# Patient Record
Sex: MALE | Race: WHITE | NOT HISPANIC OR LATINO | Employment: OTHER | URBAN - METROPOLITAN AREA
[De-identification: names, ages, dates, MRNs, and addresses within clinical notes are randomized per-mention and may not be internally consistent; named-entity substitution may affect disease eponyms.]

---

## 2017-01-11 ENCOUNTER — GENERIC CONVERSION - ENCOUNTER (OUTPATIENT)
Dept: OTHER | Facility: OTHER | Age: 62
End: 2017-01-11

## 2017-01-12 ENCOUNTER — ALLSCRIPTS OFFICE VISIT (OUTPATIENT)
Dept: OTHER | Facility: OTHER | Age: 62
End: 2017-01-12

## 2017-01-12 ENCOUNTER — TRANSCRIBE ORDERS (OUTPATIENT)
Dept: ADMINISTRATIVE | Facility: HOSPITAL | Age: 62
End: 2017-01-12

## 2017-01-12 ENCOUNTER — APPOINTMENT (OUTPATIENT)
Dept: LAB | Facility: HOSPITAL | Age: 62
End: 2017-01-12
Attending: SURGERY
Payer: COMMERCIAL

## 2017-01-12 DIAGNOSIS — R10.32 PAIN IN THE GROIN, LEFT: Primary | ICD-10-CM

## 2017-01-12 DIAGNOSIS — R10.30 LOWER ABDOMINAL PAIN: ICD-10-CM

## 2017-01-12 DIAGNOSIS — O01.9 TROPHOBLASTIC DISEASE: Primary | ICD-10-CM

## 2017-01-12 LAB
ANION GAP SERPL CALCULATED.3IONS-SCNC: 6 MMOL/L (ref 4–13)
BUN SERPL-MCNC: 14 MG/DL (ref 5–25)
CALCIUM SERPL-MCNC: 9.4 MG/DL (ref 8.3–10.1)
CHLORIDE SERPL-SCNC: 102 MMOL/L (ref 100–108)
CO2 SERPL-SCNC: 30 MMOL/L (ref 21–32)
CREAT SERPL-MCNC: 1.06 MG/DL (ref 0.6–1.3)
GFR SERPL CREATININE-BSD FRML MDRD: >60 ML/MIN/1.73SQ M
GLUCOSE SERPL-MCNC: 90 MG/DL (ref 65–140)
POTASSIUM SERPL-SCNC: 4.1 MMOL/L (ref 3.5–5.3)
SODIUM SERPL-SCNC: 138 MMOL/L (ref 136–145)

## 2017-01-12 PROCEDURE — 80048 BASIC METABOLIC PNL TOTAL CA: CPT

## 2017-01-12 PROCEDURE — 36415 COLL VENOUS BLD VENIPUNCTURE: CPT

## 2017-01-16 RX ORDER — DULOXETIN HYDROCHLORIDE 60 MG/1
90 CAPSULE, DELAYED RELEASE ORAL DAILY
COMMUNITY
End: 2018-02-28 | Stop reason: ALTCHOICE

## 2017-01-16 RX ORDER — POLYETHYLENE GLYCOL 3350 17 G/17G
17 POWDER, FOR SOLUTION ORAL DAILY
COMMUNITY
End: 2018-02-28 | Stop reason: ALTCHOICE

## 2017-01-18 ENCOUNTER — ALLSCRIPTS OFFICE VISIT (OUTPATIENT)
Dept: OTHER | Facility: OTHER | Age: 62
End: 2017-01-18

## 2017-01-19 ENCOUNTER — GENERIC CONVERSION - ENCOUNTER (OUTPATIENT)
Dept: OTHER | Facility: OTHER | Age: 62
End: 2017-01-19

## 2017-01-19 ENCOUNTER — HOSPITAL ENCOUNTER (OUTPATIENT)
Dept: CT IMAGING | Facility: HOSPITAL | Age: 62
Discharge: HOME | End: 2017-01-19
Attending: SURGERY
Payer: COMMERCIAL

## 2017-01-19 DIAGNOSIS — R10.30 LOWER ABDOMINAL PAIN: ICD-10-CM

## 2017-01-19 PROCEDURE — 72193 CT PELVIS W/DYE: CPT

## 2017-01-19 RX ADMIN — IOHEXOL 100 ML: 350 INJECTION, SOLUTION INTRAVENOUS at 11:58

## 2017-01-24 ENCOUNTER — HOSPITAL ENCOUNTER (OUTPATIENT)
Facility: HOSPITAL | Age: 62
Setting detail: OUTPATIENT SURGERY
Discharge: HOME | End: 2017-01-24
Attending: SURGERY | Admitting: SURGERY
Payer: COMMERCIAL

## 2017-01-24 ENCOUNTER — ANESTHESIA EVENT (OUTPATIENT)
Dept: PERIOP | Facility: HOSPITAL | Age: 62
End: 2017-01-24
Payer: COMMERCIAL

## 2017-01-24 ENCOUNTER — ANESTHESIA (OUTPATIENT)
Dept: PERIOP | Facility: HOSPITAL | Age: 62
End: 2017-01-24
Payer: COMMERCIAL

## 2017-01-24 VITALS
RESPIRATION RATE: 18 BRPM | WEIGHT: 280 LBS | DIASTOLIC BLOOD PRESSURE: 68 MMHG | HEIGHT: 72 IN | SYSTOLIC BLOOD PRESSURE: 127 MMHG | OXYGEN SATURATION: 94 % | HEART RATE: 88 BPM | TEMPERATURE: 99.6 F | BODY MASS INDEX: 37.93 KG/M2

## 2017-01-24 RX ORDER — SODIUM CHLORIDE 9 MG/ML
20 INJECTION, SOLUTION INTRAVENOUS CONTINUOUS
Status: DISCONTINUED | OUTPATIENT
Start: 2017-01-24 | End: 2017-01-24 | Stop reason: HOSPADM

## 2017-01-24 RX ORDER — PROPOFOL 10 MG/ML
INJECTION, EMULSION INTRAVENOUS AS NEEDED
Status: DISCONTINUED | OUTPATIENT
Start: 2017-01-24 | End: 2017-01-24 | Stop reason: SURG

## 2017-01-24 RX ADMIN — PROPOFOL 50 MG: 10 INJECTION, EMULSION INTRAVENOUS at 08:31

## 2017-01-24 RX ADMIN — SODIUM CHLORIDE 20 ML/HR: 0.9 INJECTION, SOLUTION INTRAVENOUS at 07:22

## 2017-01-24 RX ADMIN — PROPOFOL 50 MG: 10 INJECTION, EMULSION INTRAVENOUS at 08:36

## 2017-01-24 RX ADMIN — PROPOFOL 50 MG: 10 INJECTION, EMULSION INTRAVENOUS at 08:24

## 2017-01-24 RX ADMIN — PROPOFOL 50 MG: 10 INJECTION, EMULSION INTRAVENOUS at 08:29

## 2017-01-24 RX ADMIN — PROPOFOL 50 MG: 10 INJECTION, EMULSION INTRAVENOUS at 08:39

## 2017-01-24 RX ADMIN — PROPOFOL 50 MG: 10 INJECTION, EMULSION INTRAVENOUS at 08:27

## 2017-01-24 RX ADMIN — PROPOFOL 50 MG: 10 INJECTION, EMULSION INTRAVENOUS at 08:33

## 2017-01-24 RX ADMIN — PROPOFOL 50 MG: 10 INJECTION, EMULSION INTRAVENOUS at 08:22

## 2017-01-30 ENCOUNTER — GENERIC CONVERSION - ENCOUNTER (OUTPATIENT)
Dept: OTHER | Facility: OTHER | Age: 62
End: 2017-01-30

## 2017-03-22 ENCOUNTER — ALLSCRIPTS OFFICE VISIT (OUTPATIENT)
Dept: OTHER | Facility: OTHER | Age: 62
End: 2017-03-22

## 2017-03-28 ENCOUNTER — ALLSCRIPTS OFFICE VISIT (OUTPATIENT)
Dept: OTHER | Facility: OTHER | Age: 62
End: 2017-03-28

## 2017-04-10 ENCOUNTER — ALLSCRIPTS OFFICE VISIT (OUTPATIENT)
Dept: RADIOLOGY | Facility: CLINIC | Age: 62
End: 2017-04-10
Payer: COMMERCIAL

## 2017-04-24 ENCOUNTER — ALLSCRIPTS OFFICE VISIT (OUTPATIENT)
Dept: RADIOLOGY | Facility: CLINIC | Age: 62
End: 2017-04-24
Payer: COMMERCIAL

## 2017-05-01 ENCOUNTER — ALLSCRIPTS OFFICE VISIT (OUTPATIENT)
Dept: OTHER | Facility: OTHER | Age: 62
End: 2017-05-01

## 2017-05-09 ENCOUNTER — GENERIC CONVERSION - ENCOUNTER (OUTPATIENT)
Dept: OTHER | Facility: OTHER | Age: 62
End: 2017-05-09

## 2017-06-02 DIAGNOSIS — R73.9 HYPERGLYCEMIA: ICD-10-CM

## 2017-06-02 DIAGNOSIS — E78.5 HYPERLIPIDEMIA: ICD-10-CM

## 2017-06-02 DIAGNOSIS — F11.20 UNCOMPLICATED OPIOID DEPENDENCE (HCC): ICD-10-CM

## 2017-06-02 DIAGNOSIS — G89.4 CHRONIC PAIN SYNDROME: ICD-10-CM

## 2017-06-09 ENCOUNTER — APPOINTMENT (OUTPATIENT)
Dept: LAB | Facility: HOSPITAL | Age: 62
End: 2017-06-09
Payer: COMMERCIAL

## 2017-06-09 ENCOUNTER — TRANSCRIBE ORDERS (OUTPATIENT)
Dept: ADMINISTRATIVE | Facility: HOSPITAL | Age: 62
End: 2017-06-09

## 2017-06-09 DIAGNOSIS — R73.9 HYPERGLYCEMIA: ICD-10-CM

## 2017-06-09 DIAGNOSIS — E78.5 HYPERLIPIDEMIA: ICD-10-CM

## 2017-06-09 LAB
ALBUMIN SERPL BCP-MCNC: 3.7 G/DL (ref 3.5–5)
ALP SERPL-CCNC: 58 U/L (ref 46–116)
ALT SERPL W P-5'-P-CCNC: 36 U/L (ref 12–78)
ANION GAP SERPL CALCULATED.3IONS-SCNC: 6 MMOL/L (ref 4–13)
AST SERPL W P-5'-P-CCNC: 21 U/L (ref 5–45)
BILIRUB SERPL-MCNC: 0.5 MG/DL (ref 0.2–1)
BUN SERPL-MCNC: 14 MG/DL (ref 5–25)
CALCIUM SERPL-MCNC: 8.9 MG/DL (ref 8.3–10.1)
CHLORIDE SERPL-SCNC: 104 MMOL/L (ref 100–108)
CHOLEST SERPL-MCNC: 151 MG/DL (ref 50–200)
CO2 SERPL-SCNC: 31 MMOL/L (ref 21–32)
CREAT SERPL-MCNC: 1.06 MG/DL (ref 0.6–1.3)
ERYTHROCYTE [DISTWIDTH] IN BLOOD BY AUTOMATED COUNT: 16.1 % (ref 11.6–15.1)
EST. AVERAGE GLUCOSE BLD GHB EST-MCNC: 117 MG/DL
GFR SERPL CREATININE-BSD FRML MDRD: >60 ML/MIN/1.73SQ M
GLUCOSE P FAST SERPL-MCNC: 106 MG/DL (ref 65–99)
HBA1C MFR BLD: 5.7 % (ref 4.2–6.3)
HCT VFR BLD AUTO: 48.8 % (ref 36.5–49.3)
HDLC SERPL-MCNC: 46 MG/DL (ref 40–60)
HGB BLD-MCNC: 16.2 G/DL (ref 12–17)
LDLC SERPL CALC-MCNC: 82 MG/DL (ref 0–100)
MCH RBC QN AUTO: 29.5 PG (ref 26.8–34.3)
MCHC RBC AUTO-ENTMCNC: 33.2 G/DL (ref 31.4–37.4)
MCV RBC AUTO: 89 FL (ref 82–98)
PLATELET # BLD AUTO: 245 THOUSANDS/UL (ref 149–390)
PMV BLD AUTO: 8.5 FL (ref 8.9–12.7)
POTASSIUM SERPL-SCNC: 3.8 MMOL/L (ref 3.5–5.3)
PROT SERPL-MCNC: 8 G/DL (ref 6.4–8.2)
RBC # BLD AUTO: 5.49 MILLION/UL (ref 3.88–5.62)
SODIUM SERPL-SCNC: 141 MMOL/L (ref 136–145)
TRIGL SERPL-MCNC: 115 MG/DL
TSH SERPL DL<=0.05 MIU/L-ACNC: 1.34 UIU/ML (ref 0.36–3.74)
WBC # BLD AUTO: 6.21 THOUSAND/UL (ref 4.31–10.16)

## 2017-06-09 PROCEDURE — 80053 COMPREHEN METABOLIC PANEL: CPT

## 2017-06-09 PROCEDURE — 80061 LIPID PANEL: CPT

## 2017-06-09 PROCEDURE — 85027 COMPLETE CBC AUTOMATED: CPT

## 2017-06-09 PROCEDURE — 36415 COLL VENOUS BLD VENIPUNCTURE: CPT

## 2017-06-09 PROCEDURE — 84443 ASSAY THYROID STIM HORMONE: CPT

## 2017-06-09 PROCEDURE — 83036 HEMOGLOBIN GLYCOSYLATED A1C: CPT

## 2017-06-19 ENCOUNTER — ALLSCRIPTS OFFICE VISIT (OUTPATIENT)
Dept: OTHER | Facility: OTHER | Age: 62
End: 2017-06-19

## 2017-06-20 ENCOUNTER — ALLSCRIPTS OFFICE VISIT (OUTPATIENT)
Dept: OTHER | Facility: OTHER | Age: 62
End: 2017-06-20

## 2017-06-22 ENCOUNTER — APPOINTMENT (OUTPATIENT)
Dept: LAB | Facility: HOSPITAL | Age: 62
End: 2017-06-22
Payer: COMMERCIAL

## 2017-06-22 DIAGNOSIS — F11.20 UNCOMPLICATED OPIOID DEPENDENCE (HCC): ICD-10-CM

## 2017-06-22 DIAGNOSIS — G89.4 CHRONIC PAIN SYNDROME: ICD-10-CM

## 2017-06-22 PROCEDURE — 36415 COLL VENOUS BLD VENIPUNCTURE: CPT

## 2017-06-22 PROCEDURE — 84403 ASSAY OF TOTAL TESTOSTERONE: CPT

## 2017-06-22 PROCEDURE — 84402 ASSAY OF FREE TESTOSTERONE: CPT

## 2017-06-23 LAB
TESTOST FREE SERPL-MCNC: 4.6 PG/ML (ref 6.6–18.1)
TESTOST SERPL-MCNC: 405 NG/DL (ref 348–1197)
TESTOSTERONE COMMENT: ABNORMAL

## 2017-06-26 ENCOUNTER — GENERIC CONVERSION - ENCOUNTER (OUTPATIENT)
Dept: OTHER | Facility: OTHER | Age: 62
End: 2017-06-26

## 2017-08-28 ENCOUNTER — ALLSCRIPTS OFFICE VISIT (OUTPATIENT)
Dept: OTHER | Facility: OTHER | Age: 62
End: 2017-08-28

## 2017-09-12 ENCOUNTER — GENERIC CONVERSION - ENCOUNTER (OUTPATIENT)
Dept: OTHER | Facility: OTHER | Age: 62
End: 2017-09-12

## 2017-09-12 ENCOUNTER — ALLSCRIPTS OFFICE VISIT (OUTPATIENT)
Dept: OTHER | Facility: OTHER | Age: 62
End: 2017-09-12

## 2017-09-15 ENCOUNTER — GENERIC CONVERSION - ENCOUNTER (OUTPATIENT)
Dept: OTHER | Facility: OTHER | Age: 62
End: 2017-09-15

## 2017-10-09 ENCOUNTER — GENERIC CONVERSION - ENCOUNTER (OUTPATIENT)
Dept: OTHER | Facility: OTHER | Age: 62
End: 2017-10-09

## 2017-10-11 ENCOUNTER — GENERIC CONVERSION - ENCOUNTER (OUTPATIENT)
Dept: OTHER | Facility: OTHER | Age: 62
End: 2017-10-11

## 2017-10-13 ENCOUNTER — ALLSCRIPTS OFFICE VISIT (OUTPATIENT)
Dept: RADIOLOGY | Facility: CLINIC | Age: 62
End: 2017-10-13
Payer: COMMERCIAL

## 2017-10-26 NOTE — PROGRESS NOTES
Assessment  1  Chronic pain syndrome (338 4) (G89 4)   2  Lumbar post-laminectomy syndrome (722 83) (M96 1)   3  Lumbar degenerative disc disease (722 52) (M51 36)   4  Chronic lumbar radiculopathy (724 4) (M54 16)   5  Continuous opioid dependence (304 01) (F11 20)    Plan  Chronic lumbar radiculopathy    · Hydrocodone-Acetaminophen  MG Oral Tablet; Take 1/2-1 tablet QID PRN  for break through pain; Do Not Fill Before: [id number]   Rx By: Sudhir Wolf; Dispense: 30 Days ; #:70 Tablet; Refill: 0;For: Chronic lumbar radiculopathy; TING = Y; Print Rx  Chronic lumbar radiculopathy, Chronic pain syndrome, Lumbar degenerative disc  disease, Peripheral neuropathy    · DULoxetine HCl - 60 MG Oral Capsule Delayed Release Particles; TAKE 1 PILL  DAILY   Rx By: Sudhir Wolf; Dispense: 90 Days ; #:90 Capsule Delayed Release Particles; Refill: 0;For: Chronic lumbar radiculopathy, Chronic pain syndrome, Lumbar degenerative disc disease, Peripheral neuropathy; TING = N; Verified Transmission to [address]; Last Updated By: System, [employer]; 9/12/2017 8:28:47 AM  Lumbar post-laminectomy syndrome    · DULoxetine HCl - 30 MG Oral Capsule Delayed Release Particles; Take 1 PO HS  with 60 mg to equal 90 mg daily   Rx By: Sudhir Wolf; Dispense: 90 Days ; #:90 Capsule Delayed Release Particles; Refill: 0;For: Lumbar post-laminectomy syndrome; TING = N; Verified Transmission to [address]; Last Updated By: System, [employer]; 9/12/2017 8:35:59 AM  Unlinked    · TraZODone HCl - 150 MG Oral Tablet   Dispense: 30 Days ; #:30 Tablet; Refill: 2; TING = N; Record; Last Updated By: Chico Humphreys; 9/12/2017 8:04:16 AM    Discussion/Summary    The patient is a 53-year-old male with history of chronic pain syndrome secondary to lumbar postlaminectomy syndrome, lumbar canal stenosis, chronic lumbar radiculopathy, lumbar degenerative disc disease, and peripheral neuropathy   Patient has permanent implanted [employer] spinal cord stimulator  At this time patient continues with ongoing low back pain with radicular symptoms consistent with lumbar postlaminectomy syndrome  Patient reports that he is well managed with the use of his spinal cord stimulator and medications for his pain  the patient reports that duloxetine 60 mg is not as efficient as it was when starting the medication therefore will increase his duloxetine to 90 mg daily  He was given a prescription for 60 mg 1 tablet at bedtime, and a prescription for 30 mg 1 tablet at bedtime to equal 90 mg total  Patient was instructed to call the office if he develops medication side effects or with worsening of symptoms  Patient will continue on hydrocodone/acetaminophen 10/325 mg half a tablet to one tablet 4 times a day as needed for pain  I had a thorough conversation with patient about his medication regimen  We discussed once he increased to 90 mg of duloxetine for 1 month he might not need as much hydrocodone/acetaminophen therefore I will continue to give him 75 tablets for next month, and then decrease down to 70 tablets a month after  Patient verbalized understanding  He was given a prescription for this month with a do not fill date of 9/29/2017 for 75 tablets  He was given a prescription for the following month with a do not fill date of 10/27/2017 for 70 tablets  recently had his spinal cord stimulator reprogramed 3 weeks ago is continuing to provide him good relief  patient was office today he was educated on taking benzodiazepines and opiate medication  He was cautioned that taking these 2 medications concurrently can cause increased respiratory depression and possible death  Patient verbalized understanding  He stated he would not take his opioid medications concurrently with his benzodiazepine medication  Patient also reported that he takes his benzodiazepine medication very sparingly, and on a as-needed basis only    prescription monitoring drug program report was reviewed and was appropriate  risk of opioid medications, including dependence, addiction and tolerance were explained to the patient  The patient understands and agrees to use these medications only as prescribed  I have fully discussed the potential side effects of the medication with the patient, which include, but are not limited to, constipation, drowsiness, addiction, impaired judgment and risk of fatal overdose as not taken as prescribed  I have warned the patient that sharing medications is a felony  I warned against driving while taking sedating medications  At this point in time, the patient is showing no signs of addiction, abuse, diversion or suicidal ideation  extra prescription for this patient was printed in error, prescription was voided and scanned into patients chart  The patient has the current Goals: Manage pain to tolerable level so patient can continue with activities of daily living  The patent has the current Barriers: None  Patient is able to Self-Care  Possible side effects of new medications were reviewed with the patient/guardian today  The treatment plan was reviewed with the patient/guardian  The patient/guardian understands and agrees with the treatment plan   The patient was counseled regarding instructions for management,-- risk factor reductions,-- prognosis,-- patient and family education,-- impressions,-- risks and benefits of treatment options-- and-- importance of compliance with treatment  total time of encounter was 25 minutes  There are risks associated with opiod medications, including dependence, addiction and tolerance  The patient understands and agrees to use these medications only as prescribed  Potential side effects of the medications include, but are not limited to, constipation, drowsiness, addiction, impaired judgment and risk of fatal overdose if not taken as prescribed  Sharing medications is a felony   At this point and time, the patient is showing no signs of addiction, abuse, diversion or suicidal ideation  Chief Complaint  1  Pain  Back Pain      History of Present Illness  The patient is a 46-year-old male with history of Chronic pain syndrome secondary to lumbar postlaminectomy syndrome, lumbar canal stenosis, chronic lumbar radiculopathy, lumbar degenerative disc disease, and peripheral neuropathy  Patient has permanent implanted [employer] spinal cord stimulator  Patient was last seen in the office on June 20, 2017, where he was continued on Hydrocodone/acetaminophen 10/325 mg half a tablet to 1 tablet 4 times a day as needed for pain  At this time patient presents with ongoing low back pain that radiates into his right calf and bilateral anterior aspect of his lower legs  He reports that his symptoms and pain are unchanged since last visit  His pain is constant occurring throughout the day  He describes his pain as dull aching, sharp, shooting, numbness, pins and needles, stabbing  is currently taking Hydrocodone/acetaminophen 10/325 mg half a tablet to one tablet 4 times a day as needed for pain, duloxetine 60 mg 1 tablet at bedtime  Patient reports that these medications provide him overall 60% pain relief  However patient reports recently that the duloxetine is not working as efficiently as it had been in the past  He denies bowel or bladder issues or medication side effects  reports that he had spinal cord stimulator reprogramming 3 weeks ago with positive results  Watson Amezquita presents with complaints of constant episodes of moderate bilateral lower back, left buttock, bilateral lower leg, bilateral ankle and bilateral foot pain, described as sharp, dull and aching, radiating to the lower back and bilateral lower extremity  On a scale of 1 to 10, the patient rates the pain as 7  Symptoms are unchanged        Review of Systems    Constitutional: no fever,-- no recent weight gain-- and-- no recent weight loss  Eyes: no double vision-- and-- no blurry vision  Cardiovascular: no chest pain,-- no palpitations-- and-- no lower extremity edema  Respiratory: no complaints of shortness of breath-- and-- no wheezing  Musculoskeletal: difficulty walking,-- muscle weakness,-- joint stiffness,-- pain in extremity -- and-- decreased range of motion, but-- no joint swelling-- and-- no limb swelling  Neurological: no dizziness,-- no difficulty swallowing,-- no memory loss,-- no loss of consciousness-- and-- no seizures  Gastrointestinal: no nausea,-- no vomiting,-- no constipation-- and-- no diarrhea  Genitourinary: no difficulty initiating urine stream,-- no genital pain-- and-- no frequent urination  Integumentary: no complaints of skin rash  Psychiatric: no depression  Endocrine: no excessive thirst,-- no adrenal disease,-- no hypothyroidism-- and-- no hyperthyroidism  Hematologic/Lymphatic: no tendency for easy bruising-- and-- no tendency for easy bleeding  ROS reviewed  Active Problems  1  Bursitis of hip (726 5) (M70 70)   2  Carpal tunnel syndrome, unspecified laterality (354 0) (G56 00)   3  Chronic bilateral low back pain with bilateral sciatica (724 2,724 3,338 29)   (M54 42,M54 41,G89 29)   4  Chronic lumbar radiculopathy (724 4) (M54 16)   5  Chronic pain syndrome (338 4) (G89 4)   6  Constipation due to opioid therapy (564 09,E935 2) (K59 03,T40 2X5A)   7  Continuous opioid dependence (304 01) (F11 20)   8  Depression (311) (F32 9)   9  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)   10  Edema (782 3) (R60 9)   11  Encounter for long-term opiate analgesic use (V58 69) (Z79 891)   12  Hyperglycemia (790 29) (R73 9)   13  Hyperlipidemia (272 4) (E78 5)   14  Hypertension (401 9) (I10)   15  Lateral epicondylitis (726 32) (M77 10)   16  Left groin pain (789 09) (R10 32)   17  Left inguinal hernia (550 90) (K40 90)   18  Lumbar canal stenosis (724 02) (M48 06)   19   Lumbar degenerative disc disease (722 52) (M51 36)   20  Lumbar post-laminectomy syndrome (722 83) (M96 1)   21  Lumbar sprain, sequela (905 7,847 2) (S33 5XXS)   22  Morbid obesity due to excess calories (278 01) (E66 01)   23  Neuropathy, ilioinguinal nerve, 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)   24  Obesity (278 00) (E66 9)   25  Pain in left hip (719 45) (M25 552)   26  Partial thickness burn of right foot, initial encounter (945 22) (T25 221A)   27  Peripheral neuropathy (356 9) (G62 9)   28  Persistent insomnia (307 42) (G47 00)   29  Rectus diastasis (728 84) (M62 08)   30  Restless legs syndrome (333 94) (G25 81)   31  Retrolisthesis of vertebrae (738 4) (M43 10)   32  Staphylococcus infection (041 10) (B95 8)   33  Thoracic radiculopathy (724 4) (M54 14)   34  Umbilical hernia without obstruction and without gangrene (553 1) (K42 9)    Past Medical History  1  History of Acute low back pain (724 2) (M54 5)   2  History of Deep vein thrombosis of left upper extremity (453 82) (I82 622)   3  History of Diverticula of colon (562 10) (K57 30)   4  History of angina pectoris (V12 59) (Z86 79)   5  History of backache (V13 59) (Z87 39)   6  History of sleep disturbance (V13 89) (Z87 898)   7  Denied: History of substance abuse   8  History of Leg numbness (782 0) (R20 0)   9  History of Leg pain, bilateral (729 5) (M79 604,M79 605)   10  History of Leg weakness (729 89) (R29 898)   11  History of Methicillin Resistant Staphylococcus Aureus Septicemia (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 04)    The active problems and past medical history were reviewed and updated today  Surgical History  1  History of Arthroscopy Knee Right   2  History of Back Surgery   3  History of Complete Colonoscopy   4  History of Elbow Surgery   5  History of Incision And Drainage Of Skin Abscess   6  History of Neck Surgery   7  History of Shoulder Surgery    The surgical history was reviewed and updated today  Family History  Mother    1  Family history of Carcinoma   2  Denied: Family history of substance abuse   3  Family history of Heart Disease (V17 49)   4  Denied: Family history of Mental health problem  Father    5  Family history of Carcinoma   6  Denied: Family history of substance abuse   7  Family history of Heart Disease (V17 49)   8  Denied: Family history of Mental health problem    The family history was reviewed and updated today  Social History   · Being A Social Drinker   · Currently sexually active   · Disabled   · Marital History - Currently    · Never a smoker   · No drug use   · No known risk factors   · No known STD risk factors   · Non-smoker (V49 89) (Z78 9)   · Three children  The social history was reviewed and updated today  The social history was reviewed and is unchanged  Current Meds   1  Aleve 220 MG Oral Tablet; Take 1 tablet twice daily; Therapy: (Maribel King) to Recorded   2  Aspirin 81 MG CAPS; take 1 capsule daily; Therapy: (Maribel King) to Recorded   3  Bisoprolol-Hydrochlorothiazide 2 5-6 25 MG Oral Tablet; take one tablet by mouth every   day; Therapy: 64MVO8997 to (Last Rx:92Bxc9074)  Requested for: 22OJB2198 Ordered   4  ClonazePAM 0 5 MG Oral Tablet; TAKE ONE TABLET BY MOUTH DAILY IN THE EVENING   FOR RESTLESS LEGS; Therapy: 18RRA7704 to (Last Rx:31May2017)  Requested for: 53HCK4176 Ordered   5  Daily Multivitamin TABS; TAKE 1 TABLET DAILY; Therapy: (Maribel King) to Recorded   6  DiazePAM 10 MG Oral Tablet; TAKE TABLET  1/2-1 tablet as needed daily; Last   Rx:19Jun2017 Ordered   7  DULoxetine HCl - 60 MG Oral Capsule Delayed Release Particles; TAKE 1 PILL DAILY; Therapy: 16Nbn2099 to (Evaluate:13Fif0647)  Requested for: 20Jun2017; Last   Rx:20Jun2017 Ordered   8  Hydrocodone-Acetaminophen  MG Oral Tablet; Take 1/2-1 tablet QID PRIN for   break through pain; Therapy: 69GQN4434 to (Evaluate:66Uho6800)  Requested for: 20Jun2017; Last   Rx:20Jun2017 Ordered   9   Lactulose 10 GM/15ML Oral Solution; TAKE 1 TABLESPOONFUL DAILY prn for   constipation; Therapy: 37OHI7066 to (Evaluate:30Smc1998)  Requested for: 20Jun2017; Last   Rx:20Jun2017 Ordered   10  OxyMORphone HCl ER 10 MG Oral Tablet Extended Release 12 Hour; Take 1 pill daily    PRN for severe break through pain days; Last Rx:18Jan2017 Ordered   11  Rosuvastatin Calcium 10 MG Oral Tablet; TAKE 1 TABLET DAILY; Therapy: 01Gok5702 to (Evaluate:37Sap9843)  Requested for: 63Gwj4004; Last    Rx:75Dza1407 Ordered   12  Silver Sulfadiazine 1 % External Cream; APPLY TO AFFECTED AREA 3 TIMES DAILY; Therapy: 97Zkl7373 to (Evaluate:23Oct2017)  Requested for: 59Jcp4372; Last    Rx:96Zht5872 Ordered   13  TraZODone HCl - 150 MG Oral Tablet; TAKE 1 TABLET AT BEDTIME Recorded    The medication list was reviewed and updated today  Allergies  1  fentanyl   2  Penicillins   3  Pravachol TABS   4  Sulfa Drugs   5  Vytorin TABS    Vitals  Vital Signs    Recorded: 12Sep2017 08:02AM   Temperature 98 5 F   Heart Rate 80   Systolic 332   Diastolic 80   Height 5 ft 11 in   Weight 276 lb 12 96 oz   BMI Calculated 38 61   BSA Calculated 2 42   Pain Scale 7     Physical Exam    Constitutional   General appearance: Abnormal   overweight  Eyes   Sclera: anicteric   HEENT   Hearing grossly intact  Pulmonary   Respiratory effort: Even and unlabored  Skin   Skin and subcutaneous tissue: Normal without rashes or lesions, well hydrated  Psychiatric   Mood and affect: Mood and affect appropriate  Musculoskeletal   Gait and station: Normal     Lumbar/Sacral Spine examination demonstrates Lumbosacral Spine:   Appearance: Normal    Tenderness: None  ROM Lumbosacral Spine: Full  ROM Hips: Full   Foot and ankle strength was normal bilaterally  Knee strength was normal bilaterally  Hip strength was normal bilaterally     Evaluation of Muscle Stretch Reflexes on the right side demonstrates muscle stretch reflexes equal and symmetric right lower limbs    Evaluation of Muscle Stretch Reflexes on the left side demonstrates muscle stretch reflexes equal and symmetric right lower limbs        Future Appointments    Date/Time Provider Specialty Site   11/07/2017 08:00 AM MARIA GUADALUPE [facility]   12/18/2017 06:20 PM Mary Valenzuela  [facility] MD     Signatures   Electronically signed by : MARIA GUADALUPE Agosto; Sep 12 2017 10:43AM EST                       (Author)    Electronically signed by : Maren Marcos DO; Sep 12 2017 10:52AM EST

## 2017-11-07 ENCOUNTER — ALLSCRIPTS OFFICE VISIT (OUTPATIENT)
Dept: OTHER | Facility: OTHER | Age: 62
End: 2017-11-07

## 2017-11-08 NOTE — PROGRESS NOTES
Assessment  1  Chronic bilateral low back pain with bilateral sciatica (724 2,724 3,338 29)   (M54 42,M54 41,G89 29)   2  Chronic lumbar radiculopathy (724 4) (M54 16)   3  Continuous opioid dependence (304 01) (F11 20)   4  Chronic pain syndrome (338 4) (G89 4)   5  Encounter for long-term opiate analgesic use (V58 69) (Z79 891)   6  Lumbar canal stenosis (724 02) (M48 061)   7  Lumbar degenerative disc disease (722 52) (M51 36)   8  Lumbar post-laminectomy syndrome (722 83) (M96 1)    Plan   Chronic lumbar radiculopathy    · Hydrocodone-Acetaminophen  MG Oral Tablet; Take 1 PO QID PRN for  pain   Rx By: Dedrick Lloyd; Dispense: 30 Days ; #:85 Tablet; Refill: 0;For: Chronic lumbar radiculopathy; TING = Y; Print Rx  Chronic lumbar radiculopathy, Chronic pain syndrome, Lumbar degenerative disc  disease, Peripheral neuropathy    · From  DULoxetine HCl - 60 MG Oral Capsule Delayed Release Particles TAKE  1 PILL DAILY To DULoxetine HCl - 60 MG Oral Capsule Delayed Release Particles Take  1 PO bid   Rx By: Dedrick Lloyd; Dispense: 90 Days ; #:90 Capsule Delayed Release Particles; Refill: 0;For: Chronic lumbar radiculopathy, Chronic pain syndrome, Lumbar degenerative disc disease, Peripheral neuropathy; TING = N; Record  Chronic pain syndrome    · OxyMORphone HCl ER 10 MG Oral Tablet Extended Release 12 Hour   Rx By: Dedrick Lloyd; Dispense: 15 Days ; #:15 Tablet Extended Release 12 Hour; Refill: 0;For: Chronic pain syndrome; TING = N; Print Rx  Chronic pain syndrome, Continuous opioid dependence, Encounter for long-term opiate  analgesic use    · Procedure Flowsheet; Status:Complete;   Done: 65ZFP0510 08:17AM   Performed: In Office; RYN:[id number]; Ordered; For:Chronic pain syndrome, Continuous opioid dependence, Encounter for long-term opiate analgesic use; Ordered By:Skip Fleming;  Constipation due to opioid therapy    · Lactulose 10 GM/15ML Oral Solution; TAKE 1 TABLESPOONFUL DAILY prn for  constipation   Rx By: Beto Thompson; Dispense: 30 Days ; #:1 X 237 ML Bottle; Refill: 2;For: Constipation due to opioid therapy; TING = N; Verified Transmission to [facility]  Lumbar post-laminectomy syndrome    · DULoxetine HCl - 30 MG Oral Capsule Delayed Release Particles   Rx By: Sudhir Wolf; Dispense: 90 Days ; #:90 Capsule Delayed Release Particles; Refill: 0;For: Lumbar post-laminectomy syndrome; TING = N; Sent To: [facility]; Last Updated By: Beto Thompson; 11/7/2017 8:17:02 AM    Follow-up visit in 2 months Evaluation and Treatment  Follow-up  Status: Hold For - Scheduling  Requested for: 27QIY8686  Ordered; For: Chronic pain syndrome;  Ordered By: Beto Thompson  Performed:   Due: 99EBR8013     Discussion/Summary    While the patient was in the office today, I discussed with the patient that at this point time I do feel that his current pain symptoms could be worsening with the changes in the weather as a result of the osteoarthritis  I explained to the patient that at this point I feel would be in his best interest to further titrate the Cymbalta to 60 mg twice a day again and see how he does as he has been down on the 90 mg dosage for the past several months, without side effects and may be going up on the Cymbalta will provide relief, without side effects  However, the patient is to let me know how he is doing on the increased dosage as he will need a refill in the next week or so  He can continue with the rest of his medication regimen as prescribed, however, I did temporarily increase the p r n  Norco up to 90 pills for 30 days for the 1st month and then down to 85 pills for 30 days for the 2nd month  I advised the patient that if they experience any side effects or issues with the changes in their medication regiment, they should give our office a call to discuss  I also advised the patient not to drive or operate machinery until they see how the changes in the medication regimen affects them   The patient was agreeable and verbalized an understanding  encouraged him to continue to use his spinal cord stimulator and if he feels that he needs any reprogramming, he should contact the [address] [place] [address] team  The patient was agreeable and verbalized an understanding  the patient was in the office today, I did review the patient's report on the [facility] web site and found it to be appropriate for what is being prescribed and I reviewed it for any inconsistencies or evidence of multiple prescribers/drug diversion  A copy of the patient's report can be found in their chart  oral drug screen swab was collected at today's office visit  The swab will be sent for confirmatory testing  The drug screen is medically necessary because the patient is either dependent on opioid medication or is being considered for opioid medication therapy and the results could impact ongoing or future treatment  The drug screen is to evaluate for the presence or absence of prescribed, non-prescribed, and/or illicit drugs/substances  patient was not picked for a pill count today, but he did bring his medications as required  patient was given a 2 month supply of prescriptions with a Do Not Fill date(s) of December 5, 2017  risk of opioid medications, including dependence, addiction and tolerance were explained to the patient  The patient understands and agrees to use these medications only as prescribed  I have fully discussed the potential side effects of the medication with the patient, which include, but are not limited to, constipation, drowsiness, addiction, impaired judgment and risk of fatal overdose as not taken as prescribed  I have warned the patient that sharing medications is a felony  I warned against driving while taking sedating medications  At this point in time, the patient is showing no signs of addiction, abuse, diversion or suicidal ideation           The patient has the current Goals: To hopefully see some improvement in his overall pain symptoms as a result of the increase in the Cymbalta and hopefully see better overall relief of his pain symptoms with the changes in his medication regimen  The patent has the current Barriers: Chronic pain syndrome and opioid dependence  Patient is able to Self-Care  Educational resources provided: While the patient was in the office today, I explained to them that although I am not prescribing their anti-anxiety/benzodiazapine medications, it has been deemed a black box warning for any patients who are on these types of medications to also be on opioid medications because of the risk for respiratory depression and death  I encouraged the patient to discuss this medication(s) with their prescriber to see if there are any alternative medications  I also explained that if they need to continue with this type of medication, we will have to consider decreasing any opioid medications by at least 25-50% and/or consider titrating off of opioid medications all together as a pre-caution to prevent any harm to the patient or any one else  The patient verbalized an understanding  A signed copy of the patient education sheet reviewing the risks and reasons for prescribing this medication is available in the patient's chart and a copy was also sent home with the patient  The treatment plan was reviewed with the patient/guardian  The patient/guardian understands and agrees with the treatment plan   The patient was counseled regarding instructions for management,-- prognosis,-- patient and family education,-- risks and benefits of treatment options-- and-- importance of compliance with treatment  total time of encounter was 25 minutes  Chief Complaint  1  Pain  Chronic low back and leg pain, worsened/stable  Left groin and abdominal pain, stable  History of Present Illness  The patient presents today for a follow-up office visit   The patient is currently being treated for his chronic low back and lower extremity radicular symptoms and is status post a permanent spinal cord stim implantation  He reports that with the weather changes of the past 3-4 weeks his pain has been worse, but is still manageable as long as he can take at least 2 or 3 of the p r n  [place] a day as he is no longer taking the Opana ER  The patient is status post a repeat bilateral S1 transforaminal epidural steroid injection on October 13, 2017 with Dr Ravi Santos, which she reports provided moderate relief for about 3-4 days  He reports that at this point he does not know the wants to proceed with any repeat injections and that his stimulator is still helping and overall his medication regimen treatment plans providing 30-40% relief of his pain symptoms, without any significant side effects or issues  He feels that his current increase in pain is just related to the weather changes and hopefully as thing stabilize it will get better  Sailaja Brady presents with complaints of constant episodes of moderate bilateral lower back, right hip, bilateral lower leg, bilateral ankle and bilateral foot pain, described as sharp and burning, radiating to the lower back and bilateral lower extremity  On a scale of 1 to 10, the patient rates the pain as 8  Symptoms are worsening  Review of Systems    Constitutional: no fever,-- no recent weight gain-- and-- no recent weight loss  Eyes: no double vision-- and-- no blurry vision  Cardiovascular: no chest pain,-- no palpitations-- and-- no lower extremity edema  Respiratory: no complaints of shortness of breath-- and-- no wheezing  Musculoskeletal: difficulty walking,-- muscle weakness,-- joint stiffness,-- pain in extremity -- and-- decreased range of motion, but-- no joint swelling-- and-- no limb swelling  Neurological: no dizziness,-- no difficulty swallowing,-- no memory loss,-- no loss of consciousness-- and-- no seizures  Gastrointestinal: constipation, but-- no nausea,-- no vomiting-- and-- no diarrhea  Genitourinary: no difficulty initiating urine stream,-- no genital pain-- and-- no frequent urination  Integumentary: no complaints of skin rash  Psychiatric: no depression  Endocrine: no excessive thirst,-- no adrenal disease,-- no hypothyroidism-- and-- no hyperthyroidism  Hematologic/Lymphatic: no tendency for easy bruising-- and-- no tendency for easy bleeding  Active Problems  1  Bursitis of hip (726 5) (M70 70)   2  Carpal tunnel syndrome, unspecified laterality (354 0) (G56 00)   3  Chronic bilateral low back pain with bilateral sciatica (724 2,724 3,338 29)   (M54 42,M54 41,G89 29)   4  Chronic lumbar radiculopathy (724 4) (M54 16)   5  Chronic pain syndrome (338 4) (G89 4)   6  Constipation due to opioid therapy (564 09,E935 2) (K59 03,T40 2X5A)   7  Continuous opioid dependence (304 01) (F11 20)   8  Depression (311) (F32 9)   9  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)   10  Edema (782 3) (R60 9)   11  Encounter for long-term opiate analgesic use (V58 69) (Z79 891)   12  Hyperglycemia (790 29) (R73 9)   13  Hyperlipidemia (272 4) (E78 5)   14  Hypertension (401 9) (I10)   15  Lateral epicondylitis (726 32) (M77 10)   16  Left groin pain (789 09) (R10 32)   17  Left inguinal hernia (550 90) (K40 90)   18  Lumbar canal stenosis (724 02) (M48 061)   19  Lumbar degenerative disc disease (722 52) (M51 36)   20  Lumbar post-laminectomy syndrome (722 83) (M96 1)   21  Lumbar sprain, sequela (905 7,847 2) (S33 5XXS)   22  Morbid obesity due to excess calories (278 01) (E66 01)   23  Neuropathy, ilioinguinal nerve, 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)   24  Obesity (278 00) (E66 9)   25  Pain in left hip (719 45) (M25 552)   26  Partial thickness burn of right foot, initial encounter (905 22) (T25 221A)   27  Peripheral neuropathy (356 9) (G62 9)   28  Persistent insomnia (307 42) (G47 00)   29   Rectus diastasis (819 84) (M62 08)   30  Restless legs syndrome (333 94) (G25 81)   31  Retrolisthesis of vertebrae (738 4) (M43 10)   32  Staphylococcus infection (041 10) (B95 8)   33  Thoracic radiculopathy (724 4) (M54 14)   34  Umbilical hernia without obstruction and without gangrene (553 1) (K42 9)    Past Medical History  1  History of Acute low back pain (724 2) (M54 5)   2  History of Deep vein thrombosis of left upper extremity (453 82) (I82 622)   3  History of Diverticula of colon (562 10) (K57 30)   4  History of angina pectoris (V12 59) (Z86 79)   5  History of backache (V13 59) (Z87 39)   6  History of sleep disturbance (V13 89) (Z87 898)   7  Denied: History of substance abuse   8  History of Leg numbness (782 0) (R20 0)   9  History of Leg pain, bilateral (729 5) (M79 604,M79 605)   10  History of Leg weakness (729 89) (R29 898)   11  History of Methicillin Resistant Staphylococcus Aureus Septicemia (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)    The active problems and past medical history were reviewed and updated today  Surgical History  1  History of Arthroscopy Knee Right   2  History of Back Surgery   3  History of Complete Colonoscopy   4  History of Elbow Surgery   5  History of Incision And Drainage Of Skin Abscess   6  History of Neck Surgery   7  History of Shoulder Surgery    The surgical history was reviewed and updated today  Family History  Mother    1  Family history of Carcinoma   2  Denied: Family history of substance abuse   3  Family history of Heart Disease (V17 49)   4  Denied: Family history of Mental health problem  Father    5  Family history of Carcinoma   6  Denied: Family history of substance abuse   7  Family history of Heart Disease (V17 49)   8  Denied: Family history of Mental health problem    The family history was reviewed and updated today         Social History   · Being A Social Drinker   · Currently sexually active   · Disabled   · Marital History - Currently    · Never a smoker   · No drug use   · No known risk factors   · No known STD risk factors   · Non-smoker (V49 89) (Z78 9)   · Three children  The social history was reviewed and updated today  The social history was reviewed and is unchanged  Current Meds   1  Aleve 220 MG Oral Tablet; Take 1 tablet twice daily; Therapy: (Larnell Clear) to Recorded   2  Aspirin 81 MG CAPS; take 1 capsule daily; Therapy: (Larnell Clear) to Recorded   3  Bisoprolol-Hydrochlorothiazide 2 5-6 25 MG Oral Tablet; take one tablet by mouth every   day; Therapy: 85KHL8941 to (Last Rx:31May2017)  Requested for: 27IXX1501 Ordered   4  ClonazePAM 0 5 MG Oral Tablet; TAKE ONE TABLET BY MOUTH DAILY IN THE EVENING   FOR RESTLESS LEGS; Therapy: 83XHJ3008 to (Last Rx:31May2017)  Requested for: 11CPU8905 Ordered   5  Daily Multivitamin TABS; TAKE 1 TABLET DAILY; Therapy: (Larnell Clear) to Recorded   6  DiazePAM 10 MG Oral Tablet; TAKE TABLET  1/2-1 tablet as needed daily; Last   Rx:19Jun2017 Ordered   7  DULoxetine HCl - 30 MG Oral Capsule Delayed Release Particles; Take 1 PO HS with 60   mg to equal 90 mg daily; Therapy: 14Lpv7205 to (Evaluate:99Gzh5634)  Requested for: 31Qfe2927; Last   Rx:06Hlx8276 Ordered   8  DULoxetine HCl - 60 MG Oral Capsule Delayed Release Particles; TAKE 1 PILL DAILY; Therapy: 47DPE5381 to (Evaluate:36Pjh8999)  Requested for: 87Mwh0923; Last   Rx:44Yhv5663 Ordered   9  Hydrocodone-Acetaminophen  MG Oral Tablet; Take 1/2-1 tablet QID PRN for   break through pain; Therapy: 30QMO7557 to (Evaluate:58Rzl9380)  Requested for: 11Wvb7970; Last   Rx:20Gbt3673 Ordered   10  Lactulose 10 GM/15ML Oral Solution; TAKE 1 TABLESPOONFUL DAILY prn for    constipation; Therapy: 02JOK1181 to (Evaluate:71Zfa3533)  Requested for: 20Jun2017; Last    Rx:20Jun2017 Ordered   11  OxyMORphone HCl ER 10 MG Oral Tablet Extended Release 12 Hour; Take 1 pill daily    PRN for severe break through pain days; Last Rx:18Jan2017 Ordered   12   Rosuvastatin Calcium 10 MG Oral Tablet; TAKE 1 TABLET DAILY; Therapy: 22Gsc7747 to (Evaluate:10Aug2017)  Requested for: 70Kxw6546; Last    Rx:85Yyw9923 Ordered   13  Silver Sulfadiazine 1 % External Cream; APPLY TO AFFECTED AREA 3 TIMES DAILY; Therapy: 94Efw3974 to [phone])  Requested for: 91Bmz8307; Last    Rx:14Bxh6807 Ordered    The medication list was reviewed and updated today  Allergies  1  fentanyl   2  Penicillins   3  Pravachol TABS   4  Sulfa Drugs   5  Vytorin TABS    Vitals  Vital Signs    Recorded: 69WTI7066 07:58AM   Temperature 98 4 F   Heart Rate 88   Systolic 043   Diastolic 82   Height 5 ft 11 in   Weight 277 lb    BMI Calculated 38 63   BSA Calculated 2 42   Pain Scale 8     Physical Exam    Constitutional   General appearance: Well developed, well nourished, alert, in no distress, non-toxic and no overt pain behavior  Eyes   Sclera: anicteric   HEENT   Hearing grossly intact  Pulmonary   Respiratory effort: Even and unlabored  Cardiovascular   Examination of extremities: No edema or pitting edema present  Abdomen   Abdomen: Abnormal   The abdomen was rounded-- and-- obese  Skin   Skin and subcutaneous tissue: Normal without rashes or lesions, well hydrated  Psychiatric   Mood and affect: Mood and affect appropriate  Neurologic Motor Tone:    Cranial nerves: Cranial nerves II-XII grossly intact  -- Slightly antalgic, painful, but steady gait without the use of any assistive devices     Musculoskeletal       Results/Data  Procedure Flowsheet [id number] 08:17AM Dedrick Lloyd     Test Name Result Flag Reference   Urine Drug Screen Performed Date 58IHQ1782         Future Appointments    Date/Time Provider Specialty Site   01/04/2018 02:45 PM MARIA GUADALUPE [facility]   12/18/2017 06:20 PM Alexandrea Carrasco MD Family Medicine Anne Cruz MD     Signatures   Electronically signed by : MARIA GUADALUPE Menard; Nov 7 2017  1:12PM EST (Author)    Electronically signed by : Andrews Guajardo DO; Nov 7 2017  1:25PM EST

## 2017-11-30 DIAGNOSIS — E78.5 HYPERLIPIDEMIA: ICD-10-CM

## 2017-11-30 DIAGNOSIS — Z11.59 ENCOUNTER FOR SCREENING FOR OTHER VIRAL DISEASES (CODE): ICD-10-CM

## 2017-11-30 DIAGNOSIS — R73.9 HYPERGLYCEMIA: ICD-10-CM

## 2017-12-04 ENCOUNTER — APPOINTMENT (OUTPATIENT)
Dept: LAB | Facility: HOSPITAL | Age: 62
End: 2017-12-04
Payer: COMMERCIAL

## 2017-12-04 ENCOUNTER — TRANSCRIBE ORDERS (OUTPATIENT)
Dept: ADMINISTRATIVE | Facility: HOSPITAL | Age: 62
End: 2017-12-04

## 2017-12-04 DIAGNOSIS — Z11.59 ENCOUNTER FOR SCREENING FOR OTHER VIRAL DISEASES (CODE): ICD-10-CM

## 2017-12-04 DIAGNOSIS — R73.9 HYPERGLYCEMIA: ICD-10-CM

## 2017-12-04 DIAGNOSIS — E78.5 HYPERLIPIDEMIA: ICD-10-CM

## 2017-12-04 LAB
ALBUMIN SERPL BCP-MCNC: 3.5 G/DL (ref 3.5–5)
ALP SERPL-CCNC: 63 U/L (ref 46–116)
ALT SERPL W P-5'-P-CCNC: 43 U/L (ref 12–78)
ANION GAP SERPL CALCULATED.3IONS-SCNC: 9 MMOL/L (ref 4–13)
AST SERPL W P-5'-P-CCNC: 22 U/L (ref 5–45)
BILIRUB SERPL-MCNC: 0.4 MG/DL (ref 0.2–1)
BUN SERPL-MCNC: 16 MG/DL (ref 5–25)
CALCIUM SERPL-MCNC: 8.9 MG/DL (ref 8.3–10.1)
CHLORIDE SERPL-SCNC: 105 MMOL/L (ref 100–108)
CHOLEST SERPL-MCNC: 113 MG/DL (ref 50–200)
CO2 SERPL-SCNC: 30 MMOL/L (ref 21–32)
CREAT SERPL-MCNC: 1.12 MG/DL (ref 0.6–1.3)
EST. AVERAGE GLUCOSE BLD GHB EST-MCNC: 120 MG/DL
GFR SERPL CREATININE-BSD FRML MDRD: 71 ML/MIN/1.73SQ M
GLUCOSE P FAST SERPL-MCNC: 111 MG/DL (ref 65–99)
HBA1C MFR BLD: 5.8 % (ref 4.2–6.3)
HDLC SERPL-MCNC: 40 MG/DL (ref 40–60)
LDLC SERPL CALC-MCNC: 62 MG/DL (ref 0–100)
POTASSIUM SERPL-SCNC: 4.7 MMOL/L (ref 3.5–5.3)
PROT SERPL-MCNC: 7.8 G/DL (ref 6.4–8.2)
SODIUM SERPL-SCNC: 144 MMOL/L (ref 136–145)
TRIGL SERPL-MCNC: 56 MG/DL

## 2017-12-04 PROCEDURE — 86803 HEPATITIS C AB TEST: CPT

## 2017-12-04 PROCEDURE — 83036 HEMOGLOBIN GLYCOSYLATED A1C: CPT

## 2017-12-04 PROCEDURE — 80053 COMPREHEN METABOLIC PANEL: CPT

## 2017-12-04 PROCEDURE — 36415 COLL VENOUS BLD VENIPUNCTURE: CPT

## 2017-12-04 PROCEDURE — 80061 LIPID PANEL: CPT

## 2017-12-05 LAB — HCV AB SER QL: NORMAL

## 2017-12-11 ENCOUNTER — GENERIC CONVERSION - ENCOUNTER (OUTPATIENT)
Dept: OTHER | Facility: OTHER | Age: 62
End: 2017-12-11

## 2017-12-18 ENCOUNTER — ALLSCRIPTS OFFICE VISIT (OUTPATIENT)
Dept: OTHER | Facility: OTHER | Age: 62
End: 2017-12-18

## 2017-12-20 NOTE — PROGRESS NOTES
Assessment  1  Hypertension (401 9) (I10)   2  Hyperlipidemia (272 4) (E78 5)   3  Hyperglycemia (790 29) (R73 9)   4  Chronic pain syndrome (338 4) (G89 4)   5  Morbid obesity due to excess calories (278 01) (E66 01)   6  Restless legs syndrome (333 94) (G25 81)   7  Statin myopathy (359 4,E942 2) (T46 6X1A,G72 0)    Discussion/Summary    BP excellent controlLabs reviewed in detail and copy givenLipids excellent on VezvyzeF0c stable on diet controlMyalgia possibly due to Duloxetine exacerbating a statin myopathy  Advised to stay on Duloxetine same strength and hold the Crestor for 2 weeks  L perineal pain likely due to sacral neuropathy  Possible side effects of new medications were reviewed with the patient/guardian today  The treatment plan was reviewed with the patient/guardian  The patient/guardian understands and agrees with the treatment plan     Self Referrals: No      Chief Complaint  Patient is here today for follow up of chronic conditions described in HPI  History of Present Illness  4 month followup  Taking Duloxetine now up to 120 mgSince he changed to another generic Duloxetine tablet he has been experiencing persistent and severe muscle pains in upper arms and thighs  He has been sweating in all temperatures, always warmHe has been experiencing brief but severe episodic pain from tip of penis proximately to L testicle and deep into perineum and hip  Review of Systems   Constitutional: not feeling poorly,-- no recent weight gain,-- not feeling tired-- and-- no recent weight loss  ENT: no nasal discharge  Cardiovascular: no chest pain,-- no intermittent leg claudication,-- no palpitations-- and-- no extremity edema  Respiratory: no cough-- and-- no shortness of breath during exertion  Gastrointestinal: no abdominal pain  Genitourinary: no dysuria  Musculoskeletal: arthralgias-- and-- myalgias  Integumentary: no rashes    Neurological: difficulty walking, but-- no headache-- and-- no dizziness  Psychiatric: no anxiety  Active Problems  1  Bursitis of hip (726 5) (M70 70)   2  Carpal tunnel syndrome, unspecified laterality (354 0) (G56 00)   3  Chronic bilateral low back pain with bilateral sciatica (724 2,724 3,338 29) (M54 42,M54 41,G89 29)   4  Chronic lumbar radiculopathy (724 4) (M54 16)   5  Chronic pain syndrome (338 4) (G89 4)   6  Constipation due to opioid therapy (564 09,E935 2) (K59 03,T40 2X5A)   7  Continuous opioid dependence (304 01) (F11 20)   8  Depression (311) (F32 9)   9  Edema (782 3) (R60 9)   10  Encounter for long-term opiate analgesic use (V58 69) (Z79 891)   11  Hyperglycemia (790 29) (R73 9)   12  Hyperlipidemia (272 4) (E78 5)   13  Hypertension (401 9) (I10)   14  Lateral epicondylitis (726 32) (M77 10)   15  Left groin pain (789 09) (R10 32)   16  Left inguinal hernia (550 90) (K40 90)   17  Lumbar canal stenosis (724 02) (M48 061)   18  Lumbar degenerative disc disease (722 52) (M51 36)   19  Lumbar post-laminectomy syndrome (722 83) (M96 1)   20  Morbid obesity due to excess calories (278 01) (E66 01)   21  Need for hepatitis C screening test (V73 89) (Z11 59)   22  Neuropathy, ilioinguinal nerve, 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)   23  Obesity (278 00) (E66 9)   24  Partial thickness burn of right foot, initial encounter (945 22) (T25 221A)   25  Peripheral neuropathy (356 9) (G62 9)   26  Persistent insomnia (307 42) (G47 00)   27  Restless legs syndrome (333 94) (G25 81)   28  Retrolisthesis of vertebrae (738 4) (M43 10)   29  Thoracic radiculopathy (724 4) (M54 14)   30  Umbilical hernia without obstruction and without gangrene (553 1) (K42 9)    Past Medical History  1  History of Acute low back pain (724 2) (M54 5)   2  History of Deep vein thrombosis of left upper extremity (453 82) (I82 622)   3  History of Diverticula of colon (562 10) (K57 30)   4  History of angina pectoris (V12 59) (Z86 79)   5  History of backache (V13 59) (Z87 39)   6   History of sleep disturbance (V13 89) (Z87 898)   7  Denied: History of substance abuse   8  History of Leg numbness (782 0) (R20 0)   9  History of Leg pain, bilateral (729 5) (M79 604,M79 605)   10  History of Leg weakness (729 89) (R29 898)   11  History of Methicillin Resistant Staphylococcus Aureus Septicemia (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 04)    The active problems and past medical history were reviewed and updated today  Surgical History  1  History of Arthroscopy Knee Right   2  History of Back Surgery   3  History of Complete Colonoscopy   4  History of Elbow Surgery   5  History of Incision And Drainage Of Skin Abscess   6  History of Neck Surgery   7  History of Shoulder Surgery    The surgical history was reviewed and updated today  Family History  Mother    1  Family history of Carcinoma   2  Denied: Family history of substance abuse   3  Family history of Heart Disease (V17 49)   4  Denied: Family history of Mental health problem  Father    5  Family history of Carcinoma   6  Denied: Family history of substance abuse   7  Family history of Heart Disease (V17 49)   8  Denied: Family history of Mental health problem    The family history was reviewed and updated today  Social History   · Being A Social Drinker   · Currently sexually active   · Disabled   · Marital History - Currently    · Never a smoker   · No drug use   · No known risk factors   · No known STD risk factors   · Non-smoker (V49 89) (Z78 9)   · Three children  The social history was reviewed and updated today  The social history was reviewed and is unchanged  Current Meds   1  Aleve 220 MG Oral Tablet; Take 1 tablet twice daily; Therapy: (Bonnee Render) to Recorded   2  Aspirin 81 MG CAPS; take 1 capsule daily; Therapy: (Bonnee Render) to Recorded   3  Bisoprolol-Hydrochlorothiazide 2 5-6 25 MG Oral Tablet; take one tablet by mouth every day; Therapy: 32JUG1713 to (Last Rx:10Dvm0032)  Requested for: 66LKM4807 Ordered   4   ClonazePAM 0 5 MG Oral Tablet; TAKE ONE TABLET BY MOUTH DAILY IN THE EVENING FOR RESTLESS LEGS; Therapy: 62GXD4863 to (Last Rx:41Gaz3401)  Requested for: 31RYH4530 Ordered   5  Daily Multivitamin TABS; TAKE 1 TABLET DAILY; Therapy: (Donny Sanchez) to Recorded   6  DiazePAM 10 MG Oral Tablet; TAKE TABLET  1/2-1 tablet as needed daily; Last Rx:21Uht6612 Ordered   7  DULoxetine HCl - 60 MG Oral Capsule Delayed Release Particles; Take 1 PO bid; Therapy: 90Sxu9829 to (Evaluate:11Mar2018)  Requested for: 86Ulu8543; Last Rx:81Rft9622 Ordered   8  Hydrocodone-Acetaminophen  MG Oral Tablet; Take 1 PO QID PRN for pain; Therapy: 52GRF2110 to (Evaluate:59Qbd9008)  Requested for: 27HSJ9583; Last Rx:80Eaz5444 Ordered   9  Lactulose 10 GM/15ML Oral Solution; TAKE 1 TABLESPOONFUL DAILY prn for constipation; Therapy: 23ZFO6736 to (Evaluate:72Ngx7050)  Requested for: 34RNR7866; Last Rx:12Vum6365 Ordered   10  Rosuvastatin Calcium 10 MG Oral Tablet; Take 1 tablet by mouth daily; Therapy: 62Flc7647 to (Last Rx:67Uei8645)  Requested for: 10Qxr0174 Ordered   11  Silver Sulfadiazine 1 % External Cream; APPLY TO AFFECTED AREA 3 TIMES DAILY; Therapy: 04Mnm7208 to [phone])  Requested for: 61Lnr8598; Last  Rx:60Uwq9986 Ordered    The medication list was reviewed and updated today  Allergies  1  fentanyl   2  Penicillins   3  Pravachol TABS   4  Sulfa Drugs   5  Vytorin TABS    Vitals  Vital Signs    Recorded: 54UFL4066 05:52PM   Temperature 99 2 F, Tympanic   Heart Rate 88   Systolic 579, Sitting   Diastolic 86, Sitting   BP CUFF SIZE Large   Height 5 ft 11 in   Weight 276 lb 12 8 oz   BMI Calculated 38 61   BSA Calculated 2 42     Physical Exam   Constitutional  General appearance: Abnormal  -- obese  Ears, Nose, Mouth, and Throat  Oropharynx: Normal with no erythema, edema, exudate or lesions  Pulmonary  Respiratory effort: No increased work of breathing or signs of respiratory distress     Auscultation of lungs: Clear to auscultation, equal breath sounds bilaterally, no wheezes, no rales, no rhonci  Cardiovascular  Auscultation of heart: Normal rate and rhythm, normal S1 and S2, without murmurs  Examination of extremities for edema and/or varicosities: Normal    Carotid pulses: Normal    Lymphatic  Palpation of lymph nodes in neck: No lymphadenopathy  Musculoskeletal  Gait and station: Normal    Skin  Skin and subcutaneous tissue: Normal without rashes or lesions  Neurologic  Reflexes: 2+ and symmetric  Psychiatric  Orientation to person, place and time: Normal    Mood and affect: Normal          Health Management  History of Encounter for screening colonoscopy   COLONOSCOPY; every 10 years; Last 24TGE1292; Next Due: 42FXQ1543;  Active    Future Appointments    Date/Time Provider Specialty Site   01/04/2018 02:45 PM [facility]   06/18/2018 07:00 PM Herbie Mariano MD Family Medicine Radhika Staton MD     Signatures   Electronically signed by : Raford Kawasaki, MD; Dec 19 2017  9:45PM EST                       (Author)

## 2018-01-04 ENCOUNTER — GENERIC CONVERSION - ENCOUNTER (OUTPATIENT)
Dept: OTHER | Facility: OTHER | Age: 63
End: 2018-01-04

## 2018-01-04 ENCOUNTER — APPOINTMENT (OUTPATIENT)
Dept: RADIOLOGY | Facility: CLINIC | Age: 63
End: 2018-01-04
Payer: COMMERCIAL

## 2018-01-04 DIAGNOSIS — M25.552 PAIN IN LEFT HIP: ICD-10-CM

## 2018-01-04 DIAGNOSIS — R10.32 LEFT LOWER QUADRANT PAIN: ICD-10-CM

## 2018-01-04 PROCEDURE — 73502 X-RAY EXAM HIP UNI 2-3 VIEWS: CPT

## 2018-01-10 NOTE — MISCELLANEOUS
Message   Recorded as Task   Date: 07/13/2016 10:41 AM, Created By: Oumar Diaz   Task Name: Follow Up   Assigned To: Laura Romero   Regarding Patient: Nallely Gaytan, Status: Active   CommentMicdixie Snyder - 13 Jul 2016 10:41 AM     TASK CREATED  Cover my meds sent approval letter for patient's Flector 1 3% patches  Patient aware of PA approval    Audie Mcelroy - 13 Jul 2016 10:54 AM     TASK REPLIED TO: Previously Assigned To WILLIAM Bradley, thanks! 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)   8  Edema (782 3) (R60 9)   9  Encounter for long-term use of opiate analgesic (V58 69) (Z79 891)   10  Encounter for screening colonoscopy (V76 51) (Z12 11)   11  Flu vaccine need (V04 81) (Z23)   12  Hyperglycemia (790 29) (R73 9)   13  Hyperlipidemia (272 4) (E78 5)   14  Hypertension (401 9) (I10)   15  Lateral epicondylitis (726 32) (M77 10)   16  Lumbar radicular pain (724 4) (M54 16)   17  Lumbar sprain, sequela (905 7,847 2) (S33 5XXS)   18  Neurogenic claudication due to lumbar spinal stenosis (724 03) (M48 06)   19  Pain in left hip (719 45) (M25 552)   20  Peripheral neuropathy (356 9) (G62 9)   21  Postlaminectomy syndrome of lumbosacral region (722 83) (M96 1)   22  Restless legs syndrome (333 94) (G25 81)   23  Special screening examination for neoplasm of prostate (V76 44) (Z12 5)   24  Staphylococcus infection (041 10) (B95 8)   25  Thoracic injury, initial encounter (959 11) (S29 9XXA)   26  Thoracic radiculopathy (724 4) (M54 14)   27  Thoracic spinal stenosis (724 01) (M48 04)   28  Umbilical hernia (855 7) (K42 9)    Current Meds   1   Aleve 220 MG Oral Tablet; Take 1 tablet twice daily; Therapy: (Markeldy Punter) to Recorded   2  Aspirin 81 MG CAPS; take 1 capsule daily; Therapy: (Heddy Punter) to Recorded   3  Belsomra 15 MG Oral Tablet; take 1 tablet at  bedtime as needed for insomnia; Therapy: 58CEO3569 to (Evaluate:70Bhj7052) Recorded   4  Bisoprolol-Hydrochlorothiazide 2 5-6 25 MG Oral Tablet; take one tablet by mouth every   day; Therapy: 15LYB9034 to (Last Rx:09Xjr6165)  Requested for: 97ROQ7064 Ordered   5  ClonazePAM 0 5 MG Oral Tablet; TAKE ONE TABLET BY MOUTH EVERY EVENING FOR   RESTLESS LEG; Therapy: 20KEG3420 to (Last Rx:26Jan2016)  Requested for: 15OSY5970 Ordered   6  Daily Multivitamin TABS; TAKE 1 TABLET DAILY; Therapy: (Markeldy Punter) to Recorded   7  Diazepam 10 MG Oral Tablet; TAKE TABLET  1/2-1 tablet as needed daily; Last   Rx:59Ggi3328 Ordered   8  Flector 1 3 % Transdermal Patch; APPLY PATCH TO AFFECTED AREA TWICE DAILY    Requested for: 20Ykd1264; Last Rx:86Djf7996 Ordered   9  Hydrocodone-Acetaminophen  MG Oral Tablet; Take 1/2 -1 tab every 6 hrs prn; Therapy: 07JPS8360 to (Evaluate:11Aug2016)  Requested for: 35Bng5366; Last   Rx:74Asx2319 Ordered   10  Lyrica 75 MG Oral Capsule; TAKE 1 CAPSULE TWICE DAILY; Therapy: 38ZDZ7997 to (Evaluate:11Aug2016); Last Rx:86Dos3735 Ordered   11  Oxymorphone HCl ER 10 MG Oral Tablet Extended Release 12 Hour; TAKE 1 TABLET    EVERY 12 HOURS AS NEEDED; Last Rx:27Yyw3909 Ordered   12  Rosuvastatin Calcium 10 MG Oral Tablet; TAKE 1 TABLET DAILY; Therapy: 58Xzl4490 to (Evaluate:12Rok8754); Last Rx:22Jun2016 Ordered    Allergies    1  fentanyl   2  Penicillins   3  Pravachol TABS   4  Sulfa Drugs   5   Vytorin TABS    Signatures   Electronically signed by : Joss Rivera, ; Jul 14 2016  9:31AM EST                       (Author)

## 2018-01-11 ENCOUNTER — GENERIC CONVERSION - ENCOUNTER (OUTPATIENT)
Dept: OTHER | Facility: OTHER | Age: 63
End: 2018-01-11

## 2018-01-11 NOTE — RESULT NOTES
Message  rcvd call from pt's wife to schedule pt for another proc prior to them leaving for vacation  Advised our next appt would be 8/24/2017 but they will be away already  Schedule is booked  She also asked if they could speak to Isatu from [address] [place] [address] to check about options for different programs for pt's stim  Sent email to Kandice Velásquez and Isatu to reach out to pt  and spoke to Kandice Velásquez who was in office at time of call doing a procedure

## 2018-01-11 NOTE — RESULT NOTES
Message   Recorded as Task   Date: 09/15/2017 09:31 AM, Created By: Clarence Copeland   Task Name: Miscellaneous   Assigned To: [facility] [place] [facility],Team   Regarding Patient: Inga Lomeli, Status: Active   Comment:    Pippa Montoya - 15 Sep 2017 9:31 AM     TASK CREATED  Pt's wife Husam Zimmerman called stating Skip [facility] pt [facility] (she did not know name of the medication)  Pls call Husam Erasmo at [phone]  Fabi Palacios - 15 Sep 2017 10:52 AM     TASK EDITED  Looks like bonita ordered duloxetine   Maybe that is it? Skip Fleming - 15 Sep 2017 10:54 AM     TASK REPLIED TO: Previously Assigned To Skip Fleming  It looks like it was sent to [facility]  So they will need to call the mail order to get it sent to home  Thank you  Fabi Palacios - 15 Sep 2017 11:18 AM     TASK EDITED  S/w Husam Erasmo and she s/w the pharmacy and the issue was resolved          Signatures   Electronically signed by : Nando Munoz, ; Sep 15 2017 11:18AM EST                       (Author)

## 2018-01-12 VITALS
HEART RATE: 88 BPM | WEIGHT: 284.4 LBS | SYSTOLIC BLOOD PRESSURE: 122 MMHG | BODY MASS INDEX: 39.81 KG/M2 | HEIGHT: 71 IN | DIASTOLIC BLOOD PRESSURE: 80 MMHG | TEMPERATURE: 99.3 F

## 2018-01-12 NOTE — MISCELLANEOUS
Message   Recorded as Task   Date: 08/10/2016 02:29 PM, Created By: Gracie Mccarthy   Task Name: Financial Authorization   Assigned To: Laura Romero   Regarding Patient: Saint Sayers, Status: In Progress   Luann Campbell - 10 Aug 2016 2:29 PM     TASK CREATED  PA completed, waiting on insurace approval   Laura Romero - 10 Aug 2016 2:29 PM     TASK IN PROGRESS   Gracie Mccarthy - 16 Aug 2016 8:12 AM     TASK EDITED  Refaxed PA to insurance company  Laura Romero - 16 Aug 2016 9:10 AM     TASK EDITED  Sarah Cisneros called from [facility] stating that patient's Oxymorphone 7 5mg is approved as long as the medication is the same qty that is allowed with his plan  Skip Fleming - 16 Aug 2016 10:03 AM     TASK REPLIED TO: Previously Assigned To Skip Fleming  Provider aware  Thank you  Active Problems    1  Bursitis of hip (726 5) (M70 70)   2  Carpal tunnel syndrome, unspecified laterality (354 0) (G56 00)   3  Chronic bilateral low back pain without sciatica (724 2,338 29) (M54 5,G89 29)   4  Chronic lumbar radiculopathy (724 4) (M54 16)   5  Chronic pain syndrome (338 4) (G89 4)   6  Continuous opioid dependence (304 01) (F11 20)   7  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)   8  Edema (782 3) (R60 9)   9  Encounter for long-term use of opiate analgesic (V58 69) (Z79 891)   10  Encounter for screening colonoscopy (V76 51) (Z12 11)   11  Flu vaccine need (V04 81) (Z23)   12  Hyperglycemia (790 29) (R73 9)   13  Hyperlipidemia (272 4) (E78 5)   14  Hypertension (401 9) (I10)   15  Lateral epicondylitis (726 32) (M77 10)   16  Lumbar degenerative disc disease (722 52) (M51 36)   17  Lumbar sprain, sequela (905 7,847 2) (S33 5XXS)   18  Neurogenic claudication due to lumbar spinal stenosis (724 03) (M48 06)   19  Pain in left hip (719 45) (M25 552)   20  Peripheral neuropathy (356 9) (G62 9)   21  Postlaminectomy syndrome of lumbosacral region (722 83) (M96 1)   22   Restless legs syndrome (207 94) (G25 81)   23  Special screening examination for neoplasm of prostate (V76 44) (Z12 5)   24  Staphylococcus infection (041 10) (B95 8)   25  Thoracic injury, initial encounter (959 11) (S29 9XXA)   26  Thoracic radiculopathy (724 4) (M54 14)   27  Thoracic spinal stenosis (724 01) (M48 04)   28  Umbilical hernia (018 1) (K42 9)    Current Meds   1  Aleve 220 MG Oral Tablet; Take 1 tablet twice daily; Therapy: (Maribel King) to Recorded   2  Aspirin 81 MG CAPS; take 1 capsule daily; Therapy: (Maribel King) to Recorded   3  Belsomra 15 MG Oral Tablet; take 1 tablet at  bedtime as needed for insomnia; Therapy: 91FLQ2319 to (Evaluate:76Fgs6910) Recorded   4  Bisoprolol-Hydrochlorothiazide 2 5-6 25 MG Oral Tablet; take one tablet by mouth every   day; Therapy: 97GFR5178 to (Last Rx:23Eky4486)  Requested for: 64RCQ3801 Ordered   5  ClonazePAM 0 5 MG Oral Tablet; TAKE ONE TABLET BY MOUTH EVERY EVENING FOR   RESTLESS LEG; Therapy: 65RTH6033 to (Last Rx:26Jan2016)  Requested for: 23KNQ4384 Ordered   6  Daily Multivitamin TABS; TAKE 1 TABLET DAILY; Therapy: (Maribel King) to Recorded   7  Diazepam 10 MG Oral Tablet; TAKE TABLET  1/2-1 tablet as needed daily; Last   Rx:61Dsy2350 Ordered   8  Flector 1 3 % Transdermal Patch; APPLY PATCH TO AFFECTED AREA TWICE DAILY    Requested for: 10Aug2016; Last Rx:77Anz5131 Ordered   9  Hydrocodone-Acetaminophen  MG Oral Tablet; Take 1/2-1 tablet BID PRIN for   break through pain; Therapy: 09YHI3120 to (Evaluate:93Yjb1931)  Requested for: 10Aug2016; Last   Rx:10Aug2016 Ordered   10  Oxymorphone HCl ER 7 5 MG Oral Tablet Extended Release 12 Hour; Take 1 po bid;    Last Rx:10Aug2016 Ordered   11  Rosuvastatin Calcium 10 MG Oral Tablet; TAKE 1 TABLET DAILY; Therapy: 16Uzq0847 to (Evaluate:10Aug2017)  Requested for: 03Mmt8410; Last    Rx:59Rso6074 Ordered    Allergies    1  fentanyl   2  Penicillins   3  Pravachol TABS   4  Sulfa Drugs   5   Vytorin TABS    Signatures   Electronically signed by : Chepe Goetz, ; Aug 16 2016 11:16AM EST                       (Author)

## 2018-01-12 NOTE — MISCELLANEOUS
Message   Recorded as Task   Date: 09/12/2017 08:42 AM, Created By: Ivory Swartz   Task Name: Miscellaneous   Assigned To: Art agudelo,Team   Regarding Patient: Tramaine Angeles, Status: In Progress   Comment:    Mariama Arellano - 12 Sep 2017 8:42 AM     TASK CREATED  I accidentally printed a prescription for Hydrocodone/acetaminophen 10/325 mg for this patient on the [facility] Can you please VOID the prescription and scan it in the patient's chart? Thank you   Monika Kennedy - 12 Sep 2017 9:21 AM     TASK EDITED  S/w Gracie Kelley   Per PC, script was noted to be printed, it was voided and scanned per request    Monika Kennedy - 12 Sep 2017 9:22 AM     TASK REASSIGNED: Previously Assigned To [facility],Team  ***FYI***   Mariama Arellano - 12 Sep 2017 9:37 AM     TASK REPLIED TO: Previously Assigned To Mariama Arellano  Thank you        Active Problems    1  Bursitis of hip (726 5) (M70 70)   2  Carpal tunnel syndrome, unspecified laterality (354 0) (G56 00)   3  Chronic bilateral low back pain with bilateral sciatica (724 2,724 3,338 29)   (M54 42,M54 41,G89 29)   4  Chronic lumbar radiculopathy (724 4) (M54 16)   5  Chronic pain syndrome (338 4) (G89 4)   6  Constipation due to opioid therapy (564 09,E935 2) (K59 03,T40 2X5A)   7  Continuous opioid dependence (304 01) (F11 20)   8  Depression (311) (F32 9)   9  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)   10  Edema (782 3) (R60 9)   11  Encounter for long-term opiate analgesic use (V58 69) (Z79 891)   12  Hyperglycemia (790 29) (R73 9)   13  Hyperlipidemia (272 4) (E78 5)   14  Hypertension (401 9) (I10)   15  Lateral epicondylitis (726 32) (M77 10)   16  Left groin pain (789 09) (R10 32)   17  Left inguinal hernia (550 90) (K40 90)   18  Lumbar canal stenosis (724 02) (M48 06)   19  Lumbar degenerative disc disease (722 52) (M51 36)   20  Lumbar post-laminectomy syndrome (712 83) (M96 1)   21   Lumbar sprain, sequela (905 7,847 2) (S33 5XXS)   22  Morbid obesity due to excess calories (278 01) (E66 01)   23  Neuropathy, ilioinguinal nerve, 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)   24  Obesity (278 00) (E66 9)   25  Pain in left hip (719 45) (M25 552)   26  Partial thickness burn of right foot, initial encounter (945 22) (T25 221A)   27  Peripheral neuropathy (356 9) (G62 9)   28  Persistent insomnia (307 42) (G47 00)   29  Rectus diastasis (728 84) (M62 08)   30  Restless legs syndrome (333 94) (G25 81)   31  Retrolisthesis of vertebrae (738 4) (M43 10)   32  Staphylococcus infection (041 10) (B95 8)   33  Thoracic radiculopathy (724 4) (M54 14)   34  Umbilical hernia without obstruction and without gangrene (553 1) (K42 9)    Current Meds   1  Aleve 220 MG Oral Tablet; Take 1 tablet twice daily; Therapy: (Inetta Pearl) to Recorded   2  Aspirin 81 MG CAPS; take 1 capsule daily; Therapy: (Inetta Pearl) to Recorded   3  Bisoprolol-Hydrochlorothiazide 2 5-6 25 MG Oral Tablet; take one tablet by mouth every   day; Therapy: 03UQZ3005 to (Last Rx:39Pqz2001)  Requested for: 90VYA7484 Ordered   4  ClonazePAM 0 5 MG Oral Tablet; TAKE ONE TABLET BY MOUTH DAILY IN THE   EVENING FOR RESTLESS LEGS; Therapy: 24ZKL4786 to (Last Rx:30Lto6768)  Requested for: 76FFW8469 Ordered   5  Daily Multivitamin TABS; TAKE 1 TABLET DAILY; Therapy: (Inetta Pearl) to Recorded   6  DiazePAM 10 MG Oral Tablet; TAKE TABLET  1/2-1 tablet as needed daily; Last   Rx:19Jun2017 Ordered   7  DULoxetine HCl - 30 MG Oral Capsule Delayed Release Particles; Take 1 PO HS with 60   mg to equal 90 mg daily; Therapy: 19Hrk6376 to (Evaluate:34Wxh6708)  Requested for: 93Mzm7093; Last   Rx:10Lmh4499 Ordered   8  DULoxetine HCl - 60 MG Oral Capsule Delayed Release Particles; TAKE 1 PILL DAILY; Therapy: 06RFH0410 to (Evaluate:86Jrp2964)  Requested for: 02Uzm9528; Last   Rx:35Yte7680 Ordered   9  Hydrocodone-Acetaminophen  MG Oral Tablet;  Take 1/2-1 tablet QID PRN for   break through pain; Therapy: 11VYI0689 to (Evaluate:38Azf3318)  Requested for: 50Zyq8153; Last   Rx:03Emy6661 Ordered   10  Lactulose 10 GM/15ML Oral Solution; TAKE 1 TABLESPOONFUL DAILY prn for    constipation; Therapy: 05IGK5637 to (Evaluate:44Vaj3813)  Requested for: 17Dky2764; Last    Rx:49Xqc9915 Ordered   11  OxyMORphone HCl ER 10 MG Oral Tablet Extended Release 12 Hour; Take 1 pill daily    PRN for severe break through pain days; Last Rx:67Jyq5762 Ordered   12  Rosuvastatin Calcium 10 MG Oral Tablet; TAKE 1 TABLET DAILY; Therapy: 96Rrl6767 to (Evaluate:10Aug2017)  Requested for: 40Lqr6896; Last    Rx:97Ani1702 Ordered   13  Silver Sulfadiazine 1 % External Cream; APPLY TO AFFECTED AREA 3 TIMES DAILY; Therapy: 70Wjq8443 to (77 873 135)  Requested for: 70Gka3414; Last    Rx:90Lzc7707 Ordered    Allergies    1  fentanyl   2  Penicillins   3  Pravachol TABS   4  Sulfa Drugs   5   Vytorin TABS    Signatures   Electronically signed by : Joaquin Goldman RN; Sep 12 2017 12:11PM EST                       (Author)

## 2018-01-12 NOTE — MISCELLANEOUS
Message   Recorded as Task   Date: 09/30/2016 11:17 AM, Created By: Taqueria Bello   Task Name: Miscellaneous   Assigned To: [facility]   Regarding Patient: Margarito Crain, Status: Active   CommentLarry Oddi - 30 Sep 2016 11:17 AM     TASK CREATED  Pt's wife Vasquez Robles) called  She needs to know spicifically which dr you are referring Kristin Johnson to at [facility]  Is it a specific dept or [place]? She can be reached at [phone] (cell)  or work # [phone] ([address]) at the [facility]   Skip Fleming - 03 Oct 2016 6:51 AM     TASK REPLIED TO: Previously Assigned To [facility] [place] [facility]  We are referring him to [facility] and they will review his case and paperwork and decide which surgeon is best for him to see, if they feel that they have anything to offer  Chito Burrell - 03 Oct 2016 9:10 AM     TASK REASSIGNED: Previously Assigned To YULIYA Mazariegos - 03 Oct 2016 9:22 AM     TASK REPLIED TO: Previously Assigned To [address] [place] [address] clinical,Team  Pt's wife, Pavel Yap, not listed on release  S/w pt  and advised of above  Pt  states that it is okay to s/w Pavel Yap  Pt  was advised that I will put a temporary note in his chart that it is okay to s/w Pavel Yap, but at his next visit w/ our office he should revise his release of health form  Pt  verbalized understanding and was appreciative  Cammie Warren - 26 Oct 2016 9:33 AM     TASK REPLIED TO: Previously Assigned To Skip Fleming  ****FYI****    S/w pt's wife, Pavel Yap, and advised of DG's above recommendations  Pavel Fracisco was appreciative and states that she had received a call from [facility] and they were asking if pt  was doing [facility] or if he was to be seeing a specific provider/dept  Pavel Yap states that she will notify [facility] and [employer] that pt  is to be seeing Neurogsurgery at [facility]       Pavel Yap also states that [employer] was inquiring about why the pt  needed to see [facility] and was requesting CPT codes  Deirdre Archibald states that she is going to try and just tell the insurance company that this is for a second opinion/consultation for other treatment options  Deirdre Archibadl was advised that if she does need any further info, or any types of codes c/b and we will discuss w/ DG  Deirdre Archibald jameledil Green - 03 Oct 2016 10:41 AM     TASK REPLIED TO: Previously Assigned To [facility] [facility]  Provider aware  Thank you  Active Problems    1  Bursitis of hip (726 5) (M70 70)   2  Carpal tunnel syndrome, unspecified laterality (354 0) (G56 00)   3  Chronic bilateral low back pain without sciatica (724 2,338 29) (M54 5,G89 29)   4  Chronic lumbar radiculopathy (724 4) (M54 16)   5  Chronic pain syndrome (338 4) (G89 4)   6  Continuous opioid dependence (304 01) (F11 20)   7  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)   8  Edema (782 3) (R60 9)   9  Encounter for long-term use of opiate analgesic (V58 69) (Z79 891)   10  Encounter for screening colonoscopy (V76 51) (Z12 11)   11  Flu vaccine need (V04 81) (Z23)   12  Hyperglycemia (790 29) (R73 9)   13  Hyperlipidemia (272 4) (E78 5)   14  Hypertension (401 9) (I10)   15  Lateral epicondylitis (726 32) (M77 10)   16  Lumbar degenerative disc disease (722 52) (M51 36)   17  Lumbar sprain, sequela (905 7,847 2) (S33 5XXS)   18  Neurogenic claudication due to lumbar spinal stenosis (724 03) (M48 06)   19  Neuropathy, ilioinguinal nerve, 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)   20  Pain in left hip (719 45) (M25 552)   21  Peripheral neuropathy (356 9) (G62 9)   22  Postlaminectomy syndrome of lumbosacral region (722 83) (M96 1)   23  Restless legs syndrome (333 94) (G25 81)   24  Special screening examination for neoplasm of prostate (V76 44) (Z12 5)   25  Staphylococcus infection (041 10) (B95 8)   26  Thoracic injury, initial encounter (972 11) (S29 9XXA)   27   Thoracic radiculopathy (724 4) (M54 14)   28  Thoracic spinal stenosis (724 01) (M48 04)   29  Umbilical hernia (576 1) (K42 9)    Current Meds   1  Aleve 220 MG Oral Tablet; Take 1 tablet twice daily; Therapy: (Syed Farooq) to Recorded   2  Aspirin 81 MG CAPS; take 1 capsule daily; Therapy: (Syed Farooq) to Recorded   3  Belsomra 15 MG Oral Tablet; take 1 tablet at  bedtime as needed for insomnia; Therapy: 06HXR9420 to (Evaluate:37Qal6057) Recorded   4  Bisoprolol-Hydrochlorothiazide 2 5-6 25 MG Oral Tablet; take one tablet by mouth every   day; Therapy: 81ZQC4813 to (Last Rx:48Wkn8476)  Requested for: 58QAQ4501 Ordered   5  ClonazePAM 0 5 MG Oral Tablet; TAKE ONE TABLET BY MOUTH EVERY EVENING FOR   RESTLESS LEG; Therapy: 60ACC0598 to (Last Rx:26Jan2016)  Requested for: 90CDD2885 Ordered   6  Daily Multivitamin TABS; TAKE 1 TABLET DAILY; Therapy: (Syed Farooq) to Recorded   7  Diazepam 10 MG Oral Tablet; TAKE TABLET  1/2-1 tablet as needed daily; Last   Rx:00Nbx6068 Ordered   8  DULoxetine HCl - 30 MG Oral Capsule Delayed Release Particles; Take 1 pill every other   night x 10 days, then 1 pill every night for 3-4 weeks  Call with side effects and   call 3-4 days before out of med; Therapy: 50MDG6530 to (Evie Bermudez)  Requested for: 62YUL3178; Last   Rx:52Yzm7117 Ordered   9  Flector 1 3 % Transdermal Patch; APPLY PATCH TO AFFECTED AREA TWICE DAILY    Requested for: 10Aug2016; Last Rx:54Qpq5195 Ordered   10  Hydrocodone-Acetaminophen  MG Oral Tablet; Take 1/2-1 tablet QID PRIN for    break through pain; Therapy: 06YOT5011 to (Evie Bermudez)  Requested for: 85Gry2732; Last    Rx:19Flx4941 Ordered   11  Rosuvastatin Calcium 10 MG Oral Tablet; TAKE 1 TABLET DAILY; Therapy: 76Gcf2905 to (Evaluate:10Aug2017)  Requested for: 12Vtu3171; Last    Rx:84Xpi3343 Ordered    Allergies    1  fentanyl   2  Penicillins   3  Pravachol TABS   4  Sulfa Drugs   5   Vytorin TABS    Signatures Electronically signed by : Brock Oh, ; Oct  3 2016 11:26AM EST                       (Author)

## 2018-01-12 NOTE — PROCEDURES
Procedures by José Miguel Clemente PA-C at  7/5/2016  2:45 PM      Author:  José Miguel Clemente PA-C Service:  Radiology Author Type:  Physician Assistant    Filed:  7/5/2016  2:47 PM Date of Service:  7/5/2016  2:45 PM Status:  Signed    :  José Miguel Clemente PA-C (Physician Assistant)  Cosigner:  Renee Pool DO at 7/5/2016  4:12 PM         LUMBAR MYELOGRAM    INDICATION: Lumbar back pain and bilateral lower extremity radiculopathy  COMPARISON:    Lumbar xray from June 22nd, 2016  FLUOROSCOPY TIME:   2 1 MFT      TECHNIQUE:      After fully explaining the risks, benefits and alternatives of the procedure to the patient, consent was obtained  The skin overlying the lumbar spine was prepped and draped in the usual sterile fashion  1% lidocaine was infiltrated at the puncture site  Under fluoroscopic guidance a 20 gauge 6 inch spinal needle was inserted into the thecal sac at the L3-L4 interlaminar  space  15 Cc of Omnipaque 180 contrast was injected into the thecal sac under fluoroscopy  The needle was removed  Post myelogram plain films and CT were obtained  No post-procedure complications  The patient was given appropriate instructions  Procedure performed under the direct supervision of Dr Sayda Clemente PA-C           Received for:Mel Simonkaykay [facility]  Jul 5 2016  4:11PM [facility] Standard Time

## 2018-01-12 NOTE — RESULT NOTES
Message   Recorded as Task   Date: 10/13/2016 10:13 AM, Created By: Viktoria Libman   Task Name: Med Renewal Request   Assigned To: [facility] [place] [facility]   Regarding Patient: Ruth Gerardo, Status: Active   Comment:    Chito Burrell - 13 Oct 2016 10:13 AM     TASK CREATED  Caller: Self; Renew Medication; [phone] (Home)  m 10/12/2016 @ 0806  Female calling for pt  Requesting refill of duloxetine 30 mg  Send to [facility] - 13 Oct 2016 10:27 AM     TASK EDITED  Attempted to call the pt  to clarify if any SE  Last prescribed by DG on 9/7, Next SOVS scheduled c/ DG on 10/26, LMOM for pt  CB to clarify effects  Fabi Palacios - 13 Oct 2016 10:27 AM     TASK IN PROGRESS   Fabi Palacios - 13 Oct 2016 1:47 PM     TASK EDITED  S/w the pt  and he states he feels like the duloxetine was working but now he feels like his pain is back, intense at times in his legs and feet  He did see his PCP for ABD pain, and he was supposed to call you in regards  He is wondering if his PCP was going to F/U c/ you  He is thinking maybe he needs to be increased  He states his wife can  at [facility] in   DG to advise  thanks   Skip Fleming - 13 Oct 2016 1:50 PM     TASK REPLIED TO: Previously Assigned To [facility] [place] [facility]  Is he still having the abdominal pain or is he having any other side effects  The 30 mg is a sub therapeutic dose, I just need to have those questions answered before I increase it  Chito Burrell - 13 Oct 2016 4:39 PM     TASK EDITED   Monika Kennedy - 14 Oct 2016 4:25 PM     TASK REPLIED TO: Previously Assigned To [facility] [place] [facility]       S/w pt and advised of above  Pt denies abd pain or any side effects  Spoke with DG who states he will slowly increase pt's cymbalta since pt is sensative to medication    Per DG for two weeks pt will take 30mg of cymbalta QOD and on the opposite days pt will take 60mg of cymbalta; after two weeks pt will stay on cymbalta 60mg  Pt aware that another rx is being sent to his pharmacy and verbalized understanding of instructions  Confirmed ov on 10/26 arrival at [facility] [address]   Pt will call in meantime if any issues or side effects with cymbalta increase  Skip Fleming - 14 Oct 2016 4:46 PM     TASK REPLIED TO: Previously Assigned To Skip Fleming  I escribed the Cymbalta to his pharmacy as discussed  Fabi Palacios - 17 Oct 2016 8:51 AM     TASK EDITED  S/w the pt  and he is aware          Signatures   Electronically signed by : Latisha Johansen, ; Oct 17 2016  8:51AM EST                       (Author)

## 2018-01-13 VITALS
HEIGHT: 71 IN | SYSTOLIC BLOOD PRESSURE: 136 MMHG | BODY MASS INDEX: 38.89 KG/M2 | HEART RATE: 96 BPM | TEMPERATURE: 98.9 F | DIASTOLIC BLOOD PRESSURE: 80 MMHG | WEIGHT: 277.8 LBS

## 2018-01-13 VITALS
TEMPERATURE: 98.4 F | WEIGHT: 277 LBS | SYSTOLIC BLOOD PRESSURE: 134 MMHG | BODY MASS INDEX: 38.78 KG/M2 | HEIGHT: 71 IN | DIASTOLIC BLOOD PRESSURE: 82 MMHG | HEART RATE: 88 BPM

## 2018-01-13 VITALS
WEIGHT: 283 LBS | TEMPERATURE: 100.2 F | BODY MASS INDEX: 39.62 KG/M2 | DIASTOLIC BLOOD PRESSURE: 82 MMHG | HEART RATE: 88 BPM | SYSTOLIC BLOOD PRESSURE: 120 MMHG | HEIGHT: 71 IN | RESPIRATION RATE: 16 BRPM

## 2018-01-13 VITALS
SYSTOLIC BLOOD PRESSURE: 128 MMHG | WEIGHT: 273.63 LBS | HEIGHT: 71 IN | DIASTOLIC BLOOD PRESSURE: 78 MMHG | BODY MASS INDEX: 38.31 KG/M2 | TEMPERATURE: 98.1 F | HEART RATE: 80 BPM

## 2018-01-13 VITALS
DIASTOLIC BLOOD PRESSURE: 82 MMHG | HEIGHT: 71 IN | BODY MASS INDEX: 38.55 KG/M2 | TEMPERATURE: 98 F | SYSTOLIC BLOOD PRESSURE: 128 MMHG | WEIGHT: 275.38 LBS | HEART RATE: 76 BPM

## 2018-01-13 VITALS
RESPIRATION RATE: 16 BRPM | TEMPERATURE: 98.3 F | WEIGHT: 273 LBS | HEART RATE: 92 BPM | SYSTOLIC BLOOD PRESSURE: 145 MMHG | HEIGHT: 71 IN | BODY MASS INDEX: 38.22 KG/M2 | DIASTOLIC BLOOD PRESSURE: 93 MMHG

## 2018-01-13 NOTE — PROGRESS NOTES
Assessment    1  Chronic lumbar radiculopathy (724 4) (M54 16)   2  Chronic pain syndrome (338 4) (G89 4)   3  Lumbar degenerative disc disease (722 52) (M51 36)   4  Lumbar post-laminectomy syndrome (722 83) (M96 1)    Plan    · Follow-up PRN Evaluation and Treatment  Follow-up  Status: Complete  Done:  43PLI5633 09:39AM   Ordered; For: Chronic lumbar radiculopathy, Chronic pain syndrome, Lumbar degenerative disc disease, Lumbar post-laminectomy syndrome; Ordered By: Derick Simon Performed:  Due: 78FPV9892    Discussion/Summary    This is a 65 yo male with LBP and bilateral leg pain  History of 6 previous lumbar surgeries  MRI with moderate DDD and stenosis, but also has arachnoiditis and epidural scarring  His pain pattern appears to be more consistent with post laminectomy syndrome, as well as arachnoiditis  Underwent successful SCS trial with Dr Jacklyn Carrillo  He is 1 5 years from SCS placement with additional level laminectomy  It initially worked well for his pain, but he no longer has efficacy and has not used the device in 6 months  It maintains charge  Previous CT lumbar myelogram is unchanged from his prior MRI and c/w stenosis at L5-S1  I do not believe that surgical correction of this stenosis will help with his chronic pain symptoms and carries signficant risk given his previous surgeries  Discussion held with patient and wife about Burst stimulation with his St  Kristofer device which can deliver paraesthesia free stimulation  We also discussed option of removal, leaving in and OFF, MRI conditionality, and removal of IPG alone for his mild discomfort  Also discussed risk of removal with revision surgery in face of two level laminectomy for placement  At this time he would like to try BurstDR  He underwent programming at this visit with midline mapping of the lead and activation of Burst waveform  He would like time to try this new platform  He is on Aspirin for UE DVT after a PICC placement    F/U as needed  The patient, patient's family was counseled regarding diagnostic results, patient and family education, risks and benefits of treatment options  total time of encounter was minutes  39      Chief Complaint  Patient presents to office for review of CT [place]  History of Present Illness  He is 1 5 years from SCS placement  It was initially working for him, but lost efficacy  He has not used it in over 6 months  His pain pattern is unchanged  He presents to discuss removal  He has not tried the "[employer] in his Protege IPG  He reports mild pain at the IPG site  He obtained some relief of his back and groin pain with a recent GUILLERMO  He presented 8 months from SCS placement  He has stopped using the SCS secondary to feeling it increases or intensifies his pain  He remains with pain in his lower back, bilateral hips, and right calf  Presents to discuss recent myelogram     He presented 6 weeks from SCS placement with additional level laminectomy  Doing well  Denies incisional pain  No fevers or chills  Had a PICC placed prior to procedure for IV Vanco secondary to history of MRSA  The PICC was subsequently removed three days after surgery secondary to an UE DVT for which he was placed on Aspirin  He reports obtaining significant pain relief around his hips and thighs for which he is very happy with  He would like an adjustment to increase coverage into his lower back and right calf  He presented in follow up after undergoing a successful SCS trial with Dr Gloria Plata obtaining 60-70% pain relief with improved ambulation and standing upright  He wishes to proceed with permanent implant  Given his MRSA history he had a PICC line placed today and has started Vancomycin twice daily per Dr Elian Arciniega of ID which will be continued for two weeks after surgery  In review, this is a very pleasant 65yo male with chronic pain involving his lower back and bilateral LE's   He describes constant aching LBP, as well as bilateral buttock pain with right greater than left leg pain that includes his anterior/lateral thigh and calf to the top of his foot  He feels the leg pain is worse than the back pain  History of 6 lumbar surgeries including a L2-3 Fusion  History of three staph infections treated with Vancomycin  Presently seeing Dr Fabiola Menjivar to undergo a SCS trial  Presents for surgery versus SCS evaluation  Review of Systems    Constitutional: No fever or chills, feels well, no tiredness, no recent weight gain or weight loss  Eyes: No complaints of eye pain, no red eyes, no discharge from eyes, no itchy eyes  ENT: no complaints of earache, no hearing loss, no nosebleeds, no nasal discharge, no sore throat, no hoarseness  Cardiovascular: No complaints of slow heart rate, no fast heart rate, no chest pain, no palpitations, no leg claudication, no lower extremity  Respiratory: No complaints of shortness of breath, no wheezing, no cough, no SOB on exertion, no orthopnea or PND  Gastrointestinal: No complaints of abdominal pain, no constipation, no nausea or vomiting, no diarrhea or bloody stools  Genitourinary: urinary hesitancy, but no dysuria, no genital lesions, no incontinence, no nocturia and no testicular pain  Musculoskeletal: limb pain and bi-lateral leg pain  , but No complaints of arthralgia, no myalgias, no joint swelling or stiffness, no limb pain or swelling  Integumentary: No complaints of skin rash or skin lesions, no itching, no skin wound, no dry skin  Neurological: numbness, tingling, limb weakness and difficulty walking, but no headache, no confusion, no dizziness, no convulsions and no fainting  Psychiatric: Is not suicidal, no sleep disturbances, no anxiety or depression, no change in personality, no emotional problems     Endocrine: No complaints of proptosis, no hot flashes, no muscle weakness, no erectile dysfunction, no deepening of the voice, no feelings of weakness  Hematologic/Lymphatic: ASA 81 mg, but No complaints of swollen glands, no swollen glands in the neck, does not bleed easily, no easy bruising  ROS reviewed  Active Problems    1  Bursitis of hip (726 5) (M70 70)   2  Carpal tunnel syndrome, unspecified laterality (354 0) (G56 00)   3  Chronic bilateral low back pain with bilateral sciatica (724 2,724 3,338 29)   (M54 42,M54 41,G89 29)   4  Chronic lumbar radiculopathy (724 4) (M54 16)   5  Chronic pain syndrome (338 4) (G89 4)   6  Constipation due to opioid therapy (564 09,E935 2) (K59 03,T40 2X5A)   7  Continuous opioid dependence (304 01) (F11 20)   8  Depression (311) (F32 9)   9  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)   10  Edema (782 3) (R60 9)   11  Encounter for long-term opiate analgesic use (V58 69) (Z79 891)   12  Encounter for screening colonoscopy (V76 51) (Z12 11)   13  Hyperglycemia (790 29) (R73 9)   14  Hyperlipidemia (272 4) (E78 5)   15  Hypertension (401 9) (I10)   16  Lateral epicondylitis (726 32) (M77 10)   17  Left groin pain (789 09) (R10 30)   18  Left inguinal hernia (550 90) (K40 90)   19  Lumbar canal stenosis (724 02) (M48 06)   20  Lumbar degenerative disc disease (722 52) (M51 36)   21  Lumbar sprain, sequela (905 7,847 2) (S33 5XXS)   22  Morbid obesity due to excess calories (278 01) (E66 01)   23  Neuropathy, ilioinguinal nerve, 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)   24  Obesity (278 00) (E66 9)   25  Pain in left hip (719 45) (M25 552)   26  Peripheral neuropathy (356 9) (G62 9)   27  Persistent insomnia (307 42) (G47 00)   28  Rectus diastasis (728 84) (M62 08)   29  Restless legs syndrome (333 94) (G25 81)   30  Retrolisthesis of vertebrae (738 4) (M43 10)   31  Staphylococcus infection (041 10) (B95 8)   32  Thoracic radiculopathy (724 4) (M54 14)   33  Umbilical hernia (387 5) (K42 9)   34  Umbilical hernia without obstruction and without gangrene (553 1) (K42 9)    Past Medical History    1   History of Achrochordon (701 9) (L91 8)   2  History of Acute low back pain (724 2) (M54 5)   3  History of Back stiffness (724 8) (M25 60)   4  History of Deep vein thrombosis of left upper extremity (453 82) (I82 622)   5  History of Diverticula of colon (562 10) (K57 30)   6  History of Eustachian tube dysfunction (381 81) (H69 80)   7  History of Flu vaccine need (V04 81) (Z23)   8  History of Follow-up examination following surgery (V67 00) (Z09)   9  History of Hip pain (719 45) (M25 559)   10  History of acute pharyngitis (V12 69) (Z87 09)   11  History of angina pectoris (V12 59) (Z86 79)   12  History of backache (V13 59) (Z87 39)   13  History of chest pain (V13 89) (Z87 898)   14  History of sebaceous cyst (V13 3) (Z87 2)   15  History of serous otitis media (V12 49) (Z86 69)   16  History of sleep disturbance (V13 89) (Z87 898)   17  History of Joint pain (719 40) (M25 50)   18  History of Leg numbness (782 0) (R20 0)   19  History of Leg pain, bilateral (729 5) (M79 604,M79 605)   20  History of Leg weakness (729 89) (R29 898)   21  History of Methicillin Resistant Staphylococcus Aureus Septicemia (V12 04)   22  History of Postprocedural state (V45 89) (Z98 890)   23  History of Pre-procedural examination (V72 84) (Z01 818)   24  History of Surgery, elective (V50 9) (Z41 9)    The active problems and past medical history were reviewed and updated today  Surgical History    1  History of Arthroscopy Knee Right   2  History of Back Surgery   3  History of Complete Colonoscopy   4  History of Elbow Surgery   5  History of Incision And Drainage Of Skin Abscess   6  History of Neck Surgery   7  History of Shoulder Surgery    The surgical history was reviewed and updated today  Family History  Mother    1  Family history of Carcinoma   2  Family history of Heart Disease (V17 49)  Father    3  Family history of Carcinoma   4   Family history of Heart Disease (V17 49)    The family history was reviewed and updated today  Social History    · Being A Social Drinker   · Currently sexually active   · Disabled   · Former smoker (Y68 17) (I07 188)   · Marital History - Currently    · No drug use   · No known risk factors   · No known STD risk factors   · Non-smoker (V49 89) (Z78 9)   · Three children  The social history was reviewed and updated today  Current Meds   1  Aleve 220 MG Oral Tablet; Take 1 tablet twice daily; Therapy: (Jack Phan) to Recorded   2  Aspirin 81 MG CAPS; take 1 capsule daily; Therapy: (Jack Phan) to Recorded   3  Bisoprolol-Hydrochlorothiazide 2 5-6 25 MG Oral Tablet; take one tablet by mouth every   day; Therapy: 18VVA5702 to (Last Rx:83Gqz8049)  Requested for: 79NPT6592 Ordered   4  ClonazePAM 0 5 MG Oral Tablet; TAKE ONE TABLET BY MOUTH EVERY EVENING FOR   RESTLESS LEGS; Therapy: 11ETF8405 to (Last Rx:14Oct2016)  Requested for: 99Ohz2301 Ordered   5  Daily Multivitamin TABS; TAKE 1 TABLET DAILY; Therapy: (Jack Phan) to Recorded   6  DiazePAM 10 MG Oral Tablet; TAKE TABLET  1/2-1 tablet as needed daily; Last   Rx:65Vhm2473 Ordered   7  DULoxetine HCl - 60 MG Oral Capsule Delayed Release Particles; TAKE 1 PILL DAILY; Therapy: 82Gmx2954 to (Evaluate:26Jun2017)  Requested for: 28Mar2017; Last   Rx:28Mar2017 Ordered   8  Hydrocodone-Acetaminophen  MG Oral Tablet; Take 1/2-1 tablet QID PRIN for   break through pain; Therapy: 86MTM4293 to (Evaluate:27Apr2017)  Requested for: 04KKV7875; Last   Rx:28Mar2017 Ordered   9  Lactulose 10 GM/15ML Oral Solution; TAKE 1 TABLESPOONFUL DAILY prn for   constipation; Therapy: 81DIW6758 to (Evaluate:18Apr2017)  Requested for: 28Mar2017; Last   Rx:18Jan2017 Ordered   10  OxyMORphone HCl ER 10 MG Oral Tablet Extended Release 12 Hour; Take 1 pill daily    PRN for severe break through pain days; Last Rx:18Jan2017 Ordered   11  Rosuvastatin Calcium 10 MG Oral Tablet; TAKE 1 TABLET DAILY;     Therapy: 40Ujq1794 to (Evaluate:67Swy9543)  Requested for: 01Bdj4674; Last    Rx:84Mpa7409 Ordered   12  TraZODone HCl - 150 MG Oral Tablet; TAKE 1 TABLET AT BEDTIME Recorded    The medication list was reviewed and updated today  Allergies    1  fentanyl   2  Penicillins   3  Pravachol TABS   4  Sulfa Drugs   5  Vytorin TABS    Vitals  Vital Signs    Recorded: 56ATC0161 08:18AM   Temperature 98 3 F, Tympanic   Heart Rate 92, R Brachial Artery   Pulse Quality Normal, R Brachial Artery   Respiration Quality Normal   Respiration 16   Systolic 084, RUE, Sitting   Diastolic 93, RUE, Sitting   Height 5 ft 11 in   Weight 273 lb    BMI Calculated 38 08   BSA Calculated 2 41     Physical Exam     Constitutional Patient appears healthy and well developed  Head and Face Normal on inspection  Eyes Fundoscopic exam is benign  Neck No JVD  Carotid pulses: 2+ and equal bilaterally, without bruits  Respiratory Auscultation of lungs: Clear to auscultation  No rales, rhonchi, wheezes appreciated over the lungs bilaterally  Cardiovascular Auscultation of heart: Rhythm is regular, no gallop or rubs  No heart murmur appreciated  Peripheral vascular exam: Pedal Pulses: 2+ and equal bilaterally  Radial pulses: 2+ and equal bilaterally  Extremities: Peripheral circulation is normal    Abdomen Soft, nontender, and nondistended without guarding, rigidity or rebound tenderness  Musculo: Spine Contour is normal  No tenderness of the spine billaterally  Skin warm and dry  Incisions well healed  Neurologic - Mental Status: Alert and Oriented x3  Mood and affect: Affect is normal   Memory is grossly intact  Attention is WNL  Santo Heckler Speech: Language is fluent  Cranial Nerve Exam:  2nd cranial nerve: Intact  3rd, 4th, and 6th cranial nerves: Intact  5th cranial nerve: Intact  7th cranial nerve: Intact  8th cranial nerve: Intact  9th and 10th cranial nerves: Intact  11th cranial nerve: Intact  12th cranial nerve: Intact    Motor System General Motor Strength: No pronator drift and no parietal drift  Motor System - Upper Extremities: Muscle strength: 5/5 bilaterally  Muscle tone: Normal bilaterally  Motor System - Lower Extremities: Muscle strength: 5/5 bilaterally  Muscle tone: Normal bilaterally  Reflexes: DTR's are brisk, symmetric and 2+ bilaterally  Babinski's reflex is down going bilaterally symmetrical bilaterally  Coordination: Normal    Sensory: Sensation grossly intact to light touch  Sensation grossly intact to pinprick  Gait and Station: [place] with a normal gait  Results/Data  Diagnostic Studies Reviewed Neurosurger [facility]:   I personally reviewed the CT myelogram in detail with the patient  X-ray Review Reviewed images and report of SCS trial with Dr Jose M Rao with single St  Kristofer midline 8 contact lead at mid T8 using T8-9 contacts  CT Scan Review Reviewed images and report of CT lumbar myelogram 7/2016  Stable compared to MRI in 8/2015  There is multilevel DDD with post surgical changes and scarring  Worse stenosis and DDD at L5-S1  MRI Review Reviewed images and report of MRI lumbar spine 9/2015  Moderate multilevel DDD with mild to moderate foraminal and central stenosis throughout  Previous L2-3 fusion  Arachnoiditis and epidural scarring at multiple levels  Reviewed images and report of MRI thoracic spine 9/2015  Moderate DDD throughout  There is a T8-9 and T9-10 disc protrusion with moderate stenosis  Health Management  Encounter for screening colonoscopy   COLONOSCOPY; every 10 years; Last 99GCL0467; Next Due: 25OIH7005;  Active    Future Appointments    Date/Time Provider Specialty Site   06/20/2017 08:00 AM MARIA GUADALUPE [facility]   06/19/2017 06:20 PM MD Fina Smith MD     Signatures   Electronically signed by : WILLIAM Swann ; May  1 2017  9:45AM EST                       (Author)

## 2018-01-13 NOTE — RESULT NOTES
Message   Recorded as Task   Date: 10/19/2017 10:01 AM, Created By: Chris Narayanan   Task Name: Follow Up   Assigned To: [facility]   Regarding Patient: Salvador Mckinley, Status: Active   Comment:    Geneva Loyola - 19 Oct 2017 10:01 AM     TASK CREATED  B/L S1 TFESI on 10/13/17 w/SL in [place]  F/u scheduled for 11/7/17    Please call 53/33/03   Swati Ocasio - 20 Oct 0194 55:44 AM     TASK EDITED  Patient reports he got good relief for about 3days and not he only has about 30% - Patient was advised to give more time and will discuss @ F/U     Sudhir Lyn - 20 Oct 2017 10:50 AM     TASK REPLIED TO: Previously Assigned To Vanita Flores

## 2018-01-13 NOTE — MISCELLANEOUS
Message   Recorded as Task   Date: 01/30/2017 03:56 PM, Created By: Jena Horan   Task Name: Medical Complaint Callback   Assigned To: [facility] [place] [facility]   Regarding Patient: Jose Alberto Hunter, Status: Active   Comment:    Fabi Palacios - 30 Jan 2017 3:56 PM     TASK CREATED  Caller: Self; Medical Complaint; [phone] (Home); [phone] (Work)  S/w the pt  after receiving a vmlom from 3901 from his wife  The pt  stated that since the increase in the duloxetine from 60mg to 90 mg he has noticed that he is having a lot more muscle pain, especially in his UE's, L>R  He also has BLE muscle pain  He stated he started the increase on Sunday 1/29 and then he had to stop it over night for a colonoscopy on Monday  He restarted it back up on Tuesday and since then he has been uncomfortable  DG to advise  Thanks   Skip Fleming - 30 Jan 2017 4:13 PM     TASK REPLIED TO: Previously Assigned To [facility] [place] [facility]  At this point for the next week go back to just 60 mg a day and then in 1 week, retry the 90 mg a day and see how he does  He should call our office if he continues to have issues  Fabi Palacios - 30 Jan 2017 4:15 PM     TASK EDITED  S/w the pt  and he is aware and will CB c/ an update  Skip Fleming - 30 Jan 2017 4:16 PM     TASK REPLIED TO: Previously Assigned To Skip Fleming  Provider aware  Thank you  Active Problems    1  Bursitis of hip (726 5) (M70 70)   2  Carpal tunnel syndrome, unspecified laterality (354 0) (G56 00)   3  Chronic bilateral low back pain with bilateral sciatica (724 2,724 3,338 29)   (M54 42,M54 41,G89 29)   4  Chronic lumbar radiculopathy (724 4) (M54 16)   5  Chronic pain syndrome (338 4) (G89 4)   6  Constipation due to opioid therapy (564 09,E935 2) (K59 03,T40 2X5A)   7  Continuous opioid dependence (304 01) (F11 20)   8  Depression (311) (F32 9)   9  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)   10  Edema (782 3) (R60 9)   11   Encounter for screening colonoscopy (V76 51) (Z12 11)   12  Hyperglycemia (790 29) (R73 9)   13  Hyperlipidemia (272 4) (E78 5)   14  Hypertension (401 9) (I10)   15  Lateral epicondylitis (726 32) (M77 10)   16  Left groin pain (789 09) (R10 30)   17  Left inguinal hernia (550 90) (K40 90)   18  Lumbar degenerative disc disease (722 52) (M51 36)   19  Lumbar sprain, sequela (905 7,847 2) (S33 5XXS)   20  Morbid obesity due to excess calories (278 01) (E66 01)   21  Neuropathy, ilioinguinal nerve, 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)   22  Obesity (278 00) (E66 9)   23  Pain in left hip (719 45) (M25 552)   24  Peripheral neuropathy (356 9) (G62 9)   25  Persistent insomnia (307 42) (G47 00)   26  Rectus diastasis (728 84) (M62 08)   27  Restless legs syndrome (333 94) (G25 81)   28  Staphylococcus infection (041 10) (B95 8)   29  Thoracic radiculopathy (724 4) (M54 14)   30  Umbilical hernia (009 9) (K42 9)   31  Umbilical hernia without obstruction and without gangrene (553 1) (K42 9)    Current Meds   1  Aleve 220 MG Oral Tablet; Take 1 tablet twice daily; Therapy: (Kailey Ulloa) to Recorded   2  Aspirin 81 MG CAPS; take 1 capsule daily; Therapy: (Kailey Ulloa) to Recorded   3  Bisoprolol-Hydrochlorothiazide 2 5-6 25 MG Oral Tablet; take one tablet by mouth every   day; Therapy: 67LQK6286 to (Last Rx:26Bkk4836)  Requested for: 35OFM2522 Ordered   4  ClonazePAM 0 5 MG Oral Tablet; TAKE ONE TABLET BY MOUTH EVERY EVENING FOR   RESTLESS LEGS; Therapy: 99OJC3774 to (Last Rx:14Oct2016)  Requested for: 14Oct2016 Ordered   5  Daily Multivitamin TABS; TAKE 1 TABLET DAILY; Therapy: (Kailey Ulloa) to Recorded   6  DiazePAM 10 MG Oral Tablet; TAKE TABLET  1/2-1 tablet as needed daily; Last   Rx:04Fyp7007 Ordered   7  DULoxetine HCl - 30 MG Oral Capsule Delayed Release Particles; Take 1 PO HS with 60   mg to equal 90 mg per day;    Therapy: 91EDO7647 to (Evaluate:23Apr2017)  Requested for: 64JGP4795; Last   JL:70SID4465 Ordered   8  DULoxetine HCl - 60 MG Oral Capsule Delayed Release Particles; TAKE 1 PILL DAILY; Therapy: 59HVX4876 to (Evaluate:23Apr2017)  Requested for: 11YAF7712; Last   Rx:23Jan2017 Ordered   9  Hydrocodone-Acetaminophen  MG Oral Tablet; Take 1/2-1 tablet QID PRIN for   break through pain; Therapy: 43XEB1532 to (Evaluate:62Sue8635)  Requested for: 73YUQ5618; Last   Rx:18Jan2017 Ordered   10  Lactulose 10 GM/15ML Oral Solution; TAKE 1 TABLESPOONFUL DAILY prn for    constipation; Therapy: 20BKV4085 to (Evaluate:18Apr2017)  Requested for: 45ZTB8471; Last    Rx:18Jan2017 Ordered   11  OxyMORphone HCl ER 10 MG Oral Tablet Extended Release 12 Hour; Take 1 pill daily    PRN for severe break through pain days; Last Rx:18Jan2017 Ordered   12  Rosuvastatin Calcium 10 MG Oral Tablet; TAKE 1 TABLET DAILY; Therapy: 22Apr2016 to (Evaluate:10Aug2017)  Requested for: 05Mjd9514; Last    Rx:71Zhv7281 Ordered    Allergies    1  fentanyl   2  Penicillins   3  Pravachol TABS   4  Sulfa Drugs   5   Vytorin TABS    Signatures   Electronically signed by : Cindy García, ; Jan 30 2017  4:21PM EST                       (Author)

## 2018-01-14 VITALS
HEIGHT: 71 IN | DIASTOLIC BLOOD PRESSURE: 74 MMHG | HEART RATE: 80 BPM | WEIGHT: 271.6 LBS | BODY MASS INDEX: 38.02 KG/M2 | TEMPERATURE: 98.3 F | SYSTOLIC BLOOD PRESSURE: 122 MMHG

## 2018-01-14 VITALS
SYSTOLIC BLOOD PRESSURE: 130 MMHG | WEIGHT: 276.81 LBS | HEIGHT: 71 IN | BODY MASS INDEX: 38.75 KG/M2 | DIASTOLIC BLOOD PRESSURE: 80 MMHG | HEART RATE: 80 BPM | TEMPERATURE: 98.5 F

## 2018-01-14 NOTE — MISCELLANEOUS
Message   Recorded as Task   Date: 07/14/2016 11:49 AM, Created By: Kami Braxton   Task Name: Financial Authorization   Assigned To: Laura Romero   Regarding Patient: Josephine Blanco, Status: In Progress   Comment:    Laura Romero - 14 Jul 2016 11:49 AM     TASK CREATED  PA completed for Oxymorphone  Waiting on insurance approval   Laura Romero - 14 Jul 2016 11:49 AM     TASK IN PROGRESS   Laura Romero - 15 Jul 2016 3:15 PM     TASK EDITED  Cover my meds email approval letter to me for Oxymorphone  Letter in scan bin  Left message on patient's answering machine that PA was approved and can  script at pharmacy  JoseF Lambert - 15 Jul 2016 3:16 PM     TASK REPLIED TO: Previously Assigned To Brent Gonzalez  aware thanks        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)   8  Edema (782 3) (R60 9)   9  Encounter for long-term use of opiate analgesic (V58 69) (Z79 891)   10  Encounter for screening colonoscopy (V76 51) (Z12 11)   11  Flu vaccine need (V04 81) (Z23)   12  Hyperglycemia (790 29) (R73 9)   13  Hyperlipidemia (272 4) (E78 5)   14  Hypertension (401 9) (I10)   15  Lateral epicondylitis (726 32) (M77 10)   16  Lumbar radicular pain (724 4) (M54 16)   17  Lumbar sprain, sequela (905 7,847 2) (S33 5XXS)   18  Neurogenic claudication due to lumbar spinal stenosis (724 03) (M48 06)   19  Pain in left hip (719 45) (M25 552)   20  Peripheral neuropathy (356 9) (G62 9)   21  Postlaminectomy syndrome of lumbosacral region (722 83) (M96 1)   22  Restless legs syndrome (333 94) (G25 81)   23  Special screening examination for neoplasm of prostate (V76 44) (Z12 5)   24  Staphylococcus infection (041 10) (B95 8)   25  Thoracic injury, initial encounter (959 11) (S29 9XXA)   26  Thoracic radiculopathy (724 4) (M54 14)   27  Thoracic spinal stenosis (724 01) (M48 04)   28  Umbilical hernia (448 8) (K42 9)    Current Meds   1  Aleve 220 MG Oral Tablet; Take 1 tablet twice daily; Therapy: ([employer]) to Recorded   2  Aspirin 81 MG CAPS; take 1 capsule daily; Therapy: ([employer]) to Recorded   3  Belsomra 15 MG Oral Tablet; take 1 tablet at  bedtime as needed for insomnia; Therapy: 61YSR4612 to (Evaluate:80Kpq1169) Recorded   4  Bisoprolol-Hydrochlorothiazide 2 5-6 25 MG Oral Tablet; take one tablet by mouth every   day; Therapy: 89NXK4282 to (Last Rx:20Uay5024)  Requested for: 34ZTQ7909 Ordered   5  ClonazePAM 0 5 MG Oral Tablet; TAKE ONE TABLET BY MOUTH EVERY EVENING FOR   RESTLESS LEG; Therapy: 93DYK5398 to (Last Rx:26Jan2016)  Requested for: 58UCE0386 Ordered   6  Daily Multivitamin TABS; TAKE 1 TABLET DAILY; Therapy: ([employer]) to Recorded   7  Diazepam 10 MG Oral Tablet; TAKE TABLET  1/2-1 tablet as needed daily; Last   Rx:11Eyf9764 Ordered   8  Flector 1 3 % Transdermal Patch; APPLY PATCH TO AFFECTED AREA TWICE DAILY    Requested for: 50Vyy3515; Last Rx:67Mij3768 Ordered   9  Hydrocodone-Acetaminophen  MG Oral Tablet; Take 1/2 -1 tab every 6 hrs prn; Therapy: 82CYI2414 to (Evaluate:11Aug2016)  Requested for: 01Qcr5649; Last   Rx:20Wzu1324 Ordered   10  Lyrica 75 MG Oral Capsule; TAKE 1 CAPSULE TWICE DAILY; Therapy: 59ZEL5831 to (Evaluate:11Aug2016); Last Rx:80Ulj0785 Ordered   11  Oxymorphone HCl ER 10 MG Oral Tablet Extended Release 12 Hour; TAKE 1 TABLET    EVERY 12 HOURS AS NEEDED; Last Rx:49Haj8994 Ordered   12  Rosuvastatin Calcium 10 MG Oral Tablet; TAKE 1 TABLET DAILY; Therapy: 99Owy6886 to (Evaluate:97Shl5989); Last Rx:22Jun2016 Ordered    Allergies    1  fentanyl   2  Penicillins   3  Pravachol TABS   4   Sulfa Drugs 5  Vytorin TABS    Signatures   Electronically signed by : Too Palmer, ; Jul 15 2016  3:57PM EST                       (Author)

## 2018-01-15 VITALS
BODY MASS INDEX: 39.48 KG/M2 | SYSTOLIC BLOOD PRESSURE: 132 MMHG | DIASTOLIC BLOOD PRESSURE: 80 MMHG | WEIGHT: 282 LBS | TEMPERATURE: 98.5 F | HEIGHT: 71 IN | HEART RATE: 92 BPM

## 2018-01-15 NOTE — RESULT NOTES
Message   Recorded as Task   Date: 10/09/2017 09:51 AM, Created By: Chepe Her   Task Name: Miscellaneous   Assigned To: Jamia Navarro   Regarding Patient: Mecca Grey, Status: Active   Comment:    Jamia Navarro - 09 Oct 2017 9:51 AM     TASK CREATED  l/m for pt to rtc to [place]  to schedule B/l S1 TFESI   Jamia Navarro - 11 Oct 2017 11:06 AM     TASK EDITED  proc scheduled for 10/13/2017, pt aware need for , npo 1 hr prior, wear comfortable pants, if becomes ill call to r/s  pt verbalized understanding

## 2018-01-15 NOTE — RESULT NOTES
Message   Recorded as Task   Date: 10/05/2017 02:45 PM, Created By: Josemanuel Beckham   Task Name: Miscellaneous   Assigned To: Jamia Navarro   Regarding Patient: Milly Denney, Status: In Progress   Comment:    Jamia Navarro - 05 Oct 2017 2:45 PM     TASK CREATED  pt's wife l/m to schedule pt for 3rd injection  rtc to pt to get more info, pt states back pain and lt hip pain  pt was in to see you in f/u on 2017  pt had auth for 3 inj's used 2 but [place] is not  will need to re-auth  pt is aware need to discuss w/provider to see about another inj and if yes will need to re auth  Skip Fleming - 05 Oct 2017 3:31 PM     TASK REPLIED TO: Previously Assigned To Skip Fleming  If he wants to we can schedule him for a repeat B/L S1 TFESI with DR Christiansen Argue using the same codes  THank you     Jamia Navarro - 06 Oct 2017 11:58 AM     TASK EDITED  faxed auth request w/clinicals to [place] - 09 Oct 2017 9:47 AM     TASK EDITED  approved [place] #[id number]  valid 10/9/2017-10/09/2018 X6 visits

## 2018-01-15 NOTE — MISCELLANEOUS
Message   Recorded as Task   Date: 01/19/2017 11:58 AM, Created By: Cristopher Garcia   Task Name: Follow Up   Assigned To: [facility] [place] [facility]   Regarding Patient: Daksha Lauren, Status: Active   Comment:    Skip Fleming - 19 Jan 2017 11:58 AM     TASK CREATED  Please call the patient and advise him that I did follow up with [facility]  It appears that his stim is not upgradeable  He would need a new IPG/Stim battery implanted in order to upgrade him to [place]  AT this point he can think about it and he should schedule on OV with Dr Penny Correa to discuss re-implantion or removal as he previously discussed  Thank you  Chito Burrell - 19 Jan 2017 2:51 PM     TASK EDITED  s/w pt, advised of above  pt verbalized understanding, states he is going to have the whole thing removed as he is very unhappy with the stim  Active Problems    1  Bursitis of hip (726 5) (M70 70)   2  Carpal tunnel syndrome, unspecified laterality (354 0) (G56 00)   3  Chronic bilateral low back pain with bilateral sciatica (724 2,724 3,338 29)   (M54 42,M54 41,G89 29)   4  Chronic lumbar radiculopathy (724 4) (M54 16)   5  Chronic pain syndrome (338 4) (G89 4)   6  Constipation due to opioid therapy (564 09,E935 2) (K59 03,T40 2X5A)   7  Continuous opioid dependence (304 01) (F11 20)   8  Depression (311) (F32 9)   9  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)   10  Edema (782 3) (R60 9)   11  Encounter for screening colonoscopy (V76 51) (Z12 11)   12  Hyperglycemia (790 29) (R73 9)   13  Hyperlipidemia (272 4) (E78 5)   14  Hypertension (401 9) (I10)   15  Lateral epicondylitis (726 32) (M77 10)   16  Left groin pain (789 09) (R10 30)   17  Left inguinal hernia (550 90) (K40 90)   18  Lumbar degenerative disc disease (722 52) (M51 36)   19  Lumbar sprain, sequela (905 7,847 2) (S33 5XXS)   20  Morbid obesity due to excess calories (278 01) (E66 01)   21  Neuropathy, ilioinguinal nerve, 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)   22   Obesity (278 00) (E66 9)   23  Pain in left hip (719 45) (M25 552)   24  Peripheral neuropathy (356 9) (G62 9)   25  Persistent insomnia (307 42) (G47 00)   26  Rectus diastasis (728 84) (M62 08)   27  Restless legs syndrome (333 94) (G25 81)   28  Staphylococcus infection (041 10) (B95 8)   29  Thoracic radiculopathy (724 4) (M54 14)   30  Umbilical hernia (570 2) (K42 9)   31  Umbilical hernia without obstruction and without gangrene (553 1) (K42 9)    Current Meds   1  Aleve 220 MG Oral Tablet; Take 1 tablet twice daily; Therapy: (Melvi Odom) to Recorded   2  Aspirin 81 MG CAPS; take 1 capsule daily; Therapy: (Melvi Odom) to Recorded   3  Bisoprolol-Hydrochlorothiazide 2 5-6 25 MG Oral Tablet; take one tablet by mouth every   day; Therapy: 79CRH3406 to (Last Rx:45Yfu2518)  Requested for: 75JLR2027 Ordered   4  ClonazePAM 0 5 MG Oral Tablet; TAKE ONE TABLET BY MOUTH EVERY EVENING FOR   RESTLESS LEGS; Therapy: 96TFI2783 to (Last Rx:14Oct2016)  Requested for: 14Oct2016 Ordered   5  Daily Multivitamin TABS; TAKE 1 TABLET DAILY; Therapy: (Melvi Odom) to Recorded   6  DiazePAM 10 MG Oral Tablet; TAKE TABLET  1/2-1 tablet as needed daily; Last   Rx:09Feb2016 Ordered   7  DULoxetine HCl - 30 MG Oral Capsule Delayed Release Particles; Take 1 PO HS with 60   mg to equal 90 mg per day; Therapy: 70EFZ3623 to (Sandi Marker)  Requested for: 31JRL0986; Last   Rx:18Jan2017 Ordered   8  DULoxetine HCl - 60 MG Oral Capsule Delayed Release Particles; TAKE 1 PILL DAILY; Therapy: 48KKM8608 to (Evaluate:22Zud6907)  Requested for: 51TLL9329; Last   Rx:18Jan2017 Ordered   9  Hydrocodone-Acetaminophen  MG Oral Tablet; Take 1/2-1 tablet QID PRIN for   break through pain; Therapy: 11GAL9936 to (Evaluate:83Tcp4186)  Requested for: 13AIB5537; Last   Rx:18Jan2017 Ordered   10  Lactulose 10 GM/15ML Oral Solution; TAKE 1 TABLESPOONFUL DAILY prn for    constipation;     Therapy: 07BUF3818 to (Evaluate:18Apr2017)  Requested for: 55DOV9454; Last    Rx:18Jan2017 Ordered   11  OxyMORphone HCl ER 10 MG Oral Tablet Extended Release 12 Hour; Take 1 pill daily    PRN for severe break through pain days; Last Rx:18Jan2017 Ordered   12  Rosuvastatin Calcium 10 MG Oral Tablet; TAKE 1 TABLET DAILY; Therapy: 22Apr2016 to (Evaluate:10Aug2017)  Requested for: 92Ywe8796; Last    Rx:53Aso2979 Ordered    Allergies    1  fentanyl   2  Penicillins   3  Pravachol TABS   4  Sulfa Drugs   5   Vytorin TABS    Signatures   Electronically signed by : Sam Villareal, ; Jan 19 2017  2:51PM EST                       (Author)

## 2018-01-16 NOTE — MISCELLANEOUS
Message   Recorded as Task   Date: 11/11/2016 03:45 PM, Created By: Yulia Self   Task Name: Med Renewal Request   Assigned To: [facility] [facility]   Regarding Patient: Charles Hannah, Status: In Progress   Comment:    Monika Kennedy - 11 Nov 2016 3:45 PM     TASK CREATED  Caller: Kiya Webb , Spouse; Renew Medication    Recieved call on [facility] triage line;  Pt's wife stating that DG asked them to call for a refill of her 's duloxetine when they were getting "low "  Kiya Webb states that the pt is now taking two pills at HS, States the duloxetine can be called into the [address] and it then gets sent to [address]  [facility] for her to ; Harriet Mcneal that I would send this information to Blane Pagan stated that she will check with [facility] on Monday,  Kiya Flowood then asked if I could fax the PT referral to her at [phone], because they misplaced the one that DG gave to them, PT referral faxed at present    Please adviseDago - 11 Nov 2016 3:49 PM     TASK REPLIED TO: Previously Assigned To [facility] [facility]  I escribed a script for Cymbalta 60 mg and he only needs to take one because I increased it from 30 mg, 2 PO HS  He should take the 60 mg for the next week, then I would like him to increase it to 2 PO HS and call our office before he runs out  He should also call us if he has any side effects or issues with the changes  Monika Kennedy - 11 Nov 2016 4:41 PM     TASK EDITED    ***FYI       S/w pt regarding above  Pt verbalized understanding of new dosing schedule  Stated that he will receive this new rx on Monday and will start the new dosing then  Appreciative of call  Skip Fleming - 11 Nov 2016 4:43 PM     TASK REPLIED TO: Previously Assigned To Skip Fleming  Provider aware  Thank you  Cammie Warren - 14 Nov 2016 8:36 AM     TASK IN PROGRESS        Active Problems    1  Bursitis of hip (726 5) (M70 70)   2   Carpal tunnel syndrome, unspecified laterality (354 0) (G56 00)   3  Chronic bilateral low back pain without sciatica (724 2,338 29) (M54 5,G89 29)   4  Chronic lumbar radiculopathy (724 4) (M54 16)   5  Chronic pain syndrome (338 4) (G89 4)   6  Continuous opioid dependence (304 01) (F11 20)   7  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)   8  Edema (782 3) (R60 9)   9  Encounter for long-term use of opiate analgesic (V58 69) (Z79 891)   10  Encounter for screening colonoscopy (V76 51) (Z12 11)   11  Flu vaccine need (V04 81) (Z23)   12  Hyperglycemia (790 29) (R73 9)   13  Hyperlipidemia (272 4) (E78 5)   14  Hypertension (401 9) (I10)   15  Lateral epicondylitis (726 32) (M77 10)   16  Lumbar degenerative disc disease (722 52) (M51 36)   17  Lumbar sprain, sequela (905 7,847 2) (S33 5XXS)   18  Neurogenic claudication due to lumbar spinal stenosis (724 03) (M48 06)   19  Neuropathy, ilioinguinal nerve, 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)   20  Pain in left hip (719 45) (M25 552)   21  Peripheral neuropathy (356 9) (G62 9)   22  Postlaminectomy syndrome of lumbosacral region (722 83) (M96 1)   23  Restless legs syndrome (333 94) (G25 81)   24  Special screening examination for neoplasm of prostate (V76 44) (Z12 5)   25  Staphylococcus infection (041 10) (B95 8)   26  Thoracic injury, initial encounter (959 11) (S29 9XXA)   27  Thoracic radiculopathy (724 4) (M54 14)   28  Thoracic spinal stenosis (724 01) (M48 04)   29  Umbilical hernia (224 7) (K42 9)    Current Meds   1  Aleve 220 MG Oral Tablet; Take 1 tablet twice daily; Therapy: (Aamir Peña) to Recorded   2  Aspirin 81 MG CAPS; take 1 capsule daily; Therapy: (Aamir Peña) to Recorded   3  Belsomra 15 MG Oral Tablet; take 1 tablet at  bedtime as needed for insomnia; Therapy: 20BXX1526 to (Evaluate:99Kjm1213) Recorded   4  Bisoprolol-Hydrochlorothiazide 2 5-6 25 MG Oral Tablet; take one tablet by mouth every   day;    Therapy: 03ZSY0143 to (Last Rx:06Avy6851)  Requested for: 23LXM5659 Ordered   5  ClonazePAM 0 5 MG Oral Tablet; TAKE ONE TABLET BY MOUTH EVERY EVENING FOR   RESTLESS LEGS; Therapy: 79OQH2511 to (Last Rx:93Sxz0973)  Requested for: 69Azw0344 Ordered   6  Daily Multivitamin TABS; TAKE 1 TABLET DAILY; Therapy: (Krysta Castañeda) to Recorded   7  DiazePAM 10 MG Oral Tablet; TAKE TABLET  1/2-1 tablet as needed daily; Last   Rx:89Ons1269 Ordered   8  DULoxetine HCl - 60 MG Oral Capsule Delayed Release Particles; TAKE 1 PILL DAILY; Therapy: 60Yse7138 to (Evaluate:10Jan2017)  Requested for: 15CXG9790; Last   Rx:11Nov2016 Ordered   9  Flector 1 3 % Transdermal Patch; APPLY PATCH TO AFFECTED AREA TWICE DAILY    Requested for: 10Aug2016; Last Rx:61Rta5965 Ordered   10  Hydrocodone-Acetaminophen  MG Oral Tablet; Take 1/2-1 tablet QID PRIN for    break through pain; Therapy: 75RWR9769 to (Evaluate:25Nov2016)  Requested for: 26Oct2016; Last    Rx:26Oct2016 Ordered   11  Rosuvastatin Calcium 10 MG Oral Tablet; TAKE 1 TABLET DAILY; Therapy: 46Tuu4525 to (Evaluate:10Aug2017)  Requested for: 90Xgj1350; Last    Rx:10Imu5275 Ordered    Allergies    1  fentanyl   2  Penicillins   3  Pravachol TABS   4  Sulfa Drugs   5   Vytorin TABS    Signatures   Electronically signed by : Rodríguez Mathur RN; Nov 14 2016  8:36AM EST                       (Author)

## 2018-01-17 NOTE — MISCELLANEOUS
Message   Recorded as Task   Date: 06/24/2017 08:33 AM, Created By: Tom Adam   Task Name: Follow Up   Assigned To: [facility]   Regarding Patient: Easton Salazar, Status: Active   CommentLus Starch - 24 Jun 2017 8:33 AM     TASK CREATED  Please call the patient and advise him that his testosterone was slightly low, but most likely normal for and because of his age  We will re-check next year  Ivory Ozuna - 26 Jun 2017 10:58 AM     TASK EDITED  S/W pt  Advised pt of the same  Pt appreciative  Active Problems    1  Bursitis of hip (726 5) (M70 70)   2  Carpal tunnel syndrome, unspecified laterality (354 0) (G56 00)   3  Chronic bilateral low back pain with bilateral sciatica (724 2,724 3,338 29)   (M54 42,M54 41,G89 29)   4  Chronic lumbar radiculopathy (724 4) (M54 16)   5  Chronic pain syndrome (338 4) (G89 4)   6  Constipation due to opioid therapy (564 09,E935 2) (K59 03,T40 2X5A)   7  Continuous opioid dependence (304 01) (F11 20)   8  Depression (311) (F32 9)   9  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)   10  Edema (782 3) (R60 9)   11  Encounter for long-term opiate analgesic use (V58 69) (Z79 891)   12  Hyperglycemia (790 29) (R73 9)   13  Hyperlipidemia (272 4) (E78 5)   14  Hypertension (401 9) (I10)   15  Lateral epicondylitis (726 32) (M77 10)   16  Left groin pain (789 09) (R10 32)   17  Left inguinal hernia (550 90) (K40 90)   18  Lumbar canal stenosis (724 02) (M48 06)   19  Lumbar degenerative disc disease (722 52) (M51 36)   20  Lumbar post-laminectomy syndrome (722 83) (M96 1)   21  Lumbar sprain, sequela (905 7,847 2) (S33 5XXS)   22  Morbid obesity due to excess calories (278 01) (E66 01)   23  Neuropathy, ilioinguinal nerve, 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)   24  Obesity (278 00) (E66 9)   25  Pain in left hip (719 45) (M25 552)   26  Peripheral neuropathy (356 9) (G62 9)   27  Persistent insomnia (307 42) (G47 00)   28   Rectus diastasis (218 84) (M62 08)   29  Restless legs syndrome (333 94) (G25 81)   30  Retrolisthesis of vertebrae (738 4) (M43 10)   31  Staphylococcus infection (041 10) (B95 8)   32  Thoracic radiculopathy (724 4) (M54 14)   33  Umbilical hernia without obstruction and without gangrene (553 1) (K42 9)    Current Meds   1  Aleve 220 MG Oral Tablet; Take 1 tablet twice daily; Therapy: (Jamas Channel) to Recorded   2  Aspirin 81 MG CAPS; take 1 capsule daily; Therapy: ([employer]) to Recorded   3  Bisoprolol-Hydrochlorothiazide 2 5-6 25 MG Oral Tablet; take one tablet by mouth every   day; Therapy: 10CFL2931 to (Last Rx:31May2017)  Requested for: 99EBC7340 Ordered   4  ClonazePAM 0 5 MG Oral Tablet; TAKE ONE TABLET BY MOUTH DAILY IN THE   EVENING FOR RESTLESS LEGS; Therapy: 86SGX4279 to (Last Rx:31May2017)  Requested for: 72GDY2499 Ordered   5  Daily Multivitamin TABS; TAKE 1 TABLET DAILY; Therapy: ([employer]) to Recorded   6  DiazePAM 10 MG Oral Tablet; TAKE TABLET  1/2-1 tablet as needed daily; Last   Rx:19Jun2017 Ordered   7  DULoxetine HCl - 60 MG Oral Capsule Delayed Release Particles; TAKE 1 PILL DAILY; Therapy: 48Zje7572 to (Evaluate:33Rei7811)  Requested for: 20Jun2017; Last   Rx:20Jun2017 Ordered   8  Hydrocodone-Acetaminophen  MG Oral Tablet; Take 1/2-1 tablet QID PRIN for   break through pain; Therapy: 04MDX9740 to (Evaluate:84Efi0962)  Requested for: 20Jun2017; Last   Rx:20Jun2017 Ordered   9  Lactulose 10 GM/15ML Oral Solution; TAKE 1 TABLESPOONFUL DAILY prn for   constipation; Therapy: 86WSJ7532 to (Evaluate:60Dlu8917)  Requested for: 20Jun2017; Last   Rx:20Jun2017 Ordered   10  OxyMORphone HCl ER 10 MG Oral Tablet Extended Release 12 Hour; Take 1 pill daily    PRN for severe break through pain days; Last Rx:18Jan2017 Ordered   11  Rosuvastatin Calcium 10 MG Oral Tablet; TAKE 1 TABLET DAILY;     Therapy: 78Ixe2872 to (Evaluate:80Gfe7016)  Requested for: 01Wgn3853; Last    Rx:19Zgd0306 Ordered   12  TraZODone HCl - 150 MG Oral Tablet; TAKE 1 TABLET AT BEDTIME Recorded    Allergies    1  fentanyl   2  Penicillins   3  Pravachol TABS   4  Sulfa Drugs   5   Vytorin TABS    Signatures   Electronically signed by : Kami Lawrence, ; Jun 26 2017 10:58AM EST                       (Author)

## 2018-01-23 VITALS
HEART RATE: 88 BPM | HEIGHT: 71 IN | TEMPERATURE: 99.2 F | WEIGHT: 276.8 LBS | BODY MASS INDEX: 38.75 KG/M2 | DIASTOLIC BLOOD PRESSURE: 86 MMHG | SYSTOLIC BLOOD PRESSURE: 128 MMHG

## 2018-01-23 NOTE — MISCELLANEOUS
Message   Recorded as Task   Date: 12/11/2017 01:28 PM, Created By: Leland Barragan   Task Name: Miscellaneous   Assigned To: [facility]   Regarding Patient: Jason Dee, Status: Active   Comment:    Leland Barragan - 11 Dec 2017 1:28 PM     TASK CREATED  phone call from patient's wife, Price Savage, requesting a medication refill for duloextine  patient has about 7 days worth left of medication  patient uses [address] but would like to  from [facility]  if any questions can reach patient at [phone] (cell), or [phone] (home)  Chito Burrell - 11 Dec 2017 2:41 PM     TASK EDITED  s/w pt, confirmed duloxetine 60 mg bid as discussed at 11/7 ov  per pt, + relief, no se's  Confirmed 7+ days of medication on hand  Requested rx be sent to mail order pharmacy  Advised pt, will d/w Dr Villeda Apt anticipate rx will be sent today  this office will cb if there is any issue or concern  pt verbalized understandign and confirmed 1/4 ov  Hortencia Le - 11 Dec 2017 2:51 PM     TASK EDITED  Pt's wife returned call, not realizing that you already spoke with her   Please call with any further questions at [phone] ir [phone]  Sudhir Lyn - 11 Dec 2017 3:07 PM     TASK REASSIGNED: Previously Assigned To Starr Mcintosh - 11 Dec 2017 3:26 PM     TASK REPLIED TO: Previously Assigned To [facility]  I escribed a 90 day supply of Cymbalta to the patient's [employer]  Thank you  Active Problems    1  Bursitis of hip (726 5) (M70 70)   2  Carpal tunnel syndrome, unspecified laterality (354 0) (G56 00)   3  Chronic bilateral low back pain with bilateral sciatica (724 2,724 3,338 29)   (M54 42,M54 41,G89 29)   4  Chronic lumbar radiculopathy (724 4) (M54 16)   5  Chronic pain syndrome (338 4) (G89 4)   6  Constipation due to opioid therapy (564 09,E935 2) (K59 03,T40 2X5A)   7  Continuous opioid dependence (304 01) (F11 20)   8   Depression (311) (F32 9)   9  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)   10  Edema (782 3) (R60 9)   11  Encounter for long-term opiate analgesic use (V58 69) (Z79 891)   12  Hyperglycemia (790 29) (R73 9)   13  Hyperlipidemia (272 4) (E78 5)   14  Hypertension (401 9) (I10)   15  Lateral epicondylitis (726 32) (M77 10)   16  Left groin pain (789 09) (R10 32)   17  Left inguinal hernia (550 90) (K40 90)   18  Lumbar canal stenosis (724 02) (M48 061)   19  Lumbar degenerative disc disease (722 52) (M51 36)   20  Lumbar post-laminectomy syndrome (722 83) (M96 1)   21  Lumbar sprain, sequela (905 7,847 2) (S33 5XXS)   22  Morbid obesity due to excess calories (278 01) (E66 01)   23  Need for hepatitis C screening test (V73 89) (Z11 59)   24  Neuropathy, ilioinguinal nerve, 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)   25  Obesity (278 00) (E66 9)   26  Pain in left hip (719 45) (M25 552)   27  Partial thickness burn of right foot, initial encounter (945 22) (T25 221A)   28  Peripheral neuropathy (356 9) (G62 9)   29  Persistent insomnia (307 42) (G47 00)   30  Rectus diastasis (728 84) (M62 08)   31  Restless legs syndrome (333 94) (G25 81)   32  Retrolisthesis of vertebrae (738 4) (M43 10)   33  Staphylococcus infection (041 10) (B95 8)   34  Thoracic radiculopathy (724 4) (M54 14)   35  Umbilical hernia without obstruction and without gangrene (553 1) (K42 9)    Current Meds   1  Aleve 220 MG Oral Tablet; Take 1 tablet twice daily; Therapy: (Lorri Mcginnis) to Recorded   2  Aspirin 81 MG CAPS; take 1 capsule daily; Therapy: (Lorri Mcginnis) to Recorded   3  Bisoprolol-Hydrochlorothiazide 2 5-6 25 MG Oral Tablet; take one tablet by mouth every   day; Therapy: 67PVT3597 to (Last Rx:30Cyl5434)  Requested for: 71RKB3824 Ordered   4  ClonazePAM 0 5 MG Oral Tablet; TAKE ONE TABLET BY MOUTH DAILY IN THE   EVENING FOR RESTLESS LEGS; Therapy: 65WKM3539 to (Last Rx:31May2017)  Requested for: 36PCH2427 Ordered   5   Daily Multivitamin TABS; TAKE 1 TABLET DAILY; Therapy: (Luli Stevenson) to Recorded   6  DiazePAM 10 MG Oral Tablet; TAKE TABLET  1/2-1 tablet as needed daily; Last   Rx:19Jun2017 Ordered   7  DULoxetine HCl - 60 MG Oral Capsule Delayed Release Particles; Take 1 PO bid; Therapy: 15Cgl2636 to (Evaluate:11Mar2018)  Requested for: 09Hrq4473; Last   Rx:92Mug7919 Ordered   8  Hydrocodone-Acetaminophen  MG Oral Tablet; Take 1 PO QID PRN for pain; Therapy: 83TVU3972 to (Evaluate:62Qun7811)  Requested for: 12IWT3987; Last   Rx:07Nov2017 Ordered   9  Lactulose 10 GM/15ML Oral Solution; TAKE 1 TABLESPOONFUL DAILY prn for   constipation; Therapy: 01KRE1680 to (Evaluate:45Sho2500)  Requested for: 72BAW1922; Last   Rx:20Jun2017 Ordered   10  Rosuvastatin Calcium 10 MG Oral Tablet; Take 1 tablet by mouth daily; Therapy: 25Qom0679 to (Last Rx:77Ngy9993)  Requested for: 17Jez1919 Ordered   11  Silver Sulfadiazine 1 % External Cream; APPLY TO AFFECTED AREA 3 TIMES DAILY; Therapy: 61Xgf8024 to (03 17 74 30 53)  Requested for: 83Ilu5599; Last    Rx:19Ohx4112 Ordered    Allergies    1  fentanyl   2  Penicillins   3  Pravachol TABS   4  Sulfa Drugs   5   Vytorin TABS    Signatures   Electronically signed by : Srinivasan Morris, ; Dec 11 2017  3:37PM EST                       (Author)

## 2018-01-24 VITALS
SYSTOLIC BLOOD PRESSURE: 130 MMHG | BODY MASS INDEX: 38.64 KG/M2 | TEMPERATURE: 98.6 F | HEART RATE: 86 BPM | WEIGHT: 276 LBS | HEIGHT: 71 IN | DIASTOLIC BLOOD PRESSURE: 82 MMHG

## 2018-01-26 ENCOUNTER — OFFICE VISIT (OUTPATIENT)
Dept: FAMILY MEDICINE CLINIC | Facility: HOSPITAL | Age: 63
End: 2018-01-26
Payer: COMMERCIAL

## 2018-01-26 VITALS
RESPIRATION RATE: 14 BRPM | HEIGHT: 71 IN | WEIGHT: 280.4 LBS | HEART RATE: 88 BPM | SYSTOLIC BLOOD PRESSURE: 146 MMHG | DIASTOLIC BLOOD PRESSURE: 80 MMHG | BODY MASS INDEX: 39.26 KG/M2 | TEMPERATURE: 98.7 F

## 2018-01-26 DIAGNOSIS — I10 ESSENTIAL HYPERTENSION: ICD-10-CM

## 2018-01-26 DIAGNOSIS — F19.939 WITHDRAWAL FROM OTHER PSYCHOACTIVE SUBSTANCE (HCC): ICD-10-CM

## 2018-01-26 DIAGNOSIS — R42 VERTIGO: Primary | ICD-10-CM

## 2018-01-26 PROBLEM — K42.9 UMBILICAL HERNIA WITHOUT OBSTRUCTION AND WITHOUT GANGRENE: Status: ACTIVE | Noted: 2017-01-16

## 2018-01-26 PROBLEM — T46.6X5A STATIN MYOPATHY: Status: ACTIVE | Noted: 2017-12-18

## 2018-01-26 PROBLEM — K40.90 LEFT INGUINAL HERNIA: Status: ACTIVE | Noted: 2017-01-16

## 2018-01-26 PROBLEM — M48.061 LUMBAR CANAL STENOSIS: Status: ACTIVE | Noted: 2017-03-28

## 2018-01-26 PROBLEM — E66.01 MORBID OBESITY DUE TO EXCESS CALORIES (HCC): Status: ACTIVE | Noted: 2017-01-16

## 2018-01-26 PROBLEM — M54.42 CHRONIC BILATERAL LOW BACK PAIN WITH BILATERAL SCIATICA: Status: ACTIVE | Noted: 2017-01-18

## 2018-01-26 PROBLEM — G72.0 STATIN MYOPATHY: Status: ACTIVE | Noted: 2017-12-18

## 2018-01-26 PROBLEM — T40.2X5A CONSTIPATION DUE TO OPIOID THERAPY: Status: ACTIVE | Noted: 2017-01-18

## 2018-01-26 PROBLEM — G89.29 CHRONIC BILATERAL LOW BACK PAIN WITH BILATERAL SCIATICA: Status: ACTIVE | Noted: 2017-01-18

## 2018-01-26 PROBLEM — M62.08 RECTUS DIASTASIS: Status: ACTIVE | Noted: 2017-01-16

## 2018-01-26 PROBLEM — K59.03 CONSTIPATION DUE TO OPIOID THERAPY: Status: ACTIVE | Noted: 2017-01-18

## 2018-01-26 PROBLEM — F32.A DEPRESSION: Status: ACTIVE | Noted: 2017-01-12

## 2018-01-26 PROBLEM — M96.1 LUMBAR POST-LAMINECTOMY SYNDROME: Status: ACTIVE | Noted: 2017-05-01

## 2018-01-26 PROBLEM — M43.10 RETROLISTHESIS OF VERTEBRAE: Status: ACTIVE | Noted: 2017-03-22

## 2018-01-26 PROBLEM — M54.41 CHRONIC BILATERAL LOW BACK PAIN WITH BILATERAL SCIATICA: Status: ACTIVE | Noted: 2017-01-18

## 2018-01-26 PROCEDURE — 99214 OFFICE O/P EST MOD 30 MIN: CPT | Performed by: FAMILY MEDICINE

## 2018-01-26 RX ORDER — MECLIZINE HCL 12.5 MG/1
12.5 TABLET ORAL 3 TIMES DAILY PRN
Qty: 30 TABLET | Refills: 0 | Status: SHIPPED | OUTPATIENT
Start: 2018-01-26 | End: 2018-03-29

## 2018-01-26 RX ORDER — ERGOCALCIFEROL (VITAMIN D2) 10 MCG
1 TABLET ORAL DAILY
COMMUNITY
End: 2018-11-21

## 2018-01-26 RX ORDER — LACTULOSE 10 G/15ML
SOLUTION ORAL
COMMUNITY
Start: 2018-01-04 | End: 2019-01-16

## 2018-01-26 RX ORDER — HYDROCODONE BITARTRATE AND ACETAMINOPHEN 10; 325 MG/1; MG/1
TABLET ORAL
COMMUNITY
Start: 2018-01-08 | End: 2018-03-29 | Stop reason: SDUPTHER

## 2018-01-26 NOTE — PROGRESS NOTES
Patient is here for an acute visit    HPI:  Patient is here for   Dizziness  This is not associated with it  Mostly associated with change of position  No feelings of lightheadedness  No change in blood pressure medications    He is drinking fluids adequately  Good urine output  No upper respiratory infection  No ear infection recently  No new medications  However he has been on Cymbalta in the past   Due to forgetting it in last trip he went from Cymbalta 120 mg to not taking it at all  He stopped the Cymbalta (by mistake ) last week  However this was also associated with improvement the right leg pain and mild abdominal pain that he was having  Not associated with any palpitations or chest pain  No shortness of breath or wheezing  No headaches  No lateralizing weakness, no speech changes  Dizziness   This is a new problem  The current episode started in the past 7 days  The problem occurs intermittently  The problem has been unchanged  Pertinent negatives include no abdominal pain, anorexia, arthralgias, chest pain, chills, congestion, diaphoresis, fatigue, fever, headaches, joint swelling, myalgias, nausea, numbness, rash, sore throat, swollen glands, urinary symptoms or weakness  The symptoms are aggravated by standing  He has tried nothing for the symptoms  The treatment provided no relief            -----------------------------  Review of Systems   Constitutional: Negative  Negative for activity change, appetite change, chills, diaphoresis, fatigue and fever  HENT: Negative for congestion, dental problem and sore throat  Eyes: Negative  Respiratory: Negative  Negative for apnea, chest tightness, shortness of breath and wheezing  Cardiovascular: Negative  Negative for chest pain, palpitations and leg swelling  Gastrointestinal: Negative  Negative for abdominal distention, abdominal pain, anorexia, constipation, diarrhea and nausea     Genitourinary: Negative  Negative for difficulty urinating, dysuria and frequency  Musculoskeletal: Negative for arthralgias, joint swelling and myalgias  Skin: Negative for rash  Neurological: Positive for dizziness and light-headedness  Negative for tremors, seizures, syncope, speech difficulty, weakness, numbness and headaches  Hematological: Negative for adenopathy  Does not bruise/bleed easily  Psychiatric/Behavioral: Negative for agitation and behavioral problems  The patient is not nervous/anxious  Social Hx reviewed:    Social History     Social History    Marital status: /Civil Union     Spouse name: N/A    Number of children: N/A    Years of education: N/A     Occupational History    Not on file  Social History Main Topics    Smoking status: Never Smoker    Smokeless tobacco: Never Used    Alcohol use Yes      Comment: social    Drug use: No    Sexual activity: Not on file     Other Topics Concern    Not on file     Social History Narrative    No narrative on file       Past Medical History:   Diagnosis Date    Chronic narcotic dependence ([facility] [address] )     Chronic pain syndrome     DDD (degenerative disc disease), lumbar     Depression     [facility]     LUE    History of staph infection     Hyperglycemia     Hypertension     Insomnia     Obesity     Peripheral neuropathy     Post laminectomy syndrome     RLS (restless legs syndrome)     Septicemia ([facility])     MRSA    Umbilical hernia            Allergies   Allergen Reactions    Fentanyl Other (See Comments)     "Makes me Suicidal"  Happened when dosage increased      Penicillins Other (See Comments)     As a child unknown    Pravastatin Other (See Comments)     Reaction Date: [id number];   Muscle weakness    Sulfa Antibiotics Other (See Comments)     As a child unknown    Vytorin [Ezetimibe-Simvastatin]      Reaction Date: 77NNX8404;        Vitals:    01/26/18 0842 01/26/18 1115   BP: 142/80 146/82 Patient Position:  Sitting   Pulse: 88    Resp: 14    Temp: 98 7 °F (37 1 °C)    Weight: 127 kg (280 lb 6 4 oz)    Height: 5' 11" (1 803 m)      Vitals:    01/26/18 1115   BP: 146/82   Pulse:    Resp:    Temp:      Physical Exam   Constitutional: He is oriented to person, place, and time  He appears well-developed and well-nourished  HENT:   Head: Normocephalic and atraumatic  Right Ear: External ear normal    Left Ear: External ear normal    Mouth/Throat: Oropharynx is clear and moist    Eyes: EOM are normal  Pupils are equal, round, and reactive to light  Neck: Normal range of motion  Neck supple  Cardiovascular: Normal rate, regular rhythm and normal heart sounds  Pulmonary/Chest: Effort normal and breath sounds normal    Abdominal: Soft  Bowel sounds are normal    Neurological: He is alert and oriented to person, place, and time  Skin: Skin is warm and dry  Psychiatric: He has a normal mood and affect  His behavior is normal    Nursing note and vitals reviewed  Problem List Items Addressed This Visit     Essential hypertension       Stable  No changes to medications made today  Patient not orthostatic today  Other Visit Diagnoses     Vertigo    -  Primary    Relevant Medications    meclizine (ANTIVERT) 12 5 MG tablet    Withdrawal from other psychoactive substance [facility])              Patient with vertigo  Will treat empirically with meclizine for vertigo as needed  Not consistent with orthostatics changes  Hypertension is stable  Will not make any changes with his blood pressure medication  Discussed the time concerned that this is due to withdrawal from the use of Cymbalta  Think the best course of action is to restart his Cymbalta at 60 mg once a day  This is mostly managed through pain management  Discussed if he were to get off the medication I would recommend going down to 30 mg at least for several days before weaning this off further        He has regular follow ups for his regular visits  He will continue follow-up with pain management  If the vertigo persists he will to call our office and consider a follow-up appointment  To call our office if any concerns/questions at [phone]

## 2018-02-13 ENCOUNTER — APPOINTMENT (EMERGENCY)
Dept: CT IMAGING | Facility: HOSPITAL | Age: 63
End: 2018-02-13
Payer: COMMERCIAL

## 2018-02-13 ENCOUNTER — HOSPITAL ENCOUNTER (EMERGENCY)
Facility: HOSPITAL | Age: 63
Discharge: HOME | End: 2018-02-13
Attending: EMERGENCY MEDICINE | Admitting: EMERGENCY MEDICINE
Payer: COMMERCIAL

## 2018-02-13 ENCOUNTER — APPOINTMENT (EMERGENCY)
Dept: RADIOLOGY | Facility: HOSPITAL | Age: 63
End: 2018-02-13
Payer: COMMERCIAL

## 2018-02-13 VITALS
WEIGHT: 290 LBS | HEIGHT: 71 IN | SYSTOLIC BLOOD PRESSURE: 141 MMHG | DIASTOLIC BLOOD PRESSURE: 84 MMHG | HEART RATE: 86 BPM | RESPIRATION RATE: 19 BRPM | TEMPERATURE: 98.6 F | OXYGEN SATURATION: 98 % | BODY MASS INDEX: 40.6 KG/M2

## 2018-02-13 DIAGNOSIS — K20.90 ESOPHAGITIS: ICD-10-CM

## 2018-02-13 DIAGNOSIS — R91.8 PULMONARY NODULES: ICD-10-CM

## 2018-02-13 DIAGNOSIS — R06.00 DYSPNEA: Primary | ICD-10-CM

## 2018-02-13 LAB
ANION GAP SERPL CALCULATED.3IONS-SCNC: 7 MMOL/L (ref 4–13)
ATRIAL RATE: 91 BPM
BASOPHILS # BLD AUTO: 0.04 THOUSANDS/ΜL (ref 0–0.1)
BASOPHILS NFR BLD AUTO: 1 % (ref 0–1)
BUN SERPL-MCNC: 17 MG/DL (ref 5–25)
CALCIUM SERPL-MCNC: 9.1 MG/DL (ref 8.3–10.1)
CHLORIDE SERPL-SCNC: 101 MMOL/L (ref 100–108)
CO2 SERPL-SCNC: 31 MMOL/L (ref 21–32)
CREAT SERPL-MCNC: 1.05 MG/DL (ref 0.6–1.3)
EOSINOPHIL # BLD AUTO: 0.16 THOUSAND/ΜL (ref 0–0.61)
EOSINOPHIL NFR BLD AUTO: 2 % (ref 0–6)
ERYTHROCYTE [DISTWIDTH] IN BLOOD BY AUTOMATED COUNT: 15.2 % (ref 11.6–15.1)
GFR SERPL CREATININE-BSD FRML MDRD: 77 ML/MIN/1.73SQ M
GLUCOSE SERPL-MCNC: 133 MG/DL (ref 65–140)
HCT VFR BLD AUTO: 49 % (ref 36.5–49.3)
HGB BLD-MCNC: 15.5 G/DL (ref 12–17)
LYMPHOCYTES # BLD AUTO: 2.36 THOUSANDS/ΜL (ref 0.6–4.47)
LYMPHOCYTES NFR BLD AUTO: 32 % (ref 14–44)
MCH RBC QN AUTO: 28.4 PG (ref 26.8–34.3)
MCHC RBC AUTO-ENTMCNC: 31.6 G/DL (ref 31.4–37.4)
MCV RBC AUTO: 90 FL (ref 82–98)
MONOCYTES # BLD AUTO: 0.74 THOUSAND/ΜL (ref 0.17–1.22)
MONOCYTES NFR BLD AUTO: 10 % (ref 4–12)
NEUTROPHILS # BLD AUTO: 4.13 THOUSANDS/ΜL (ref 1.85–7.62)
NEUTS SEG NFR BLD AUTO: 55 % (ref 43–75)
P AXIS: 49 DEGREES
PLATELET # BLD AUTO: 283 THOUSANDS/UL (ref 149–390)
PMV BLD AUTO: 8.8 FL (ref 8.9–12.7)
POTASSIUM SERPL-SCNC: 3.9 MMOL/L (ref 3.5–5.3)
PR INTERVAL: 178 MS
QRS AXIS: 39 DEGREES
QRSD INTERVAL: 84 MS
QT INTERVAL: 356 MS
QTC INTERVAL: 437 MS
RBC # BLD AUTO: 5.46 MILLION/UL (ref 3.88–5.62)
SODIUM SERPL-SCNC: 139 MMOL/L (ref 136–145)
T WAVE AXIS: 67 DEGREES
TROPONIN I SERPL-MCNC: 0.1 NG/ML
VENTRICULAR RATE: 91 BPM
WBC # BLD AUTO: 7.43 THOUSAND/UL (ref 4.31–10.16)

## 2018-02-13 PROCEDURE — 80048 BASIC METABOLIC PNL TOTAL CA: CPT | Performed by: EMERGENCY MEDICINE

## 2018-02-13 PROCEDURE — 84484 ASSAY OF TROPONIN QUANT: CPT | Performed by: EMERGENCY MEDICINE

## 2018-02-13 PROCEDURE — 36415 COLL VENOUS BLD VENIPUNCTURE: CPT | Performed by: EMERGENCY MEDICINE

## 2018-02-13 PROCEDURE — 93005 ELECTROCARDIOGRAM TRACING: CPT

## 2018-02-13 PROCEDURE — 71275 CT ANGIOGRAPHY CHEST: CPT

## 2018-02-13 PROCEDURE — 99285 EMERGENCY DEPT VISIT HI MDM: CPT

## 2018-02-13 PROCEDURE — 85025 COMPLETE CBC W/AUTO DIFF WBC: CPT | Performed by: EMERGENCY MEDICINE

## 2018-02-13 PROCEDURE — 71046 X-RAY EXAM CHEST 2 VIEWS: CPT

## 2018-02-13 RX ORDER — OMEPRAZOLE 20 MG/1
40 CAPSULE, DELAYED RELEASE ORAL DAILY
Qty: 30 CAPSULE | Refills: 0 | Status: SHIPPED | OUTPATIENT
Start: 2018-02-13 | End: 2018-02-28 | Stop reason: ALTCHOICE

## 2018-02-13 RX ADMIN — IOHEXOL 100 ML: 350 INJECTION, SOLUTION INTRAVENOUS at 13:14

## 2018-02-13 NOTE — ED PROVIDER NOTES
History  Chief Complaint   Patient presents with    Shortness of Breath     patient presents to ED c/o of SOB since yesterday  Was dropping a lot of things in his hands yesterday and then today just cant seem to catch his breath      Here w/ c/o dyspnea  Started yesterday, notes some sob at rest w/ worsening thomas  Denies cp/pressure, no diaphoresis, some lightheadedness no n/v/d  Has felt 'clumsy' since yesterday - dropping things  Denies actual weakness  No numbness or tingling  No hx of dvt/pe, no recent travel, surg/immob  History provided by:  Patient and spouse   used: No    Shortness of Breath   Severity:  Moderate  Onset quality:  Sudden  Duration:  2 days  Timing:  Constant  Progression:  Waxing and waning  Chronicity:  New  Context: not URI    Relieved by:  Nothing  Worsened by: Movement and exertion  Ineffective treatments:  Rest  Associated symptoms: no abdominal pain, no chest pain, no claudication, no cough, no diaphoresis, no fever, no hemoptysis, no rash, no sputum production, no syncope, no vomiting and no wheezing    Risk factors: obesity    Risk factors: no hx of PE/DVT, no prolonged immobilization and no tobacco use        Prior to Admission Medications   Prescriptions Last Dose Informant Patient Reported? Taking? ASPIRIN 81 PO   Yes No   Sig: Take 1 capsule by mouth daily   DULoxetine (CYMBALTA) 60 mg delayed release capsule   Yes No   Sig: Take 90 mg by mouth daily     Diclofenac Epolamine (FLECTOR) 1 3 % PTCH   Yes No   Sig: Place 1 application on the skin Medrol Dose Pack scheduling ONLY  Docusate Calcium (STOOL SOFTENER PO)   Yes No   Sig: Take by mouth   HYDROcodone-acetaminophen (NORCO)  mg per tablet   Yes No   HYDROcodone-acetaminophen (XODOL )  MG per tablet   Yes No   Sig: Take 1 tablet by mouth every 6 (six) hours as needed for moderate pain     Multiple Vitamin (DAILY VALUE MULTIVITAMIN) TABS   Yes No   Sig: Take 1 tablet by mouth daily   aspirin 81 MG tablet   Yes No   Sig: Take 81 mg by mouth daily   bisoprolol-hydrochlorothiazide (ZIAC) 2 5-6 25 MG per tablet   Yes No   Sig: Take 1 tablet by mouth daily  clonazePAM (KlonoPIN) 0 5 mg tablet   Yes No   Sig: Take 0 5 mg by mouth Medrol Dose Pack scheduling ONLY    diazepam (VALIUM) 10 mg tablet   Yes No   Sig: Take 10 mg by mouth daily at bedtime as needed for anxiety  lactulose (CHRONULAC) 10 g/15 mL solution   Yes No   meclizine (ANTIVERT) 12 5 MG tablet   No No   Sig: Take 1 tablet by mouth 3 (three) times a day as needed for dizziness   polyethylene glycol (MIRALAX) 17 g packet   Yes No   Sig: Take 17 g by mouth daily   rosuvastatin (CRESTOR) 10 MG tablet   Yes No   Sig: Take 10 mg by mouth Medrol Dose Pack scheduling ONLY  Facility-Administered Medications: None       Past Medical History:   Diagnosis Date    Chronic narcotic dependence ([facility] [address] )     Chronic pain syndrome     DDD (degenerative disc disease), lumbar     Depression     D[facility] thrombosis) ([facility])     LUE    History of staph infection     Hyperglycemia     Hypertension     Insomnia     Obesity     Peripheral neuropathy     Post laminectomy syndrome     RLS (restless legs syndrome)     Septicemia ([facility])     MRSA    Umbilical hernia        Past Surgical History:   Procedure Laterality Date    BACK SURGERY      laminectomy, hardware    CARPAL TUNNEL RELEASE Left     CERVICAL SPINE SURGERY      COLONOSCOPY      ELBOW SURGERY      KNEE ARTHROSCOPY Right     knee    GA COLONOSCOPY FLX DX W/COLLJ SPEC WHEN PFRMD N/A 1/24/2017    Procedure: COLONOSCOPY;  Surgeon: Lobito Bell MD;  Location: [facility] OR;  Service: General    SHOULDER SURGERY      SPINAL CORD STIMULATOR IMPLANT         Family History   Problem Relation Age of Onset    Heart disease Mother     Cancer Mother     Heart disease Father     Cancer Father      I have reviewed and agree with the history as documented      Social History   Substance Use Topics    Smoking status: Never Smoker    Smokeless tobacco: Never Used    Alcohol use Yes      Comment: social        Review of Systems   Constitutional: Negative for diaphoresis and fever  Respiratory: Positive for shortness of breath  Negative for cough, hemoptysis, sputum production and wheezing  Cardiovascular: Negative for chest pain, claudication and syncope  Gastrointestinal: Negative for abdominal pain and vomiting  Skin: Negative for rash  All other systems reviewed and are negative  Physical Exam  ED Triage Vitals   Temperature Pulse Respirations Blood Pressure SpO2   02/13/18 1112 02/13/18 1040 02/13/18 1040 02/13/18 1040 02/13/18 1040   98 6 °F (37 °C) 93 20 151/95 98 %      Temp Source Heart Rate Source Patient Position - Orthostatic VS BP Location FiO2 (%)   02/13/18 1112 02/13/18 1040 02/13/18 1040 02/13/18 1040 --   Temporal Monitor Lying Left arm       Pain Score       --                  Orthostatic Vital Signs  Vitals:    02/13/18 1300 02/13/18 1330 02/13/18 1400 02/13/18 1500   BP: 141/86 139/81 140/82 141/84   Pulse: 84 86 84 86   Patient Position - Orthostatic VS:           Physical Exam   Constitutional: He appears well-developed and well-nourished  Morbidly obese w/ BMI 40 58   HENT:   Nose: Nose normal    Mouth/Throat: Oropharynx is clear and moist    Eyes: Conjunctivae are normal  Pupils are equal, round, and reactive to light  Neck: Neck supple  No JVD present  Cardiovascular: Normal rate and regular rhythm  Pulmonary/Chest: Effort normal and breath sounds normal  No respiratory distress  Abdominal: Soft  There is no tenderness  Musculoskeletal: He exhibits edema (trace)  He exhibits no deformity  Neurological: He is alert  Skin: Skin is warm  Psychiatric: He has a normal mood and affect  Nursing note and vitals reviewed        ED Medications  Medications   iohexol (OMNIPAQUE) 350 MG/ML injection (MULTI-DOSE) 100 mL (100 mL Intravenous Given 2/13/18 1314)       Diagnostic Studies  Results Reviewed     Procedure Component Value Units Date/Time    Troponin I [[id number]]  (Abnormal) Collected:  02/13/18 1106    Lab Status:  Final result Specimen:  Blood from Arm, Left Updated:  02/13/18 1142     Troponin I 0 10 (H) ng/mL     Narrative:         [employer] Chemistry analyzer 99% cutoff is > 0 04 ng/mL in network labs    o cTnI 99% cutoff is useful only when applied to patients in the clinical setting of myocardial ischemia  o cTnI 99% cutoff should be interpreted in the context of clinical history, ECG findings and possibly cardiac imaging to establish correct diagnosis  o cTnI 99% cutoff may be suggestive but clearly not indicative of a coronary event without the clinical setting of myocardial ischemia  Basic metabolic panel [[id number]] Collected:  02/13/18 1106    Lab Status:  Final result Specimen:  Blood from Arm, Left Updated:  02/13/18 1137     Sodium 139 mmol/L      Potassium 3 9 mmol/L      Chloride 101 mmol/L      CO2 31 mmol/L      Anion Gap 7 mmol/L      BUN 17 mg/dL      Creatinine 1 05 mg/dL      Glucose 133 mg/dL      Calcium 9 1 mg/dL      eGFR 77 ml/min/1 73sq m     Narrative:         National Kidney Disease Education Program recommendations are as follows:  GFR calculation is accurate only with a steady state creatinine  Chronic Kidney disease less than 60 ml/min/1 73 sq  meters  Kidney failure less than 15 ml/min/1 73 sq  meters      CBC and differential [[id number]]  (Abnormal) Collected:  02/13/18 1106    Lab Status:  Final result Specimen:  Blood from Arm, Left Updated:  02/13/18 1130     WBC 7 43 Thousand/uL      RBC 5 46 Million/uL      Hemoglobin 15 5 g/dL      Hematocrit 49 0 %      MCV 90 fL      MCH 28 4 pg      MCHC 31 6 g/dL      RDW 15 2 (H) %      MPV 8 8 (L) fL      Platelets 956 Thousands/uL      Neutrophils Relative 55 %      Lymphocytes Relative 32 %      Monocytes Relative 10 %      Eosinophils Relative 2 % Basophils Relative 1 %      Neutrophils Absolute 4 13 Thousands/µL      Lymphocytes Absolute 2 36 Thousands/µL      Monocytes Absolute 0 74 Thousand/µL      Eosinophils Absolute 0 16 Thousand/µL      Basophils Absolute 0 04 Thousands/µL                  XR chest 2 views   ED Interpretation by Tristan Buitrago DO (02/13 1458)   neg      Final Result by Dennise Graham MD (02/13 1142)      No acute cardiopulmonary disease, stable               Workstation performed: [id number]         CTA ED chest PE study   ED Interpretation by Tristan Buitrago DO (02/13 1458)   See below      Final Result by Merle Javier DO (02/13 1442)   No central pulmonary emboli  No significant peripheral emboli to the limits of visualization  Numerous pulmonary nodules as detailed above  The largest nodule which was not imaged previously measures 5 mm on image 2/90 in the right upper lobe  Based on current Fleischner Society 2017 Guidelines on incidental pulmonary nodule, no routine    follow-up is needed if the patient is considered low risk for lung cancer  If the patient is considered high risk for lung cancer, 12 month follow-up non-contrast chest CT is recommended  Mild cardiomegaly  Fatty infiltration of liver  Esophagitis  Colonic diverticulosis without evidence of diverticulitis           Workstation performed: [id number]                    Procedures  ECG 12 Lead Documentation  Date/Time: 2/13/2018 10:45 AM  Performed by: Chelle Villalobos  Authorized by: Chelle Villalobos     ECG reviewed by me, the ED Provider: yes    Patient location:  ED  Previous ECG:     Previous ECG:  Compared to current    Similarity:  No change  Interpretation:     Interpretation: normal    Rate:     ECG rate:  91    ECG rate assessment: normal    Rhythm:     Rhythm: sinus rhythm    Ectopy:     Ectopy: none    QRS:     QRS axis:  Normal  ST segments:     ST segments:  Normal  T waves:     T waves: normal               Phone Contacts  ED Phone Contact    ED Course  ED Course as of Feb 13 1927   Tue Feb 13, 2018   1411 Troponin at baseline (runs 0 08-0 10) Troponin I: (!) 0 10     d/w pt and spouse lab/rad results  Symptoms have completely resolved since coming to ED  ? If some anxiety component  Will have him f/u w/ pcm as outpt  Started on PPI for esophagitis  MDM  Number of Diagnoses or Management Options  Dyspnea: new and requires workup  Esophagitis: new and requires workup  Pulmonary nodules: new and requires workup     Amount and/or Complexity of Data Reviewed  Clinical lab tests: reviewed and ordered  Tests in the radiology section of CPT®: reviewed and ordered  Tests in the medicine section of CPT®: ordered and reviewed  Decide to obtain previous medical records or to obtain history from someone other than the patient: yes  Obtain history from someone other than the patient: yes  Independent visualization of images, tracings, or specimens: yes      CritCare Time    Disposition  Final diagnoses:   Dyspnea   Esophagitis   Pulmonary nodules     Time reflects when diagnosis was documented in both MDM as applicable and the Disposition within this note     Time User Action Codes Description Comment    2/13/2018  3:02 PM Osiris Tee Add [R06 00] Dyspnea     2/13/2018  3:02 PM Osiris Tee Add [K20 9] Esophagitis     2/13/2018  3:02 PM Kurtis Tee0 Wilfrido Valladares Add [R91 8] Pulmonary nodules       ED Disposition     ED Disposition Condition Comment    Discharge  Samina Reillyean  discharge to home/self care      Condition at discharge: Good        Follow-up Information     Follow up With Specialties Details Why [address]  Gastroenterology  Schedule an appointment as soon as possible for a visit If symptoms worsen Sebastian  [address] [place] [address]   [id number] [place] [address] 96        Discharge Medication List as of 2/13/2018  3:05 PM      START taking these medications    Details   omeprazole (PriLOSEC) 20 mg delayed release capsule Take 2 capsules (40 mg total) by mouth daily, Starting Tue 2/13/2018, Normal         CONTINUE these medications which have NOT CHANGED    Details   aspirin 81 MG tablet Take 81 mg by mouth daily, Until Discontinued, Historical Med      ASPIRIN 81 PO Take 1 capsule by mouth daily, Historical Med      bisoprolol-hydrochlorothiazide (ZIAC) 2 5-6 25 MG per tablet Take 1 tablet by mouth daily  , Until Discontinued, Historical Med      clonazePAM (KlonoPIN) 0 5 mg tablet Take 0 5 mg by mouth Medrol Dose Pack scheduling ONLY , Until Discontinued, Historical Med      diazepam (VALIUM) 10 mg tablet Take 10 mg by mouth daily at bedtime as needed for anxiety  , Until Discontinued, Historical Med      Diclofenac Epolamine (FLECTOR) 1 3 % PTCH Place 1 application on the skin Medrol Dose Pack scheduling ONLY , Until Discontinued, Historical Med      Docusate Calcium (STOOL SOFTENER PO) Take by mouth, Until Discontinued, Historical Med      DULoxetine (CYMBALTA) 60 mg delayed release capsule Take 90 mg by mouth daily  , Until Discontinued, Historical Med      HYDROcodone-acetaminophen (NORCO)  mg per tablet Starting Mon 1/8/2018, Historical Med      HYDROcodone-acetaminophen (XODOL )  MG per tablet Take 1 tablet by mouth every 6 (six) hours as needed for moderate pain , Until Discontinued, Historical Med      lactulose (CHRONULAC) 10 g/15 mL solution Starting Thu 1/4/2018, Historical Med      meclizine (ANTIVERT) 12 5 MG tablet Take 1 tablet by mouth 3 (three) times a day as needed for dizziness, Starting Fri 1/26/2018, Normal      Multiple Vitamin (DAILY VALUE MULTIVITAMIN) TABS Take 1 tablet by mouth daily, Historical Med      polyethylene glycol (MIRALAX) 17 g packet Take 17 g by mouth daily, Until Discontinued, Historical Med      rosuvastatin (CRESTOR) 10 MG tablet Take 10 mg by mouth Medrol Dose Pack scheduling ONLY , Until Discontinued, Historical Med           No discharge procedures on file      ED Provider  Electronically Signed by           Angeles Gerber DO  02/13/18 7252

## 2018-02-13 NOTE — DISCHARGE INSTRUCTIONS
Dyspnea   WHAT YOU NEED TO KNOW:   Dyspnea is breathing difficulty or discomfort  You may have labored, painful, or shallow breathing  You may feel breathless or short of breath  Dyspnea can occur during rest or with activity  You may have dyspnea for a short time, or it might become chronic  Dyspnea is often a symptom of a disease or condition  DISCHARGE INSTRUCTIONS:   Return to the emergency department if:   · Your signs and symptoms are the same or worse within 24 hours of treatment  · You have shaking chills or a fever over 102°F      · You have new pain, pressure, or tightness in your chest      · You have a new or worse cough or wheezing, or you cough up blood  · You feel like you cannot get enough air  · The skin over your ribs or on your neck sinks in when you breathe  · You have a severe headache with vomiting and abdominal pain  · You feel confused or dizzy  Contact your healthcare provider or specialist if:   · You have questions or concerns about your condition or care  Medicines:   · Medicines  may be used to treat the cause of your dyspnea  Medicines may reduce swelling in your airway or decrease extra fluid from around your heart or lungs  Other medicines may be used to decrease anxiety and help you feel calm and relaxed  · Take your medicine as directed  Contact your healthcare provider if you think your medicine is not helping or if you have side effects  Tell him or her if you are allergic to any medicine  Keep a list of the medicines, vitamins, and herbs you take  Include the amounts, and when and why you take them  Bring the list or the pill bottles to follow-up visits  Carry your medicine list with you in case of an emergency  Manage long-term dyspnea:   · Create an action plan  You and your healthcare provider can work together to create a plan for how to handle episodes of dyspnea   The plan can include daily activities, treatment changes, and what to do if you have severe breathing problems  · Lean forward on your elbows when you sit  This helps your lungs expand and may make it easier to breathe  · Use pursed-lip breathing any time you feel short of breath  Breathe in through your nose and then slowly breathe out through your mouth with your lips slightly puckered  It should take you twice as long to breathe out as it did to breathe in  · Do not smoke  Nicotine and other chemicals in cigarettes and cigars can cause lung damage and make it harder to breathe  Ask your healthcare provider for information if you currently smoke and need help to quit  E-cigarettes or smokeless tobacco still contain nicotine  Talk to your healthcare provider before you use these products  · Reach or maintain a healthy weight  Your healthcare provider can help you create a safe weight loss plan if you are overweight  · Exercise as directed  Exercise can help your lungs work more easily  Exercise can also help you lose weight if needed  Try to get at least 30 minutes of exercise most days of the week  Your healthcare provider can help you create an exercise plan that is safe for you  Follow up with your healthcare provider or specialist as directed:  Write down your questions so you remember to ask them during your visits  © 2017 [facility] Information is for End User's use only and may not be sold, redistributed or otherwise used for commercial purposes  All illustrations and images included in CareNotes® are the copyrighted property of A D A M , Inc  or Dejon Carty  The above information is an  only  It is not intended as medical advice for individual conditions or treatments  Talk to your doctor, nurse or pharmacist before following any medical regimen to see if it is safe and effective for you  Esophagitis   WHAT YOU NEED TO KNOW:   Esophagitis is inflammation or irritation of the lining of the esophagus  DISCHARGE INSTRUCTIONS:   Call 911 for any of the following:   · You have chest pain that does not go away within a few minutes or gets worse  Return to the emergency department if:   · You feel like you have food stuck in your throat and you cannot cough it out  Contact your healthcare provider if:   · You have new or worsening symptoms, even after treatment  · You have questions or concerns about your condition or care  Medicines:   · Medicines  may be given to fight an infection or to control stomach acid  · Take your medicine as directed  Contact your healthcare provider if you think your medicine is not helping or if you have side effects  Tell him of her if you are allergic to any medicine  Keep a list of the medicines, vitamins, and herbs you take  Include the amounts, and when and why you take them  Bring the list or the pill bottles to follow-up visits  Carry your medicine list with you in case of an emergency  Follow up with your healthcare provider as directed: You may need ongoing tests or treatment  Write down your questions so you remember to ask them during your visits  Do not smoke:  Nicotine and other chemicals in cigarettes and cigars can cause blood vessel and lung damage  Ask your healthcare provider for information if you currently smoke and need help to quit  E-cigarettes or smokeless tobacco still contain nicotine  Talk to your healthcare provider before you use these products  Do not drink alcohol:  Alcohol can irritate your esophagus  Talk to your healthcare provider if you need help to stop drinking  Keep batteries and similar objects out of the reach of children:  Babies often put items in their mouths to explore them  Button batteries are easy to swallow and can cause serious damage  Keep the battery covers of electronic devices such as remote controls taped closed  Store all batteries and toxic materials where children cannot get to them   Use childproof locks to keep children away from dangerous materials  Manage or prevent esophagitis:   · Limit or do not eat foods that can lead to esophagitis  Foods such as oranges and salsa can irritate your esophagus  Caffeine and chocolate can cause acid reflux  High-fat and fried foods make your stomach digest food more slowly  This increases the amount of stomach acid your esophagus is exposed to  Eat small meals, and drink water with your meals  Soft foods such as yogurt and applesauce may help soothe your throat  Do not eat for at least 3 hours before you go to bed  · Prevent acid reflux  Do not bend over unless it is necessary  Acid may back up into your esophagus when you bend over  If possible, keep the head of your bed elevated while you sleep  This will help keep acid from backing up  Manage stress  Stress can make your symptoms worse or cause stomach acid to back up  · Drink more liquid when you take pills  Drink a full glass of water when you take your pills  Ask your healthcare provider if you can take your pills at least an hour before you go to bed  © 2017 [facility] Information is for End User's use only and may not be sold, redistributed or otherwise used for commercial purposes  All illustrations and images included in CareNotes® are the copyrighted property of A D A M , Inc  or Dejon Carty  The above information is an  only  It is not intended as medical advice for individual conditions or treatments  Talk to your doctor, nurse or pharmacist before following any medical regimen to see if it is safe and effective for you

## 2018-02-20 ENCOUNTER — VBI (OUTPATIENT)
Dept: ADMINISTRATIVE | Facility: OTHER | Age: 63
End: 2018-02-20

## 2018-02-20 NOTE — TELEPHONE ENCOUNTER
Lamont Damian  ED Visit Information     Ed visit date: 02/13/2018  Diagnosis Description: Dyspnea; Esophagitis; Pulmonary nodules  In Network? Yes [facility]  Discharge status: Home  Number of ED visits to date: 1  ED Severity:2    Outreach Information    Outreach successful: Yes 1  Date letter mailed:N/a  Date Finalized:02/20/2018    Care Coordination    [facility] ED Outreach    [facility] PCP:  Yes  Transportation:  Friend/Family Transport  Ambulance - Was Pt given choice of of ED:  NA  If able to choose an ED  Would you have chosen [facility]:  NA  Called PCP first?:  No  Told to go to ED by PCP?:  NA  Same-Day or Next Day Appointment Offered?:  NA  Would have used same-day or next-day if offered?:  NA  Feels able to call PCP for urgent problems ?:  Yes  Understands what emergencies can be handled by PCP ?:  Yes  Ever any problems getting appointment with PCP for minor emergency/urgency problems?:  No  Practice Contacted Patient ?:  No    02/20/2018 01:19 PM Phone (Puma Tate) Dhruv De  (Self)   Call Complete - Personal communication with patient:    Patient went to the ed due to shortness of breath and perfuse sweating  He believes the sweating was related to anxiety due to not being able to breath well  Patient stated that his symptoms have improved but he still has some shortness of breath  Per patient, he has had a follow up appointment with an out of network GI specialist for reflux  Patient is also scheduled for an appointment with an in network pulmonologist on 02/28/2018 to follow up on pulmonary nodules find in Ct scan performed in the ed  He does not feel that a f/u appt with his PCP is necessary at this time  Shyanne Flores is scheduled for an appt with Dr Deanne Villanueva on 03/07/2018

## 2018-02-26 NOTE — RESULT NOTES
Message   Recorded as Task   Date: 01/05/2018 12:53 PM, Created By: Rosario Ching   Task Name: Follow Up   Assigned To: Jamia Navarro   Regarding Patient: America Andrade, Status: Active   Comment:    LonnieSkip - 05 Jan 2018 12:53 PM     TASK CREATED  Can you please call the patient and advise him that his left hip x-rays did show mild OA, but unchanged since his last x-ray  WE can try a left intraarticular hip joint injection with Dr Lance Franklin to see if that helps  Thank you  Fabi Palacios - 05 Jan 2018 1:30 PM     TASK EDITED  S/w the pt  and reviewed the previous task  Pt  aware that we are switching computer systems  He is agreeable in scheduling the Left intraarticular hip injection c/ SL  Can you please schedule?  Thanks   Jamia Navarro - 09 Jan 2018 1:40 PM     TASK REASSIGNED: Previously Assigned To [facility] surgery sched,Team   Jamia Navarro - 11 Jan 2018 5:16 PM     TASK EDITED  proc scheduled in [place] [id number] 1/26/18

## 2018-02-27 ENCOUNTER — OFFICE VISIT (OUTPATIENT)
Dept: FAMILY MEDICINE CLINIC | Facility: HOSPITAL | Age: 63
End: 2018-02-27
Payer: COMMERCIAL

## 2018-02-27 ENCOUNTER — APPOINTMENT (OUTPATIENT)
Dept: LAB | Facility: HOSPITAL | Age: 63
End: 2018-02-27
Payer: COMMERCIAL

## 2018-02-27 VITALS
DIASTOLIC BLOOD PRESSURE: 88 MMHG | WEIGHT: 285.8 LBS | OXYGEN SATURATION: 92 % | HEART RATE: 76 BPM | HEIGHT: 71 IN | SYSTOLIC BLOOD PRESSURE: 140 MMHG | TEMPERATURE: 98.6 F | BODY MASS INDEX: 40.01 KG/M2

## 2018-02-27 DIAGNOSIS — I10 ESSENTIAL HYPERTENSION: ICD-10-CM

## 2018-02-27 DIAGNOSIS — R06.02 SHORTNESS OF BREATH: ICD-10-CM

## 2018-02-27 DIAGNOSIS — F41.9 ANXIETY: ICD-10-CM

## 2018-02-27 DIAGNOSIS — R06.02 SHORTNESS OF BREATH: Primary | ICD-10-CM

## 2018-02-27 LAB — NT-PROBNP SERPL-MCNC: 42 PG/ML

## 2018-02-27 PROCEDURE — 36415 COLL VENOUS BLD VENIPUNCTURE: CPT

## 2018-02-27 PROCEDURE — 93000 ELECTROCARDIOGRAM COMPLETE: CPT | Performed by: NURSE PRACTITIONER

## 2018-02-27 PROCEDURE — 83880 ASSAY OF NATRIURETIC PEPTIDE: CPT

## 2018-02-27 PROCEDURE — 99214 OFFICE O/P EST MOD 30 MIN: CPT | Performed by: NURSE PRACTITIONER

## 2018-02-27 NOTE — PROGRESS NOTES
Assessment/Plan:    No problem-specific Assessment & Plan notes found for this encounter  Diagnoses and all orders for this visit:    Shortness of breath  -     POCT ECG  -     NT-BNP PRO; Future    Essential hypertension  Comments:  borderline today, continue same antihypertensive and f/u with Dr Owen Dewey as scheduled next week  Anxiety  Comments:  exacerbated by SOB, he has diazepam to use as needed for increase in anxiety       2/2018 ER records reviewed  S/P normal CXR and CTA  EKG in office showing NSR  Consider CHF with STAT BNP drawn now  Will determine further plan of care pending result  F/U with pulmonary as scheduled tomorrow  In the meantime, trial sample dulera 100mcg BID  Needs to schedule f/u with GI for further eval of esophagitis noted on CTA  Subjective:      Patient ID: Allison Light  is a 63 y o  male here for an acute visit      "I can't get enough air" for a few weeks  Had been in the ER two weeks ago and discharged with recommendation to see GI and pulmonary  Has an appt with pulmonologist tomorrow  Has SOB episodes for 3-4 hours  Feels like he can't take a deep breath  Causes him anxiety and gets irritable  Took a diazepam with some benefit after 45 minutes  Occ cough in morning only  Occ wheezes at night time  Can't lie flat at night, has to sleep in recliner  Non smoker  Hasn't made GI follow up yet  The following portions of the patient's history were reviewed and updated as appropriate: allergies, current medications, past medical history, past social history and problem list     Review of Systems   Constitutional: Negative for chills and fever  Respiratory: Positive for cough, shortness of breath and wheezing  Cardiovascular: Negative for chest pain and palpitations  Psychiatric/Behavioral: The patient is nervous/anxious            Objective:      /88 (Patient Position: Sitting, Cuff Size: Large)   Pulse 76   Temp 98 6 °F (37 °C) (Tympanic)   Ht 5' 11" (1 803 m)   Wt 130 kg (285 lb 12 8 oz)   SpO2 92%   BMI 39 86 kg/m²          Physical Exam   Constitutional: He is oriented to person, place, and time  He appears well-developed and well-nourished  No distress  HENT:   Head: Normocephalic and atraumatic  Eyes: Conjunctivae are normal    Cardiovascular: Normal rate, regular rhythm and normal heart sounds  No murmur heard  Pulmonary/Chest: Effort normal and breath sounds normal  No respiratory distress  No cough    Lymphadenopathy:     He has no cervical adenopathy  Neurological: He is alert and oriented to person, place, and time  Skin: Skin is warm and dry  Psychiatric: He has a normal mood and affect

## 2018-02-28 ENCOUNTER — OFFICE VISIT (OUTPATIENT)
Dept: PULMONOLOGY | Facility: CLINIC | Age: 63
End: 2018-02-28
Payer: COMMERCIAL

## 2018-02-28 VITALS
SYSTOLIC BLOOD PRESSURE: 126 MMHG | WEIGHT: 285.4 LBS | OXYGEN SATURATION: 95 % | BODY MASS INDEX: 39.96 KG/M2 | HEART RATE: 88 BPM | TEMPERATURE: 98.1 F | RESPIRATION RATE: 18 BRPM | DIASTOLIC BLOOD PRESSURE: 70 MMHG | HEIGHT: 71 IN

## 2018-02-28 DIAGNOSIS — F41.9 ANXIETY: ICD-10-CM

## 2018-02-28 DIAGNOSIS — E66.01 MORBID OBESITY DUE TO EXCESS CALORIES (HCC): ICD-10-CM

## 2018-02-28 DIAGNOSIS — R06.02 SHORTNESS OF BREATH: ICD-10-CM

## 2018-02-28 DIAGNOSIS — R91.8 MULTIPLE PULMONARY NODULES: Primary | ICD-10-CM

## 2018-02-28 DIAGNOSIS — G47.33 OBSTRUCTIVE SLEEP APNEA: ICD-10-CM

## 2018-02-28 PROCEDURE — 94010 BREATHING CAPACITY TEST: CPT | Performed by: INTERNAL MEDICINE

## 2018-02-28 PROCEDURE — 99205 OFFICE O/P NEW HI 60 MIN: CPT | Performed by: INTERNAL MEDICINE

## 2018-02-28 NOTE — LETTER
February 28, 2018     Ray BrownDO criss  [address]    Patient: Shefali Jean  YOB: 1955   Date of Visit: 2/28/2018       Dear Dr Diane Hilliard:    Thank you for referring Camilla Daughters to me for evaluation  Below are my notes for this consultation  If you have questions, please do not hesitate to call me  I look forward to following your patient along with you  Sincerely,        Donya Kuo MD        CC: MD Donya Manzanares MD  2/28/2018  2:09 PM  Sign at close encounter    Consultation - Pulmonary Medicine   Shefali Jean  63 y o  male MRN: [id number]        Physician Requesting Consult: Dr Rona Fisher  Reason for Consult:   Shortness of breath and pulmonary nodules     Multiple pulmonary nodules  · These nodules are essentially stable since 2008  There is a new nodule that is 5 mm that was never imaged previously  This is likely a stable nodule and is likely associated with his occupational exposures as well  · No further follow-up has been recommended as he is a nonsmoker with low risk factors for primary pulmonary malignancy  Shortness of breath  · This is multifactorial and has components related to obesity, cardiovascular deconditioning, anxiety, and possibly a mild component of bronchospasm given his possible improvement with a couple of days of Dulera  · I have recommended a trial of Anoro Ellipta as I do not think he needs the inhaled corticosteroid component  Dual bronchodilator therapy will determine whether he is truly prone to bronchospasm  Morbid obesity due to excess calories (HCC)  · Diet and weight loss is essential for his breathing and probable component of obstructive sleep apnea  · I recommended a home sleep test to evaluate for sleep apnea  He also has a component of sleep onset insomnia, restless legs syndrome, and frequent nocturnal awakenings for urination    All of these contribute to poor sleep quality  Treatment of obstructive sleep apnea should help with some of the symptoms however  I anticipate seeing Jeremias James back in 2 months  He will contact me if the Anoro Ellipta is helpful  Will then order this for him  We will also review his home sleep study and likely initiate him on CPAP if appropriate  He will certainly call me with any new or worsening symptoms  ______________________________________________________________________    HPI:    Claudia Moreno  is a 63 y o  male who presents for evaluation of shortness of breath and pulmonary nodules  He has been referred by Dr David Kim  He had several weeks worth of increasing shortness of breath which was also associated with anxiety  When he became anxious his symptoms got considerably worse  He did not have any chest pain but he did want going to the emergency room for evaluation  At that time is EKG was normal cardiac enzymes and BNP level have been normal  He had a CT scan of his chest which showed multiple pulmonary nodules  There was no evidence of pulmonary embolism  He has multiple joint problems with chronic back pain and neuropathy  He is on chronic narcotic analgesics  He also has restless legs syndrome, insomnia, and other medical problems  He is morbidly obese and has never been evaluated for obstructive sleep apnea  He sleeps poorly and does snore  He has difficulty falling asleep and typically will only sleep for about 5 hours  He will wake up at nighttime to urinate 2-3 times during that interval     He does not have cough  He does not have wheezing  He was given a Dulera inhaler yesterday and has taken 2 doses  He does think that it has helped him open up and take a little bit deeper breath  He does not have chest pain  He has noticed a newer neck pain on the left side of his neck which feel somewhat deep and has been bothering him for the last few weeks as well    He thought he slept wrong on that side of his neck  He has not had any headaches or visual change  He denies any other new symptoms    Review of Systems:  Review of Systems   Constitutional: Negative for activity change (He is very sedentary chronically) and unexpected weight change  As per HPI   HENT: Negative  As per HPI   Eyes: Negative  Respiratory:        As per HPI   Cardiovascular: Negative for chest pain, palpitations and leg swelling  AS PER HPI   Gastrointestinal: Negative for abdominal pain  No GERD symptoms   Endocrine: Negative  Genitourinary: Positive for frequency (he does get up few times a night to urinate)  Musculoskeletal: Positive for arthralgias, back pain, gait problem and myalgias  Skin: Negative  Allergic/Immunologic: Negative for environmental allergies  Neurological:        Has issues with neuropathy and numbness related to multiple musculoskeletal issues with his back   Hematological: Negative  Psychiatric/Behavioral: The patient is nervous/anxious  All other systems reviewed and are negative          Historical Information   Past Medical History:   Diagnosis Date    Chronic narcotic dependence ([address] )     Chronic pain syndrome     DDD (degenerative disc disease), lumbar     Depression     D[facility]deep venous thrombosis) ([facility])     LUE    History of staph infection     Hyperglycemia     Hypertension     Insomnia     Obesity     Peripheral neuropathy     Post laminectomy syndrome     RLS (restless legs syndrome)     Septicemia ([facility])     MRSA    Umbilical hernia      Past Surgical History:   Procedure Laterality Date    BACK SURGERY      laminectomy, hardware    CARPAL TUNNEL RELEASE Left     CERVICAL SPINE SURGERY      COLONOSCOPY      ELBOW SURGERY      KNEE ARTHROSCOPY Right     knee    DC COLONOSCOPY FLX DX W/COLLJ SPEC WHEN PFRMD N/A 1/24/2017    Procedure: COLONOSCOPY;  Surgeon: Jesus Dobson MD;  Location:  MAIN OR;  Service: Goran Chau SHOULDER SURGERY      SPINAL CORD STIMULATOR IMPLANT       Social History   History   Smoking Status    Never Smoker   Smokeless Tobacco    Never Used       Occupational history:  Currently disabled  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   Family History:   Family History   Problem Relation Age of Onset    Heart disease Mother     Cancer Mother      breast, stomach    Aneurysm Mother     Heart disease Father     Cancer Father      skin, liver, colon         PhysicalExamination:  Vitals:   /70   Pulse 88   Temp 98 1 °F (36 7 °C)   Resp 18   Ht 5' 11" (1 803 m)   Wt 129 kg (285 lb 6 4 oz)   SpO2 95%   BMI 39 81 kg/m²      Gen:  Obese, comfortable on room air  HEENT: PERRL  Nares have no erythema or swelling  Oropharynx is crowded with a class 2 Mallampati airway  There is posterior erythema without exudate  Neck: Stout  I do not appreciate any JVD or lymphadenopathy  Trachea is midline  No thyromegaly  Chest: Breath sounds are distant but clear to auscultation  There are no wheezes, rales or rhonchi  Cardiac: Regular rate and rhythm  There are no murmurs, rubs or gallops  Abdomen:  Significant central obesity of the abdomen  Soft and nontender  Extremities: No clubbing, cyanosis or edema  Neurologic: No focal deficits  Skin: No appreciable rashes  Diagnostic Data:  Labs:   I personally reviewed the most recent laboratory data pertinent to today's visit      Lab Results   Component Value Date    WBC 7 43 02/13/2018    HGB 15 5 02/13/2018    HCT 49 0 02/13/2018    MCV 90 02/13/2018     02/13/2018     Lab Results   Component Value Date    GLUCOSE 133 02/13/2018    CALCIUM 9 1 02/13/2018     02/13/2018    K 3 9 02/13/2018    CO2 31 02/13/2018     02/13/2018    BUN 17 02/13/2018    CREATININE 1 05 02/13/2018     No results found for: IGE  Lab Results   Component Value Date    ALT 43 12/04/2017    AST 22 12/04/2017    ALKPHOS 63 12/04/2017    BILITOT 0 40 12/04/2017     He also had BNP and cardiac troponin levels done as well as EKGs which did not show any evidence for congestive heart failure or ischemia    PFT results: The most recent pulmonary function tests were reviewed  Spirometry was performed in the office today  This shows moderate restriction  Forced vital capacity was 59% predicted  FEV1 was 66% predicted  Imaging:  I personally reviewed the images on the [facility] system pertinent to today's visit  CT scan of the abdomen shows multiple pulmonary nodules  Most are stable from an abdominal CT in 2008  The remainder were not previously visualized but the largest of those is only 5 mm in size        Gabriela Morales MD

## 2018-02-28 NOTE — ASSESSMENT & PLAN NOTE
· These nodules are essentially stable since 2008  There is a new nodule that is 5 mm that was never imaged previously  This is likely a stable nodule and is likely associated with his occupational exposures as well  · No further follow-up has been recommended as he is a nonsmoker with low risk factors for primary pulmonary malignancy

## 2018-02-28 NOTE — ASSESSMENT & PLAN NOTE
· This is multifactorial and has components related to obesity, cardiovascular deconditioning, anxiety, and possibly a mild component of bronchospasm given his possible improvement with a couple of days of Dulera  · I have recommended a trial of Anoro Ellipta as I do not think he needs the inhaled corticosteroid component  Dual bronchodilator therapy will determine whether he is truly prone to bronchospasm

## 2018-02-28 NOTE — ASSESSMENT & PLAN NOTE
· Diet and weight loss is essential for his breathing and probable component of obstructive sleep apnea  · I recommended a home sleep test to evaluate for sleep apnea  He also has a component of sleep onset insomnia, restless legs syndrome, and frequent nocturnal awakenings for urination  All of these contribute to poor sleep quality  Treatment of obstructive sleep apnea should help with some of the symptoms however

## 2018-02-28 NOTE — PROGRESS NOTES
Consultation - Pulmonary Medicine   Karen Jovel  63 y o  male MRN: [id number]        Physician Requesting Consult: Dr Saud Sarmiento  Reason for Consult:   Shortness of breath and pulmonary nodules     Multiple pulmonary nodules  · These nodules are essentially stable since 2008  There is a new nodule that is 5 mm that was never imaged previously  This is likely a stable nodule and is likely associated with his occupational exposures as well  · No further follow-up has been recommended as he is a nonsmoker with low risk factors for primary pulmonary malignancy  Shortness of breath  · This is multifactorial and has components related to obesity, cardiovascular deconditioning, anxiety, and possibly a mild component of bronchospasm given his possible improvement with a couple of days of Dulera  · I have recommended a trial of Anoro Ellipta as I do not think he needs the inhaled corticosteroid component  Dual bronchodilator therapy will determine whether he is truly prone to bronchospasm  Morbid obesity due to excess calories (HCC)  · Diet and weight loss is essential for his breathing and probable component of obstructive sleep apnea  · I recommended a home sleep test to evaluate for sleep apnea  He also has a component of sleep onset insomnia, restless legs syndrome, and frequent nocturnal awakenings for urination  All of these contribute to poor sleep quality  Treatment of obstructive sleep apnea should help with some of the symptoms however  I anticipate seeing Nicole Llanos back in 2 months  He will contact me if the Anoro Ellipta is helpful  Will then order this for him  We will also review his home sleep study and likely initiate him on CPAP if appropriate  He will certainly call me with any new or worsening symptoms    ______________________________________________________________________    HPI:    Karen Jovel  is a 63 y o  male who presents for evaluation of shortness of breath and pulmonary nodules  He has been referred by Dr Veronica Banegas  He had several weeks worth of increasing shortness of breath which was also associated with anxiety  When he became anxious his symptoms got considerably worse  He did not have any chest pain but he did want going to the emergency room for evaluation  At that time is EKG was normal cardiac enzymes and BNP level have been normal  He had a CT scan of his chest which showed multiple pulmonary nodules  There was no evidence of pulmonary embolism  He has multiple joint problems with chronic back pain and neuropathy  He is on chronic narcotic analgesics  He also has restless legs syndrome, insomnia, and other medical problems  He is morbidly obese and has never been evaluated for obstructive sleep apnea  He sleeps poorly and does snore  He has difficulty falling asleep and typically will only sleep for about 5 hours  He will wake up at nighttime to urinate 2-3 times during that interval     He does not have cough  He does not have wheezing  He was given a Dulera inhaler yesterday and has taken 2 doses  He does think that it has helped him open up and take a little bit deeper breath  He does not have chest pain  He has noticed a newer neck pain on the left side of his neck which feel somewhat deep and has been bothering him for the last few weeks as well  He thought he slept wrong on that side of his neck  He has not had any headaches or visual change  He denies any other new symptoms    Review of Systems:  Review of Systems   Constitutional: Negative for activity change (He is very sedentary chronically) and unexpected weight change  As per HPI   HENT: Negative  As per HPI   Eyes: Negative  Respiratory:        As per HPI   Cardiovascular: Negative for chest pain, palpitations and leg swelling  AS PER HPI   Gastrointestinal: Negative for abdominal pain  No GERD symptoms   Endocrine: Negative  Genitourinary: Positive for frequency (he does get up few times a night to urinate)  Musculoskeletal: Positive for arthralgias, back pain, gait problem and myalgias  Skin: Negative  Allergic/Immunologic: Negative for environmental allergies  Neurological:        Has issues with neuropathy and numbness related to multiple musculoskeletal issues with his back   Hematological: Negative  Psychiatric/Behavioral: The patient is nervous/anxious  All other systems reviewed and are negative  Historical Information   Past Medical History:   Diagnosis Date    [facility] [address] [facility]     Chronic pain syndrome     DDD (degenerative disc disease), lumbar     Depression     [facility])     LUE    History of staph infection     Hyperglycemia     Hypertension     Insomnia     Obesity     Peripheral neuropathy     Post laminectomy syndrome     RLS (restless legs syndrome)     Septicemia ([facility])     MRSA    Umbilical hernia      Past Surgical History:   Procedure Laterality Date    BACK SURGERY      laminectomy, hardware    CARPAL TUNNEL RELEASE Left     CERVICAL SPINE SURGERY      COLONOSCOPY      ELBOW SURGERY      KNEE ARTHROSCOPY Right     knee    AR COLONOSCOPY FLX DX W/COLLJ SPEC WHEN PFRMD N/A 1/24/2017    Procedure: COLONOSCOPY;  Surgeon: Andi Lundborg, MD;  Location: [facility];  Service: General    SHOULDER SURGERY      SPINAL CORD STIMULATOR IMPLANT       Social History   History   Smoking Status    Never Smoker   Smokeless Tobacco    Never Used       Occupational history:  Currently disabled  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       Family History:   Family History   Problem Relation Age of Onset    Heart disease Mother     Cancer Mother      breast, stomach    Aneurysm Mother     Heart disease Father     Cancer Father      skin, liver, colon         PhysicalExamination:  Vitals:   /70   Pulse 88   Temp 98 1 °F (36 7 °C)   Resp 18   Ht 5' 11" (1 803 m)   Wt 129 kg (285 lb 6 4 oz)   SpO2 95%   BMI 39 81 kg/m²     Gen:  Obese, comfortable on room air  HEENT: PERRL  Nares have no erythema or swelling  Oropharynx is crowded with a class 2 Mallampati airway  There is posterior erythema without exudate  Neck: Stout  I do not appreciate any JVD or lymphadenopathy  Trachea is midline  No thyromegaly  Chest: Breath sounds are distant but clear to auscultation  There are no wheezes, rales or rhonchi  Cardiac: Regular rate and rhythm  There are no murmurs, rubs or gallops  Abdomen:  Significant central obesity of the abdomen  Soft and nontender  Extremities: No clubbing, cyanosis or edema  Neurologic: No focal deficits  Skin: No appreciable rashes  Diagnostic Data:  Labs: I personally reviewed the most recent laboratory data pertinent to today's visit      Lab Results   Component Value Date    WBC 7 43 02/13/2018    HGB 15 5 02/13/2018    HCT 49 0 02/13/2018    MCV 90 02/13/2018     02/13/2018     Lab Results   Component Value Date    GLUCOSE 133 02/13/2018    CALCIUM 9 1 02/13/2018     02/13/2018    K 3 9 02/13/2018    CO2 31 02/13/2018     02/13/2018    BUN 17 02/13/2018    CREATININE 1 05 02/13/2018     No results found for: IGE  Lab Results   Component Value Date    ALT 43 12/04/2017    AST 22 12/04/2017    ALKPHOS 63 12/04/2017    BILITOT 0 40 12/04/2017     He also had BNP and cardiac troponin levels done as well as EKGs which did not show any evidence for congestive heart failure or ischemia    PFT results: The most recent pulmonary function tests were reviewed  Spirometry was performed in the office today  This shows moderate restriction  Forced vital capacity was 59% predicted  FEV1 was 66% predicted      Imaging:  I personally reviewed the images on the [facility] system pertinent to today's visit  CT scan of the abdomen shows multiple pulmonary nodules  Most are stable from an abdominal CT in 2008  The remainder were not previously visualized but the largest of those is only 5 mm in size        Matt Lennon MD

## 2018-02-28 NOTE — PATIENT INSTRUCTIONS
· Start Anoro Ellipta, 1 inhalation daily in the morning  Please call me to let me know if this is working well and we will order you the medication  · Stop taking Dulera  · Make an effort at diet and weight loss with increasing your exercise as you can tolerate  · We will arrange for you to have a home sleep study and treat obstructive sleep apnea if this is identified  · I will see her back in the office in 2 months

## 2018-03-07 NOTE — PROCEDURES
Procedure    Place of Service: Procedure Room  Diagnosis: Lumbar spinal stenosis, low back pain, lumbar radiculitis     Procedure: Bilateral S1 Transforaminal Epidural Injection under Fluoroscopy  Indication for Fluoroscopy: This procedure requires the precise placement of the spinal needle into the specific paravertebral foramen  The only way to accurately and safely perform the injection  Medical Necessity: Failure of conservative medical management  Procedure Note: After fully informed consent was obtained, the patient was then positioned on the fluoroscopy table in the prone position with a pillow beneath the pelvis to reduce lumbar lordosis  The lumbar area was prepped with betadine prep x 3 and 2% chlorhexidine gluconate with 70% isopropyl alcohol and draped in the usual manner  The fluoroscope was used to identify the L3///L4///L5 vertebral body on the AP projection  The S1 foramen was clearly identified  Using a 25g needle 1% lidocaine with bicarbonate was injected for local anesthetic  Then using a 25 gauge 3 5inch needle was inserted in the skin at a point overlying the foramen  Using AP and lateral projections the needle was advanced into the foramen  Paresthesias were not noted  After gentle negative aspiration, Omnipaque 300 was injected under live fluoroscopy  No vascular uptake was noted  Digital subtraction was utilized  Following demonstration of the neurogram, 1% lidocaine was injected followed by methylprednisolone was injected  There was no CSF, paresthesiae nor heme identified  This was then performed for the contralateral side  Disposition: The patient was brought to the recovery room in stable condition  Strength and sensation were tested in both lower extremities and were found to be intact  Vital signs were stable  The patient tolerated the procedure well  Discharge instructions were reviewed and given to the patient  The patient was discharged home with a   Signatures   Electronically signed by : Ashley Llanes DO; Oct 13 2017  8:25AM EST                       (Author)

## 2018-03-29 ENCOUNTER — OFFICE VISIT (OUTPATIENT)
Dept: PAIN MEDICINE | Facility: CLINIC | Age: 63
End: 2018-03-29
Payer: COMMERCIAL

## 2018-03-29 VITALS
SYSTOLIC BLOOD PRESSURE: 132 MMHG | BODY MASS INDEX: 40.6 KG/M2 | WEIGHT: 290 LBS | HEART RATE: 72 BPM | TEMPERATURE: 97.9 F | HEIGHT: 71 IN | DIASTOLIC BLOOD PRESSURE: 72 MMHG

## 2018-03-29 DIAGNOSIS — F11.20 CONTINUOUS OPIOID DEPENDENCE (HCC): ICD-10-CM

## 2018-03-29 DIAGNOSIS — M96.1 LUMBAR POST-LAMINECTOMY SYNDROME: ICD-10-CM

## 2018-03-29 DIAGNOSIS — M54.16 CHRONIC LUMBAR RADICULOPATHY: ICD-10-CM

## 2018-03-29 DIAGNOSIS — M54.42 CHRONIC BILATERAL LOW BACK PAIN WITH BILATERAL SCIATICA: Primary | ICD-10-CM

## 2018-03-29 DIAGNOSIS — G89.29 CHRONIC BILATERAL LOW BACK PAIN WITH BILATERAL SCIATICA: Primary | ICD-10-CM

## 2018-03-29 DIAGNOSIS — M51.36 LUMBAR DEGENERATIVE DISC DISEASE: ICD-10-CM

## 2018-03-29 DIAGNOSIS — M54.41 CHRONIC BILATERAL LOW BACK PAIN WITH BILATERAL SCIATICA: Primary | ICD-10-CM

## 2018-03-29 DIAGNOSIS — M48.062 SPINAL STENOSIS OF LUMBAR REGION WITH NEUROGENIC CLAUDICATION: ICD-10-CM

## 2018-03-29 DIAGNOSIS — G60.9 IDIOPATHIC PERIPHERAL NEUROPATHY: ICD-10-CM

## 2018-03-29 DIAGNOSIS — G89.4 CHRONIC PAIN DISORDER: ICD-10-CM

## 2018-03-29 PROCEDURE — 99215 OFFICE O/P EST HI 40 MIN: CPT | Performed by: NURSE PRACTITIONER

## 2018-03-29 RX ORDER — HYDROCODONE BITARTRATE AND ACETAMINOPHEN 10; 325 MG/1; MG/1
TABLET ORAL
Qty: 75 TABLET | Refills: 0 | Status: SHIPPED | OUTPATIENT
Start: 2018-03-29 | End: 2018-03-29 | Stop reason: SDUPTHER

## 2018-03-29 RX ORDER — HYDROCODONE BITARTRATE AND ACETAMINOPHEN 10; 325 MG/1; MG/1
TABLET ORAL
Qty: 70 TABLET | Refills: 0 | Status: SHIPPED | OUTPATIENT
Start: 2018-03-29 | End: 2018-03-29 | Stop reason: SDUPTHER

## 2018-03-29 RX ORDER — HYDROCODONE BITARTRATE AND ACETAMINOPHEN 10; 325 MG/1; MG/1
TABLET ORAL
Qty: 70 TABLET | Refills: 0 | Status: SHIPPED | OUTPATIENT
Start: 2018-03-29 | End: 2018-07-09 | Stop reason: SDUPTHER

## 2018-03-29 NOTE — PROGRESS NOTES
Assessment:  1  Chronic bilateral low back pain with bilateral sciatica    2  Chronic pain disorder    3  Continuous opioid dependence ([address] )    4  Spinal stenosis of lumbar region with neurogenic claudication    5  Lumbar post-laminectomy syndrome    6  Lumbar degenerative disc disease    7  Chronic lumbar radiculopathy    8  Idiopathic peripheral neuropathy        Plan:  While the patient was in the office today, I did have a thorough conversation with the patient regarding his medication regimen and treatment plan  I explained to the patient that with his recent joint pain flare-up it is most likely because he did overdo it all at 1 time and that the Cymbalta may have been helping to keep his joint pain at a minimal and manageable level prior, however, at this point decided to just continue him without the Cymbalta and see how he does over the next few months  I did explain to the patient that I feel that a better diet, exercise, and weight loss plan would be in his best interest and that to slowly and steadily increase his activity, as tolerated, allowing pain be his guide  The patient was agreeable and verbalized an understanding  With regards to his medication regimen, I explained to the patient at this point time since I feel that his medication regimen is reasonable appropriate and he is always using the medication on an as-needed basis as evidence by his pill count, we will continue with the p r n  Norco as prescribed for now  However, for the 1st month I did decrease the total amount down to 75 pills and for the 2nd and 3rd month I decreased it down to 70 pills for 30 days  We will continue to evaluate his need for the pain medication and how much medicine he actually needs to have on hand  The patient was agreeable and verbalized an understanding           [place] Prescription Drug Monitoring Program report was reviewed and was appropriate      The patient was selected for a random pill count at todays office visit  The medication was available for the count and the amount present was found to be appropriate according to the date(s) filled and the medication prescription instructions noted on the prescription container  The medication was returned to the patient and the documentation form can be found in the patient's chart  The patient was given a 3 month supply of prescriptions with a Do Not Fill date(s) of April 26, 2018 and May 24, 2018  There are risks associated with opioid medications, including dependence, addiction and tolerance  The patient understands and agrees to use these medications only as prescribed  Potential side effects of the medications include, but are not limited to, constipation, drowsiness, addiction, impaired judgment and risk of fatal overdose if not taken as prescribed  The patient was warned against driving while taking sedation medications  Sharing medications is a felony  At this point in time, the patient is showing no signs of addiction, abuse, diversion or suicidal ideation  An oral drug screen swab was collected at today's office visit  The swab will be sent for confirmatory testing  The drug screen is medically necessary because the patient is either dependent on opioid medication or is being considered for opioid medication therapy and the results could impact ongoing or future treatment  The drug screen is to evaluate for the presences or absence of prescribed, non-prescribed, and/or illicit drugs/substances  The patient will follow-up in 12 weeks for medication prescription refill and reevaluation  The patient was advised to contact the office should their symptoms worsen in the interim  The patient was agreeable and verbalized an understanding        I attest that I have spent at least 30 minutes face to face with the patient and that at least 50% of the time was educating and/or discussing the patient's symptoms and treatment plan options until the patient was satisfied with the plan  History of Present Illness: The patient is a 63 y o  male last seen on 1/4/18 who presents for a follow up office visit in regards to chronic pain secondary to lumbar spinal stenosis with post-laminectomy syndrome  The patient currently reports that since his last office visit he actually feels that some of his radicular pain symptoms have improved since he has titrated off of the Cymbalta  He reports that he inadvertently titrated off of the Cymbalta as he went away for a trip at forgot to take it with him and since he did not see a difference office Cymbalta and his pain actually seems slightly improved, he decided to stay off of the medication  However, he reports that he recently went to a gun show where he walked around from most 8 hours and ever since then his joint pain, especially his bilateral ankles and knees has been worse  He reports that he continues to gain weight, even though he has tried to make some adjustments to his diet and most recently has not been as diligent with his home exercise program because of the joint pain  The patient does feel that the spinal cord stimulator is helpful for his lower extremity radicular symptoms  The patient presents today to discuss his medication regimen and treatment plan  Current pain medications includes:  Norco 10/3251 p o  t i d  p r n  for pain, #80 for 30 days  The patient reports that this regimen is providing 40% pain relief  The patient is reporting no side effects from this pain medication regimen  Pain Contract Signed: 1/4/18  Last Urine Drug Screen: 3/29/18    I have personally reviewed and/or updated the patient's past medical history, past surgical history, family history, social history, current medications, allergies, and vital signs today  Review of Systems:    Review of Systems   Respiratory: Positive for shortness of breath      Cardiovascular: Negative for chest pain    Gastrointestinal: Negative for constipation, diarrhea, nausea and vomiting  Musculoskeletal: Positive for gait problem  Negative for arthralgias, joint swelling (joint stiffness) and myalgias  Skin: Negative for rash  Neurological: Positive for weakness  Negative for dizziness and seizures  All other systems reviewed and are negative          Past Medical History:   Diagnosis Date    Acute low back pain     10 AUG 2016 RESOLVED    [facility]is [facility]     Bursitis of hip     Carpal tunnel syndrome     Chronic bilateral low back pain with bilateral sciatica     Chronic lumbar radiculopathy     Chronic narcotic dependence ([facility]     Chronic pain syndrome     Constipation due to opioid therapy     DDD (degenerative disc disease), lumbar     Deep vein thrombosis of left upper extremity ([facility] )     [id number]  RESOLVED    Depression     Diverticula of colon     [id number] RESOLVED    DVT (deep venous thrombosis) ([facility])     LUE    Edema     History of staph infection     Hyperglycemia     Hyperlipidemia     Hypertension     Insomnia     Lateral epicondylitis     Left inguinal hernia     Methicillin resistant Staphylococcus aureus septicemia ([facility]     Morbid obesity due to excess calories ([facility])     Obesity     Peripheral neuropathy     Post laminectomy syndrome     RLS (restless legs syndrome)     [email or web address]     MRSA    Umbilical hernia        Past Surgical History:   Procedure Laterality Date    BACK SURGERY      laminectomy, hardware    CARPAL TUNNEL RELEASE Left     CERVICAL SPINE SURGERY      COLONOSCOPY      ELBOW SURGERY      ELBOW SURGERY      KNEE ARTHROSCOPY Right     knee    KNEE SURGERY Right     ARTHOSCOPY KNEE    NECK SURGERY      1997    MT COLONOSCOPY FLX DX W/COLLJ SPEC WHEN PFRMD N/A 1/24/2017    Procedure: COLONOSCOPY;  Surgeon: Lucretia Ch MD;  Location:  MAIN OR;  Service: General    SHOULDER SURGERY     [address] [place] [address] Family History   Problem Relation Age of Onset    Heart disease Mother     Cancer Mother      breast, stomach    Aneurysm Mother     Heart disease Father     Cancer Father      skin, liver, colon DECESED AGE 58       Social History     Occupational History    Not on file  Social History Main Topics    Smoking status: Never Smoker    Smokeless tobacco: Never Used    Alcohol use Yes      Comment: social    Drug use: No    Sexual activity: Yes         Current Outpatient Prescriptions:     aspirin 81 MG tablet, Take 81 mg by mouth daily, Disp: , Rfl:     bisoprolol-hydrochlorothiazide (ZIAC) 2 5-6 25 MG per tablet, Take 1 tablet by mouth daily  , Disp: , Rfl:     diazepam (VALIUM) 10 mg tablet, Take 10 mg by mouth daily at bedtime as needed for anxiety  , Disp: , Rfl:     Docusate Calcium (STOOL SOFTENER PO), Take by mouth, Disp: , Rfl:     HYDROcodone-acetaminophen (NORCO)  mg per tablet, , Disp: , Rfl:     lactulose (CHRONULAC) 10 g/15 mL solution, , Disp: , Rfl:     meclizine (ANTIVERT) 12 5 MG tablet, Take 1 tablet by mouth 3 (three) times a day as needed for dizziness, Disp: 30 tablet, Rfl: 0    Multiple Vitamin (DAILY VALUE MULTIVITAMIN) TABS, Take 1 tablet by mouth daily, Disp: , Rfl:     rosuvastatin (CRESTOR) 10 MG tablet, Take 10 mg by mouth Medrol Dose Pack scheduling ONLY , Disp: , Rfl:     Allergies   Allergen Reactions    Fentanyl Other (See Comments)     "Makes me Suicidal"  Happened when dosage increased   Penicillins Other (See Comments)     As a child unknown    Pravastatin Other (See Comments)     Reaction Date: [id number];   Muscle weakness    Sulfa Antibiotics Other (See Comments)     As a child unknown    Vytorin [Ezetimibe-Simvastatin]      Reaction Date: [id number];        Physical Exam:    There were no vitals taken for this visit  Constitutional:normal, well developed, well nourished, alert, in no distress and non-toxic and no overt pain behavior   and overweight  Eyes:anicteric  HEENT:grossly intact  Neck:supple, symmetric, trachea midline and no masses   Pulmonary:even and unlabored  Cardiovascular:No edema or pitting edema present  Skin:Normal without rashes or lesions and well hydrated  Psychiatric:Mood and affect appropriate  Neurologic:Cranial Nerves II-XII grossly intact  Musculoskeletal:normal      Imaging  No orders to display         No orders of the defined types were placed in this encounter

## 2018-06-07 ENCOUNTER — TELEPHONE (OUTPATIENT)
Dept: FAMILY MEDICINE CLINIC | Facility: HOSPITAL | Age: 63
End: 2018-06-07

## 2018-06-08 DIAGNOSIS — Z12.5 SCREENING FOR MALIGNANT NEOPLASM OF PROSTATE: Primary | ICD-10-CM

## 2018-06-08 DIAGNOSIS — R73.9 HYPERGLYCEMIA: ICD-10-CM

## 2018-06-08 DIAGNOSIS — I10 ESSENTIAL HYPERTENSION: ICD-10-CM

## 2018-06-08 DIAGNOSIS — I10 ESSENTIAL HYPERTENSION: Primary | ICD-10-CM

## 2018-06-08 DIAGNOSIS — E78.2 MIXED HYPERLIPIDEMIA: ICD-10-CM

## 2018-06-08 RX ORDER — BISOPROLOL FUMARATE AND HYDROCHLOROTHIAZIDE 2.5; 6.25 MG/1; MG/1
TABLET ORAL
Qty: 90 TABLET | Refills: 3 | Status: SHIPPED | OUTPATIENT
Start: 2018-06-08 | End: 2019-06-19 | Stop reason: SDUPTHER

## 2018-06-11 ENCOUNTER — APPOINTMENT (OUTPATIENT)
Dept: LAB | Facility: HOSPITAL | Age: 63
End: 2018-06-11
Payer: COMMERCIAL

## 2018-06-11 DIAGNOSIS — R73.9 HYPERGLYCEMIA: ICD-10-CM

## 2018-06-11 DIAGNOSIS — Z12.5 SCREENING FOR MALIGNANT NEOPLASM OF PROSTATE: ICD-10-CM

## 2018-06-11 DIAGNOSIS — I10 ESSENTIAL HYPERTENSION: ICD-10-CM

## 2018-06-11 DIAGNOSIS — E78.2 MIXED HYPERLIPIDEMIA: ICD-10-CM

## 2018-06-11 LAB
ALBUMIN SERPL BCP-MCNC: 3.9 G/DL (ref 3.5–5)
ALP SERPL-CCNC: 49 U/L (ref 46–116)
ALT SERPL W P-5'-P-CCNC: 37 U/L (ref 12–78)
ANION GAP SERPL CALCULATED.3IONS-SCNC: 11 MMOL/L (ref 4–13)
AST SERPL W P-5'-P-CCNC: 26 U/L (ref 5–45)
BASOPHILS # BLD AUTO: 0.04 THOUSANDS/ΜL (ref 0–0.1)
BASOPHILS NFR BLD AUTO: 1 % (ref 0–1)
BILIRUB SERPL-MCNC: 0.6 MG/DL (ref 0.2–1)
BUN SERPL-MCNC: 16 MG/DL (ref 5–25)
CALCIUM SERPL-MCNC: 9.4 MG/DL (ref 8.3–10.1)
CHLORIDE SERPL-SCNC: 102 MMOL/L (ref 100–108)
CHOLEST SERPL-MCNC: 115 MG/DL (ref 50–200)
CO2 SERPL-SCNC: 28 MMOL/L (ref 21–32)
CREAT SERPL-MCNC: 1.16 MG/DL (ref 0.6–1.3)
EOSINOPHIL # BLD AUTO: 0.08 THOUSAND/ΜL (ref 0–0.61)
EOSINOPHIL NFR BLD AUTO: 2 % (ref 0–6)
ERYTHROCYTE [DISTWIDTH] IN BLOOD BY AUTOMATED COUNT: 14.7 % (ref 11.6–15.1)
EST. AVERAGE GLUCOSE BLD GHB EST-MCNC: 128 MG/DL
GFR SERPL CREATININE-BSD FRML MDRD: 68 ML/MIN/1.73SQ M
GLUCOSE P FAST SERPL-MCNC: 92 MG/DL (ref 65–99)
HBA1C MFR BLD: 6.1 % (ref 4.2–6.3)
HCT VFR BLD AUTO: 48.1 % (ref 36.5–49.3)
HDLC SERPL-MCNC: 33 MG/DL (ref 40–60)
HGB BLD-MCNC: 15.3 G/DL (ref 12–17)
IMM GRANULOCYTES # BLD AUTO: 0.01 THOUSAND/UL (ref 0–0.2)
IMM GRANULOCYTES NFR BLD AUTO: 0 % (ref 0–2)
LDLC SERPL CALC-MCNC: 62 MG/DL (ref 0–100)
LYMPHOCYTES # BLD AUTO: 1.69 THOUSANDS/ΜL (ref 0.6–4.47)
LYMPHOCYTES NFR BLD AUTO: 35 % (ref 14–44)
MCH RBC QN AUTO: 28 PG (ref 26.8–34.3)
MCHC RBC AUTO-ENTMCNC: 31.8 G/DL (ref 31.4–37.4)
MCV RBC AUTO: 88 FL (ref 82–98)
MONOCYTES # BLD AUTO: 0.49 THOUSAND/ΜL (ref 0.17–1.22)
MONOCYTES NFR BLD AUTO: 10 % (ref 4–12)
NEUTROPHILS # BLD AUTO: 2.46 THOUSANDS/ΜL (ref 1.85–7.62)
NEUTS SEG NFR BLD AUTO: 52 % (ref 43–75)
NRBC BLD AUTO-RTO: 0 /100 WBCS
PLATELET # BLD AUTO: 276 THOUSANDS/UL (ref 149–390)
PMV BLD AUTO: 8.6 FL (ref 8.9–12.7)
POTASSIUM SERPL-SCNC: 3.9 MMOL/L (ref 3.5–5.3)
PROT SERPL-MCNC: 8.5 G/DL (ref 6.4–8.2)
PSA SERPL-MCNC: 0.5 NG/ML (ref 0–4)
RBC # BLD AUTO: 5.47 MILLION/UL (ref 3.88–5.62)
SODIUM SERPL-SCNC: 141 MMOL/L (ref 136–145)
TRIGL SERPL-MCNC: 99 MG/DL
TSH SERPL DL<=0.05 MIU/L-ACNC: 1.3 UIU/ML (ref 0.36–3.74)
WBC # BLD AUTO: 4.77 THOUSAND/UL (ref 4.31–10.16)

## 2018-06-11 PROCEDURE — 36415 COLL VENOUS BLD VENIPUNCTURE: CPT

## 2018-06-11 PROCEDURE — 80053 COMPREHEN METABOLIC PANEL: CPT

## 2018-06-11 PROCEDURE — 85025 COMPLETE CBC W/AUTO DIFF WBC: CPT

## 2018-06-11 PROCEDURE — 83036 HEMOGLOBIN GLYCOSYLATED A1C: CPT

## 2018-06-11 PROCEDURE — 84443 ASSAY THYROID STIM HORMONE: CPT

## 2018-06-11 PROCEDURE — 84153 ASSAY OF PSA TOTAL: CPT

## 2018-06-11 PROCEDURE — 80061 LIPID PANEL: CPT

## 2018-06-18 ENCOUNTER — OFFICE VISIT (OUTPATIENT)
Dept: FAMILY MEDICINE CLINIC | Facility: HOSPITAL | Age: 63
End: 2018-06-18
Payer: COMMERCIAL

## 2018-06-18 VITALS
BODY MASS INDEX: 37.99 KG/M2 | RESPIRATION RATE: 12 BRPM | SYSTOLIC BLOOD PRESSURE: 122 MMHG | HEART RATE: 68 BPM | TEMPERATURE: 97.9 F | WEIGHT: 271.4 LBS | DIASTOLIC BLOOD PRESSURE: 72 MMHG | HEIGHT: 71 IN

## 2018-06-18 DIAGNOSIS — G72.0 STATIN MYOPATHY: ICD-10-CM

## 2018-06-18 DIAGNOSIS — E78.2 MIXED HYPERLIPIDEMIA: ICD-10-CM

## 2018-06-18 DIAGNOSIS — M54.16 CHRONIC LUMBAR RADICULOPATHY: ICD-10-CM

## 2018-06-18 DIAGNOSIS — M51.35 DDD (DEGENERATIVE DISC DISEASE), THORACOLUMBAR: ICD-10-CM

## 2018-06-18 DIAGNOSIS — G89.29 CHRONIC BILATERAL LOW BACK PAIN WITH BILATERAL SCIATICA: ICD-10-CM

## 2018-06-18 DIAGNOSIS — R73.9 HYPERGLYCEMIA: ICD-10-CM

## 2018-06-18 DIAGNOSIS — M54.42 CHRONIC BILATERAL LOW BACK PAIN WITH BILATERAL SCIATICA: ICD-10-CM

## 2018-06-18 DIAGNOSIS — E66.01 CLASS 2 SEVERE OBESITY DUE TO EXCESS CALORIES WITH SERIOUS COMORBIDITY AND BODY MASS INDEX (BMI) OF 37.0 TO 37.9 IN ADULT (HCC): ICD-10-CM

## 2018-06-18 DIAGNOSIS — M54.41 CHRONIC BILATERAL LOW BACK PAIN WITH BILATERAL SCIATICA: ICD-10-CM

## 2018-06-18 DIAGNOSIS — I10 ESSENTIAL HYPERTENSION: Primary | ICD-10-CM

## 2018-06-18 DIAGNOSIS — T46.6X5A STATIN MYOPATHY: ICD-10-CM

## 2018-06-18 PROBLEM — E66.9 OBESITY: Status: ACTIVE | Noted: 2017-01-16

## 2018-06-18 PROCEDURE — 3074F SYST BP LT 130 MM HG: CPT | Performed by: FAMILY MEDICINE

## 2018-06-18 PROCEDURE — 3078F DIAST BP <80 MM HG: CPT | Performed by: FAMILY MEDICINE

## 2018-06-18 PROCEDURE — 99214 OFFICE O/P EST MOD 30 MIN: CPT | Performed by: FAMILY MEDICINE

## 2018-06-18 RX ORDER — ROSUVASTATIN CALCIUM 10 MG/1
10 TABLET, COATED ORAL
Qty: 30 TABLET | Refills: 5 | Status: SHIPPED | OUTPATIENT
Start: 2018-06-18 | End: 2018-12-17 | Stop reason: SDUPTHER

## 2018-06-18 RX ORDER — DIAZEPAM 10 MG/1
10 TABLET ORAL
Qty: 30 TABLET | Refills: 1 | Status: SHIPPED | OUTPATIENT
Start: 2018-06-18 | End: 2019-06-19 | Stop reason: SDUPTHER

## 2018-06-18 NOTE — PROGRESS NOTES
Assessment/Plan:         Diagnoses and all orders for this visit:    Essential hypertension  Comments:  Excellent BP control    Chronic lumbar radiculopathy  Comments:  Stimulator managed by Pain Management  Orders:  -     diazepam (VALIUM) 10 mg tablet; Take 1 tablet (10 mg total) by mouth daily at bedtime as needed for anxiety    Statin myopathy  Comments:  Stabtolerance on Rosuvastatinle with     DDD (degenerative disc disease), thoracolumbar  Comments:  Significant flar of likely chronic DDD thoracic  Return to pain management  Orders:  -     Ambulatory referral to Physical Therapy; Future    Mixed hyperlipidemia  Comments:  Good profile on Crestor  Orders:  -     rosuvastatin (CRESTOR) 10 MG tablet; Take 1 tablet (10 mg total) by mouth Medrol Dose Pack scheduling ONLY    Hyperglycemia  Comments:  A1c in non diabetic range    Class 2 severe obesity due to excess calories with serious comorbidity and body mass index (BMI) of 37 0 to 37 9 in [facility])  Comments:  Urged further attention to weight loss    Chronic bilateral low back pain with bilateral sciatica          Subjective:      Patient ID: Gema Contreras  is a 66 y o  male  Pain for 2 months in mid thoracic area  Both sides  Unable to stand up straight    Will return to [facility] to request small amount of narcotic medication for this increased pain    Pain in L hip to medial knee to 1st toe     No recent illness  Labs reviewed in detail and copy given to patient  The following portions of the patient's history were reviewed and updated as appropriate: allergies, current medications, past family history, past medical history, past social history, past surgical history and problem list     Review of Systems   HENT: Negative  Eyes: Negative  Respiratory: Negative  Cardiovascular: Negative  Genitourinary: Negative  Musculoskeletal: Positive for arthralgias, back pain, gait problem, joint swelling, myalgias and neck pain  Allergic/Immunologic: Negative  Hematological: Negative  Psychiatric/Behavioral: Positive for dysphoric mood  Objective:      /72   Pulse 68   Temp 97 9 °F (36 6 °C)   Resp 12   Ht 5' 11" (1 803 m)   Wt 123 kg (271 lb 6 4 oz)   BMI 37 85 kg/m²          Physical Exam   Constitutional: He is oriented to person, place, and time  He appears well-developed and well-nourished  Eyes: Conjunctivae are normal    Cardiovascular: Normal rate and regular rhythm  Pulmonary/Chest: Effort normal and breath sounds normal    Musculoskeletal: Normal range of motion  Thoracic back: He exhibits tenderness, pain and spasm  Unable to stand straight from mid to upper thorax   Neurological: He is oriented to person, place, and time  Psychiatric: He has a normal mood and affect  His behavior is normal  Judgment and thought content normal    Nursing note and vitals reviewed

## 2018-06-26 ENCOUNTER — EVALUATION (OUTPATIENT)
Dept: PHYSICAL THERAPY | Facility: CLINIC | Age: 63
End: 2018-06-26
Payer: COMMERCIAL

## 2018-06-26 DIAGNOSIS — M51.35 DDD (DEGENERATIVE DISC DISEASE), THORACOLUMBAR: Primary | ICD-10-CM

## 2018-06-26 PROCEDURE — G8979 MOBILITY GOAL STATUS: HCPCS

## 2018-06-26 PROCEDURE — 97162 PT EVAL MOD COMPLEX 30 MIN: CPT

## 2018-06-26 PROCEDURE — G8978 MOBILITY CURRENT STATUS: HCPCS

## 2018-06-26 NOTE — PROGRESS NOTES
PT Evaluation     Today's date: 2018  Patient name: Trina Hilliard  : 1955  MRN: [id number]  Referring provider: Collin Haque MD  Dx:   Encounter Diagnosis     ICD-10-CM    1  DDD (degenerative disc disease), thoracolumbar M51 35 Ambulatory referral to Physical Therapy    Significant flar of likely chronic DDD thoracic  Return to pain management  Assessment  Impairments: abnormal gait, abnormal or restricted ROM, activity intolerance, pain with function, weight-bearing intolerance and poor posture     Assessment details: Pt is a 66year old male with lumbar stenosis and thoracic spine osteoarthritis  Pt has decreased lumbar and thoracic extension/rotation ROM, decreased ROM/increased neural tension of hamstrings, and decreased tolerance with standing and walking  Pt has overall decreased functional mobility and would benefit from PT  Thank you kindly for your referral!  Understanding of Dx/Px/POC: good   Prognosis: fair    Goals  ST  Pt will have a 1-3 point decrease on the pain scale within 4 weeks  2  Pt will have 75% limited thoracolumbar extension in standing within 4 weeks  3  Pt will be able to stand for 5 minutes without increased pain within 4 weeks  LT  Pt will have a decrease of 4-6 points on the pain scale within 6 weeks  2  Pt will have a FOTO score of 48/100 within 6 weeks  3  Pt will be able to walk for 5 minutes without increase in pain within 6 weeks       Plan  Planned modality interventions: cryotherapy  Planned therapy interventions: abdominal trunk stabilization, functional ROM exercises, home exercise program, therapeutic exercise, therapeutic activities, stretching, strengthening, postural training, neuromuscular re-education, manual therapy and joint mobilization  Frequency: 2x week  Duration in weeks: 6  Plan of Care beginning date: 2018  Plan of Care expiration date: 2018        Subjective Evaluation    History of Present Illness  Date of onset: 2017  Mechanism of injury: Pt has had low back pain for 20-30 years, but onset of thoracic pain 6 months ago  The thoracic pain wraps around anterior at the ribs  Pt got a spine stimulator 2 years ago, but says it has not helped  Pt most comfortable in lounge chair with feet up and has slept there the past 3 years  Pain  At best pain rating: 3  At worst pain ratin  Location: thoracolumbar spine, wrapping anterior b/l, sometimes into hips  Quality: radiating ("tooth ache pain, slow pulsing")  Relieving factors: change in position and relaxation  Aggravating factors: walking and standing  Progression: worsening      Diagnostic Tests  No diagnostic tests performed  Treatments  No previous or current treatments  Patient Goals  Patient goals for therapy: decreased pain  Patient goal: walk with less pain         Objective     Tenderness     Lumbar Spine  Tenderness in the spinous process (middle thoracic spine)       Neurological Testing     Reflexes   Left   Patellar (L4): absent (0)    Right   Patellar (L4): trace (1+)    Active Range of Motion     Additional Active Range of Motion Details  Thoracic (sitting):   Flexion: normal   L Rotation: 25% limited  R Rotation: 25% limited    Lumbar (standing):   Flexion: normal ROM, Derek's sign  Extension: unable to achieve neutral  L Rotation: 25% limited  R Rotation: 25% limited      Strength/Myotome Testing     Left Hip   Planes of Motion   Flexion: 4+    Right Hip   Planes of Motion   Flexion: 4+    Left Knee   Flexion: 4+ (painful)  Extension: 5    Right Knee   Flexion: 4+  Extension: 5    General Comments     Lumbar Comments  Gait: excessive thoracolumbar flexion, R shoulder elevation, decreased b/l knee flexion during swing, antalgic gait with general stiffness throughout   Standing posture: thoracolumbar flexion, pain with palpation at mid thoracic spine, apparent hinge around T8   Hip ROM  In supine: b/l hip flexion and hip flexion with IR/ER had no pain   SLR: hamstring pain b/l   PA glides in prone: thoracic spine hypomobile               Precautions: spine stimulator on L side, significant medical history  Dx: thoracolumbar DDD  Personal factors: anxiety  Comorbidities: Hypertension  EPOC: 8/7/18    Daily Treatment Diary         Manual 6/26            L Piriformis Trp Release - brief                                                    Exercise Diary              Recumbent bike             Supine thoracic rot HEP            Seated hamstring stretch             clamshells             TB rows             TB lat pull downs             DLS: Abd/add             DLS: pball press downs                                                                                                                                                                                                   Modalities

## 2018-06-28 ENCOUNTER — OFFICE VISIT (OUTPATIENT)
Dept: PHYSICAL THERAPY | Facility: CLINIC | Age: 63
End: 2018-06-28
Payer: COMMERCIAL

## 2018-06-28 DIAGNOSIS — M51.35 DDD (DEGENERATIVE DISC DISEASE), THORACOLUMBAR: Primary | ICD-10-CM

## 2018-06-28 PROCEDURE — 97112 NEUROMUSCULAR REEDUCATION: CPT

## 2018-06-28 PROCEDURE — 97110 THERAPEUTIC EXERCISES: CPT

## 2018-06-28 PROCEDURE — 97140 MANUAL THERAPY 1/> REGIONS: CPT

## 2018-06-28 NOTE — PROGRESS NOTES
Daily Note     Today's date: 2018  Patient name: Anya Lyle  : 1955  MRN: [id number]  Referring provider: Iván Pike MD  Dx:   Encounter Diagnosis     ICD-10-CM    1  DDD (degenerative disc disease), thoracolumbar M51 35                   Subjective: Pt  Stated he is very sore today due to the weather  Objective: See treatment diary below      Assessment: Tolerated treatment fair  Patient demonstrated fatigue post treatment and would benefit from continued PT  Pt  Presented with significant forward flexed posture and reports sharp pain when trying to stand upright  Initiated PT POC today  Pt  Able to complete TE's given but with some discomfort t/o  Post Rx pt  Left therapy with more upright posture but reported feeling sore  Plan: Continue per plan of care  Progress treatment as tolerated        Precautions: spine stimulator on L side, significant medical history  Dx: thoracolumbar DDD  Personal factors: anxiety  Comorbidities: Hypertension  EPOC: 18    Daily Treatment Diary         Manual            L Piriformis Trp Release - brief  7  b/l           Thoracic STM  7   b/l                                     Exercise Diary              Recumbent bike  5'           Supine thoracic rot HEP --           Seated hamstring stretch  15"x3 ea           clamshells             TB rows  gtb  10           TB lat pull downs  gtb  10           DLS: Abd/add  9"H30 ea           DLS: pball press downs  5"x10                                                                                                                                                                                                 Modalities              CP (seated)  12'

## 2018-07-03 ENCOUNTER — OFFICE VISIT (OUTPATIENT)
Dept: PHYSICAL THERAPY | Facility: CLINIC | Age: 63
End: 2018-07-03
Payer: COMMERCIAL

## 2018-07-03 DIAGNOSIS — M51.35 DDD (DEGENERATIVE DISC DISEASE), THORACOLUMBAR: Primary | ICD-10-CM

## 2018-07-03 PROCEDURE — 97140 MANUAL THERAPY 1/> REGIONS: CPT

## 2018-07-03 PROCEDURE — 97110 THERAPEUTIC EXERCISES: CPT

## 2018-07-03 NOTE — PROGRESS NOTES
Daily Note     Today's date: 7/3/2018  Patient name: Candida Weston  : 1955  MRN: [id number]  Referring provider: Trena Prader, MD  Dx:   Encounter Diagnosis     ICD-10-CM    1  DDD (degenerative disc disease), thoracolumbar M51 35                   Subjective: Patient reports he overdid it yesterday pressure washing his deck and is now really sore today  Objective: See treatment diary below      Assessment:  Continued pain throughout treatment this visit  No significant progressions due to soreness today  Patient reports feeling no better no worse after treatment  Plan: Continue per plan of care       Precautions: spine stimulator on L side, significant medical history  Dx: thoracolumbar DDD  Personal factors: anxiety  Comorbidities: Hypertension  EPOC: 18     Daily Treatment Diary            Manual 6/26 6/28  7/3                 L Piriformis Trp Release - brief   7  b/l  5'                 Thoracic STM   7   b/l  8'                                                                 Exercise Diary                        Recumbent bike   5'  5'                 Supine thoracic rot HEP --                   Seated hamstring stretch   15"x3 ea  20"x3 ea                 clamshells                       TB rows   gtb  10  gtb 20                 TB lat pull downs   gtb  10  gtb 20                 DLS: Abd/add   1"P28 ea  5"x10 ea                 DLS: pball press downs   5"x10  5" x10                                                                                                                                                                                                                                                                                                                                                                 Modalities    6/28  7/3                 CP (seated)   12'  10'

## 2018-07-05 ENCOUNTER — APPOINTMENT (OUTPATIENT)
Dept: PHYSICAL THERAPY | Facility: CLINIC | Age: 63
End: 2018-07-05
Payer: COMMERCIAL

## 2018-07-09 ENCOUNTER — OFFICE VISIT (OUTPATIENT)
Dept: PAIN MEDICINE | Facility: CLINIC | Age: 63
End: 2018-07-09
Payer: COMMERCIAL

## 2018-07-09 ENCOUNTER — OFFICE VISIT (OUTPATIENT)
Dept: PHYSICAL THERAPY | Facility: CLINIC | Age: 63
End: 2018-07-09
Payer: COMMERCIAL

## 2018-07-09 VITALS
SYSTOLIC BLOOD PRESSURE: 120 MMHG | DIASTOLIC BLOOD PRESSURE: 78 MMHG | WEIGHT: 266 LBS | HEART RATE: 76 BPM | TEMPERATURE: 97.9 F | BODY MASS INDEX: 37.24 KG/M2 | HEIGHT: 71 IN

## 2018-07-09 DIAGNOSIS — G60.9 IDIOPATHIC PERIPHERAL NEUROPATHY: ICD-10-CM

## 2018-07-09 DIAGNOSIS — M51.35 DDD (DEGENERATIVE DISC DISEASE), THORACOLUMBAR: Primary | ICD-10-CM

## 2018-07-09 DIAGNOSIS — M54.41 CHRONIC BILATERAL LOW BACK PAIN WITH BILATERAL SCIATICA: Primary | ICD-10-CM

## 2018-07-09 DIAGNOSIS — M96.1 LUMBAR POST-LAMINECTOMY SYNDROME: ICD-10-CM

## 2018-07-09 DIAGNOSIS — G89.29 CHRONIC BILATERAL LOW BACK PAIN WITH BILATERAL SCIATICA: Primary | ICD-10-CM

## 2018-07-09 DIAGNOSIS — M48.062 SPINAL STENOSIS OF LUMBAR REGION WITH NEUROGENIC CLAUDICATION: ICD-10-CM

## 2018-07-09 DIAGNOSIS — M54.16 CHRONIC LUMBAR RADICULOPATHY: ICD-10-CM

## 2018-07-09 DIAGNOSIS — M51.36 LUMBAR DEGENERATIVE DISC DISEASE: ICD-10-CM

## 2018-07-09 DIAGNOSIS — M54.42 CHRONIC BILATERAL LOW BACK PAIN WITH BILATERAL SCIATICA: Primary | ICD-10-CM

## 2018-07-09 DIAGNOSIS — G89.4 CHRONIC PAIN DISORDER: ICD-10-CM

## 2018-07-09 PROCEDURE — 99214 OFFICE O/P EST MOD 30 MIN: CPT | Performed by: NURSE PRACTITIONER

## 2018-07-09 PROCEDURE — 97110 THERAPEUTIC EXERCISES: CPT

## 2018-07-09 PROCEDURE — 97112 NEUROMUSCULAR REEDUCATION: CPT

## 2018-07-09 PROCEDURE — 97140 MANUAL THERAPY 1/> REGIONS: CPT

## 2018-07-09 RX ORDER — COVID-19 ANTIGEN TEST
KIT MISCELLANEOUS
COMMUNITY
End: 2018-11-21

## 2018-07-09 RX ORDER — HYDROCODONE BITARTRATE 20 MG/1
TABLET, EXTENDED RELEASE ORAL
Qty: 30 EACH | Refills: 0 | Status: SHIPPED | OUTPATIENT
Start: 2018-07-09 | End: 2018-12-17 | Stop reason: CLARIF

## 2018-07-09 RX ORDER — HYDROCODONE BITARTRATE AND ACETAMINOPHEN 10; 325 MG/1; MG/1
TABLET ORAL
Qty: 80 TABLET | Refills: 0 | Status: SHIPPED | OUTPATIENT
Start: 2018-07-09 | End: 2018-07-09 | Stop reason: SDUPTHER

## 2018-07-09 RX ORDER — HYDROCODONE BITARTRATE AND ACETAMINOPHEN 10; 325 MG/1; MG/1
TABLET ORAL
Qty: 80 TABLET | Refills: 0 | Status: SHIPPED | OUTPATIENT
Start: 2018-07-09 | End: 2018-09-26

## 2018-07-09 NOTE — PROGRESS NOTES
Assessment:  1  Chronic bilateral low back pain with bilateral sciatica    2  Lumbar post-laminectomy syndrome    3  Chronic lumbar radiculopathy    4  Chronic pain disorder    5  Spinal stenosis of lumbar region with neurogenic claudication    6  Lumbar degenerative disc disease    7  Idiopathic peripheral neuropathy        Plan:  While the patient was in the office today, I did have a thorough conversation with the patient regarding his medication regimen and treatment plan  I explained to the patient that I feel would be in his best interest to follow up with the [address] [place] [address] team to schedule a reprogramming session see if there is anything else they can do with the stimulator  The patient was agreeable and verbalized an understanding  With regards to his medication regimen, I explained to the patient that at this point time since we do not want to increase his use of the p r n  hydrocodone, I will give him a prescription for Hysingla ER, 20 mg, 1 p o  q day and he can use it as needed for severe pain days only and we are confident that he will make 1 prescription last 3 months or longer  For now, we will continue with the p r n  Norco as prescribed as he was previously on the fentanyl patch and other long-acting opioids and feels that his pain is more manageable off of the long-acting opioids on a consistent basis between the stimulator and the as needed medications  The patient was agreeable and verbalized an understanding  [place] Prescription Drug Monitoring Program report was reviewed and was appropriate      The patient was not picked for a pill count today, but he did bring his medications as required  The patient's opioid scripts were sent to their pharmacy electronically and was given a 3 month supply of prescriptions with a Do Not Fill date(s) of August 6, 2018 and September 3, 2018          There are risks associated with opioid medications, including dependence, addiction and tolerance  The patient understands and agrees to use these medications only as prescribed  Potential side effects of the medications include, but are not limited to, constipation, drowsiness, addiction, impaired judgment and risk of fatal overdose if not taken as prescribed  The patient was warned against driving while taking sedation medications  Sharing medications is a felony  At this point in time, the patient is showing no signs of addiction, abuse, diversion or suicidal ideation  The patient will follow-up in 12 weeks for medication prescription refill and reevaluation  The patient was advised to contact the office should their symptoms worsen in the interim  The patient was agreeable and verbalized an understanding  History of Present Illness: The patient is a 66 y o  male last seen on 3/29/18 who presents for a follow up office visit in regards to chronic pain secondary to lumbar post-laminectomy syndrome  The patient currently reports that since his last office visit his pain symptoms have remained relatively stable and manageable as he has recently just restarted physical therapy at the recommendation of his primary care provider to try to help with his chronic hip and low back pain  He does feel that the therapy is somewhat help  He reports that he continues use the spinal cord stimulator, but is not sure how helpful it is any more  With regards to his medication regimen, we have tried and failed all alternative neuropathic medications, either with side effects, and/or without any relief and continue to try to manage him on minimal amounts of opioids  Previously the patient was using Opana ER on an as-needed basis and only when he was having severe pain as he did not want have to take more than 2-3 of the hydrocodone daily, but did not want to take a long-acting drug every day  The patient's Opana ER prescription, which was a 30 day prescription, lasted him for 2 years  However, he is now out of the medication and is aware that this medicine is no longer available and this formulation  The patient presents today to discuss his medication regimen treatment plan as he would like some kind of long-acting medication, however, in the past has tried and failed morphine sulfate ER, Butrans patch, and the fentanyl patch  Current pain medications includes:  Norco 10/325, 1 p o  t i d  p r n  for pain dispense number 70 pills for 30 days  The patient reports that this regimen is providing 50% pain relief  The patient is reporting no side effects from this pain medication regimen  Pain Contract Signed: 1/4/18  Last Urine Drug Screen: 3/29/18    I have personally reviewed and/or updated the patient's past medical history, past surgical history, family history, social history, current medications, allergies, and vital signs today  Review of Systems:    Review of Systems   Respiratory: Positive for shortness of breath  Cardiovascular: Negative for chest pain  Gastrointestinal: Negative for constipation, diarrhea, nausea and vomiting  Musculoskeletal: Positive for gait problem  Negative for arthralgias, joint swelling (joint stiffness) and myalgias  Skin: Negative for rash  Neurological: Positive for weakness  Negative for dizziness and seizures  All other systems reviewed and are negative          Past Medical History:   Diagnosis Date    Acute low back pain     10 AUG 2016 RESOLVED    Angina pectoris (HCC)     Bursitis of hip     Carpal tunnel syndrome     Chronic bilateral low back pain with bilateral sciatica     Chronic lumbar radiculopathy     Chronic narcotic dependence (HCC)     Chronic pain syndrome     Constipation due to opioid therapy     DDD (degenerative disc disease), lumbar     Deep vein thrombosis of left upper extremity ([facility] [address] )     [id number]  RESOLVED    Depression     Diverticula of colon     [id number] RESOLVED    DVT (deep venous thrombosis) ([facility] [address] )     LUE    Edema     History of staph infection     Hyperglycemia     Hyperlipidemia     Hypertension     Insomnia     Lateral epicondylitis     Left inguinal hernia     Methicillin resistant Staphylococcus aureus septicemia (HCC)     Morbid obesity due to excess calories (HCC)     Obesity     Peripheral neuropathy     Post laminectomy syndrome     RLS (restless legs syndrome)     Septicemia ([facility] [address] )     MRSA    Umbilical hernia        Past Surgical History:   Procedure Laterality Date    BACK SURGERY      laminectomy, hardware    CARPAL TUNNEL RELEASE Left     CERVICAL SPINE SURGERY      COLONOSCOPY      ELBOW SURGERY      ELBOW SURGERY      KNEE ARTHROSCOPY Right     knee    KNEE SURGERY Right     ARTHOSCOPY KNEE    NECK SURGERY      1997    NM COLONOSCOPY FLX DX W/COLLJ SPEC WHEN PFRMD N/A 1/24/2017    Procedure: COLONOSCOPY;  Surgeon: Chelsy Calix MD;  Location:  MAIN OR;  Service: General    SHOULDER SURGERY      SPINAL CORD STIMULATOR IMPLANT         Family History   Problem Relation Age of Onset    Heart disease Mother     Cancer Mother         breast, stomach    Aneurysm Mother     Heart disease Father     Cancer Father         skin, liver, colon DECESED AGE 58       Social History     Occupational History    Not on file  Social History Main Topics    Smoking status: Never Smoker    Smokeless tobacco: Never Used    Alcohol use Yes      Comment: social    Drug use: No    Sexual activity: Yes         Current Outpatient Prescriptions:     aspirin 81 MG tablet, Take 81 mg by mouth daily, Disp: , Rfl:     bisoprolol-hydrochlorothiazide (ZIAC) 2 5-6 25 MG per tablet, TAKE ONE TABLET BY MOUTH EVERY DAY, Disp: 90 tablet, Rfl: 3    diazepam (VALIUM) 10 mg tablet, Take 1 tablet (10 mg total) by mouth daily at bedtime as needed for anxiety, Disp: 30 tablet, Rfl: 1    HYDROcodone-acetaminophen (NORCO)  mg per tablet, Take 1 PO TID PRN for pain  , Disp: 70 tablet, Rfl: 0    lactulose (CHRONULAC) 10 g/15 mL solution, , Disp: , Rfl:     Multiple Vitamin (DAILY VALUE MULTIVITAMIN) TABS, Take 1 tablet by mouth daily, Disp: , Rfl:     rosuvastatin (CRESTOR) 10 MG tablet, Take 1 tablet (10 mg total) by mouth Medrol Dose Pack scheduling ONLY, Disp: 30 tablet, Rfl: 5    Allergies   Allergen Reactions    Fentanyl Other (See Comments)     "Makes me Suicidal"  Happened when dosage increased   Penicillins Other (See Comments)     As a child unknown    Pravastatin Other (See Comments)     Reaction Date: [id number];   Muscle weakness    Sulfa Antibiotics Other (See Comments)     As a child unknown    Vytorin [Ezetimibe-Simvastatin]      Reaction Date: [id number];        Physical Exam:    There were no vitals taken for this visit  Constitutional:normal, well developed, well nourished, alert, in no distress and non-toxic and no overt pain behavior  and overweight  Eyes:anicteric  HEENT:grossly intact  Neck:supple, symmetric, trachea midline and no masses   Pulmonary:even and unlabored  Cardiovascular:No edema or pitting edema present  Skin:Normal without rashes or lesions and well hydrated  Psychiatric:Mood and affect appropriate  Neurologic:Cranial Nerves II-XII grossly intact  Musculoskeletal:Slightly antalgic, but steady gait without the use of any assistive devices  Imaging  No orders to display         No orders of the defined types were placed in this encounter

## 2018-07-09 NOTE — PROGRESS NOTES
Daily Note     Today's date: 2018  Patient name: Nia Lawrence  : 1955  MRN: [id number]  Referring provider: Devaughn Ferro MD  Dx:   Encounter Diagnosis     ICD-10-CM    1  DDD (degenerative disc disease), thoracolumbar M51 35                   Subjective: Patient reports he doesn't feel too bad today so far  Objective: See treatment diary below      Assessment: Pt  Continues to have fair tolerance to TE's with discomfort t/o  Pt  Ambulates with forward flexed posture due to his low back pain  Continued with manual therapy which provides minimal relief  Attempted to instruct pt  In proper rolling technique- will continue to enforce in future appointments  Plan: Continue per plan of care       Precautions: spine stimulator on L side, significant medical history  Dx: thoracolumbar DDD  Personal factors: anxiety  Comorbidities: Hypertension  EPOC: 18     Daily Treatment Diary            Manual 6/26 6/28  7/3  7/9               L Piriformis Trp Release - brief   7  b/l  5'  3               Thoracic STM   7   b/l  8'  7                                                               Exercise Diary                        Recumbent bike   5'  5'  5'               Supine thoracic rot HEP --                   Seated hamstring stretch   15"x3 ea  20"x3 ea  10"x5               clamshells        15 ea               TB rows   gtb  10  gtb 20  mtb  20               TB lat pull downs   gtb  10  gtb 20  mtb  20               DLS: Abd/add   6"X09 ea  5"x10 ea  5"x15               DLS: pball press downs   5"x10  5" x10  5"x15                                                                                                                                                                                                                                                                                                                                                               Modalities    6/28  7/3                 CP (seated)   12'  10'

## 2018-07-12 ENCOUNTER — OFFICE VISIT (OUTPATIENT)
Dept: PHYSICAL THERAPY | Facility: CLINIC | Age: 63
End: 2018-07-12
Payer: COMMERCIAL

## 2018-07-12 DIAGNOSIS — M51.35 DDD (DEGENERATIVE DISC DISEASE), THORACOLUMBAR: Primary | ICD-10-CM

## 2018-07-12 PROCEDURE — 97010 HOT OR COLD PACKS THERAPY: CPT

## 2018-07-12 PROCEDURE — 97112 NEUROMUSCULAR REEDUCATION: CPT | Performed by: PHYSICAL THERAPIST

## 2018-07-12 PROCEDURE — 97110 THERAPEUTIC EXERCISES: CPT | Performed by: PHYSICAL THERAPIST

## 2018-07-12 PROCEDURE — 97140 MANUAL THERAPY 1/> REGIONS: CPT | Performed by: PHYSICAL THERAPIST

## 2018-07-12 NOTE — PROGRESS NOTES
Daily Note     Today's date: 2018  Patient name: Debbi Perdomo  : 1955  MRN: [id number]  Referring provider: Josefina Ramirez MD  Dx:   Encounter Diagnosis     ICD-10-CM    1  DDD (degenerative disc disease), thoracolumbar M51 35                   Subjective: Patient reports pain feeling about the same as previously and not feeling like much has changed in the past couple weeks  Objective: See treatment diary below      Assessment: Pt tolerated tx fair  Pt tolerated TE well, and had increased pain with transitions between sit/stand/mat  Pt reported pain feeling deep like between the bones, and did not have relief with soft tissue massage  Pt tolerated PA mobilization well, and did not have any discomfort, but also did not report a change in sx's afterwards  PT tried IASTM today, but due to pocket swelling L of thoracic spine, would not continue  Pt had relief with ice massage through L of thoracic spine  Pt able to continue to progress with TE with increased difficulty of theraband and repetitions  Pt reported feeling like he is standing up straighter since starting therapy  Plan: Continue per plan of care        **Co-treated with BC, SPT on 18    Precautions: spine stimulator on L side, significant medical history  Dx: thoracolumbar DDD  Personal factors: anxiety  Comorbidities: Hypertension  EPOC: 18     Daily Treatment Diary            Manual 6/26 6/28  7/3  7/9  7/12             L Piriformis Trp Release - brief   7  b/l  5'  3               Thoracic STM   7   b/l  8'  7  3' R side              thoracic Grade 1-2 PA mobs in sitting          4'              L thoracic IASTM          3'             Exercise Diary                        Recumbent bike   5'  5'  5'  5'             Supine thoracic rot HEP --                   Seated hamstring stretch   15"x3 ea  20"x3 ea  10"x5  15" x 4 ea             clamshells        15 ea  10x ea btb             TB rows   gtb  10  gtb 20  mtb  20  mtb 20             TB lat pull downs   gtb  10  gtb 20  mtb  20  mtb 20             DLS: Abd/add   4"Y44 ea  5"x10 ea  5"x15  5"x15             DLS: pball press downs   5"x10  5" x10  5"x15  5"x15              Modified rows          3# x20 b/l              modified ext            3# x20 b/l              hip abd/add (1/4)          10x btb                                                                                                                                                                                                                                                                                     Modalities    6/28  7/3  7/12               CP (seated)   12'  10'  10'

## 2018-07-17 ENCOUNTER — OFFICE VISIT (OUTPATIENT)
Dept: PHYSICAL THERAPY | Facility: CLINIC | Age: 63
End: 2018-07-17
Payer: COMMERCIAL

## 2018-07-17 DIAGNOSIS — M54.41 CHRONIC BILATERAL LOW BACK PAIN WITH BILATERAL SCIATICA: ICD-10-CM

## 2018-07-17 DIAGNOSIS — G89.29 CHRONIC BILATERAL LOW BACK PAIN WITH BILATERAL SCIATICA: ICD-10-CM

## 2018-07-17 DIAGNOSIS — M54.42 CHRONIC BILATERAL LOW BACK PAIN WITH BILATERAL SCIATICA: ICD-10-CM

## 2018-07-17 DIAGNOSIS — M51.35 DDD (DEGENERATIVE DISC DISEASE), THORACOLUMBAR: Primary | ICD-10-CM

## 2018-07-17 PROCEDURE — 97140 MANUAL THERAPY 1/> REGIONS: CPT

## 2018-07-17 PROCEDURE — 97010 HOT OR COLD PACKS THERAPY: CPT

## 2018-07-17 NOTE — PROGRESS NOTES
Daily Note     Today's date: 2018  Patient name: Angela Dowling  : 1955  MRN: [id number]  Referring provider: Tc Hughes MD  Dx:   Encounter Diagnosis     ICD-10-CM    1  DDD (degenerative disc disease), thoracolumbar M51 35                   Subjective: Patient reports he hurts all over today with the weather coming in      Objective: See treatment diary below      Assessment: Pt  Presented with increased pain today and significant forward flexed posture  Focused on manual therapy and pain management due to high level of discomfort  minute DTM in sitting, flexed over pillow (focused on thoracic and lumbar areas-deep paraspinals and entire thoracic area)  Pt  Left much better post and left with much less flexed posture-able to stand upright in static standing but upon ambulating he slightly flexed again- not as significant as pre treatment  Plan: Continue per plan of care          Precautions: spine stimulator on L side, significant medical history  Dx: thoracolumbar DDD  Personal factors: anxiety  Comorbidities: Hypertension  EPOC: 18     Daily Treatment Diary            Manual 6/26 6/28  7/3  7/9  7/12 7/17            L Piriformis Trp Release - brief   7  b/l  5'  3               Thoracic STM   7   b/l  8'  7  3' R side  DTM  25'            thoracic Grade 1-2 PA mobs in sitting          4'              L thoracic IASTM          3'             Exercise Diary                        Recumbent bike   5'  5'  5'  5'  5'           Supine thoracic rot HEP --                   Seated hamstring stretch   15"x3 ea  20"x3 ea  10"x5  15" x 4 ea             clamshells        15 ea  10x ea btb             TB rows   gtb  10  gtb 20  mtb  20  mtb 20             TB lat pull downs   gtb  10  gtb 20  mtb  20  mtb 20             DLS: Abd/add   6"A28 ea  5"x10 ea  5"x15  5"x15             DLS: pball press downs   5"x10  5" x10  5"x15  5"x15              Modified rows          3# x20 b/l            modified ext            3# x20 b/l              hip abd/add (1/4)          10x btb                                                                                                                                                                                                                                                                                     Modalities    6/28  7/3  7/12  7/17             CP (seated)   12'  10'  10'  10

## 2018-07-19 ENCOUNTER — OFFICE VISIT (OUTPATIENT)
Dept: PHYSICAL THERAPY | Facility: CLINIC | Age: 63
End: 2018-07-19
Payer: COMMERCIAL

## 2018-07-19 DIAGNOSIS — M54.41 CHRONIC BILATERAL LOW BACK PAIN WITH BILATERAL SCIATICA: ICD-10-CM

## 2018-07-19 DIAGNOSIS — M51.35 DDD (DEGENERATIVE DISC DISEASE), THORACOLUMBAR: Primary | ICD-10-CM

## 2018-07-19 DIAGNOSIS — M54.42 CHRONIC BILATERAL LOW BACK PAIN WITH BILATERAL SCIATICA: ICD-10-CM

## 2018-07-19 DIAGNOSIS — G89.29 CHRONIC BILATERAL LOW BACK PAIN WITH BILATERAL SCIATICA: ICD-10-CM

## 2018-07-19 PROCEDURE — 97140 MANUAL THERAPY 1/> REGIONS: CPT

## 2018-07-19 NOTE — PROGRESS NOTES
Daily Note     Today's date: 2018  Patient name: Patrick San  : 1955  MRN: [id number]  Referring provider: Orion Wiseman MD  Dx:   Encounter Diagnosis     ICD-10-CM    1  DDD (degenerative disc disease), thoracolumbar M51 35    2  Chronic bilateral low back pain with bilateral sciatica M54 42     M54 41     G89 29                   Subjective: Patient reports that he felt very good after last visit and last about a day or little longer, but pain is starting to come back today  Objective: See treatment diary below      Assessment: Pt  Achieved significant relief after last visit, which was focused entirely on manual therapy  Continued today with same approach-focusing mainly on manual therapy with DTM for pain relief  Deferred CP due to pt  Reporting he was loose post DTM but ice stiffened him back up  Pt  Felt more mobile and loose after today's session  Plan: Continue per plan of care          Precautions: spine stimulator on L side, significant medical history  Dx: thoracolumbar DDD  Personal factors: anxiety  Comorbidities: Hypertension  EPOC: 18     Daily Treatment Diary            Manual 6/26 6/28  7/3  7/9  7/12 7/17   7/19         L Piriformis Trp Release - brief   7  b/l  5'  3               Thoracic STM   7   b/l  8'  7  3' R side  DTM  25'  DTM  25'          thoracic Grade 1-2 PA mobs in sitting          4'              L thoracic IASTM          3'             Exercise Diary                        Recumbent bike   11'  5'  5'  5'  5'  5'         Supine thoracic rot HEP --                   Seated hamstring stretch   15"x3 ea  20"x3 ea  10"x5  15" x 4 ea             clamshells        15 ea  10x ea btb             TB rows   gtb  10  gtb 20  mtb  20  mtb 20             TB lat pull downs   gtb  10  gtb 20  mtb  20  mtb 20             DLS: Abd/add   9"P34 ea  5"x10 ea  5"x15  5"x15             DLS: pball press downs   5"x10  5" x10  5"x15  5"x15              Modified rows          3# x20 b/l              modified ext            3# x20 b/l              hip abd/add (1/4)          10x btb                                                                                                                                                                                                                                                                                     Modalities    6/28  7/3  7/12  7/17  7/19           CP (seated)   12'  10'  10'  10

## 2018-07-23 ENCOUNTER — OFFICE VISIT (OUTPATIENT)
Dept: PHYSICAL THERAPY | Facility: CLINIC | Age: 63
End: 2018-07-23
Payer: COMMERCIAL

## 2018-07-23 DIAGNOSIS — M51.35 DDD (DEGENERATIVE DISC DISEASE), THORACOLUMBAR: Primary | ICD-10-CM

## 2018-07-23 PROCEDURE — 97140 MANUAL THERAPY 1/> REGIONS: CPT

## 2018-07-23 NOTE — PROGRESS NOTES
Daily Note     Today's date: 2018  Patient name: Tina Mandel  : 1955  MRN: [id number]  Referring provider: Hortencia Ag MD  Dx:   Encounter Diagnosis     ICD-10-CM    1  DDD (degenerative disc disease), thoracolumbar M51 35                   Subjective: Patient reports that he felt very good after last visit and last about a day or little longer, but pain began so set back in as the weather changed  He stated he feels therapy is finally starting to help and he is feeling more mobile  Objective: See treatment diary below      Assessment: Pt  Achieved significant relief after last visit, which was focused entirely on manual therapy  Continued today with same approach-focusing mainly on manual therapy with DTM for pain relief  Deferred CP due to pt  Reporting he was loose post DTM but ice stiffened him back up  Pt  Felt more mobile and loose after today's session  Plan: Continue per plan of care          Precautions: spine stimulator on L side, significant medical history  Dx: thoracolumbar DDD  Personal factors: anxiety  Comorbidities: Hypertension  EPOC: 18     Daily Treatment Diary            Manual 6/26 6/28  7/3  7/9  7/12 7/17   7/19  7/23       L Piriformis Trp Release - brief   7  b/l  5'  3               Thoracic STM   7   b/l  8'  7  3' R side  DTM  25'  DTM  25'  DTM  25'        thoracic Grade 1-2 PA mobs in sitting          4'              L thoracic IASTM          3'             Exercise Diary                        Recumbent bike   5'  5'  5'  5'  5'  5'  5'       Supine thoracic rot HEP --                   Seated hamstring stretch   15"x3 ea  20"x3 ea  10"x5  15" x 4 ea             clamshells        15 ea  10x ea btb             TB rows   gtb  10  gtb 20  mtb  20  mtb 20             TB lat pull downs   gtb  10  gtb 20  mtb  20  mtb 20             DLS: Abd/add   2"W70 ea  5"x10 ea  5"x15  5"x15             DLS: pball press downs   5"x10  5" x10  5"x15  5"x15              Modified rows          3# x20 b/l              modified ext            3# x20 b/l              hip abd/add (1/4)          10x btb                                                                                                                                                                                                                                                                                     Modalities    6/28  7/3  7/12  7/17  7/19           CP (seated)   12'  10'  10'  10

## 2018-07-26 ENCOUNTER — OFFICE VISIT (OUTPATIENT)
Dept: PHYSICAL THERAPY | Facility: CLINIC | Age: 63
End: 2018-07-26
Payer: COMMERCIAL

## 2018-07-26 DIAGNOSIS — M54.41 CHRONIC BILATERAL LOW BACK PAIN WITH BILATERAL SCIATICA: ICD-10-CM

## 2018-07-26 DIAGNOSIS — M54.42 CHRONIC BILATERAL LOW BACK PAIN WITH BILATERAL SCIATICA: ICD-10-CM

## 2018-07-26 DIAGNOSIS — G89.29 CHRONIC BILATERAL LOW BACK PAIN WITH BILATERAL SCIATICA: ICD-10-CM

## 2018-07-26 DIAGNOSIS — M51.35 DDD (DEGENERATIVE DISC DISEASE), THORACOLUMBAR: Primary | ICD-10-CM

## 2018-07-26 PROCEDURE — G8991 OTHER PT/OT GOAL STATUS: HCPCS

## 2018-07-26 PROCEDURE — 97110 THERAPEUTIC EXERCISES: CPT | Performed by: PHYSICAL THERAPIST

## 2018-07-26 PROCEDURE — 97140 MANUAL THERAPY 1/> REGIONS: CPT | Performed by: PHYSICAL THERAPIST

## 2018-07-26 PROCEDURE — G8990 OTHER PT/OT CURRENT STATUS: HCPCS

## 2018-07-26 NOTE — PROGRESS NOTES
Daily Note     Today's date: 2018  Patient name: Roxana Cruz  : 1955  MRN: [id number]  Referring provider: Bean Green MD  Dx:   Encounter Diagnosis     ICD-10-CM    1  DDD (degenerative disc disease), thoracolumbar M51 35    2  Chronic bilateral low back pain with bilateral sciatica M54 42     M54 41     G89 29                   Subjective: Pt reported feeling better recently, but feeling more sore today due to walking a lot yesterday with his grandson and sleeping in bed last night instead of in a recliner  Objective: See treatment diary below      Assessment: Pt tolerated tx fair  Pt thoracic musculature pain has decreased, and pt stated now has increase pain in low back that goes down through hips and sometimes down R LE  PT trialed IASTM on bilateral low back musculature, and patient tolerated well  Pt reported figure 4 stretching helping to decrease some of his discomfort through R buttocks, but still occasionally has the "nerve pain" down to his calf  Pt reported feeling a bit less tight after tx  Plan: Continue per plan of care        **Co-treated with BC,SPT on 18      Precautions: spine stimulator on L side, significant medical history  Dx: thoracolumbar DDD  Personal factors: anxiety  Comorbidities: Hypertension  EPOC: 18     Daily Treatment Diary            Manual 6/26 6/28  7/3  7/9  7/12 7/17   7/19  7/23  7/26     L Piriformis Trp Release - brief   7  b/l  5'  3               Thoracic STM   7   b/l  8'  7  3' R side  DTM  25'  DTM  25'  DTM  25'  20'      thoracic Grade 1-2 PA mobs in sitting          4'        5'     B/l lumbar IASTM              L thoracic IASTM          3'             Exercise Diary                        Recumbent bike   11'  5'  5'  5'  5'  5'  5'  5'     Supine thoracic rot HEP --                   Seated hamstring stretch   15"x3 ea  20"x3 ea  10"x5  15" x 4 ea        15"x4 b/l     clamshells        15 ea  10x ea btb             TB rows   gtb  10  gtb 20  mtb  20  mtb 20             TB lat pull downs   gtb  10  gtb 20  mtb  20  mtb 20             DLS: Abd/add   2"K31 ea  5"x10 ea  5"x15  5"x15             DLS: pball press downs   5"x10  5" x10  5"x15  5"x15              Modified rows          3# x20 b/l              modified ext            3# x20 b/l              hip abd/add (1/4)          10x btb              seated figure 4 stretch                  15"x3 b/l                                                                                                                                                                                                                                                     Modalities    6/28  7/3  7/12  7/17  7/19           CP (seated)   12'  10'  10'  10

## 2018-07-30 ENCOUNTER — OFFICE VISIT (OUTPATIENT)
Dept: PHYSICAL THERAPY | Facility: CLINIC | Age: 63
End: 2018-07-30
Payer: COMMERCIAL

## 2018-07-30 DIAGNOSIS — M51.35 DDD (DEGENERATIVE DISC DISEASE), THORACOLUMBAR: Primary | ICD-10-CM

## 2018-07-30 DIAGNOSIS — M54.42 CHRONIC BILATERAL LOW BACK PAIN WITH BILATERAL SCIATICA: ICD-10-CM

## 2018-07-30 DIAGNOSIS — M54.41 CHRONIC BILATERAL LOW BACK PAIN WITH BILATERAL SCIATICA: ICD-10-CM

## 2018-07-30 DIAGNOSIS — G89.29 CHRONIC BILATERAL LOW BACK PAIN WITH BILATERAL SCIATICA: ICD-10-CM

## 2018-07-30 PROCEDURE — 97140 MANUAL THERAPY 1/> REGIONS: CPT

## 2018-07-30 NOTE — PROGRESS NOTES
Daily Note     Today's date: 2018  Patient name: Edy Bain  : 1955  MRN: [id number]  Referring provider: Dennis Marino MD  Dx:   Encounter Diagnosis     ICD-10-CM    1  DDD (degenerative disc disease), thoracolumbar M51 35    2  Chronic bilateral low back pain with bilateral sciatica M54 42     M54 41     G89 29                   Subjective: Pt reported he was feeling pretty good Thursday and then went to baseball game and sat in hard bleachers for 3 hours and has felt terrible ever since  Objective: See treatment diary below      Assessment: Pt presents with increased back  Pain today after sitting on hard surface for several hours Thursday  Focused on manual therapy due to pt  Reports of making progressions with manual therapy focus prior to setback  Pt  Was in significant forward flexed posture at start of session today  Felt much better post manual therapy and walked out with more upright posture  Plan: Continue per plan of care            Precautions: spine stimulator on L side, significant medical history  Dx: thoracolumbar DDD  Personal factors: anxiety  Comorbidities: Hypertension  EPOC: 18     Daily Treatment Diary            Manual 6/26 6/28  7/3  7/9  7/12 7/17   7/19  7/23  7/26  7/30   L Piriformis Trp Release - brief   7  b/l  5'  3               Thoracic STM   7   b/l  8'  7  3' R side  DTM  25'  DTM  25'  DTM  25'  20'  30'    thoracic Grade 1-2 PA mobs in sitting          4'        5'     B/l lumbar IASTM              L thoracic IASTM          3'             Exercise Diary                        Recumbent bike   11'  5'  5'  5'  5'  5'  5'  5'  5'   Supine thoracic rot HEP --                   Seated hamstring stretch   15"x3 ea  20"x3 ea  10"x5  15" x 4 ea        15"x4 b/l     clamshells        15 ea  10x ea btb             TB rows   gtb  10  gtb 20  mtb  20  mtb 20             TB lat pull downs   gtb  10  gtb 20  mtb  20  mtb 20             DLS: Abd/add   5"x10 ea  5"x10 ea  5"x15  5"x15             DLS: pball press downs   5"x10  5" x10  5"x15  5"x15              Modified rows          3# x20 b/l              modified ext            3# x20 b/l              hip abd/add (1/4)          10x btb              seated figure 4 stretch                  15"x3 b/l                                                                                                                                                                                                                                                     Modalities    6/28  7/3  7/12  7/17  7/19           CP (seated)   12'  10'  10'  10

## 2018-08-02 ENCOUNTER — EVALUATION (OUTPATIENT)
Dept: PHYSICAL THERAPY | Facility: CLINIC | Age: 63
End: 2018-08-02
Payer: COMMERCIAL

## 2018-08-02 DIAGNOSIS — M54.42 CHRONIC BILATERAL LOW BACK PAIN WITH BILATERAL SCIATICA: ICD-10-CM

## 2018-08-02 DIAGNOSIS — M51.35 DDD (DEGENERATIVE DISC DISEASE), THORACOLUMBAR: Primary | ICD-10-CM

## 2018-08-02 DIAGNOSIS — G89.29 CHRONIC BILATERAL LOW BACK PAIN WITH BILATERAL SCIATICA: ICD-10-CM

## 2018-08-02 DIAGNOSIS — M54.41 CHRONIC BILATERAL LOW BACK PAIN WITH BILATERAL SCIATICA: ICD-10-CM

## 2018-08-02 PROCEDURE — 97140 MANUAL THERAPY 1/> REGIONS: CPT | Performed by: PHYSICAL THERAPIST

## 2018-08-02 NOTE — PROGRESS NOTES
PT Re-Evaluation     Today's date: 2018  Patient name: Jeni Andrade  : 1955  MRN: [id number]  Referring provider: Sally Adams MD  Dx:   Encounter Diagnosis     ICD-10-CM    1  DDD (degenerative disc disease), thoracolumbar M51 35    2  Chronic bilateral low back pain with bilateral sciatica M54 42     M54 41     G89 29                   Assessment  Impairments: abnormal gait, abnormal or restricted ROM, activity intolerance, pain with function and poor posture     Assessment details: Pt is a 66year old male with lumbar stenosis and thoracic spine osteoarthritis  Pt has decreased lumbar and thoracic extension ROM and decreased tolerance with standing and walking  Pt continues to have pain in thoracolumbar spine, but the higher pain ratings are happening less frequently  Pt has improved with thoracolumbar rotation ROM  Pt would benefit from continued physical therapy to continue progressing to decreased pain with functional mobility  Understanding of Dx/Px/POC: good   Prognosis: fair    Goals  ST  Pt will have a 1-3 point decrease on the pain scale within 4 weeks  Not met  2  Pt will have 75% limited thoracolumbar extension in standing within 4 weeks  met  3  Pt will be able to stand for 5 minutes without increased pain within 4 weeks  Partially met  LT  Pt will have a decrease of 4-6 points on the pain scale within 6 weeks  Not met  2  Pt will have a FOTO score of 48/100 within 6 weeks  Not met  3  Pt will be able to walk for 5 minutes without increase in pain within 6 weeks   Not met    Plan  Planned modality interventions: cryotherapy  Planned therapy interventions: abdominal trunk stabilization, functional ROM exercises, home exercise program, therapeutic exercise, therapeutic activities, stretching, strengthening, postural training, neuromuscular re-education, manual therapy and joint mobilization  Frequency: 2x week  Duration in weeks: 2  Plan of Care beginning date: 2018  Plan of Care expiration date: 2018        Subjective Evaluation    History of Present Illness  Date of onset: 2017  Mechanism of injury: Pt reports feeling a decrease in pain in bilateral sides  Pt reports being able to move better than when he first began physical therapy, and is able to sit up a little straighter than when he started  Pt reports being at the [employer] game last week and sitting in those plastic chairs took him a week to recover  Pt reports still having pain deep down inside  PT reports thoracic spine feeling better, but not feeling like it is all the way better yet  Pt reports pain fluctuating, and still having intense pain but it not happening as often  Pain  At best pain ratin  At worst pain ratin  Location: thoracolumbar spine, wrapping anterior b/l, sometimes into hips, and down into R calf  Quality: radiating and dull ache ("tooth ache pain, slow pulsing")  Relieving factors: change in position and relaxation  Aggravating factors: walking and standing  Progression: improved      Diagnostic Tests  No diagnostic tests performed  Treatments  No previous or current treatments  Patient Goals  Patient goals for therapy: decreased pain  Patient goal: walk with less pain         Objective     Tenderness     Lumbar Spine  Tenderness in the spinous process (middle thoracic spine)       Neurological Testing     Reflexes   Left   Patellar (L4): absent (0)    Right   Patellar (L4): trace (1+)    Active Range of Motion     Additional Active Range of Motion Details  Thoracic (sitting):   Flexion: normal   L Rotation: 75% of full  R Rotation: 75% of full     Lumbar (standing):   Flexion: normal ROM, [place]'s sign, pain in thoracic  Extension: neutral lumbar spine, pain down legs  L Rotation: normal   R Rotation: 25% limited, slight pain down legs      Strength/Myotome Testing     Left Hip   Planes of Motion   Flexion: 4+    Right Hip   Planes of Motion   Flexion: 4+    Left Knee Flexion: 5 (painful)  Extension: 5    Right Knee   Flexion: 5  Extension: 5    General Comments     Lumbar Comments  Gait: thoracolumbar flexion, antalgic gait with general stiffness   Standing posture: thoracolumbar flexion  Hip ROM  In supine: b/l hip flexion and hip flexion with IR/ER had no pain   SLR: tightness in b/l hamstrings                 Precautions: spine stimulator on L side, significant medical history  Dx: thoracolumbar DDD  Personal factors: anxiety  Comorbidities: Hypertension  EPOC: 8/7/18    Daily Treatment Diary            Manual 6/26 6/28  7/3  7/9  7/12 7/17   7/19  7/23  7/26  7/30   L Piriformis Trp Release - brief   7  b/l  5'  3               Thoracic STM   7   b/l  8'  7  3' R side  DTM  25'  DTM  25'  DTM  25'  20'  20'    thoracic Grade 1-2 PA mobs in sitting          4'        5'     B/l lumbar IASTM              L thoracic IASTM          3'             Exercise Diary                        Recumbent bike   11'  5'  5'  5'  5'  5'  5'  5'  5'   Supine thoracic rot HEP --                   Seated hamstring stretch   15"x3 ea  20"x3 ea  10"x5  15" x 4 ea        15"x4 b/l     clamshells        15 ea  10x ea btb             TB rows   gtb  10  gtb 20  mtb  20  mtb 20             TB lat pull downs   gtb  10  gtb 20  mtb  20  mtb 20             DLS: Abd/add   7"U03 ea  5"x10 ea  5"x15  5"x15             DLS: pball press downs   5"x10  5" x10  5"x15  5"x15              Modified rows          3# x20 b/l              modified ext            3# x20 b/l              hip abd/add (1/4)          10x btb              seated figure 4 stretch                  15"x3 b/l                                                                                                                                                                                                                                                     Modalities    6/28  7/3  7/12  7/17  7/19           CP (seated)   12'  10'  10'  10

## 2018-08-06 ENCOUNTER — OFFICE VISIT (OUTPATIENT)
Dept: PHYSICAL THERAPY | Facility: CLINIC | Age: 63
End: 2018-08-06
Payer: COMMERCIAL

## 2018-08-06 DIAGNOSIS — G89.29 CHRONIC BILATERAL LOW BACK PAIN WITH BILATERAL SCIATICA: ICD-10-CM

## 2018-08-06 DIAGNOSIS — M54.41 CHRONIC BILATERAL LOW BACK PAIN WITH BILATERAL SCIATICA: ICD-10-CM

## 2018-08-06 DIAGNOSIS — M54.42 CHRONIC BILATERAL LOW BACK PAIN WITH BILATERAL SCIATICA: ICD-10-CM

## 2018-08-06 DIAGNOSIS — M51.35 DDD (DEGENERATIVE DISC DISEASE), THORACOLUMBAR: Primary | ICD-10-CM

## 2018-08-06 PROCEDURE — 97140 MANUAL THERAPY 1/> REGIONS: CPT

## 2018-08-06 NOTE — PROGRESS NOTES
Daily Note     Today's date: 2018  Patient name: Roxana Cruz  : 1955  MRN: [id number]  Referring provider: Bean Green MD  Dx:   Encounter Diagnosis     ICD-10-CM    1  DDD (degenerative disc disease), thoracolumbar M51 35    2  Chronic bilateral low back pain with bilateral sciatica M54 42     M54 41     G89 29                   Subjective: Pt reported he was feeling pretty good Thursday and then went to baseball game and sat in hard bleachers for 3 hours and has felt terrible ever since  Objective: See treatment diary below      Assessment: Pt presents with increased back pain which extended up thoracic area into shoulders today  Focused on manual therapy due to pt  Pt  Was forward flexed posture at start of session today  Felt much better post manual therapy and walked out with more upright posture  Plan: Continue per plan of care            Precautions: spine stimulator on L side, significant medical history  Dx: thoracolumbar DDD  Personal factors: anxiety  Comorbidities: Hypertension  EPOC: 18     Daily Treatment Diary            Manual             L Piriformis Trp Release - brief             Thoracic STM 30'             thoracic Grade 1-2 PA mobs in sitting             B/l lumbar IASTM              L thoracic IASTM             Exercise Diary              Recumbent bike 5'            Supine thoracic rot                     Seated hamstring stretch              clamshells              TB rows              TB lat pull downs              DLS: Abd/add              DLS: pball press downs               Modified rows               modified ext                 hip abd/add ()               seated figure 4 stretch                                                                                                                                                                               Modalities                CP (seated)

## 2018-08-09 ENCOUNTER — OFFICE VISIT (OUTPATIENT)
Dept: PHYSICAL THERAPY | Facility: CLINIC | Age: 63
End: 2018-08-09
Payer: COMMERCIAL

## 2018-08-09 DIAGNOSIS — M54.42 CHRONIC BILATERAL LOW BACK PAIN WITH BILATERAL SCIATICA: ICD-10-CM

## 2018-08-09 DIAGNOSIS — M54.41 CHRONIC BILATERAL LOW BACK PAIN WITH BILATERAL SCIATICA: ICD-10-CM

## 2018-08-09 DIAGNOSIS — M51.35 DDD (DEGENERATIVE DISC DISEASE), THORACOLUMBAR: Primary | ICD-10-CM

## 2018-08-09 DIAGNOSIS — G89.29 CHRONIC BILATERAL LOW BACK PAIN WITH BILATERAL SCIATICA: ICD-10-CM

## 2018-08-09 PROCEDURE — 97140 MANUAL THERAPY 1/> REGIONS: CPT

## 2018-08-09 NOTE — PROGRESS NOTES
Daily Note     Today's date: 2018  Patient name: Suman Solis  : 1955  MRN: [id number]  Referring provider: Gayla Yun MD  Dx:   Encounter Diagnosis     ICD-10-CM    1  DDD (degenerative disc disease), thoracolumbar M51 35    2  Chronic bilateral low back pain with bilateral sciatica M54 42     M54 41     G89 29                   Subjective: Pt stated he has noticed pain relief after therapy has started to last a little less time-pain sets in sooner  Objective: See treatment diary below      Assessment: Pt presents with increased back pain which extended up thoracic area extending around rib areas  Focused on manual therapy due to pt  Pain level and low tolerance to TE's  Pt  Presented with forward flexed posture at start of session today  Felt much better post manual therapy and walked out with more upright posture  Plan: Continue per plan of care            Precautions: spine stimulator on L side, significant medical history  Dx: thoracolumbar DDD  Personal factors: anxiety  Comorbidities: Hypertension  EPOC: 18     Daily Treatment Diary            Manual            L Piriformis Trp Release - brief             Thoracic STM 30' 30'            thoracic Grade 1-2 PA mobs in sitting             B/l lumbar IASTM              L thoracic IASTM             Exercise Diary              Recumbent bike 5' 5'           Supine thoracic rot                     Seated hamstring stretch              clamshells              TB rows              TB lat pull downs              DLS: Abd/add              DLS: pball press downs               Modified rows               modified ext                 hip abd/add ()               seated figure 4 stretch                                                                                                                                                                               Modalities                CP (seated)

## 2018-08-13 ENCOUNTER — OFFICE VISIT (OUTPATIENT)
Dept: PHYSICAL THERAPY | Facility: CLINIC | Age: 63
End: 2018-08-13
Payer: COMMERCIAL

## 2018-08-13 DIAGNOSIS — M51.35 DDD (DEGENERATIVE DISC DISEASE), THORACOLUMBAR: Primary | ICD-10-CM

## 2018-08-13 PROCEDURE — 97140 MANUAL THERAPY 1/> REGIONS: CPT

## 2018-08-13 NOTE — PROGRESS NOTES
Daily Note     Today's date: 2018  Patient name: Suman Solis  : 1955  MRN: [id number]  Referring provider: Gayla Yun MD  Dx:   Encounter Diagnosis     ICD-10-CM    1  DDD (degenerative disc disease), thoracolumbar M51 35                   Subjective: Pt stated today is a good day and he isn't feeling too bad  Objective: See treatment diary below      Assessment: Pt presents with same back pain which extended up thoracic area extending around rib areas, just a bit less than last few visits  Focused on manual therapy due to pt  Pain level and low tolerance to TE's  Still Presented with forward flexed posture at start of session today, but decreased since last visit  Felt much better post manual therapy and walked out with more upright posture  Plan: Continue per plan of care            Precautions: spine stimulator on L side, significant medical history  Dx: thoracolumbar DDD  Personal factors: anxiety  Comorbidities: Hypertension  EPOC: 18     Daily Treatment Diary            Manual           L Piriformis Trp Release - brief             Thoracic STM 30' 30' 30'           thoracic Grade 1-2 PA mobs in sitting             B/l lumbar IASTM              L thoracic IASTM             Exercise Diary              Recumbent bike 5' 5' 5'          Supine thoracic rot                     Seated hamstring stretch              clamshells              TB rows              TB lat pull downs              DLS: Abd/add              DLS: pball press downs               Modified rows               modified ext                 hip abd/add ()               seated figure 4 stretch                                                                                                                                                                               Modalities                CP (seated)

## 2018-08-16 ENCOUNTER — OFFICE VISIT (OUTPATIENT)
Dept: PHYSICAL THERAPY | Facility: CLINIC | Age: 63
End: 2018-08-16
Payer: COMMERCIAL

## 2018-08-16 DIAGNOSIS — M51.35 DDD (DEGENERATIVE DISC DISEASE), THORACOLUMBAR: Primary | ICD-10-CM

## 2018-08-16 DIAGNOSIS — M54.41 CHRONIC BILATERAL LOW BACK PAIN WITH BILATERAL SCIATICA: ICD-10-CM

## 2018-08-16 DIAGNOSIS — G89.29 CHRONIC BILATERAL LOW BACK PAIN WITH BILATERAL SCIATICA: ICD-10-CM

## 2018-08-16 DIAGNOSIS — M54.42 CHRONIC BILATERAL LOW BACK PAIN WITH BILATERAL SCIATICA: ICD-10-CM

## 2018-08-16 PROCEDURE — G8991 OTHER PT/OT GOAL STATUS: HCPCS

## 2018-08-16 PROCEDURE — 97140 MANUAL THERAPY 1/> REGIONS: CPT

## 2018-08-16 PROCEDURE — G8992 OTHER PT/OT  D/C STATUS: HCPCS

## 2018-08-16 NOTE — PROGRESS NOTES
Daily Note     Today's date: 2018  Patient name: Lorraine Shi  : 1955  MRN: [id number]  Referring provider: Rabia Pardo MD  Dx:   Encounter Diagnosis     ICD-10-CM    1  DDD (degenerative disc disease), thoracolumbar M51 35                   Subjective: Pt stated he is in a lot of discomfort today and did not sleep well last night due to his pain  Objective: See treatment diary below      Assessment: Pt  Had minimal progress while at PT and will be d/c to Home Exercise Program today  Pt  Always reported he felt better at end of PT sessions but relief didn't last more than 2-3 hours  Pt  now reports  pain at still rest and increasing with activity  Pt  Still has difficulty with ADL's due to condition being seen at PT for     Pt  Did not meet impairment or functional goals    Plan: D/C        Precautions: spine stimulator on L side, significant medical history  Dx: thoracolumbar DDD  Personal factors: anxiety  Comorbidities: Hypertension  EPOC: 18     Daily Treatment Diary            Manual          L Piriformis Trp Release - brief             Thoracic STM 30' 30' 30' 30          thoracic Grade 1-2 PA mobs in sitting             B/l lumbar IASTM              L thoracic IASTM             Exercise Diary              Recumbent bike 5' 5' 5' 5'         Supine thoracic rot                     Seated hamstring stretch              clamshells              TB rows              TB lat pull downs              DLS: Abd/add              DLS: pball press downs               Modified rows               modified ext                 hip abd/add ()               seated figure 4 stretch                                                                                                                                                                               Modalities                CP (seated)

## 2018-08-22 ENCOUNTER — OFFICE VISIT (OUTPATIENT)
Dept: FAMILY MEDICINE CLINIC | Facility: HOSPITAL | Age: 63
End: 2018-08-22
Payer: COMMERCIAL

## 2018-08-22 VITALS
SYSTOLIC BLOOD PRESSURE: 130 MMHG | TEMPERATURE: 98.1 F | BODY MASS INDEX: 37.08 KG/M2 | HEIGHT: 70 IN | HEART RATE: 78 BPM | DIASTOLIC BLOOD PRESSURE: 78 MMHG | WEIGHT: 259 LBS

## 2018-08-22 DIAGNOSIS — F32.9 REACTIVE DEPRESSION: ICD-10-CM

## 2018-08-22 DIAGNOSIS — M54.14 THORACIC RADICULOPATHY: Primary | ICD-10-CM

## 2018-08-22 DIAGNOSIS — G89.4 CHRONIC PAIN DISORDER: ICD-10-CM

## 2018-08-22 DIAGNOSIS — N40.1 BENIGN PROSTATIC HYPERPLASIA WITH INCOMPLETE BLADDER EMPTYING: ICD-10-CM

## 2018-08-22 DIAGNOSIS — R39.14 BENIGN PROSTATIC HYPERPLASIA WITH INCOMPLETE BLADDER EMPTYING: ICD-10-CM

## 2018-08-22 PROCEDURE — 3008F BODY MASS INDEX DOCD: CPT | Performed by: FAMILY MEDICINE

## 2018-08-22 PROCEDURE — 99214 OFFICE O/P EST MOD 30 MIN: CPT | Performed by: FAMILY MEDICINE

## 2018-08-22 PROCEDURE — 1036F TOBACCO NON-USER: CPT | Performed by: FAMILY MEDICINE

## 2018-08-22 RX ORDER — TAMSULOSIN HYDROCHLORIDE 0.4 MG/1
0.4 CAPSULE ORAL
Qty: 30 CAPSULE | Refills: 2 | Status: SHIPPED | OUTPATIENT
Start: 2018-08-22 | End: 2018-09-26

## 2018-08-22 NOTE — PROGRESS NOTES
Assessment/Plan:         Diagnoses and all orders for this visit:    Thoracic radiculopathy      -  Extended discussion with Ysabel Parra about ongoing thoracic pain which to him is different and new since last visit with [facility]  He wanted me to image the area to see what has changed but I strongly urged him to relay his concerns back to [facility] about need to rescan if appropriate and to review medication and stimulator efficacy  He is extremely frustrated with stimulator management and lack of accessibility for reprogramming  I stated I would pass on his concerns to Skip with whom he should be communicating ASAP    Benign prostatic hyperplasia with incomplete bladder emptying  -     tamsulosin (FLOMAX) 0 4 mg; Take 1 capsule (0 4 mg total) by mouth daily with dinner  If no benefit he will need Urologic evaluation    Chronic pain disorder    Reactive depression          Subjective:      Patient ID: Anya Lyle  is a 66 y o  male  No benefit with PT over the long term despite benefit while there for 2-3 hours  Pain is in the low thoracic bilateral area from spine to the midaxillary line both sides  This pain is different from what he has been treating with Skip from last visit in July    Some SOB at times and splinting with deep breath  Recent cough/sore throat from grandkids  Tried contacting the stimulator programming group twice but didn't have call returned  He got disgusted and didn't try again  Frustrated with Dr Kadi Ayala because he feels he isnt addressing his concerns  He hasnt discussed this pain with Skip but does have an appointment with him in later September  Also discussed difficulty with urination- feels like he cant easily empty his bladder and sometimes has to move his bowels to be able to empty better  We had tried a medication  In the distant past according to him but there is no direct record of that which I can see            The following portions of the patient's history were reviewed and updated as appropriate: allergies, current medications, past family history, past medical history, past social history, past surgical history and problem list     Review of Systems   Genitourinary: Positive for decreased urine volume and difficulty urinating  Musculoskeletal: Positive for arthralgias, back pain, gait problem, joint swelling and myalgias  Hematological: Does not bruise/bleed easily  Psychiatric/Behavioral: Positive for dysphoric mood and sleep disturbance  All other systems reviewed and are negative  Objective:      /78   Pulse 78   Temp 98 1 °F (36 7 °C)   Ht 5' 9 75" (1 772 m)   Wt 117 kg (259 lb)   BMI 37 43 kg/m²          Physical Exam   Constitutional: He is oriented to person, place, and time  He appears well-developed and well-nourished  Cardiovascular: Normal rate, regular rhythm and normal heart sounds  Pulmonary/Chest: Effort normal and breath sounds normal    Musculoskeletal:        Thoracic back: He exhibits decreased range of motion and tenderness  Arms:  Mid and low thoracic localization of pain and spasm   Neurological: He is oriented to person, place, and time  Psychiatric: His behavior is normal  Judgment and thought content normal  Cognition and memory are normal  He exhibits a depressed mood  Nursing note and vitals reviewed

## 2018-08-23 ENCOUNTER — TELEPHONE (OUTPATIENT)
Dept: PAIN MEDICINE | Facility: CLINIC | Age: 63
End: 2018-08-23

## 2018-08-23 DIAGNOSIS — M54.6 CHRONIC BILATERAL THORACIC BACK PAIN: ICD-10-CM

## 2018-08-23 DIAGNOSIS — G89.29 CHRONIC BILATERAL LOW BACK PAIN WITH BILATERAL SCIATICA: ICD-10-CM

## 2018-08-23 DIAGNOSIS — G89.29 CHRONIC BILATERAL THORACIC BACK PAIN: ICD-10-CM

## 2018-08-23 DIAGNOSIS — M54.41 CHRONIC BILATERAL LOW BACK PAIN WITH BILATERAL SCIATICA: ICD-10-CM

## 2018-08-23 DIAGNOSIS — G89.4 CHRONIC PAIN DISORDER: ICD-10-CM

## 2018-08-23 DIAGNOSIS — M96.1 LUMBAR POST-LAMINECTOMY SYNDROME: ICD-10-CM

## 2018-08-23 DIAGNOSIS — M54.42 CHRONIC BILATERAL LOW BACK PAIN WITH BILATERAL SCIATICA: ICD-10-CM

## 2018-08-23 DIAGNOSIS — M54.16 CHRONIC LUMBAR RADICULOPATHY: ICD-10-CM

## 2018-08-23 DIAGNOSIS — M54.14 THORACIC RADICULOPATHY: Primary | ICD-10-CM

## 2018-08-23 DIAGNOSIS — M51.36 LUMBAR DEGENERATIVE DISC DISEASE: ICD-10-CM

## 2018-08-23 DIAGNOSIS — M48.062 SPINAL STENOSIS OF LUMBAR REGION WITH NEUROGENIC CLAUDICATION: ICD-10-CM

## 2018-08-23 RX ORDER — PREDNISONE 10 MG/1
TABLET ORAL
Qty: 21 TABLET | Refills: 0 | Status: SHIPPED | OUTPATIENT
Start: 2018-08-23 | End: 2018-09-04 | Stop reason: SDUPTHER

## 2018-08-23 NOTE — TELEPHONE ENCOUNTER
S/w pt, advised of above and provided central scheduling #  Pt verbalized agreement with oral steroids  Advised pt, no NSAID, ok to continue daily baby asa, tylenol is ok unless contraindicated  Anticipate rx of 2 pills po tid, decrease 1 pill each day until gone  C/b when med is complete with an update/ sooner if questions / concerns arise  This office will cb when ct results are available, sooner if there is a change/ question with the plan as discussed  Pt verbalized understanding and appreciation

## 2018-08-23 NOTE — TELEPHONE ENCOUNTER
S/w pt, states he had increased pain in mid back, wrapping around to his ribs b/l  Pt states the pain is also in his legs  Pt stated that his pain increased shortly after his last ov w/ DG  Pt stated that he has gone to therapy with no relief  Also made 2 attempts to contact [facility]  Stated that he was told someone would get back to him, pt has not received a call back  Pt expressed frustration, stating, "I'm done with them "    Advised pt, will d/w DG and cb to advise  Pt verbalized understanding and appreciation

## 2018-08-23 NOTE — TELEPHONE ENCOUNTER
Can you please call the patient and advise him that Dr Liss Coleman contacted us to follow up with him regarding his new onset thoracic pain  Has he had any trauma or injury? How long has his pain been worse? Also, Dr Liss Coleman mentioned concerns about lack of accessibility for stim re-programming? Has he been trying to contact [facility] and they aren't getting back to him?

## 2018-08-23 NOTE — TELEPHONE ENCOUNTER
Well we can proceed with a CT scan of the thoracic and lumbar spine to see if anything has changed or worsened  I put in an order for that and we will call once we have those results  We can do an oral titrating dose of Prednisone if he wants to see if that at least calms things down for now  Let me know if he wants to try that

## 2018-08-26 ENCOUNTER — HOSPITAL ENCOUNTER (OUTPATIENT)
Dept: CT IMAGING | Facility: HOSPITAL | Age: 63
Discharge: HOME | End: 2018-08-26
Payer: COMMERCIAL

## 2018-08-26 DIAGNOSIS — M54.16 CHRONIC LUMBAR RADICULOPATHY: ICD-10-CM

## 2018-08-26 DIAGNOSIS — M96.1 LUMBAR POST-LAMINECTOMY SYNDROME: ICD-10-CM

## 2018-08-26 DIAGNOSIS — M54.42 CHRONIC BILATERAL LOW BACK PAIN WITH BILATERAL SCIATICA: ICD-10-CM

## 2018-08-26 DIAGNOSIS — M51.36 LUMBAR DEGENERATIVE DISC DISEASE: ICD-10-CM

## 2018-08-26 DIAGNOSIS — M54.41 CHRONIC BILATERAL LOW BACK PAIN WITH BILATERAL SCIATICA: ICD-10-CM

## 2018-08-26 DIAGNOSIS — M54.6 CHRONIC BILATERAL THORACIC BACK PAIN: ICD-10-CM

## 2018-08-26 DIAGNOSIS — G89.29 CHRONIC BILATERAL LOW BACK PAIN WITH BILATERAL SCIATICA: ICD-10-CM

## 2018-08-26 DIAGNOSIS — G89.4 CHRONIC PAIN DISORDER: ICD-10-CM

## 2018-08-26 DIAGNOSIS — M48.062 SPINAL STENOSIS OF LUMBAR REGION WITH NEUROGENIC CLAUDICATION: ICD-10-CM

## 2018-08-26 DIAGNOSIS — M54.14 THORACIC RADICULOPATHY: ICD-10-CM

## 2018-08-26 DIAGNOSIS — G89.29 CHRONIC BILATERAL THORACIC BACK PAIN: ICD-10-CM

## 2018-08-26 PROCEDURE — 72128 CT CHEST SPINE W/O DYE: CPT

## 2018-08-26 PROCEDURE — 72131 CT LUMBAR SPINE W/O DYE: CPT

## 2018-08-28 ENCOUNTER — TELEPHONE (OUTPATIENT)
Dept: PAIN MEDICINE | Facility: CLINIC | Age: 63
End: 2018-08-28

## 2018-08-28 NOTE — TELEPHONE ENCOUNTER
S/w pt, advised of above  Pt verbalized understanding  Stated that his pain was worst in his mid back, wrapping around his sides  Pt stated that he has relief w/ the oral steroids, stated that his pain is tolerable at this point  Pt stated taht he will fu w/ DG at his ov later this month, 9/26 at 0700  Will cb if his pain worsens or questions arise  Advised pt, will make DG aware

## 2018-08-28 NOTE — TELEPHONE ENCOUNTER
Can you please call the patient and advise him that his CT scan of the thoracic spine does show mild deg changes/OA with a small Right herniation at T12-L1  It appears that the SCStim lead has not moved and is intact  The CT of the Lumbar spine does shoe mod to severe deg changes/OA with worsening B/L foramnial stenosis at L5-S1 and various levels of mild to moderate central and foraminal stenosis at other levels, which has progressed since his last imaging  There is also a new disc herniation at L1-2 to the right causing compression on the Right L1 nerve  Is his pain currently worse in the middle of his back or the low back? Does it go down his leg? Which leg and how far down? Have the steroids helped at all?

## 2018-09-04 ENCOUNTER — TELEPHONE (OUTPATIENT)
Dept: PAIN MEDICINE | Facility: CLINIC | Age: 63
End: 2018-09-04

## 2018-09-04 DIAGNOSIS — M54.16 CHRONIC LUMBAR RADICULOPATHY: ICD-10-CM

## 2018-09-04 DIAGNOSIS — M51.36 LUMBAR DEGENERATIVE DISC DISEASE: ICD-10-CM

## 2018-09-04 DIAGNOSIS — G89.29 CHRONIC BILATERAL THORACIC BACK PAIN: ICD-10-CM

## 2018-09-04 DIAGNOSIS — M48.062 SPINAL STENOSIS OF LUMBAR REGION WITH NEUROGENIC CLAUDICATION: ICD-10-CM

## 2018-09-04 DIAGNOSIS — M54.41 CHRONIC BILATERAL LOW BACK PAIN WITH BILATERAL SCIATICA: ICD-10-CM

## 2018-09-04 DIAGNOSIS — M96.1 LUMBAR POST-LAMINECTOMY SYNDROME: ICD-10-CM

## 2018-09-04 DIAGNOSIS — G89.4 CHRONIC PAIN DISORDER: ICD-10-CM

## 2018-09-04 DIAGNOSIS — M54.42 CHRONIC BILATERAL LOW BACK PAIN WITH BILATERAL SCIATICA: ICD-10-CM

## 2018-09-04 DIAGNOSIS — M54.14 THORACIC RADICULOPATHY: ICD-10-CM

## 2018-09-04 DIAGNOSIS — M54.6 CHRONIC BILATERAL THORACIC BACK PAIN: ICD-10-CM

## 2018-09-04 DIAGNOSIS — G89.29 CHRONIC BILATERAL LOW BACK PAIN WITH BILATERAL SCIATICA: ICD-10-CM

## 2018-09-04 RX ORDER — PREDNISONE 10 MG/1
TABLET ORAL
Qty: 21 TABLET | Refills: 0 | Status: SHIPPED | OUTPATIENT
Start: 2018-09-04 | End: 2018-09-26

## 2018-09-04 RX ORDER — PREDNISONE 10 MG/1
TABLET ORAL
Qty: 21 TABLET | Refills: 0 | Status: CANCELLED | OUTPATIENT
Start: 2018-09-04

## 2018-09-04 NOTE — TELEPHONE ENCOUNTER
S/w pt, he was wondering if he can get another rx for the Prednisone  Pt said he was on a titratable dose and has been off it for about a week now  Pt said it really helped, even more than his pain pills  Pt was not sure if he could go on it again cause the pain is starting to come back  Pt has f/u on 9/26

## 2018-09-04 NOTE — TELEPHONE ENCOUNTER
Please advise the patient that we can try another round of oral prednisone, but then after this round we would have to hold off on any oral prednisone for at least 3-4 months, if not longer  I did e-scribe the script to his pharmacy listed in the chart  He is to call in a week once he has completed the prednisone to give us an update on his pain  Thank you

## 2018-09-26 ENCOUNTER — TELEPHONE (OUTPATIENT)
Dept: PAIN MEDICINE | Facility: MEDICAL CENTER | Age: 63
End: 2018-09-26

## 2018-09-26 ENCOUNTER — OFFICE VISIT (OUTPATIENT)
Dept: PAIN MEDICINE | Facility: CLINIC | Age: 63
End: 2018-09-26
Payer: COMMERCIAL

## 2018-09-26 VITALS
WEIGHT: 256 LBS | BODY MASS INDEX: 36.65 KG/M2 | HEART RATE: 68 BPM | SYSTOLIC BLOOD PRESSURE: 120 MMHG | DIASTOLIC BLOOD PRESSURE: 80 MMHG | HEIGHT: 70 IN

## 2018-09-26 DIAGNOSIS — M54.14 THORACIC RADICULOPATHY: ICD-10-CM

## 2018-09-26 DIAGNOSIS — M51.36 LUMBAR DEGENERATIVE DISC DISEASE: ICD-10-CM

## 2018-09-26 DIAGNOSIS — G89.29 CHRONIC BILATERAL THORACIC BACK PAIN: ICD-10-CM

## 2018-09-26 DIAGNOSIS — M54.42 CHRONIC BILATERAL LOW BACK PAIN WITH BILATERAL SCIATICA: Primary | ICD-10-CM

## 2018-09-26 DIAGNOSIS — M54.16 CHRONIC LUMBAR RADICULOPATHY: ICD-10-CM

## 2018-09-26 DIAGNOSIS — M48.062 SPINAL STENOSIS OF LUMBAR REGION WITH NEUROGENIC CLAUDICATION: ICD-10-CM

## 2018-09-26 DIAGNOSIS — G89.29 CHRONIC BILATERAL LOW BACK PAIN WITH BILATERAL SCIATICA: Primary | ICD-10-CM

## 2018-09-26 DIAGNOSIS — M96.1 LUMBAR POST-LAMINECTOMY SYNDROME: ICD-10-CM

## 2018-09-26 DIAGNOSIS — G89.4 CHRONIC PAIN DISORDER: ICD-10-CM

## 2018-09-26 DIAGNOSIS — G60.9 IDIOPATHIC PERIPHERAL NEUROPATHY: ICD-10-CM

## 2018-09-26 DIAGNOSIS — M54.41 CHRONIC BILATERAL LOW BACK PAIN WITH BILATERAL SCIATICA: Primary | ICD-10-CM

## 2018-09-26 DIAGNOSIS — M54.6 CHRONIC BILATERAL THORACIC BACK PAIN: ICD-10-CM

## 2018-09-26 PROCEDURE — 99214 OFFICE O/P EST MOD 30 MIN: CPT | Performed by: NURSE PRACTITIONER

## 2018-09-26 RX ORDER — MORPHINE SULFATE 15 MG/1
TABLET ORAL
Qty: 60 TABLET | Refills: 0 | Status: SHIPPED | OUTPATIENT
Start: 2018-09-26 | End: 2018-11-21

## 2018-09-26 RX ORDER — MORPHINE SULFATE 15 MG/1
TABLET ORAL
Qty: 60 TABLET | Refills: 0 | Status: SHIPPED | OUTPATIENT
Start: 2018-09-26 | End: 2018-09-26 | Stop reason: SDUPTHER

## 2018-09-26 NOTE — TELEPHONE ENCOUNTER
Pt called and left a voicemail today @ 1:14p requesting his medical records be faxed to dr Anthony Stewart office @ [phone]

## 2018-09-26 NOTE — PROGRESS NOTES
Assessment:  1  Chronic bilateral low back pain with bilateral sciatica    2  Chronic bilateral thoracic back pain    3  Chronic pain disorder    4  Spinal stenosis of lumbar region with neurogenic claudication    5  Lumbar post-laminectomy syndrome    6  Chronic lumbar radiculopathy    7  Idiopathic peripheral neuropathy    8  Thoracic radiculopathy    9  Lumbar degenerative disc disease        Plan:  While the patient and his wife were in the office today, I did have a very long, thorough, and vickie conversation with the patient regarding realistic expectations and his pain relief in the future  I explained to the patient that he has had several surgeries on his spine with complications and infections, and that unfortunately he may not find anyone who is willing to do surgery because they have to look at the risk versus benefit ratio  I also try to set realistic expectations and that his pain will never be fixed," and that we are trying to find is a way to manage his pain and unfortunately that may require the use of opioid medications  I explained to the patient that I really feel it is important that he just comes to terms with the fact that he most likely have some degree of pain the rest of his life  I explained and and but thighs with him that I could see how that would be very disheartening, but I feel it is important that he have a realistic expectation of his pain management goals  The patient was very tearful, but verbalized understanding, however, stated that he just feels the pain is finally getting to me," and he just finds it hard to go on day by day with pain that isn't manageable  At this point, as per the patient's request, I did put in a referral to Dr Mandy Watkins of [facility] for a 2nd surgical opinion as he did recently just have CT scans of the thoracic and lumbar spine so that way they do have fresh imaging for them to review   The patient just wants to make sure that there are not any other options at this point  With regards to the spinal cord stimulator, we did not even address this at his office visit today as I felt that it was a sore subject for the patient and he is just convinced that it does not help and will not help no matter how much reprogramming is completed so rather than to distress him more we just did not address it at this office visit  With regards to his medication regimen, I did explain to the patient and his wife that he has been on the p r n  Norco for quite some time and unfortunately we do build up tolerance to pain medications and that maybe we need to try to switch to a different medication at this point  However, I did explain to the patient that I do feel he may benefit from a long-acting medication with a short-acting medication for breakthrough, however, he did not want to go on a long-acting medication as he likes to be able to take less medicine if he does not need to and does not like the idea of having something in his system all the time, especially if he does not feel he needs it all the time  At this point we are going to have him discontinue the p r n  Norco and try morphine sulfate IR 15 mg, half a tablet every 6 hr as needed for pain  I advised the patient that if they experience any side effects or issues with the changes in their medication regiment, they should give our office a call to discuss  I also advised the patient not to drive or operate machinery until they see how the changes in the medication regimen affects them  I explained to the patient at this point since he has tried and failed every other neuropathic medication, physical therapy, spinal cord stimulation, and even has had surgeries, without relief, unfortunately our only other option is probably to try to manage his pain with opioid medications at safe and reasonable doses  The patient was agreeable and verbalized an understanding           [place] Prescription Drug Monitoring Program report was reviewed and was appropriate     The patient was not picked for a pill count today, but he did bring his medications as required  The patient's opioid scripts were sent to their pharmacy electronically and was given a 2 month supply of prescriptions with a Do Not Fill date(s) of October 24, 2018  There are risks associated with opioid medications, including dependence, addiction and tolerance  The patient understands and agrees to use these medications only as prescribed  Potential side effects of the medications include, but are not limited to, constipation, drowsiness, addiction, impaired judgment and risk of fatal overdose if not taken as prescribed  The patient was warned against driving while taking sedation medications  Sharing medications is a felony  At this point in time, the patient is showing no signs of addiction, abuse, diversion or suicidal ideation  While the patient was in the office today, I explained to them that although I am not prescribing their anti-anxiety/benzodiazapine medications, it has been deemed a black box warning for any patients who are on these types of medications to also be on opioid medications because of the risk for respiratory depression and death  I encouraged the patient to discuss this medication(s) with their prescriber to see if there are any alternative medications  I also explained that if they need to continue with this type of medication, we will have to consider decreasing any opioid medications by at least 25-50% and/or consider titrating off of opioid medications all together as a pre-caution to prevent any harm to the patient or any one else  The patient verbalized an understanding  A signed copy of the patient education sheet reviewing the risks and reasons for prescribing this medication is available in the patient's chart and a copy was also sent home with the patient           The patient will follow-up in 8 weeks for medication prescription refill and reevaluation  The patient was advised to contact the office should their symptoms worsen in the interim  The patient was agreeable and verbalized an understanding  History of Present Illness: The patient is a 66 y o  male last seen on 7/9/18 who presents for a follow up office visit in regards to chronic pain secondary to lumbar post-laminectomy syndrome  The patient currently reports that his low back and lower extremity radicular symptoms has continued to worsen and although the previous to doses of oral prednisone tapers have at least helped, he feels that the pain is just getting worse and worse and getting to the point that he just cannot tolerate it any more  At this point he has completely given up on his spinal cord stimulator and reprogramming as he feels it is "useless" and provides little to no relief  At this point time the patient would like to proceed with a 2nd surgical opinion with Dr Orlin Griffith, just to make sure that there are not any other treatment options  He also presents today to discuss his medication regimen and treatment plan and to see if maybe read trying or switching to a different medication at this point as he has been on hydrocodone for quite some time and just feels it is not as helpful as it used to be and does not want to just take more higher dosages and would prefer if maybe we can switch him back to an older medication or something different for now  The patient reports that he is looking for some kind of relief and away to fix," the problem so he can function with manageable pain without pain medicines of possible  The patient and his wife present today to discuss his medication regimen treatment plan  Current pain medications includes:   Norco 10/3251 p o  t i d  p r n  for pain dispense number 80 pills for 30 days  The patient reports that this regimen is providing 40% pain relief    The patient is reporting no side effects from this pain medication regimen  Pain Contract Signed: 1/4/18  Last Urine Drug Screen: 3/29/18    I have personally reviewed and/or updated the patient's past medical history, past surgical history, family history, social history, current medications, allergies, and vital signs today  Review of Systems:    Review of Systems   Respiratory: Negative for shortness of breath  Cardiovascular: Negative for chest pain  Gastrointestinal: Negative for constipation, diarrhea, nausea and vomiting  Musculoskeletal: Positive for gait problem  Negative for arthralgias, joint swelling (joint stiffness) and myalgias  Skin: Negative for rash  Neurological: Positive for weakness  Negative for dizziness and seizures  All other systems reviewed and are negative          Past Medical History:   Diagnosis Date    Acute low back pain     10 AUG 2016 RESOLVED    [facility]is [facility])     Bursitis of hip     Carpal tunnel syndrome     Chronic bilateral low back pain with bilateral sciatica     Chronic lumbar radiculopathy     Chronic narcotic dependence ([facility])     Chronic pain syndrome     Constipation due to opioid therapy     DDD (degenerative disc disease), lumbar     Deep vein thrombosis of left upper extremity ([address] )     [id number]  RESOLVED    Depression     Diverticula of colon     [id number] RESOLVED    DVT (deep venous thrombosis) ([facility])     LUE    Edema     History of staph infection     Hyperglycemia     Hyperlipidemia     Hypertension     Insomnia     Lateral epicondylitis     Left inguinal hernia     Methicillin resistant Staphylococcus aureus septicemia ([address] )     Morbid obesity due to excess calories ([facility])     Obesity     Peripheral neuropathy     Post laminectomy syndrome     RLS (restless legs syndrome)     Septicemia ([address] )     MRSA    Umbilical hernia        Past Surgical History:   Procedure Laterality Date    BACK SURGERY      laminectomy, hardware    CARPAL TUNNEL RELEASE Left     CERVICAL SPINE SURGERY      COLONOSCOPY      ELBOW SURGERY      ELBOW SURGERY      KNEE ARTHROSCOPY Right     knee    KNEE SURGERY Right     ARTHOSCOPY KNEE    NECK SURGERY      1997    OH COLONOSCOPY FLX DX W/COLLJ SPEC WHEN PFRMD N/A 1/24/2017    Procedure: COLONOSCOPY;  Surgeon: Sarah Martino MD;  Location: [facility];  Service: General    SHOULDER SURGERY      SPINAL CORD STIMULATOR IMPLANT         Family History   Problem Relation Age of Onset    Heart disease Mother     Cancer Mother         breast, stomach    Aneurysm Mother     Heart disease Father     Cancer Father         skin, liver, colon DECESED AGE 58       Social History     Occupational History    Not on file  Social History Main Topics    Smoking status: Never Smoker    Smokeless tobacco: Never Used    Alcohol use Yes      Comment: social    Drug use: No    Sexual activity: Yes         Current Outpatient Prescriptions:     aspirin 81 MG tablet, Take 81 mg by mouth daily, Disp: , Rfl:     bisoprolol-hydrochlorothiazide (ZIAC) 2 5-6 25 MG per tablet, TAKE ONE TABLET BY MOUTH EVERY DAY, Disp: 90 tablet, Rfl: 3    diazepam (VALIUM) 10 mg tablet, Take 1 tablet (10 mg total) by mouth daily at bedtime as needed for anxiety, Disp: 30 tablet, Rfl: 1    HYDROcodone Bitartrate ER (HYSINGLA ER) 20 MG T24A, Take 1 PO QD for SEVERE PAIN, Disp: 30 each, Rfl: 0    HYDROcodone-acetaminophen (NORCO)  mg per tablet, Take 1 PO TID PRN for pain   DO NOT FILL UNTL: 9/3/18, Disp: 80 tablet, Rfl: 0    lactulose (CHRONULAC) 10 g/15 mL solution, , Disp: , Rfl:     Multiple Vitamin (DAILY VALUE MULTIVITAMIN) TABS, Take 1 tablet by mouth daily, Disp: , Rfl:     Naproxen Sodium (ALEVE) 220 MG CAPS, Take by mouth, Disp: , Rfl:     predniSONE 10 mg tablet, Take 2 PO TID, then decrease by one pill daily untill gone , Disp: 21 tablet, Rfl: 0    rosuvastatin (CRESTOR) 10 MG tablet, Take 1 tablet (10 mg total) by mouth Medrol Dose Pack scheduling ONLY, Disp: 30 tablet, Rfl: 5    tamsulosin (FLOMAX) 0 4 mg, Take 1 capsule (0 4 mg total) by mouth daily with dinner, Disp: 30 capsule, Rfl: 2    Allergies   Allergen Reactions    Fentanyl Other (See Comments)     "Makes me Suicidal"  Happened when dosage increased   Penicillins Other (See Comments)     As a child unknown    Pravastatin Other (See Comments)     Reaction Date: [id number];   Muscle weakness    Sulfa Antibiotics Other (See Comments)     As a child unknown    Vytorin [Ezetimibe-Simvastatin]      Reaction Date: [id number];        Physical Exam:    There were no vitals taken for this visit  Constitutional:normal, well developed, well nourished, alert, in no distress and non-toxic and no overt pain behavior  and overweight  Eyes:anicteric  HEENT:grossly intact  Neck:supple, symmetric, trachea midline and no masses   Pulmonary:even and unlabored  Cardiovascular:No edema or pitting edema present  Skin:Normal without rashes or lesions and well hydrated  Psychiatric:Mood and affect appropriate  Neurologic:Cranial Nerves II-XII grossly intact  Musculoskeletal:Slightly antalgic, but steady gait without the use of any assistive devices  Imaging  No orders to display         No orders of the defined types were placed in this encounter

## 2018-09-27 ENCOUNTER — TELEPHONE (OUTPATIENT)
Dept: PAIN MEDICINE | Facility: CLINIC | Age: 63
End: 2018-09-27

## 2018-09-28 ENCOUNTER — TELEPHONE (OUTPATIENT)
Dept: PAIN MEDICINE | Facility: CLINIC | Age: 63
End: 2018-09-28

## 2018-09-28 NOTE — TELEPHONE ENCOUNTER
S/w pt to advise him we are working on Rafaela Galarza for Morphine & will contact him when determination is made   Also, he confirmed that he is just using the Hydrocodone until we contact him

## 2018-09-28 NOTE — TELEPHONE ENCOUNTER
Can you please call the patient and advise him that I have faxed the pre-auth for his Morphine and we are awaiting the results  He can continue the Hydrocodone in the mean time  We will call him once we have the results  Thank you

## 2018-10-01 ENCOUNTER — TELEPHONE (OUTPATIENT)
Dept: PAIN MEDICINE | Facility: CLINIC | Age: 63
End: 2018-10-01

## 2018-10-01 NOTE — TELEPHONE ENCOUNTER
Can you please call the patient and advise him that his Morphine Sulfate IR was APPROVED until 9/29/19  He is to discontinue the Hydrocodone and start the MS IR as prescribed  Thank you

## 2018-11-06 ENCOUNTER — TELEPHONE (OUTPATIENT)
Dept: PAIN MEDICINE | Facility: MEDICAL CENTER | Age: 63
End: 2018-11-06

## 2018-11-13 DIAGNOSIS — K59.03 CONSTIPATION DUE TO OPIOID THERAPY: Primary | ICD-10-CM

## 2018-11-13 DIAGNOSIS — T40.2X5A CONSTIPATION DUE TO OPIOID THERAPY: Primary | ICD-10-CM

## 2018-11-13 RX ORDER — LACTULOSE 20 G/30ML
SOLUTION ORAL
Qty: 473 ML | Refills: 2 | Status: SHIPPED | OUTPATIENT
Start: 2018-11-13 | End: 2020-03-10 | Stop reason: SDUPTHER

## 2018-11-21 ENCOUNTER — OFFICE VISIT (OUTPATIENT)
Dept: PAIN MEDICINE | Facility: CLINIC | Age: 63
End: 2018-11-21
Payer: COMMERCIAL

## 2018-11-21 VITALS
SYSTOLIC BLOOD PRESSURE: 122 MMHG | WEIGHT: 258 LBS | BODY MASS INDEX: 36.94 KG/M2 | DIASTOLIC BLOOD PRESSURE: 80 MMHG | HEIGHT: 70 IN | HEART RATE: 76 BPM

## 2018-11-21 DIAGNOSIS — M54.41 CHRONIC BILATERAL LOW BACK PAIN WITH BILATERAL SCIATICA: Primary | ICD-10-CM

## 2018-11-21 DIAGNOSIS — G89.4 CHRONIC PAIN DISORDER: ICD-10-CM

## 2018-11-21 DIAGNOSIS — G60.9 IDIOPATHIC PERIPHERAL NEUROPATHY: ICD-10-CM

## 2018-11-21 DIAGNOSIS — M51.36 LUMBAR DEGENERATIVE DISC DISEASE: ICD-10-CM

## 2018-11-21 DIAGNOSIS — F11.20 CONTINUOUS OPIOID DEPENDENCE (HCC): ICD-10-CM

## 2018-11-21 DIAGNOSIS — G89.29 CHRONIC BILATERAL THORACIC BACK PAIN: ICD-10-CM

## 2018-11-21 DIAGNOSIS — M48.062 SPINAL STENOSIS OF LUMBAR REGION WITH NEUROGENIC CLAUDICATION: ICD-10-CM

## 2018-11-21 DIAGNOSIS — M96.1 LUMBAR POST-LAMINECTOMY SYNDROME: ICD-10-CM

## 2018-11-21 DIAGNOSIS — G89.29 CHRONIC BILATERAL LOW BACK PAIN WITH BILATERAL SCIATICA: Primary | ICD-10-CM

## 2018-11-21 DIAGNOSIS — M54.42 CHRONIC BILATERAL LOW BACK PAIN WITH BILATERAL SCIATICA: Primary | ICD-10-CM

## 2018-11-21 DIAGNOSIS — E78.2 MIXED HYPERLIPIDEMIA: Primary | ICD-10-CM

## 2018-11-21 DIAGNOSIS — M54.6 CHRONIC BILATERAL THORACIC BACK PAIN: ICD-10-CM

## 2018-11-21 DIAGNOSIS — M54.16 CHRONIC LUMBAR RADICULOPATHY: ICD-10-CM

## 2018-11-21 DIAGNOSIS — M54.14 THORACIC RADICULOPATHY: ICD-10-CM

## 2018-11-21 DIAGNOSIS — R73.9 HYPERGLYCEMIA: ICD-10-CM

## 2018-11-21 PROCEDURE — 99214 OFFICE O/P EST MOD 30 MIN: CPT | Performed by: NURSE PRACTITIONER

## 2018-11-21 RX ORDER — HYDROCODONE BITARTRATE AND ACETAMINOPHEN 10; 325 MG/1; MG/1
TABLET ORAL
Qty: 90 TABLET | Refills: 0 | Status: SHIPPED | OUTPATIENT
Start: 2018-11-21 | End: 2018-11-21 | Stop reason: SDUPTHER

## 2018-11-21 RX ORDER — HYDROCODONE BITARTRATE AND ACETAMINOPHEN 10; 325 MG/1; MG/1
TABLET ORAL
Qty: 85 TABLET | Refills: 0 | Status: SHIPPED | OUTPATIENT
Start: 2018-11-21 | End: 2019-01-16

## 2018-11-21 NOTE — PROGRESS NOTES
Assessment:  1  Chronic bilateral low back pain with bilateral sciatica    2  Chronic lumbar radiculopathy    3  Thoracic radiculopathy    4  Idiopathic peripheral neuropathy    5  Lumbar degenerative disc disease    6  Chronic bilateral thoracic back pain    7  Continuous opioid dependence ([facility] [address] )    8  Lumbar post-laminectomy syndrome    9  Spinal stenosis of lumbar region with neurogenic claudication    10  Chronic pain disorder        Plan:  While the patient was in the office today, I did have a thorough conversation with the patient regarding his medication regimen and treatment plan  I did try to remind the patient of the goal of pain medication and explained to him that unfortunately there still going to be good days and bad days no matter what medication he is on  I also explained to him that he still needs to come to the real is a shin that most likely there is in a medication that is going to take all of his pain away all the time and since he has already had more than 1 surgery with out complete relief, I do not think it is in his best interest to put so much emphasis on having another surgery with the hopes of significantly relieving his pain  The patient was agreeable and verbalized an understanding  I explained to the patient at this point since he feels the hydrocodone was more helpful, he could finish out the morphine sulfate IR as prescribed and his next prescription which is due to be filled in the beginning of December we will switch him back to the [facility] 10/325 dosage and again of 1 pill every 8 hours as needed  However, for the 2nd prescription I am going to decrease the total amount down to 85 pills for 30 days  The patient was agreeable and verbalized an understanding  [place] Prescription Drug Monitoring Program report was reviewed and was appropriate      The patient was not picked for a pill count today, but he did bring his medications as required          The patient's opioid scripts were sent to their pharmacy electronically and was given a 2 month supply of prescriptions with a Do Not Fill date(s) of December 4, 2018 and January 2, 2019  There are risks associated with opioid medications, including dependence, addiction and tolerance  The patient understands and agrees to use these medications only as prescribed  Potential side effects of the medications include, but are not limited to, constipation, drowsiness, addiction, impaired judgment and risk of fatal overdose if not taken as prescribed  The patient was warned against driving while taking sedation medications  Sharing medications is a felony  At this point in time, the patient is showing no signs of addiction, abuse, diversion or suicidal ideation  An oral drug screen swab was collected at today's office visit  The swab will be sent for confirmatory testing  The drug screen is medically necessary because the patient is either dependent on opioid medication or is being considered for opioid medication therapy and the results could impact ongoing or future treatment  The drug screen is to evaluate for the presences or absence of prescribed, non-prescribed, and/or illicit drugs/substances  The patient will follow-up in 8 weeks for medication prescription refill and reevaluation  The patient was advised to contact the office should their symptoms worsen in the interim  The patient was agreeable and verbalized an understanding  History of Present Illness: The patient is a 66 y o  male last seen on 9/26/18 who presents for a follow up office visit in regards to chronic pain secondary to lumbar post-laminectomy syndrome  The patient currently reports that he continues with the low back and leg pain which has somewhat stabilized since his last office visit    However, he reports that he actually feels the previously prescribed Norco was more effective in managing his pain, especially on the bad days than the morphine sulfate IR  The patient would like to finish out his morphine sulfate IR and go back on to the [facility] 10/325 dosage  He also reports that since his last office visit he did try to proceed for the consultation with Dr Charlton Dancer for the 2nd surgical opinion, however, he reports that he went to the appointment and checked in and sat for an hour and half and was never seen because he decided to leave after waiting so long  Does report he has an upcoming office visit next Friday with a different orthopedic surgeon for 2nd opinion, however, he did not know his name and thought that our office set him up for it  The patient presents today to discuss his medication regimen and treatment plan  Current pain medications includes:  Morphine sulfate IR 15 mg, half a tablet q i d  p r n  for pain  The patient reports that this regimen is providing minimal pain relief  The patient is reporting no side effects from this pain medication regimen  Pain Contract Signed: 1/4/18  Last Urine Drug Screen: 11/21/18    I have personally reviewed and/or updated the patient's past medical history, past surgical history, family history, social history, current medications, allergies, and vital signs today  Review of Systems:    Review of Systems   Respiratory: Negative for shortness of breath  Cardiovascular: Negative for chest pain  Gastrointestinal: Positive for constipation  Negative for diarrhea, nausea and vomiting  Musculoskeletal: Positive for gait problem  Negative for arthralgias, joint swelling (joint stiffness) and myalgias  Skin: Negative for rash  Neurological: Positive for weakness  Negative for dizziness and seizures  All other systems reviewed and are negative          Past Medical History:   Diagnosis Date    Acute low back pain     10 AUG 2016 RESOLVED    Angina pectoris (HCC)     Bursitis of hip     Carpal tunnel syndrome     Chronic bilateral low back pain with bilateral sciatica  Chronic lumbar radiculopathy     Chronic narcotic dependence ([facility])     Chronic pain syndrome     Constipation due to opioid therapy     DDD (degenerative disc disease), lumbar     Deep vein thrombosis of left upper extremity ([facility] [address] )     [id number]  RESOLVED    Depression     Diverticula of colon     [id number] RESOLVED    DVT (deep venous thrombosis) ([facility])     LUE    Edema     History of staph infection     Hyperglycemia     Hyperlipidemia     Hypertension     Insomnia     Lateral epicondylitis     Left inguinal hernia     Methicillin resistant Staphylococcus aureus septicemia ([facility] [address] )     Morbid obesity due to excess calories ([facility])     Obesity     Peripheral neuropathy     Post laminectomy syndrome     RLS (restless legs syndrome)     Septicemia ([facility] [address] )     MRSA    Umbilical hernia        Past Surgical History:   Procedure Laterality Date    BACK SURGERY      laminectomy, hardware    CARPAL TUNNEL RELEASE Left     CERVICAL SPINE SURGERY      COLONOSCOPY      ELBOW SURGERY      ELBOW SURGERY      KNEE ARTHROSCOPY Right     knee    KNEE SURGERY Right     ARTHOSCOPY KNEE    NECK SURGERY      1997    VT COLONOSCOPY FLX DX W/COLLJ SPEC WHEN PFRMD N/A 1/24/2017    Procedure: COLONOSCOPY;  Surgeon: Raad Smith MD;  Location:  MAIN OR;  Service: General    SHOULDER SURGERY      SPINAL CORD STIMULATOR IMPLANT         Family History   Problem Relation Age of Onset    Heart disease Mother     Cancer Mother         breast, stomach    Aneurysm Mother     Heart disease Father     Cancer Father         skin, liver, colon DECESED AGE 58       Social History     Occupational History    Not on file       Social History Main Topics    Smoking status: Never Smoker    Smokeless tobacco: Never Used    Alcohol use Yes      Comment: social    Drug use: No    Sexual activity: Yes         Current Outpatient Prescriptions:     aspirin 81 MG tablet, Take 81 mg by mouth daily, Disp: , Rfl:    bisoprolol-hydrochlorothiazide (ZIAC) 2 5-6 25 MG per tablet, TAKE ONE TABLET BY MOUTH EVERY DAY, Disp: 90 tablet, Rfl: 3    diazepam (VALIUM) 10 mg tablet, Take 1 tablet (10 mg total) by mouth daily at bedtime as needed for anxiety, Disp: 30 tablet, Rfl: 1    HYDROcodone Bitartrate ER (HYSINGLA ER) 20 MG T24A, Take 1 PO QD for SEVERE PAIN, Disp: 30 each, Rfl: 0    lactulose (CHRONULAC) 10 g/15 mL solution, , Disp: , Rfl:     lactulose 20 g/30 mL, Take 1-2 tablespoons daily PRN as needed for chronic constipation  , Disp: 473 mL, Rfl: 2    morphine (MSIR) 15 mg tablet, Take 1/2 tablet QID PRN for pain  DO NOT FILL UNTIL: 10/24/18, Disp: 60 tablet, Rfl: 0    Multiple Vitamin (DAILY VALUE MULTIVITAMIN) TABS, Take 1 tablet by mouth daily, Disp: , Rfl:     Naproxen Sodium (ALEVE) 220 MG CAPS, Take by mouth, Disp: , Rfl:     rosuvastatin (CRESTOR) 10 MG tablet, Take 1 tablet (10 mg total) by mouth Medrol Dose Pack scheduling ONLY, Disp: 30 tablet, Rfl: 5    Allergies   Allergen Reactions    Fentanyl Other (See Comments)     "Makes me Suicidal"  Happened when dosage increased   Penicillins Other (See Comments)     As a child unknown    Pravastatin Other (See Comments)     Reaction Date: [id number];   Muscle weakness    Sulfa Antibiotics Other (See Comments)     As a child unknown    Vytorin [Ezetimibe-Simvastatin]      Reaction Date: [id number];        Physical Exam:    There were no vitals taken for this visit  Constitutional:normal, well developed, well nourished, alert, in no distress and non-toxic and no overt pain behavior   and overweight  Eyes:anicteric  HEENT:grossly intact  Neck:supple, symmetric, trachea midline and no masses   Pulmonary:even and unlabored  Cardiovascular:No edema or pitting edema present  Skin:Normal without rashes or lesions and well hydrated  Psychiatric:Mood and affect appropriate  Neurologic:Cranial Nerves II-XII grossly intact  Musculoskeletal:Slightly antalgic, but steady gait without the use of any assistive devices  Imaging  No orders to display         No orders of the defined types were placed in this encounter

## 2018-12-07 ENCOUNTER — APPOINTMENT (OUTPATIENT)
Dept: LAB | Facility: HOSPITAL | Age: 63
End: 2018-12-07
Payer: COMMERCIAL

## 2018-12-07 DIAGNOSIS — E78.2 MIXED HYPERLIPIDEMIA: ICD-10-CM

## 2018-12-07 DIAGNOSIS — R73.9 HYPERGLYCEMIA: ICD-10-CM

## 2018-12-07 LAB
ALBUMIN SERPL BCP-MCNC: 3.7 G/DL (ref 3.5–5)
ALP SERPL-CCNC: 53 U/L (ref 46–116)
ALT SERPL W P-5'-P-CCNC: 31 U/L (ref 12–78)
ANION GAP SERPL CALCULATED.3IONS-SCNC: 10 MMOL/L (ref 4–13)
AST SERPL W P-5'-P-CCNC: 19 U/L (ref 5–45)
BILIRUB SERPL-MCNC: 0.4 MG/DL (ref 0.2–1)
BUN SERPL-MCNC: 15 MG/DL (ref 5–25)
CALCIUM SERPL-MCNC: 8.8 MG/DL (ref 8.3–10.1)
CHLORIDE SERPL-SCNC: 103 MMOL/L (ref 100–108)
CHOLEST SERPL-MCNC: 135 MG/DL (ref 50–200)
CO2 SERPL-SCNC: 30 MMOL/L (ref 21–32)
CREAT SERPL-MCNC: 1.02 MG/DL (ref 0.6–1.3)
EST. AVERAGE GLUCOSE BLD GHB EST-MCNC: 117 MG/DL
GFR SERPL CREATININE-BSD FRML MDRD: 79 ML/MIN/1.73SQ M
GLUCOSE P FAST SERPL-MCNC: 98 MG/DL (ref 65–99)
HBA1C MFR BLD: 5.7 % (ref 4.2–6.3)
HDLC SERPL-MCNC: 35 MG/DL (ref 40–60)
LDLC SERPL CALC-MCNC: 81 MG/DL (ref 0–100)
POTASSIUM SERPL-SCNC: 4.1 MMOL/L (ref 3.5–5.3)
PROT SERPL-MCNC: 7.9 G/DL (ref 6.4–8.2)
SODIUM SERPL-SCNC: 143 MMOL/L (ref 136–145)
TRIGL SERPL-MCNC: 93 MG/DL

## 2018-12-07 PROCEDURE — 36415 COLL VENOUS BLD VENIPUNCTURE: CPT

## 2018-12-07 PROCEDURE — 83036 HEMOGLOBIN GLYCOSYLATED A1C: CPT

## 2018-12-07 PROCEDURE — 80053 COMPREHEN METABOLIC PANEL: CPT

## 2018-12-07 PROCEDURE — 80061 LIPID PANEL: CPT

## 2018-12-16 RX ORDER — DULOXETIN HYDROCHLORIDE 60 MG/1
CAPSULE, DELAYED RELEASE ORAL
COMMUNITY
End: 2019-01-16

## 2018-12-16 RX ORDER — OMEPRAZOLE 20 MG/1
CAPSULE, DELAYED RELEASE ORAL
COMMUNITY
End: 2019-04-10

## 2018-12-16 RX ORDER — PREDNISONE 10 MG/1
TABLET ORAL
COMMUNITY
End: 2019-04-10

## 2018-12-16 RX ORDER — MORPHINE SULFATE 15 MG/1
TABLET ORAL
COMMUNITY
End: 2018-12-17 | Stop reason: CLARIF

## 2018-12-16 RX ORDER — TAMSULOSIN HYDROCHLORIDE 0.4 MG/1
CAPSULE ORAL
COMMUNITY
End: 2019-01-16

## 2018-12-16 RX ORDER — MECLIZINE HCL 12.5 MG/1
TABLET ORAL
COMMUNITY
End: 2019-01-16

## 2018-12-17 ENCOUNTER — OFFICE VISIT (OUTPATIENT)
Dept: OBGYN CLINIC | Facility: HOSPITAL | Age: 63
End: 2018-12-17
Payer: COMMERCIAL

## 2018-12-17 ENCOUNTER — HOSPITAL ENCOUNTER (OUTPATIENT)
Dept: RADIOLOGY | Facility: HOSPITAL | Age: 63
Discharge: HOME | End: 2018-12-17
Attending: ORTHOPAEDIC SURGERY
Payer: COMMERCIAL

## 2018-12-17 ENCOUNTER — OFFICE VISIT (OUTPATIENT)
Dept: FAMILY MEDICINE CLINIC | Facility: HOSPITAL | Age: 63
End: 2018-12-17
Payer: COMMERCIAL

## 2018-12-17 VITALS
DIASTOLIC BLOOD PRESSURE: 90 MMHG | HEIGHT: 70 IN | HEART RATE: 87 BPM | SYSTOLIC BLOOD PRESSURE: 147 MMHG | WEIGHT: 258 LBS | BODY MASS INDEX: 36.94 KG/M2

## 2018-12-17 VITALS
TEMPERATURE: 98.5 F | WEIGHT: 260.6 LBS | HEART RATE: 90 BPM | DIASTOLIC BLOOD PRESSURE: 80 MMHG | SYSTOLIC BLOOD PRESSURE: 132 MMHG | HEIGHT: 70 IN | BODY MASS INDEX: 37.31 KG/M2

## 2018-12-17 DIAGNOSIS — M51.36 LUMBAR DEGENERATIVE DISC DISEASE: ICD-10-CM

## 2018-12-17 DIAGNOSIS — M48.10 DISH (DIFFUSE IDIOPATHIC SKELETAL HYPEROSTOSIS): Primary | ICD-10-CM

## 2018-12-17 DIAGNOSIS — M48.062 SPINAL STENOSIS OF LUMBAR REGION WITH NEUROGENIC CLAUDICATION: ICD-10-CM

## 2018-12-17 DIAGNOSIS — M54.42 CHRONIC BILATERAL LOW BACK PAIN WITH BILATERAL SCIATICA: ICD-10-CM

## 2018-12-17 DIAGNOSIS — M54.6 CHRONIC BILATERAL THORACIC BACK PAIN: ICD-10-CM

## 2018-12-17 DIAGNOSIS — K59.03 CONSTIPATION DUE TO OPIOID THERAPY: ICD-10-CM

## 2018-12-17 DIAGNOSIS — M54.41 CHRONIC BILATERAL LOW BACK PAIN WITH BILATERAL SCIATICA: ICD-10-CM

## 2018-12-17 DIAGNOSIS — M54.14 THORACIC RADICULOPATHY: ICD-10-CM

## 2018-12-17 DIAGNOSIS — T40.2X5A CONSTIPATION DUE TO OPIOID THERAPY: ICD-10-CM

## 2018-12-17 DIAGNOSIS — G89.29 CHRONIC BILATERAL LOW BACK PAIN WITH BILATERAL SCIATICA: ICD-10-CM

## 2018-12-17 DIAGNOSIS — M54.16 CHRONIC LUMBAR RADICULOPATHY: ICD-10-CM

## 2018-12-17 DIAGNOSIS — F11.20 CONTINUOUS OPIOID DEPENDENCE (HCC): ICD-10-CM

## 2018-12-17 DIAGNOSIS — G89.4 CHRONIC PAIN DISORDER: ICD-10-CM

## 2018-12-17 DIAGNOSIS — M96.1 LUMBAR POST-LAMINECTOMY SYNDROME: ICD-10-CM

## 2018-12-17 DIAGNOSIS — G89.29 CHRONIC BILATERAL THORACIC BACK PAIN: ICD-10-CM

## 2018-12-17 DIAGNOSIS — E66.01 MORBID OBESITY DUE TO EXCESS CALORIES (HCC): ICD-10-CM

## 2018-12-17 DIAGNOSIS — E78.2 MIXED HYPERLIPIDEMIA: ICD-10-CM

## 2018-12-17 DIAGNOSIS — I10 ESSENTIAL HYPERTENSION: Primary | ICD-10-CM

## 2018-12-17 DIAGNOSIS — M50.30 DDD (DEGENERATIVE DISC DISEASE), CERVICAL: ICD-10-CM

## 2018-12-17 PROCEDURE — 72110 X-RAY EXAM L-2 SPINE 4/>VWS: CPT

## 2018-12-17 PROCEDURE — 99214 OFFICE O/P EST MOD 30 MIN: CPT | Performed by: FAMILY MEDICINE

## 2018-12-17 PROCEDURE — 99204 OFFICE O/P NEW MOD 45 MIN: CPT | Performed by: ORTHOPAEDIC SURGERY

## 2018-12-17 PROCEDURE — 3075F SYST BP GE 130 - 139MM HG: CPT | Performed by: FAMILY MEDICINE

## 2018-12-17 PROCEDURE — 1036F TOBACCO NON-USER: CPT | Performed by: FAMILY MEDICINE

## 2018-12-17 PROCEDURE — 3079F DIAST BP 80-89 MM HG: CPT | Performed by: FAMILY MEDICINE

## 2018-12-17 PROCEDURE — 72170 X-RAY EXAM OF PELVIS: CPT

## 2018-12-17 RX ORDER — ROSUVASTATIN CALCIUM 10 MG/1
10 TABLET, COATED ORAL
Qty: 90 TABLET | Refills: 3 | Status: SHIPPED | OUTPATIENT
Start: 2018-12-17 | End: 2019-12-11 | Stop reason: SDUPTHER

## 2018-12-17 NOTE — PROGRESS NOTES
Assessment/Plan:    Lumbar post laminectomy syndrome  DISH  · Return to pain management for left SI joint injection and left intra-articular hip injection  · Consider updating CT myelogram  · Follow up PRN       Problem List Items Addressed This Visit     Chronic bilateral low back pain with bilateral sciatica    Relevant Orders    XR spine lumbar minimum 4 views non injury    XR pelvis ap only 1 or 2 vw    Chronic lumbar radiculopathy     Patient presents for evaluation of constant left-sided lower back pain and chronic lower back pain that radiates to bilateral legs  He has had several lumbar spine surgeries including fusions complicated with postop infection including osteomyelitis at L4-5  His most recent surgery was a spinal cord stimulator placed over 4 years ago for symptoms  He reports ongoing left-sided lower back and bilateral leg symptoms  He is on heavy duty pain medications including morphine as prescribed to him by his pain management specialist    On physical exam  He appears stated age  He is awake alert and oriented x3  Affect is appropriate  He is overweight  He does ambulate slowly  Lumbar mobility is limited in all planes  No significant reproducible tenderness to palpation over the lumbosacral spine  Well-healed midline lumbar incision  In the seated position he has pain with hip internal rotation and external rotation left side worse than right  Negative modified straight leg raise bilaterally  5/5 strength L2-S1 with the exception of left foot dorsiflexion which is 4/5  Sensation is diminished light touch left L4-5 distribution  Reflexes are absent on the left at L4 and present at 1+ on the right at L4  S1 reflexes are absent bilaterally  CT imaging  Demonstrates previous lumbar fusion at L2-3 with pedicle screws and interbody cages  The left L2 pedicle screw appears lateral in the vertebral body    There is loss of global lordosis and severe disc space narrowing retrolisthesis at L5-S1  He has evidence of diffuse idiopathic skeletal hyperostosis involving the thoracolumbar spine as well as bilateral hip joints    Assessment and plan  Chronic pain syndrome  Post-laminectomy syndrome  History of lumbar osteomyelitis L4-5  Diffuse idiopathic skeletal hyperostosis  I believe his left-sided lower back and hip pain is multifactorial in origin including post-laminectomy syndrome as well as intra-articular pathology of the hip  At this time I will avoid injecting lumbar spine due to history of osteomyelitis and diskitis  Injections should be targeted to the left SI joint and intra-articular hip injection to be performed by pain management specialist     He will follow up on a p r n  basis         Chronic pain disorder    Relevant Orders    Ambulatory referral to Pain Management    Lumbar canal stenosis    Relevant Orders    Ambulatory referral to Pain Management    Lumbar degenerative disc disease    Lumbar post-laminectomy syndrome    Relevant Orders    Ambulatory referral to Pain Management    Thoracic radiculopathy    Chronic bilateral thoracic back pain      Other Visit Diagnoses     DISH (diffuse idiopathic skeletal hyperostosis)    -  Primary    Relevant Orders    Ambulatory referral to Pain Management            Subjective:      Patient ID: Leona Keys  is a 66 y o  male  HPI  Patient presents to the office for a second opinion of low back pain ongoing for multiple years  He has a history of 8 back surgeries including lumbar fusion in 1994 and 2007  He developed a "bone eating staph infection" following the surgery in 2007  He also has a spinal cord stimulator placed in 2012 by Dr Nimesh Saini  He reports no benefit in symptoms from his surgical interventions  Today he is experiencing bilateral leg pain that goes into his bilateral feet  His pain is worst at his left hip  He has associated numbness and tingling in his bilateral legs   His bilateral feet are constantly numb and his bilateral calves are starting to become more constant  He experiences bilateral leg weakness  He has difficulty with balance and walking  He has tried epidural steroid injections without relief  He is accompanied by his wife today in the office  The following portions of the patient's history were reviewed and updated as appropriate: current medications, past family history, past medical history, past social history, past surgical history and problem list     Review of Systems   Constitutional: Negative for fever and unexpected weight change  HENT: Negative for hearing loss, nosebleeds and sore throat  Eyes: Negative for pain, redness and visual disturbance  Respiratory: Negative for cough, shortness of breath and wheezing  Cardiovascular: Negative for chest pain, palpitations and leg swelling  Gastrointestinal: Negative for abdominal pain, nausea and vomiting  Endocrine: Negative for polydipsia and polyuria  Genitourinary: Negative for dysuria and hematuria  Musculoskeletal: Positive for back pain  Skin: Negative for rash and wound  Neurological: Negative for dizziness and headaches  Psychiatric/Behavioral: Negative for agitation and suicidal ideas  Objective:      /90   Pulse 87   Ht 5' 10" (1 778 m)   Wt 117 kg (258 lb)   BMI 37 02 kg/m²          Physical Exam   Constitutional: He is oriented to person, place, and time  He appears well-developed and well-nourished  HENT:   Head: Normocephalic and atraumatic  Eyes: Pupils are equal, round, and reactive to light  Neck: Neck supple  Cardiovascular: Intact distal pulses  Pulmonary/Chest: Breath sounds normal    Neurological: He is alert and oriented to person, place, and time  Skin: Skin is warm and dry  Psychiatric: He has a normal mood and affect   His behavior is normal        Patient ambulates without assistance  Well healed incisions  Modified straight leg raise negative bilaterally  Strength 4/5 with left foot dorsiflexion  Sensation diminished left L4-5  Reflexes 1+ at L4 and hypoactive at S1 bilaterally  Mayco's test positive on the left  Pain with internal and external rotation of bilateral hips

## 2018-12-17 NOTE — ASSESSMENT & PLAN NOTE
Patient presents for evaluation of constant left-sided lower back pain and chronic lower back pain that radiates to bilateral legs  He has had several lumbar spine surgeries including fusions complicated with postop infection including osteomyelitis at L4-5  His most recent surgery was a spinal cord stimulator placed over 4 years ago for symptoms  He reports ongoing left-sided lower back and bilateral leg symptoms  He is on heavy duty pain medications including morphine as prescribed to him by his pain management specialist    On physical exam  He appears stated age  He is awake alert and oriented x3  Affect is appropriate  He is overweight  He does ambulate slowly  Lumbar mobility is limited in all planes  No significant reproducible tenderness to palpation over the lumbosacral spine  Well-healed midline lumbar incision  In the seated position he has pain with hip internal rotation and external rotation left side worse than right  Negative modified straight leg raise bilaterally  5/5 strength L2-S1 with the exception of left foot dorsiflexion which is 4/5  Sensation is diminished light touch left L4-5 distribution  Reflexes are absent on the left at L4 and present at 1+ on the right at L4  S1 reflexes are absent bilaterally  CT imaging  Demonstrates previous lumbar fusion at L2-3 with pedicle screws and interbody cages  The left L2 pedicle screw appears lateral in the vertebral body  There is loss of global lordosis and severe disc space narrowing retrolisthesis at L5-S1    He has evidence of diffuse idiopathic skeletal hyperostosis involving the thoracolumbar spine as well as bilateral hip joints    Assessment and plan  Chronic pain syndrome  Post-laminectomy syndrome  History of lumbar osteomyelitis L4-5  Diffuse idiopathic skeletal hyperostosis  I believe his left-sided lower back and hip pain is multifactorial in origin including post-laminectomy syndrome as well as intra-articular pathology of the hip  At this time I will avoid injecting lumbar spine due to history of osteomyelitis and diskitis  Injections should be targeted to the left SI joint and intra-articular hip injection to be performed by pain management specialist     He will follow up on a p r n  basis

## 2018-12-18 ENCOUNTER — TELEPHONE (OUTPATIENT)
Dept: RADIOLOGY | Facility: CLINIC | Age: 63
End: 2018-12-18

## 2018-12-18 PROBLEM — M50.30 DDD (DEGENERATIVE DISC DISEASE), CERVICAL: Status: ACTIVE | Noted: 2018-12-18

## 2018-12-18 NOTE — PROGRESS NOTES
Assessment/Plan:         Diagnoses and all orders for this visit:    Essential hypertension  Comments:  Excellent BP control    Mixed hyperlipidemia  Comments:  Good profile on Crestor  Orders:  -     rosuvastatin (CRESTOR) 10 MG tablet; Take 1 tablet (10 mg total) by mouth once at bedtime    Chronic pain disorder  Comments:  Discussed CBD oil and advised review with Skip at next visit  Continuous opioid dependence ([facility] [address] )  Comments:  F/U with pain management    Morbid obesity due to excess calories (HCC)    Constipation due to opioid therapy    DDD (degenerative disc disease), cervical          Subjective:      Patient ID: Lamont Damian  is a 66 y o  male  6 month follow up  Saw Dr Maris Moy and is recommending SI injection but not to compromise or risk infection of LS space  We had an extended discussion about considering CBD oil as he has failed on significant opioid management  Having stiffness and difficulty in neck mobility into L shoulder        The following portions of the patient's history were reviewed and updated as appropriate: allergies, current medications, past family history, past medical history, past social history, past surgical history and problem list     Review of Systems   Constitutional: Negative for unexpected weight change  HENT: Negative  Musculoskeletal: Positive for arthralgias, back pain, gait problem, joint swelling, myalgias, neck pain and neck stiffness  Skin: Negative for rash  Neurological: Negative for tremors  Hematological: Negative  Psychiatric/Behavioral: Positive for decreased concentration, dysphoric mood and sleep disturbance  All other systems reviewed and are negative  Objective:      /80   Pulse 90   Temp 98 5 °F (36 9 °C)   Ht 5' 10" (1 778 m)   Wt 118 kg (260 lb 9 6 oz)   BMI 37 39 kg/m²          Physical Exam   Constitutional: He is oriented to person, place, and time  He appears well-developed and well-nourished  Neck: No thyromegaly present  Cardiovascular: Normal rate and regular rhythm  Murmur heard  Pulmonary/Chest: Effort normal and breath sounds normal    Musculoskeletal: He exhibits no edema  Neurological: He is oriented to person, place, and time  Psychiatric: He has a normal mood and affect  His behavior is normal  Judgment and thought content normal    Nursing note and vitals reviewed  BMI Counseling: Body mass index is 37 39 kg/m²  Discussed the patient's BMI with him  The BMI is above average  BMI counseling and education was provided to the patient  Nutrition recommendations include reducing portion sizes and moderation in carbohydrate intake  Exercise recommendations include exercising 3-5 times per week

## 2018-12-18 NOTE — TELEPHONE ENCOUNTER
Created encounter from staff message for documentation purpose  Message     Please schedule the patient for a left intra-articular hip joint injection with diary, then 2 weeks later left SI joint injection with dye or      ----- Message -----   From: Mary Leyva   Sent: 12/18/2018   1:50 PM   To: Renu Wang, DO      Patient is established, referred for injection, please advise   Follow up scheduled in January with Skip

## 2018-12-19 NOTE — TELEPHONE ENCOUNTER
L/m for pt to rtc to [facility]  to schedule hip inj (order in [place])    Ask pt if he has done or completed PT for low back pain/SIJ for next procedure, if no therapy completed send info to provider prior to scheduling #2 procedure as the requirement is PT within the past year for a SIJ

## 2019-01-07 ENCOUNTER — HOSPITAL ENCOUNTER (OUTPATIENT)
Dept: RADIOLOGY | Facility: CLINIC | Age: 64
Discharge: HOME | End: 2019-01-07
Attending: ANESTHESIOLOGY | Admitting: ANESTHESIOLOGY
Payer: COMMERCIAL

## 2019-01-07 VITALS
RESPIRATION RATE: 20 BRPM | HEART RATE: 74 BPM | DIASTOLIC BLOOD PRESSURE: 80 MMHG | SYSTOLIC BLOOD PRESSURE: 136 MMHG | OXYGEN SATURATION: 96 % | TEMPERATURE: 98.6 F

## 2019-01-07 DIAGNOSIS — M25.552 LEFT HIP PAIN: ICD-10-CM

## 2019-01-07 PROCEDURE — 77002 NEEDLE LOCALIZATION BY XRAY: CPT | Performed by: ANESTHESIOLOGY

## 2019-01-07 PROCEDURE — 77002 NEEDLE LOCALIZATION BY XRAY: CPT

## 2019-01-07 PROCEDURE — 20610 DRAIN/INJ JOINT/BURSA W/O US: CPT | Performed by: ANESTHESIOLOGY

## 2019-01-07 RX ORDER — LIDOCAINE HYDROCHLORIDE 10 MG/ML
5 INJECTION, SOLUTION EPIDURAL; INFILTRATION; INTRACAUDAL; PERINEURAL ONCE
Status: DISCONTINUED | OUTPATIENT
Start: 2019-01-07 | End: 2019-01-11 | Stop reason: HOSPADM

## 2019-01-07 RX ORDER — METHYLPREDNISOLONE ACETATE 80 MG/ML
80 INJECTION, SUSPENSION INTRA-ARTICULAR; INTRALESIONAL; INTRAMUSCULAR; PARENTERAL; SOFT TISSUE ONCE
Status: DISCONTINUED | OUTPATIENT
Start: 2019-01-07 | End: 2019-01-11 | Stop reason: HOSPADM

## 2019-01-07 NOTE — H&P
History of Present Illness: The patient is a 66 y o  male who presents with complaints of hip pain      Patient Active Problem List   Diagnosis    Carpal tunnel syndrome    Chronic bilateral low back pain with bilateral sciatica    Chronic lumbar radiculopathy    Chronic pain disorder    Constipation due to opioid therapy    Continuous opioid dependence ([facility] )    Depression    Hyperglycemia    Hyperlipidemia    Essential hypertension    Left inguinal hernia    Lumbar canal stenosis    Lumbar degenerative disc disease    Lumbar post-laminectomy syndrome    Morbid obesity due to excess calories (HCC)    Neuropathy, ilioinguinal nerve, left    Pain in left hip    Peripheral neuropathy    Persistent insomnia    Rectus diastasis    Restless legs syndrome    Retrolisthesis of vertebrae    Statin myopathy    Thoracic radiculopathy    Umbilical hernia without obstruction and without gangrene    Multiple pulmonary nodules    Shortness of breath    Anxiety    Obstructive sleep apnea    Benign prostatic hyperplasia with incomplete bladder emptying    Chronic bilateral thoracic back pain    DDD (degenerative disc disease), cervical       Past Medical History:   Diagnosis Date    Acute low back pain     10 AUG 2016 RESOLVED    Angina pectoris (HCC)     Bursitis of hip     Carpal tunnel syndrome     Chronic bilateral low back pain with bilateral sciatica     Chronic lumbar radiculopathy     Chronic narcotic dependence (HCC)     Chronic pain syndrome     Constipation due to opioid therapy     DDD (degenerative disc disease), lumbar     Deep vein thrombosis of left upper extremity ([address] )     [id number]  RESOLVED    Depression     Diverticula of colon     [id number] RESOLVED    D[facility] thrombosis) [facility])     LUE    Edema     History of staph infection     Hyperglycemia     Hyperlipidemia     Hypertension     Insomnia     Lateral epicondylitis     Left inguinal hernia     Methicillin resistant Staphylococcus aureus septicemia ([facility] [address] )     Morbid obesity due to excess calories (HCC)     Obesity     Peripheral neuropathy     Post laminectomy syndrome     RLS (restless legs syndrome)     Septicemia ([facility] [address] )     MRSA    Umbilical hernia        Past Surgical History:   Procedure Laterality Date    BACK SURGERY      laminectomy, hardware    CARPAL TUNNEL RELEASE Left     CERVICAL SPINE SURGERY      COLONOSCOPY      ELBOW SURGERY      ELBOW SURGERY      KNEE ARTHROSCOPY Right     knee    KNEE SURGERY Right     ARTHOSCOPY KNEE    NECK SURGERY      1997    DC COLONOSCOPY FLX DX W/COLLJ SPEC WHEN PFRMD N/A 1/24/2017    Procedure: COLONOSCOPY;  Surgeon: Maris Marrero MD;  Location:  MAIN OR;  Service: General    SHOULDER SURGERY      SPINAL CORD STIMULATOR IMPLANT           Current Outpatient Prescriptions:     aspirin 81 MG tablet, Take 81 mg by mouth daily, Disp: , Rfl:     bisoprolol-hydrochlorothiazide (ZIAC) 2 5-6 25 MG per tablet, TAKE ONE TABLET BY MOUTH EVERY DAY, Disp: 90 tablet, Rfl: 3    diazepam (VALIUM) 10 mg tablet, Take 1 tablet (10 mg total) by mouth daily at bedtime as needed for anxiety, Disp: 30 tablet, Rfl: 1    DULoxetine (CYMBALTA) 60 mg delayed release capsule, duloxetine 60 mg capsule,delayed release, Disp: , Rfl:     HYDROcodone-acetaminophen (NORCO)  mg per tablet, Take 1 PO TID PRN for pain  DO NOT FILL UNTIL 1/2/19, Disp: 85 tablet, Rfl: 0    influenza vaccine, quadrivalent (AFLURIA QUADRIVALENT) 0 5 ML GAIL, inject 0 5 milliliter intramuscularly, Disp: , Rfl:     lactulose (CHRONULAC) 10 g/15 mL solution, , Disp: , Rfl:     lactulose 20 g/30 mL, Take 1-2 tablespoons daily PRN as needed for chronic constipation  , Disp: 473 mL, Rfl: 2    meclizine (ANTIVERT) 12 5 MG tablet, meclizine 12 5 mg tablet, Disp: , Rfl:     omeprazole (PriLOSEC) 20 mg delayed release capsule, omeprazole 20 mg capsule,delayed release, Disp: , Rfl:    predniSONE 10 mg tablet, prednisone 10 mg tablet, Disp: , Rfl:     rosuvastatin (CRESTOR) 10 MG tablet, Take 1 tablet (10 mg total) by mouth once at bedtime, Disp: 90 tablet, Rfl: 3    tamsulosin (FLOMAX) 0 4 mg, tamsulosin 0 4 mg capsule, Disp: , Rfl:     Current Facility-Administered Medications:     iohexol (OMNIPAQUE) 300 mg/mL injection 50 mL, 50 mL, Intra-articular, Once, Sudhir Lyn DO    lidocaine (PF) (XYLOCAINE-MPF) 1 % injection 5 mL, 5 mL, Other, Once, Sudhir Lyn DO    lidocaine (PF) (XYLOCAINE-MPF) 2 % injection 5 mL, 5 mL, Intra-articular, Once, Sudhir Lyn DO    methylPREDNISolone acetate (DEPO-MEDROL) injection 80 mg, 80 mg, Intra-articular, Once, Sudhir Lyn DO    Allergies   Allergen Reactions    Fentanyl Other (See Comments)     "Makes me Suicidal"  Happened when dosage increased   Penicillins Other (See Comments)     As a child unknown    Pravastatin Other (See Comments)     Reaction Date: [id number];   Muscle weakness    Sulfa Antibiotics Other (See Comments)     As a child unknown    Vytorin [Ezetimibe-Simvastatin]      Reaction Date: [id number];        Physical Exam:   Vitals:    01/07/19 1457   BP: 130/73   Pulse: 71   Resp: 20   Temp: 98 6 °F (37 °C)   SpO2: 95%     General: Awake, Alert, Oriented x 3, Mood and affect appropriate  Respiratory: Respirations even and unlabored  Cardiovascular: Peripheral pulses intact; no edema  Musculoskeletal Exam:  Decreased range of motion left hip    ASA Score: II    Patient/Chart Verification  Patient ID Verified: Verbal  ID Band Applied: No  Consents Confirmed: Procedural  H&P( within 30 days) Verified: To be obtained in the Pre-Procedure area  Interval H&P(within 24 hr) Complete (required for Outpatients and Surgery Admit only): To be obtained in the Pre-Procedure area  Allergies Reviewed: Yes  Anticoag/NSAID held?: No  Currently on antibiotics?: No  Pre-op Lab/Test Results Available: N/A    Assessment:   1   Left hip pain        Plan: LT INTRA ART HIP JNT INJ W/PAIN DIARY

## 2019-01-07 NOTE — DISCHARGE INSTRUCTIONS
Steroid Joint Injection   WHAT YOU NEED TO KNOW:   A steroid joint injection is a procedure to inject steroid medicine into a joint  Steroid medicine decreases pain and inflammation  The injection may also contain an anesthetic (numbing medicine) to decrease pain  It may be done to treat conditions such as arthritis, gout, or carpal tunnel syndrome  The injections may be given in your knee, ankle, shoulder, elbow, wrist, ankle or sacroiliac joint  1  Do not apply heat to any area that is numb  If you have discomfort or soreness at the injection site, you may apply ice today, 20 minutes on and 20 minutes off  Tomorrow you may use ice or warm, moist heat  Do not apply ice or heat directly to the skin  2  You may have an increase or change in the discomfort for 36-48 hours after your treatment  Apply ice and continue with any pain medicine you have been prescribed  3  Do not do anything strenuous today  You may shower, but no tub baths or hot tubs today  You may resume your normal activities tomorrow, but do not overdo it  Resume normal activities slowly when you are feeling better  4  If you experience redness, drainage or swelling at the injection site, or if you develop a fever above 100 degrees, please call [facility] at [phone] or go to the Emergency Room  5  Continue to take all routine medicines prescribed by your primary care physician unless otherwise instructed by our staff  Most blood thinners should be started again according to your regularly scheduled dosing  If you have any questions, please give our office a call  If you have a problem specifically related to your procedure, please call our office at [phone]  Problems not related to your procedure should be directed to your primary care physician

## 2019-01-08 NOTE — TELEPHONE ENCOUNTER
Pt was discharged from procedure on 1/7/2019 and not sent to  to schedule SIJ per note, will reach out to pt today

## 2019-01-08 NOTE — TELEPHONE ENCOUNTER
Discussed with provider if we are continuing with plan of SIJ 2 wks post hip injection and yes  Pt called and schedule for Lt SIJ on 1/31/2019, pt aware of pre-procedure instructions

## 2019-01-14 ENCOUNTER — TELEPHONE (OUTPATIENT)
Dept: PAIN MEDICINE | Facility: CLINIC | Age: 64
End: 2019-01-14

## 2019-01-14 DIAGNOSIS — G89.29 CHRONIC BILATERAL THORACIC BACK PAIN: ICD-10-CM

## 2019-01-14 DIAGNOSIS — M54.6 CHRONIC BILATERAL THORACIC BACK PAIN: ICD-10-CM

## 2019-01-14 DIAGNOSIS — M96.1 LUMBAR POST-LAMINECTOMY SYNDROME: ICD-10-CM

## 2019-01-14 DIAGNOSIS — G89.29 CHRONIC BILATERAL LOW BACK PAIN WITH BILATERAL SCIATICA: ICD-10-CM

## 2019-01-14 DIAGNOSIS — G89.4 CHRONIC PAIN DISORDER: ICD-10-CM

## 2019-01-14 DIAGNOSIS — M54.42 CHRONIC BILATERAL LOW BACK PAIN WITH BILATERAL SCIATICA: ICD-10-CM

## 2019-01-14 DIAGNOSIS — M54.41 CHRONIC BILATERAL LOW BACK PAIN WITH BILATERAL SCIATICA: ICD-10-CM

## 2019-01-14 NOTE — TELEPHONE ENCOUNTER
I was first made aware of it this morning and the pre-auth was faxed with a confirmation that they received it at [phone] this morning with a confirmation fax that the insurance company received it  So as soon as we receive any information regarding the pre-auth we will calll  Hopefully we will have an answer today

## 2019-01-14 NOTE — TELEPHONE ENCOUNTER
Pt reports 50% improvement post inj   Pain level 4/10       Lt Intra-articular hip inj 1/7   F/u 1/16

## 2019-01-14 NOTE — TELEPHONE ENCOUNTER
Approved per [facility] pharmacy   Addressed in email refill request response of 1/14/2019

## 2019-01-15 RX ORDER — HYDROCODONE BITARTRATE AND ACETAMINOPHEN 10; 325 MG/1; MG/1
TABLET ORAL
Qty: 85 TABLET | Refills: 0 | Status: CANCELLED | OUTPATIENT
Start: 2019-01-15

## 2019-01-15 NOTE — TELEPHONE ENCOUNTER
Monica Ríos, [facility],[address]   [phone]    Monica Ríos is calling to let us know that the patient has been approved for Demian Davidson will be faxing the information to us

## 2019-01-16 ENCOUNTER — OFFICE VISIT (OUTPATIENT)
Dept: PAIN MEDICINE | Facility: CLINIC | Age: 64
End: 2019-01-16
Payer: COMMERCIAL

## 2019-01-16 VITALS
BODY MASS INDEX: 37.39 KG/M2 | HEART RATE: 78 BPM | SYSTOLIC BLOOD PRESSURE: 120 MMHG | DIASTOLIC BLOOD PRESSURE: 78 MMHG | HEIGHT: 70 IN

## 2019-01-16 DIAGNOSIS — M54.42 CHRONIC BILATERAL LOW BACK PAIN WITH BILATERAL SCIATICA: Primary | ICD-10-CM

## 2019-01-16 DIAGNOSIS — M54.14 THORACIC RADICULOPATHY: ICD-10-CM

## 2019-01-16 DIAGNOSIS — G89.29 CHRONIC BILATERAL LOW BACK PAIN WITH BILATERAL SCIATICA: Primary | ICD-10-CM

## 2019-01-16 DIAGNOSIS — G89.4 CHRONIC PAIN DISORDER: ICD-10-CM

## 2019-01-16 DIAGNOSIS — M48.062 SPINAL STENOSIS OF LUMBAR REGION WITH NEUROGENIC CLAUDICATION: ICD-10-CM

## 2019-01-16 DIAGNOSIS — M51.36 LUMBAR DEGENERATIVE DISC DISEASE: ICD-10-CM

## 2019-01-16 DIAGNOSIS — G89.29 CHRONIC BILATERAL THORACIC BACK PAIN: ICD-10-CM

## 2019-01-16 DIAGNOSIS — G60.9 IDIOPATHIC PERIPHERAL NEUROPATHY: ICD-10-CM

## 2019-01-16 DIAGNOSIS — M96.1 LUMBAR POST-LAMINECTOMY SYNDROME: ICD-10-CM

## 2019-01-16 DIAGNOSIS — M54.6 CHRONIC BILATERAL THORACIC BACK PAIN: ICD-10-CM

## 2019-01-16 DIAGNOSIS — M54.41 CHRONIC BILATERAL LOW BACK PAIN WITH BILATERAL SCIATICA: Primary | ICD-10-CM

## 2019-01-16 DIAGNOSIS — M54.16 CHRONIC LUMBAR RADICULOPATHY: ICD-10-CM

## 2019-01-16 PROCEDURE — 99214 OFFICE O/P EST MOD 30 MIN: CPT | Performed by: NURSE PRACTITIONER

## 2019-01-16 RX ORDER — HYDROCODONE BITARTRATE AND ACETAMINOPHEN 10; 325 MG/1; MG/1
TABLET ORAL
Qty: 90 TABLET | Refills: 0 | Status: SHIPPED | OUTPATIENT
Start: 2019-01-16 | End: 2019-01-16 | Stop reason: SDUPTHER

## 2019-01-16 RX ORDER — HYDROCODONE BITARTRATE AND ACETAMINOPHEN 10; 325 MG/1; MG/1
TABLET ORAL
Qty: 90 TABLET | Refills: 0 | Status: SHIPPED | OUTPATIENT
Start: 2019-01-16 | End: 2019-04-10 | Stop reason: SDUPTHER

## 2019-01-16 NOTE — PROGRESS NOTES
Assessment:  1  Chronic bilateral low back pain with bilateral sciatica    2  Chronic lumbar radiculopathy    3  Idiopathic peripheral neuropathy    4  Thoracic radiculopathy    5  Lumbar degenerative disc disease    6  Chronic bilateral thoracic back pain    7  Chronic pain disorder    8  Lumbar post-laminectomy syndrome    9  Spinal stenosis of lumbar region with neurogenic claudication        Plan:  While the patient was in the office today, I discussed with the patient at this point time with regards to the spinal cord stimulator, I feel would be in his best interest to follow-up with the [address] [place] [address] team for re-evaluation and to see what his battery life looks like  I took the patient's phone number and advised him that I would contact Mari Medina from the [address] [place] [address] team to call him so they can meet for evaluation and possible reprogramming  He may need to be referred to Dr August Smalls in the future if he does need an IPG replacement  The patient was agreeable and verbalized an understanding  With regards to his procedures, I explained to the patient that it does appear that the left hip injection has been diagnostic and therapeutic and that hopefully we can hold off any repeat hip injections for at least 3-4 months, if not longer  I encouraged the patient to proceed with the left SI joint injection as scheduled later on this month  The patient was agreeable and verbalized an understanding  With regards to his medication, explained the patient at this point he can continue with the p r n  Norco and lactulose as prescribed an as needed  I did review with him that as soon as we received the pre authorization notice it was completed and should be good for 1 whole year  The patient was agreeable and verbalized an understanding               [place] Prescription Drug Monitoring Program report was reviewed and was appropriate      While the patient was in the office today, an annual review of the opioid contract/agreement was conducted, the patient was agreeable to continuing the contract as previously agreed upon and signed for this calendar year  The patient did not have their opioid medications available for confirmation or counting while in the office today  I reviewed with the patient that as per the opioid contract, they are to bring in the last filled prescription for all of their opioid medications, with what they have left to every office visit  I advised the patient that if they would continue to not bring in their prescriptions as discussed, we may not be able to continue prescribing opioid medications in the future  The patient was agreeable and verbalized an understanding  The patient's opioid scripts were sent to their pharmacy electronically and was given a 3 month supply of prescriptions with a Do Not Fill date(s) of February 11, 2019, March 11, 2019, and April 8, 2019  There are risks associated with opioid medications, including dependence, addiction and tolerance  The patient understands and agrees to use these medications only as prescribed  Potential side effects of the medications include, but are not limited to, constipation, drowsiness, addiction, impaired judgment and risk of fatal overdose if not taken as prescribed  The patient was warned against driving while taking sedation medications  Sharing medications is a felony  At this point in time, the patient is showing no signs of addiction, abuse, diversion or suicidal ideation  The patient will follow-up in 12 weeks for medication prescription refill and reevaluation  The patient was advised to contact the office should their symptoms worsen in the interim  The patient was agreeable and verbalized an understanding  History of Present Illness:     The patient is a 66 y o  male last seen on 1/7/19 who presents for a follow up office visit in regards to chronic pain secondary to lumbar post-laminectomy syndrome  The patient currently reports that since his last office visit he did proceed with a left intra-articular hip joint injection on January 7, 2019 with Dr Yamil Carrillo and has definitely seen improvement in his left hip pain that he has not seen in years  He is also scheduled for an upcoming left sacroiliac joint injection at the end of this month and is hoping that would be somewhat helpful as well  However, he has been out of his pain medications, appropriately, for a few days because there was some issues with a pre authorization which was completed and the script was filled 2 days ago, however, it has to get sent from the [facility] to our [facility]  He is hopeful that he will get the medications tomorrow as he has definitely noticed worsening pain  He also reports that he is using his spinal cord stimulator and is concerned about the battery running out, but reports that he has had issues with the [address] [place] [address] team getting back to him  Current pain medications includes:  Norco 10/3251 p  o  t i d  p r n  for pain  The patient reports that this regimen is providing 50-60% pain relief  The patient is reporting no side effects from this pain medication regimen  Pain Contract Signed: 1/16/19  Last Urine Drug Screen: 11/21/18    I have personally reviewed and/or updated the patient's past medical history, past surgical history, family history, social history, current medications, allergies, and vital signs today  Review of Systems:    Review of Systems   Respiratory: Negative for shortness of breath  Cardiovascular: Negative for chest pain  Gastrointestinal: Negative for constipation, diarrhea, nausea and vomiting  Musculoskeletal: Positive for gait problem  Negative for arthralgias, joint swelling (joint stiffness) and myalgias  Skin: Negative for rash  Neurological: Positive for weakness  Negative for dizziness and seizures     All other systems reviewed and are negative  Past Medical History:   Diagnosis Date    Acute low back pain     10 AUG 2016 RESOLVED    [facility]     Bursitis of hip     Carpal tunnel syndrome     Chronic bilateral low back pain with bilateral sciatica     Chronic lumbar radiculopathy     Chronic narcotic dependence (HCC)     Chronic pain syndrome     Constipation due to opioid therapy     DDD (degenerative disc disease), lumbar     Deep vein thrombosis of left upper extremity ([facility] [address] )     [id number]  RESOLVED    Depression     Diverticula of colon     [id number] RESOLVED    DVT (deep venous thrombosis) ([facility])     LUE    Edema     History of staph infection     Hyperglycemia     Hyperlipidemia     Hypertension     Insomnia     Lateral epicondylitis     Left inguinal hernia     Methicillin resistant Staphylococcus aureus septicemia ([facility]     Morbid obesity due to excess calories ([facility])     Obesity     Peripheral neuropathy     Post laminectomy syndrome     RLS (restless legs syndrome)     Septicemia ([facility] [address] [facility] )     MRSA    Umbilical hernia        Past Surgical History:   Procedure Laterality Date    BACK SURGERY      laminectomy, hardware    CARPAL TUNNEL RELEASE Left     CERVICAL SPINE SURGERY      COLONOSCOPY      ELBOW SURGERY      ELBOW SURGERY      KNEE ARTHROSCOPY Right     knee    KNEE SURGERY Right     ARTHOSCOPY KNEE    NECK SURGERY      1997    NM COLONOSCOPY FLX DX W/COLLJ SPEC WHEN PFRMD N/A 1/24/2017    Procedure: COLONOSCOPY;  Surgeon: Opal Mcguire MD;  Location:  MAIN OR;  Service: General    SHOULDER SURGERY      SPINAL CORD STIMULATOR IMPLANT         Family History   Problem Relation Age of Onset    Heart disease Mother     Cancer Mother         breast, stomach    Aneurysm Mother     Heart disease Father     Cancer Father         skin, liver, colon DECESED AGE 58       Social History     Occupational History    Not on file       Social History Main Topics    Smoking status: Never Smoker    Smokeless tobacco: Never Used    Alcohol use Yes      Comment: social    Drug use: No    Sexual activity: Yes         Current Outpatient Prescriptions:     aspirin 81 MG tablet, Take 81 mg by mouth daily, Disp: , Rfl:     bisoprolol-hydrochlorothiazide (ZIAC) 2 5-6 25 MG per tablet, TAKE ONE TABLET BY MOUTH EVERY DAY, Disp: 90 tablet, Rfl: 3    diazepam (VALIUM) 10 mg tablet, Take 1 tablet (10 mg total) by mouth daily at bedtime as needed for anxiety, Disp: 30 tablet, Rfl: 1    DULoxetine (CYMBALTA) 60 mg delayed release capsule, duloxetine 60 mg capsule,delayed release, Disp: , Rfl:     HYDROcodone-acetaminophen (NORCO)  mg per tablet, Take 1 PO TID PRN for pain  DO NOT FILL UNTIL 1/2/19, Disp: 85 tablet, Rfl: 0    influenza vaccine, quadrivalent (AFLURIA QUADRIVALENT) 0 5 ML GAIL, inject 0 5 milliliter intramuscularly, Disp: , Rfl:     lactulose (CHRONULAC) 10 g/15 mL solution, , Disp: , Rfl:     lactulose 20 g/30 mL, Take 1-2 tablespoons daily PRN as needed for chronic constipation  , Disp: 473 mL, Rfl: 2    meclizine (ANTIVERT) 12 5 MG tablet, meclizine 12 5 mg tablet, Disp: , Rfl:     omeprazole (PriLOSEC) 20 mg delayed release capsule, omeprazole 20 mg capsule,delayed release, Disp: , Rfl:     predniSONE 10 mg tablet, prednisone 10 mg tablet, Disp: , Rfl:     rosuvastatin (CRESTOR) 10 MG tablet, Take 1 tablet (10 mg total) by mouth once at bedtime, Disp: 90 tablet, Rfl: 3    tamsulosin (FLOMAX) 0 4 mg, tamsulosin 0 4 mg capsule, Disp: , Rfl:     Allergies   Allergen Reactions    Fentanyl Other (See Comments)     "Makes me Suicidal"  Happened when dosage increased      Penicillins Other (See Comments)     As a child unknown    Pravastatin Other (See Comments)     Reaction Date: [id number];   Muscle weakness    Sulfa Antibiotics Other (See Comments)     As a child unknown    Vytorin [Ezetimibe-Simvastatin]      Reaction Date: [id number];        Physical Exam:    There were no vitals taken for this visit  Constitutional:normal, well developed, well nourished, alert, in no distress and non-toxic and no overt pain behavior  and overweight  Eyes:anicteric  HEENT:grossly intact  Neck:supple, symmetric, trachea midline and no masses   Pulmonary:even and unlabored  Cardiovascular:No edema or pitting edema present  Skin:Normal without rashes or lesions and well hydrated  Psychiatric:Mood and affect appropriate  Neurologic:Cranial Nerves II-XII grossly intact  Musculoskeletal:Slightly antalgic, but steady gait without the use of any assistive devices  Imaging  No orders to display         No orders of the defined types were placed in this encounter

## 2019-01-31 ENCOUNTER — HOSPITAL ENCOUNTER (OUTPATIENT)
Dept: RADIOLOGY | Facility: CLINIC | Age: 64
Discharge: HOME | End: 2019-01-31
Attending: ANESTHESIOLOGY | Admitting: ANESTHESIOLOGY
Payer: COMMERCIAL

## 2019-01-31 VITALS
OXYGEN SATURATION: 94 % | TEMPERATURE: 98.2 F | DIASTOLIC BLOOD PRESSURE: 79 MMHG | SYSTOLIC BLOOD PRESSURE: 135 MMHG | RESPIRATION RATE: 20 BRPM | HEART RATE: 75 BPM

## 2019-01-31 DIAGNOSIS — M53.3 DISORDER OF SACRUM: ICD-10-CM

## 2019-01-31 DIAGNOSIS — M46.1 SACROILIITIS, NOT ELSEWHERE CLASSIFIED (HCC): ICD-10-CM

## 2019-01-31 PROCEDURE — 27096 INJECT SACROILIAC JOINT: CPT | Performed by: ANESTHESIOLOGY

## 2019-01-31 RX ORDER — MULTIVITAMIN
1 TABLET ORAL EVERY OTHER DAY
COMMUNITY

## 2019-01-31 RX ORDER — LIDOCAINE HYDROCHLORIDE 10 MG/ML
5 INJECTION, SOLUTION EPIDURAL; INFILTRATION; INTRACAUDAL; PERINEURAL ONCE
Status: COMPLETED | OUTPATIENT
Start: 2019-01-31 | End: 2019-01-31

## 2019-01-31 RX ORDER — METHYLPREDNISOLONE ACETATE 80 MG/ML
80 INJECTION, SUSPENSION INTRA-ARTICULAR; INTRALESIONAL; INTRAMUSCULAR; PARENTERAL; SOFT TISSUE ONCE
Status: COMPLETED | OUTPATIENT
Start: 2019-01-31 | End: 2019-01-31

## 2019-01-31 RX ADMIN — METHYLPREDNISOLONE ACETATE 80 MG: 80 INJECTION, SUSPENSION INTRA-ARTICULAR; INTRALESIONAL; INTRAMUSCULAR; SOFT TISSUE at 15:11

## 2019-01-31 RX ADMIN — IOHEXOL 1 ML: 300 INJECTION, SOLUTION INTRAVENOUS at 15:10

## 2019-01-31 RX ADMIN — LIDOCAINE HYDROCHLORIDE 2 ML: 20 INJECTION, SOLUTION EPIDURAL; INFILTRATION; INTRACAUDAL at 15:11

## 2019-01-31 RX ADMIN — LIDOCAINE HYDROCHLORIDE 3 ML: 10 INJECTION, SOLUTION EPIDURAL; INFILTRATION; INTRACAUDAL; PERINEURAL at 15:10

## 2019-01-31 NOTE — DISCHARGE INSTR - LAB
Left message with Josie to return call regarding INR instructions.   Steroid Joint Injection   WHAT YOU NEED TO KNOW:   A steroid joint injection is a procedure to inject steroid medicine into a joint  Steroid medicine decreases pain and inflammation  The injection may also contain an anesthetic (numbing medicine) to decrease pain  It may be done to treat conditions such as arthritis, gout, or carpal tunnel syndrome  The injections may be given in your knee, ankle, shoulder, elbow, wrist, ankle or sacroiliac joint  1  Do not apply heat to any area that is numb  If you have discomfort or soreness at the injection site, you may apply ice today, 20 minutes on and 20 minutes off  Tomorrow you may use ice or warm, moist heat  Do not apply ice or heat directly to the skin  2  You may have an increase or change in the discomfort for 36-48 hours after your treatment  Apply ice and continue with any pain medicine you have been prescribed  3  Do not do anything strenuous today  You may shower, but no tub baths or hot tubs today  You may resume your normal activities tomorrow, but do not overdo it  Resume normal activities slowly when you are feeling better  4  If you experience redness, drainage or swelling at the injection site, or if you develop a fever above 100 degrees, please call [facility] at [phone] or go to the Emergency Room  5  Continue to take all routine medicines prescribed by your primary care physician unless otherwise instructed by our staff  Most blood thinners should be started again according to your regularly scheduled dosing  If you have any questions, please give our office a call  If you have a problem specifically related to your procedure, please call our office at [phone]  Problems not related to your procedure should be directed to your primary care physician

## 2019-01-31 NOTE — H&P
History of Present Illness:  The patient is a 66 y o  male who presents with complaints of     Patient Active Problem List   Diagnosis    Carpal tunnel syndrome    Chronic bilateral low back pain with bilateral sciatica    Chronic lumbar radiculopathy    Chronic pain disorder    Constipation due to opioid therapy    [facility]    Depression    Hyperglycemia    Hyperlipidemia    Essential hypertension    Left inguinal hernia    Lumbar canal stenosis    Lumbar degenerative disc disease    Lumbar post-laminectomy syndrome    Morbid obesity due to excess calories (HCC)    Neuropathy, ilioinguinal nerve, left    Pain in left hip    Peripheral neuropathy    Persistent insomnia    Rectus diastasis    Restless legs syndrome    Retrolisthesis of vertebrae    Statin myopathy    Thoracic radiculopathy    Umbilical hernia without obstruction and without gangrene    Multiple pulmonary nodules    Shortness of breath    Anxiety    Obstructive sleep apnea    Benign prostatic hyperplasia with incomplete bladder emptying    Chronic bilateral thoracic back pain    DDD (degenerative disc disease), cervical       Past Medical History:   Diagnosis Date    Acute low back pain     10 AUG 2016 RESOLVED    [facility] pectoris [facility])     Bursitis of hip     Carpal tunnel syndrome     Chronic bilateral low back pain with bilateral sciatica     Chronic lumbar radiculopathy     Chronic narcotic dependence (HCC)     Chronic pain syndrome     Constipation due to opioid therapy     DDD (degenerative disc disease), lumbar     Deep vein thrombosis of left upper extremity ([facility] )     [id number]  RESOLVED    Depression     Diverticula of colon     [id number] RESOLVED    DVT (deep venous thrombosis) ([facility])     LUE    Edema     History of staph infection     Hyperglycemia     Hyperlipidemia     Hypertension     Insomnia     Lateral epicondylitis     Left inguinal hernia     Methicillin resistant Staphylococcus aureus septicemia ([facility]     Morbid obesity due to excess calories ([facility] )     Obesity     Peripheral neuropathy     Post laminectomy syndrome     RLS (restless legs syndrome)     Septicemia ([facility] )     MRSA    Umbilical hernia        Past Surgical History:   Procedure Laterality Date    BACK SURGERY      laminectomy, hardware    CARPAL TUNNEL RELEASE Left     CERVICAL SPINE SURGERY      COLONOSCOPY      ELBOW SURGERY      ELBOW SURGERY      KNEE ARTHROSCOPY Right     knee    KNEE SURGERY Right     ARTHOSCOPY KNEE    NECK SURGERY      1997    KY COLONOSCOPY FLX DX W/COLLJ SPEC WHEN PFRMD N/A 1/24/2017    Procedure: COLONOSCOPY;  Surgeon: Luciano Sung MD;  Location:  MAIN OR;  Service: General    SHOULDER SURGERY      SPINAL CORD STIMULATOR IMPLANT           Current Outpatient Prescriptions:     Multiple Vitamin (MULTIVITAMIN) tablet, Take 1 tablet by mouth daily, Disp: , Rfl:     aspirin 81 MG tablet, Take 81 mg by mouth daily, Disp: , Rfl:     bisoprolol-hydrochlorothiazide (ZIAC) 2 5-6 25 MG per tablet, TAKE ONE TABLET BY MOUTH EVERY DAY, Disp: 90 tablet, Rfl: 3    diazepam (VALIUM) 10 mg tablet, Take 1 tablet (10 mg total) by mouth daily at bedtime as needed for anxiety, Disp: 30 tablet, Rfl: 1    HYDROcodone-acetaminophen (NORCO)  mg per tablet, Take 1 PO TID PRN for pain  DO NOT FILL UNTIL 4/8/19  [facility] send to [facility], Disp: 90 tablet, Rfl: 0    lactulose 20 g/30 mL, Take 1-2 tablespoons daily PRN as needed for chronic constipation  , Disp: 473 mL, Rfl: 2    omeprazole (PriLOSEC) 20 mg delayed release capsule, omeprazole 20 mg capsule,delayed release, Disp: , Rfl:     predniSONE 10 mg tablet, prednisone 10 mg tablet, Disp: , Rfl:     rosuvastatin (CRESTOR) 10 MG tablet, Take 1 tablet (10 mg total) by mouth once at bedtime, Disp: 90 tablet, Rfl: 3    Current Facility-Administered Medications:     iohexol (OMNIPAQUE) 300 mg/mL injection 50 mL, 50 mL, Intra-articular, Once, Sudhir Lyn DO    lidocaine (PF) (XYLOCAINE-MPF) 1 % injection 5 mL, 5 mL, Other, Once, Sudhir Lyn,     lidocaine (PF) (XYLOCAINE-MPF) 2 % injection 5 mL, 5 mL, Intra-articular, Once, Sudhir Lyn DO    methylPREDNISolone acetate (DEPO-MEDROL) injection 80 mg, 80 mg, Intra-articular, Once, Sudhir Lyn, DO    Allergies   Allergen Reactions    Fentanyl Other (See Comments)     "Makes me Suicidal"  Happened when dosage increased   Penicillins Other (See Comments)     As a child unknown    Pravastatin Other (See Comments)     Reaction Date: [id number];   Muscle weakness    Sulfa Antibiotics Other (See Comments)     As a child unknown    Vytorin [Ezetimibe-Simvastatin]      Reaction Date: [id number];        Physical Exam:   Vitals:    01/31/19 1453   BP: 139/86   Pulse: 79   Resp: 20   Temp: 98 2 °F (36 8 °C)   SpO2: 96%     General: Awake, Alert, Oriented x 3, Mood and affect appropriate  Respiratory: Respirations even and unlabored  Cardiovascular: Peripheral pulses intact; no edema  Musculoskeletal Exam:  Pain to palpation left PSIS    ASA Score: II    Patient/Chart Verification  Patient ID Verified: Verbal  ID Band Applied: No  Consents Confirmed: Procedural, To be obtained in the Pre-Procedure area  H&P( within 30 days) Verified: To be obtained in the Pre-Procedure area  Allergies Reviewed: Yes  Anticoag/NSAID held?: NA  Currently on antibiotics?: No    Assessment:   1  Sacroiliitis, not elsewhere classified ([facility] )    2   Disorder of sacrum        Plan: LT SIJ

## 2019-02-07 ENCOUNTER — TELEPHONE (OUTPATIENT)
Dept: PAIN MEDICINE | Facility: CLINIC | Age: 64
End: 2019-02-07

## 2019-04-10 ENCOUNTER — OFFICE VISIT (OUTPATIENT)
Dept: PAIN MEDICINE | Facility: CLINIC | Age: 64
End: 2019-04-10
Payer: COMMERCIAL

## 2019-04-10 VITALS
SYSTOLIC BLOOD PRESSURE: 138 MMHG | BODY MASS INDEX: 38.94 KG/M2 | WEIGHT: 272 LBS | HEART RATE: 72 BPM | HEIGHT: 70 IN | DIASTOLIC BLOOD PRESSURE: 80 MMHG

## 2019-04-10 DIAGNOSIS — M51.36 LUMBAR DEGENERATIVE DISC DISEASE: ICD-10-CM

## 2019-04-10 DIAGNOSIS — M54.16 CHRONIC LUMBAR RADICULOPATHY: ICD-10-CM

## 2019-04-10 DIAGNOSIS — G89.4 CHRONIC PAIN DISORDER: ICD-10-CM

## 2019-04-10 DIAGNOSIS — M54.42 CHRONIC BILATERAL LOW BACK PAIN WITH BILATERAL SCIATICA: Primary | ICD-10-CM

## 2019-04-10 DIAGNOSIS — G89.29 CHRONIC BILATERAL LOW BACK PAIN WITH BILATERAL SCIATICA: Primary | ICD-10-CM

## 2019-04-10 DIAGNOSIS — M25.551 CHRONIC PAIN OF RIGHT HIP: ICD-10-CM

## 2019-04-10 DIAGNOSIS — M48.062 SPINAL STENOSIS OF LUMBAR REGION WITH NEUROGENIC CLAUDICATION: ICD-10-CM

## 2019-04-10 DIAGNOSIS — M54.6 CHRONIC BILATERAL THORACIC BACK PAIN: ICD-10-CM

## 2019-04-10 DIAGNOSIS — M54.41 CHRONIC BILATERAL LOW BACK PAIN WITH BILATERAL SCIATICA: Primary | ICD-10-CM

## 2019-04-10 DIAGNOSIS — G60.9 IDIOPATHIC PERIPHERAL NEUROPATHY: ICD-10-CM

## 2019-04-10 DIAGNOSIS — G89.29 CHRONIC BILATERAL THORACIC BACK PAIN: ICD-10-CM

## 2019-04-10 DIAGNOSIS — M96.1 LUMBAR POST-LAMINECTOMY SYNDROME: ICD-10-CM

## 2019-04-10 DIAGNOSIS — M50.30 DDD (DEGENERATIVE DISC DISEASE), CERVICAL: ICD-10-CM

## 2019-04-10 DIAGNOSIS — G89.29 CHRONIC PAIN OF RIGHT HIP: ICD-10-CM

## 2019-04-10 DIAGNOSIS — M54.14 THORACIC RADICULOPATHY: ICD-10-CM

## 2019-04-10 PROCEDURE — 99214 OFFICE O/P EST MOD 30 MIN: CPT | Performed by: NURSE PRACTITIONER

## 2019-04-10 RX ORDER — HYDROCODONE BITARTRATE AND ACETAMINOPHEN 10; 325 MG/1; MG/1
TABLET ORAL
Qty: 90 TABLET | Refills: 0 | Status: SHIPPED | OUTPATIENT
Start: 2019-04-10 | End: 2019-04-10 | Stop reason: SDUPTHER

## 2019-04-10 RX ORDER — HYDROCODONE BITARTRATE AND ACETAMINOPHEN 10; 325 MG/1; MG/1
TABLET ORAL
Qty: 90 TABLET | Refills: 0 | Status: SHIPPED | OUTPATIENT
Start: 2019-04-10 | End: 2019-07-02 | Stop reason: SDUPTHER

## 2019-04-15 ENCOUNTER — HOSPITAL ENCOUNTER (OUTPATIENT)
Dept: RADIOLOGY | Facility: CLINIC | Age: 64
Discharge: HOME | End: 2019-04-15
Attending: ANESTHESIOLOGY
Payer: COMMERCIAL

## 2019-04-15 VITALS
RESPIRATION RATE: 18 BRPM | OXYGEN SATURATION: 98 % | TEMPERATURE: 98.7 F | SYSTOLIC BLOOD PRESSURE: 122 MMHG | DIASTOLIC BLOOD PRESSURE: 78 MMHG | HEART RATE: 70 BPM

## 2019-04-15 DIAGNOSIS — M25.551 CHRONIC PAIN OF RIGHT HIP: ICD-10-CM

## 2019-04-15 DIAGNOSIS — G89.29 CHRONIC PAIN OF RIGHT HIP: ICD-10-CM

## 2019-04-15 PROCEDURE — 77002 NEEDLE LOCALIZATION BY XRAY: CPT | Performed by: ANESTHESIOLOGY

## 2019-04-15 PROCEDURE — 77002 NEEDLE LOCALIZATION BY XRAY: CPT

## 2019-04-15 PROCEDURE — 20610 DRAIN/INJ JOINT/BURSA W/O US: CPT | Performed by: ANESTHESIOLOGY

## 2019-04-15 RX ORDER — LIDOCAINE HYDROCHLORIDE 10 MG/ML
5 INJECTION, SOLUTION EPIDURAL; INFILTRATION; INTRACAUDAL; PERINEURAL ONCE
Status: COMPLETED | OUTPATIENT
Start: 2019-04-15 | End: 2019-04-15

## 2019-04-15 RX ORDER — METHYLPREDNISOLONE ACETATE 80 MG/ML
80 INJECTION, SUSPENSION INTRA-ARTICULAR; INTRALESIONAL; INTRAMUSCULAR; PARENTERAL; SOFT TISSUE ONCE
Status: COMPLETED | OUTPATIENT
Start: 2019-04-15 | End: 2019-04-15

## 2019-04-15 RX ADMIN — IOHEXOL 1 ML: 300 INJECTION, SOLUTION INTRAVENOUS at 09:06

## 2019-04-15 RX ADMIN — LIDOCAINE HYDROCHLORIDE 2 ML: 20 INJECTION, SOLUTION EPIDURAL; INFILTRATION; INTRACAUDAL at 09:07

## 2019-04-15 RX ADMIN — METHYLPREDNISOLONE ACETATE 80 MG: 80 INJECTION, SUSPENSION INTRA-ARTICULAR; INTRALESIONAL; INTRAMUSCULAR; SOFT TISSUE at 09:08

## 2019-04-15 RX ADMIN — LIDOCAINE HYDROCHLORIDE 3 ML: 10 INJECTION, SOLUTION EPIDURAL; INFILTRATION; INTRACAUDAL; PERINEURAL at 09:06

## 2019-04-22 ENCOUNTER — TELEPHONE (OUTPATIENT)
Dept: PAIN MEDICINE | Facility: CLINIC | Age: 64
End: 2019-04-22

## 2019-06-06 ENCOUNTER — TELEPHONE (OUTPATIENT)
Dept: FAMILY MEDICINE CLINIC | Facility: HOSPITAL | Age: 64
End: 2019-06-06

## 2019-06-06 NOTE — TELEPHONE ENCOUNTER
Has appointment coming up this month  Wants to know if he's supposed to have blood work done prior to the appointment

## 2019-06-07 DIAGNOSIS — E78.2 MIXED HYPERLIPIDEMIA: ICD-10-CM

## 2019-06-07 DIAGNOSIS — Z12.5 SCREENING FOR MALIGNANT NEOPLASM OF PROSTATE: ICD-10-CM

## 2019-06-07 DIAGNOSIS — I10 ESSENTIAL HYPERTENSION: Primary | ICD-10-CM

## 2019-06-07 DIAGNOSIS — R73.9 HYPERGLYCEMIA: ICD-10-CM

## 2019-06-10 ENCOUNTER — APPOINTMENT (OUTPATIENT)
Dept: LAB | Facility: HOSPITAL | Age: 64
End: 2019-06-10
Payer: COMMERCIAL

## 2019-06-10 DIAGNOSIS — I10 ESSENTIAL HYPERTENSION: ICD-10-CM

## 2019-06-10 DIAGNOSIS — R73.9 HYPERGLYCEMIA: ICD-10-CM

## 2019-06-10 DIAGNOSIS — E78.2 MIXED HYPERLIPIDEMIA: ICD-10-CM

## 2019-06-10 DIAGNOSIS — Z12.5 SCREENING FOR MALIGNANT NEOPLASM OF PROSTATE: ICD-10-CM

## 2019-06-10 LAB
ALBUMIN SERPL BCP-MCNC: 3.7 G/DL (ref 3.5–5)
ALP SERPL-CCNC: 44 U/L (ref 46–116)
ALT SERPL W P-5'-P-CCNC: 34 U/L (ref 12–78)
ANION GAP SERPL CALCULATED.3IONS-SCNC: 10 MMOL/L (ref 4–13)
AST SERPL W P-5'-P-CCNC: 21 U/L (ref 5–45)
BASOPHILS # BLD AUTO: 0.04 THOUSANDS/ΜL (ref 0–0.1)
BASOPHILS NFR BLD AUTO: 1 % (ref 0–1)
BILIRUB SERPL-MCNC: 0.4 MG/DL (ref 0.2–1)
BUN SERPL-MCNC: 18 MG/DL (ref 5–25)
CALCIUM SERPL-MCNC: 9.2 MG/DL (ref 8.3–10.1)
CHLORIDE SERPL-SCNC: 104 MMOL/L (ref 100–108)
CHOLEST SERPL-MCNC: 121 MG/DL (ref 50–200)
CO2 SERPL-SCNC: 28 MMOL/L (ref 21–32)
CREAT SERPL-MCNC: 1.04 MG/DL (ref 0.6–1.3)
EOSINOPHIL # BLD AUTO: 0.23 THOUSAND/ΜL (ref 0–0.61)
EOSINOPHIL NFR BLD AUTO: 5 % (ref 0–6)
ERYTHROCYTE [DISTWIDTH] IN BLOOD BY AUTOMATED COUNT: 13.5 % (ref 11.6–15.1)
EST. AVERAGE GLUCOSE BLD GHB EST-MCNC: 117 MG/DL
GFR SERPL CREATININE-BSD FRML MDRD: 77 ML/MIN/1.73SQ M
GLUCOSE P FAST SERPL-MCNC: 92 MG/DL (ref 65–99)
HBA1C MFR BLD: 5.7 % (ref 4.2–6.3)
HCT VFR BLD AUTO: 49.4 % (ref 36.5–49.3)
HDLC SERPL-MCNC: 37 MG/DL (ref 40–60)
HGB BLD-MCNC: 15.7 G/DL (ref 12–17)
IMM GRANULOCYTES # BLD AUTO: 0 THOUSAND/UL (ref 0–0.2)
IMM GRANULOCYTES NFR BLD AUTO: 0 % (ref 0–2)
LDLC SERPL CALC-MCNC: 66 MG/DL (ref 0–100)
LYMPHOCYTES # BLD AUTO: 1.86 THOUSANDS/ΜL (ref 0.6–4.47)
LYMPHOCYTES NFR BLD AUTO: 38 % (ref 14–44)
MCH RBC QN AUTO: 29 PG (ref 26.8–34.3)
MCHC RBC AUTO-ENTMCNC: 31.8 G/DL (ref 31.4–37.4)
MCV RBC AUTO: 91 FL (ref 82–98)
MONOCYTES # BLD AUTO: 0.49 THOUSAND/ΜL (ref 0.17–1.22)
MONOCYTES NFR BLD AUTO: 10 % (ref 4–12)
NEUTROPHILS # BLD AUTO: 2.32 THOUSANDS/ΜL (ref 1.85–7.62)
NEUTS SEG NFR BLD AUTO: 46 % (ref 43–75)
NRBC BLD AUTO-RTO: 0 /100 WBCS
PLATELET # BLD AUTO: 274 THOUSANDS/UL (ref 149–390)
PMV BLD AUTO: 8.8 FL (ref 8.9–12.7)
POTASSIUM SERPL-SCNC: 4 MMOL/L (ref 3.5–5.3)
PROT SERPL-MCNC: 8 G/DL (ref 6.4–8.2)
PSA SERPL-MCNC: 0.6 NG/ML (ref 0–4)
RBC # BLD AUTO: 5.41 MILLION/UL (ref 3.88–5.62)
SODIUM SERPL-SCNC: 142 MMOL/L (ref 136–145)
TRIGL SERPL-MCNC: 91 MG/DL
TSH SERPL DL<=0.05 MIU/L-ACNC: 1.46 UIU/ML (ref 0.36–3.74)
WBC # BLD AUTO: 4.94 THOUSAND/UL (ref 4.31–10.16)

## 2019-06-10 PROCEDURE — 80053 COMPREHEN METABOLIC PANEL: CPT

## 2019-06-10 PROCEDURE — 83036 HEMOGLOBIN GLYCOSYLATED A1C: CPT

## 2019-06-10 PROCEDURE — 80061 LIPID PANEL: CPT

## 2019-06-10 PROCEDURE — 84443 ASSAY THYROID STIM HORMONE: CPT

## 2019-06-10 PROCEDURE — G0103 PSA SCREENING: HCPCS

## 2019-06-10 PROCEDURE — 85025 COMPLETE CBC W/AUTO DIFF WBC: CPT

## 2019-06-10 PROCEDURE — 36415 COLL VENOUS BLD VENIPUNCTURE: CPT

## 2019-06-19 ENCOUNTER — OFFICE VISIT (OUTPATIENT)
Dept: FAMILY MEDICINE CLINIC | Facility: HOSPITAL | Age: 64
End: 2019-06-19
Payer: COMMERCIAL

## 2019-06-19 VITALS
OXYGEN SATURATION: 96 % | HEIGHT: 70 IN | BODY MASS INDEX: 38.14 KG/M2 | WEIGHT: 266.4 LBS | TEMPERATURE: 99.5 F | DIASTOLIC BLOOD PRESSURE: 80 MMHG | SYSTOLIC BLOOD PRESSURE: 124 MMHG | HEART RATE: 71 BPM

## 2019-06-19 DIAGNOSIS — E78.2 MIXED HYPERLIPIDEMIA: ICD-10-CM

## 2019-06-19 DIAGNOSIS — R73.9 HYPERGLYCEMIA: ICD-10-CM

## 2019-06-19 DIAGNOSIS — F11.20 CONTINUOUS OPIOID DEPENDENCE (HCC): ICD-10-CM

## 2019-06-19 DIAGNOSIS — M54.16 CHRONIC LUMBAR RADICULOPATHY: ICD-10-CM

## 2019-06-19 DIAGNOSIS — R07.9 CHEST PAIN, EXERTIONAL: ICD-10-CM

## 2019-06-19 DIAGNOSIS — I10 ESSENTIAL HYPERTENSION: ICD-10-CM

## 2019-06-19 DIAGNOSIS — Z00.00 MEDICARE ANNUAL WELLNESS VISIT, SUBSEQUENT: Primary | ICD-10-CM

## 2019-06-19 DIAGNOSIS — E66.01 MORBID OBESITY DUE TO EXCESS CALORIES (HCC): ICD-10-CM

## 2019-06-19 PROCEDURE — G0439 PPPS, SUBSEQ VISIT: HCPCS | Performed by: FAMILY MEDICINE

## 2019-06-19 PROCEDURE — 99214 OFFICE O/P EST MOD 30 MIN: CPT | Performed by: FAMILY MEDICINE

## 2019-06-19 RX ORDER — DIAZEPAM 10 MG/1
10 TABLET ORAL
Qty: 30 TABLET | Refills: 1 | Status: SHIPPED | OUTPATIENT
Start: 2019-06-19 | End: 2020-06-29 | Stop reason: SDUPTHER

## 2019-06-19 RX ORDER — BISOPROLOL FUMARATE AND HYDROCHLOROTHIAZIDE 2.5; 6.25 MG/1; MG/1
1 TABLET ORAL DAILY
Qty: 90 TABLET | Refills: 3 | Status: SHIPPED | OUTPATIENT
Start: 2019-06-19 | End: 2020-06-23

## 2019-06-19 RX ORDER — MAGNESIUM 30 MG
30 TABLET ORAL DAILY PRN
COMMUNITY
End: 2020-06-29

## 2019-06-20 ENCOUNTER — TELEPHONE (OUTPATIENT)
Dept: FAMILY MEDICINE CLINIC | Facility: HOSPITAL | Age: 64
End: 2019-06-20

## 2019-07-02 ENCOUNTER — OFFICE VISIT (OUTPATIENT)
Dept: PAIN MEDICINE | Facility: CLINIC | Age: 64
End: 2019-07-02
Payer: COMMERCIAL

## 2019-07-02 VITALS
HEIGHT: 70 IN | HEART RATE: 72 BPM | WEIGHT: 270 LBS | DIASTOLIC BLOOD PRESSURE: 78 MMHG | SYSTOLIC BLOOD PRESSURE: 128 MMHG | BODY MASS INDEX: 38.65 KG/M2

## 2019-07-02 DIAGNOSIS — G89.4 CHRONIC PAIN SYNDROME: Primary | ICD-10-CM

## 2019-07-02 DIAGNOSIS — M54.6 CHRONIC BILATERAL THORACIC BACK PAIN: ICD-10-CM

## 2019-07-02 DIAGNOSIS — G89.29 CHRONIC PAIN OF RIGHT HIP: ICD-10-CM

## 2019-07-02 DIAGNOSIS — M54.42 CHRONIC BILATERAL LOW BACK PAIN WITH BILATERAL SCIATICA: ICD-10-CM

## 2019-07-02 DIAGNOSIS — M54.41 CHRONIC BILATERAL LOW BACK PAIN WITH BILATERAL SCIATICA: ICD-10-CM

## 2019-07-02 DIAGNOSIS — M51.36 LUMBAR DEGENERATIVE DISC DISEASE: ICD-10-CM

## 2019-07-02 DIAGNOSIS — G89.29 CHRONIC BILATERAL THORACIC BACK PAIN: ICD-10-CM

## 2019-07-02 DIAGNOSIS — M54.16 CHRONIC LUMBAR RADICULOPATHY: ICD-10-CM

## 2019-07-02 DIAGNOSIS — M25.551 CHRONIC PAIN OF RIGHT HIP: ICD-10-CM

## 2019-07-02 DIAGNOSIS — M96.1 LUMBAR POST-LAMINECTOMY SYNDROME: ICD-10-CM

## 2019-07-02 DIAGNOSIS — G60.9 IDIOPATHIC PERIPHERAL NEUROPATHY: ICD-10-CM

## 2019-07-02 DIAGNOSIS — M48.062 SPINAL STENOSIS OF LUMBAR REGION WITH NEUROGENIC CLAUDICATION: ICD-10-CM

## 2019-07-02 DIAGNOSIS — G89.4 CHRONIC PAIN DISORDER: ICD-10-CM

## 2019-07-02 DIAGNOSIS — G89.29 CHRONIC BILATERAL LOW BACK PAIN WITH BILATERAL SCIATICA: ICD-10-CM

## 2019-07-02 PROCEDURE — 99214 OFFICE O/P EST MOD 30 MIN: CPT | Performed by: NURSE PRACTITIONER

## 2019-07-02 RX ORDER — HYDROCODONE BITARTRATE AND ACETAMINOPHEN 10; 325 MG/1; MG/1
TABLET ORAL
Qty: 90 TABLET | Refills: 0 | Status: SHIPPED | OUTPATIENT
Start: 2019-07-02 | End: 2019-10-22 | Stop reason: SDUPTHER

## 2019-07-02 RX ORDER — HYDROCODONE BITARTRATE AND ACETAMINOPHEN 10; 325 MG/1; MG/1
TABLET ORAL
Qty: 90 TABLET | Refills: 0 | Status: SHIPPED | OUTPATIENT
Start: 2019-07-02 | End: 2019-07-02 | Stop reason: SDUPTHER

## 2019-07-02 NOTE — PATIENT INSTRUCTIONS
Hydrocodone Fill Dates: 7/17/19 & 8/14/19      Opioid Pain Management   AMBULATORY CARE:   An opioid  is a type of medicine used to treat pain  Examples of opioids are oxycodone, morphine, fentanyl, or codeine  Take opioid medicines as directed, for the condition it is prescribed:  Common problems that may occur when you do not take opioid medicines as directed include the following:  · Health problems  may occur  You may have trouble breathing  You may also develop liver or kidney damage, or stomach bleeding  Any of these health problems can become life-threatening  · Opioid dependence  means your body needs the opioid medicine to keep it from going through withdrawal      · Opioid tolerance  means the opioid does not control pain as well as it used to  You need higher doses of the opioid to get pain relief  · Opioid addiction  means you are not able to control the use of the opioid medicine  You use it when you do not have pain  You crave the opioid medicine  Call 911 or have someone call 911 for any of the following:   · You are breathing slower than normal, or you have trouble breathing  · You cannot be awakened  · You have a seizure  Seek care immediately if:   · Your heart is beating slower than usual     · Your heart feels like it is jumping or fluttering  · You are so sleepy that you cannot stay awake  · You have severe muscle pain or weakness  · You see or hear things that are not real   Contact your healthcare provider if:   · You are too dizzy to stand up  · Your pain gets worse or you have new pain  · You cannot do your usual activities because of side effects from the opioid  · You are constipated or have abdominal pain  · You have questions or concerns about your condition or care  Opioid safety measures:   · Take your medicine as directed  Ask if you need more information on how to take your medicine correctly   Follow up with your healthcare provider regularly  You may need to have your dose adjusted  Do not use opioid medicine if you are pregnant or breastfeeding  · Give your healthcare provider a list of all your medicines  Include any over-the-counter medicines, vitamins, and herbs  It can be dangerous to take opioids with certain other medicines, such as antihistamines  · Keep opioid medicine in a safe place  Store your opioid medicine in a locked cabinet to keep it away from children and others  · Do not drink alcohol while you use opioids  Alcohol use with an opioid medicine can make you sleepy and slow your breathing rate  You may stop breathing completely  · Do not drive or operate heavy machinery after you take opioid medicine  Opioid medicine can make you drowsy and make it hard to concentrate  You may injure yourself or others if you drive or operate heavy machinery while taking your medicine  · Drink fluids and eat high-fiber foods  Fluids and fiber will help prevent constipation  Ask your healthcare provider what fluids are right for you and how much you should drink  Also ask for a list of foods that contain fiber  Follow up with your healthcare provider as directed: You may need to have your dose adjusted  You may be referred to a pain specialist  Write down your questions so you remember to ask them during your visits  © 2017 [employer]  Information is for End User's use only and may not be sold, redistributed or otherwise used for commercial purposes  All illustrations and images included in CareNotes® are the copyrighted property of [employer]  or [employer]  The above information is an  only  It is not intended as medical advice for individual conditions or treatments  Talk to your doctor, nurse or pharmacist before following any medical regimen to see if it is safe and effective for you      Buprenorphine (Into the mouth)   Buprenorphine (bue-pre-NOR-feen)  Treats addiction to narcotic pain relievers  Also relieves severe pain  Brand Name(s): Belbuca   There may be other brand names for this medicine  When This Medicine Should Not Be Used: This medicine is not right for everyone  Do not use it if you had an allergic reaction to buprenorphine, or if you severe lung or breathing problems or stomach blockage (including paralytic ileus)  How to Use This Medicine: Thin Sheet, Tablet  · Take your medicine as directed  Your dose may need to be changed several times to find what works best for you  · An overdose can be dangerous  Follow directions carefully so you do not get too much medicine at one time  · Buccal film:   ¨ Do not place a film on an area of the mouth that has sores or lesions  ¨ Use your tongue to wet the side of your cheek, or rinse your mouth with water  ¨ Place the yellow side of the film against the inside of your cheek  ¨ Press down on the film and hold it in place with clean, dry fingers for 5 seconds  ¨ Leave the film in place until it dissolves  Do not touch or move the film  Do not chew or swallow the film  ¨ Do not eat or drink anything until the film is completely dissolved, which is usually within 30 minutes  ¨ Do not use a film that is cut, torn, or damaged  · This medicine should come with a Medication Guide  Ask your pharmacist for a copy if you do not have one  · Missed dose: Take a dose as soon as you remember  If it is almost time for your next dose, wait until then and take a regular dose  Do not take extra medicine to make up for a missed dose  · Store the medicine in a closed container at room temperature, away from heat, moisture, and direct light  Ask your pharmacist about the best way to dispose of medicine you do not use  Drugs and Foods to Avoid:   Ask your doctor or pharmacist before using any other medicine, including over-the-counter medicines, vitamins, and herbal products    · Some medicines can affect how buprenorphine works  Tell your doctor if you are using any of the following:   ¨ Carbamazepine, erythromycin, ketoconazole, phenytoin, rifampin  ¨ Diuretic (water pill)  ¨ HIV or AIDS medicine (including atazanavir, delavirdine, efavirenz, etravirine, nevirapine, ritonavir)  ¨ Medicine to treat depression, including MAO inhibitor  ¨ Medicine to treat heart rhythm (including amiodarone, disopyramide, dofetilide, procainamide, quinidine, sotalol)  ¨ Phenothiazine medicine  ¨ Tranquilizer or benzodiazepine medicine (including alprazolam, clonazepam, diazepam, lorazepam)  · Do not drink alcohol while you are using this medicine  · Tell your doctor if you use anything else that makes you sleepy  Some examples are allergy medicine, narcotic pain medicine, and alcohol  Tell your doctor if you are also using butorphanol, nalbuphine, pentazocine, a benzodiazepine, or a muscle relaxer  Warnings While Using This Medicine:   · Tell your doctor if you are pregnant or breastfeeding, or if you have kidney disease, liver disease, an adrenal gland problem, an enlarged prostate, gallbladder problems, lung or breathing problems (including COPD, apnea), mouth sores or ulcers, pancreas problems, trouble urinating, an underactive thyroid, or mental health problems (including depression)  Tell your doctor if you have heart disease, congestive heart failure, a slow heartbeat, or a history of heart rhythm problems (long QT syndrome)  Tell your doctor if you have ever had a head injury, brain tumor, seizures, stroke, or alcohol or drug abuse  · This medicine may cause the following problems:  ¨ High risk of overdose, which can lead to death  ¨ Respiratory depression (serious breathing problem that can be life-threatening)  ¨ QT prolongation (heart rhythm problem)  ¨ Liver problems  ¨ Serotonin syndrome, when used with certain medicines  · This medicine may make you dizzy or drowsy   Do not drive or do anything that could be dangerous until you know how this medicine affects you  Sit or lie down if you feel dizzy  Stand up carefully  · Tell any doctor or dentist who treats you that you are using this medicine  · This medicine can be habit-forming  Do not use more than your prescribed dose  Call your doctor if you think your medicine is not working  · Do not stop using this medicine suddenly  Your doctor will need to slowly decrease your dose before you stop it completely  · This medicine could cause infertility  Talk with your doctor before using this medicine if you plan to have children  · Keep all medicine out of the reach of children  Never share your medicine with anyone  Possible Side Effects While Using This Medicine:   Call your doctor right away if you notice any of these side effects:  · Allergic reaction: Itching or hives, swelling in your face or hands, swelling or tingling in your mouth or throat, chest tightness, trouble breathing  · Anxiety, restlessness, fast heartbeat, fever, sweating, muscle spasms, twitching, nausea, vomiting, diarrhea, seeing or hearing things that are not there  · Dark urine or pale stools, nausea, vomiting, loss of appetite, stomach pain, yellow skin or eyes  · Extreme dizziness or weakness, shallow breathing, sweating, seizures, cold or clammy skin  · Fast, pounding, or uneven heartbeat  · Severe confusion, lightheadedness, dizziness, or fainting  · Severe constipation, stomach pain, or vomiting  · Trouble breathing or slow breathing  If you notice these less serious side effects, talk with your doctor:   · Mild constipation  · Headache  If you notice other side effects that you think are caused by this medicine, tell your doctor  Call your doctor for medical advice about side effects  You may report side effects to FDA at 9-113-FDA-7683  © 2017 [employer] Information is for End User's use only and may not be sold, redistributed or otherwise used for commercial purposes    The above information is an  only  It is not intended as medical advice for individual conditions or treatments  Talk to your doctor, nurse or pharmacist before following any medical regimen to see if it is safe and effective for you

## 2019-07-02 NOTE — PROGRESS NOTES
Assessment:  1  Chronic pain syndrome    2  Chronic bilateral low back pain with bilateral sciatica    3  Chronic lumbar radiculopathy    4  Idiopathic peripheral neuropathy    5  Lumbar degenerative disc disease    6  Chronic bilateral thoracic back pain    7  Chronic pain of right hip    8  Lumbar post-laminectomy syndrome    9  Spinal stenosis of lumbar region with neurogenic claudication    10  Chronic pain disorder        Plan:  While the patient was in the office today, I did have a thorough conversation with the patient regarding his chronic pain syndrome, symptoms, treatment plan options  I reviewed with the patient that at this time since he is not a surgical candidate and he already has a spinal cord stimulator and did try multiple adjustments on his own and previous adjustments with the [employer] representative, at this point I feel it is in his best interest to try to find a long-acting medications such as Belbuca 150 mcg BID to try to better manage his baseline pain and also decrease his need for the p r n  Norco in both dosage and frequency in the future  Especially since we have also previously tried and failed every alternative neuropathic medication including gabapentin, Cymbalta, nortriptyline, Lyrica, and Savella  We will continue with the p r n  Norco as prescribed for now as well as the lactulose as needed  I advised the patient that if they experience any side effects or issues with the changes in their medication regiment, they should give our office a call to discuss  I also advised the patient not to drive or operate machinery until they see how the changes in the medication regimen affects them  The patient was agreeable and verbalized an understanding  [place] Prescription Drug Monitoring Program report was reviewed and was appropriate     The patient was not picked for a pill count today, but he did bring his medications as required          The patient's opioid scripts were sent to their pharmacy electronically and was given a 2 month supply of prescriptions with a Do Not Fill date(s) of July 17, 2019 and August 14, 2019  There are risks associated with opioid medications, including dependence, addiction and tolerance  The patient understands and agrees to use these medications only as prescribed  Potential side effects of the medications include, but are not limited to, constipation, drowsiness, addiction, impaired judgment and risk of fatal overdose if not taken as prescribed  The patient was warned against driving while taking sedation medications  Sharing medications is a felony  At this point in time, the patient is showing no signs of addiction, abuse, diversion or suicidal ideation  An oral drug screen swab was collected at today's office visit  The swab will be sent for confirmatory testing  The drug screen is medically necessary because the patient is either dependent on opioid medication or is being considered for opioid medication therapy and the results could impact ongoing or future treatment  The drug screen is to evaluate for the presences or absence of prescribed, non-prescribed, and/or illicit drugs/substances  The patient will follow-up in 8 weeks for medication prescription refill and reevaluation  The patient was advised to contact the office should their symptoms worsen in the interim  The patient was agreeable and verbalized an understanding  History of Present Illness: The patient is a 60 y o  male last seen on 4/15/19 who presents for a follow up office visit in regards to chronic pain syndrome secondary to lumbar post-laminectomy syndrome with idiopathic peripheral neuropathy  The patient currently reports that since his last office visit although his back pain is relatively stable and manageable, the leg pain and radicular symptoms that, go have been severe and significant at times    He reports that he did try to adjust his spinal cord stimulator, without any relief of pain and is not interested in meeting with the [address] [place] [address] team for any reprogramming at this time  The patient presents today to discuss his medication regimen treatment plan and to see if there is anything else that can be done to better manage his overall pain symptoms  Current pain medications includes:  Norco 10/3251 p  o  t i d  p r n  for pain and lactulose as needed for constipation  The patient reports that this regimen is providing 50% pain relief  The patient is reporting no side effects from this pain medication regimen  Pain Contract Signed: 1/16/19  Last Urine Drug Screen: 7/2/19    I have personally reviewed and/or updated the patient's past medical history, past surgical history, family history, social history, current medications, allergies, and vital signs today  Review of Systems:    Review of Systems   Respiratory: Positive for shortness of breath  Cardiovascular: Positive for chest pain  Gastrointestinal: Negative for constipation, diarrhea, nausea and vomiting  Musculoskeletal: Positive for gait problem  Negative for arthralgias, joint swelling (JOINT STIFFNESS) and myalgias  Skin: Negative for rash  Neurological: Positive for weakness  Negative for dizziness and seizures  All other systems reviewed and are negative          Past Medical History:   Diagnosis Date    Acute low back pain     10 AUG 2016 RESOLVED    [facility])     Bursitis of hip     Carpal tunnel syndrome     Chronic bilateral low back pain with bilateral sciatica     Chronic lumbar radiculopathy     Chronic narcotic dependence (HCC)     Chronic pain syndrome     Constipation due to opioid therapy     DDD (degenerative disc disease), lumbar     Deep vein thrombosis of left upper extremity ([facility] [address] )     [id number]  RESOLVED    Depression     Diverticula of colon     [id number] RESOLVED    DVT (deep venous thrombosis) ([facility])     LUE    Edema     History of staph infection     Hyperglycemia     Hyperlipidemia     Hypertension     Insomnia     Lateral epicondylitis     Left inguinal hernia     Methicillin resistant Staphylococcus aureus septicemia (HCC)     Morbid obesity due to excess calories (HCC)     Obesity     Peripheral neuropathy     Post laminectomy syndrome     RLS (restless legs syndrome)     Septicemia ([facility] [address] )     MRSA    Umbilical hernia        Past Surgical History:   Procedure Laterality Date    BACK SURGERY      laminectomy, hardware    CARPAL TUNNEL RELEASE Left     CERVICAL SPINE SURGERY      COLONOSCOPY      ELBOW SURGERY      ELBOW SURGERY      KNEE ARTHROSCOPY Right     knee    KNEE SURGERY Right     ARTHOSCOPY KNEE    NECK SURGERY      1997    [place] COLONOSCOPY FLX DX W/COLLJ SPEC WHEN PFRMD N/A 1/24/2017    Procedure: COLONOSCOPY;  Surgeon: Constantin Arrieta MD;  Location:  MAIN OR;  Service: General    SHOULDER SURGERY      SPINAL CORD STIMULATOR IMPLANT         Family History   Problem Relation Age of Onset    Heart disease Mother     Cancer Mother         breast, stomach    Aneurysm Mother     Heart disease Father     Cancer Father         skin, liver, colon DECESED AGE 58       Social History     Occupational History    Not on file   Tobacco Use    Smoking status: Never Smoker    Smokeless tobacco: Never Used   Substance and Sexual Activity    Alcohol use: Yes     Comment: social    Drug use: No    Sexual activity: Yes         Current Outpatient Medications:     aspirin 81 MG tablet, Take 81 mg by mouth daily, Disp: , Rfl:     bisoprolol-hydrochlorothiazide (ZIAC) 2 5-6 25 MG per tablet, Take 1 tablet by mouth daily, Disp: 90 tablet, Rfl: 3    diazepam (VALIUM) 10 mg tablet, Take 1 tablet (10 mg total) by mouth daily at bedtime as needed for anxiety, Disp: 30 tablet, Rfl: 1    HYDROcodone-acetaminophen (NORCO)  mg per tablet, Take 1 PO TID PRN for pain   DO NOT FILL UNTIL: 7/17/19 Please send to [facility], Disp: 90 tablet, Rfl: 0    lactulose 20 g/30 mL, Take 1-2 tablespoons daily PRN as needed for chronic constipation  , Disp: 473 mL, Rfl: 2    magnesium 30 MG tablet, Take 30 mg by mouth daily as needed, Disp: , Rfl:     Multiple Vitamin (MULTIVITAMIN) tablet, Take 1 tablet by mouth every other day , Disp: , Rfl:     rosuvastatin (CRESTOR) 10 MG tablet, Take 1 tablet (10 mg total) by mouth once at bedtime, Disp: 90 tablet, Rfl: 3    Buprenorphine HCl 150 MCG FILM, Apply 1 patch to inside of cheek BID for pain  [facility] send to [facility]  , Disp: 60 each, Rfl: 1    Allergies   Allergen Reactions    Fentanyl Other (See Comments)     "Makes me Suicidal"  Happened when dosage increased   Penicillins Other (See Comments)     As a child unknown    Pravastatin Other (See Comments)     Reaction Date: [id number];   Muscle weakness    Sulfa Antibiotics Other (See Comments)     As a child unknown    Vytorin [Ezetimibe-Simvastatin]      Reaction Date: [id number];        Physical Exam:    /78   Pulse 72   Ht 5' 10" (1 778 m)   Wt 122 kg (270 lb)   BMI 38 74 kg/m²     Constitutional:normal, well developed, well nourished, alert, in no distress and non-toxic and no overt pain behavior  and overweight  Eyes:anicteric  HEENT:grossly intact  Neck:supple, symmetric, trachea midline and no masses   Pulmonary:even and unlabored  Cardiovascular:No edema or pitting edema present  Skin:Normal without rashes or lesions and well hydrated  Psychiatric:Mood and affect appropriate  Neurologic:Cranial Nerves II-XII grossly intact  Musculoskeletal:The patient's gait is slightly antalgic, painful, but steady without the use of any assistive devices  Imaging  No orders to display         No orders of the defined types were placed in this encounter

## 2019-07-08 ENCOUNTER — TELEPHONE (OUTPATIENT)
Dept: PAIN MEDICINE | Facility: CLINIC | Age: 64
End: 2019-07-08

## 2019-07-08 DIAGNOSIS — G89.4 CHRONIC PAIN SYNDROME: Primary | ICD-10-CM

## 2019-07-08 RX ORDER — BUPRENORPHINE 7.5 UG/H
PATCH TRANSDERMAL
Qty: 4 PATCH | Refills: 1 | Status: SHIPPED | OUTPATIENT
Start: 2019-07-08 | End: 2019-08-27

## 2019-07-08 NOTE — TELEPHONE ENCOUNTER
I e-scribed a Butrans 7 5 mcg dosage script to his pharmacy  Please remind him that the patch is to be changed every 7 days and that he should not submerse into any water, but taking a shower is fine  He needs to alternate patch application sites to avoid skin irritation  He should see how the medication affects him before he drives or operates machinery  He is to call us if he has any side effects or issues  He is also to call us if the Butrans need pre-auth  Thank you

## 2019-07-08 NOTE — TELEPHONE ENCOUNTER
S/w pt, advised of above  Pt verbalized understanding and agreement  Advised pt, will make DG aware  Anticipate rx will be sent to the pharmacy today  This office will cb if there is anything additional or change in the plan as discussed

## 2019-07-08 NOTE — TELEPHONE ENCOUNTER
Can you please call the patient and advise him that his insurance will not cover the oral Belbuca until he tries and fails the Butrans patch  Does he want to try the patch and if it fails we can try again to get the Oral Belbuca covered? Thank you

## 2019-07-10 ENCOUNTER — TELEPHONE (OUTPATIENT)
Dept: PAIN MEDICINE | Facility: CLINIC | Age: 64
End: 2019-07-10

## 2019-07-10 NOTE — TELEPHONE ENCOUNTER
Can you please call the patient and let him know that the Butrans patch was APPROVED for one year  Thank you

## 2019-07-10 NOTE — TELEPHONE ENCOUNTER
S/w pt and advised the same  Pt asking if he should continue his hydrocodone  Advised pt will check with DG but will likely continue as needed and hopefully butrans patch will decrease the need for it  Pt verbalized understanding  States he takes hydrocodone up to q6hrs prn  Advised hydrocodone ordered tid prn  Are you OK with pt taking as ordered if needed along with butrans?

## 2019-07-10 NOTE — TELEPHONE ENCOUNTER
Yes he can still take the Hydrocodone PRN with the butrans, but the hope is that he finds he will not need to use it as much or as often

## 2019-07-12 ENCOUNTER — HOSPITAL ENCOUNTER (OUTPATIENT)
Dept: NON INVASIVE DIAGNOSTICS | Facility: HOSPITAL | Age: 64
Discharge: HOME | End: 2019-07-12
Payer: COMMERCIAL

## 2019-07-12 DIAGNOSIS — R07.9 CHEST PAIN, EXERTIONAL: ICD-10-CM

## 2019-07-12 PROCEDURE — 93306 TTE W/DOPPLER COMPLETE: CPT | Performed by: INTERNAL MEDICINE

## 2019-07-12 PROCEDURE — 93306 TTE W/DOPPLER COMPLETE: CPT

## 2019-07-17 ENCOUNTER — TELEPHONE (OUTPATIENT)
Dept: FAMILY MEDICINE CLINIC | Facility: HOSPITAL | Age: 64
End: 2019-07-17

## 2019-08-27 ENCOUNTER — OFFICE VISIT (OUTPATIENT)
Dept: PAIN MEDICINE | Facility: CLINIC | Age: 64
End: 2019-08-27
Payer: COMMERCIAL

## 2019-08-27 VITALS
SYSTOLIC BLOOD PRESSURE: 134 MMHG | BODY MASS INDEX: 38.65 KG/M2 | DIASTOLIC BLOOD PRESSURE: 84 MMHG | HEIGHT: 70 IN | WEIGHT: 270 LBS | HEART RATE: 72 BPM

## 2019-08-27 DIAGNOSIS — M54.41 CHRONIC BILATERAL LOW BACK PAIN WITH BILATERAL SCIATICA: ICD-10-CM

## 2019-08-27 DIAGNOSIS — M54.6 CHRONIC BILATERAL THORACIC BACK PAIN: ICD-10-CM

## 2019-08-27 DIAGNOSIS — M96.1 LUMBAR POST-LAMINECTOMY SYNDROME: ICD-10-CM

## 2019-08-27 DIAGNOSIS — M16.0 PRIMARY OSTEOARTHRITIS OF BOTH HIPS: ICD-10-CM

## 2019-08-27 DIAGNOSIS — M51.36 LUMBAR DEGENERATIVE DISC DISEASE: ICD-10-CM

## 2019-08-27 DIAGNOSIS — M54.14 THORACIC RADICULOPATHY: ICD-10-CM

## 2019-08-27 DIAGNOSIS — G89.4 CHRONIC PAIN DISORDER: ICD-10-CM

## 2019-08-27 DIAGNOSIS — M25.551 CHRONIC PAIN OF BOTH HIPS: ICD-10-CM

## 2019-08-27 DIAGNOSIS — M54.16 CHRONIC LUMBAR RADICULOPATHY: ICD-10-CM

## 2019-08-27 DIAGNOSIS — G89.29 CHRONIC PAIN OF BOTH HIPS: ICD-10-CM

## 2019-08-27 DIAGNOSIS — M48.062 SPINAL STENOSIS OF LUMBAR REGION WITH NEUROGENIC CLAUDICATION: ICD-10-CM

## 2019-08-27 DIAGNOSIS — M25.551 CHRONIC PAIN OF RIGHT HIP: ICD-10-CM

## 2019-08-27 DIAGNOSIS — G89.29 CHRONIC PAIN OF RIGHT HIP: ICD-10-CM

## 2019-08-27 DIAGNOSIS — G89.4 CHRONIC PAIN SYNDROME: Primary | ICD-10-CM

## 2019-08-27 DIAGNOSIS — M54.42 CHRONIC BILATERAL LOW BACK PAIN WITH BILATERAL SCIATICA: ICD-10-CM

## 2019-08-27 DIAGNOSIS — M25.552 CHRONIC PAIN OF BOTH HIPS: ICD-10-CM

## 2019-08-27 DIAGNOSIS — G89.29 CHRONIC BILATERAL THORACIC BACK PAIN: ICD-10-CM

## 2019-08-27 DIAGNOSIS — G89.29 CHRONIC BILATERAL LOW BACK PAIN WITH BILATERAL SCIATICA: ICD-10-CM

## 2019-08-27 DIAGNOSIS — G60.9 IDIOPATHIC PERIPHERAL NEUROPATHY: ICD-10-CM

## 2019-08-27 PROCEDURE — 99214 OFFICE O/P EST MOD 30 MIN: CPT | Performed by: NURSE PRACTITIONER

## 2019-08-27 RX ORDER — BUPRENORPHINE 10 UG/H
PATCH TRANSDERMAL
Qty: 4 PATCH | Refills: 1 | Status: SHIPPED | OUTPATIENT
Start: 2019-08-27 | End: 2019-10-22 | Stop reason: SDUPTHER

## 2019-08-27 NOTE — PROGRESS NOTES
Assessment:  1  Chronic pain syndrome    2  Chronic bilateral low back pain with bilateral sciatica    3  Chronic bilateral thoracic back pain    4  Chronic pain of right hip    5  Lumbar post-laminectomy syndrome    6  Chronic lumbar radiculopathy    7  Idiopathic peripheral neuropathy    8  Thoracic radiculopathy    9  Lumbar degenerative disc disease    10  Spinal stenosis of lumbar region with neurogenic claudication    11  Chronic pain disorder    12  Chronic pain of both hips    13  Primary osteoarthritis of both hips        Plan:  While the patient was in the office today, I did have a thorough conversation with the patient regarding his chronic pain syndrome, symptoms, medication regimen  At this point since he does feel the Butrans patch was helpful, without side effects as he has been very sensitive to other medications, but feels that it was not as helpful as he would like or as long lasting as he would like, we could try to increase the patch to 10 mcg for the next 2 months and see how he does  The patient can continue the p r n  Norco and at this point he was hopeful and very confident that with what he has left he could make it last for 2 months so I did not send a prescription for the p r n  Agatha Ruiz as he would like to try to titrate down on that as much as possible  I advised the patient that if they experience any side effects or issues with the changes in their medication regiment, they should give our office a call to discuss  I also advised the patient not to drive or operate machinery until they see how the changes in the medication regimen affects them  The patient was agreeable and verbalized an understanding       The patient had also asked about may be considering a muscle relaxer at nighttime, however, we decided to hold off on a muscle relaxer for now since he is sometime sensitive to meds and would like to try to get a better  on the Butrans patch 1st as in the future if the patches as helpful we may also consider switching him to [facility]  The patient was agreeable and verbalized an understanding  [place] Prescription Drug Monitoring Program report was reviewed and was appropriate     There are risks associated with opioid medications, including dependence, addiction and tolerance  The patient understands and agrees to use these medications only as prescribed  Potential side effects of the medications include, but are not limited to, constipation, drowsiness, addiction, impaired judgment and risk of fatal overdose if not taken as prescribed  The patient was warned against driving while taking sedation medications  Sharing medications is a felony  At this point in time, the patient is showing no signs of addiction, abuse, diversion or suicidal ideation  The patient will follow-up in 8 weeks for medication prescription refill and reevaluation  The patient was advised to contact the office should their symptoms worsen in the interim  The patient was agreeable and verbalized an understanding  History of Present Illness: The patient is a 60 y o  male last seen on 7/2/19 who presents for a follow up office visit in regards to chronic pain syndrome secondary to lumbar post-laminectomy syndrome and bilateral hip osteoarthritis  The patient currently reports that since his last office visit pain symptoms have slightly improved regards to the back and radicular pain symptoms as he does feel the Butrans patch was definitely helpful, although he has been out of it for almost 2 weeks appropriately  The patient feels that the patch did seem to help his back and leg pain definitely for the 1st 4-5 days, but after that is started to wax and wane with regards to relief  However, he does report that he was definitely using much less Norco and feels that with what he has left on hand he may even make it to his next office visit in 2 months    The patient presents today to discuss his medication regimen and to see if we would be willing to consider increasing the Butrans patch to the next dosage to see if that would be more helpful  Current pain medications includes:   Butrans patch 7 5 mcg applying 1 patch transdermally Q 7 days, lactulose p r n  for constipation, and Norco 10/325 t i d  p r n  for pain  The patient reports that this regimen is providing 10-20% pain relief  The patient is reporting no side effects from this pain medication regimen  Pain Contract Signed: 1/16/19  Last Urine Drug Screen: 7/2/19    I have personally reviewed and/or updated the patient's past medical history, past surgical history, family history, social history, current medications, allergies, and vital signs today  Review of Systems:    Review of Systems   Respiratory: Negative for shortness of breath  Cardiovascular: Negative for chest pain  Gastrointestinal: Negative for constipation, diarrhea, nausea and vomiting  Musculoskeletal: Positive for gait problem  Negative for arthralgias, joint swelling (joint stiffness) and myalgias  Skin: Negative for rash  Neurological: Positive for weakness  Negative for dizziness and seizures  All other systems reviewed and are negative          Past Medical History:   Diagnosis Date    Acute low back pain     10 AUG 2016 RESOLVED    Angina pectoris (HCC)     Bursitis of hip     Carpal tunnel syndrome     Chronic bilateral low back pain with bilateral sciatica     Chronic lumbar radiculopathy     Chronic narcotic dependence (HCC)     Chronic pain syndrome     Constipation due to opioid therapy     DDD (degenerative disc disease), lumbar     Deep vein thrombosis of left upper extremity ([facility] [address] )     [id number]  RESOLVED    Depression     Diverticula of colon     [id number] RESOLVED    DVT ([facility] thrombosis) ([facility])     LUE    Edema     History of staph infection     Hyperglycemia     Hyperlipidemia     Hypertension     Insomnia     Lateral epicondylitis     Left inguinal hernia     Methicillin resistant Staphylococcus aureus septicemia ([facility] [address] [facility]     Morbid obesity due to excess calories (HCC)     Obesity     Peripheral neuropathy     Post laminectomy syndrome     RLS (restless legs syndrome)     Septicemia ([facility] [address] )     MRSA    Umbilical hernia        Past Surgical History:   Procedure Laterality Date    BACK SURGERY      laminectomy, hardware    CARPAL TUNNEL RELEASE Left     CERVICAL SPINE SURGERY      COLONOSCOPY      ELBOW SURGERY      ELBOW SURGERY      KNEE ARTHROSCOPY Right     knee    KNEE SURGERY Right     ARTHOSCOPY KNEE    NECK SURGERY      1997    [place] COLONOSCOPY FLX DX W/COLLJ SPEC WHEN PFRMD N/A 1/24/2017    Procedure: COLONOSCOPY;  Surgeon: Maricruz Rayo MD;  Location: [facility];  Service: General    SHOULDER SURGERY      SPINAL CORD STIMULATOR IMPLANT         Family History   Problem Relation Age of Onset    Heart disease Mother     Cancer Mother         breast, stomach    Aneurysm Mother     Heart disease Father     Cancer Father         skin, liver, colon DECESED AGE 58       Social History     Occupational History    Not on file   Tobacco Use    Smoking status: Never Smoker    Smokeless tobacco: Never Used   Substance and Sexual Activity    Alcohol use: Yes     Comment: social    Drug use: No    Sexual activity: Yes         Current Outpatient Medications:     aspirin 81 MG tablet, Take 81 mg by mouth daily, Disp: , Rfl:     bisoprolol-hydrochlorothiazide (ZIAC) 2 5-6 25 MG per tablet, Take 1 tablet by mouth daily, Disp: 90 tablet, Rfl: 3    diazepam (VALIUM) 10 mg tablet, Take 1 tablet (10 mg total) by mouth daily at bedtime as needed for anxiety, Disp: 30 tablet, Rfl: 1    HYDROcodone-acetaminophen (NORCO)  mg per tablet, Take 1 PO TID PRN for pain   DO NOT FILL UNTIL: 8/14/19 [facility] send to [facility], Disp: 90 tablet, Rfl: 0    lactulose 20 g/30 mL, Take 1-2 tablespoons daily PRN as needed for chronic constipation  , Disp: 473 mL, Rfl: 2    magnesium 30 MG tablet, Take 30 mg by mouth daily as needed, Disp: , Rfl:     Multiple Vitamin (MULTIVITAMIN) tablet, Take 1 tablet by mouth every other day , Disp: , Rfl:     rosuvastatin (CRESTOR) 10 MG tablet, Take 1 tablet (10 mg total) by mouth once at bedtime, Disp: 90 tablet, Rfl: 3    Buprenorphine 10 MCG/HR PTWK, Apply 1 patch TD every 7 days  [facility] send to [facility] [address]  , Disp: 4 patch, Rfl: 1    Allergies   Allergen Reactions    Fentanyl Other (See Comments)     "Makes me Suicidal"  Happened when dosage increased   Penicillins Other (See Comments)     As a child unknown    Pravastatin Other (See Comments)     Reaction Date: [id number];   Muscle weakness    Sulfa Antibiotics Other (See Comments)     As a child unknown    Vytorin [Ezetimibe-Simvastatin]      Reaction Date: [id number];        Physical Exam:    /84 (BP Location: Left arm, Patient Position: Sitting, Cuff Size: Large)   Pulse 72   Ht 5' 10" (1 778 m)   Wt 122 kg (270 lb)   BMI 38 74 kg/m²     Constitutional:normal, well developed, well nourished, alert, in no distress and non-toxic and no overt pain behavior  and overweight  Eyes:anicteric  HEENT:grossly intact  Neck:supple, symmetric, trachea midline and no masses   Pulmonary:even and unlabored  Cardiovascular:No edema or pitting edema present  Skin:Normal without rashes or lesions and well hydrated  Psychiatric:Mood and affect appropriate  Neurologic:Cranial Nerves II-XII grossly intact  Musculoskeletal:The patient's gait is slightly antalgic, but steady without the use of any assistive devices        Imaging  FL spine and pain procedure    (Results Pending)         Orders Placed This Encounter   Procedures    [place] spine and pain procedure

## 2019-08-27 NOTE — PATIENT INSTRUCTIONS
Steroid Joint Injection   WHAT YOU NEED TO KNOW:   What do I need to know about steroid joint injection? A steroid joint injection is a procedure to inject steroid medicine into a joint  Steroid medicine decreases pain and inflammation  The injection may also contain an anesthetic (numbing medicine) to decrease pain  It may be done to treat conditions such as arthritis, gout, or carpal tunnel syndrome  The injections may be given in your knee, ankle, shoulder, elbow, wrist, or ankle  How do I prepare for steroid joint injection? Your healthcare provider will talk to you about how to prepare for this procedure  He will tell you what medicines to take or not take on the day of your procedure  You may need to stop taking blood thinners several days before your procedure  What will happen during steroid joint injection? You may be given local anesthesia to numb the area where the injection will be given  With local anesthesia, you may still feel pressure during the procedure, but you should not feel any pain  Your healthcare provider may use ultrasound or fluoroscopy (a type of x-ray) to guide the needle to the right area  He will then inject the steroid into your joint  A bandage will be placed on the injection site  What will happen after steroid joint injection? You may have redness, warmth, or sweating in your face and chest right after the steroid injection  Steroids can affect blood sugar levels  If you have diabetes, check your blood sugars closely in the first 24 hours after your procedure  What are the risks of steroid joint injection? You may get an infection in your joint  The injection may also cause more pain during the first 24 to 36 hours  You may need more than one injection to feel pain relief  The skin near the injection site may be damaged and become discolored or indented  This can happen if the steroid is placed too close to your skin   A tendon near the injection site may rupture or a nerve can be damaged  CARE AGREEMENT:   You have the right to help plan your care  Learn about your health condition and how it may be treated  Discuss treatment options with your caregivers to decide what care you want to receive  You always have the right to refuse treatment  The above information is an  only  It is not intended as medical advice for individual conditions or treatments  Talk to your doctor, nurse or pharmacist before following any medical regimen to see if it is safe and effective for you  © 2017 [employer]  Information is for End User's use only and may not be sold, redistributed or otherwise used for commercial purposes  All illustrations and images included in CareNotes® are the copyrighted property of [employer] M [employer]  or [employer]  Opioid Pain Management   AMBULATORY CARE:   An opioid  is a type of medicine used to treat pain  Examples of opioids are oxycodone, morphine, fentanyl, or codeine  Take opioid medicines as directed, for the condition it is prescribed:  Common problems that may occur when you do not take opioid medicines as directed include the following:  · Health problems  may occur  You may have trouble breathing  You may also develop liver or kidney damage, or stomach bleeding  Any of these health problems can become life-threatening  · Opioid dependence  means your body needs the opioid medicine to keep it from going through withdrawal      · Opioid tolerance  means the opioid does not control pain as well as it used to  You need higher doses of the opioid to get pain relief  · Opioid addiction  means you are not able to control the use of the opioid medicine  You use it when you do not have pain  You crave the opioid medicine  Call 911 or have someone call 911 for any of the following:   · You are breathing slower than normal, or you have trouble breathing  · You cannot be awakened  · You have a seizure    Seek care immediately if:   · Your heart is beating slower than usual     · Your heart feels like it is jumping or fluttering  · You are so sleepy that you cannot stay awake  · You have severe muscle pain or weakness  · You see or hear things that are not real   Contact your healthcare provider if:   · You are too dizzy to stand up  · Your pain gets worse or you have new pain  · You cannot do your usual activities because of side effects from the opioid  · You are constipated or have abdominal pain  · You have questions or concerns about your condition or care  Opioid safety measures:   · Take your medicine as directed  Ask if you need more information on how to take your medicine correctly  Follow up with your healthcare provider regularly  You may need to have your dose adjusted  Do not use opioid medicine if you are pregnant or breastfeeding  · Give your healthcare provider a list of all your medicines  Include any over-the-counter medicines, vitamins, and herbs  It can be dangerous to take opioids with certain other medicines, such as antihistamines  · Keep opioid medicine in a safe place  Store your opioid medicine in a locked cabinet to keep it away from children and others  · Do not drink alcohol while you use opioids  Alcohol use with an opioid medicine can make you sleepy and slow your breathing rate  You may stop breathing completely  · Do not drive or operate heavy machinery after you take opioid medicine  Opioid medicine can make you drowsy and make it hard to concentrate  You may injure yourself or others if you drive or operate heavy machinery while taking your medicine  · Drink fluids and eat high-fiber foods  Fluids and fiber will help prevent constipation  Ask your healthcare provider what fluids are right for you and how much you should drink  Also ask for a list of foods that contain fiber  Follow up with your healthcare provider as directed:   You may need to have your dose adjusted  You may be referred to a pain specialist  Write down your questions so you remember to ask them during your visits  © 2017 [employer] Information is for End User's use only and may not be sold, redistributed or otherwise used for commercial purposes  All illustrations and images included in CareNotes® are the copyrighted property of [employer] M [employer] Inc  or [employer]  The above information is an  only  It is not intended as medical advice for individual conditions or treatments  Talk to your doctor, nurse or pharmacist before following any medical regimen to see if it is safe and effective for you

## 2019-08-28 ENCOUNTER — TELEPHONE (OUTPATIENT)
Dept: PAIN MEDICINE | Facility: CLINIC | Age: 64
End: 2019-08-28

## 2019-08-28 NOTE — TELEPHONE ENCOUNTER
Can you please call the patient and advise him that I was able to get the increased dose of the Butrans patch 10 mcg patch APPROVED through his insurance for 1 year  Thank you

## 2019-08-30 ENCOUNTER — HOSPITAL ENCOUNTER (OUTPATIENT)
Dept: RADIOLOGY | Facility: CLINIC | Age: 64
Discharge: HOME | End: 2019-08-30
Attending: ANESTHESIOLOGY
Payer: COMMERCIAL

## 2019-08-30 VITALS
TEMPERATURE: 98.3 F | SYSTOLIC BLOOD PRESSURE: 130 MMHG | OXYGEN SATURATION: 94 % | HEART RATE: 72 BPM | RESPIRATION RATE: 20 BRPM | DIASTOLIC BLOOD PRESSURE: 83 MMHG

## 2019-08-30 DIAGNOSIS — M16.0 PRIMARY OSTEOARTHRITIS OF BOTH HIPS: ICD-10-CM

## 2019-08-30 DIAGNOSIS — G89.29 CHRONIC PAIN OF BOTH HIPS: ICD-10-CM

## 2019-08-30 DIAGNOSIS — M25.552 CHRONIC PAIN OF BOTH HIPS: ICD-10-CM

## 2019-08-30 DIAGNOSIS — M25.551 CHRONIC PAIN OF BOTH HIPS: ICD-10-CM

## 2019-08-30 PROCEDURE — 20610 DRAIN/INJ JOINT/BURSA W/O US: CPT | Performed by: ANESTHESIOLOGY

## 2019-08-30 PROCEDURE — 77002 NEEDLE LOCALIZATION BY XRAY: CPT | Performed by: ANESTHESIOLOGY

## 2019-08-30 PROCEDURE — 77002 NEEDLE LOCALIZATION BY XRAY: CPT

## 2019-08-30 RX ORDER — METHYLPREDNISOLONE ACETATE 80 MG/ML
80 INJECTION, SUSPENSION INTRA-ARTICULAR; INTRALESIONAL; INTRAMUSCULAR; PARENTERAL; SOFT TISSUE ONCE
Status: COMPLETED | OUTPATIENT
Start: 2019-08-30 | End: 2019-08-30

## 2019-08-30 RX ORDER — LIDOCAINE HYDROCHLORIDE 10 MG/ML
5 INJECTION, SOLUTION EPIDURAL; INFILTRATION; INTRACAUDAL; PERINEURAL ONCE
Status: COMPLETED | OUTPATIENT
Start: 2019-08-30 | End: 2019-08-30

## 2019-08-30 RX ADMIN — LIDOCAINE HYDROCHLORIDE 5 ML: 20 INJECTION, SOLUTION EPIDURAL; INFILTRATION; INTRACAUDAL at 09:15

## 2019-08-30 RX ADMIN — METHYLPREDNISOLONE ACETATE 80 MG: 80 INJECTION, SUSPENSION INTRA-ARTICULAR; INTRALESIONAL; INTRAMUSCULAR; SOFT TISSUE at 09:16

## 2019-08-30 RX ADMIN — IOHEXOL 2 ML: 300 INJECTION, SOLUTION INTRAVENOUS at 09:16

## 2019-08-30 RX ADMIN — LIDOCAINE HYDROCHLORIDE 4 ML: 10 INJECTION, SOLUTION EPIDURAL; INFILTRATION; INTRACAUDAL; PERINEURAL at 09:14

## 2019-08-30 NOTE — H&P
History of Present Illness: The patient is a 60 y o  male who presents with complaints of hip pain      Patient Active Problem List   Diagnosis    Carpal tunnel syndrome    Chronic bilateral low back pain with bilateral sciatica    Chronic lumbar radiculopathy    Chronic pain syndrome    Constipation due to opioid therapy    Continuous opioid dependence ([facility] [address] )    Depression    Hyperglycemia    Mixed hyperlipidemia    Essential hypertension    Left inguinal hernia    Lumbar canal stenosis    Lumbar degenerative disc disease    Lumbar post-laminectomy syndrome    Morbid obesity due to excess calories ([facility])    Neuropathy, ilioinguinal nerve, left    Chronic pain of both hips    Peripheral neuropathy    Persistent insomnia    Rectus diastasis    Restless legs syndrome    Retrolisthesis of vertebrae    Statin myopathy    Thoracic radiculopathy    Umbilical hernia without obstruction and without gangrene    Multiple pulmonary nodules    Shortness of breath    Anxiety    Obstructive sleep apnea    Benign prostatic hyperplasia with incomplete bladder emptying    Chronic bilateral thoracic back pain    DDD (degenerative disc disease), cervical    Primary osteoarthritis of both hips    Medicare annual wellness visit, subsequent    Chest pain, exertional       Past Medical History:   Diagnosis Date    Acute low back pain     10 AUG 2016 RESOLVED    Angina pectoris ([facility])     Bursitis of hip     Carpal tunnel syndrome     Chronic bilateral low back pain with bilateral sciatica     Chronic lumbar radiculopathy     Chronic narcotic dependence ([facility])     Chronic pain syndrome     Constipation due to opioid therapy     DDD (degenerative disc disease), lumbar     Deep vein thrombosis of left upper extremity ([address] )     [id number]  RESOLVED    Depression     Diverticula of colon     [id number] RESOLVED    DVT (deep venous thrombosis) ([facility])     LUE    Edema     History of staph infection     Hyperglycemia     Hyperlipidemia     Hypertension     Insomnia     Lateral epicondylitis     Left inguinal hernia     Methicillin resistant Staphylococcus aureus septicemia (HCC)     Morbid obesity due to excess calories (HCC)     Obesity     Peripheral neuropathy     Post laminectomy syndrome     RLS (restless legs syndrome)     Septicemia ([facility] [address] )     MRSA    Umbilical hernia        Past Surgical History:   Procedure Laterality Date    BACK SURGERY      laminectomy, hardware    CARPAL TUNNEL RELEASE Left     CERVICAL SPINE SURGERY      COLONOSCOPY      ELBOW SURGERY      ELBOW SURGERY      KNEE ARTHROSCOPY Right     knee    KNEE SURGERY Right     ARTHOSCOPY KNEE    NECK SURGERY      1997    OR COLONOSCOPY FLX DX W/COLLJ SPEC WHEN PFRMD N/A 1/24/2017    Procedure: COLONOSCOPY;  Surgeon: Baird Councilman, MD;  Location:  MAIN OR;  Service: General    SHOULDER SURGERY      SPINAL CORD STIMULATOR IMPLANT           Current Outpatient Medications:     aspirin 81 MG tablet, Take 81 mg by mouth daily, Disp: , Rfl:     bisoprolol-hydrochlorothiazide (ZIAC) 2 5-6 25 MG per tablet, Take 1 tablet by mouth daily, Disp: 90 tablet, Rfl: 3    Buprenorphine 10 MCG/HR PTWK, Apply 1 patch TD every 7 days  Please send to [facility] [address]  , Disp: 4 patch, Rfl: 1    diazepam (VALIUM) 10 mg tablet, Take 1 tablet (10 mg total) by mouth daily at bedtime as needed for anxiety, Disp: 30 tablet, Rfl: 1    HYDROcodone-acetaminophen (NORCO)  mg per tablet, Take 1 PO TID PRN for pain  DO NOT FILL UNTIL: 8/14/19 Please send to [facility], Disp: 90 tablet, Rfl: 0    lactulose 20 g/30 mL, Take 1-2 tablespoons daily PRN as needed for chronic constipation  , Disp: 473 mL, Rfl: 2    magnesium 30 MG tablet, Take 30 mg by mouth daily as needed, Disp: , Rfl:     Multiple Vitamin (MULTIVITAMIN) tablet, Take 1 tablet by mouth every other day , Disp: , Rfl:     rosuvastatin (CRESTOR) 10 MG tablet, Take 1 tablet (10 mg total) by mouth once at bedtime, Disp: 90 tablet, Rfl: 3    Current Facility-Administered Medications:     iohexol (OMNIPAQUE) 300 mg/mL injection 50 mL, 50 mL, Intra-articular, Once, Sudhir Lyn DO    lidocaine (PF) (XYLOCAINE-MPF) 1 % injection 5 mL, 5 mL, Other, Once, Sudhir Lyn DO    lidocaine (PF) (XYLOCAINE-MPF) 2 % injection 5 mL, 5 mL, Intra-articular, Once, Sudhir Lyn DO    methylPREDNISolone acetate (DEPO-MEDROL) injection 80 mg, 80 mg, Intra-articular, Once, Sudhir Lyn DO    Allergies   Allergen Reactions    Fentanyl Other (See Comments)     "Makes me Suicidal"  Happened when dosage increased   Penicillins Other (See Comments)     As a child unknown    Pravastatin Other (See Comments)     Reaction Date: [id number];   Muscle weakness    Sulfa Antibiotics Other (See Comments)     As a child unknown    Vytorin [Ezetimibe-Simvastatin]      Reaction Date: [id number];        Physical Exam:   General: Awake, Alert, Oriented x 3, Mood and affect appropriate  Respiratory: Respirations even and unlabored  Cardiovascular: Peripheral pulses intact; no edema  Musculoskeletal Exam:  Decreased range of motion bilateral hip    ASA Score: III         Assessment:   1  Chronic pain of both hips    2   Primary osteoarthritis of both hips        Plan: B/L Hip Injection

## 2019-08-30 NOTE — DISCHARGE INSTRUCTIONS
Steroid Joint Injection   WHAT YOU NEED TO KNOW:   A steroid joint injection is a procedure to inject steroid medicine into a joint  Steroid medicine decreases pain and inflammation  The injection may also contain an anesthetic (numbing medicine) to decrease pain  It may be done to treat conditions such as arthritis, gout, or carpal tunnel syndrome  The injections may be given in your knee, ankle, shoulder, elbow, wrist, ankle or sacroiliac joint  1  Do not apply heat to any area that is numb  If you have discomfort or soreness at the injection site, you may apply ice today, 20 minutes on and 20 minutes off  Tomorrow you may use ice or warm, moist heat  Do not apply ice or heat directly to the skin  2  You may have an increase or change in the discomfort for 36-48 hours after your treatment  Apply ice and continue with any pain medicine you have been prescribed  3  Do not do anything strenuous today  You may shower, but no tub baths or hot tubs today  You may resume your normal activities tomorrow, but do not overdo it  Resume normal activities slowly when you are feeling better  4  If you experience redness, drainage or swelling at the injection site, or if you develop a fever above 100 degrees, please call [facility] at [phone] or go to the Emergency Room  5  Continue to take all routine medicines prescribed by your primary care physician unless otherwise instructed by our staff  Most blood thinners should be started again according to your regularly scheduled dosing  If you have any questions, please give our office a call  If you have a problem specifically related to your procedure, please call our office at [phone]  Problems not related to your procedure should be directed to your primary care physician

## 2019-09-06 ENCOUNTER — TELEPHONE (OUTPATIENT)
Dept: PAIN MEDICINE | Facility: CLINIC | Age: 64
End: 2019-09-06

## 2019-09-18 NOTE — TELEPHONE ENCOUNTER
S/w Shelby from [facility]; pt is going away and they are requesting to have his   Buprenorphine 10 MCG/HR PTWK filled 4 days prior to refill date    Shelby contact tel [phone]    [address] #[id number], [address] [facility] [address]

## 2019-09-18 NOTE — TELEPHONE ENCOUNTER
S/w pt, confirmed he is wearing his last patch, will run out saturday  Leaving for [place] for a week tomorrow  Pt is requesting to  1 patch - would be enough to get him through his vacation  Advised pt, will d/w DG and cb to advise  Pt verbalized understanding and appreciation

## 2019-09-19 NOTE — TELEPHONE ENCOUNTER
S/w pharmacy, advised of above  S/w javi at [facility], advised of above  S/w pt, advised of above  Pt stated that he will probably not have time to get up to [facility] to  the rx before he leaves  Pt stated that he will go back to using his pills for pain  Provided [facility] phone number for hours  Pt verbalied understanding and appreciation

## 2019-09-19 NOTE — TELEPHONE ENCOUNTER
Nika Yu from [facility] left voice message on call center     Requesting override to fill script for pt:   Buprenorphine 10 MCG/HR PTWK filled 4 days prior to refill date  [facility] #[id number], [address] [place] tel [phone]

## 2019-10-14 ENCOUNTER — OFFICE VISIT (OUTPATIENT)
Dept: OBGYN CLINIC | Facility: CLINIC | Age: 64
End: 2019-10-14
Payer: COMMERCIAL

## 2019-10-14 VITALS
HEIGHT: 70 IN | DIASTOLIC BLOOD PRESSURE: 90 MMHG | SYSTOLIC BLOOD PRESSURE: 138 MMHG | WEIGHT: 266.4 LBS | BODY MASS INDEX: 38.14 KG/M2

## 2019-10-14 DIAGNOSIS — G56.01 CARPAL TUNNEL SYNDROME ON RIGHT: Primary | ICD-10-CM

## 2019-10-14 PROCEDURE — 99213 OFFICE O/P EST LOW 20 MIN: CPT | Performed by: ORTHOPAEDIC SURGERY

## 2019-10-14 RX ORDER — LIDOCAINE HYDROCHLORIDE AND EPINEPHRINE 10; 10 MG/ML; UG/ML
20 INJECTION, SOLUTION INFILTRATION; PERINEURAL ONCE
Status: CANCELLED | OUTPATIENT
Start: 2019-10-14 | End: 2019-10-14

## 2019-10-14 NOTE — H&P
ASSESSMENT/PLAN:    Assessment:   Right wrist carpal tunnel syndrome    Plan:   Diagnosis, treatment options and associated risks were discussed with the patient including no treatment, nonsurgical treatment and potential for surgical intervention  The patient was given the opportunity to ask questions regarding each  Quality of life decision pursue endoscopic carpal tunnel release of his right wrist   Risks and benefits were discussed and consents obtained  Follow Up: After Surgery    To Do Next Visit:    and Sutures out      Operative Discussions:     Endoscopic Carpal Tunnel Release: The anatomy and physiology of carpal tunnel syndrome was discussed with the patient today  Increase pressure localized under the transverse carpal ligament can cause pain, numbness, tingling, or dysesthesias within the median nerve distribution as well as feelings of fatigue, clumsiness, or awkwardness  These symptoms typically occur at night and worse in the morning upon waking  Eventually, untreated carpal tunnel syndrome can result in weakness and permanent loss of muscle within the thenar compartment of the hand  Treatment options were discussed with the patient  Conservative treatment includes nocturnal resting splints to keep the nerve in a neutral position, ergonomic changes within the work or home environment, activity modification, and tendon gliding exercises  Steroid injections within the carpal canal can help a majority of patients, however this is often self-limited in a majority of patients  Surgical intervention to divide the transverse carpal ligament typically results in a long-lasting relief of the patient's complaints, with the recurrence rate of less than 1%  The patient has elected to undergo an endoscopic carpal tunnel release  The 2 incision technique was discussed with the patient, which results in approximately a two-week less recovery time, and less wound complications    In the postoperative period, light activities are allowed immediately, driving is allowed when narcotic medication has stopped, and the bandages may be removed and incision may get wet after 2 days  Heavy activities (lifting more than approximately 10 pounds) will be allowed after follow up appointment in 1-2 weeks  While the pain and discomfort in the hands generally improves rapidly, the numbness and tingling as well as the strength will slowly improve over weeks to months depending on the severity of the carpal tunnel syndrome  Pillar pain was discussed with the patient, which is typically a common but self-limiting condition  The risks of bleeding and infection from the surgery are less than 1%  Risk of recurrence is approximately 0 5%  The risks of nerve injury or nerve damage or damage to the blood vessels is approximately 1 in 1200  The patient has an understanding of the above mentioned discussion  The risks and benefits of the procedure were explained to the patient, which include, but are not limited to: Bleeding, infection, recurrence, pain, scar, damage to tendons, damage to nerves, and damage to blood vessels, failure to give desired results and complications related to anesthesia   These risks, along with alternative conservative treatment options, and postoperative protocols were voiced back and understood by the patient   All questions were answered to the patient's satisfaction   The patient agrees to comply with a standard postoperative protocol, and is willing to proceed   Education was provided via written and auditory forms   There were no barriers to learning   Written handouts regarding wound care, incision and scar care, and general preoperative information was provided to the patient   Prior to surgery, the patient may be requested to stop all anti-inflammatory medications   Prophylactic aspirin, Plavix, and Coumadin may be allowed to be continued   Medications including vitamin E , ginkgo, and fish oil are requested to be stopped approximately one week prior to surgery   Hypertensive medications and beta blockers, if taken, should be continued  Right wrist endoscopic carpal tunnel release, local    _____________________________________________________  CHIEF COMPLAINT:  Chief Complaint   Patient presents with    Right Hand - Numbness, Pain, Tingling         SUBJECTIVE:  Cora Green  is a 60 y o  male who presents with numbness and tingling as well as pain throughout his right hand  He has numbness and tingling throughout all fingers of his right hand  He had similar symptoms at his left in 2014 in responded favorably to a endoscopic carpal tunnel release  He actually states that he had symptoms of both hands at that time however had relief of his right hand symptoms following his left wrist surgery for several years  He finds that with certain daily activities as well as nighttime his hand will be numb and has to shake out      PAST MEDICAL HISTORY:  Past Medical History:   Diagnosis Date    Acute low back pain     10 AUG 2016 RESOLVED    [facility]is [facility])     Bursitis of hip     Carpal tunnel syndrome     Chronic bilateral low back pain with bilateral sciatica     Chronic lumbar radiculopathy     Chronic narcotic dependence ([facility])     Chronic pain syndrome     Constipation due to opioid therapy     DDD (degenerative disc disease), lumbar     Deep vein thrombosis of left upper extremity ([facility] [address] )     [id number]  RESOLVED    Depression     Diverticula of colon     [id number] RESOLVED    DVT (deep venous thrombosis) ([facility])     LUE    Edema     History of staph infection     Hyperglycemia     Hyperlipidemia     Hypertension     Insomnia     Lateral epicondylitis     Left inguinal hernia     Methicillin resistant Staphylococcus aureus septicemia ([facility] [address] )     Morbid obesity due to excess calories ([facility])     Obesity     Peripheral neuropathy     Post laminectomy syndrome     RLS (restless legs syndrome)     Septicemia ([facility] [address] )     MRSA    Umbilical hernia        PAST SURGICAL HISTORY:  Past Surgical History:   Procedure Laterality Date    BACK SURGERY      laminectomy, hardware    CARPAL TUNNEL RELEASE Left     CERVICAL SPINE SURGERY      COLONOSCOPY      ELBOW SURGERY      ELBOW SURGERY      KNEE ARTHROSCOPY Right     knee    KNEE SURGERY Right     ARTHOSCOPY KNEE    NECK SURGERY      1997    NM COLONOSCOPY FLX DX W/COLLJ SPEC WHEN PFRMD N/A 1/24/2017    Procedure: COLONOSCOPY;  Surgeon: Salley Schaumann, MD;  Location:  MAIN OR;  Service: General    SHOULDER SURGERY      SPINAL CORD STIMULATOR IMPLANT         FAMILY HISTORY:  Family History   Problem Relation Age of Onset    Heart disease Mother     Cancer Mother         breast, stomach    Aneurysm Mother     Heart disease Father     Cancer Father         skin, liver, colon DECESED AGE 58       SOCIAL HISTORY:  Social History     Tobacco Use    Smoking status: Never Smoker    Smokeless tobacco: Never Used   Substance Use Topics    Alcohol use: Yes     Comment: social    Drug use: No       MEDICATIONS:    Current Outpatient Medications:     aspirin 81 MG tablet, Take 81 mg by mouth daily, Disp: , Rfl:     bisoprolol-hydrochlorothiazide (ZIAC) 2 5-6 25 MG per tablet, Take 1 tablet by mouth daily, Disp: 90 tablet, Rfl: 3    Buprenorphine 10 MCG/HR PTWK, Apply 1 patch TD every 7 days  [facility] send to [facility] [address]  , Disp: 4 patch, Rfl: 1    diazepam (VALIUM) 10 mg tablet, Take 1 tablet (10 mg total) by mouth daily at bedtime as needed for anxiety, Disp: 30 tablet, Rfl: 1    HYDROcodone-acetaminophen (NORCO)  mg per tablet, Take 1 PO TID PRN for pain  DO NOT FILL UNTIL: 8/14/19 [facility] send to [facility], Disp: 90 tablet, Rfl: 0    lactulose 20 g/30 mL, Take 1-2 tablespoons daily PRN as needed for chronic constipation  , Disp: 473 mL, Rfl: 2    magnesium 30 MG tablet, Take 30 mg by mouth daily as needed, Disp: , Rfl:     Multiple Vitamin (MULTIVITAMIN) tablet, Take 1 tablet by mouth every other day , Disp: , Rfl:     rosuvastatin (CRESTOR) 10 MG tablet, Take 1 tablet (10 mg total) by mouth once at bedtime, Disp: 90 tablet, Rfl: 3    ALLERGIES:  Allergies   Allergen Reactions    Fentanyl Other (See Comments)     "Makes me Suicidal"  Happened when dosage increased   Penicillins Other (See Comments)     As a child unknown    Pravastatin Other (See Comments)     Reaction Date: [id number];   Muscle weakness    Sulfa Antibiotics Other (See Comments)     As a child unknown    Vytorin [Ezetimibe-Simvastatin]      Reaction Date: [id number];        REVIEW OF SYSTEMS:  Pertinent items are noted in HPI  A comprehensive review of systems was negative      LABS:  HgA1c:   Lab Results   Component Value Date    HGBA1C 5 7 06/10/2019     BMP:   Lab Results   Component Value Date    GLUCOSE 100 12/14/2015    CALCIUM 9 2 06/10/2019     12/14/2015    K 4 0 06/10/2019    CO2 28 06/10/2019     06/10/2019    BUN 18 06/10/2019    CREATININE 1 04 06/10/2019         _____________________________________________________  PHYSICAL EXAMINATION:  Vital signs: /90   Ht 5' 10" (1 778 m)   Wt 121 kg (266 lb 6 4 oz)   BMI 38 22 kg/m²    General: well developed and well nourished, alert, oriented times 3 and appears comfortable  Psychiatric: Normal  HEENT: Trachea Midline, No torticollis  Cardiovascular: No discernable arrhythmia  Pulmonary: No wheezing or stridor  Skin: No masses, erythema, lacerations, fluctation, ulcerations  Neurovascular: Sensation intact to the Ulnar Nerve, Sensation Intact to the Radial Nerve, Decreased Sensation to  the Median Nerve, Motor Intact to the Ulnar Nerve, Motor Intact to the Radial Nerve, Pulses Intact and Right APB weakness    MUSCULOSKELETAL EXAMINATION:  RIGHT SIDE:  Carpal tunnel:  No atrophy thenar muscles, Weakness APB 4/5, full AIN,Postive Tinel's and Positive Phalan's Test    _____________________________________________________  STUDIES REVIEWED:  No Studies to review      PROCEDURES PERFORMED:  Procedures  No Procedures performed today   Scribe Attestation    I,:   Ardenraymond Jang am acting as a scribe while in the presence of the attending physician :        I,:   Sindhu Gaona MD personally performed the services described in this documentation    as scribed in my presence :

## 2019-10-14 NOTE — PROGRESS NOTES
ASSESSMENT/PLAN:    Assessment:   Right wrist carpal tunnel syndrome    Plan:   Diagnosis, treatment options and associated risks were discussed with the patient including no treatment, nonsurgical treatment and potential for surgical intervention  The patient was given the opportunity to ask questions regarding each  Quality of life decision pursue endoscopic carpal tunnel release of his right wrist   Risks and benefits were discussed and consents obtained  Follow Up: After Surgery    To Do Next Visit:    and Sutures out      Operative Discussions:     Endoscopic Carpal Tunnel Release: The anatomy and physiology of carpal tunnel syndrome was discussed with the patient today  Increase pressure localized under the transverse carpal ligament can cause pain, numbness, tingling, or dysesthesias within the median nerve distribution as well as feelings of fatigue, clumsiness, or awkwardness  These symptoms typically occur at night and worse in the morning upon waking  Eventually, untreated carpal tunnel syndrome can result in weakness and permanent loss of muscle within the thenar compartment of the hand  Treatment options were discussed with the patient  Conservative treatment includes nocturnal resting splints to keep the nerve in a neutral position, ergonomic changes within the work or home environment, activity modification, and tendon gliding exercises  Steroid injections within the carpal canal can help a majority of patients, however this is often self-limited in a majority of patients  Surgical intervention to divide the transverse carpal ligament typically results in a long-lasting relief of the patient's complaints, with the recurrence rate of less than 1%  The patient has elected to undergo an endoscopic carpal tunnel release  The 2 incision technique was discussed with the patient, which results in approximately a two-week less recovery time, and less wound complications    In the postoperative period, light activities are allowed immediately, driving is allowed when narcotic medication has stopped, and the bandages may be removed and incision may get wet after 2 days  Heavy activities (lifting more than approximately 10 pounds) will be allowed after follow up appointment in 1-2 weeks  While the pain and discomfort in the hands generally improves rapidly, the numbness and tingling as well as the strength will slowly improve over weeks to months depending on the severity of the carpal tunnel syndrome  Pillar pain was discussed with the patient, which is typically a common but self-limiting condition  The risks of bleeding and infection from the surgery are less than 1%  Risk of recurrence is approximately 0 5%  The risks of nerve injury or nerve damage or damage to the blood vessels is approximately 1 in 1200  The patient has an understanding of the above mentioned discussion  The risks and benefits of the procedure were explained to the patient, which include, but are not limited to: Bleeding, infection, recurrence, pain, scar, damage to tendons, damage to nerves, and damage to blood vessels, failure to give desired results and complications related to anesthesia   These risks, along with alternative conservative treatment options, and postoperative protocols were voiced back and understood by the patient   All questions were answered to the patient's satisfaction   The patient agrees to comply with a standard postoperative protocol, and is willing to proceed   Education was provided via written and auditory forms   There were no barriers to learning   Written handouts regarding wound care, incision and scar care, and general preoperative information was provided to the patient   Prior to surgery, the patient may be requested to stop all anti-inflammatory medications   Prophylactic aspirin, Plavix, and Coumadin may be allowed to be continued   Medications including vitamin E , ginkgo, and fish oil are requested to be stopped approximately one week prior to surgery   Hypertensive medications and beta blockers, if taken, should be continued  Right wrist endoscopic carpal tunnel release, local    _____________________________________________________  CHIEF COMPLAINT:  Chief Complaint   Patient presents with    Right Hand - Numbness, Pain, Tingling         SUBJECTIVE:  Archie Wilson  is a 60 y o  male who presents with numbness and tingling as well as pain throughout his right hand  He has numbness and tingling throughout all fingers of his right hand  He had similar symptoms at his left in 2014 in responded favorably to a endoscopic carpal tunnel release  He actually states that he had symptoms of both hands at that time however had relief of his right hand symptoms following his left wrist surgery for several years  He finds that with certain daily activities as well as nighttime his hand will be numb and has to shake out      PAST MEDICAL HISTORY:  Past Medical History:   Diagnosis Date    Acute low back pain     10 AUG 2016 RESOLVED    [facility]     Bursitis of hip     Carpal tunnel syndrome     Chronic bilateral low back pain with bilateral sciatica     Chronic lumbar radiculopathy     Chronic narcotic dependence ([facility]     Chronic pain syndrome     Constipation due to opioid therapy     DDD (degenerative disc disease), lumbar     Deep vein thrombosis of left upper extremity ([facility] [address] )     [id number]  RESOLVED    Depression     Diverticula of colon     [id number] RESOLVED    DVT (deep venous thrombosis) ([facility])     LUE    Edema     History of staph infection     Hyperglycemia     Hyperlipidemia     Hypertension     Insomnia     Lateral epicondylitis     Left inguinal hernia     Methicillin resistant Staphylococcus aureus septicemia ([facility] [address] )     Morbid obesity due to excess calories ([facility])     Obesity     Peripheral neuropathy     Post laminectomy syndrome     RLS (restless legs syndrome)     Septicemia ([facility] [address] )     MRSA    Umbilical hernia        PAST SURGICAL HISTORY:  Past Surgical History:   Procedure Laterality Date    BACK SURGERY      laminectomy, hardware    CARPAL TUNNEL RELEASE Left     CERVICAL SPINE SURGERY      COLONOSCOPY      ELBOW SURGERY      ELBOW SURGERY      KNEE ARTHROSCOPY Right     knee    KNEE SURGERY Right     ARTHOSCOPY KNEE    NECK SURGERY      1997    WA COLONOSCOPY FLX DX W/COLLJ SPEC WHEN PFRMD N/A 1/24/2017    Procedure: COLONOSCOPY;  Surgeon: Ernesto Mott MD;  Location:  MAIN OR;  Service: General    SHOULDER SURGERY      SPINAL CORD STIMULATOR IMPLANT         FAMILY HISTORY:  Family History   Problem Relation Age of Onset    Heart disease Mother     Cancer Mother         breast, stomach    Aneurysm Mother     Heart disease Father     Cancer Father         skin, liver, colon DECESED AGE 58       SOCIAL HISTORY:  Social History     Tobacco Use    Smoking status: Never Smoker    Smokeless tobacco: Never Used   Substance Use Topics    Alcohol use: Yes     Comment: social    Drug use: No       MEDICATIONS:    Current Outpatient Medications:     aspirin 81 MG tablet, Take 81 mg by mouth daily, Disp: , Rfl:     bisoprolol-hydrochlorothiazide (ZIAC) 2 5-6 25 MG per tablet, Take 1 tablet by mouth daily, Disp: 90 tablet, Rfl: 3    Buprenorphine 10 MCG/HR PTWK, Apply 1 patch TD every 7 days  Please send to [facility] [address]  , Disp: 4 patch, Rfl: 1    diazepam (VALIUM) 10 mg tablet, Take 1 tablet (10 mg total) by mouth daily at bedtime as needed for anxiety, Disp: 30 tablet, Rfl: 1    HYDROcodone-acetaminophen (NORCO)  mg per tablet, Take 1 PO TID PRN for pain  DO NOT FILL UNTIL: 8/14/19 Please send to [facility], Disp: 90 tablet, Rfl: 0    lactulose 20 g/30 mL, Take 1-2 tablespoons daily PRN as needed for chronic constipation  , Disp: 473 mL, Rfl: 2    magnesium 30 MG tablet, Take 30 mg by mouth daily as needed, Disp: , Rfl:     Multiple Vitamin (MULTIVITAMIN) tablet, Take 1 tablet by mouth every other day , Disp: , Rfl:     rosuvastatin (CRESTOR) 10 MG tablet, Take 1 tablet (10 mg total) by mouth once at bedtime, Disp: 90 tablet, Rfl: 3    ALLERGIES:  Allergies   Allergen Reactions    Fentanyl Other (See Comments)     "Makes me Suicidal"  Happened when dosage increased   Penicillins Other (See Comments)     As a child unknown    Pravastatin Other (See Comments)     Reaction Date: [id number];   Muscle weakness    Sulfa Antibiotics Other (See Comments)     As a child unknown    Vytorin [Ezetimibe-Simvastatin]      Reaction Date: [id number];        REVIEW OF SYSTEMS:  Pertinent items are noted in HPI  A comprehensive review of systems was negative      LABS:  HgA1c:   Lab Results   Component Value Date    HGBA1C 5 7 06/10/2019     BMP:   Lab Results   Component Value Date    GLUCOSE 100 12/14/2015    CALCIUM 9 2 06/10/2019     12/14/2015    K 4 0 06/10/2019    CO2 28 06/10/2019     06/10/2019    BUN 18 06/10/2019    CREATININE 1 04 06/10/2019         _____________________________________________________  PHYSICAL EXAMINATION:  Vital signs: /90   Ht 5' 10" (1 778 m)   Wt 121 kg (266 lb 6 4 oz)   BMI 38 22 kg/m²   General: well developed and well nourished, alert, oriented times 3 and appears comfortable  Psychiatric: Normal  HEENT: Trachea Midline, No torticollis  Cardiovascular: No discernable arrhythmia  Pulmonary: No wheezing or stridor  Skin: No masses, erythema, lacerations, fluctation, ulcerations  Neurovascular: Sensation intact to the Ulnar Nerve, Sensation Intact to the Radial Nerve, Decreased Sensation to  the Median Nerve, Motor Intact to the Ulnar Nerve, Motor Intact to the Radial Nerve, Pulses Intact and Right APB weakness    MUSCULOSKELETAL EXAMINATION:  RIGHT SIDE:  Carpal tunnel:  No atrophy thenar muscles, Weakness APB 4/5, full AIN,Postive Tinel's and Positive Phalan's Test    _____________________________________________________  STUDIES REVIEWED:  No Studies to review      PROCEDURES PERFORMED:  Procedures  No Procedures performed today   Scribe Attestation    I,:   Gary Gonzalez am acting as a scribe while in the presence of the attending physician :        I,:   Joceline Morales MD personally performed the services described in this documentation    as scribed in my presence :

## 2019-10-15 ENCOUNTER — PREP FOR PROCEDURE (OUTPATIENT)
Dept: OBGYN CLINIC | Facility: CLINIC | Age: 64
End: 2019-10-15

## 2019-10-22 ENCOUNTER — OFFICE VISIT (OUTPATIENT)
Dept: PAIN MEDICINE | Facility: CLINIC | Age: 64
End: 2019-10-22
Payer: COMMERCIAL

## 2019-10-22 VITALS
DIASTOLIC BLOOD PRESSURE: 80 MMHG | HEIGHT: 70 IN | BODY MASS INDEX: 38.08 KG/M2 | SYSTOLIC BLOOD PRESSURE: 126 MMHG | WEIGHT: 266 LBS

## 2019-10-22 DIAGNOSIS — M25.551 CHRONIC PAIN OF RIGHT HIP: ICD-10-CM

## 2019-10-22 DIAGNOSIS — G89.29 CHRONIC BILATERAL THORACIC BACK PAIN: ICD-10-CM

## 2019-10-22 DIAGNOSIS — M54.14 THORACIC RADICULOPATHY: ICD-10-CM

## 2019-10-22 DIAGNOSIS — M54.42 CHRONIC BILATERAL LOW BACK PAIN WITH BILATERAL SCIATICA: ICD-10-CM

## 2019-10-22 DIAGNOSIS — M16.0 PRIMARY OSTEOARTHRITIS OF BOTH HIPS: ICD-10-CM

## 2019-10-22 DIAGNOSIS — G89.4 CHRONIC PAIN DISORDER: ICD-10-CM

## 2019-10-22 DIAGNOSIS — M48.062 SPINAL STENOSIS OF LUMBAR REGION WITH NEUROGENIC CLAUDICATION: ICD-10-CM

## 2019-10-22 DIAGNOSIS — M25.552 CHRONIC PAIN OF BOTH HIPS: ICD-10-CM

## 2019-10-22 DIAGNOSIS — F11.20 UNCOMPLICATED OPIOID DEPENDENCE (HCC): ICD-10-CM

## 2019-10-22 DIAGNOSIS — M96.1 LUMBAR POST-LAMINECTOMY SYNDROME: ICD-10-CM

## 2019-10-22 DIAGNOSIS — G89.29 CHRONIC PAIN OF BOTH HIPS: ICD-10-CM

## 2019-10-22 DIAGNOSIS — G89.4 CHRONIC PAIN SYNDROME: Primary | ICD-10-CM

## 2019-10-22 DIAGNOSIS — M54.6 CHRONIC BILATERAL THORACIC BACK PAIN: ICD-10-CM

## 2019-10-22 DIAGNOSIS — G89.29 CHRONIC PAIN OF RIGHT HIP: ICD-10-CM

## 2019-10-22 DIAGNOSIS — M25.551 CHRONIC PAIN OF BOTH HIPS: ICD-10-CM

## 2019-10-22 DIAGNOSIS — Z79.891 ENCOUNTER FOR LONG-TERM OPIATE ANALGESIC USE: ICD-10-CM

## 2019-10-22 DIAGNOSIS — G89.29 CHRONIC BILATERAL LOW BACK PAIN WITH BILATERAL SCIATICA: ICD-10-CM

## 2019-10-22 DIAGNOSIS — M54.16 CHRONIC LUMBAR RADICULOPATHY: ICD-10-CM

## 2019-10-22 DIAGNOSIS — M51.36 LUMBAR DEGENERATIVE DISC DISEASE: ICD-10-CM

## 2019-10-22 DIAGNOSIS — M54.41 CHRONIC BILATERAL LOW BACK PAIN WITH BILATERAL SCIATICA: ICD-10-CM

## 2019-10-22 PROCEDURE — 99214 OFFICE O/P EST MOD 30 MIN: CPT | Performed by: NURSE PRACTITIONER

## 2019-10-22 RX ORDER — BUPRENORPHINE 10 UG/H
PATCH TRANSDERMAL
Qty: 4 PATCH | Refills: 2 | Status: SHIPPED | OUTPATIENT
Start: 2019-10-22 | End: 2020-01-14

## 2019-10-22 RX ORDER — HYDROCODONE BITARTRATE AND ACETAMINOPHEN 10; 325 MG/1; MG/1
TABLET ORAL
Qty: 60 TABLET | Refills: 0 | Status: SHIPPED | OUTPATIENT
Start: 2019-10-22 | End: 2019-10-22 | Stop reason: SDUPTHER

## 2019-10-22 RX ORDER — HYDROCODONE BITARTRATE AND ACETAMINOPHEN 10; 325 MG/1; MG/1
TABLET ORAL
Qty: 60 TABLET | Refills: 0 | Status: SHIPPED | OUTPATIENT
Start: 2019-10-22 | End: 2020-01-14 | Stop reason: SDUPTHER

## 2019-10-22 NOTE — PATIENT INSTRUCTIONS
Donald Valentina Dates: Today, 11/19/19, & 12/17/19    Opioid Pain Management   AMBULATORY CARE:   An opioid  is a type of medicine used to treat pain  Examples of opioids are oxycodone, morphine, fentanyl, or codeine  Take opioid medicines as directed, for the condition it is prescribed:  Common problems that may occur when you do not take opioid medicines as directed include the following:  · Health problems  may occur  You may have trouble breathing  You may also develop liver or kidney damage, or stomach bleeding  Any of these health problems can become life-threatening  · Opioid dependence  means your body needs the opioid medicine to keep it from going through withdrawal      · Opioid tolerance  means the opioid does not control pain as well as it used to  You need higher doses of the opioid to get pain relief  · Opioid addiction  means you are not able to control the use of the opioid medicine  You use it when you do not have pain  You crave the opioid medicine  Call 911 or have someone call 911 for any of the following:   · You are breathing slower than normal, or you have trouble breathing  · You cannot be awakened  · You have a seizure  Seek care immediately if:   · Your heart is beating slower than usual     · Your heart feels like it is jumping or fluttering  · You are so sleepy that you cannot stay awake  · You have severe muscle pain or weakness  · You see or hear things that are not real   Contact your healthcare provider if:   · You are too dizzy to stand up  · Your pain gets worse or you have new pain  · You cannot do your usual activities because of side effects from the opioid  · You are constipated or have abdominal pain  · You have questions or concerns about your condition or care  Opioid safety measures:   · Take your medicine as directed  Ask if you need more information on how to take your medicine correctly   Follow up with your healthcare provider regularly  You may need to have your dose adjusted  Do not use opioid medicine if you are pregnant or breastfeeding  · Give your healthcare provider a list of all your medicines  Include any over-the-counter medicines, vitamins, and herbs  It can be dangerous to take opioids with certain other medicines, such as antihistamines  · Keep opioid medicine in a safe place  Store your opioid medicine in a locked cabinet to keep it away from children and others  · Do not drink alcohol while you use opioids  Alcohol use with an opioid medicine can make you sleepy and slow your breathing rate  You may stop breathing completely  · Do not drive or operate heavy machinery after you take opioid medicine  Opioid medicine can make you drowsy and make it hard to concentrate  You may injure yourself or others if you drive or operate heavy machinery while taking your medicine  · Drink fluids and eat high-fiber foods  Fluids and fiber will help prevent constipation  Ask your healthcare provider what fluids are right for you and how much you should drink  Also ask for a list of foods that contain fiber  Follow up with your healthcare provider as directed: You may need to have your dose adjusted  You may be referred to a pain specialist  Write down your questions so you remember to ask them during your visits  © 2017 [employer] Information is for End User's use only and may not be sold, redistributed or otherwise used for commercial purposes  All illustrations and images included in CareNotes® are the copyrighted property of [employer]  or [employer]  The above information is an  only  It is not intended as medical advice for individual conditions or treatments  Talk to your doctor, nurse or pharmacist before following any medical regimen to see if it is safe and effective for you

## 2019-10-22 NOTE — PROGRESS NOTES
Assessment:  1  Chronic pain syndrome    2  Chronic bilateral low back pain with bilateral sciatica    3  Chronic lumbar radiculopathy    4  Chronic pain of both hips    5  Chronic bilateral thoracic back pain    6  Lumbar post-laminectomy syndrome    7  Spinal stenosis of lumbar region with neurogenic claudication    8  Lumbar degenerative disc disease    9  Primary osteoarthritis of both hips    10  Thoracic radiculopathy    11  Chronic pain of right hip    12  Chronic pain disorder    13  Uncomplicated opioid dependence ([address] )    14  Encounter for long-term opiate analgesic use        Plan:  While the patient was in the office today, I did have a thorough conversation with the patient regarding his chronic pain syndrome, symptoms, and medication regimen/treatment plan options  I did discuss with the patient that if he wanted to we could try to increase the Butrans patch to the 15 micro g dosage to see if that would be more helpful with regards to managing the pain for the full 7 days and or we could consider switching him to [facility] in the future  However, this point the patient want to continue with the Butrans at the current dosage and see how he does  I explained the patient that for now he can continue with the Butrans patch and the p r n  Norco as prescribed  The patient was agreeable and verbalized an understanding  [place] Prescription Drug Monitoring Program report was reviewed and was appropriate     The patient was not picked for a pill count today, but he did bring his medications as required  The patient's opioid scripts were sent to their pharmacy electronically and was given a 3 month supply of prescriptions with a Do Not Fill date(s) of November 19, 2019 and December 17, 2019  There are risks associated with opioid medications, including dependence, addiction and tolerance  The patient understands and agrees to use these medications only as prescribed   Potential side effects of the medications include, but are not limited to, constipation, drowsiness, addiction, impaired judgment and risk of fatal overdose if not taken as prescribed  The patient was warned against driving while taking sedation medications  Sharing medications is a felony  At this point in time, the patient is showing no signs of addiction, abuse, diversion or suicidal ideation  The patient will follow-up in 12 weeks for medication prescription refill and reevaluation  The patient was advised to contact the office should their symptoms worsen in the interim  The patient was agreeable and verbalized an understanding  History of Present Illness: The patient is a 60 y o  male last seen on 8/30/19 who presents for a follow up office visit in regards to chronic pain syndrome secondary to lumbar post-laminectomy syndrome  The patient currently reports that since his last office visit he does feel that his hip pain has improved as he is status post a bilateral hip injections on August 30, 2019 although the relief was more significant initially, it is still greater than 50% improved and for now would like to hold off any repeat injections for as long as possible  He does feel that the increase in the Butrans patch at his last office visit has been helpful and in general his back pain at least is much more manageable and tolerable except on the 5th and 6th day of the patch he does start to notice the pain starts to come back as prior to that he has almost no back pain but continues with the foot pain and neuropathy  The patient presents today to discuss his medication regimen treatment plan  Current pain medications includes:  Butrans patch 10 micro g applying 1 patch transdermally every 7 days and Norco 10/3251 p o  t i d  p r n  for pain  The patient reports that this regimen is providing 40-50% pain relief  The patient is reporting no side effects from this pain medication regimen      Pain Contract Signed: 1/16/19  Last Urine Drug Screen: 10/22/19    I have personally reviewed and/or updated the patient's past medical history, past surgical history, family history, social history, current medications, allergies, and vital signs today  Review of Systems:    Review of Systems   Respiratory: Negative for shortness of breath  Cardiovascular: Negative for chest pain  Gastrointestinal: Negative for constipation, diarrhea, nausea and vomiting  Musculoskeletal: Positive for gait problem  Negative for arthralgias, joint swelling (joint stiffness) and myalgias  Skin: Negative for rash  Neurological: Positive for weakness  Negative for dizziness and seizures  All other systems reviewed and are negative          Past Medical History:   Diagnosis Date    Acute low back pain     10 AUG 2016 RESOLVED    [facility]is [facility])     Bursitis of hip     Carpal tunnel syndrome     Chronic bilateral low back pain with bilateral sciatica     Chronic lumbar radiculopathy     Chronic narcotic dependence ([facility])     Chronic pain syndrome     Constipation due to opioid therapy     DDD (degenerative disc disease), lumbar     Deep vein thrombosis of left upper extremity ([address] )     [id number]  RESOLVED    Depression     Diverticula of colon     [id number] RESOLVED    DVT (deep venous thrombosis) ([facility])     LUE    Edema     History of staph infection     Hyperglycemia     Hyperlipidemia     Hypertension     Insomnia     Lateral epicondylitis     Left inguinal hernia     Methicillin resistant Staphylococcus aureus septicemia ([address] )     Morbid obesity due to excess calories ([facility])     Obesity     Peripheral neuropathy     Post laminectomy syndrome     RLS (restless legs syndrome)     Septicemia ([address] )     MRSA    Umbilical hernia        Past Surgical History:   Procedure Laterality Date    BACK SURGERY      laminectomy, hardware    CARPAL TUNNEL RELEASE Left     CERVICAL SPINE SURGERY      COLONOSCOPY      ELBOW SURGERY      ELBOW SURGERY      KNEE ARTHROSCOPY Right     knee    KNEE SURGERY Right     ARTHOSCOPY KNEE    NECK SURGERY      1997    DE COLONOSCOPY FLX DX W/COLLJ SPEC WHEN PFRMD N/A 1/24/2017    Procedure: COLONOSCOPY;  Surgeon: Chu Keen MD;  Location:  MAIN OR;  Service: General    SHOULDER SURGERY      SPINAL CORD STIMULATOR IMPLANT         Family History   Problem Relation Age of Onset    Heart disease Mother     Cancer Mother         breast, stomach    Aneurysm Mother     Heart disease Father     Cancer Father         skin, liver, colon DECESED AGE 58       Social History     Occupational History    Not on file   Tobacco Use    Smoking status: Never Smoker    Smokeless tobacco: Never Used   Substance and Sexual Activity    Alcohol use: Yes     Comment: social    Drug use: No    Sexual activity: Yes         Current Outpatient Medications:     aspirin 81 MG tablet, Take 81 mg by mouth daily, Disp: , Rfl:     bisoprolol-hydrochlorothiazide (ZIAC) 2 5-6 25 MG per tablet, Take 1 tablet by mouth daily, Disp: 90 tablet, Rfl: 3    Buprenorphine 10 MCG/HR PTWK, Apply 1 patch TD every 7 days  Please send to [facility] [address]  , Disp: 4 patch, Rfl: 2    diazepam (VALIUM) 10 mg tablet, Take 1 tablet (10 mg total) by mouth daily at bedtime as needed for anxiety, Disp: 30 tablet, Rfl: 1    HYDROcodone-acetaminophen (NORCO)  mg per tablet, Take 1 PO BID PRN for pain  Please send to [facility], Disp: 60 tablet, Rfl: 0    lactulose 20 g/30 mL, Take 1-2 tablespoons daily PRN as needed for chronic constipation  , Disp: 473 mL, Rfl: 2    magnesium 30 MG tablet, Take 30 mg by mouth daily as needed, Disp: , Rfl:     Multiple Vitamin (MULTIVITAMIN) tablet, Take 1 tablet by mouth every other day , Disp: , Rfl:     rosuvastatin (CRESTOR) 10 MG tablet, Take 1 tablet (10 mg total) by mouth once at bedtime, Disp: 90 tablet, Rfl: 3    Allergies   Allergen Reactions    Fentanyl Other (See Comments)     "Makes me Suicidal"  Happened when dosage increased   Penicillins Other (See Comments)     As a child unknown    Pravastatin Other (See Comments)     Reaction Date: [id number];   Muscle weakness    Sulfa Antibiotics Other (See Comments)     As a child unknown    Vytorin [Ezetimibe-Simvastatin]      Reaction Date: [id number];        Physical Exam:    /80 (BP Location: Left arm, Patient Position: Sitting, Cuff Size: Large)   Ht 5' 10" (1 778 m)   Wt 121 kg (266 lb)   BMI 38 17 kg/m²     Constitutional:normal, well developed, well nourished, alert, in no distress and non-toxic and no overt pain behavior  and overweight  Eyes:anicteric  HEENT:grossly intact  Neck:supple, symmetric, trachea midline and no masses   Pulmonary:even and unlabored  Cardiovascular:No edema or pitting edema present  Skin:Normal without rashes or lesions and well hydrated  Psychiatric:Mood and affect appropriate  Neurologic:Cranial Nerves II-XII grossly intact  Musculoskeletal:The patient's gait is slightly antalgic, but steady without the use of any assistive devices        Imaging  No orders to display         Orders Placed This Encounter   Procedures    MM ALL_Prescribed Meds and Special Instructions    MM DT_Alprazolam Definitive Test    MM DT_Amphetamine Definitive Test    MM DT_Aripiprazole Definitive Test    MM DT_Bath Salts Definitive Test    MM DT_Buprenorphine Definitive Test    MM DT_Butalbital Definitive Test    MM DT_Clonazepam Definitive Test    MM DT_Cocaine Definitive Test    MM DT_Clozapine Definitive Test    MM DT_Codeine Definitive Test    MM DT_Desipramine Definitive Test    MM DT_Dextromethorphan Definitive Test    MM Diazepam Definitive Test    MM DT_Ethyl Glucuronide/Ethyl Sulfate Definitive Test    MM DT_Fentanyl Screen and Confirm    MM DT_Haloperidol Definitive Test    MM DT_Heroin Definitive Test    MM DT_Hydrocodone Definitive Test    MM DT_Hydromorphone Definitive Test    MM DT_Imipramine Definitive Test    MM DT_Kratom Definitive Test    MM DT_Levorphanol Definitive Test    MM Lorazepam Definitive Test    MM DT_MDMA Definitive Test    MM DT_Meperidine Definitive Test    MM DT_Methadone Definitive Test    MM DT_Methamphetamine Definitive Test    MM DT_Methylphenidate Definitive Test    MM DT_Morphine Definitive Test    MM DT_Olanzapine Definitive Test    MM DT_Oxazepam Definitive Test    MM DT_Oxycodone Definitive Test    MM DT_Oxymorphone Definitive Test    MM DT_Phencyclidine Definitive Test    MM DT_Phenobarbital Definitive Test    MM DT_Phentermine Definitive Test    MM DT_Quetiapine Definitive Test    MM DT_Risperidone Definitive Test    MM DT_Secobarbital Definitive Test    MM DT_Spice Definitive Test    MM DT_Tapentadol Definitive Test    MM DT_Temazapam Definitive Test    MM DT_THC Definitive Test    MM DT_Tramadol Definitive Test

## 2019-12-11 DIAGNOSIS — E78.2 MIXED HYPERLIPIDEMIA: Primary | ICD-10-CM

## 2019-12-11 DIAGNOSIS — E78.2 MIXED HYPERLIPIDEMIA: ICD-10-CM

## 2019-12-11 DIAGNOSIS — R73.9 HYPERGLYCEMIA: ICD-10-CM

## 2019-12-11 DIAGNOSIS — I10 ESSENTIAL HYPERTENSION: ICD-10-CM

## 2019-12-11 DIAGNOSIS — G72.0 STATIN MYOPATHY: ICD-10-CM

## 2019-12-11 DIAGNOSIS — T46.6X5A STATIN MYOPATHY: ICD-10-CM

## 2019-12-11 RX ORDER — ROSUVASTATIN CALCIUM 10 MG/1
10 TABLET, COATED ORAL
Qty: 90 TABLET | Refills: 0 | Status: SHIPPED | OUTPATIENT
Start: 2019-12-11 | End: 2020-06-23

## 2019-12-11 RX ORDER — ROSUVASTATIN CALCIUM 10 MG/1
TABLET, COATED ORAL
Qty: 90 TABLET | Refills: 0 | Status: SHIPPED | OUTPATIENT
Start: 2019-12-11 | End: 2019-12-11 | Stop reason: SDUPTHER

## 2019-12-12 ENCOUNTER — APPOINTMENT (OUTPATIENT)
Dept: LAB | Facility: HOSPITAL | Age: 64
End: 2019-12-12
Payer: COMMERCIAL

## 2019-12-12 ENCOUNTER — TRANSCRIBE ORDERS (OUTPATIENT)
Dept: ADMINISTRATIVE | Facility: HOSPITAL | Age: 64
End: 2019-12-12

## 2019-12-12 ENCOUNTER — HOSPITAL ENCOUNTER (OUTPATIENT)
Dept: RADIOLOGY | Facility: HOSPITAL | Age: 64
Discharge: HOME | End: 2019-12-12
Attending: PODIATRIST
Payer: COMMERCIAL

## 2019-12-12 DIAGNOSIS — R73.9 HYPERGLYCEMIA: ICD-10-CM

## 2019-12-12 DIAGNOSIS — E78.2 MIXED HYPERLIPIDEMIA: ICD-10-CM

## 2019-12-12 DIAGNOSIS — I10 ESSENTIAL HYPERTENSION: ICD-10-CM

## 2019-12-12 DIAGNOSIS — M20.11 ACQUIRED HALLUX VALGUS OF RIGHT FOOT: Primary | ICD-10-CM

## 2019-12-12 DIAGNOSIS — M20.12 ACQUIRED HALLUX VALGUS OF LEFT FOOT: ICD-10-CM

## 2019-12-12 DIAGNOSIS — G72.0 STATIN MYOPATHY: ICD-10-CM

## 2019-12-12 DIAGNOSIS — T46.6X5A STATIN MYOPATHY: ICD-10-CM

## 2019-12-12 DIAGNOSIS — M20.11 ACQUIRED HALLUX VALGUS OF RIGHT FOOT: ICD-10-CM

## 2019-12-12 LAB
ALBUMIN SERPL BCP-MCNC: 3.9 G/DL (ref 3.5–5)
ALP SERPL-CCNC: 48 U/L (ref 46–116)
ALT SERPL W P-5'-P-CCNC: 38 U/L (ref 12–78)
ANION GAP SERPL CALCULATED.3IONS-SCNC: 4 MMOL/L (ref 4–13)
AST SERPL W P-5'-P-CCNC: 19 U/L (ref 5–45)
BASOPHILS # BLD AUTO: 0.06 THOUSANDS/ΜL (ref 0–0.1)
BASOPHILS NFR BLD AUTO: 1 % (ref 0–1)
BILIRUB SERPL-MCNC: 0.51 MG/DL (ref 0.2–1)
BUN SERPL-MCNC: 16 MG/DL (ref 5–25)
CALCIUM SERPL-MCNC: 9.2 MG/DL (ref 8.3–10.1)
CHLORIDE SERPL-SCNC: 107 MMOL/L (ref 100–108)
CHOLEST SERPL-MCNC: 151 MG/DL (ref 50–200)
CK MB SERPL-MCNC: 1.5 % (ref 0–2.5)
CK MB SERPL-MCNC: 2.5 NG/ML (ref 0–5)
CK SERPL-CCNC: 171 U/L (ref 39–308)
CO2 SERPL-SCNC: 29 MMOL/L (ref 21–32)
CREAT SERPL-MCNC: 1.07 MG/DL (ref 0.6–1.3)
EOSINOPHIL # BLD AUTO: 0.2 THOUSAND/ΜL (ref 0–0.61)
EOSINOPHIL NFR BLD AUTO: 3 % (ref 0–6)
ERYTHROCYTE [DISTWIDTH] IN BLOOD BY AUTOMATED COUNT: 13.6 % (ref 11.6–15.1)
EST. AVERAGE GLUCOSE BLD GHB EST-MCNC: 123 MG/DL
GFR SERPL CREATININE-BSD FRML MDRD: 74 ML/MIN/1.73SQ M
GLUCOSE P FAST SERPL-MCNC: 106 MG/DL (ref 65–99)
HBA1C MFR BLD: 5.9 % (ref 4.2–6.3)
HCT VFR BLD AUTO: 47.7 % (ref 36.5–49.3)
HDLC SERPL-MCNC: 39 MG/DL
HGB BLD-MCNC: 15.4 G/DL (ref 12–17)
IMM GRANULOCYTES # BLD AUTO: 0.01 THOUSAND/UL (ref 0–0.2)
IMM GRANULOCYTES NFR BLD AUTO: 0 % (ref 0–2)
LDLC SERPL CALC-MCNC: 87 MG/DL (ref 0–100)
LYMPHOCYTES # BLD AUTO: 1.96 THOUSANDS/ΜL (ref 0.6–4.47)
LYMPHOCYTES NFR BLD AUTO: 33 % (ref 14–44)
MCH RBC QN AUTO: 29.1 PG (ref 26.8–34.3)
MCHC RBC AUTO-ENTMCNC: 32.3 G/DL (ref 31.4–37.4)
MCV RBC AUTO: 90 FL (ref 82–98)
MONOCYTES # BLD AUTO: 0.6 THOUSAND/ΜL (ref 0.17–1.22)
MONOCYTES NFR BLD AUTO: 10 % (ref 4–12)
NEUTROPHILS # BLD AUTO: 3.19 THOUSANDS/ΜL (ref 1.85–7.62)
NEUTS SEG NFR BLD AUTO: 53 % (ref 43–75)
NRBC BLD AUTO-RTO: 0 /100 WBCS
PLATELET # BLD AUTO: 257 THOUSANDS/UL (ref 149–390)
PMV BLD AUTO: 8.6 FL (ref 8.9–12.7)
POTASSIUM SERPL-SCNC: 3.8 MMOL/L (ref 3.5–5.3)
PROT SERPL-MCNC: 7.8 G/DL (ref 6.4–8.2)
RBC # BLD AUTO: 5.29 MILLION/UL (ref 3.88–5.62)
SODIUM SERPL-SCNC: 140 MMOL/L (ref 136–145)
TRIGL SERPL-MCNC: 123 MG/DL
WBC # BLD AUTO: 6.02 THOUSAND/UL (ref 4.31–10.16)

## 2019-12-12 PROCEDURE — 36415 COLL VENOUS BLD VENIPUNCTURE: CPT

## 2019-12-12 PROCEDURE — 80053 COMPREHEN METABOLIC PANEL: CPT

## 2019-12-12 PROCEDURE — 83036 HEMOGLOBIN GLYCOSYLATED A1C: CPT

## 2019-12-12 PROCEDURE — 82550 ASSAY OF CK (CPK): CPT

## 2019-12-12 PROCEDURE — 80061 LIPID PANEL: CPT

## 2019-12-12 PROCEDURE — 85025 COMPLETE CBC W/AUTO DIFF WBC: CPT

## 2019-12-12 PROCEDURE — 82553 CREATINE MB FRACTION: CPT

## 2019-12-12 PROCEDURE — 73630 X-RAY EXAM OF FOOT: CPT

## 2019-12-18 ENCOUNTER — OFFICE VISIT (OUTPATIENT)
Dept: FAMILY MEDICINE CLINIC | Facility: HOSPITAL | Age: 64
End: 2019-12-18
Payer: COMMERCIAL

## 2019-12-18 VITALS
OXYGEN SATURATION: 96 % | WEIGHT: 270 LBS | DIASTOLIC BLOOD PRESSURE: 80 MMHG | HEIGHT: 70 IN | BODY MASS INDEX: 38.65 KG/M2 | HEART RATE: 77 BPM | TEMPERATURE: 99 F | SYSTOLIC BLOOD PRESSURE: 130 MMHG

## 2019-12-18 DIAGNOSIS — M21.372 BILATERAL FOOT-DROP: ICD-10-CM

## 2019-12-18 DIAGNOSIS — F11.20 CONTINUOUS OPIOID DEPENDENCE (HCC): ICD-10-CM

## 2019-12-18 DIAGNOSIS — R73.9 HYPERGLYCEMIA: ICD-10-CM

## 2019-12-18 DIAGNOSIS — N32.81 OAB (OVERACTIVE BLADDER): ICD-10-CM

## 2019-12-18 DIAGNOSIS — E66.01 CLASS 2 SEVERE OBESITY DUE TO EXCESS CALORIES WITH SERIOUS COMORBIDITY AND BODY MASS INDEX (BMI) OF 38.0 TO 38.9 IN ADULT (HCC): ICD-10-CM

## 2019-12-18 DIAGNOSIS — I10 ESSENTIAL HYPERTENSION: Primary | ICD-10-CM

## 2019-12-18 DIAGNOSIS — G60.9 IDIOPATHIC PERIPHERAL NEUROPATHY: ICD-10-CM

## 2019-12-18 DIAGNOSIS — M51.36 LUMBAR DEGENERATIVE DISC DISEASE: ICD-10-CM

## 2019-12-18 DIAGNOSIS — M21.371 BILATERAL FOOT-DROP: ICD-10-CM

## 2019-12-18 DIAGNOSIS — E78.2 MIXED HYPERLIPIDEMIA: ICD-10-CM

## 2019-12-18 PROBLEM — E66.812 CLASS 2 SEVERE OBESITY DUE TO EXCESS CALORIES WITH SERIOUS COMORBIDITY AND BODY MASS INDEX (BMI) OF 38.0 TO 38.9 IN ADULT (HCC): Status: ACTIVE | Noted: 2017-01-16

## 2019-12-18 PROCEDURE — 99214 OFFICE O/P EST MOD 30 MIN: CPT | Performed by: FAMILY MEDICINE

## 2019-12-18 RX ORDER — SOLIFENACIN SUCCINATE 10 MG/1
10 TABLET, FILM COATED ORAL DAILY
Qty: 30 TABLET | Refills: 3 | Status: SHIPPED | OUTPATIENT
Start: 2019-12-18 | End: 2020-06-29

## 2019-12-19 NOTE — PROGRESS NOTES
Assessment/Plan:         Diagnoses and all orders for this visit:    Essential hypertension  Comments:  Excellent BP control    Lumbar degenerative disc disease  Comments:  F/U with Vera De Souza    Continuous opioid dependence ([facility] )  Comments:  Managed by [facility]    Hyperglycemia  Comments:  Good control A1c on diet    Mixed hyperlipidemia    Class 2 severe obesity due to excess calories with serious comorbidity and body mass index (BMI) of 38 0 to 38 9 in adult [facility])    Bilateral foot-drop  -     Ambulatory referral to Neurology; Future    Idiopathic peripheral neuropathy    OAB (overactive bladder)  -     solifenacin (VESICARE) 10 MG tablet; Take 1 tablet (10 mg total) by mouth daily          Subjective:      Patient ID: Yulia Tsai  is a 60 y o  male  6 month follow up    Sprained L ankle and skinned R shin with very extensive subcutaneous bruising down to medial malleolus from gravity flow  Having R carpal tunnel surgery and R bunionectomy    Tried Butrans patch but not necessarily cutting down the total tablets he is needing for pain relief  Will follow up next month with Skip  Labs reviewed in detail and copy given to patient  Having trouble with urinary urgency if he drinks anything  Has double voiding and slowed stream  We did try Flomax in the past without benefit      The following portions of the patient's history were reviewed and updated as appropriate: allergies, current medications, past family history, past medical history, past social history, past surgical history and problem list     Review of Systems   Constitutional: Negative for unexpected weight change  Eyes: Negative for visual disturbance  Respiratory: Negative  Cardiovascular: Negative  Gastrointestinal: Negative  Musculoskeletal: Positive for arthralgias, back pain, gait problem, joint swelling and myalgias  Skin: Positive for wound  Hematological: Bruises/bleeds easily  Psychiatric/Behavioral: Negative  All other systems reviewed and are negative  Objective:      /80 (Patient Position: Sitting, Cuff Size: Large)   Pulse 77   Temp 99 °F (37 2 °C) (Tympanic)   Ht 5' 10" (1 778 m)   Wt 122 kg (270 lb)   SpO2 96%   BMI 38 74 kg/m²          Physical Exam   Constitutional: He is oriented to person, place, and time  He appears well-developed and well-nourished  Neck: No thyromegaly present  Cardiovascular: Normal rate, regular rhythm, normal heart sounds and intact distal pulses  Pulmonary/Chest: Effort normal and breath sounds normal    Neurological: He is alert and oriented to person, place, and time  Nursing note and vitals reviewed

## 2019-12-27 RX ORDER — MELOXICAM 15 MG/1
15 TABLET ORAL DAILY
COMMUNITY
End: 2020-06-29

## 2019-12-27 NOTE — PRE-PROCEDURE INSTRUCTIONS
Pre-Surgery Instructions:   Medication Instructions    aspirin 81 MG tablet Patient was instructed by Physician and understands   bisoprolol-hydrochlorothiazide (ZIAC) 2 5-6 25 MG per tablet Patient was instructed by Physician and understands   Buprenorphine 10 MCG/HR PTWK Patient was instructed by Physician and understands   diazepam (VALIUM) 10 mg tablet Patient was instructed by Physician and understands   HYDROcodone-acetaminophen (NORCO)  mg per tablet Patient was instructed by Physician and understands   lactulose 20 g/30 mL Patient was instructed by Physician and understands   magnesium 30 MG tablet Patient was instructed by Physician and understands   meloxicam (MOBIC) 15 mg tablet Patient was instructed by Physician and understands   Multiple Vitamin (MULTIVITAMIN) tablet Patient was instructed by Physician and understands   rosuvastatin (CRESTOR) 10 MG tablet Patient was instructed by Physician and understands   solifenacin (VESICARE) 10 MG tablet Patient was instructed by Physician and understands  Pre shower with hibiclens as instructed by surgeon  You may drive yourself to the hospital   You may take your medications day of surgery  You may eat on the day of your surgery  You may have a dressing, please wear something that will fit over it

## 2019-12-31 ENCOUNTER — TELEPHONE (OUTPATIENT)
Dept: OBGYN CLINIC | Facility: CLINIC | Age: 64
End: 2019-12-31

## 2020-01-02 ENCOUNTER — HOSPITAL ENCOUNTER (OUTPATIENT)
Facility: HOSPITAL | Age: 65
Setting detail: OUTPATIENT SURGERY
Discharge: HOME | End: 2020-01-02
Attending: ORTHOPAEDIC SURGERY | Admitting: ORTHOPAEDIC SURGERY
Payer: COMMERCIAL

## 2020-01-02 VITALS
OXYGEN SATURATION: 93 % | DIASTOLIC BLOOD PRESSURE: 89 MMHG | RESPIRATION RATE: 16 BRPM | HEART RATE: 72 BPM | SYSTOLIC BLOOD PRESSURE: 128 MMHG | TEMPERATURE: 98.5 F

## 2020-01-02 DIAGNOSIS — G56.01 CARPAL TUNNEL SYNDROME ON RIGHT: Primary | ICD-10-CM

## 2020-01-02 PROCEDURE — 29848 WRIST ENDOSCOPY/SURGERY: CPT | Performed by: PHYSICIAN ASSISTANT

## 2020-01-02 PROCEDURE — 29848 WRIST ENDOSCOPY/SURGERY: CPT | Performed by: ORTHOPAEDIC SURGERY

## 2020-01-02 PROCEDURE — 99024 POSTOP FOLLOW-UP VISIT: CPT | Performed by: PHYSICIAN ASSISTANT

## 2020-01-02 RX ORDER — NAPROXEN SODIUM 220 MG
220 TABLET ORAL 2 TIMES DAILY WITH MEALS
Qty: 10 TABLET | Refills: 0 | Status: SHIPPED | OUTPATIENT
Start: 2020-01-02 | End: 2021-09-20

## 2020-01-02 RX ORDER — SENNOSIDES 8.6 MG
650 CAPSULE ORAL EVERY 8 HOURS
Qty: 15 TABLET | Refills: 0 | Status: SHIPPED | OUTPATIENT
Start: 2020-01-02 | End: 2020-01-07

## 2020-01-02 RX ORDER — MAGNESIUM HYDROXIDE 1200 MG/15ML
LIQUID ORAL AS NEEDED
Status: DISCONTINUED | OUTPATIENT
Start: 2020-01-02 | End: 2020-01-02 | Stop reason: HOSPADM

## 2020-01-02 RX ORDER — LIDOCAINE HYDROCHLORIDE AND EPINEPHRINE 10; 10 MG/ML; UG/ML
20 INJECTION, SOLUTION INFILTRATION; PERINEURAL ONCE
Status: COMPLETED | OUTPATIENT
Start: 2020-01-02 | End: 2020-01-02

## 2020-01-02 RX ADMIN — LIDOCAINE HYDROCHLORIDE AND EPINEPHRINE 18 ML: 10; 10 INJECTION, SOLUTION INFILTRATION; PERINEURAL at 08:50

## 2020-01-02 NOTE — H&P
H&P Exam - Orthopedics   Wei Sullivan  60 y o  male MRN: [id number]  Unit/Bed#: [facility] 06    Assessment/Plan   Assessment:  R CTS   Plan:  R ECTR today    History of Present Illness   HPI:  Wei Sullivan  is a 60 y o  male who presents with c/o R hand n/t and pain  Describes nocturnal symptoms and flick sign  Had similar symptoms on the left hand which resolved after CTR       Historical Information  Review Of Systems:   · Skin: Normal  · Neuro: See HPI  · Musculoskeletal: See HPI  · 14 point review of systems negative except as stated above     Past Medical History:   Past Medical History:   Diagnosis Date    Acute low back pain     10 AUG 2016 RESOLVED    Angina pectoris ([facility])     Bunion     Bursitis of hip     Carpal tunnel syndrome     Chronic bilateral low back pain with bilateral sciatica     Chronic lumbar radiculopathy     Chronic narcotic dependence ([facility])     Chronic pain syndrome     Constipation due to opioid therapy     DDD (degenerative disc disease), lumbar     Deep vein thrombosis of left upper extremity ([address] )     [id number]  RESOLVED    Depression     Diverticula of colon     [id number] RESOLVED    DVT (deep venous thrombosis) ([facility])     LUE    Edema     History of staph infection     Hyperglycemia     Hyperlipidemia     Hypertension     Insomnia     Lateral epicondylitis     Left inguinal hernia     Methicillin resistant Staphylococcus aureus septicemia ([address] )     Morbid obesity due to excess calories ([facility])     Obesity     Peripheral neuropathy     Post laminectomy syndrome     RLS (restless legs syndrome)     Septicemia ([address] )     MRSA    Umbilical hernia        Past Surgical History:   Past Surgical History:   Procedure Laterality Date    BACK SURGERY      laminectomy, hardware    CARPAL TUNNEL RELEASE Left     CERVICAL SPINE SURGERY      COLONOSCOPY      ELBOW SURGERY      ELBOW SURGERY      KNEE ARTHROSCOPY Right     knee    KNEE SURGERY Right ARTHOSCOPY KNEE    NECK SURGERY      1997    TN COLONOSCOPY FLX DX W/COLLJ SPEC WHEN PFRMD N/A 1/24/2017    Procedure: COLONOSCOPY;  Surgeon: Dee Dee Grajeda MD;  Location:  MAIN OR;  Service: General    SHOULDER SURGERY      SPINAL CORD STIMULATOR IMPLANT         Family History:  Family history reviewed and non-contributory  Family History   Problem Relation Age of Onset    Heart disease Mother     Cancer Mother         breast, stomach    Aneurysm Mother     Heart disease Father     Cancer Father         skin, liver, colon DECESED AGE 58       Social History:  Social History     Socioeconomic History    Marital status: /Civil Union     Spouse name: None    Number of children: 3    Years of education: None    Highest education level: None   Occupational History    None   Social Needs    Financial resource strain: None    Food insecurity:     Worry: None     Inability: None    Transportation needs:     Medical: None     Non-medical: None   Tobacco Use    Smoking status: Never Smoker    Smokeless tobacco: Never Used   Substance and Sexual Activity    Alcohol use: Yes     Frequency: Monthly or less     Drinks per session: 1 or 2     Binge frequency: Never    Drug use: No     Comment: opiod use for chronic pain    Sexual activity: Yes   Lifestyle    Physical activity:     Days per week: None     Minutes per session: None    Stress: None   Relationships    Social connections:     Talks on phone: None     Gets together: None     Attends Druze service: None     Active member of club or organization: None     Attends meetings of clubs or organizations: None     Relationship status: None    Intimate partner violence:     Fear of current or ex partner: None     Emotionally abused: None     Physically abused: None     Forced sexual activity: None   Other Topics Concern    None   Social History Narrative    Worked for 14 years in [employer] - talc exposure/dust exposure DISABLED    NO KNOWN RISK FACTORS    NO KNOWN STD RISK FACTORS       Allergies: Allergies   Allergen Reactions    Fentanyl Other (See Comments)     "Makes me Suicidal"  Happened when dosage increased   Pravastatin Other (See Comments)     Reaction Date: [id number];   Muscle weakness    Vytorin [Ezetimibe-Simvastatin] Myalgia     Reaction Date: [id number];     Penicillins Other (See Comments)     As a child unknown    Sulfa Antibiotics Other (See Comments)     As a child unknown           Labs:  0   Lab Value Date/Time    HCT 47 7 12/12/2019 0805    HCT 49 4 (H) 06/10/2019 0717    HCT 48 1 06/11/2018 0728    HCT 45 1 11/29/2015 0642    HCT 44 6 11/28/2015 0905    HCT 42 3 11/27/2015 0523    HGB 15 4 12/12/2019 0805    HGB 15 7 06/10/2019 0717    HGB 15 3 06/11/2018 0728    HGB 15 0 11/29/2015 0642    HGB 14 7 11/28/2015 0905    HGB 14 2 11/27/2015 0523    INR 0 96 07/05/2016 1243    INR 1 05 11/26/2015 0938    WBC 6 02 12/12/2019 0805    WBC 4 94 06/10/2019 0717    WBC 4 77 06/11/2018 0728    WBC 8 88 11/29/2015 0642    WBC 7 18 11/28/2015 0905    WBC 9 94 11/27/2015 0523       Meds:    Current Facility-Administered Medications:     lidocaine-epinephrine (XYLOCAINE/EPINEPHRINE) 1 %-1:100,000 injection 20 mL, 20 mL, Infiltration, Once, Kasandra Rojas MD    Blood Culture:   No results found for: BLOODCX    Wound Culture:   No results found for: WOUNDCULT    Ins and Outs:  No intake/output data recorded  Physical Exam  There were no vitals taken for this visit  There were no vitals taken for this visit  Gen: Alert and oriented to person, place, time  HEENT: EOMI, eyes clear, moist mucus membranes, hearing intact  Respiratory: Bilateral chest rise   No audible wheezing found  Cardiovascular: Regular Rate and Rhythm  Abdomen: soft nontender/nondistended  Ortho Exam: Well-maintained finger ROM  Neuro Exam: Right carpal tunnel with + Tinel's, + Phalan's test, +APB weakness but no atrophy    Lab Results: Reviewed  Imaging: Reviewed

## 2020-01-02 NOTE — OP NOTE
OPERATIVE REPORT  PATIENT NAME: Archie Wilson  :  1955  MRN: [id number]  Pt Location: [facility] MAIN OR    SURGERY DATE: 20    Surgeon(s) and Role:     * Julieta Rodriguez MD - Primary     * Lucy Alas PA-C - Assisting    Pre-Op Diagnosis:  Carpal tunnel syndrome on right [G56 01]    Post-Op Diagnosis Codes:     * Carpal tunnel syndrome on right [G56 01]    Procedure(s):  RELEASE CARPAL TUNNEL ENDOSCOPIC, right (Right)    Specimen(s):  * No orders in the log *    Estimated Blood Loss:   Minimal      Anesthesia Type:   Local    Operative Indications: The patient has a history of Carpal Tunnel Syndrome  right that was recalcitrant to conservative management  The decision was made to bring the patient to the operating room for Endoscopic Carpal Tunnel Release  right  Risks of the procedure were explained which include, but are not limited to bleeding; infection; damage to nerves, arteries,veins, tendons; scar; pain; need for reoperation; failure to give desired result; and risks of anaesthesia  All questions were answered to satisfaction and they were willing to proceed  Operative Findings:  Right hand carpal tunnel syndrome    Complications:   None    Procedure and Technique:  After the patient, site, and procedure were identified, the patient was brought into the operating room in a supine position  Local anaesthesia was adminstered in the preoperative holding area  A tourniquet was not used  The  right upper extremity was then prepped and drapped in a normal, sterile, orthopedic fashion  After reconfirmation of the patient, site, and surgical procedure, which was agreed upon by the entire surgical team, attention was turned to the right wrist   The sites of the proximal and distal incisions were marked    The mustapha of the proximal incision was placed horizontally at the midline of the wrist   The distal incision mustapha was longitudinal extending distally from the point of intersection of the line between the long finger and ring finger and the line along the distal border of the fully abducted thumb  The proximal incision was performed  Subcutaneous tissues were dissected  Then the transverse volar antebrachial fascia was perforated with a scalpel  The edges of the skin incision where retracted and the forearm fascia was incised for approximately 1 5 cm proximally with care taken to identify and protect the median nerve  Retractors were used to inspect the transverse carpal ligament distally  A curved Kim dissector was used to glide under the transverse carpal ligament and superficial to the median nerve with confirmation via the washboard feeling  Then the curved Kim was pushed into the palm toward the distal incision site  When the location of the distal skin mustapha was adequate, the distal incision was made  Then with retraction of the skin, further dissection and perforation of the palmar fascia was performed with the use of tenotomy scissors  The curved Kim was guided from proximal to distal out the distal incisions without any twisting to allow for dilation of the tract  The curved Kim was removed, and the cannula for the camera was inserted along the same tract, making sure to keep the alignment post on the cannula perpendicular to the plane of the hand without twisting  Then while keeping the wrist in extension, and holding the cannula of the camera in place, the wrist was placed on the hyperextension board  The scope was inserted distally, and a cotton-tip applicator was used proximally to clean the tract as well as the scope  A curved cutting knife was introduced from proximal to distal while keeping visualization with the use of the camera  Without twisting of the canula, the knife was used to cut the transverse carpal ligament completely, making sure there were no remnant fibers    Then after this was accomplished, the hand was removed from the extension block   Three maneuvers were used to confirm the full release of the transverse carpal ligament  First, the ease of twisting the trocar of the camera confirmed the release of the ligament  Second, the curved Kim was introduced to make sure there were no remnant fibers that could be felt palmarly  Third, the scope was introduced again to visualize that the whole ligament was released proximally to distally  Additional confirmation of full release included retraction and inspection in the distal and proximal incisions to make sure there were no remnant fibers distally or proximally respectively  At the completion of the procedure, hemostasis was obtained with cautery and direct pressure  The wounds were copiously irrigated with sterile solution  The wounds were closed with Prolene  Sterile dressings were applied, including Xeroform, gauze, tweeners, webril, ACE  Please note, all sponge, needle, and instrument counts were correct prior to closure  Loupe magnification was utilized  The patient tolerated the procedure well       I was present for the entire procedure, A qualified resident physician was not available and A physician assistant was required during the procedure for retraction tissue handling,dissection and suturing    Patient Disposition:  APU and hemodynamically stable    SIGNATURE: Paul Angel MD  DATE: 01/02/20  TIME: 9:25 AM

## 2020-01-02 NOTE — DISCHARGE INSTRUCTIONS
Post Operative Instructions    You have had surgery on your arm today, please read and follow the information below:  · Elevate your hand above your elbow during the next 24-48 hours to help with swelling  · Place your hand and arm over your head with motion at your shoulder three times a day  · Do not apply any cream/ointment/oil to your incisions including antibiotics  · Do not soak your hands in standing water (dishwater, tubs, Jacuzzi's, pools, etc ) until given permission (typically 2-3 weeks after injury)    Call the office if you notice any:  · Increased numbness or tingling of your hand or fingers that is not relieved with elevation  · Increasing pain that is not controlled with medication  · Difficulty chewing, breathing, swallowing  · Chest pains or shortness of breath  · Fever over 101 4 degrees  Bandage: Remove bandage after 5 days  Motion: Move fingers into a fist 5 times a day, DO NOT move any splinted fingers  Weight bearing status: Avoid heavy lifting (>5 pounds) with the extremity that was operated on until follow up appointment  Normal activities of daily living are OK  Ice: Ice for 10 minutes every hour as needed for swelling x 24 hours  Sling: No sling necessary  Medications:   Naproxen 220 mg two times a day   Tylenol Extended Release 650 mg every 8 hours   Please also continue your home pain medications as instructed     Follow-up Appointment: 7-10 days  Please call the office if you have any questions or concerns regarding your post-operative care

## 2020-01-13 ENCOUNTER — OFFICE VISIT (OUTPATIENT)
Dept: OBGYN CLINIC | Facility: CLINIC | Age: 65
End: 2020-01-13

## 2020-01-13 VITALS
SYSTOLIC BLOOD PRESSURE: 122 MMHG | HEIGHT: 70 IN | DIASTOLIC BLOOD PRESSURE: 84 MMHG | WEIGHT: 272 LBS | BODY MASS INDEX: 38.94 KG/M2

## 2020-01-13 DIAGNOSIS — G56.01 CARPAL TUNNEL SYNDROME ON RIGHT: Primary | ICD-10-CM

## 2020-01-13 PROCEDURE — 99024 POSTOP FOLLOW-UP VISIT: CPT | Performed by: ORTHOPAEDIC SURGERY

## 2020-01-13 NOTE — PROGRESS NOTES
Assessment:   S/p R ECTR on 1/2/2020    Plan: It was discussed with the patient that they may now begin to wash their incision site with soap and water  They were instructed to let the steri-strips fall off on their own at this point in time and when they do they may begin to apply lotion to their incision site  Lotion enriched in vitamin E, coca butter, scarzone and maderma was discussed with the patient  Follow Up:  PRN    To Do Next Visit:         CHIEF COMPLAINT:  Chief Complaint   Patient presents with    Right Wrist - Post-op         SUBJECTIVE:  Armen Islas  is a 60 y o  male who presents for follow up after Endoscopic Carpal Tunnel Release  Right on 1/2/2020  Today patient has minimal numbness to hsi thumb, index and long finger however this is greatly improved since surgery  His pain resolved since surgery  He is very happy with his results         PHYSICAL EXAMINATION:  Vital signs: /84   Ht 5' 10" (1 778 m)   Wt 123 kg (272 lb)   BMI 39 03 kg/m²   General: well developed and well nourished, alert, oriented times 3 and appears comfortable  Psychiatric: Normal    MUSCULOSKELETAL EXAMINATION:  Incision: healed  Range of Motion: opposition intact and full composite fist possible apb 5/5  Neurovascular status: Neuro intact, good cap refill  Activity Restrictions: No restrictions       STUDIES REVIEWED:  No Studies to review      PROCEDURES PERFORMED:  Procedures  No Procedures performed today   Scribe Attestation    I,:   Nick Grullon am acting as a scribe while in the presence of the attending physician :        I,:   Zeyad Orantes MD personally performed the services described in this documentation    as scribed in my presence :

## 2020-01-14 ENCOUNTER — OFFICE VISIT (OUTPATIENT)
Dept: PAIN MEDICINE | Facility: CLINIC | Age: 65
End: 2020-01-14
Payer: COMMERCIAL

## 2020-01-14 VITALS
HEIGHT: 70 IN | DIASTOLIC BLOOD PRESSURE: 82 MMHG | WEIGHT: 271 LBS | HEART RATE: 65 BPM | SYSTOLIC BLOOD PRESSURE: 122 MMHG | BODY MASS INDEX: 38.8 KG/M2

## 2020-01-14 DIAGNOSIS — M54.42 CHRONIC BILATERAL LOW BACK PAIN WITH BILATERAL SCIATICA: ICD-10-CM

## 2020-01-14 DIAGNOSIS — G89.4 CHRONIC PAIN SYNDROME: Primary | ICD-10-CM

## 2020-01-14 DIAGNOSIS — M16.0 PRIMARY OSTEOARTHRITIS OF BOTH HIPS: ICD-10-CM

## 2020-01-14 DIAGNOSIS — M25.552 CHRONIC PAIN OF BOTH HIPS: ICD-10-CM

## 2020-01-14 DIAGNOSIS — G89.29 CHRONIC PAIN OF BOTH HIPS: ICD-10-CM

## 2020-01-14 DIAGNOSIS — M25.551 CHRONIC PAIN OF RIGHT HIP: ICD-10-CM

## 2020-01-14 DIAGNOSIS — M25.551 CHRONIC PAIN OF BOTH HIPS: ICD-10-CM

## 2020-01-14 DIAGNOSIS — G89.29 CHRONIC BILATERAL LOW BACK PAIN WITH BILATERAL SCIATICA: ICD-10-CM

## 2020-01-14 DIAGNOSIS — M54.41 CHRONIC BILATERAL LOW BACK PAIN WITH BILATERAL SCIATICA: ICD-10-CM

## 2020-01-14 DIAGNOSIS — M54.6 CHRONIC BILATERAL THORACIC BACK PAIN: ICD-10-CM

## 2020-01-14 DIAGNOSIS — M51.36 LUMBAR DEGENERATIVE DISC DISEASE: ICD-10-CM

## 2020-01-14 DIAGNOSIS — G89.29 CHRONIC BILATERAL THORACIC BACK PAIN: ICD-10-CM

## 2020-01-14 DIAGNOSIS — M54.14 THORACIC RADICULOPATHY: ICD-10-CM

## 2020-01-14 DIAGNOSIS — G89.29 CHRONIC PAIN OF RIGHT HIP: ICD-10-CM

## 2020-01-14 DIAGNOSIS — M54.16 CHRONIC LUMBAR RADICULOPATHY: ICD-10-CM

## 2020-01-14 DIAGNOSIS — M48.062 SPINAL STENOSIS OF LUMBAR REGION WITH NEUROGENIC CLAUDICATION: ICD-10-CM

## 2020-01-14 DIAGNOSIS — G89.4 CHRONIC PAIN DISORDER: ICD-10-CM

## 2020-01-14 DIAGNOSIS — M96.1 LUMBAR POST-LAMINECTOMY SYNDROME: ICD-10-CM

## 2020-01-14 PROCEDURE — 99214 OFFICE O/P EST MOD 30 MIN: CPT | Performed by: NURSE PRACTITIONER

## 2020-01-14 RX ORDER — HYDROCODONE BITARTRATE AND ACETAMINOPHEN 10; 325 MG/1; MG/1
TABLET ORAL
Qty: 60 TABLET | Refills: 0 | Status: SHIPPED | OUTPATIENT
Start: 2020-01-14 | End: 2020-03-10 | Stop reason: SDUPTHER

## 2020-01-14 RX ORDER — HYDROCODONE BITARTRATE AND ACETAMINOPHEN 10; 325 MG/1; MG/1
TABLET ORAL
Qty: 60 TABLET | Refills: 0 | Status: SHIPPED | OUTPATIENT
Start: 2020-01-14 | End: 2020-01-14 | Stop reason: SDUPTHER

## 2020-01-14 RX ORDER — BUPRENORPHINE 15 UG/H
PATCH TRANSDERMAL
Qty: 4 PATCH | Refills: 1 | Status: SHIPPED | OUTPATIENT
Start: 2020-01-14 | End: 2020-01-31

## 2020-01-14 NOTE — PATIENT INSTRUCTIONS
Sofiya Rosales Dates: Today & 2/11/20    Opioid Pain Management   AMBULATORY CARE:   An opioid  is a type of medicine used to treat pain  Examples of opioids are oxycodone, morphine, fentanyl, or codeine  Take opioid medicines as directed, for the condition it is prescribed:  Common problems that may occur when you do not take opioid medicines as directed include the following:  · Health problems  may occur  You may have trouble breathing  You may also develop liver or kidney damage, or stomach bleeding  Any of these health problems can become life-threatening  · Opioid dependence  means your body needs the opioid medicine to keep it from going through withdrawal      · Opioid tolerance  means the opioid does not control pain as well as it used to  You need higher doses of the opioid to get pain relief  · Opioid addiction  means you are not able to control the use of the opioid medicine  You use it when you do not have pain  You crave the opioid medicine  Call 911 or have someone call 911 for any of the following:   · You are breathing slower than normal, or you have trouble breathing  · You cannot be awakened  · You have a seizure  Seek care immediately if:   · Your heart is beating slower than usual     · Your heart feels like it is jumping or fluttering  · You are so sleepy that you cannot stay awake  · You have severe muscle pain or weakness  · You see or hear things that are not real   Contact your healthcare provider if:   · You are too dizzy to stand up  · Your pain gets worse or you have new pain  · You cannot do your usual activities because of side effects from the opioid  · You are constipated or have abdominal pain  · You have questions or concerns about your condition or care  Opioid safety measures:   · Take your medicine as directed  Ask if you need more information on how to take your medicine correctly  Follow up with your healthcare provider regularly   You may need to have your dose adjusted  Do not use opioid medicine if you are pregnant or breastfeeding  · Give your healthcare provider a list of all your medicines  Include any over-the-counter medicines, vitamins, and herbs  It can be dangerous to take opioids with certain other medicines, such as antihistamines  · Keep opioid medicine in a safe place  Store your opioid medicine in a locked cabinet to keep it away from children and others  · Do not drink alcohol while you use opioids  Alcohol use with an opioid medicine can make you sleepy and slow your breathing rate  You may stop breathing completely  · Do not drive or operate heavy machinery after you take opioid medicine  Opioid medicine can make you drowsy and make it hard to concentrate  You may injure yourself or others if you drive or operate heavy machinery while taking your medicine  · Drink fluids and eat high-fiber foods  Fluids and fiber will help prevent constipation  Ask your healthcare provider what fluids are right for you and how much you should drink  Also ask for a list of foods that contain fiber  Follow up with your healthcare provider as directed: You may need to have your dose adjusted  You may be referred to a pain specialist  Write down your questions so you remember to ask them during your visits  © 2017 [employer]  Information is for End User's use only and may not be sold, redistributed or otherwise used for commercial purposes  All illustrations and images included in CareNotes® are the copyrighted property of [employer]  or [employer]  The above information is an  only  It is not intended as medical advice for individual conditions or treatments  Talk to your doctor, nurse or pharmacist before following any medical regimen to see if it is safe and effective for you

## 2020-01-14 NOTE — PROGRESS NOTES
Assessment:  1  Chronic pain syndrome    2  Chronic bilateral low back pain with bilateral sciatica    3  Lumbar post-laminectomy syndrome    4  Spinal stenosis of lumbar region with neurogenic claudication    5  Chronic bilateral thoracic back pain    6  Chronic pain of both hips    7  Lumbar degenerative disc disease    8  Primary osteoarthritis of both hips    9  Chronic lumbar radiculopathy    10  Thoracic radiculopathy    11  Chronic pain of right hip    12  Chronic pain disorder        Plan:  While the patient was in the office today, I did have a thorough conversation with the patient regarding his chronic pain syndrome, symptoms, medication regimen, and treatment plan  I did explain to the patient that at this point since he is not noticing as stable relief and typically the winter time is his worst pain time of the year, I do feel that for the next 2 months it would be beneficial to further titrate the Butrans patch to the 15 micro g dosage, applying 1 patch transdermally every 7 days and continue the p r n  Norco as prescribed  I advised the patient that if they experience any side effects or issues with the changes in their medication regiment, they should give our office a call to discuss  I also advised the patient not to drive or operate machinery until they see how the changes in the medication regimen affects them  The patient was agreeable and verbalized an understanding  [place] Prescription Drug Monitoring Program report was reviewed and was appropriate      While the patient was in the office today, an annual review of the opioid contract/agreement was conducted, the patient was agreeable to continuing the contract as previously agreed upon and signed for this calendar year  The patient was selected for a random pill count at [facility] office visit   The medication was available for the count and the amount present was found to be appropriate according to the date(s) filled and the medication prescription instructions noted on the prescription container  The medication was returned to the patient and the documentation form can be found in the patient's chart  The patient's opioid scripts were sent to their pharmacy electronically and was given a 2 month supply of prescriptions with a Do Not Fill date(s) of February 11, 2020  There are risks associated with opioid medications, including dependence, addiction and tolerance  The patient understands and agrees to use these medications only as prescribed  Potential side effects of the medications include, but are not limited to, constipation, drowsiness, addiction, impaired judgment and risk of fatal overdose if not taken as prescribed  The patient was warned against driving while taking sedation medications  Sharing medications is a felony  At this point in time, the patient is showing no signs of addiction, abuse, diversion or suicidal ideation  The patient will follow-up in 8 weeks for medication prescription refill and reevaluation  The patient was advised to contact the office should their symptoms worsen in the interim  The patient was agreeable and verbalized an understanding  History of Present Illness: The patient is a 60 y o  male last seen on 10/22/19 who presents for a follow up office visit in regards to chronic pain syndrome secondary to lumbar post-laminectomy syndrome with stenosis, spondylosis as well as bilateral osteoarthritis of the hips and chronic lumbar radiculopathy  The patient currently reports that since his last office visit his pain symptoms have slightly worsened as he does not feel that the Butrans patch is as helpful as it was initially and finds that he is using the p r n  Norco every 8 hours as needed more off and then he would like   The patient presents today to discuss his medication regimen and treatment plan as he continues to find minimal relief as a result of the spinal cord stimulator, despite numerous attempts at reprogramming  Current pain medications includes:  Butrans patch 10 micro g applying 1 patch transdermally every 7 days, Norco 10/325 t i d  P r n  For breakthrough pain dispense number 60 pills for 30 days   The patient reports that this regimen is providing minimal to moderate pain relief  The patient is reporting no side effects from this pain medication regimen  Pain Contract Signed: 2/12/19  Last Urine Drug Screen: 10/22/19    I have personally reviewed and/or updated the patient's past medical history, past surgical history, family history, social history, current medications, allergies, and vital signs today         Review of Systems:    Review of Systems      Past Medical History:   Diagnosis Date    Acute low back pain     10 AUG 2016 RESOLVED    Angina pectoris (HCC)     Bunion     Bursitis of hip     Carpal tunnel syndrome     Chronic bilateral low back pain with bilateral sciatica     Chronic lumbar radiculopathy     Chronic narcotic dependence (HCC)     Chronic pain syndrome     Constipation due to opioid therapy     DDD (degenerative disc disease), lumbar     Deep vein thrombosis of left upper extremity ([address] )     [id number]  RESOLVED    Depression     Diverticula of colon     [id number] RESOLVED    DVT (deep venous thrombosis) (HCC)     LUE    Edema     History of staph infection     Hyperglycemia     Hyperlipidemia     Hypertension     Insomnia     Lateral epicondylitis     Left inguinal hernia     Methicillin resistant Staphylococcus aureus septicemia ([address] )     Morbid obesity due to excess calories ([facility])     Obesity     Peripheral neuropathy     Post laminectomy syndrome     RLS (restless legs syndrome)     Septicemia ([address] )     MRSA    Umbilical hernia        Past Surgical History:   Procedure Laterality Date    BACK SURGERY      laminectomy, hardware    CARPAL TUNNEL RELEASE Left     CERVICAL SPINE SURGERY      COLONOSCOPY      ELBOW SURGERY      ELBOW SURGERY      KNEE ARTHROSCOPY Right     knee    KNEE SURGERY Right     ARTHOSCOPY KNEE    NECK SURGERY      1997    UT COLONOSCOPY FLX DX W/COLLJ SPEC WHEN PFRMD N/A 1/24/2017    Procedure: COLONOSCOPY;  Surgeon: Delvin Primrose, MD;  Location: [facility];  Service: General    UT WRIST Clotildejaime Vivas LIG Right 1/2/2020    Procedure: RELEASE CARPAL TUNNEL ENDOSCOPIC, right;  Surgeon: Anirudh Stevenson MD;  Location: [facility];  Service: Orthopedics    SHOULDER SURGERY      SPINAL CORD STIMULATOR IMPLANT         Family History   Problem Relation Age of Onset    Heart disease Mother     Cancer Mother         breast, stomach    Aneurysm Mother     Heart disease Father     Cancer Father         skin, liver, colon DECESED AGE 58       Social History     Occupational History    Not on file   Tobacco Use    Smoking status: Never Smoker    Smokeless tobacco: Never Used   Substance and Sexual Activity    Alcohol use: Yes     Frequency: Monthly or less     Drinks per session: 1 or 2     Binge frequency: Never    Drug use: No     Comment: opiod use for chronic pain    Sexual activity: Yes         Current Outpatient Medications:     aspirin 81 MG tablet, Take 81 mg by mouth daily, Disp: , Rfl:     bisoprolol-hydrochlorothiazide (ZIAC) 2 5-6 25 MG per tablet, Take 1 tablet by mouth daily, Disp: 90 tablet, Rfl: 3    diazepam (VALIUM) 10 mg tablet, Take 1 tablet (10 mg total) by mouth daily at bedtime as needed for anxiety, Disp: 30 tablet, Rfl: 1    HYDROcodone-acetaminophen (NORCO)  mg per tablet, Take 1 PO BID PRN for pain  Please send to [facility], Disp: 60 tablet, Rfl: 0    lactulose 20 g/30 mL, Take 1-2 tablespoons daily PRN as needed for chronic constipation  , Disp: 473 mL, Rfl: 2    magnesium 30 MG tablet, Take 30 mg by mouth daily as needed, Disp: , Rfl:     Multiple Vitamin (MULTIVITAMIN) tablet, Take 1 tablet by mouth every other day , Disp: , Rfl:     naproxen sodium (ALEVE) 220 MG tablet, Take 1 tablet (220 mg total) by mouth 2 (two) times a day with meals for 5 days, Disp: 10 tablet, Rfl: 0    rosuvastatin (CRESTOR) 10 MG tablet, Take 1 tablet (10 mg total) by mouth daily at bedtime, Disp: 90 tablet, Rfl: 0    solifenacin (VESICARE) 10 MG tablet, Take 1 tablet (10 mg total) by mouth daily, Disp: 30 tablet, Rfl: 3    Buprenorphine (BUTRANS) 15 MCG/HR PTWK, Apply 1 patch TD every 7 days for pain  [facility] to [facility]  , Disp: 4 patch, Rfl: 1    meloxicam (MOBIC) 15 mg tablet, Take 15 mg by mouth daily, Disp: , Rfl:     Allergies   Allergen Reactions    Fentanyl Other (See Comments)     "Makes me Suicidal"  Happened when dosage increased   Pravastatin Other (See Comments)     Reaction Date: [id number];   Muscle weakness    Vytorin [Ezetimibe-Simvastatin] Myalgia     Reaction Date: [id number];     Penicillins Other (See Comments)     As a child unknown    Sulfa Antibiotics Other (See Comments)     As a child unknown       Physical Exam:    /82 (BP Location: Left arm, Patient Position: Sitting, Cuff Size: Large)   Pulse 65   Ht 5' 10" (1 778 m)   Wt 123 kg (271 lb)   BMI 38 88 kg/m²     Constitutional:normal, well developed, well nourished, alert, in no distress and non-toxic and no overt pain behavior  and overweight  Eyes:anicteric  HEENT:grossly intact  Neck:supple, symmetric, trachea midline and no masses   Pulmonary:even and unlabored  Cardiovascular:No edema or pitting edema present  Skin:Normal without rashes or lesions and well hydrated  Psychiatric:Mood and affect appropriate  Neurologic:Cranial Nerves II-XII grossly intact  Musculoskeletal:The patient's gait is slightly antalgic, but steady without the use of any assistive devices  Imaging  No orders to display         No orders of the defined types were placed in this encounter

## 2020-01-20 ENCOUNTER — TELEPHONE (OUTPATIENT)
Dept: PAIN MEDICINE | Facility: CLINIC | Age: 65
End: 2020-01-20

## 2020-01-20 NOTE — TELEPHONE ENCOUNTER
Can you please call [facility] plan and find out about the pre-auth for the Butrans patch 15 mcg dosage that I faxed on 1/14/2020? Thank you       Phone: [phone]  ID: [id number]

## 2020-01-20 NOTE — TELEPHONE ENCOUNTER
S/w Silvana at [facility]  Confirmed the change in strength did not need a prior auth  Stated that that rx was updated and processed by [facility] on 1/14/2020  S/w pt, confirmed above  Per pt, stated the next rx on Saturday  At this time, + improvement / no se's  Advised pt, will make DG aware, please cb if questions / concerns arise  Pt verbalized understanding and appreciation

## 2020-01-30 ENCOUNTER — TELEPHONE (OUTPATIENT)
Dept: NEUROLOGY | Facility: CLINIC | Age: 65
End: 2020-01-30

## 2020-01-30 NOTE — TELEPHONE ENCOUNTER
Called patient to offer sooner apt with Dr Doc Vieyra in [facility] tomorrow 1/30/2020 at 8 am   Patient accepted new apt verified date, time and location

## 2020-01-31 ENCOUNTER — CONSULT (OUTPATIENT)
Dept: NEUROLOGY | Facility: CLINIC | Age: 65
End: 2020-01-31
Payer: COMMERCIAL

## 2020-01-31 ENCOUNTER — TELEPHONE (OUTPATIENT)
Dept: PAIN MEDICINE | Facility: CLINIC | Age: 65
End: 2020-01-31

## 2020-01-31 VITALS
BODY MASS INDEX: 38.6 KG/M2 | DIASTOLIC BLOOD PRESSURE: 80 MMHG | HEART RATE: 70 BPM | SYSTOLIC BLOOD PRESSURE: 136 MMHG | WEIGHT: 269 LBS

## 2020-01-31 DIAGNOSIS — G89.29 CHRONIC BILATERAL LOW BACK PAIN WITH BILATERAL SCIATICA: ICD-10-CM

## 2020-01-31 DIAGNOSIS — M25.551 CHRONIC PAIN OF BOTH HIPS: ICD-10-CM

## 2020-01-31 DIAGNOSIS — M54.6 CHRONIC BILATERAL THORACIC BACK PAIN: ICD-10-CM

## 2020-01-31 DIAGNOSIS — G62.9 NEUROPATHY: Primary | ICD-10-CM

## 2020-01-31 DIAGNOSIS — M54.41 CHRONIC BILATERAL LOW BACK PAIN WITH BILATERAL SCIATICA: ICD-10-CM

## 2020-01-31 DIAGNOSIS — M54.42 CHRONIC BILATERAL LOW BACK PAIN WITH BILATERAL SCIATICA: ICD-10-CM

## 2020-01-31 DIAGNOSIS — M96.1 LUMBAR POST-LAMINECTOMY SYNDROME: ICD-10-CM

## 2020-01-31 DIAGNOSIS — G89.29 CHRONIC BILATERAL THORACIC BACK PAIN: ICD-10-CM

## 2020-01-31 DIAGNOSIS — M25.552 CHRONIC PAIN OF BOTH HIPS: ICD-10-CM

## 2020-01-31 DIAGNOSIS — M54.16 CHRONIC LUMBAR RADICULOPATHY: ICD-10-CM

## 2020-01-31 DIAGNOSIS — M21.372 BILATERAL FOOT-DROP: ICD-10-CM

## 2020-01-31 DIAGNOSIS — G89.29 CHRONIC PAIN OF BOTH HIPS: ICD-10-CM

## 2020-01-31 DIAGNOSIS — M51.36 LUMBAR DEGENERATIVE DISC DISEASE: ICD-10-CM

## 2020-01-31 DIAGNOSIS — M21.371 BILATERAL FOOT-DROP: ICD-10-CM

## 2020-01-31 DIAGNOSIS — G89.4 CHRONIC PAIN SYNDROME: ICD-10-CM

## 2020-01-31 PROCEDURE — 99244 OFF/OP CNSLTJ NEW/EST MOD 40: CPT | Performed by: PSYCHIATRY & NEUROLOGY

## 2020-01-31 RX ORDER — BUPRENORPHINE 10 UG/H
PATCH TRANSDERMAL
Qty: 4 PATCH | Refills: 1 | Status: SHIPPED | OUTPATIENT
Start: 2020-01-31 | End: 2020-03-10 | Stop reason: SDUPTHER

## 2020-01-31 NOTE — TELEPHONE ENCOUNTER
S/w [facility]  Confirmed butrans 15 mcg has been cancelled  S/w Pt, advised of above  Pt verbalized understanding and appreciation  Will fu as discussed and cb if questions or concerns arise

## 2020-01-31 NOTE — PATIENT INSTRUCTIONS
Lumbar post-laminectomy syndrome  Pt here for initial neuro follow up  Pt with long standing low back issues ever since 1994  Pt with total of 7 lumbar surgeries and 1 cervical surgery  Pt follows with dr flanagan from [facility]  Pt epidural in lumbar spine in October for conservative mx  Pt has also gotten SIJ injections as well  Pts current sxs with int tripping  Pt had one fall about one month ago on one step  Pt with stephanie lower ext numbess up to about 2/3 calf  Pt with evidence of possible peripheral neuropathy superimposed upon multi level lumbar spondylosis  rec updated labs for neuropathy  rec emg of the lower ext , only had emg of uppers at [facility] several yrs ago    Chronic pain of both hips  Pt to follow up with [facility] for eval  History of sij injections    Neuropathy  Please see above  Pt with dec vib stephanie lowers sym  Pt also with dec temp lower 2/3 calves sym  rec updated labs   rec emg as well

## 2020-01-31 NOTE — ASSESSMENT & PLAN NOTE
Please see above  Pt with dec vib stephanie lowers sym  Pt also with dec temp lower 2/3 calves sym  rec updated labs   rec emg as well

## 2020-01-31 NOTE — PROGRESS NOTES
Patient ID: Henry Jimenes  is a 60 y o  male  Assessment/Plan:    Lumbar post-laminectomy syndrome  Pt here for initial neuro follow up  Pt with long standing low back issues ever since 1994  Pt with total of 7 lumbar surgeries and 1 cervical surgery  Pt follows with dr flanagan from [facility]  Pt epidural in lumbar spine in October for conservative mx  Pt has also gotten SIJ injections as well  Pts current sxs with int tripping  Pt had one fall about one month ago on one step  Pt with stephanie lower ext numbess up to about 2/3 calf  Pt with evidence of possible peripheral neuropathy superimposed upon multi level lumbar spondylosis  rec updated labs for neuropathy  rec emg of the lower ext , only had emg of uppers at [place] several yrs ago    Chronic pain of both hips  Pt to follow up with pain [facility]x for eval  History of sij injections    Neuropathy  Please see above  Pt with dec vib stephanie lowers sym  Pt also with dec temp lower 2/3 calves sym  rec updated labs   rec emg as well       Diagnoses and all orders for this visit:    Neuropathy  -     EMG 2 limb lower extremity; Future  -     Vitamin B12; Future  -     Vitamin B6; Future  -     Folate; Future  -     Lyme Antibody Profile with reflex to WB; Future  -     Protein electrophoresis, serum; Future  -     Methylmalonic acid, serum; Future    Bilateral foot-drop  -     Ambulatory referral to Neurology  -     EMG 2 limb lower extremity; Future    Lumbar degenerative disc disease    Chronic lumbar radiculopathy  -     EMG 2 limb lower extremity; Future    Lumbar post-laminectomy syndrome  -     EMG 2 limb lower extremity; Future           Subjective:    HPI    Pt is a 59 yo m with pmh of arthritis, htn and hypercholesterolemia who presents today c/o numbness and tingling of both lower ext  Pt notes prior history of 8 surgeries ie 7 on his lumbar spine and one on his cervical spine  Pt presents today accompanied by his wife    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  pts back surgeries span time of 1994- 2012  Pt has also had spinal cord stimulator placed in 2012 by dr Norah Hilton  Per pt this was not overly successful  Pt notes he also recalls that one of his surgeries on his back had significant post op complications with MRSA in 2008 and needed revision and cage placement  Pt notes he had surgery and upenn at that time  Pt notes occ tripping  No falls  No loc  No sz  No change in bowel or bladder  No change in speech or swallowing  No ha or n or v   No recent infections  No recent hospitalizations  Pt notes long standing pain and chronic pain  Pt is on norco per pain mx  Pt had myelogram in 2016  Rev study with pt and wife from [facility]  Pt with known L2-3 pedicle screw fixation with vertical support rods and disc cage in place  Pt with spondylotic ddd most sever left greater than right foraminal stenosis at L5/S1  Pt with mod left greater than right L4/5 narrowing  and mild stenosis at L3/4  Pt notes he has been following with dr flanagan for several yrs now  Pt has had epidurals in past as well as SIJ injections  Pt denies any history of DM or cancer or chemo  Only social etoh  Pt notes he had a right arm injury as well at work from pulling heavy object and damaged tendons in the arm at that time and subsequent neck surgery  Pt notes numbness of the feet up to about 2/3 of the calves stephanie  Pt notes occ pinprick sensaiton in the feet as well  Pt had one fall on a one step slab and twisted left foot and landed on right shin on the front of the step  Pt worked with fork lift many years ago as well  Pt notes prior knee surgeries as well 3 on the right and 2 on the left  Rev with pt the importance of emg of the lower ext to help sort out his peripheral complaints stemming from his back as well as possible lower ext neuropathy as well    Pt with numbness and tingling of the fee which is symmetrical   When he notes low back pain , this will go across back and occ more down the right leg  Pt denies any nocturnal sxs at this time  Pt with long standing pain in both hips, seen by ortho in the past      The following portions of the patient's history were reviewed and updated as appropriate: allergies, current medications, past family history, past medical history, past social history, past surgical history and problem list and ros rev  Objective:    Blood pressure 136/80, pulse 70, weight 122 kg (269 lb)  Physical Exam   Constitutional: He appears well-developed and well-nourished  Eyes: Pupils are equal, round, and reactive to light  Lids are normal    Cardiovascular: Intact distal pulses  Neurological:   Reflex Scores:       Tricep reflexes are 2+ on the right side and 2+ on the left side  Bicep reflexes are 2+ on the right side and 2+ on the left side  Brachioradialis reflexes are 2+ on the right side and 2+ on the left side  Patellar reflexes are Tr on the right side and Tr on the left side  Achilles reflexes are 0 on the right side and 0 on the left side  Psychiatric: His speech is normal        Neurological Exam  Mental Status  Awake, alert and oriented to person, place and time  Recent and remote memory are intact  Speech is normal  Language is fluent with no aphasia  Attention and concentration are normal  Fund of knowledge is appropriate for level of education  Cranial Nerves  CN II: Visual acuity is normal  Visual fields full to confrontation  Right funduscopic exam: disc intact  Left funduscopic exam: disc intact  CN III, IV, VI: Extraocular movements intact bilaterally  Normal lids and orbits bilaterally  Pupils equal round and reactive to light bilaterally  CN V: Facial sensation is normal   CN VII: Full and symmetric facial movement  CN VIII: Hearing is normal   CN IX, X: Palate elevates symmetrically  Normal gag reflex    CN XI: Shoulder shrug strength is normal   CN XII: Tongue midline without atrophy or fasciculations  Motor  Normal muscle bulk throughout  Normal muscle tone  No abnormal involuntary movements  Strength is 5/5 in all four extremities except as noted  Pt with right iliopsoas 4+/5, left 5-/5  Lower distal right tib ant 5-/5 and left 4+/5  Upper ext 5/5 stephanie  Sensory  Dec vib stephanie lowers  Dec temp stephanie lowers  Reflexes                                           Right                      Left  Brachioradialis                    2+                         2+  Biceps                                 2+                         2+  Triceps                                2+                         2+  Finger flex                           2+                         2+  Hamstring                            2+                         2+  Patellar                                Tr                         Tr  Achilles                                0                         0  Plantar                           Downgoing                Downgoing    Coordination  Right: Finger-to-nose normal  Rapid alternating movement normal   Left: Finger-to-nose normal  Rapid alternating movement normal     Gait  Casual gait: Wide stance  ROS:    Review of Systems   Constitutional: Negative  Negative for appetite change and fever  HENT: Negative  Negative for hearing loss, tinnitus, trouble swallowing and voice change  Eyes: Negative  Negative for photophobia and pain  Respiratory: Positive for shortness of breath  Cardiovascular: Negative  Negative for palpitations  Gastrointestinal: Negative  Negative for nausea and vomiting  Endocrine: Negative  Negative for cold intolerance and heat intolerance  Genitourinary: Positive for frequency  Negative for dysuria and urgency  Musculoskeletal: Positive for arthralgias, back pain and gait problem  Negative for myalgias and neck pain  Pain while walking   Skin: Negative  Negative for rash  Neurological: Positive for numbness  Negative for dizziness, tremors, seizures, syncope, facial asymmetry, speech difficulty, weakness, light-headedness and headaches  Tingling   Hematological: Negative  Does not bruise/bleed easily  Psychiatric/Behavioral: Negative  Negative for confusion, hallucinations and sleep disturbance

## 2020-01-31 NOTE — ASSESSMENT & PLAN NOTE
Pt here for initial neuro follow up  Pt with long standing low back issues ever since 1994  Pt with total of 7 lumbar surgeries and 1 cervical surgery  Pt follows with dr flanagan from [facility]  Pt epidural in lumbar spine in October for conservative mx  Pt has also gotten SIJ injections as well  Pts current sxs with int tripping  Pt had one fall about one month ago on one step  Pt with stephanie lower ext numbess up to about 2/3 calf  Pt with evidence of possible peripheral neuropathy superimposed upon multi level lumbar spondylosis  rec updated labs for neuropathy  rec emg of the lower ext , only had emg of uppers at [facility] several yrs ago

## 2020-01-31 NOTE — TELEPHONE ENCOUNTER
Patient stopped by stating he stopped his Buprenorphine (BUTRANS) 15 MCG/HR PTWK     In the mean time he is taking   HYDROcodone-acetaminophen (NORCO)  mg per tablet         And wants to go back to the lower dose due to urination problems with the higher dose  He has one patch of the 10mcg and would like you to call in more to  [facility]      Patient can be contacted   [phone]

## 2020-01-31 NOTE — TELEPHONE ENCOUNTER
Can you please call [facility] and cancel the Butrans 15 mcg script as I sent a script for 10 mcg with a refill  He will have to wait at least a week to fill the 10 mcg patch because the 15 was filled on 1/14/20  He should use his NORCO as prescribed and the 1 patch of 10 mcg he has and then go from there  Thank you

## 2020-02-04 ENCOUNTER — TELEPHONE (OUTPATIENT)
Dept: PAIN MEDICINE | Facility: CLINIC | Age: 65
End: 2020-02-04

## 2020-02-04 NOTE — TELEPHONE ENCOUNTER
Per outgoing message, "the party you are trying to reach is not accepting calls at this time "     Will fu

## 2020-02-04 NOTE — TELEPHONE ENCOUNTER
Can you please call the patient and advise him that I was able to get his Butrans 10 mcg patch approved until: 7/10/2020  Thank you

## 2020-02-04 NOTE — TELEPHONE ENCOUNTER
Per outgoing message, "the party you are trying to reach is not accepting calls at this time "    LMOM to cb on "other" phone   Provided cb number and office hours

## 2020-02-06 ENCOUNTER — TELEPHONE (OUTPATIENT)
Dept: PAIN MEDICINE | Facility: CLINIC | Age: 65
End: 2020-02-06

## 2020-02-06 NOTE — TELEPHONE ENCOUNTER
Patient called returning nurses call  Patient will call back in a hour  He has some type of block on the phone where he only receives calls from his wife and family

## 2020-02-06 NOTE — TELEPHONE ENCOUNTER
Pt called back, he says he has his phone set up to avoid robo calls so that's why no one could get through to him  Best to call his wife   Advised pt patch was approved, will p/u later

## 2020-03-10 ENCOUNTER — OFFICE VISIT (OUTPATIENT)
Dept: PAIN MEDICINE | Facility: CLINIC | Age: 65
End: 2020-03-10
Payer: COMMERCIAL

## 2020-03-10 VITALS
HEART RATE: 76 BPM | BODY MASS INDEX: 39.8 KG/M2 | DIASTOLIC BLOOD PRESSURE: 78 MMHG | WEIGHT: 278 LBS | HEIGHT: 70 IN | SYSTOLIC BLOOD PRESSURE: 130 MMHG

## 2020-03-10 DIAGNOSIS — Z79.891 ENCOUNTER FOR LONG-TERM OPIATE ANALGESIC USE: ICD-10-CM

## 2020-03-10 DIAGNOSIS — M50.30 DDD (DEGENERATIVE DISC DISEASE), CERVICAL: ICD-10-CM

## 2020-03-10 DIAGNOSIS — M54.42 CHRONIC BILATERAL LOW BACK PAIN WITH BILATERAL SCIATICA: ICD-10-CM

## 2020-03-10 DIAGNOSIS — M54.6 CHRONIC BILATERAL THORACIC BACK PAIN: ICD-10-CM

## 2020-03-10 DIAGNOSIS — T40.2X5A CONSTIPATION DUE TO OPIOID THERAPY: ICD-10-CM

## 2020-03-10 DIAGNOSIS — G89.4 CHRONIC PAIN SYNDROME: Primary | ICD-10-CM

## 2020-03-10 DIAGNOSIS — M25.551 CHRONIC PAIN OF BOTH HIPS: ICD-10-CM

## 2020-03-10 DIAGNOSIS — G60.9 IDIOPATHIC PERIPHERAL NEUROPATHY: ICD-10-CM

## 2020-03-10 DIAGNOSIS — M54.41 CHRONIC BILATERAL LOW BACK PAIN WITH BILATERAL SCIATICA: ICD-10-CM

## 2020-03-10 DIAGNOSIS — G89.4 CHRONIC PAIN DISORDER: ICD-10-CM

## 2020-03-10 DIAGNOSIS — M48.062 SPINAL STENOSIS OF LUMBAR REGION WITH NEUROGENIC CLAUDICATION: ICD-10-CM

## 2020-03-10 DIAGNOSIS — G89.29 CHRONIC BILATERAL THORACIC BACK PAIN: ICD-10-CM

## 2020-03-10 DIAGNOSIS — M54.16 CHRONIC LUMBAR RADICULOPATHY: ICD-10-CM

## 2020-03-10 DIAGNOSIS — K59.03 CONSTIPATION DUE TO OPIOID THERAPY: ICD-10-CM

## 2020-03-10 DIAGNOSIS — M96.1 LUMBAR POST-LAMINECTOMY SYNDROME: ICD-10-CM

## 2020-03-10 DIAGNOSIS — G89.29 CHRONIC BILATERAL LOW BACK PAIN WITH BILATERAL SCIATICA: ICD-10-CM

## 2020-03-10 DIAGNOSIS — M54.14 THORACIC RADICULOPATHY: ICD-10-CM

## 2020-03-10 DIAGNOSIS — F11.20 UNCOMPLICATED OPIOID DEPENDENCE (HCC): ICD-10-CM

## 2020-03-10 DIAGNOSIS — M16.0 PRIMARY OSTEOARTHRITIS OF BOTH HIPS: ICD-10-CM

## 2020-03-10 DIAGNOSIS — M25.552 CHRONIC PAIN OF BOTH HIPS: ICD-10-CM

## 2020-03-10 DIAGNOSIS — G89.29 CHRONIC PAIN OF BOTH HIPS: ICD-10-CM

## 2020-03-10 DIAGNOSIS — M51.36 LUMBAR DEGENERATIVE DISC DISEASE: ICD-10-CM

## 2020-03-10 PROCEDURE — 3078F DIAST BP <80 MM HG: CPT | Performed by: NURSE PRACTITIONER

## 2020-03-10 PROCEDURE — 99214 OFFICE O/P EST MOD 30 MIN: CPT | Performed by: NURSE PRACTITIONER

## 2020-03-10 PROCEDURE — 1036F TOBACCO NON-USER: CPT | Performed by: NURSE PRACTITIONER

## 2020-03-10 PROCEDURE — 3008F BODY MASS INDEX DOCD: CPT | Performed by: NURSE PRACTITIONER

## 2020-03-10 PROCEDURE — 3075F SYST BP GE 130 - 139MM HG: CPT | Performed by: NURSE PRACTITIONER

## 2020-03-10 RX ORDER — BUPRENORPHINE 10 UG/H
PATCH TRANSDERMAL
Qty: 4 PATCH | Refills: 2 | Status: SHIPPED | OUTPATIENT
Start: 2020-03-10 | End: 2020-06-02 | Stop reason: SDUPTHER

## 2020-03-10 RX ORDER — HYDROCODONE BITARTRATE AND ACETAMINOPHEN 10; 325 MG/1; MG/1
TABLET ORAL
Qty: 60 TABLET | Refills: 0 | Status: SHIPPED | OUTPATIENT
Start: 2020-03-10 | End: 2020-03-10 | Stop reason: SDUPTHER

## 2020-03-10 RX ORDER — LACTULOSE 20 G/30ML
SOLUTION ORAL
Qty: 473 ML | Refills: 2 | Status: SHIPPED | OUTPATIENT
Start: 2020-03-10 | End: 2020-08-25 | Stop reason: SDUPTHER

## 2020-03-10 RX ORDER — HYDROCODONE BITARTRATE AND ACETAMINOPHEN 10; 325 MG/1; MG/1
TABLET ORAL
Qty: 60 TABLET | Refills: 0 | Status: SHIPPED | OUTPATIENT
Start: 2020-03-10 | End: 2020-07-15 | Stop reason: SDUPTHER

## 2020-03-10 NOTE — PATIENT INSTRUCTIONS
Lizz Up Dates: Today, 4/7/20, 5/5/20      Opioid Pain Management   AMBULATORY CARE:   An opioid  is a type of medicine used to treat pain  Examples of opioids are oxycodone, morphine, fentanyl, or codeine  Take opioid medicines as directed, for the condition it is prescribed:  Common problems that may occur when you do not take opioid medicines as directed include the following:  · Health problems  may occur  You may have trouble breathing  You may also develop liver or kidney damage, or stomach bleeding  Any of these health problems can become life-threatening  · Opioid dependence  means your body needs the opioid medicine to keep it from going through withdrawal      · Opioid tolerance  means the opioid does not control pain as well as it used to  You need higher doses of the opioid to get pain relief  · Opioid addiction  means you are not able to control the use of the opioid medicine  You use it when you do not have pain  You crave the opioid medicine  Call 911 or have someone call 911 for any of the following:   · You are breathing slower than normal, or you have trouble breathing  · You cannot be awakened  · You have a seizure  Seek care immediately if:   · Your heart is beating slower than usual     · Your heart feels like it is jumping or fluttering  · You are so sleepy that you cannot stay awake  · You have severe muscle pain or weakness  · You see or hear things that are not real   Contact your healthcare provider if:   · You are too dizzy to stand up  · Your pain gets worse or you have new pain  · You cannot do your usual activities because of side effects from the opioid  · You are constipated or have abdominal pain  · You have questions or concerns about your condition or care  Opioid safety measures:   · Take your medicine as directed  Ask if you need more information on how to take your medicine correctly   Follow up with your healthcare provider regularly  You may need to have your dose adjusted  Do not use opioid medicine if you are pregnant or breastfeeding  · Give your healthcare provider a list of all your medicines  Include any over-the-counter medicines, vitamins, and herbs  It can be dangerous to take opioids with certain other medicines, such as antihistamines  · Keep opioid medicine in a safe place  Store your opioid medicine in a locked cabinet to keep it away from children and others  · Do not drink alcohol while you use opioids  Alcohol use with an opioid medicine can make you sleepy and slow your breathing rate  You may stop breathing completely  · Do not drive or operate heavy machinery after you take opioid medicine  Opioid medicine can make you drowsy and make it hard to concentrate  You may injure yourself or others if you drive or operate heavy machinery while taking your medicine  · Drink fluids and eat high-fiber foods  Fluids and fiber will help prevent constipation  Ask your healthcare provider what fluids are right for you and how much you should drink  Also ask for a list of foods that contain fiber  Follow up with your healthcare provider as directed: You may need to have your dose adjusted  You may be referred to a pain specialist  Write down your questions so you remember to ask them during your visits  © 2017 [employer] Information is for End User's use only and may not be sold, redistributed or otherwise used for commercial purposes  All illustrations and images included in CareNotes® are the copyrighted property of [employer]  or Dejon Cart[employer]  The above information is an  only  It is not intended as medical advice for individual conditions or treatments  Talk to your doctor, nurse or pharmacist before following any medical regimen to see if it is safe and effective for you

## 2020-03-10 NOTE — PROGRESS NOTES
Assessment:  1  Chronic pain syndrome    2  Chronic bilateral thoracic back pain    3  Chronic pain of both hips    4  Chronic bilateral low back pain with bilateral sciatica    5  Chronic lumbar radiculopathy    6  Idiopathic peripheral neuropathy    7  Thoracic radiculopathy    8  DDD (degenerative disc disease), cervical    9  Lumbar degenerative disc disease    10  Primary osteoarthritis of both hips    11  Spinal stenosis of lumbar region with neurogenic claudication    12  Lumbar post-laminectomy syndrome    13  Chronic pain disorder        Plan:  While the patient was in the office today, I did have a thorough conversation with the patient regarding their chronic pain syndrome, symptoms, medication regimen, and treatment plan  At this point time, I discussed the patient that since it has been quite sometime since he has had any kind of injections in his hips and he did note moderate to significant relief for a significant amount of time, since his pain is becoming worse and worse, I feel would be beneficial proceed with repeat bilateral hip injections with Dr Quintin Henry to address inflammatory component provide relief again  The patient was agreeable and verbalized an understanding  Complete risks and benefits including bleeding, infection, tissue reaction, nerve injury and allergic reaction were discussed  The approach was demonstrated using models and literature was provided  Verbal and written consent was obtained  With regards to his medication regimen, at this point time since he is noting at least moderate stable relief, we decided to continue the Butrans patch at the 10 micro g dosage and the p r n  Norco as prescribed  The patient was agreeable and verbalized an understanding  [place] Prescription Drug Monitoring Program report was reviewed and was appropriate     The patient was not picked for a pill count today, but he did bring his medications as required          The patient's opioid scripts were sent to their pharmacy electronically and was given a 3 month supply of prescriptions with a Do Not Fill date(s) of April 7, 2020 and May 5, 2020  There are risks associated with opioid medications, including dependence, addiction and tolerance  The patient understands and agrees to use these medications only as prescribed  Potential side effects of the medications include, but are not limited to, constipation, drowsiness, addiction, impaired judgment and risk of fatal overdose if not taken as prescribed  The patient was warned against driving while taking sedation medications  Sharing medications is a felony  At this point in time, the patient is showing no signs of addiction, abuse, diversion or suicidal ideation  An oral drug screen swab was collected at today's office visit  The swab will be sent for confirmatory testing  The drug screen is medically necessary because the patient is either dependent on opioid medication or is being considered for opioid medication therapy and the results could impact ongoing or future treatment  The drug screen is to evaluate for the presences or absence of prescribed, non-prescribed, and/or illicit drugs/substances  The patient will follow-up in 12 weeks for medication prescription refill and reevaluation  The patient was advised to contact the office should their symptoms worsen in the interim  The patient was agreeable and verbalized an understanding  History of Present Illness: The patient is a 60 y o  male last seen on 1/14/2020 who presents for a follow up office visit in regards to chronic pain syndrome secondary to lumbar post-laminectomy syndrome with degenerative disc disease, primary osteoarthritis of the bilateral hips, and diffuse myofascial pain with lumbar radiculopathy    The patient currently reports that since his last office visit overall his pain symptoms have remained relatively stable, except for his bilateral hip and groin pain continues to worsen and presents today as he would like to discuss the possibility of a repeat hip injection with Dr Rohit Fall  Since his last office visit the patient did try the Butrans patch at the 15 micro g dosage and although he definitely felt that for the 1st week it lasted the full 6-7 days, however, for the 2nd week he started to notice significant urinary retention and then went back to the 10 micro g patch and the urinary retention resolved and he still has the same issue where it the 10 micro g patch does provide relief for the 1st 4-5 days and then the last 2 days he he does use the p r n  Hydrocodone as he tries not to use the p r n  Hydrocodone during the 1st 4-5 days when he is getting relief  The patient presents today to discuss his medication regimen and treatment plan  Current pain medications includes:  Butrans patch 10 micro g applying 1 patch transdermally Q 7 days for pain, Norco 10/3251 p o  B i d  P r n  For pain  The patient reports that this regimen is providing 50% pain relief  The patient is reporting no side effects from this pain medication regimen  Pain Contract Signed: 1/14/2020  Last Urine Drug Screen: 3/10/2020    I have personally reviewed and/or updated the patient's past medical history, past surgical history, family history, social history, current medications, allergies, and vital signs today  Review of Systems:    Review of Systems   Respiratory: Positive for shortness of breath  Cardiovascular: Negative for chest pain  Gastrointestinal: Positive for constipation  Negative for diarrhea, nausea and vomiting  Musculoskeletal: Positive for gait problem  Negative for arthralgias, joint swelling (joint stiffness) and myalgias  Skin: Negative for rash  Neurological: Positive for weakness  Negative for dizziness and seizures  All other systems reviewed and are negative          Past Medical History:   Diagnosis Date    Acute low back pain     10 AUG 2016 RESOLVED    Angina pectoris (HCC)     Bunion     Bursitis of hip     Carpal tunnel syndrome     Chronic bilateral low back pain with bilateral sciatica     Chronic lumbar radiculopathy     Chronic narcotic dependence (HCC)     Chronic pain syndrome     Constipation due to opioid therapy     DDD (degenerative disc disease), lumbar     Deep vein thrombosis of left upper extremity ([facility] [address] )     [id number]  RESOLVED    Depression     Diverticula of colon     [id number] RESOLVED    DVT (deep venous thrombosis) (formerly [facility])     LUE    Edema     History of staph infection     Hyperglycemia     Hyperlipidemia     Hypertension     Insomnia     Lateral epicondylitis     Left inguinal hernia     Methicillin resistant Staphylococcus aureus septicemia (HCC)     Morbid obesity due to excess calories (HCC)     Obesity     Peripheral neuropathy     Post laminectomy syndrome     RLS (restless legs syndrome)     Septicemia ([facility] [address] )     MRSA    Umbilical hernia        Past Surgical History:   Procedure Laterality Date    BACK SURGERY      laminectomy, hardware    CARPAL TUNNEL RELEASE Left     CERVICAL SPINE SURGERY      COLONOSCOPY      ELBOW SURGERY      ELBOW SURGERY      KNEE ARTHROSCOPY Right     knee    KNEE SURGERY Right     ARTHOSCOPY KNEE    NECK SURGERY      1997    UT COLONOSCOPY FLX DX W/COLLJ SPEC WHEN PFRMD N/A 1/24/2017    Procedure: COLONOSCOPY;  Surgeon: Ching Bellamy MD;  [facility];  Service: General    [place] WRIST [place] LIG Right 1/2/2020    Procedure: RELEASE CARPAL TUNNEL ENDOSCOPIC, right;  Surgeon: Lali Garzon MD;  [facility];  Service: Orthopedics    SHOULDER SURGERY      SPINAL CORD STIMULATOR IMPLANT         Family History   Problem Relation Age of Onset    Heart disease Mother     Cancer Mother         breast, stomach    Aneurysm Mother     Heart disease Father     Cancer Father         skin, liver, colon DECESED AGE 58       Social History     Occupational History    Not on file   Tobacco Use    Smoking status: Never Smoker    Smokeless tobacco: Never Used   Substance and Sexual Activity    Alcohol use: Yes     Frequency: Monthly or less     Drinks per session: 1 or 2     Binge frequency: Never    Drug use: No     Comment: opiod use for chronic pain    Sexual activity: Yes         Current Outpatient Medications:     aspirin 81 MG tablet, Take 81 mg by mouth daily, Disp: , Rfl:     bisoprolol-hydrochlorothiazide (ZIAC) 2 5-6 25 MG per tablet, Take 1 tablet by mouth daily, Disp: 90 tablet, Rfl: 3    diazepam (VALIUM) 10 mg tablet, Take 1 tablet (10 mg total) by mouth daily at bedtime as needed for anxiety, Disp: 30 tablet, Rfl: 1    HYDROcodone-acetaminophen (NORCO)  mg per tablet, Take 1 PO BID PRN for pain  Please send to [facility], Disp: 60 tablet, Rfl: 0    lactulose 20 g/30 mL, Take 1-2 tablespoons daily PRN as needed for chronic constipation  , Disp: 473 mL, Rfl: 2    Multiple Vitamin (MULTIVITAMIN) tablet, Take 1 tablet by mouth every other day , Disp: , Rfl:     naproxen sodium (ALEVE) 220 MG tablet, Take 1 tablet (220 mg total) by mouth 2 (two) times a day with meals for 5 days, Disp: 10 tablet, Rfl: 0    rosuvastatin (CRESTOR) 10 MG tablet, Take 1 tablet (10 mg total) by mouth daily at bedtime, Disp: 90 tablet, Rfl: 0    transdermal buprenorphine (Butrans) 10 MCG/HR PTWK, Apply 1 patch TD every 7 days for pain  Please send to [facility]  , Disp: 4 patch, Rfl: 2    magnesium 30 MG tablet, Take 30 mg by mouth daily as needed, Disp: , Rfl:     meloxicam (MOBIC) 15 mg tablet, Take 15 mg by mouth daily, Disp: , Rfl:     solifenacin (VESICARE) 10 MG tablet, Take 1 tablet (10 mg total) by mouth daily (Patient not taking: Reported on 1/31/2020), Disp: 30 tablet, Rfl: 3    Allergies   Allergen Reactions    Fentanyl Other (See Comments)     "Makes me Suicidal"  Happened when dosage increased   Pravastatin Other (See Comments)     Reaction Date: [id number];   Muscle weakness    Vytorin [Ezetimibe-Simvastatin] Myalgia     Reaction Date: [id number];     Penicillins Other (See Comments)     As a child unknown    Sulfa Antibiotics Other (See Comments)     As a child unknown       Physical Exam:    /78 (BP Location: Left arm, Patient Position: Sitting, Cuff Size: Standard)   Pulse 76   Ht 5' 10" (1 778 m)   Wt 126 kg (278 lb)   BMI 39 89 kg/m²     Constitutional:normal, well developed, well nourished, alert, in no distress and non-toxic and no overt pain behavior  and overweight  Eyes:anicteric  HEENT:grossly intact  Neck:supple, symmetric, trachea midline and no masses   Pulmonary:even and unlabored  Cardiovascular:No edema or pitting edema present  Skin:Normal without rashes or lesions and well hydrated  Psychiatric:Mood and affect appropriate  Neurologic:Cranial Nerves II-XII grossly intact  Musculoskeletal:The patient's gait is slightly antalgic, but steady without the use of any assistive devices        Imaging  FL spine and pain procedure    (Results Pending)         Orders Placed This Encounter   Procedures    FL spine and pain procedure

## 2020-03-12 ENCOUNTER — HOSPITAL ENCOUNTER (OUTPATIENT)
Dept: RADIOLOGY | Facility: CLINIC | Age: 65
Discharge: HOME | End: 2020-03-12
Attending: ANESTHESIOLOGY | Admitting: ANESTHESIOLOGY
Payer: COMMERCIAL

## 2020-03-12 VITALS
SYSTOLIC BLOOD PRESSURE: 150 MMHG | TEMPERATURE: 97.9 F | OXYGEN SATURATION: 95 % | RESPIRATION RATE: 20 BRPM | HEART RATE: 77 BPM | DIASTOLIC BLOOD PRESSURE: 82 MMHG

## 2020-03-12 DIAGNOSIS — M25.551 CHRONIC PAIN OF BOTH HIPS: ICD-10-CM

## 2020-03-12 DIAGNOSIS — G89.29 CHRONIC PAIN OF BOTH HIPS: ICD-10-CM

## 2020-03-12 DIAGNOSIS — M25.552 CHRONIC PAIN OF BOTH HIPS: ICD-10-CM

## 2020-03-12 PROCEDURE — 77002 NEEDLE LOCALIZATION BY XRAY: CPT

## 2020-03-12 PROCEDURE — 20610 DRAIN/INJ JOINT/BURSA W/O US: CPT | Performed by: ANESTHESIOLOGY

## 2020-03-12 PROCEDURE — 77002 NEEDLE LOCALIZATION BY XRAY: CPT | Performed by: ANESTHESIOLOGY

## 2020-03-12 RX ORDER — METHYLPREDNISOLONE ACETATE 80 MG/ML
120 INJECTION, SUSPENSION INTRA-ARTICULAR; INTRALESIONAL; INTRAMUSCULAR; PARENTERAL; SOFT TISSUE ONCE
Status: COMPLETED | OUTPATIENT
Start: 2020-03-12 | End: 2020-03-12

## 2020-03-12 RX ORDER — LIDOCAINE HYDROCHLORIDE 10 MG/ML
5 INJECTION, SOLUTION EPIDURAL; INFILTRATION; INTRACAUDAL; PERINEURAL ONCE
Status: COMPLETED | OUTPATIENT
Start: 2020-03-12 | End: 2020-03-12

## 2020-03-12 RX ADMIN — METHYLPREDNISOLONE ACETATE 120 MG: 80 INJECTION, SUSPENSION INTRA-ARTICULAR; INTRALESIONAL; INTRAMUSCULAR; SOFT TISSUE at 13:24

## 2020-03-12 RX ADMIN — LIDOCAINE HYDROCHLORIDE 2 ML: 20 INJECTION, SOLUTION EPIDURAL; INFILTRATION; INTRACAUDAL at 13:24

## 2020-03-12 RX ADMIN — LIDOCAINE HYDROCHLORIDE 3 ML: 10 INJECTION, SOLUTION EPIDURAL; INFILTRATION; INTRACAUDAL; PERINEURAL at 13:22

## 2020-03-12 RX ADMIN — IOHEXOL 1 ML: 300 INJECTION, SOLUTION INTRAVENOUS at 13:23

## 2020-03-12 NOTE — DISCHARGE INSTRUCTIONS
Steroid Joint Injection   WHAT YOU NEED TO KNOW:   A steroid joint injection is a procedure to inject steroid medicine into a joint  Steroid medicine decreases pain and inflammation  The injection may also contain an anesthetic (numbing medicine) to decrease pain  It may be done to treat conditions such as arthritis, gout, or carpal tunnel syndrome  The injections may be given in your knee, ankle, shoulder, elbow, wrist, ankle or sacroiliac joint  1  Do not apply heat to any area that is numb  If you have discomfort or soreness at the injection site, you may apply ice today, 20 minutes on and 20 minutes off  Tomorrow you may use ice or warm, moist heat  Do not apply ice or heat directly to the skin  2  You may have an increase or change in the discomfort for 36-48 hours after your treatment  Apply ice and continue with any pain medicine you have been prescribed  3  Do not do anything strenuous today  You may shower, but no tub baths or hot tubs today  You may resume your normal activities tomorrow, but do not overdo it  Resume normal activities slowly when you are feeling better  4  If you experience redness, drainage or swelling at the injection site, or if you develop a fever above 100 degrees, please call [facility] at [phone] or go to the Emergency Room  5  Continue to take all routine medicines prescribed by your primary care physician unless otherwise instructed by our staff  Most blood thinners should be started again according to your regularly scheduled dosing  If you have any questions, please give our office a call  If you have a problem specifically related to your procedure, please call our office at [phone]  Problems not related to your procedure should be directed to your primary care physician

## 2020-03-12 NOTE — H&P
History of Present Illness:  The patient is a 60 y o  male who presents with complaints of bilateral hip pain    Patient Active Problem List   Diagnosis    Carpal tunnel syndrome    Chronic bilateral low back pain with bilateral sciatica    Chronic lumbar radiculopathy    Chronic pain syndrome    Constipation due to opioid therapy    Continuous opioid dependence ([address] )    Depression    Hyperglycemia    Mixed hyperlipidemia    Essential hypertension    Left inguinal hernia    Lumbar canal stenosis    Lumbar degenerative disc disease    Lumbar post-laminectomy syndrome    Class 2 severe obesity due to excess calories with serious comorbidity and body mass index (BMI) of 38 0 to 38 9 in adult [facility])    Neuropathy, ilioinguinal nerve, left    Chronic pain of both hips    Peripheral neuropathy    Persistent insomnia    Rectus diastasis    Restless legs syndrome    Retrolisthesis of vertebrae    Statin myopathy    Thoracic radiculopathy    Umbilical hernia without obstruction and without gangrene    Multiple pulmonary nodules    Shortness of breath    Anxiety    Obstructive sleep apnea    Benign prostatic hyperplasia with incomplete bladder emptying    Chronic bilateral thoracic back pain    DDD (degenerative disc disease), cervical    Primary osteoarthritis of both hips    Medicare annual wellness visit, subsequent    Chest pain, exertional    Carpal tunnel syndrome on right    Bilateral foot-drop    OAB (overactive bladder)    Neuropathy       Past Medical History:   Diagnosis Date    Acute low back pain     10 AUG 2016 RESOLVED    Angina pectoris (HCC)     Bunion     Bursitis of hip     Carpal tunnel syndrome     Chronic bilateral low back pain with bilateral sciatica     Chronic lumbar radiculopathy     Chronic narcotic dependence (HCC)     Chronic pain syndrome     Constipation due to opioid therapy     DDD (degenerative disc disease), lumbar     Deep vein thrombosis of left upper extremity ([facility] [address] )     [id number]  RESOLVED    Depression     Diverticula of colon     [id number] RESOLVED    DVT (deep venous thrombosis) (HCC)     LUE    Edema     History of staph infection     Hyperglycemia     Hyperlipidemia     Hypertension     Insomnia     Lateral epicondylitis     Left inguinal hernia     Methicillin resistant Staphylococcus aureus septicemia ([facility] [address] )     Morbid obesity due to excess calories ([facility] [address] )     Obesity     Peripheral neuropathy     Post laminectomy syndrome     RLS (restless legs syndrome)     Septicemia ([facility] [address] )     MRSA    Umbilical hernia        Past Surgical History:   Procedure Laterality Date    BACK SURGERY      laminectomy, hardware    CARPAL TUNNEL RELEASE Left     CERVICAL SPINE SURGERY      COLONOSCOPY      ELBOW SURGERY      ELBOW SURGERY      KNEE ARTHROSCOPY Right     knee    KNEE SURGERY Right     ARTHOSCOPY KNEE    NECK SURGERY      1997    MS COLONOSCOPY FLX DX W/COLLJ SPEC WHEN PFRMD N/A 1/24/2017    Procedure: COLONOSCOPY;  Surgeon: Chaparrita Padgett MD;  [facility];  Service: General    [place] WRIST [place] LIG Right 1/2/2020    Procedure: RELEASE CARPAL TUNNEL ENDOSCOPIC, right;  Surgeon: Sourav Blunt MD;  Location:  MAIN OR;  Service: Orthopedics    SHOULDER SURGERY      SPINAL CORD STIMULATOR IMPLANT           Current Outpatient Medications:     aspirin 81 MG tablet, Take 81 mg by mouth daily, Disp: , Rfl:     bisoprolol-hydrochlorothiazide (ZIAC) 2 5-6 25 MG per tablet, Take 1 tablet by mouth daily, Disp: 90 tablet, Rfl: 3    diazepam (VALIUM) 10 mg tablet, Take 1 tablet (10 mg total) by mouth daily at bedtime as needed for anxiety, Disp: 30 tablet, Rfl: 1    HYDROcodone-acetaminophen (NORCO)  mg per tablet, Take 1 PO BID PRN for pain  DO NOT FILL UNTIL:5/5/20   Please send to [facility], Disp: 60 tablet, Rfl: 0    lactulose 20 g/30 mL, Take 1-2 tablespoons daily PRN as needed for chronic constipation  , Disp: 473 mL, Rfl: 2    magnesium 30 MG tablet, Take 30 mg by mouth daily as needed, Disp: , Rfl:     meloxicam (MOBIC) 15 mg tablet, Take 15 mg by mouth daily, Disp: , Rfl:     Multiple Vitamin (MULTIVITAMIN) tablet, Take 1 tablet by mouth every other day , Disp: , Rfl:     naproxen sodium (ALEVE) 220 MG tablet, Take 1 tablet (220 mg total) by mouth 2 (two) times a day with meals for 5 days, Disp: 10 tablet, Rfl: 0    rosuvastatin (CRESTOR) 10 MG tablet, Take 1 tablet (10 mg total) by mouth daily at bedtime, Disp: 90 tablet, Rfl: 0    solifenacin (VESICARE) 10 MG tablet, Take 1 tablet (10 mg total) by mouth daily (Patient not taking: Reported on 1/31/2020), Disp: 30 tablet, Rfl: 3    transdermal buprenorphine (Butrans) 10 MCG/HR PTWK, Apply 1 patch TD every 7 days for pain  [facility]  , Disp: 4 patch, Rfl: 2    Current Facility-Administered Medications:     iohexol (OMNIPAQUE) 300 mg/mL injection 50 mL, 50 mL, Intra-articular, Once, Sudhir Lyn DO    lidocaine (PF) (XYLOCAINE-MPF) 1 % injection 5 mL, 5 mL, Other, Once, Sudhir Lyn DO    lidocaine (PF) (XYLOCAINE-MPF) 2 % injection 5 mL, 5 mL, Intra-articular, Once, Sudhir Lyn DO    methylPREDNISolone acetate (DEPO-MEDROL) injection 120 mg, 120 mg, Intra-articular, Once, Sudhir Lyn DO    Allergies   Allergen Reactions    Fentanyl Other (See Comments)     "Makes me Suicidal"  Happened when dosage increased      Pravastatin Other (See Comments)     Reaction Date: [id number];   Muscle weakness    Vytorin [Ezetimibe-Simvastatin] Myalgia     Reaction Date: [id number];     Penicillins Other (See Comments)     As a child unknown    Sulfa Antibiotics Other (See Comments)     As a child unknown       Physical Exam:   Vitals:    03/12/20 1304   BP: 148/88   Pulse: 74   Resp: 20   Temp: 97 9 °F (36 6 °C)   SpO2: 97%     General: Awake, Alert, Oriented x 3, Mood and affect appropriate  Respiratory: Respirations even and unlabored  Cardiovascular: Peripheral pulses intact; no edema  Musculoskeletal Exam:  Decreased range of motion bilateral hips    ASA Score: II    Patient/Chart Verification  Patient ID Verified: Verbal  ID Band Applied: No  Consents Confirmed: Procedural  H&P( within 30 days) Verified: To be obtained in the Pre-Procedure area  Interval H&P(within 24 hr) Complete (required for Outpatients and Surgery Admit only): To be obtained in the Pre-Procedure area  Allergies Reviewed: Yes  Anticoag/NSAID held?: No  Currently on antibiotics?: No  Pre-op Lab/Test Results Available: N/A    Assessment:   1   Chronic pain of both hips        Plan: B/L Hip Inejction

## 2020-03-19 ENCOUNTER — TELEPHONE (OUTPATIENT)
Dept: PAIN MEDICINE | Facility: CLINIC | Age: 65
End: 2020-03-19

## 2020-04-14 ENCOUNTER — HOSPITAL ENCOUNTER (OUTPATIENT)
Dept: NEUROLOGY | Facility: CLINIC | Age: 65
Discharge: HOME | End: 2020-04-14
Payer: COMMERCIAL

## 2020-04-14 DIAGNOSIS — M21.371 BILATERAL FOOT-DROP: ICD-10-CM

## 2020-04-14 DIAGNOSIS — M21.372 BILATERAL FOOT-DROP: ICD-10-CM

## 2020-04-14 DIAGNOSIS — M54.16 CHRONIC LUMBAR RADICULOPATHY: ICD-10-CM

## 2020-04-14 DIAGNOSIS — G62.9 NEUROPATHY: ICD-10-CM

## 2020-04-14 DIAGNOSIS — M96.1 LUMBAR POST-LAMINECTOMY SYNDROME: ICD-10-CM

## 2020-04-14 PROCEDURE — 95886 MUSC TEST DONE W/N TEST COMP: CPT | Performed by: PHYSICAL MEDICINE & REHABILITATION

## 2020-04-14 PROCEDURE — 95911 NRV CNDJ TEST 9-10 STUDIES: CPT | Performed by: PHYSICAL MEDICINE & REHABILITATION

## 2020-04-23 ENCOUNTER — APPOINTMENT (OUTPATIENT)
Dept: LAB | Facility: HOSPITAL | Age: 65
End: 2020-04-23
Attending: PSYCHIATRY & NEUROLOGY
Payer: COMMERCIAL

## 2020-04-23 DIAGNOSIS — G62.9 NEUROPATHY: ICD-10-CM

## 2020-04-23 LAB
FOLATE SERPL-MCNC: >20 NG/ML (ref 3.1–17.5)
VIT B12 SERPL-MCNC: 545 PG/ML (ref 100–900)

## 2020-04-23 PROCEDURE — 84165 PROTEIN E-PHORESIS SERUM: CPT | Performed by: PATHOLOGY

## 2020-04-23 PROCEDURE — 83918 ORGANIC ACIDS TOTAL QUANT: CPT

## 2020-04-23 PROCEDURE — 84165 PROTEIN E-PHORESIS SERUM: CPT

## 2020-04-23 PROCEDURE — 86618 LYME DISEASE ANTIBODY: CPT

## 2020-04-23 PROCEDURE — 84207 ASSAY OF VITAMIN B-6: CPT

## 2020-04-23 PROCEDURE — 36415 COLL VENOUS BLD VENIPUNCTURE: CPT

## 2020-04-23 PROCEDURE — 82607 VITAMIN B-12: CPT

## 2020-04-23 PROCEDURE — 82746 ASSAY OF FOLIC ACID SERUM: CPT

## 2020-04-24 LAB
ALBUMIN SERPL ELPH-MCNC: 4.54 G/DL (ref 3.5–5)
ALBUMIN SERPL ELPH-MCNC: 56.1 % (ref 52–65)
ALPHA1 GLOB SERPL ELPH-MCNC: 0.31 G/DL (ref 0.1–0.4)
ALPHA1 GLOB SERPL ELPH-MCNC: 3.8 % (ref 2.5–5)
ALPHA2 GLOB SERPL ELPH-MCNC: 0.8 G/DL (ref 0.4–1.2)
ALPHA2 GLOB SERPL ELPH-MCNC: 9.9 % (ref 7–13)
B BURGDOR IGG+IGM SER-ACNC: <0.91 ISR (ref 0–0.9)
BETA GLOB ABNORMAL SERPL ELPH-MCNC: 0.53 G/DL (ref 0.4–0.8)
BETA1 GLOB SERPL ELPH-MCNC: 6.5 % (ref 5–13)
BETA2 GLOB SERPL ELPH-MCNC: 6.8 % (ref 2–8)
BETA2+GAMMA GLOB SERPL ELPH-MCNC: 0.55 G/DL (ref 0.2–0.5)
GAMMA GLOB ABNORMAL SERPL ELPH-MCNC: 1.37 G/DL (ref 0.5–1.6)
GAMMA GLOB SERPL ELPH-MCNC: 16.9 % (ref 12–22)
IGG/ALB SER: 1.28 {RATIO} (ref 1.1–1.8)
PROT PATTERN SERPL ELPH-IMP: ABNORMAL
PROT SERPL-MCNC: 8.1 G/DL (ref 6.4–8.2)

## 2020-04-25 LAB — VIT B6 SERPL-MCNC: 26.1 UG/L (ref 5.3–46.7)

## 2020-04-30 LAB
METHYLMALONATE SERPL-SCNC: 222 NMOL/L (ref 0–378)
SL AMB DISCLAIMER: NORMAL

## 2020-05-26 ENCOUNTER — TELEPHONE (OUTPATIENT)
Dept: NEUROLOGY | Facility: CLINIC | Age: 65
End: 2020-05-26

## 2020-05-29 ENCOUNTER — TELEPHONE (OUTPATIENT)
Dept: NEUROLOGY | Facility: CLINIC | Age: 65
End: 2020-05-29

## 2020-05-29 ENCOUNTER — OFFICE VISIT (OUTPATIENT)
Dept: NEUROLOGY | Facility: CLINIC | Age: 65
End: 2020-05-29
Payer: COMMERCIAL

## 2020-05-29 VITALS
DIASTOLIC BLOOD PRESSURE: 82 MMHG | BODY MASS INDEX: 39.6 KG/M2 | SYSTOLIC BLOOD PRESSURE: 148 MMHG | WEIGHT: 276 LBS | HEART RATE: 79 BPM | TEMPERATURE: 98.6 F

## 2020-05-29 DIAGNOSIS — G62.9 NEUROPATHY: Primary | ICD-10-CM

## 2020-05-29 DIAGNOSIS — M54.16 CHRONIC LUMBAR RADICULOPATHY: ICD-10-CM

## 2020-05-29 DIAGNOSIS — G60.9 IDIOPATHIC PERIPHERAL NEUROPATHY: ICD-10-CM

## 2020-05-29 PROCEDURE — 1036F TOBACCO NON-USER: CPT | Performed by: PSYCHIATRY & NEUROLOGY

## 2020-05-29 PROCEDURE — 3079F DIAST BP 80-89 MM HG: CPT | Performed by: PSYCHIATRY & NEUROLOGY

## 2020-05-29 PROCEDURE — 99214 OFFICE O/P EST MOD 30 MIN: CPT | Performed by: PSYCHIATRY & NEUROLOGY

## 2020-05-29 PROCEDURE — 3077F SYST BP >= 140 MM HG: CPT | Performed by: PSYCHIATRY & NEUROLOGY

## 2020-06-01 ENCOUNTER — TELEPHONE (OUTPATIENT)
Dept: FAMILY MEDICINE CLINIC | Facility: HOSPITAL | Age: 65
End: 2020-06-01

## 2020-06-02 ENCOUNTER — OFFICE VISIT (OUTPATIENT)
Dept: PAIN MEDICINE | Facility: CLINIC | Age: 65
End: 2020-06-02
Payer: COMMERCIAL

## 2020-06-02 VITALS
SYSTOLIC BLOOD PRESSURE: 142 MMHG | TEMPERATURE: 98.7 F | WEIGHT: 268 LBS | BODY MASS INDEX: 38.37 KG/M2 | HEART RATE: 84 BPM | DIASTOLIC BLOOD PRESSURE: 84 MMHG | HEIGHT: 70 IN

## 2020-06-02 DIAGNOSIS — M54.6 CHRONIC BILATERAL THORACIC BACK PAIN: ICD-10-CM

## 2020-06-02 DIAGNOSIS — M25.552 CHRONIC PAIN OF BOTH HIPS: ICD-10-CM

## 2020-06-02 DIAGNOSIS — M51.36 LUMBAR DEGENERATIVE DISC DISEASE: ICD-10-CM

## 2020-06-02 DIAGNOSIS — M16.0 PRIMARY OSTEOARTHRITIS OF BOTH HIPS: ICD-10-CM

## 2020-06-02 DIAGNOSIS — G89.29 CHRONIC BILATERAL THORACIC BACK PAIN: ICD-10-CM

## 2020-06-02 DIAGNOSIS — M50.30 DDD (DEGENERATIVE DISC DISEASE), CERVICAL: ICD-10-CM

## 2020-06-02 DIAGNOSIS — M43.10 RETROLISTHESIS OF VERTEBRAE: ICD-10-CM

## 2020-06-02 DIAGNOSIS — M48.062 SPINAL STENOSIS OF LUMBAR REGION WITH NEUROGENIC CLAUDICATION: ICD-10-CM

## 2020-06-02 DIAGNOSIS — M54.16 CHRONIC LUMBAR RADICULOPATHY: ICD-10-CM

## 2020-06-02 DIAGNOSIS — G89.29 CHRONIC PAIN OF BOTH HIPS: ICD-10-CM

## 2020-06-02 DIAGNOSIS — M54.41 CHRONIC BILATERAL LOW BACK PAIN WITH BILATERAL SCIATICA: ICD-10-CM

## 2020-06-02 DIAGNOSIS — M54.14 THORACIC RADICULOPATHY: ICD-10-CM

## 2020-06-02 DIAGNOSIS — Z11.4 SCREENING FOR HIV (HUMAN IMMUNODEFICIENCY VIRUS): Primary | ICD-10-CM

## 2020-06-02 DIAGNOSIS — G89.29 CHRONIC BILATERAL LOW BACK PAIN WITH BILATERAL SCIATICA: ICD-10-CM

## 2020-06-02 DIAGNOSIS — R73.9 HYPERGLYCEMIA: ICD-10-CM

## 2020-06-02 DIAGNOSIS — M54.42 CHRONIC BILATERAL LOW BACK PAIN WITH BILATERAL SCIATICA: ICD-10-CM

## 2020-06-02 DIAGNOSIS — G57.82 NEUROPATHY, ILIOINGUINAL NERVE, LEFT: ICD-10-CM

## 2020-06-02 DIAGNOSIS — G89.4 CHRONIC PAIN SYNDROME: Primary | ICD-10-CM

## 2020-06-02 DIAGNOSIS — M25.551 CHRONIC PAIN OF BOTH HIPS: ICD-10-CM

## 2020-06-02 DIAGNOSIS — G60.9 IDIOPATHIC PERIPHERAL NEUROPATHY: ICD-10-CM

## 2020-06-02 DIAGNOSIS — E78.2 MIXED HYPERLIPIDEMIA: ICD-10-CM

## 2020-06-02 DIAGNOSIS — F11.20 CONTINUOUS OPIOID DEPENDENCE (HCC): ICD-10-CM

## 2020-06-02 DIAGNOSIS — M96.1 LUMBAR POST-LAMINECTOMY SYNDROME: ICD-10-CM

## 2020-06-02 PROCEDURE — 1036F TOBACCO NON-USER: CPT | Performed by: NURSE PRACTITIONER

## 2020-06-02 PROCEDURE — 3008F BODY MASS INDEX DOCD: CPT | Performed by: NURSE PRACTITIONER

## 2020-06-02 PROCEDURE — 3079F DIAST BP 80-89 MM HG: CPT | Performed by: NURSE PRACTITIONER

## 2020-06-02 PROCEDURE — 99214 OFFICE O/P EST MOD 30 MIN: CPT | Performed by: NURSE PRACTITIONER

## 2020-06-02 PROCEDURE — 3077F SYST BP >= 140 MM HG: CPT | Performed by: NURSE PRACTITIONER

## 2020-06-02 RX ORDER — BUPRENORPHINE 10 UG/H
PATCH TRANSDERMAL
Qty: 4 PATCH | Refills: 2 | Status: SHIPPED | OUTPATIENT
Start: 2020-06-02 | End: 2020-08-25

## 2020-06-02 RX ORDER — NALOXONE HYDROCHLORIDE 4 MG/.1ML
SPRAY NASAL
Qty: 2 EACH | Refills: 0 | Status: SHIPPED | OUTPATIENT
Start: 2020-06-02 | End: 2022-04-29 | Stop reason: SDUPTHER

## 2020-06-10 ENCOUNTER — APPOINTMENT (OUTPATIENT)
Dept: LAB | Facility: HOSPITAL | Age: 65
End: 2020-06-10
Attending: PSYCHIATRY & NEUROLOGY
Payer: COMMERCIAL

## 2020-06-10 DIAGNOSIS — G62.9 NEUROPATHY: ICD-10-CM

## 2020-06-10 DIAGNOSIS — E78.2 MIXED HYPERLIPIDEMIA: ICD-10-CM

## 2020-06-10 DIAGNOSIS — Z11.4 SCREENING FOR HIV (HUMAN IMMUNODEFICIENCY VIRUS): ICD-10-CM

## 2020-06-10 DIAGNOSIS — R73.9 HYPERGLYCEMIA: ICD-10-CM

## 2020-06-10 LAB
ALBUMIN SERPL BCP-MCNC: 3.9 G/DL (ref 3.5–5)
ALP SERPL-CCNC: 58 U/L (ref 46–116)
ALT SERPL W P-5'-P-CCNC: 36 U/L (ref 12–78)
ANION GAP SERPL CALCULATED.3IONS-SCNC: 6 MMOL/L (ref 4–13)
AST SERPL W P-5'-P-CCNC: 26 U/L (ref 5–45)
BILIRUB SERPL-MCNC: 0.57 MG/DL (ref 0.2–1)
BUN SERPL-MCNC: 16 MG/DL (ref 5–25)
CALCIUM SERPL-MCNC: 9.1 MG/DL (ref 8.3–10.1)
CHLORIDE SERPL-SCNC: 104 MMOL/L (ref 100–108)
CHOLEST SERPL-MCNC: 131 MG/DL (ref 50–200)
CO2 SERPL-SCNC: 28 MMOL/L (ref 21–32)
CREAT SERPL-MCNC: 1.02 MG/DL (ref 0.6–1.3)
ERYTHROCYTE [SEDIMENTATION RATE] IN BLOOD: 33 MM/HOUR (ref 0–10)
EST. AVERAGE GLUCOSE BLD GHB EST-MCNC: 114 MG/DL
GFR SERPL CREATININE-BSD FRML MDRD: 78 ML/MIN/1.73SQ M
GLUCOSE P FAST SERPL-MCNC: 112 MG/DL (ref 65–99)
HBA1C MFR BLD: 5.6 %
HDLC SERPL-MCNC: 40 MG/DL
LDLC SERPL CALC-MCNC: 76 MG/DL (ref 0–100)
POTASSIUM SERPL-SCNC: 3.9 MMOL/L (ref 3.5–5.3)
PROT SERPL-MCNC: 8.3 G/DL (ref 6.4–8.2)
SODIUM SERPL-SCNC: 138 MMOL/L (ref 136–145)
TRIGL SERPL-MCNC: 75 MG/DL

## 2020-06-10 PROCEDURE — 86039 ANTINUCLEAR ANTIBODIES (ANA): CPT

## 2020-06-10 PROCEDURE — 86235 NUCLEAR ANTIGEN ANTIBODY: CPT

## 2020-06-10 PROCEDURE — 80061 LIPID PANEL: CPT

## 2020-06-10 PROCEDURE — 86430 RHEUMATOID FACTOR TEST QUAL: CPT

## 2020-06-10 PROCEDURE — 85652 RBC SED RATE AUTOMATED: CPT

## 2020-06-10 PROCEDURE — 83520 IMMUNOASSAY QUANT NOS NONAB: CPT

## 2020-06-10 PROCEDURE — 36415 COLL VENOUS BLD VENIPUNCTURE: CPT

## 2020-06-10 PROCEDURE — 80053 COMPREHEN METABOLIC PANEL: CPT

## 2020-06-10 PROCEDURE — 83036 HEMOGLOBIN GLYCOSYLATED A1C: CPT

## 2020-06-10 PROCEDURE — 86038 ANTINUCLEAR ANTIBODIES: CPT

## 2020-06-10 PROCEDURE — 87389 HIV-1 AG W/HIV-1&-2 AB AG IA: CPT

## 2020-06-10 PROCEDURE — 86255 FLUORESCENT ANTIBODY SCREEN: CPT

## 2020-06-10 PROCEDURE — 83519 RIA NONANTIBODY: CPT

## 2020-06-11 LAB
ENA SS-A AB SER-ACNC: <0.2 AI (ref 0–0.9)
ENA SS-B AB SER-ACNC: <0.2 AI (ref 0–0.9)
HIV 1+2 AB+HIV1 P24 AG SERPL QL IA: NORMAL
RHEUMATOID FACT SER QL LA: NEGATIVE

## 2020-06-12 LAB
ANA HOMOGEN SER QL IF: NORMAL
ANA HOMOGEN TITR SER: NORMAL {TITER}
RYE IGE QN: POSITIVE

## 2020-06-15 ENCOUNTER — TELEPHONE (OUTPATIENT)
Dept: NEUROLOGY | Facility: CLINIC | Age: 65
End: 2020-06-15

## 2020-06-22 DIAGNOSIS — E78.2 MIXED HYPERLIPIDEMIA: ICD-10-CM

## 2020-06-22 DIAGNOSIS — I10 ESSENTIAL HYPERTENSION: ICD-10-CM

## 2020-06-23 RX ORDER — ROSUVASTATIN CALCIUM 10 MG/1
10 TABLET, COATED ORAL
Qty: 90 TABLET | Refills: 1 | Status: SHIPPED | OUTPATIENT
Start: 2020-06-23 | End: 2020-08-25

## 2020-06-23 RX ORDER — BISOPROLOL FUMARATE AND HYDROCHLOROTHIAZIDE 2.5; 6.25 MG/1; MG/1
TABLET ORAL
Qty: 90 TABLET | Refills: 3 | Status: SHIPPED | OUTPATIENT
Start: 2020-06-23 | End: 2021-07-01 | Stop reason: SDUPTHER

## 2020-06-24 LAB — MISCELLANEOUS LAB TEST RESULT: NORMAL

## 2020-06-29 ENCOUNTER — OFFICE VISIT (OUTPATIENT)
Dept: FAMILY MEDICINE CLINIC | Facility: HOSPITAL | Age: 65
End: 2020-06-29
Payer: COMMERCIAL

## 2020-06-29 VITALS
HEIGHT: 70 IN | SYSTOLIC BLOOD PRESSURE: 122 MMHG | BODY MASS INDEX: 37.81 KG/M2 | DIASTOLIC BLOOD PRESSURE: 68 MMHG | WEIGHT: 264.1 LBS | TEMPERATURE: 99.3 F | HEART RATE: 83 BPM

## 2020-06-29 DIAGNOSIS — E66.01 CLASS 2 SEVERE OBESITY DUE TO EXCESS CALORIES WITH SERIOUS COMORBIDITY AND BODY MASS INDEX (BMI) OF 37.0 TO 37.9 IN ADULT (HCC): ICD-10-CM

## 2020-06-29 DIAGNOSIS — R73.9 HYPERGLYCEMIA: ICD-10-CM

## 2020-06-29 DIAGNOSIS — K59.00 OBSTIPATION: ICD-10-CM

## 2020-06-29 DIAGNOSIS — I10 ESSENTIAL HYPERTENSION: Primary | ICD-10-CM

## 2020-06-29 DIAGNOSIS — E78.2 MIXED HYPERLIPIDEMIA: ICD-10-CM

## 2020-06-29 DIAGNOSIS — M54.16 CHRONIC LUMBAR RADICULOPATHY: ICD-10-CM

## 2020-06-29 DIAGNOSIS — G89.4 CHRONIC PAIN SYNDROME: ICD-10-CM

## 2020-06-29 PROCEDURE — 1036F TOBACCO NON-USER: CPT | Performed by: FAMILY MEDICINE

## 2020-06-29 PROCEDURE — 3074F SYST BP LT 130 MM HG: CPT | Performed by: FAMILY MEDICINE

## 2020-06-29 PROCEDURE — 3008F BODY MASS INDEX DOCD: CPT | Performed by: FAMILY MEDICINE

## 2020-06-29 PROCEDURE — 99214 OFFICE O/P EST MOD 30 MIN: CPT | Performed by: FAMILY MEDICINE

## 2020-06-29 PROCEDURE — 3078F DIAST BP <80 MM HG: CPT | Performed by: FAMILY MEDICINE

## 2020-06-29 RX ORDER — DIAZEPAM 10 MG/1
10 TABLET ORAL
Qty: 30 TABLET | Refills: 1 | Status: SHIPPED | OUTPATIENT
Start: 2020-06-29 | End: 2020-12-30 | Stop reason: SDUPTHER

## 2020-07-13 ENCOUNTER — TELEPHONE (OUTPATIENT)
Dept: PAIN MEDICINE | Facility: CLINIC | Age: 65
End: 2020-07-13

## 2020-07-13 DIAGNOSIS — M96.1 LUMBAR POST-LAMINECTOMY SYNDROME: ICD-10-CM

## 2020-07-13 DIAGNOSIS — M54.41 CHRONIC BILATERAL LOW BACK PAIN WITH BILATERAL SCIATICA: ICD-10-CM

## 2020-07-13 DIAGNOSIS — G89.4 CHRONIC PAIN DISORDER: ICD-10-CM

## 2020-07-13 DIAGNOSIS — M54.42 CHRONIC BILATERAL LOW BACK PAIN WITH BILATERAL SCIATICA: ICD-10-CM

## 2020-07-13 DIAGNOSIS — M54.6 CHRONIC BILATERAL THORACIC BACK PAIN: ICD-10-CM

## 2020-07-13 DIAGNOSIS — G89.29 CHRONIC BILATERAL LOW BACK PAIN WITH BILATERAL SCIATICA: ICD-10-CM

## 2020-07-13 DIAGNOSIS — G89.29 CHRONIC BILATERAL THORACIC BACK PAIN: ICD-10-CM

## 2020-07-13 NOTE — TELEPHONE ENCOUNTER
Gale-wife contacted Call Center requested refill of their medication  Medication Name: Norco      Dosage of Med: 10 - 325 mg      Frequency of Med: Take 1 PO BID PRN for pain  Remaining Medication: Humble Garay was unsure how many but states he mention low      Pharmacy and Location: [facility] pharmacy on file        Pt  Preferred Callback Phone Number: [phone]        Thank you

## 2020-07-14 DIAGNOSIS — K92.1 BLOOD IN STOOL: Primary | ICD-10-CM

## 2020-07-14 NOTE — TELEPHONE ENCOUNTER
LMOM to cb  On home and cb number provided  Provided cb number and office hours  Note:    HYDROcodone-acetaminophen (NORCO)  mg per tablet [[id number]]     Order Details   Dose, Route, Frequency: As Directed    Dispense Quantity: 60 tablet Refills: 0 Fills remaining: --           Sig: Take 1 PO BID PRN for pain   DO NOT FILL UNTIL:5/5/20   Please send to [facility]          Written Date: 03/10/20 Expiration Date: 05/09/20     Start Date: 03/10/20 End Date: --     Earliest Fill Date: 03/10/20             Ordering Provider: -- Phone:  -- Fax:  --    Address:  -- KELLIE #:  -- NPI:  --           Authorizing Provider: Lucinda Piña in august    No rx's / refills on file per teresita laws [address] [place] [address]

## 2020-07-15 RX ORDER — HYDROCODONE BITARTRATE AND ACETAMINOPHEN 10; 325 MG/1; MG/1
TABLET ORAL
Qty: 60 TABLET | Refills: 0 | Status: SHIPPED | OUTPATIENT
Start: 2020-07-15 | End: 2020-08-25 | Stop reason: SDUPTHER

## 2020-07-15 NOTE — TELEPHONE ENCOUNTER
S/w pt, confirmed Norco as prescribed  Per pt, + relief, no se's  No changes or questions re: pain or medication  7 pills on hand  Advised pt, anticipate rx will be sent to the pharmacy today  Fu on 8/25 as scheduled  This office will cb only if there is a question or change in the plan as discussed  Pt verbalized understanding and appreciation  Will cb sooner if questions/ concerns arise

## 2020-07-15 NOTE — TELEPHONE ENCOUNTER
I sent a script for the [place] to the [facility] on file  He should f/u as scheduled next month  Thank you

## 2020-08-25 ENCOUNTER — OFFICE VISIT (OUTPATIENT)
Dept: PAIN MEDICINE | Facility: CLINIC | Age: 65
End: 2020-08-25
Payer: COMMERCIAL

## 2020-08-25 VITALS
SYSTOLIC BLOOD PRESSURE: 142 MMHG | WEIGHT: 262 LBS | DIASTOLIC BLOOD PRESSURE: 82 MMHG | BODY MASS INDEX: 37.51 KG/M2 | TEMPERATURE: 99.8 F | HEIGHT: 70 IN | HEART RATE: 92 BPM

## 2020-08-25 DIAGNOSIS — M48.062 SPINAL STENOSIS OF LUMBAR REGION WITH NEUROGENIC CLAUDICATION: ICD-10-CM

## 2020-08-25 DIAGNOSIS — F11.20 UNCOMPLICATED OPIOID DEPENDENCE (HCC): ICD-10-CM

## 2020-08-25 DIAGNOSIS — M51.36 LUMBAR DEGENERATIVE DISC DISEASE: ICD-10-CM

## 2020-08-25 DIAGNOSIS — M25.552 CHRONIC PAIN OF BOTH HIPS: ICD-10-CM

## 2020-08-25 DIAGNOSIS — M50.30 DDD (DEGENERATIVE DISC DISEASE), CERVICAL: ICD-10-CM

## 2020-08-25 DIAGNOSIS — M96.1 LUMBAR POST-LAMINECTOMY SYNDROME: ICD-10-CM

## 2020-08-25 DIAGNOSIS — G62.9 NEUROPATHY: ICD-10-CM

## 2020-08-25 DIAGNOSIS — M54.42 CHRONIC BILATERAL LOW BACK PAIN WITH BILATERAL SCIATICA: ICD-10-CM

## 2020-08-25 DIAGNOSIS — K59.03 CONSTIPATION DUE TO OPIOID THERAPY: ICD-10-CM

## 2020-08-25 DIAGNOSIS — M16.0 PRIMARY OSTEOARTHRITIS OF BOTH HIPS: ICD-10-CM

## 2020-08-25 DIAGNOSIS — M25.551 CHRONIC PAIN OF BOTH HIPS: ICD-10-CM

## 2020-08-25 DIAGNOSIS — G89.29 CHRONIC BILATERAL LOW BACK PAIN WITH BILATERAL SCIATICA: ICD-10-CM

## 2020-08-25 DIAGNOSIS — M54.16 CHRONIC LUMBAR RADICULOPATHY: ICD-10-CM

## 2020-08-25 DIAGNOSIS — G89.4 CHRONIC PAIN SYNDROME: Primary | ICD-10-CM

## 2020-08-25 DIAGNOSIS — G60.9 IDIOPATHIC PERIPHERAL NEUROPATHY: ICD-10-CM

## 2020-08-25 DIAGNOSIS — Z79.891 LONG-TERM CURRENT USE OF OPIATE ANALGESIC: ICD-10-CM

## 2020-08-25 DIAGNOSIS — M54.41 CHRONIC BILATERAL LOW BACK PAIN WITH BILATERAL SCIATICA: ICD-10-CM

## 2020-08-25 DIAGNOSIS — T40.2X5A CONSTIPATION DUE TO OPIOID THERAPY: ICD-10-CM

## 2020-08-25 DIAGNOSIS — G89.29 CHRONIC PAIN OF BOTH HIPS: ICD-10-CM

## 2020-08-25 DIAGNOSIS — G89.29 CHRONIC BILATERAL THORACIC BACK PAIN: ICD-10-CM

## 2020-08-25 DIAGNOSIS — M54.14 THORACIC RADICULOPATHY: ICD-10-CM

## 2020-08-25 DIAGNOSIS — M54.6 CHRONIC BILATERAL THORACIC BACK PAIN: ICD-10-CM

## 2020-08-25 PROCEDURE — 3079F DIAST BP 80-89 MM HG: CPT | Performed by: NURSE PRACTITIONER

## 2020-08-25 PROCEDURE — 99214 OFFICE O/P EST MOD 30 MIN: CPT | Performed by: NURSE PRACTITIONER

## 2020-08-25 PROCEDURE — 3008F BODY MASS INDEX DOCD: CPT | Performed by: NURSE PRACTITIONER

## 2020-08-25 PROCEDURE — 1036F TOBACCO NON-USER: CPT | Performed by: NURSE PRACTITIONER

## 2020-08-25 PROCEDURE — 3077F SYST BP >= 140 MM HG: CPT | Performed by: NURSE PRACTITIONER

## 2020-08-25 RX ORDER — BUPRENORPHINE 10 UG/H
PATCH TRANSDERMAL
Qty: 4 PATCH | Refills: 2 | Status: CANCELLED | OUTPATIENT
Start: 2020-08-25

## 2020-08-25 RX ORDER — HYDROCODONE BITARTRATE AND ACETAMINOPHEN 10; 325 MG/1; MG/1
TABLET ORAL
Qty: 75 TABLET | Refills: 0 | Status: SHIPPED | OUTPATIENT
Start: 2020-08-25 | End: 2020-11-17 | Stop reason: SDUPTHER

## 2020-08-25 RX ORDER — LACTULOSE 20 G/30ML
SOLUTION ORAL
Qty: 473 ML | Refills: 2 | Status: SHIPPED | OUTPATIENT
Start: 2020-08-25

## 2020-08-25 RX ORDER — HYDROCODONE BITARTRATE AND ACETAMINOPHEN 10; 325 MG/1; MG/1
TABLET ORAL
Qty: 75 TABLET | Refills: 0 | Status: SHIPPED | OUTPATIENT
Start: 2020-08-25 | End: 2020-08-25 | Stop reason: SDUPTHER

## 2020-08-25 NOTE — PATIENT INSTRUCTIONS
Mari Flannery Dates: Today, 9/22/20, 10/20/20      Opioid Pain Management   AMBULATORY CARE:   An opioid  is a type of medicine used to treat pain  Examples of opioids are oxycodone, morphine, fentanyl, or codeine  Take opioid medicines as directed, for the condition it is prescribed:  Common problems that may occur when you do not take opioid medicines as directed include the following:  · Health problems  may occur  You may have trouble breathing  You may also develop liver or kidney damage, or stomach bleeding  Any of these health problems can become life-threatening  · Opioid dependence  means your body needs the opioid medicine to keep it from going through withdrawal      · Opioid tolerance  means the opioid does not control pain as well as it used to  You need higher doses of the opioid to get pain relief  · Opioid addiction  means you are not able to control the use of the opioid medicine  You use it when you do not have pain  You crave the opioid medicine  Call 911 or have someone call 911 for any of the following:   · You are breathing slower than normal, or you have trouble breathing  · You cannot be awakened  · You have a seizure  Seek care immediately if:   · Your heart is beating slower than usual     · Your heart feels like it is jumping or fluttering  · You are so sleepy that you cannot stay awake  · You have severe muscle pain or weakness  · You see or hear things that are not real   Contact your healthcare provider if:   · You are too dizzy to stand up  · Your pain gets worse or you have new pain  · You cannot do your usual activities because of side effects from the opioid  · You are constipated or have abdominal pain  · You have questions or concerns about your condition or care  Opioid safety measures:   · Take your medicine as directed  Ask if you need more information on how to take your medicine correctly   Follow up with your healthcare provider regularly  You may need to have your dose adjusted  Do not use opioid medicine if you are pregnant or breastfeeding  · Give your healthcare provider a list of all your medicines  Include any over-the-counter medicines, vitamins, and herbs  It can be dangerous to take opioids with certain other medicines, such as antihistamines  · Keep opioid medicine in a safe place  Store your opioid medicine in a locked cabinet to keep it away from children and others  · Do not drink alcohol while you use opioids  Alcohol use with an opioid medicine can make you sleepy and slow your breathing rate  You may stop breathing completely  · Do not drive or operate heavy machinery after you take opioid medicine  Opioid medicine can make you drowsy and make it hard to concentrate  You may injure yourself or others if you drive or operate heavy machinery while taking your medicine  · Drink fluids and eat high-fiber foods  Fluids and fiber will help prevent constipation  Ask your healthcare provider what fluids are right for you and how much you should drink  Also ask for a list of foods that contain fiber  Follow up with your healthcare provider as directed: You may need to have your dose adjusted  You may be referred to a pain specialist  Write down your questions so you remember to ask them during your visits  © 2017 [employer] Information is for End User's use only and may not be sold, redistributed or otherwise used for commercial purposes  All illustrations and images included in CareNotes® are the copyrighted property of [employer]  or Dejon Cart[employer]  The above information is an  only  It is not intended as medical advice for individual conditions or treatments  Talk to your doctor, nurse or pharmacist before following any medical regimen to see if it is safe and effective for you

## 2020-08-25 NOTE — PROGRESS NOTES
Assessment:  1  Chronic pain syndrome    2  Chronic bilateral low back pain with bilateral sciatica    3  Chronic bilateral thoracic back pain    4  Chronic pain of both hips    5  Chronic lumbar radiculopathy    6  Thoracic radiculopathy    7  Neuropathy    8  Idiopathic peripheral neuropathy    9  Lumbar post-laminectomy syndrome    10  Primary osteoarthritis of both hips    11  DDD (degenerative disc disease), cervical    12  Lumbar degenerative disc disease    13  Spinal stenosis of lumbar region with neurogenic claudication    14  Constipation due to opioid therapy        Plan:  While the patient was in the office today, I did have a thorough conversation with the patient regarding their chronic pain syndrome, symptoms, medication regimen, and treatment plan  I discussed with the patient at this point time with regards to the neuropathy and continued foot pain and numbness, he should continue to follow-up with neurology as scheduled  The patient was agreeable and verbalized an understanding  With regards to his medication regimen, explained the patient at this point since he does not feel the Butrans patch has noted or provided any significant or stable relief, I agree that it is in his best interest to stay off of the Butrans patch and that it is reasonable to increase the Norco to t i d  Dosing dispense number 75 pills per 30 days  The patient will continue the lactulose as needed for constipation  The patient was agreeable and verbalized an understanding  [place] Prescription Drug Monitoring Program report was reviewed and was appropriate     The patient was not picked for a pill count today, but he did bring his medications as required  The patient's opioid scripts were sent to their pharmacy electronically and was given a 3 month supply of prescriptions with a Do Not Fill date(s) of September 22, 2020 and October 20, 2020          There are risks associated with opioid medications, including dependence, addiction and tolerance  The patient understands and agrees to use these medications only as prescribed  Potential side effects of the medications include, but are not limited to, constipation, drowsiness, addiction, impaired judgment and risk of fatal overdose if not taken as prescribed  The patient was warned against driving while taking sedation medications  Sharing medications is a felony  At this point in time, the patient is showing no signs of addiction, abuse, diversion or suicidal ideation  An oral drug screen swab was collected at today's office visit  The swab will be sent for confirmatory testing  The drug screen is medically necessary because the patient is either dependent on opioid medication or is being considered for opioid medication therapy and the results could impact ongoing or future treatment  The drug screen is to evaluate for the presences or absence of prescribed, non-prescribed, and/or illicit drugs/substances  The patient will follow-up in 12 weeks for medication prescription refill and reevaluation  The patient was advised to contact the office should their symptoms worsen in the interim  The patient was agreeable and verbalized an understanding  History of Present Illness: The patient is a 63 y o  male last seen on 6/2/2020 who presents for a follow up office visit in regards to chronic pain syndrome secondary to lumbar post-laminectomy syndrome with degenerative disc disease, radiculopathy, idiopathic peripheral neuropathy as well as primary osteoarthritis of the bilateral hips and thoracic radiculopathy    The patient currently reports that since his last office visit overall his pain symptoms have remained relatively stable and manageable despite the fact that he did discontinue the Butrans patch as he was continuing to notice worsening constipation and hemorrhoids and once he discontinued the patch he did not notice significant difference in his pain  The patient reports that this point he would like to just manage on the Norco and want to know if we could increase it to t i d  Dosing again and increase the total amount 75 pills per 30 days as he feels he could manage his overall pain symptoms with that  He does report he continues with the progressing neuropathy in a sock like distribution and is following up with Neurology for evaluation to try to figure out what is causing the neuropathy  Current pain medications includes:  Norco 10/325, 1 p o  B i d  P r n  for pain and lactulose p r n  For constipation  The patient reports that this regimen is providing 50% pain relief  The patient is reporting no side effects from this pain medication regimen  Pain Contract Signed: 1/14/2020  Last Urine Drug Screen: 8/25/2020    I have personally reviewed and/or updated the patient's past medical history, past surgical history, family history, social history, current medications, allergies, and vital signs today  Review of Systems:    Review of Systems   Respiratory: Negative for shortness of breath  Cardiovascular: Negative for chest pain  Gastrointestinal: Positive for constipation  Negative for diarrhea, nausea and vomiting  Musculoskeletal: Positive for gait problem  Negative for arthralgias, joint swelling (joint stiffness) and myalgias  Skin: Negative for rash  Neurological: Positive for weakness  Negative for dizziness and seizures  All other systems reviewed and are negative          Past Medical History:   Diagnosis Date    Acute low back pain     10 AUG 2016 RESOLVED    Angina pectoris (HCC)     Bunion     Bursitis of hip     Carpal tunnel syndrome     Chronic bilateral low back pain with bilateral sciatica     Chronic lumbar radiculopathy     Chronic narcotic dependence (HCC)     Chronic pain syndrome     Constipation due to opioid therapy     DDD (degenerative disc disease), lumbar     Deep vein thrombosis of left upper extremity ([facility] [address] )     [id number]  RESOLVED    Depression     Diverticula of colon     [id number] RESOLVED    DVT (deep venous thrombosis) (HCC)     LUE    Edema     History of staph infection     Hyperglycemia     Hyperlipidemia     Hypertension     Insomnia     Lateral epicondylitis     Left inguinal hernia     Methicillin resistant Staphylococcus aureus septicemia (HCC)     Morbid obesity due to excess calories (HCC)     Obesity     Peripheral neuropathy     Post laminectomy syndrome     RLS (restless legs syndrome)     Septicemia ([facility] [address] )     MRSA    Umbilical hernia        Past Surgical History:   Procedure Laterality Date    BACK SURGERY      laminectomy, hardware    CARPAL TUNNEL RELEASE Left     CERVICAL SPINE SURGERY      COLONOSCOPY      ELBOW SURGERY      ELBOW SURGERY      KNEE ARTHROSCOPY Right     knee    KNEE SURGERY Right     ARTHOSCOPY KNEE    NECK SURGERY      1997    VT COLONOSCOPY FLX DX W/COLLJ SPEC WHEN PFRMD N/A 1/24/2017    Procedure: COLONOSCOPY;  Surgeon: Adrián Dunn MD;  Location: [facility];  Service: General    VT WRIST Hyacinth Wadsworth LIG Right 1/2/2020    Procedure: RELEASE CARPAL TUNNEL ENDOSCOPIC, right;  Surgeon: Kaylah Christina MD;  Location: [facility];  Service: Orthopedics    SHOULDER SURGERY      SPINAL CORD STIMULATOR IMPLANT         Family History   Problem Relation Age of Onset    Heart disease Mother     Cancer Mother         breast, stomach    Aneurysm Mother     Heart disease Father     Cancer Father         skin, liver, colon DECESED AGE 58       Social History     Occupational History    Not on file   Tobacco Use    Smoking status: Never Smoker    Smokeless tobacco: Never Used   Substance and Sexual Activity    Alcohol use: Yes     Frequency: Monthly or less     Drinks per session: 1 or 2     Binge frequency: Never    Drug use: No     Comment: opiod use for chronic pain    Sexual activity: Yes Current Outpatient Medications:     bisoprolol-hydrochlorothiazide (ZIAC) 2 5-6 25 MG per tablet, TAKE ONE TABLET BY MOUTH EVERY DAY, Disp: 90 tablet, Rfl: 3    diazepam (VALIUM) 10 mg tablet, Take 1 tablet (10 mg total) by mouth daily at bedtime as needed for anxiety, Disp: 30 tablet, Rfl: 1    lactulose 20 g/30 mL, Take 1-2 tablespoons daily PRN as needed for chronic constipation  Please sent to [facility]  , Disp: 473 mL, Rfl: 2    linaCLOtide 145 MCG CAPS, Take 1 capsule (145 mcg total) by mouth daily as needed (constipation), Disp: 30 capsule, Rfl: 3    Multiple Vitamin (MULTIVITAMIN) tablet, Take 1 tablet by mouth every other day , Disp: , Rfl:     naproxen sodium (ALEVE) 220 MG tablet, Take 1 tablet (220 mg total) by mouth 2 (two) times a day with meals for 5 days, Disp: 10 tablet, Rfl: 0    HYDROcodone-acetaminophen (NORCO)  mg per tablet, Take 1 PO TID PRN for pain  Please send to [facility]  DO NOT FILL UNTIL: 10/20/20, Disp: 75 tablet, Rfl: 0    Naloxone HCl (Narcan) 4 MG/0 1ML LIQD, Spray 2 sprays into 1 nostril for suspected opioid overdose  May repeat in other nostril  Call 911 , Disp: 2 each, Rfl: 0    Allergies   Allergen Reactions    Fentanyl Other (See Comments)     "Makes me Suicidal"  Happened when dosage increased   Pravastatin Other (See Comments)     Reaction Date: [id number];   Muscle weakness    Vytorin [Ezetimibe-Simvastatin] Myalgia     Reaction Date: [id number];     Penicillins Other (See Comments)     As a child unknown    Sulfa Antibiotics Other (See Comments)     As a child unknown       Physical Exam:    /82 (BP Location: Left arm, Patient Position: Sitting, Cuff Size: Standard)   Pulse 92   Temp 99 8 °F (37 7 °C)   Ht 5' 10" (1 778 m)   Wt 119 kg (262 lb)   BMI 37 59 kg/m²     Constitutional:normal, well developed, well nourished, alert, in no distress and non-toxic and no overt pain behavior   and overweight  Eyes:anicteric  HEENT:grossly intact  Neck:supple, symmetric, trachea midline and no masses   Pulmonary:even and unlabored  Cardiovascular:No edema or pitting edema present  Skin:Normal without rashes or lesions and well hydrated  Psychiatric:Mood and affect appropriate  Neurologic:Cranial Nerves II-XII grossly intact  Musculoskeletal:The patient's gait is slightly antalgic, but steady without the use of any assistive devices  Imaging  No orders to display         No orders of the defined types were placed in this encounter

## 2020-08-28 LAB
AMPHET SAL QL CFM: NEGATIVE NG/ML
BUPRENORPHINE SAL QL SCN: ABNORMAL NG/ML
CARBOXYTHC SAL QL CFM: NEGATIVE NG/ML
CCP IGG SERPL-ACNC: NEGATIVE
COCAINE SAL QL CFM: NEGATIVE NG/ML
CODEINE SAL QL CFM: NEGATIVE NG/ML
DIAZEPAM SAL CFM-MCNC: 2.93 NG/ML
EDDP SAL QL CFM: NEGATIVE NG/ML
HYDROCODONE SAL CFM-MCNC: 926.99 NG/ML
HYDROCODONE SAL CFM-MCNC: 926.99 NG/ML
HYDROMORPHONE SAL QL CFM: NEGATIVE NG/ML
LEUKEMIA MARKERS BLD-IMP: NEGATIVE NG/ML
M PROTEIN 3 UR ELPH-MCNC: NEGATIVE NG/ML
M TB TUBERC IGNF/MITOGEN IGNF CONTROL: NEGATIVE NG/ML
METHADONE SAL QL CFM: NEGATIVE NG/ML
MORPHINE SAL QL CFM: NEGATIVE NG/ML
MORPHINE SAL QL CFM: NEGATIVE NG/ML
NORDIAZEPAM SAL CFM-MCNC: 3.94 NG/ML
NORHYDROCODONE SAL CFM-MCNC: 54.15 NG/ML
NORHYDROCODONE SAL CFM-MCNC: 54.15 NG/ML
OXYMORPHONE SAL QL CFM: NEGATIVE NG/ML
OXYMORPHONE SAL QL CFM: NEGATIVE NG/ML
PCP SAL QL CFM: NEGATIVE NG/ML
RESULT ALL_PRESCRIBED MEDS AND SPECIAL INSTRUCTIONS: NORMAL
SL AMB 6-MAM (HEROIN METABOLITE) QUANTIFICATION: NEGATIVE NG/ML
SL AMB ALPRAZOLAM QUANTIFICATION: NEGATIVE NG/ML
SL AMB ATOMOXETINE METABOLITE QUANTIFICATION: NEGATIVE
SL AMB ATOMOXETINE QUANTIFICATION: NEGATIVE
SL AMB CLONAZEPAM QUANTIFICATION: NEGATIVE NG/ML
SL AMB FENTANYL QUANTIFICATION: NEGATIVE NG/ML
SL AMB MDMA QUANTIFICATION: NEGATIVE NG/ML
SL AMB N-DESMETHYL-TRAMADOL QUANTIFICATION SALIVA: NEGATIVE NG/ML
SL AMB NORBUPRENORPHINE QUANTIFICATION: ABNORMAL NG/ML
SL AMB NORFENTANYL QUANTIFICATION: NEGATIVE NG/ML
SL AMB NORMEPERIDINE QUANTIFICATION: NEGATIVE NG/ML
SL AMB NOROXYCODONE QUANTIFICATION: NEGATIVE NG/ML
SL AMB O-DESMETHYL-TRAMADOL QUANTIFICATION: NEGATIVE NG/ML
SL AMB OXAZEPAM QUANTIFICATION: NEGATIVE NG/ML
SL AMB RITALINIC ACID QUANTIFICATION: NEGATIVE
SL AMB TEMAZEPAM QUANTIFICATION: NEGATIVE NG/ML
SL AMB TEMAZEPAM QUANTIFICATION: NEGATIVE NG/ML
SL AMB TRAMADOL QUANTIFICATION: NEGATIVE NG/ML
SQUAMOUS #/AREA URNS HPF: NEGATIVE NG/ML

## 2020-09-08 ENCOUNTER — OFFICE VISIT (OUTPATIENT)
Dept: NEUROLOGY | Facility: CLINIC | Age: 65
End: 2020-09-08
Payer: COMMERCIAL

## 2020-09-08 VITALS
TEMPERATURE: 98 F | HEART RATE: 79 BPM | DIASTOLIC BLOOD PRESSURE: 89 MMHG | SYSTOLIC BLOOD PRESSURE: 136 MMHG | WEIGHT: 258 LBS | HEIGHT: 70 IN | BODY MASS INDEX: 36.94 KG/M2

## 2020-09-08 DIAGNOSIS — G89.29 CHRONIC BILATERAL LOW BACK PAIN WITH BILATERAL SCIATICA: ICD-10-CM

## 2020-09-08 DIAGNOSIS — G60.9 IDIOPATHIC PERIPHERAL NEUROPATHY: Primary | ICD-10-CM

## 2020-09-08 DIAGNOSIS — M51.36 LUMBAR DEGENERATIVE DISC DISEASE: ICD-10-CM

## 2020-09-08 DIAGNOSIS — M54.42 CHRONIC BILATERAL LOW BACK PAIN WITH BILATERAL SCIATICA: ICD-10-CM

## 2020-09-08 DIAGNOSIS — M54.41 CHRONIC BILATERAL LOW BACK PAIN WITH BILATERAL SCIATICA: ICD-10-CM

## 2020-09-08 PROCEDURE — 99215 OFFICE O/P EST HI 40 MIN: CPT | Performed by: PSYCHIATRY & NEUROLOGY

## 2020-09-08 RX ORDER — GABAPENTIN 300 MG/1
300 CAPSULE ORAL
Qty: 30 CAPSULE | Refills: 1 | Status: SHIPPED | OUTPATIENT
Start: 2020-09-08 | End: 2020-11-17

## 2020-09-08 NOTE — PROGRESS NOTES
Patient ID: Taj Perez  is a 63 y o  male  Assessment/Plan:    Peripheral neuropathy  This patient has had paresthesias in both his feet, that started 2-3 years ago, and have been gradually progressive  This is not associated with any muscle weakness, however he reports symptoms of neurogenic claudication with lower back pain, and pain in lower extremities with any considerable standing or walking  Exam today did not reveal any significant muscle weakness except the left ankle at the toes on the left, which have been chronically weak since his initial spine surgery years ago  Beyond that, he has mild acral sensory loss to all modalities up to the ankles bilaterally, with normal ankle reflexes  Clinically, he does have symptoms of neuropathy, EMG was minimally abnormal with absent superficial peroneal sensory responses, and normal sensory studies, which could be technical in origin versus normal for this patient's age  I believe some of his neuropathy symptoms could be stemming from the lower back, or could be axonal loss due to his age  He has had significant workup for reversible causes of neuropathy as noted below, which has all been unremarkable  At this time, there is no other testing that I can recommend  We discussed the neuropathy causes at length today, explained to the patient that diabetes is the most common cause of neuropathy world wide, beyond that obesity, metabolic syndrome, prediabetes have been reported as etiology for slowly progressive neuropathies, which could be a possibility in his case  Talked about continuing physical activity as much as he can tolerate to maintain his physical strength and endurance in the lower extremities, which he understands  Gave him options for physical therapy which he deferred today, he could consider aqua therapy as pandemic clears, which might allow him to get some more exercise while functioning with his lower back pain      For symptomatic treatment, he is currently not on any neuropathic pain medication, willing to try gabapentin, symptoms are worse at nighttime, recommended 300 mg at bedtime  This can be further increased if needed  Beyond that, he understands there is no medication available that will help with reinnervation of the nerves or slow down the progression of neuropathies  He would like to continue following with pain management as he has been doing, and will call us for any questions or concerns  Follow-up as needed  Diagnoses and all orders for this visit:    Idiopathic peripheral neuropathy  -     gabapentin (NEURONTIN) 300 mg capsule; Take 1 capsule (300 mg total) by mouth daily at bedtime    Chronic bilateral low back pain with bilateral sciatica    Lumbar degenerative disc disease       Total time spent with the patient today was 50 minutes, more than half the time was spent in discussing his symptoms, workup done so far and discussing my assessment  Subjective:    HPI     I had the pleasure of seeing your patient in [facility] for neuromuscular consultation  As you know, is a 14-year-old man who was referred for evaluation for peripheral neuropathy  He has been followed by my colleague Dr Ronda Sierra, comes in today for the neuromuscular consultation  Please allow me to summarize his history for the record  Started having symptoms 2-3 years ago, with numbness in the bottom of his feet, then gradually involved the top of his feet and the toes  Cannot distinguish hot and cold, feet feel cold or hot intermittently  Has paresthesias in both feet  The abnormal sensation is up to his ankles now  Has had intermittent shooting sensation in his calves, occasionally may feel paresthesias in the lower legs  Has long standing lower back issues since 1994, has had 7 lumbar surgeries and 1 cervical spine surgery, last surgery was 5-7 years ago   Continues to have lower back pain, has been following with pain management, has had SI joint injections  Has a dorsal spinal cord stimulator  On norco, also uses valium at nighttime, uses prn to help sleep  Has used buprenorphine patches in the past      Has HTN, dyslipidemia, Sleep apnea  Has had CTS surgery on right in fall of 2019  CT scan of the lumbar spine performed in August 2018 showed degenerative changes at multiple levels, right paramedian disc herniation at L1-2, impacting the descending right L4 nerve root, in addition severe bilateral neuroforaminal narrowing was reported at L5-S1, with mild to moderate central and neuroforaminal narrowing at other multiple levels  CT thoracic spine:  August 2018:  Mild Degenerative change, no significant canal stenosis  EMG performed in April 2020 by Dr Heather Wallis, data personally reviewed  Bilateral sural sensory studies were normal, bilateral superficial peroneal sensory responses were absent  Bilateral peroneal and tibial motor studies showed normal distal latencies and amplitudes with mild slowing of conduction velocities  Needle examination only showed chronic neurogenic motor unit action potentials in the peroneus longus muscle on the right  These findings are best interpreted as possible L5 radiculopathy in the right lower extremity  The absent superficial peroneal sensory responses can be considered  normal for this patient's age versus technical in origin as were reported, bilateral sural sensory studies were normal   These findings are minimal, can be considered normal versus very mild neuropathy  Blood work in June 2020 he A1c was 5 6, LYUDMILA, HIV, paraneoplastic antibodies, rheumatoid factor, sed rate, Sjogren's, MMA, serum protein electrophoresis, Lyme, B6 and B12 were all normal     CK was 171 in 12/2019< TSh has been normal      Family hx: parents had CAD, mother had breast ca, father had cancer as well (5 different types, not sure)  Father had DM as well  No siblings       Smokes a cigar once in a while, occasional alcohol  The following portions of the patient's history were reviewed and updated as appropriate: allergies, current medications, past family history, past medical history, past social history, past surgical history and problem list          Objective:    Blood pressure 136/89, pulse 79, temperature 98 °F (36 7 °C), height 5' 10" (1 778 m), weight 117 kg (258 lb)  Physical Exam  General exam: Pt was awake, alert and oriented  HEENT: atraumatic, normocephalic  Normal oral mucosa, neck was supple, no lymphadenopathy  Normal peripheral pulses  Extremities did not show any edema or cyanosis  Neurological Exam  Neurologically, pt was awake and alert  Speech was normal, no dysarthria or aphasia  Cranial nerve exam showed normal extraocular movements, no nystagmus or diplopia  There was no ptosis at baseline or with sustained upward gaze  Strength of eye closure muscles was normal   Facial sensations were normal bilaterally  No facial weakness, able to blow out the cheeks and push the tongue in the cheeks well  No tongue atrophy or fasciculations  Motor exam revealed normal tone and muscle bulk  There was no atrophy, scapular winging, high arches, hammertoes, shortening of achilles tendons or any other features of neuromuscular disease  Muscle strength was normal in neck flexors and extensors, and all muscle groups in both upper and lower extremities, except the ankle dorsiflexors on left was 4+, and EHL was 4, this is chronic since the first spine surgery per the pt     Reflexes were graded as 2+ in the upper extremities, right knee was 2, bilateral ankles were 2, left knee was absent  Toes were downgoing  There was no exaggerated jaw jerk or faye's sign  No ankle clonus  Sensory exam revealed length dependent, decreased sensation to pin and temp up to ankles  Vibration was  reduced at toes   Proprioception was normal    Rhomberg showed minimal swaying, gait was antalgic due to chronic lower back pain, and slightly wide-based, slow and cautious  ROS:  I reviewed the below ROS and what is mentioned in HPI, the remainder of ROS was negative  Review of Systems   Constitutional: Negative  Negative for appetite change and fever  HENT: Negative  Negative for hearing loss, tinnitus, trouble swallowing and voice change  Eyes: Negative  Negative for photophobia and pain  Respiratory: Negative  Negative for shortness of breath  Cardiovascular: Negative  Negative for palpitations  Gastrointestinal: Negative  Negative for nausea and vomiting  Endocrine: Negative  Negative for cold intolerance  Genitourinary: Negative  Negative for dysuria, frequency and urgency  Musculoskeletal: Positive for back pain and gait problem  Negative for myalgias, neck pain and neck stiffness  Skin: Negative  Negative for rash  Allergic/Immunologic: Negative  Neurological: Positive for numbness  Negative for dizziness, tremors, seizures, syncope, facial asymmetry, speech difficulty, weakness, light-headedness and headaches  Feet-hot/cold  Pins and needle like feeling   Hematological: Negative  Does not bruise/bleed easily  Psychiatric/Behavioral: Negative  Negative for confusion, hallucinations and sleep disturbance

## 2020-09-09 NOTE — ASSESSMENT & PLAN NOTE
This patient has had paresthesias in both his feet, that started 2-3 years ago, and have been gradually progressive  This is not associated with any muscle weakness, however he reports symptoms of neurogenic claudication with lower back pain, and pain in lower extremities with any considerable standing or walking  Exam today did not reveal any significant muscle weakness except the left ankle at the toes on the left, which have been chronically weak since his initial spine surgery years ago  Beyond that, he has mild acral sensory loss to all modalities up to the ankles bilaterally, with normal ankle reflexes  Clinically, he does have symptoms of neuropathy, EMG was minimally abnormal with absent superficial peroneal sensory responses, and normal sensory studies, which could be technical in origin versus normal for this patient's age  I believe some of his neuropathy symptoms could be stemming from the lower back, or could be axonal loss due to his age  He has had significant workup for reversible causes of neuropathy as noted below, which has all been unremarkable  At this time, there is no other testing that I can recommend  We discussed the neuropathy causes at length today, explained to the patient that diabetes is the most common cause of neuropathy world wide, beyond that obesity, metabolic syndrome, prediabetes have been reported as etiology for slowly progressive neuropathies, which could be a possibility in his case  Talked about continuing physical activity as much as he can tolerate to maintain his physical strength and endurance in the lower extremities, which he understands  Gave him options for physical therapy which he deferred today, he could consider aqua therapy as pandemic clears, which might allow him to get some more exercise while functioning with his lower back pain      For symptomatic treatment, he is currently not on any neuropathic pain medication, willing to try gabapentin, symptoms are worse at nighttime, recommended 300 mg at bedtime  This can be further increased if needed  Beyond that, he understands there is no medication available that will help with reinnervation of the nerves or slow down the progression of neuropathies  He would like to continue following with pain management as he has been doing, and will call us for any questions or concerns  Follow-up as needed

## 2020-09-17 NOTE — TELEPHONE ENCOUNTER
----- Message from Jimi Lopez sent at 9/17/2020 10:13 AM CDT -----  Regarding: refill status  Type: Patient Call Back    Who called:Mariano     What is the request in detail: The patient is calling to check on the status of a refill for: chlordiazepoxide (LIBRIUM) 10 MG capsule    Can the clinic reply by [facility]?no    Would the patient rather a call back or a response via [facility]? Call back    Best call back number:[phone]         Ramakrishna Crawford, Pt's Wife  [phone]    Serjio Jacobo    Pt's wife called stating the prior auth for Hydrocodone-Acetaminophen (Norco)  mg per tablet was not completed  Pharmacy will be faxing it over again  Please call to advise when completed

## 2020-11-17 ENCOUNTER — OFFICE VISIT (OUTPATIENT)
Dept: PAIN MEDICINE | Facility: CLINIC | Age: 65
End: 2020-11-17
Payer: COMMERCIAL

## 2020-11-17 VITALS
HEART RATE: 85 BPM | SYSTOLIC BLOOD PRESSURE: 138 MMHG | DIASTOLIC BLOOD PRESSURE: 84 MMHG | TEMPERATURE: 98.6 F | WEIGHT: 264 LBS | BODY MASS INDEX: 37.8 KG/M2 | HEIGHT: 70 IN

## 2020-11-17 DIAGNOSIS — G60.9 IDIOPATHIC PERIPHERAL NEUROPATHY: ICD-10-CM

## 2020-11-17 DIAGNOSIS — M54.6 CHRONIC BILATERAL THORACIC BACK PAIN: ICD-10-CM

## 2020-11-17 DIAGNOSIS — M54.42 CHRONIC BILATERAL LOW BACK PAIN WITH BILATERAL SCIATICA: ICD-10-CM

## 2020-11-17 DIAGNOSIS — G89.29 CHRONIC BILATERAL LOW BACK PAIN WITH BILATERAL SCIATICA: ICD-10-CM

## 2020-11-17 DIAGNOSIS — M96.1 LUMBAR POST-LAMINECTOMY SYNDROME: ICD-10-CM

## 2020-11-17 DIAGNOSIS — G89.29 CHRONIC PAIN OF BOTH HIPS: ICD-10-CM

## 2020-11-17 DIAGNOSIS — M25.551 CHRONIC PAIN OF BOTH HIPS: ICD-10-CM

## 2020-11-17 DIAGNOSIS — M16.0 PRIMARY OSTEOARTHRITIS OF BOTH HIPS: ICD-10-CM

## 2020-11-17 DIAGNOSIS — M54.16 CHRONIC LUMBAR RADICULOPATHY: ICD-10-CM

## 2020-11-17 DIAGNOSIS — M54.14 THORACIC RADICULOPATHY: ICD-10-CM

## 2020-11-17 DIAGNOSIS — M51.36 LUMBAR DEGENERATIVE DISC DISEASE: ICD-10-CM

## 2020-11-17 DIAGNOSIS — G89.4 CHRONIC PAIN SYNDROME: Primary | ICD-10-CM

## 2020-11-17 DIAGNOSIS — M25.552 CHRONIC PAIN OF BOTH HIPS: ICD-10-CM

## 2020-11-17 DIAGNOSIS — M54.41 CHRONIC BILATERAL LOW BACK PAIN WITH BILATERAL SCIATICA: ICD-10-CM

## 2020-11-17 DIAGNOSIS — M48.062 SPINAL STENOSIS OF LUMBAR REGION WITH NEUROGENIC CLAUDICATION: ICD-10-CM

## 2020-11-17 DIAGNOSIS — G89.29 CHRONIC BILATERAL THORACIC BACK PAIN: ICD-10-CM

## 2020-11-17 PROCEDURE — 99214 OFFICE O/P EST MOD 30 MIN: CPT | Performed by: NURSE PRACTITIONER

## 2020-11-17 RX ORDER — PREGABALIN 75 MG/1
CAPSULE ORAL
Qty: 90 CAPSULE | Refills: 1 | Status: SHIPPED | OUTPATIENT
Start: 2020-11-17 | End: 2021-01-13 | Stop reason: SDUPTHER

## 2020-11-17 RX ORDER — MORPHINE SULFATE 15 MG/1
TABLET ORAL
Qty: 30 TABLET | Refills: 0 | Status: SHIPPED | OUTPATIENT
Start: 2020-11-17 | End: 2021-11-11 | Stop reason: SDUPTHER

## 2020-11-17 RX ORDER — HYDROCODONE BITARTRATE AND ACETAMINOPHEN 10; 325 MG/1; MG/1
TABLET ORAL
Qty: 75 TABLET | Refills: 0 | Status: SHIPPED | OUTPATIENT
Start: 2020-11-17 | End: 2020-11-18 | Stop reason: SDUPTHER

## 2020-11-18 RX ORDER — HYDROCODONE BITARTRATE AND ACETAMINOPHEN 10; 325 MG/1; MG/1
TABLET ORAL
Qty: 75 TABLET | Refills: 0 | Status: SHIPPED | OUTPATIENT
Start: 2020-11-18 | End: 2021-01-13 | Stop reason: SDUPTHER

## 2020-11-23 ENCOUNTER — HOSPITAL ENCOUNTER (OUTPATIENT)
Dept: RADIOLOGY | Facility: CLINIC | Age: 65
Discharge: HOME | End: 2020-11-23
Attending: ANESTHESIOLOGY | Admitting: ANESTHESIOLOGY
Payer: COMMERCIAL

## 2020-11-23 VITALS
TEMPERATURE: 98.5 F | OXYGEN SATURATION: 95 % | RESPIRATION RATE: 20 BRPM | DIASTOLIC BLOOD PRESSURE: 82 MMHG | HEART RATE: 77 BPM | SYSTOLIC BLOOD PRESSURE: 152 MMHG

## 2020-11-23 DIAGNOSIS — M25.551 CHRONIC PAIN OF BOTH HIPS: ICD-10-CM

## 2020-11-23 DIAGNOSIS — M25.552 CHRONIC PAIN OF BOTH HIPS: ICD-10-CM

## 2020-11-23 DIAGNOSIS — G89.29 CHRONIC PAIN OF BOTH HIPS: ICD-10-CM

## 2020-11-23 PROCEDURE — 77002 NEEDLE LOCALIZATION BY XRAY: CPT | Performed by: ANESTHESIOLOGY

## 2020-11-23 PROCEDURE — 20610 DRAIN/INJ JOINT/BURSA W/O US: CPT | Performed by: ANESTHESIOLOGY

## 2020-11-23 PROCEDURE — 77002 NEEDLE LOCALIZATION BY XRAY: CPT

## 2020-11-23 RX ORDER — LIDOCAINE HYDROCHLORIDE 10 MG/ML
5 INJECTION, SOLUTION EPIDURAL; INFILTRATION; INTRACAUDAL; PERINEURAL ONCE
Status: COMPLETED | OUTPATIENT
Start: 2020-11-23 | End: 2020-11-23

## 2020-11-23 RX ORDER — METHYLPREDNISOLONE ACETATE 80 MG/ML
80 INJECTION, SUSPENSION INTRA-ARTICULAR; INTRALESIONAL; INTRAMUSCULAR; PARENTERAL; SOFT TISSUE ONCE
Status: COMPLETED | OUTPATIENT
Start: 2020-11-23 | End: 2020-11-23

## 2020-11-23 RX ADMIN — LIDOCAINE HYDROCHLORIDE 2 ML: 20 INJECTION, SOLUTION EPIDURAL; INFILTRATION; INTRACAUDAL at 09:35

## 2020-11-23 RX ADMIN — IOHEXOL 1 ML: 300 INJECTION, SOLUTION INTRAVENOUS at 09:35

## 2020-11-23 RX ADMIN — LIDOCAINE HYDROCHLORIDE 3 ML: 10 INJECTION, SOLUTION EPIDURAL; INFILTRATION; INTRACAUDAL; PERINEURAL at 09:35

## 2020-11-23 RX ADMIN — METHYLPREDNISOLONE ACETATE 80 MG: 80 INJECTION, SUSPENSION INTRA-ARTICULAR; INTRALESIONAL; INTRAMUSCULAR; SOFT TISSUE at 09:36

## 2020-11-30 ENCOUNTER — TELEPHONE (OUTPATIENT)
Dept: PAIN MEDICINE | Facility: CLINIC | Age: 65
End: 2020-11-30

## 2020-12-04 ENCOUNTER — TELEPHONE (OUTPATIENT)
Dept: FAMILY MEDICINE CLINIC | Facility: HOSPITAL | Age: 65
End: 2020-12-04

## 2020-12-07 DIAGNOSIS — E78.2 MIXED HYPERLIPIDEMIA: ICD-10-CM

## 2020-12-07 DIAGNOSIS — I10 ESSENTIAL HYPERTENSION: ICD-10-CM

## 2020-12-07 DIAGNOSIS — R73.9 HYPERGLYCEMIA: ICD-10-CM

## 2020-12-07 DIAGNOSIS — Z12.5 SCREENING FOR MALIGNANT NEOPLASM OF PROSTATE: Primary | ICD-10-CM

## 2020-12-21 ENCOUNTER — LAB (OUTPATIENT)
Dept: LAB | Facility: HOSPITAL | Age: 65
End: 2020-12-21
Payer: COMMERCIAL

## 2020-12-21 DIAGNOSIS — Z12.5 SCREENING FOR MALIGNANT NEOPLASM OF PROSTATE: ICD-10-CM

## 2020-12-21 DIAGNOSIS — I10 ESSENTIAL HYPERTENSION: ICD-10-CM

## 2020-12-21 DIAGNOSIS — R73.9 HYPERGLYCEMIA: ICD-10-CM

## 2020-12-21 DIAGNOSIS — E78.2 MIXED HYPERLIPIDEMIA: ICD-10-CM

## 2020-12-21 LAB
ALBUMIN SERPL BCP-MCNC: 3.9 G/DL (ref 3.5–5)
ALP SERPL-CCNC: 64 U/L (ref 46–116)
ALT SERPL W P-5'-P-CCNC: 33 U/L (ref 12–78)
ANION GAP SERPL CALCULATED.3IONS-SCNC: 6 MMOL/L (ref 4–13)
AST SERPL W P-5'-P-CCNC: 17 U/L (ref 5–45)
BILIRUB SERPL-MCNC: 0.38 MG/DL (ref 0.2–1)
BUN SERPL-MCNC: 14 MG/DL (ref 5–25)
CALCIUM SERPL-MCNC: 9.5 MG/DL (ref 8.3–10.1)
CHLORIDE SERPL-SCNC: 107 MMOL/L (ref 100–108)
CHOLEST SERPL-MCNC: 158 MG/DL (ref 50–200)
CO2 SERPL-SCNC: 29 MMOL/L (ref 21–32)
CREAT SERPL-MCNC: 1.07 MG/DL (ref 0.6–1.3)
ERYTHROCYTE [DISTWIDTH] IN BLOOD BY AUTOMATED COUNT: 14.3 % (ref 11.6–15.1)
EST. AVERAGE GLUCOSE BLD GHB EST-MCNC: 111 MG/DL
GFR SERPL CREATININE-BSD FRML MDRD: 73 ML/MIN/1.73SQ M
GLUCOSE P FAST SERPL-MCNC: 108 MG/DL (ref 65–99)
HBA1C MFR BLD: 5.5 %
HCT VFR BLD AUTO: 51.7 % (ref 36.5–49.3)
HDLC SERPL-MCNC: 45 MG/DL
HGB BLD-MCNC: 16.1 G/DL (ref 12–17)
LDLC SERPL CALC-MCNC: 88 MG/DL (ref 0–100)
MCH RBC QN AUTO: 28.5 PG (ref 26.8–34.3)
MCHC RBC AUTO-ENTMCNC: 31.1 G/DL (ref 31.4–37.4)
MCV RBC AUTO: 92 FL (ref 82–98)
PLATELET # BLD AUTO: 259 THOUSANDS/UL (ref 149–390)
PMV BLD AUTO: 9.2 FL (ref 8.9–12.7)
POTASSIUM SERPL-SCNC: 4.3 MMOL/L (ref 3.5–5.3)
PROT SERPL-MCNC: 8.2 G/DL (ref 6.4–8.2)
PSA SERPL-MCNC: 0.6 NG/ML (ref 0–4)
RBC # BLD AUTO: 5.64 MILLION/UL (ref 3.88–5.62)
SODIUM SERPL-SCNC: 142 MMOL/L (ref 136–145)
TRIGL SERPL-MCNC: 125 MG/DL
TSH SERPL DL<=0.05 MIU/L-ACNC: 1.87 UIU/ML (ref 0.36–3.74)
WBC # BLD AUTO: 6.02 THOUSAND/UL (ref 4.31–10.16)

## 2020-12-21 PROCEDURE — 84443 ASSAY THYROID STIM HORMONE: CPT

## 2020-12-21 PROCEDURE — 80061 LIPID PANEL: CPT

## 2020-12-21 PROCEDURE — 80053 COMPREHEN METABOLIC PANEL: CPT

## 2020-12-21 PROCEDURE — 83036 HEMOGLOBIN GLYCOSYLATED A1C: CPT

## 2020-12-21 PROCEDURE — 36415 COLL VENOUS BLD VENIPUNCTURE: CPT

## 2020-12-21 PROCEDURE — 85027 COMPLETE CBC AUTOMATED: CPT

## 2020-12-21 PROCEDURE — G0103 PSA SCREENING: HCPCS

## 2020-12-30 ENCOUNTER — OFFICE VISIT (OUTPATIENT)
Dept: FAMILY MEDICINE CLINIC | Facility: HOSPITAL | Age: 65
End: 2020-12-30
Payer: COMMERCIAL

## 2020-12-30 VITALS
HEIGHT: 70 IN | BODY MASS INDEX: 38.57 KG/M2 | TEMPERATURE: 97 F | DIASTOLIC BLOOD PRESSURE: 80 MMHG | SYSTOLIC BLOOD PRESSURE: 136 MMHG | WEIGHT: 269.4 LBS | HEART RATE: 74 BPM | OXYGEN SATURATION: 97 %

## 2020-12-30 DIAGNOSIS — N40.1 BENIGN PROSTATIC HYPERPLASIA WITH INCOMPLETE BLADDER EMPTYING: ICD-10-CM

## 2020-12-30 DIAGNOSIS — Z23 ENCOUNTER FOR IMMUNIZATION: ICD-10-CM

## 2020-12-30 DIAGNOSIS — R39.14 BENIGN PROSTATIC HYPERPLASIA WITH INCOMPLETE BLADDER EMPTYING: ICD-10-CM

## 2020-12-30 DIAGNOSIS — E78.2 MIXED HYPERLIPIDEMIA: ICD-10-CM

## 2020-12-30 DIAGNOSIS — F11.20 CONTINUOUS OPIOID DEPENDENCE (HCC): ICD-10-CM

## 2020-12-30 DIAGNOSIS — M54.16 CHRONIC LUMBAR RADICULOPATHY: ICD-10-CM

## 2020-12-30 DIAGNOSIS — I10 ESSENTIAL HYPERTENSION: Primary | ICD-10-CM

## 2020-12-30 PROCEDURE — 99214 OFFICE O/P EST MOD 30 MIN: CPT | Performed by: FAMILY MEDICINE

## 2020-12-30 PROCEDURE — 90682 RIV4 VACC RECOMBINANT DNA IM: CPT

## 2020-12-30 PROCEDURE — 90471 IMMUNIZATION ADMIN: CPT

## 2020-12-30 RX ORDER — DIAZEPAM 10 MG/1
10 TABLET ORAL
Qty: 30 TABLET | Refills: 1 | Status: SHIPPED | OUTPATIENT
Start: 2020-12-30 | End: 2021-07-27 | Stop reason: SDUPTHER

## 2021-01-13 ENCOUNTER — OFFICE VISIT (OUTPATIENT)
Dept: PAIN MEDICINE | Facility: CLINIC | Age: 66
End: 2021-01-13
Payer: COMMERCIAL

## 2021-01-13 VITALS
HEIGHT: 70 IN | HEART RATE: 78 BPM | DIASTOLIC BLOOD PRESSURE: 80 MMHG | SYSTOLIC BLOOD PRESSURE: 128 MMHG | WEIGHT: 273 LBS | TEMPERATURE: 97.8 F | BODY MASS INDEX: 39.08 KG/M2

## 2021-01-13 DIAGNOSIS — M54.6 CHRONIC BILATERAL THORACIC BACK PAIN: ICD-10-CM

## 2021-01-13 DIAGNOSIS — G89.29 CHRONIC PAIN OF BOTH HIPS: ICD-10-CM

## 2021-01-13 DIAGNOSIS — G89.29 CHRONIC BILATERAL THORACIC BACK PAIN: ICD-10-CM

## 2021-01-13 DIAGNOSIS — M54.41 CHRONIC BILATERAL LOW BACK PAIN WITH BILATERAL SCIATICA: ICD-10-CM

## 2021-01-13 DIAGNOSIS — G89.4 CHRONIC PAIN SYNDROME: Primary | ICD-10-CM

## 2021-01-13 DIAGNOSIS — Z79.891 LONG-TERM CURRENT USE OF OPIATE ANALGESIC: ICD-10-CM

## 2021-01-13 DIAGNOSIS — M25.552 CHRONIC PAIN OF BOTH HIPS: ICD-10-CM

## 2021-01-13 DIAGNOSIS — M16.0 PRIMARY OSTEOARTHRITIS OF BOTH HIPS: ICD-10-CM

## 2021-01-13 DIAGNOSIS — G60.9 IDIOPATHIC PERIPHERAL NEUROPATHY: ICD-10-CM

## 2021-01-13 DIAGNOSIS — G62.9 NEUROPATHY: ICD-10-CM

## 2021-01-13 DIAGNOSIS — M25.551 CHRONIC PAIN OF BOTH HIPS: ICD-10-CM

## 2021-01-13 DIAGNOSIS — M51.36 LUMBAR DEGENERATIVE DISC DISEASE: ICD-10-CM

## 2021-01-13 DIAGNOSIS — G89.29 CHRONIC BILATERAL LOW BACK PAIN WITH BILATERAL SCIATICA: ICD-10-CM

## 2021-01-13 DIAGNOSIS — M54.16 CHRONIC LUMBAR RADICULOPATHY: ICD-10-CM

## 2021-01-13 DIAGNOSIS — M54.14 THORACIC RADICULOPATHY: ICD-10-CM

## 2021-01-13 DIAGNOSIS — M54.42 CHRONIC BILATERAL LOW BACK PAIN WITH BILATERAL SCIATICA: ICD-10-CM

## 2021-01-13 DIAGNOSIS — M48.062 SPINAL STENOSIS OF LUMBAR REGION WITH NEUROGENIC CLAUDICATION: ICD-10-CM

## 2021-01-13 DIAGNOSIS — M96.1 LUMBAR POST-LAMINECTOMY SYNDROME: ICD-10-CM

## 2021-01-13 DIAGNOSIS — F11.20 UNCOMPLICATED OPIOID DEPENDENCE (HCC): ICD-10-CM

## 2021-01-13 PROCEDURE — 99214 OFFICE O/P EST MOD 30 MIN: CPT | Performed by: NURSE PRACTITIONER

## 2021-01-13 RX ORDER — PREGABALIN 100 MG/1
CAPSULE ORAL
Qty: 90 CAPSULE | Refills: 2 | Status: SHIPPED | OUTPATIENT
Start: 2021-01-13 | End: 2021-04-05 | Stop reason: SDUPTHER

## 2021-01-13 RX ORDER — HYDROCODONE BITARTRATE AND ACETAMINOPHEN 10; 325 MG/1; MG/1
TABLET ORAL
Qty: 75 TABLET | Refills: 0 | Status: SHIPPED | OUTPATIENT
Start: 2021-01-13 | End: 2021-04-05 | Stop reason: SDUPTHER

## 2021-01-13 RX ORDER — HYDROCODONE BITARTRATE AND ACETAMINOPHEN 10; 325 MG/1; MG/1
TABLET ORAL
Qty: 75 TABLET | Refills: 0 | Status: SHIPPED | OUTPATIENT
Start: 2021-01-13 | End: 2021-01-13 | Stop reason: SDUPTHER

## 2021-01-13 NOTE — PROGRESS NOTES
Assessment:  1  Chronic pain syndrome    2  Chronic bilateral low back pain with bilateral sciatica    3  Chronic bilateral thoracic back pain    4  Chronic pain of both hips    5  Chronic lumbar radiculopathy    6  Thoracic radiculopathy    7  Neuropathy    8  Idiopathic peripheral neuropathy    9  Lumbar post-laminectomy syndrome    10  Lumbar degenerative disc disease    11  Spinal stenosis of lumbar region with neurogenic claudication    12  Primary osteoarthritis of both hips    13  Long-term current use of opiate analgesic    14  Uncomplicated opioid dependence ([address] )        Plan:  While the patient was in the office today, I did have a thorough conversation with the patient regarding their chronic pain syndrome, symptoms, medication regimen, and treatment plan  I discussed with the patient at this point time since he is still on a low and subtherapeutic dose of the Lyrica with improvement, without side effects, and I feel would be in his best interest to further titrate the Lyrica to 300 mg a day for the next 3 months and see how he does  In the meantime, we will continue the p r n  Cecilia Osbornin with the hope that eventually he can titrate down on the amount of the p r n  Norco and use it on a more sparing as-needed basis for the breakthrough pain  I advised the patient that if they experience any side effects or issues with the changes in their medication regiment, they should give our office a call to discuss  I also advised the patient not to drive or operate machinery until they see how the changes in the medication regimen affects them  The patient was agreeable and verbalized an understanding               [place] Prescription Drug Monitoring Program report was reviewed and was appropriate      While the patient was in the office today, an annual review of the opioid contract/agreement was conducted, the patient was agreeable to continuing the contract as previously agreed upon and signed for this calendar year  The patient was selected for a random pill count at todays office visit  The medication was available for the count and the amount present was found to be appropriate according to the date(s) filled and the medication prescription instructions noted on the prescription container  The medication was returned to the patient and the documentation form can be found in the patient's chart  The patient's opioid scripts were sent to their pharmacy electronically and was given a 3 month supply of prescriptions with a Do Not Fill date(s) of January 28, 2021, February 25, 2021, and March 25, 2021  There are risks associated with opioid medications, including dependence, addiction and tolerance  The patient understands and agrees to use these medications only as prescribed  Potential side effects of the medications include, but are not limited to, constipation, drowsiness, addiction, impaired judgment and risk of fatal overdose if not taken as prescribed  The patient was warned against driving while taking sedation medications  Sharing medications is a felony  At this point in time, the patient is showing no signs of addiction, abuse, diversion or suicidal ideation  An oral drug screen swab was collected at today's office visit  The swab will be sent for confirmatory testing  The drug screen is medically necessary because the patient is either dependent on opioid medication or is being considered for opioid medication therapy and the results could impact ongoing or future treatment  The drug screen is to evaluate for the presences or absence of prescribed, non-prescribed, and/or illicit drugs/substances  The patient will follow-up in 12 weeks for medication prescription refill and reevaluation  The patient was advised to contact the office should their symptoms worsen in the interim  The patient was agreeable and verbalized an understanding  History of Present Illness:     The patient is a 63 y o  male last seen on 11/23/2020 who presents for a follow up office visit in regards to chronic pain syndrome secondary to lumbar post-laminectomy syndrome with degenerative disc disease, spondylosis, stenosis and radiculopathy as well as chronic joint pain and osteoarthritis  The patient currently reports that since his last office visit he does feel there has been definite improvement in his pain symptoms since starting on the Lyrica as he is currently taking 75 mg 1 pill in the morning and 2 pills at bedtime with the p r n  Norco   The patient is very pleased with the improvement in his overall pain and feels that the Lyrica is definitely helping the burning pain  Current pain medications includes:  Lyrica 75 mg t i d  The patient reports that this regimen is providing 60% pain relief  The patient is reporting no side effects from this pain medication regimen  Pain Contract Signed: 1/13/2021  Last Urine Drug Screen: 1/13/2021    I have personally reviewed and/or updated the patient's past medical history, past surgical history, family history, social history, current medications, allergies, and vital signs today  Review of Systems:    Review of Systems   Respiratory: Negative for shortness of breath  Cardiovascular: Negative for chest pain  Gastrointestinal: Positive for constipation  Negative for diarrhea, nausea and vomiting  Musculoskeletal: Positive for gait problem  Negative for arthralgias, joint swelling (joint stiffness) and myalgias  Skin: Negative for rash  Neurological: Positive for weakness  Negative for dizziness and seizures  All other systems reviewed and are negative          Past Medical History:   Diagnosis Date    Acute low back pain     10 AUG 2016 RESOLVED    Angina pectoris (HCC)     Bunion     Bursitis of hip     Carpal tunnel syndrome     Chronic bilateral low back pain with bilateral sciatica     Chronic lumbar radiculopathy     Chronic narcotic dependence (HCC)     Chronic pain syndrome     Constipation due to opioid therapy     DDD (degenerative disc disease), lumbar     Deep vein thrombosis of left upper extremity ([address] )     [id number]  RESOLVED    Depression     Diverticula of colon     [id number] RESOLVED    DVT [facility]deep venous thrombosis) [facility])     LUE    Edema     History of staph infection     Hyperglycemia     Hyperlipidemia     Hypertension     Insomnia     Lateral epicondylitis     Left inguinal hernia     Methicillin resistant Staphylococcus aureus septicemia ([address] )     Morbid obesity due to excess calories ([facility])     Obesity     Peripheral neuropathy     Post laminectomy syndrome     RLS (restless legs syndrome)     Septicemia ([address] )     MRSA    Umbilical hernia        Past Surgical History:   Procedure Laterality Date    BACK SURGERY      laminectomy, hardware    CARPAL TUNNEL RELEASE Left     CERVICAL SPINE SURGERY      COLONOSCOPY      ELBOW SURGERY      ELBOW SURGERY      KNEE ARTHROSCOPY Right     knee    KNEE SURGERY Right     ARTHOSCOPY KNEE    NECK SURGERY      1997    DC COLONOSCOPY FLX DX W/COLLJ SPEC WHEN PFRMD N/A 1/24/2017    Procedure: COLONOSCOPY;  Surgeon: Aniket Locke MD;  Location:  MAIN OR;  Service: General    DC WRIST Holly Teto LIG Right 1/2/2020    Procedure: RELEASE CARPAL TUNNEL ENDOSCOPIC, right;  Surgeon: Jaz Alvarado MD;  Location:  MAIN OR;  Service: Orthopedics    SHOULDER SURGERY      SPINAL CORD STIMULATOR IMPLANT         Family History   Problem Relation Age of Onset    Heart disease Mother     Cancer Mother         breast, stomach    Aneurysm Mother     Heart disease Father     Cancer Father         skin, liver, colon DECESED AGE 58       Social History     Occupational History    Not on file   Tobacco Use    Smoking status: Never Smoker    Smokeless tobacco: Never Used   Substance and Sexual Activity    Alcohol use: Yes     Frequency: Monthly or less     Drinks per session: 1 or 2     Binge frequency: Never    Drug use: No     Comment: opiod use for chronic pain    Sexual activity: Yes         Current Outpatient Medications:     bisoprolol-hydrochlorothiazide (ZIAC) 2 5-6 25 MG per tablet, TAKE ONE TABLET BY MOUTH EVERY DAY, Disp: 90 tablet, Rfl: 3    diazepam (VALIUM) 10 mg tablet, Take 1 tablet (10 mg total) by mouth daily at bedtime as needed for anxiety, Disp: 30 tablet, Rfl: 1    HYDROcodone-acetaminophen (NORCO)  mg per tablet, Take 1 PO TID PRN for pain  Please send to [facility]  DO NOT FILL UNTIL: 1/28/21, Disp: 75 tablet, Rfl: 0    lactulose 20 g/30 mL, Take 1-2 tablespoons daily PRN as needed for chronic constipation  Please sent to [facility]  , Disp: 473 mL, Rfl: 2    linaCLOtide 145 MCG CAPS, Take 1 capsule (145 mcg total) by mouth daily as needed (constipation), Disp: 30 capsule, Rfl: 3    morphine (MSIR) 15 mg tablet, Take 1/2 tablet BID PRN for severe pain in place of hydrocodone occasionally  Please send to [facility]  , Disp: 30 tablet, Rfl: 0    Multiple Vitamin (MULTIVITAMIN) tablet, Take 1 tablet by mouth every other day , Disp: , Rfl:     naproxen sodium (ALEVE) 220 MG tablet, Take 1 tablet (220 mg total) by mouth 2 (two) times a day with meals for 5 days, Disp: 10 tablet, Rfl: 0    pregabalin (LYRICA) 100 mg capsule, Take 1 in AM and 2 PO HS  Please send to [facility]  , Disp: 90 capsule, Rfl: 2    Rosuvastatin Calcium (CRESTOR PO), Take by mouth, Disp: , Rfl:     Naloxone HCl (Narcan) 4 MG/0 1ML LIQD, Spray 2 sprays into 1 nostril for suspected opioid overdose  May repeat in other nostril  Call 911 , Disp: 2 each, Rfl: 0    Allergies   Allergen Reactions    Fentanyl Other (See Comments)     "Makes me Suicidal"  Happened when dosage increased      Pravastatin Other (See Comments)     Reaction Date: [id number];   Muscle weakness    Vytorin [Ezetimibe-Simvastatin] Myalgia Reaction Date: [id number];     Penicillins Other (See Comments)     As a child unknown    Sulfa Antibiotics Other (See Comments)     As a child unknown       Physical Exam:    /80 (BP Location: Left arm, Patient Position: Sitting, Cuff Size: Standard)   Pulse 78   Temp 97 8 °F (36 6 °C)   Ht 5' 10" (1 778 m)   Wt 124 kg (273 lb)   BMI 39 17 kg/m²     Constitutional:normal, well developed, well nourished, alert, in no distress and non-toxic and no overt pain behavior    Eyes:anicteric  HEENT:grossly intact  Neck:supple, symmetric, trachea midline and no masses   Pulmonary:even and unlabored  Cardiovascular:No edema or pitting edema present  Skin:Normal without rashes or lesions and well hydrated  Psychiatric:Mood and affect appropriate  Neurologic:Cranial Nerves II-XII grossly intact  Musculoskeletal:normal      Imaging  No orders to display         Orders Placed This Encounter   Procedures    MM OF_Alprazolam Definitive    MM OF_Amphetamine Definitive Test    MM OF_Atomoxetine Definitive Test    MM OF_Buprenorphine Definitive Test    MM OF_Clonazepam Definitive    MM OF_Cocaine Definitive Test    MM OF_Codeine Definitive    MM OF_Diazepam Definitive    MM OF_Fentanyl Definitive Test    MM OF_Heroin Definitive Test    MM OF_Hydrocodone Definitive    MM OF_Hydromorphone Definitive    MM OF_Lorazepam Definitive    MM OF_MDMA Definitive Test    MM OF_Meperidine Definitive Test    MM OF_Methadone Definitive Test    MM OF_Methylphenidate Definitive Test    MM OF_Morphine Definitive    MM OF_Oxazepam Definitive    MM OF_Oxycodone Definitive Test - Oral Fluid    MM OF_Oxymorphone Definitive Test    MM OF_Phencyclidine Definitive Test    MM OF_Temazepam Definitive    MM OF_THC Definitive Test    MM OF_Tramadol Definitive Test    MM ALL_Prescribed Meds and Special Instructions

## 2021-01-13 NOTE — PATIENT INSTRUCTIONS
Donald Montgomery Dates: 1/28/21, 2/25/21, 3/25/21    Opioid Safety   AMBULATORY CARE:   Opioid safety  includes the correct use, storage, and disposal of opioids  Examples of opioid medicines to treat pain include oxycodone, morphine, fentanyl, and codeine  Call your local emergency number (911 in the [address] [facility]), or have someone else call if:   · You have a seizure  · You cannot be woken  · You have trouble staying awake and your breathing is slow or shallow  · Your speech is slurred, or you are confused  · You are dizzy or stumble when you walk  Call your doctor, or have someone close to you call if:   · You are extremely drowsy, or you have trouble staying awake or speaking  · You have pale or clammy skin  · You have blue fingernails or lips  · Your heartbeat is slower than normal     · You cannot stop vomiting  · You have questions or concerns about your condition or care  Use opioids safely:   · Take prescribed opioids exactly as directed  Opioids come with directions based on the kind of opioid and how it is given  Do not take more than the recommended amount, or for longer than needed  · Do not give opioids to others or take opioids that belong to someone else  Misuse of opioids can lead to an addiction or overdose  · Do not mix opioids with other medicines or alcohol  The combination can cause an overdose, or lead to a coma  · Do not drive or operate heavy machinery after you take the opioid  Your provider or pharmacist can tell you how long to wait after a dose before you do these activities  · Talk to your healthcare provider if you have any side effects  He or she can help you prevent or relieve side effects  Side effects include nausea, sleepiness, itching, and trouble thinking clearly  Manage constipation:  Constipation is the most common side effect of opioid medicine   Constipation is when you have hard, dry bowel movements, or you go longer than usual between bowel movements  Tell your healthcare provider about all changes in your bowel movements while you are taking opioids  He or she may recommend laxative medicine to help you have a bowel movement  He or she may also change the kind of opioid you are taking, or change when you take it  The following are more ways you can prevent or relieve constipation:  · Drink liquids as directed  You may need to drink extra liquids to help soften and move your bowels  Ask how much liquid to drink each day and which liquids are best for you  · Eat high-fiber foods  This may help decrease constipation by adding bulk to your bowel movements  High-fiber foods include fruits, vegetables, whole-grain breads and cereals, and beans  Your healthcare provider or dietitian can help you create a high-fiber meal plan  Your provider may also recommend a fiber supplement if you cannot get enough fiber from food  · Exercise regularly  Regular physical activity can help stimulate your intestines  Walking is a good exercise to prevent or relieve constipation  Ask which exercises are best for you  · Schedule a time each day to have a bowel movement  This may help train your body to have regular bowel movements  Bend forward while you are on the toilet to help move the bowel movement out  Sit on the toilet for at least 10 minutes, even if you do not have a bowel movement  Store opioids safely:   · Store opioids where others cannot easily get them  Keep them in a locked cabinet or secure area  Do not  keep them in a purse or other bag you carry with you  A person may be looking for something else and find the opioids  · Make sure opioids are stored out of the reach of children  A child can easily overdose on opioids  Opioids may look like candy to a small child  The best way to dispose of opioids: The laws vary by country and area   In the [facility], the best way is to return the opioids through a take-back program  This program is offered by the [employer])  The following are options for using the program:  · Take the opioids to a KELLIE collection site  The site is often a law enforcement center  Call your local law enforcement center for scheduled take-back days in your area  You will be given information on where to go if the collection site is in a different location  · Take the opioids to an approved pharmacy or hospital   A pharmacy or hospital may be set up as a collection site  You will need to ask if it is a KELLIE collection site if you were not directed there  A pharmacy or doctor's office may not be able to take back opioids unless it is a KELLIE site  · Use a mail-back system  This means you are given containers to put the opioids into  You will then mail them in the containers  · Use a take-back drop box  This is a place to leave the opioids at any time  People and animals will not be able to get into the box  Your local law enforcement agency can tell you where to find a drop box in your area  Other safe ways to dispose of opioids: The medicine may come with disposal instructions  The instructions may vary depending on the brand of medicine you are using  Instructions may come in a Medication Guide, but not every medicine has one  You may instead get instructions from your pharmacy or doctor  Follow instructions carefully  The following are general guidelines to follow:  · Find out if you can flush the opioid  Some opioids can be flushed down the toilet or poured into the sink  You will need to contact authorities in your area to see if this is an option for you  The FDA also offers a list of medicines that are safe to flush down the toilet  You can check the list if you cannot get the information for your local area  · Ask your waste management company about rules for putting opioids in the trash  The company will be able to give you specific directions   Scratch out personal information on the original medicine label so it cannot be read  Then put it in the trash  Do not label the trash or put any information on it about the opioids  It should look like regular household trash so no one is tempted to look for the opioids  Keep the trash out of the reach of children and animals  Always make sure trash is secure  · Talk to officials if you live in a facility  If you live in a nursing home or assisted living center, talk to an official  The person will know the rules for your area  Other ways to manage pain:   · Ask your healthcare provider about non-opioid medicines to control pain  Nonprescription medicines include NSAIDs (such as ibuprofen) and acetaminophen  Prescription medicines include muscle relaxers, antidepressants, and steroids  · Pain may be managed without any medicines  Some ways to relieve pain include massage, aromatherapy, or meditation  Physical or occupational therapy may also help  For more information:   · Drug Enforcement Administration  [facility] [address]  Phone: 3- 215 - 719-0949  Web Address: [facility]/drug_disposal/    · [address] Drug Administration  [facility]  [place] [address] [facility]  Phone: 8- 494 - [phone]  Web Address: http://G[employer]/  Follow up with your doctor or pain specialist as directed: You may need to have your dose adjusted  Your doctor or pain specialist can also help you find ways to manage pain without opioids  Write down your questions so you remember to ask them during your visits  © Copyright [address] [place] [address] Drive Information is for End User's use only and may not be sold, redistributed or otherwise used for commercial purposes  All illustrations and images included in CareNotes® are the copyrighted property of [employer]  or [facility] hSeila Phelps   The above information is an  only   It is not intended as medical advice for individual conditions or treatments  Talk to your doctor, nurse or pharmacist before following any medical regimen to see if it is safe and effective for you

## 2021-01-15 LAB
AMPHET SAL QL CFM: NEGATIVE NG/ML
BUPRENORPHINE SAL QL SCN: NEGATIVE NG/ML
CARBOXYTHC SAL QL CFM: NEGATIVE NG/ML
CCP IGG SERPL-ACNC: NEGATIVE
COCAINE SAL QL CFM: NEGATIVE NG/ML
CODEINE SAL QL CFM: NEGATIVE NG/ML
EDDP SAL QL CFM: NEGATIVE NG/ML
HYDROCODONE SAL QL CFM: ABNORMAL NG/ML
HYDROCODONE SAL QL CFM: ABNORMAL NG/ML
HYDROMORPHONE SAL QL CFM: ABNORMAL NG/ML
LEUKEMIA MARKERS BLD-IMP: NEGATIVE NG/ML
M PROTEIN 3 UR ELPH-MCNC: NEGATIVE NG/ML
M TB TUBERC IGNF/MITOGEN IGNF CONTROL: NEGATIVE NG/ML
METHADONE SAL QL CFM: NEGATIVE NG/ML
MORPHINE SAL QL CFM: ABNORMAL NG/ML
MORPHINE SAL QL CFM: ABNORMAL NG/ML
OXYMORPHONE SAL QL CFM: NEGATIVE NG/ML
OXYMORPHONE SAL QL CFM: NEGATIVE NG/ML
PCP SAL QL CFM: NEGATIVE NG/ML
RESULT ALL_PRESCRIBED MEDS AND SPECIAL INSTRUCTIONS: NORMAL
SL AMB 6-MAM (HEROIN METABOLITE) QUANTIFICATION: NEGATIVE NG/ML
SL AMB ALPRAZOLAM QUANTIFICATION: NEGATIVE NG/ML
SL AMB ATOMOXETINE METABOLITE QUANTIFICATION: NEGATIVE
SL AMB ATOMOXETINE QUANTIFICATION: NEGATIVE
SL AMB CLONAZEPAM QUANTIFICATION: NEGATIVE NG/ML
SL AMB DIAZEPAM QUANTIFICATION: NORMAL NG/ML
SL AMB FENTANYL QUANTIFICATION: NEGATIVE NG/ML
SL AMB MDMA QUANTIFICATION: NEGATIVE NG/ML
SL AMB N-DESMETHYL-TRAMADOL QUANTIFICATION SALIVA: NEGATIVE NG/ML
SL AMB NORBUPRENORPHINE QUANTIFICATION: NEGATIVE NG/ML
SL AMB NORDIAZEPAM QUANTIFICATION: NORMAL NG/ML
SL AMB NORFENTANYL QUANTIFICATION: NEGATIVE NG/ML
SL AMB NORHYDROCODONE QUANTIFICATION: ABNORMAL NG/ML
SL AMB NORHYDROCODONE QUANTIFICATION: ABNORMAL NG/ML
SL AMB NORMEPERIDINE QUANTIFICATION: NEGATIVE NG/ML
SL AMB NOROXYCODONE QUANTIFICATION: NEGATIVE NG/ML
SL AMB O-DESMETHYL-TRAMADOL QUANTIFICATION: NEGATIVE NG/ML
SL AMB OXAZEPAM QUANTIFICATION: NEGATIVE NG/ML
SL AMB RITALINIC ACID QUANTIFICATION: NEGATIVE
SL AMB TEMAZEPAM QUANTIFICATION: NEGATIVE NG/ML
SL AMB TEMAZEPAM QUANTIFICATION: NEGATIVE NG/ML
SL AMB TRAMADOL QUANTIFICATION: NEGATIVE NG/ML
SQUAMOUS #/AREA URNS HPF: NEGATIVE NG/ML

## 2021-02-15 DIAGNOSIS — E78.2 MIXED HYPERLIPIDEMIA: Primary | ICD-10-CM

## 2021-02-15 RX ORDER — ROSUVASTATIN CALCIUM 10 MG/1
TABLET, COATED ORAL
Qty: 90 TABLET | Refills: 3 | Status: SHIPPED | OUTPATIENT
Start: 2021-02-15 | End: 2021-12-10 | Stop reason: SDUPTHER

## 2021-03-01 DIAGNOSIS — M79.641 HAND PAIN, RIGHT: Primary | ICD-10-CM

## 2021-03-04 ENCOUNTER — OFFICE VISIT (OUTPATIENT)
Dept: SURGERY | Facility: HOSPITAL | Age: 66
End: 2021-03-04
Payer: COMMERCIAL

## 2021-03-04 VITALS
HEIGHT: 70 IN | DIASTOLIC BLOOD PRESSURE: 78 MMHG | HEART RATE: 71 BPM | SYSTOLIC BLOOD PRESSURE: 138 MMHG | WEIGHT: 268.2 LBS | TEMPERATURE: 97.7 F | BODY MASS INDEX: 38.39 KG/M2

## 2021-03-04 DIAGNOSIS — L72.3 SEBACEOUS CYST: Primary | ICD-10-CM

## 2021-03-04 PROCEDURE — 99242 OFF/OP CONSLTJ NEW/EST SF 20: CPT | Performed by: SURGERY

## 2021-03-10 DIAGNOSIS — Z23 ENCOUNTER FOR IMMUNIZATION: ICD-10-CM

## 2021-03-17 ENCOUNTER — OFFICE VISIT (OUTPATIENT)
Dept: OBGYN CLINIC | Facility: MEDICAL CENTER | Age: 66
End: 2021-03-17
Payer: COMMERCIAL

## 2021-03-17 VITALS
HEIGHT: 70 IN | HEART RATE: 76 BPM | BODY MASS INDEX: 38.37 KG/M2 | DIASTOLIC BLOOD PRESSURE: 83 MMHG | SYSTOLIC BLOOD PRESSURE: 155 MMHG | WEIGHT: 268 LBS

## 2021-03-17 DIAGNOSIS — M65.331 TRIGGER MIDDLE FINGER OF RIGHT HAND: ICD-10-CM

## 2021-03-17 DIAGNOSIS — M79.641 HAND PAIN, RIGHT: ICD-10-CM

## 2021-03-17 DIAGNOSIS — M65.322 TRIGGER INDEX FINGER OF LEFT HAND: Primary | ICD-10-CM

## 2021-03-17 DIAGNOSIS — M65.351 TRIGGER LITTLE FINGER OF RIGHT HAND: ICD-10-CM

## 2021-03-17 PROCEDURE — 20550 NJX 1 TENDON SHEATH/LIGAMENT: CPT | Performed by: ORTHOPAEDIC SURGERY

## 2021-03-17 PROCEDURE — 99244 OFF/OP CNSLTJ NEW/EST MOD 40: CPT | Performed by: ORTHOPAEDIC SURGERY

## 2021-03-17 RX ORDER — LIDOCAINE HYDROCHLORIDE 10 MG/ML
0.5 INJECTION, SOLUTION INFILTRATION; PERINEURAL
Status: COMPLETED | OUTPATIENT
Start: 2021-03-17 | End: 2021-03-17

## 2021-03-17 RX ORDER — BETAMETHASONE SODIUM PHOSPHATE AND BETAMETHASONE ACETATE 3; 3 MG/ML; MG/ML
3 INJECTION, SUSPENSION INTRA-ARTICULAR; INTRALESIONAL; INTRAMUSCULAR; SOFT TISSUE
Status: COMPLETED | OUTPATIENT
Start: 2021-03-17 | End: 2021-03-17

## 2021-03-17 RX ADMIN — BETAMETHASONE SODIUM PHOSPHATE AND BETAMETHASONE ACETATE 3 MG: 3; 3 INJECTION, SUSPENSION INTRA-ARTICULAR; INTRALESIONAL; INTRAMUSCULAR; SOFT TISSUE at 13:07

## 2021-03-17 RX ADMIN — LIDOCAINE HYDROCHLORIDE 0.5 ML: 10 INJECTION, SOLUTION INFILTRATION; PERINEURAL at 13:07

## 2021-03-17 NOTE — PROGRESS NOTES
Chief Complaint     Right middle and small finger pain  Left index finger pain      History of Present Illness     Sho Reagan  is a 63 y o  right hand dominant male presenting for evaluation regarding his bilateral hands  I am being asked to see him in consultation at the request of Dr Blanca Melchor MD   He notes pain in the middle and small finger on the right hand beginning about two and half months ago  He also states that left index finger began a similar symptoms just a few days ago  He localizes pain to the base of each finger  There is occasional popping however the primary symptom is pain and stiffness  He denies numbness or tingling and has had bilateral carpal tunnel releases done in the past which have been doing well  He has not had any formal treatment at this time although he has attempted ice and heat at home  Patient is not a diabetic  Patient is currently retired         Past Medical History:   Diagnosis Date    Acute low back pain     10 AUG 2016 RESOLVED    [facility]ina pectoris [facility])     Bunion     Bursitis of hip     Carpal tunnel syndrome     Chronic bilateral low back pain with bilateral sciatica     Chronic lumbar radiculopathy     Chronic narcotic dependence ([facility])     Chronic pain syndrome     Constipation due to opioid therapy     DDD (degenerative disc disease), lumbar     Deep vein thrombosis of left upper extremity ([address] )     [id number]  RESOLVED    Depression     Diverticula of colon     [id number] RESOLVED    DVT (deep venous thrombosis) ([facility])     LUE    Edema     History of staph infection     Hyperglycemia     Hyperlipidemia     Hypertension     Insomnia     Lateral epicondylitis     Left inguinal hernia     Methicillin resistant Staphylococcus aureus septicemia ([facility] [address] )     Morbid obesity due to excess calories ([facility])     Obesity     Peripheral neuropathy     Post laminectomy syndrome     RLS (restless legs syndrome)     Septicemia [facility])     MRSA    Umbilical hernia        Past Surgical History:   Procedure Laterality Date    BACK SURGERY      laminectomy, hardware    CARPAL TUNNEL RELEASE Left     CERVICAL SPINE SURGERY      COLONOSCOPY      ELBOW SURGERY      ELBOW SURGERY      KNEE ARTHROSCOPY Right     knee    KNEE SURGERY Right     ARTHOSCOPY KNEE    NECK SURGERY      1997    AR COLONOSCOPY FLX DX W/COLLJ SPEC WHEN PFRMD N/A 1/24/2017    Procedure: COLONOSCOPY;  Surgeon: Ernesto Mott MD;  Location: [facility];  Service: General    AR WRIST [place]ine [place] LIG Right 1/2/2020    Procedure: RELEASE CARPAL TUNNEL ENDOSCOPIC, right;  Surgeon: Reji Alvarenga MD;  Location: [facility];  Service: Orthopedics    SHOULDER SURGERY      SPINAL CORD STIMULATOR IMPLANT         Allergies   Allergen Reactions    Fentanyl Other (See Comments)     "Makes me Suicidal"  Happened when dosage increased   Pravastatin Other (See Comments)     Reaction Date: [id number];   Muscle weakness    Vytorin [Ezetimibe-Simvastatin] Myalgia     Reaction Date: [id number];     Penicillins Other (See Comments)     As a child unknown    Sulfa Antibiotics Other (See Comments)     As a child unknown       Current Outpatient Medications on File Prior to Visit   Medication Sig Dispense Refill    bisoprolol-hydrochlorothiazide (ZIAC) 2 5-6 25 MG per tablet TAKE ONE TABLET BY MOUTH EVERY DAY 90 tablet 3    diazepam (VALIUM) 10 mg tablet Take 1 tablet (10 mg total) by mouth daily at bedtime as needed for anxiety 30 tablet 1    HYDROcodone-acetaminophen (NORCO)  mg per tablet Take 1 PO TID PRN for pain  [facility] send to [facility]  DO NOT FILL UNTIL: 3/25/21 75 tablet 0    lactulose 20 g/30 mL Take 1-2 tablespoons daily PRN as needed for chronic constipation  Please sent to [facility]   473 mL 2    linaCLOtide 145 MCG CAPS Take 1 capsule (145 mcg total) by mouth daily as needed (constipation) 30 capsule 3    morphine (MSIR) 15 mg tablet Take 1/2 tablet BID PRN for severe pain in place of hydrocodone occasionally  Please send to [facility]  30 tablet 0    Multiple Vitamin (MULTIVITAMIN) tablet Take 1 tablet by mouth every other day       Naloxone HCl (Narcan) 4 MG/0 1ML LIQD Spray 2 sprays into 1 nostril for suspected opioid overdose  May repeat in other nostril  Call 911  2 each 0    pregabalin (LYRICA) 100 mg capsule Take 1 in AM and 2 PO HS  Please send to [facility]  90 capsule 2    rosuvastatin (CRESTOR) 10 MG tablet TAKE ONE TABLET BY MOUTH AT BEDTIME 90 tablet 3    Rosuvastatin Calcium (CRESTOR PO) Take by mouth      naproxen sodium (ALEVE) 220 MG tablet Take 1 tablet (220 mg total) by mouth 2 (two) times a day with meals for 5 days 10 tablet 0     No current facility-administered medications on file prior to visit  Social History     Tobacco Use    Smoking status: Never Smoker    Smokeless tobacco: Never Used   Substance Use Topics    Alcohol use: Yes     Frequency: Monthly or less     Drinks per session: 1 or 2     Binge frequency: Never    Drug use: No     Comment: opiod use for chronic pain       Family History   Problem Relation Age of Onset    Heart disease Mother     Cancer Mother         breast, stomach    Aneurysm Mother     Heart disease Father     Cancer Father         skin, liver, colon DECESED AGE 58       Review of Systems     As stated in the HPI  All other systems were reviewed and are negative  Physical Exam     /83   Pulse 76   Ht 5' 10" (1 778 m)   Wt 122 kg (268 lb)   BMI 38 45 kg/m²     GENERAL: This is a well-developed, well-nourished, age-appropriate patient in no acute distress  The patient is alert and oriented x3  Pleasant and cooperative  Eyes: Anicteric sclerae  Extraocular movements appear intact  HENT: Nares are patent with no drainage  Lungs: There is equal chest rise on inspection   Breathing is non-labored with no audible wheezing  Cardiovascular: No cyanosis  No upper extremity lymphadema  Skin: Skin is warm to touch  No obvious skin lesions or rashes other than described below  Neurologic: No ataxia  Psychiatric: Mood and affect are appropriate  Right hand:  Skin is warm, dry and well perfused  No swelling, ecchymosis or erythema  Mild PIP contractures  Elevated DPC  Tenderness A1 pulley middle and small finger, palpable nodule at small  5/5 Motor to the APB, FDI, FDP2, FDP5, EDC  Sensation intact to light touch in the median, radial, and ulnar nerve distribution  Brisk capillary refill noted in all digits  Palpable distal radial pulse    Left hand:  Skin is warm, dry and well perfused  No swelling, ecchymosis or erythema  Mild PIP contracture  Elevated DPC  Tenderness A1 pulley index finger  5/5 Motor to the APB, FDI, FDP2, FDP5, EDC  Sensation intact to light touch in the median, radial, and ulnar nerve distribution  Brisk capillary refill noted in all digits  Palpable distal radial pulse    Data Review     Results Reviewed     None             Imaging:  None today    Assessment and Plan      Diagnoses and all orders for this visit:    Hand pain, right  -     Ambulatory referral to Orthopedic Surgery       71-year-old male with left index as well as right middle and small trigger fingers  I discussed the natural course and treatment options of trigger finger with the patient  We discussed that the etiology is thickening of the tendon sheath surrounding the flexor tendons in the distal palm and that common symptoms are pain, catching, clicking, popping, and locking of the digit  We also discussed that there are surgical and nonsurgical means to treat this  Activity modification can be somewhat helpful, though corticosteroid injections are often the first-line treatment for this condition    The published efficacy of the 1st injection for trigger finger is about 45% at achieving full remission and that we may pursue up to 3 injections per the patient's desire if symptomatology returns  At any point, the patient may choose to have surgical intervention which would involve release of the A1 pulley  We discussed the technical aspects, risks, and benefits of the procedure, perioperative care, and expected recovery from this surgery  Risks of the injection including pain, infection, tendon rupture, progression of arthritis, fat atrophy, depigmentation, and worsening of symptoms were all discussed with the patient  There was good understanding by the patient and they wish to proceed  Follow Up: will call in 4 weeks    To Do Next Visit: repeat exam    PROCEDURES PERFORMED:  Hand/upper extremity injection: L index A1  Universal Protocol:  Procedure performed by:  Consent: Verbal consent obtained  Risks and benefits: risks, benefits and alternatives were discussed  Consent given by: patient  Time out: Immediately prior to procedure a "time out" was called to verify the correct patient, procedure, equipment, support staff and site/side marked as required  Timeout called at: 3/17/2021 1:05 PM   Site marked: the operative site was marked  Supporting Documentation  Indications: tendon swelling and pain   Procedure Details  Condition:trigger finger Location: index finger - L index A1   Preparation: Patient was prepped and draped in the usual sterile fashion  Needle size: 25 G  Ultrasound guidance: no  Approach: volar  Medications administered: 0 5 mL lidocaine 1 %; 3 mg betamethasone acetate-betamethasone sodium phosphate 6 (3-3) mg/mL    Patient tolerance: patient tolerated the procedure well with no immediate complications  Dressing:  Sterile dressing applied     Hand/upper extremity injection: R long A1  Universal Protocol:  Procedure performed by:  Consent: Verbal consent obtained    Risks and benefits: risks, benefits and alternatives were discussed  Consent given by: patient  Time out: Immediately prior to procedure a "time out" was called to verify the correct patient, procedure, equipment, support staff and site/side marked as required  Timeout called at: 3/17/2021 1:05 PM   Site marked: the operative site was marked  Supporting Documentation  Indications: tendon swelling and pain   Procedure Details  Condition:trigger finger Location: long finger - R long A1   Preparation: Patient was prepped and draped in the usual sterile fashion  Needle size: 25 G  Ultrasound guidance: no  Approach: volar  Medications administered: 0 5 mL lidocaine 1 %; 3 mg betamethasone acetate-betamethasone sodium phosphate 6 (3-3) mg/mL    Patient tolerance: patient tolerated the procedure well with no immediate complications  Dressing:  Sterile dressing applied     Hand/upper extremity injection: R small A1  Universal Protocol:  Procedure performed by:  Consent: Verbal consent obtained  Risks and benefits: risks, benefits and alternatives were discussed  Consent given by: patient  Time out: Immediately prior to procedure a "time out" was called to verify the correct patient, procedure, equipment, support staff and site/side marked as required    Timeout called at: 3/17/2021 1:06 PM   Site marked: the operative site was marked  Supporting Documentation  Indications: tendon swelling and pain   Procedure Details  Condition:trigger finger Location: small finger - R small A1   Preparation: Patient was prepped and draped in the usual sterile fashion  Needle size: 25 G  Ultrasound guidance: no  Approach: volar  Medications administered: 0 5 mL lidocaine 1 %; 3 mg betamethasone acetate-betamethasone sodium phosphate 6 (3-3) mg/mL    Patient tolerance: patient tolerated the procedure well with no immediate complications  Dressing:  Sterile dressing applied              Scribe Attestation    I,:  Blair Batista MA am acting as a scribe while in the presence of the attending physician :       I,:  Josh Ohara MD personally performed the services described in this documentation    as scribed in my presence :

## 2021-03-17 NOTE — LETTER
March 19, 2021     Carmencita Griffith, [address] [facility]  [address] [facility]  [address] [facility] [address]    Patient: Fer Gustafson  YOB: 1955   Date of Visit: 3/17/2021       Dear Dr Kenisha Kirk: Thank you for referring Luis Iverson to me for evaluation  Below are my notes for this consultation  If you have questions, please do not hesitate to call me  I look forward to following your patient along with you  Sincerely,        Caitie Proctor MD        CC: No Recipients  Caitie Proctor MD  3/17/2021  7:40 PM  Signed  Chief Complaint     Right middle and small finger pain  Left index finger pain      History of Present Illness     Fer Gustafson  is a 63 y o  right hand dominant male presenting for evaluation regarding his bilateral hands  I am being asked to see him in consultation at the request of Dr Carmencita Griffith MD   He notes pain in the middle and small finger on the right hand beginning about two and half months ago  He also states that left index finger began a similar symptoms just a few days ago  He localizes pain to the base of each finger  There is occasional popping however the primary symptom is pain and stiffness  He denies numbness or tingling and has had bilateral carpal tunnel releases done in the past which have been doing well  He has not had any formal treatment at this time although he has attempted ice and heat at home  Patient is not a diabetic  Patient is currently retired         Past Medical History:   Diagnosis Date    Acute low back pain     10 AUG 2016 RESOLVED    Angina pectoris (HCC)     Bunion     Bursitis of hip     Carpal tunnel syndrome     Chronic bilateral low back pain with bilateral sciatica     Chronic lumbar radiculopathy     Chronic narcotic dependence (HCC)     Chronic pain syndrome     Constipation due to opioid therapy     DDD (degenerative disc disease), lumbar     Deep vein thrombosis of left upper extremity ([facility] )     [id number]  RESOLVED    Depression     Diverticula of colon     [id number] RESOLVED    DVT (deep venous thrombosis) (HCC)     LUE    Edema     History of staph infection     Hyperglycemia     Hyperlipidemia     Hypertension     Insomnia     Lateral epicondylitis     Left inguinal hernia     Methicillin resistant Staphylococcus aureus septicemia (HCC)     Morbid obesity due to excess calories (HCC)     Obesity     Peripheral neuropathy     Post laminectomy syndrome     RLS (restless legs syndrome)     Septicemia ([facility] )     MRSA    Umbilical hernia        Past Surgical History:   Procedure Laterality Date    BACK SURGERY      laminectomy, hardware    CARPAL TUNNEL RELEASE Left     CERVICAL SPINE SURGERY      COLONOSCOPY      ELBOW SURGERY      ELBOW SURGERY      KNEE ARTHROSCOPY Right     knee    KNEE SURGERY Right     ARTHOSCOPY KNEE    NECK SURGERY      1997    WA COLONOSCOPY FLX DX W/COLLJ SPEC WHEN PFRMD N/A 1/24/2017    Procedure: COLONOSCOPY;  Surgeon: Hector Perez MD;  Location: [facility];  Service: General    WA WRIST Nayan Ax LIG Right 1/2/2020    Procedure: RELEASE CARPAL TUNNEL ENDOSCOPIC, right;  Surgeon: Jacobo Zimmerman MD;  Location: [facility];  Service: Orthopedics    SHOULDER SURGERY      SPINAL CORD STIMULATOR IMPLANT         Allergies   Allergen Reactions    Fentanyl Other (See Comments)     "Makes me Suicidal"  Happened when dosage increased      Pravastatin Other (See Comments)     Reaction Date: [id number];   Muscle weakness    Vytorin [Ezetimibe-Simvastatin] Myalgia     Reaction Date: [id number];     Penicillins Other (See Comments)     As a child unknown    Sulfa Antibiotics Other (See Comments)     As a child unknown       Current Outpatient Medications on File Prior to Visit   Medication Sig Dispense Refill    bisoprolol-hydrochlorothiazide (ZIAC) 2 5-6 25 MG per tablet TAKE ONE TABLET BY MOUTH EVERY DAY 90 tablet 3  diazepam (VALIUM) 10 mg tablet Take 1 tablet (10 mg total) by mouth daily at bedtime as needed for anxiety 30 tablet 1    HYDROcodone-acetaminophen (NORCO)  mg per tablet Take 1 PO TID PRN for pain  Please send to [facility]  DO NOT FILL UNTIL: 3/25/21 75 tablet 0    lactulose 20 g/30 mL Take 1-2 tablespoons daily PRN as needed for chronic constipation  Please sent to [facility]  473 mL 2    linaCLOtide 145 MCG CAPS Take 1 capsule (145 mcg total) by mouth daily as needed (constipation) 30 capsule 3    morphine (MSIR) 15 mg tablet Take 1/2 tablet BID PRN for severe pain in place of hydrocodone occasionally  Please send to [facility]  30 tablet 0    Multiple Vitamin (MULTIVITAMIN) tablet Take 1 tablet by mouth every other day       Naloxone HCl (Narcan) 4 MG/0 1ML LIQD Spray 2 sprays into 1 nostril for suspected opioid overdose  May repeat in other nostril  Call 911  2 each 0    pregabalin (LYRICA) 100 mg capsule Take 1 in AM and 2 PO HS  Please send to [facility]  90 capsule 2    rosuvastatin (CRESTOR) 10 MG tablet TAKE ONE TABLET BY MOUTH AT BEDTIME 90 tablet 3    Rosuvastatin Calcium (CRESTOR PO) Take by mouth      naproxen sodium (ALEVE) 220 MG tablet Take 1 tablet (220 mg total) by mouth 2 (two) times a day with meals for 5 days 10 tablet 0     No current facility-administered medications on file prior to visit          Social History     Tobacco Use    Smoking status: Never Smoker    Smokeless tobacco: Never Used   Substance Use Topics    Alcohol use: Yes     Frequency: Monthly or less     Drinks per session: 1 or 2     Binge frequency: Never    Drug use: No     Comment: opiod use for chronic pain       Family History   Problem Relation Age of Onset    Heart disease Mother     Cancer Mother         breast, stomach    Aneurysm Mother     Heart disease Father     Cancer Father         skin, liver, colon DECESED AGE 58       Review of Systems     As stated in the HPI  All other systems were reviewed and are negative  Physical Exam     /83   Pulse 76   Ht 5' 10" (1 778 m)   Wt 122 kg (268 lb)   BMI 38 45 kg/m²     GENERAL: This is a well-developed, well-nourished, age-appropriate patient in no acute distress  The patient is alert and oriented x3  Pleasant and cooperative  Eyes: Anicteric sclerae  Extraocular movements appear intact  HENT: Nares are patent with no drainage  Lungs: There is equal chest rise on inspection  Breathing is non-labored with no audible wheezing  Cardiovascular: No cyanosis  No upper extremity lymphadema  Skin: Skin is warm to touch  No obvious skin lesions or rashes other than described below  Neurologic: No ataxia  Psychiatric: Mood and affect are appropriate  Right hand:  Skin is warm, dry and well perfused  No swelling, ecchymosis or erythema  Mild PIP contractures  Elevated DPC  Tenderness A1 pulley middle and small finger, palpable nodule at small  5/5 Motor to the APB, FDI, FDP2, FDP5, EDC  Sensation intact to light touch in the median, radial, and ulnar nerve distribution  Brisk capillary refill noted in all digits  Palpable distal radial pulse    Left hand:  Skin is warm, dry and well perfused  No swelling, ecchymosis or erythema  Mild PIP contracture  Elevated DPC  Tenderness A1 pulley index finger  5/5 Motor to the APB, FDI, FDP2, FDP5, EDC  Sensation intact to light touch in the median, radial, and ulnar nerve distribution  Brisk capillary refill noted in all digits  Palpable distal radial pulse    Data Review     Results Reviewed     None             Imaging:  None today    Assessment and Plan      Diagnoses and all orders for this visit:    Hand pain, right  -     Ambulatory referral to Orthopedic Surgery       81-year-old male with left index as well as right middle and small trigger fingers  I discussed the natural course and treatment options of trigger finger with the patient    We discussed that the etiology is thickening of the tendon sheath surrounding the flexor tendons in the distal palm and that common symptoms are pain, catching, clicking, popping, and locking of the digit  We also discussed that there are surgical and nonsurgical means to treat this  Activity modification can be somewhat helpful, though corticosteroid injections are often the first-line treatment for this condition  The published efficacy of the 1st injection for trigger finger is about 45% at achieving full remission and that we may pursue up to 3 injections per the patient's desire if symptomatology returns  At any point, the patient may choose to have surgical intervention which would involve release of the A1 pulley  We discussed the technical aspects, risks, and benefits of the procedure, perioperative care, and expected recovery from this surgery  Risks of the injection including pain, infection, tendon rupture, progression of arthritis, fat atrophy, depigmentation, and worsening of symptoms were all discussed with the patient  There was good understanding by the patient and they wish to proceed  Follow Up: will call in 4 weeks    To Do Next Visit: repeat exam    PROCEDURES PERFORMED:  Hand/upper extremity injection: L index A1  Universal Protocol:  Procedure performed by:  Consent: Verbal consent obtained  Risks and benefits: risks, benefits and alternatives were discussed  Consent given by: patient  Time out: Immediately prior to procedure a "time out" was called to verify the correct patient, procedure, equipment, support staff and site/side marked as required    Timeout called at: 3/17/2021 1:05 PM   Site marked: the operative site was marked  Supporting Documentation  Indications: tendon swelling and pain   Procedure Details  Condition:trigger finger Location: index finger - L index A1   Preparation: Patient was prepped and draped in the usual sterile fashion  Needle size: 25 G  Ultrasound guidance: no  Approach: volar  Medications administered: 0 5 mL lidocaine 1 %; 3 mg betamethasone acetate-betamethasone sodium phosphate 6 (3-3) mg/mL    Patient tolerance: patient tolerated the procedure well with no immediate complications  Dressing:  Sterile dressing applied     Hand/upper extremity injection: R long A1  Universal Protocol:  Procedure performed by:  Consent: Verbal consent obtained  Risks and benefits: risks, benefits and alternatives were discussed  Consent given by: patient  Time out: Immediately prior to procedure a "time out" was called to verify the correct patient, procedure, equipment, support staff and site/side marked as required  Timeout called at: 3/17/2021 1:05 PM   Site marked: the operative site was marked  Supporting Documentation  Indications: tendon swelling and pain   Procedure Details  Condition:trigger finger Location: long finger - R long A1   Preparation: Patient was prepped and draped in the usual sterile fashion  Needle size: 25 G  Ultrasound guidance: no  Approach: volar  Medications administered: 0 5 mL lidocaine 1 %; 3 mg betamethasone acetate-betamethasone sodium phosphate 6 (3-3) mg/mL    Patient tolerance: patient tolerated the procedure well with no immediate complications  Dressing:  Sterile dressing applied     Hand/upper extremity injection: R small A1  Universal Protocol:  Procedure performed by:  Consent: Verbal consent obtained  Risks and benefits: risks, benefits and alternatives were discussed  Consent given by: patient  Time out: Immediately prior to procedure a "time out" was called to verify the correct patient, procedure, equipment, support staff and site/side marked as required    Timeout called at: 3/17/2021 1:06 PM   Site marked: the operative site was marked  Supporting Documentation  Indications: tendon swelling and pain   Procedure Details  Condition:trigger finger Location: small finger - R small A1   Preparation: Patient was prepped and draped in the usual sterile fashion  Needle size: 25 G  Ultrasound guidance: no  Approach: volar  Medications administered: 0 5 mL lidocaine 1 %; 3 mg betamethasone acetate-betamethasone sodium phosphate 6 (3-3) mg/mL    Patient tolerance: patient tolerated the procedure well with no immediate complications  Dressing:  Sterile dressing applied              Scribe Attestation    I,:  Melissa Higgins MA am acting as a scribe while in the presence of the attending physician :       I,:  Jannet Khan MD personally performed the services described in this documentation    as scribed in my presence :

## 2021-03-26 ENCOUNTER — IMMUNIZATIONS (OUTPATIENT)
Dept: FAMILY MEDICINE CLINIC | Facility: HOSPITAL | Age: 66
End: 2021-03-26

## 2021-03-26 DIAGNOSIS — Z23 ENCOUNTER FOR IMMUNIZATION: Primary | ICD-10-CM

## 2021-03-26 PROCEDURE — 91300 SARS-COV-2 / COVID-19 MRNA VACCINE (PFIZER-BIONTECH) 30 MCG: CPT

## 2021-03-26 PROCEDURE — 0001A SARS-COV-2 / COVID-19 MRNA VACCINE (PFIZER-BIONTECH) 30 MCG: CPT

## 2021-04-05 ENCOUNTER — OFFICE VISIT (OUTPATIENT)
Dept: PAIN MEDICINE | Facility: CLINIC | Age: 66
End: 2021-04-05
Payer: COMMERCIAL

## 2021-04-05 VITALS
TEMPERATURE: 98.3 F | HEART RATE: 68 BPM | WEIGHT: 270 LBS | SYSTOLIC BLOOD PRESSURE: 130 MMHG | DIASTOLIC BLOOD PRESSURE: 80 MMHG | BODY MASS INDEX: 38.65 KG/M2 | HEIGHT: 70 IN

## 2021-04-05 DIAGNOSIS — G89.29 CHRONIC PAIN OF BOTH HIPS: ICD-10-CM

## 2021-04-05 DIAGNOSIS — G62.9 NEUROPATHY: ICD-10-CM

## 2021-04-05 DIAGNOSIS — M54.42 CHRONIC BILATERAL LOW BACK PAIN WITH BILATERAL SCIATICA: ICD-10-CM

## 2021-04-05 DIAGNOSIS — M54.6 CHRONIC BILATERAL THORACIC BACK PAIN: ICD-10-CM

## 2021-04-05 DIAGNOSIS — M48.062 SPINAL STENOSIS OF LUMBAR REGION WITH NEUROGENIC CLAUDICATION: ICD-10-CM

## 2021-04-05 DIAGNOSIS — G89.29 CHRONIC BILATERAL LOW BACK PAIN WITH BILATERAL SCIATICA: ICD-10-CM

## 2021-04-05 DIAGNOSIS — G60.9 IDIOPATHIC PERIPHERAL NEUROPATHY: ICD-10-CM

## 2021-04-05 DIAGNOSIS — Z79.891 LONG-TERM CURRENT USE OF OPIATE ANALGESIC: ICD-10-CM

## 2021-04-05 DIAGNOSIS — F11.20 UNCOMPLICATED OPIOID DEPENDENCE (HCC): ICD-10-CM

## 2021-04-05 DIAGNOSIS — M54.16 CHRONIC LUMBAR RADICULOPATHY: ICD-10-CM

## 2021-04-05 DIAGNOSIS — M54.14 THORACIC RADICULOPATHY: ICD-10-CM

## 2021-04-05 DIAGNOSIS — M25.552 CHRONIC PAIN OF BOTH HIPS: ICD-10-CM

## 2021-04-05 DIAGNOSIS — M51.36 LUMBAR DEGENERATIVE DISC DISEASE: ICD-10-CM

## 2021-04-05 DIAGNOSIS — M96.1 LUMBAR POST-LAMINECTOMY SYNDROME: ICD-10-CM

## 2021-04-05 DIAGNOSIS — M54.41 CHRONIC BILATERAL LOW BACK PAIN WITH BILATERAL SCIATICA: ICD-10-CM

## 2021-04-05 DIAGNOSIS — M50.30 DDD (DEGENERATIVE DISC DISEASE), CERVICAL: ICD-10-CM

## 2021-04-05 DIAGNOSIS — M16.0 PRIMARY OSTEOARTHRITIS OF BOTH HIPS: ICD-10-CM

## 2021-04-05 DIAGNOSIS — G89.29 CHRONIC BILATERAL THORACIC BACK PAIN: ICD-10-CM

## 2021-04-05 DIAGNOSIS — M25.551 CHRONIC PAIN OF BOTH HIPS: ICD-10-CM

## 2021-04-05 DIAGNOSIS — G89.4 CHRONIC PAIN SYNDROME: Primary | ICD-10-CM

## 2021-04-05 PROCEDURE — 99214 OFFICE O/P EST MOD 30 MIN: CPT | Performed by: NURSE PRACTITIONER

## 2021-04-05 PROCEDURE — 80305 DRUG TEST PRSMV DIR OPT OBS: CPT | Performed by: NURSE PRACTITIONER

## 2021-04-05 RX ORDER — HYDROCODONE BITARTRATE AND ACETAMINOPHEN 10; 325 MG/1; MG/1
TABLET ORAL
Qty: 90 TABLET | Refills: 0 | Status: SHIPPED | OUTPATIENT
Start: 2021-04-05 | End: 2021-04-05 | Stop reason: SDUPTHER

## 2021-04-05 RX ORDER — PREGABALIN 100 MG/1
CAPSULE ORAL
Qty: 90 CAPSULE | Refills: 2 | Status: SHIPPED | OUTPATIENT
Start: 2021-04-05 | End: 2021-04-05

## 2021-04-05 RX ORDER — HYDROCODONE BITARTRATE AND ACETAMINOPHEN 10; 325 MG/1; MG/1
TABLET ORAL
Qty: 90 TABLET | Refills: 0 | Status: SHIPPED | OUTPATIENT
Start: 2021-04-05 | End: 2021-06-18 | Stop reason: SDUPTHER

## 2021-04-05 RX ORDER — PREDNISONE 10 MG/1
TABLET ORAL
Qty: 21 TABLET | Refills: 0 | Status: SHIPPED | OUTPATIENT
Start: 2021-04-05 | End: 2021-06-18

## 2021-04-05 NOTE — PROGRESS NOTES
Assessment:  1  Chronic pain syndrome    2  Chronic bilateral low back pain with bilateral sciatica    3  Chronic bilateral thoracic back pain    4  Chronic pain of both hips    5  Chronic lumbar radiculopathy    6  Thoracic radiculopathy    7  Lumbar post-laminectomy syndrome    8  Neuropathy    9  Idiopathic peripheral neuropathy    10  Lumbar degenerative disc disease    11  Primary osteoarthritis of both hips    12  DDD (degenerative disc disease), cervical    13  Spinal stenosis of lumbar region with neurogenic claudication    14  Long-term current use of opiate analgesic    15  Uncomplicated opioid dependence ([address] )        Plan:   While the patient was in the office today, I did have a thorough conversation with the patient regarding their chronic pain syndrome, symptoms, medication regimen, and treatment plan  I discussed with the patient that although edema can be normal from Lyrica, trigger finger and arthritis is not, however, I agree that at this point it is in his best interest to titrate him off of the Lyrica as discussed and see how he does over the next few weeks and proceed from there as appropriate  In the meantime, I will temporarily increase the total amount of the p r n  Norco to 90 pills per 30 days just so he has some extra pills to take if he needs to to manage the pain  I advised the patient that if they experience any side effects or issues with the changes in their medication regiment, they should give our office a call to discuss  I also advised the patient not to drive or operate machinery until they see how the changes in the medication regimen affects them  The patient was agreeable and verbalized an understanding  I also discussed with the patient that at this point time since I feel that there is a significant inflammatory component to their pain symptoms, that they would benefit from a titrating dose of oral steroids over the next 7 days   I advised the patient that while on the steroids, they should not take any other oral NSAIDs except for acetaminophen or Tylenol until they have completed the steroid taper  I also advised them that once they have completed the steroid taper, they are to give our office a follow up phone call to let us know how they are doing and if there is any improvement  The patient was agreeable and verbalized an understanding  [facility] Prescription Drug Monitoring Program report was reviewed and was appropriate     A urine drug screen was collected at today's office visit as part of our medication management protocol  The point of care testing results were appropriate for what was being prescribed  The specimen will be sent for confirmatory testing  The drug screen is medically necessary because the patient is either dependent on opioid medication or is being considered for opioid medication therapy and the results could impact ongoing or future treatment  The drug screen is to evaluate for the presences or absence of prescribed, non-prescribed, and/or illicit drugs/substances  The patient's opioid scripts were sent to their pharmacy electronically and was given a 3 month supply of prescriptions with a Do Not Fill date(s) of  April 15, 2021, May 12, 2021, and June 9, 2021  There are risks associated with opioid medications, including dependence, addiction and tolerance  The patient understands and agrees to use these medications only as prescribed  Potential side effects of the medications include, but are not limited to, constipation, drowsiness, addiction, impaired judgment and risk of fatal overdose if not taken as prescribed  The patient was warned against driving while taking sedation medications  Sharing medications is a felony  At this point in time, the patient is showing no signs of addiction, abuse, diversion or suicidal ideation      While the patient was in the office today, I discussed with the patient that at this point they have been identified as a patient who is at significant risk for respiratory depression and/or even death because they are being, appropriately prescribed,  a dosage of opioid pain medication that is equal to or greater than 40 milligram equivalents of Morphine Sulfates, on  a long acting medication, and/or the patient's age and other comorbidities puts them at increased risk for respiratory depression and possibly death  I, again, reviewed the dangers of being on an opioid and at this point since we feel it is appropriate that they continue the opioid medication at this level, for now, we will continue the dosage as prescribed  However, we feel that it is best and safe practice to prescribe narcan nasal spray to be used if the patient is experiencing respiratory depression as a result of suspected intentional or unintentional opioid over dose  I reviewed with the patient how to use the nasal spray and to make sure that they educated a family member on how to use it and that no matter what emergency personnel must be notified as the Narcan nasal spray is only meant to give the patient more time to get to the nearest emergency room for a more thorough evaluation  The patient was agreeable and verbalized an understanding  A signed copy of the patient education sheet reviewing the risks and reasons for prescribing this medication is available in the patient's chart and a copy was also sent home with the patient  The patient will follow-up in 12 weeks for medication prescription refill and reevaluation  The patient was advised to contact the office should their symptoms worsen in the interim  The patient was agreeable and verbalized an understanding  History of Present Illness:     The patient is a 63 y o  male last seen on 1/13/2021 who presents for a follow up office visit in regards to  Chronic pain syndrome secondary to  Lumbar post-laminectomy syndrome with degenerative disc disease of both cervical and lumbar spine as well as lumbar radiculopathy, neuropathy, and chronic hip pain with osteoarthritis  The patient currently reports that since his last office visit his low back pain with radiating pain around into his hips and down his legs has worsened  He also reports worsening hand pain and trigger finger, which the orthopedist suggested could be from his Lyrica as according to the patient the orthopedist has seen with patients on Lyrica  The patient is wondering if maybe we should titrate him down and off of the Lyrica to see if there is a difference in his pain as he is still not sure how helpful it is and is not sure the wants to stay on it if it could be causing some of the swelling in issues in his hands and fingers  Current pain medications includes:    Lyrica 100 mg t i d  and Norco 10/325, 1 p o  t i d  p r n  for pain, dispense number 75 pills per 30 days  The patient reports that this regimen is providing 50% pain relief  The patient is reporting no side effects from this pain medication regimen  Pain Contract Signed: 1/13/2021  Last Urine Drug Screen: 4/5/2021    I have personally reviewed and/or updated the patient's past medical history, past surgical history, family history, social history, current medications, allergies, and vital signs today  Review of Systems:    Review of Systems   Respiratory: Positive for shortness of breath  Cardiovascular: Negative for chest pain  Gastrointestinal: Negative for constipation, diarrhea, nausea and vomiting  Musculoskeletal: Positive for gait problem  Negative for arthralgias, joint swelling and myalgias  Skin: Negative for rash  Neurological: Positive for weakness  Negative for dizziness and seizures  All other systems reviewed and are negative          Past Medical History:   Diagnosis Date    Acute low back pain     10 AUG 2016 RESOLVED    Angina pectoris (HCC)     Bunion     Bursitis of hip     Carpal tunnel syndrome     Chronic bilateral low back pain with bilateral sciatica     Chronic lumbar radiculopathy     Chronic narcotic dependence (HCC)     Chronic pain syndrome     Constipation due to opioid therapy     DDD (degenerative disc disease), lumbar     Deep vein thrombosis of left upper extremity ([facility] [address] )     [id number]  RESOLVED    Depression     Diverticula of colon     [id number] RESOLVED    D[facility]deep venous thrombosis) [facility]     LUE    Edema     History of staph infection     Hyperglycemia     Hyperlipidemia     Hypertension     Insomnia     Lateral epicondylitis     Left inguinal hernia     Methicillin resistant Staphylococcus aureus septicemia ([facility] )     Morbid obesity due to excess calories ([facility])     Obesity     Peripheral neuropathy     Post laminectomy syndrome     RLS (restless legs syndrome)     Septicemia ([facility] )     MRSA    Umbilical hernia        Past Surgical History:   Procedure Laterality Date    BACK SURGERY      laminectomy, hardware    CARPAL TUNNEL RELEASE Left     CERVICAL SPINE SURGERY      COLONOSCOPY      ELBOW SURGERY      ELBOW SURGERY      KNEE ARTHROSCOPY Right     knee    KNEE SURGERY Right     ARTHOSCOPY KNEE    NECK SURGERY      1997    OK COLONOSCOPY FLX DX W/COLLJ SPEC WHEN PFRMD N/A 1/24/2017    Procedure: COLONOSCOPY;  Surgeon: Naomy Esteban MD;  Location: [facility];  Service: General    OK WRIST Siobhan Rave LIG Right 1/2/2020    Procedure: RELEASE CARPAL TUNNEL ENDOSCOPIC, right;  Surgeon: Braulio Herrera MD;  Location: [facility];  Service: Orthopedics    SHOULDER SURGERY      SPINAL CORD STIMULATOR IMPLANT         Family History   Problem Relation Age of Onset    Heart disease Mother     Cancer Mother         breast, stomach    Aneurysm Mother     Heart disease Father     Cancer Father         skin, liver, colon DECESED AGE 58       Social History     Occupational History    Not on file   Tobacco Use    Smoking status: Never Smoker  Smokeless tobacco: Never Used   Substance and Sexual Activity    Alcohol use: Yes     Frequency: Monthly or less     Drinks per session: 1 or 2     Binge frequency: Never    Drug use: No     Comment: opiod use for chronic pain    Sexual activity: Yes         Current Outpatient Medications:     bisoprolol-hydrochlorothiazide (ZIAC) 2 5-6 25 MG per tablet, TAKE ONE TABLET BY MOUTH EVERY DAY, Disp: 90 tablet, Rfl: 3    diazepam (VALIUM) 10 mg tablet, Take 1 tablet (10 mg total) by mouth daily at bedtime as needed for anxiety, Disp: 30 tablet, Rfl: 1    HYDROcodone-acetaminophen (NORCO)  mg per tablet, Take 1 PO TID PRN for pain  Please send to [facility]  DO NOT FILL UNTIL: 4/15/21, Disp: 90 tablet, Rfl: 0    lactulose 20 g/30 mL, Take 1-2 tablespoons daily PRN as needed for chronic constipation  Please sent to [facility]  , Disp: 473 mL, Rfl: 2    linaCLOtide 145 MCG CAPS, Take 1 capsule (145 mcg total) by mouth daily as needed (constipation), Disp: 30 capsule, Rfl: 3    morphine (MSIR) 15 mg tablet, Take 1/2 tablet BID PRN for severe pain in place of hydrocodone occasionally  Please send to [facility]  , Disp: 30 tablet, Rfl: 0    Multiple Vitamin (MULTIVITAMIN) tablet, Take 1 tablet by mouth every other day , Disp: , Rfl:     naproxen sodium (ALEVE) 220 MG tablet, Take 1 tablet (220 mg total) by mouth 2 (two) times a day with meals for 5 days, Disp: 10 tablet, Rfl: 0    pregabalin (LYRICA) 100 mg capsule, Take 1 in AM and 2 PO HS  Please send to [facility]  , Disp: 90 capsule, Rfl: 2    rosuvastatin (CRESTOR) 10 MG tablet, TAKE ONE TABLET BY MOUTH AT BEDTIME, Disp: 90 tablet, Rfl: 3    Naloxone HCl (Narcan) 4 MG/0 1ML LIQD, Spray 2 sprays into 1 nostril for suspected opioid overdose  May repeat in other nostril   Call 911 , Disp: 2 each, Rfl: 0    predniSONE 10 mg tablet, Take 2 PO TID or 6 pills spread out on the first day, then decrease by 1 pill daily until gone , Disp: 21 tablet, Rfl: 0    Rosuvastatin Calcium (CRESTOR PO), Take by mouth, Disp: , Rfl:     Allergies   Allergen Reactions    Fentanyl Other (See Comments)     "Makes me Suicidal"  Happened when dosage increased   Pravastatin Other (See Comments)     Reaction Date: [id number];   Muscle weakness    Vytorin [Ezetimibe-Simvastatin] Myalgia     Reaction Date: [id number];     Penicillins Other (See Comments)     As a child unknown    Sulfa Antibiotics Other (See Comments)     As a child unknown       Physical Exam:    /80 (BP Location: Left arm, Patient Position: Sitting, Cuff Size: Standard)   Pulse 68   Temp 98 3 °F (36 8 °C)   Ht 5' 10" (1 778 m)   Wt 122 kg (270 lb)   BMI 38 74 kg/m²     Constitutional:normal, well developed, well nourished, alert, in no distress and non-toxic and no overt pain behavior  and overweight  Eyes:anicteric  HEENT:grossly intact  Neck:supple, symmetric, trachea midline and no masses   Pulmonary:even and unlabored  Cardiovascular:No edema or pitting edema present  Skin:Normal without rashes or lesions and well hydrated  Psychiatric:Mood and affect appropriate  Neurologic:Cranial Nerves II-XII grossly intact  Musculoskeletal:The patient's gait is slightly antalgic, painful, but steady without the use of any assistive devices  Patient is wearing a lumbar support brace today        Imaging  No orders to display         Orders Placed This Encounter   Procedures    MM ALL_Prescribed Meds and Special Instructions    MM DT_Alprazolam Definitive Test    MM DT_Amphetamine Definitive Test    MM DT_Aripiprazole Definitive Test    MM DT_Bath Salts Definitive Test    MM DT_Buprenorphine Definitive Test    MM DT_Butalbital Definitive Test    MM DT_Clonazepam Definitive Test    MM DT_Clozapine Definitive Test    MM DT_Cocaine Definitive Test    MM DT_Codeine Confirm Positive    MM DT_Validity Creatinine    MM DT_Validity Oxidant    MM DT_Validity pH    MM DT_Validity Specific    MM DT_Desipramine Definitive Test    MM DT_Dextromethorphan Definitive Test    MM Diazepam Definitive Test    MM DT_Ethyl Glucuronide/Ethyl Sulfate Definitive Test    MM DT_Fentanyl Definitive Test    MM DT_Haloperidol Definitive Test    MM DT_Heroin Definitive Test    MM DT_Hydrocodone Definitive Test    MM DT_Hydromorphone Definitive Test    MM DT_Imipramine Definitive Test    MM DT_Kratom Definitive Test    MM DT_Levorphanol Definitive Test    MM Lorazepam Definitive Test    MM DT_MDMA Definitive Test    MM DT_Meperidine Definitive Test    MM DT_Methadone Definitive Test    MM DT_Methamphetamine Definitive Test    MM DT_Methylphenidate Definitive Test    MM DT_Morphine Definitive Test    MM DT_Olanzapine Definitive Test    MM DT_Oxazepam Definitive Test    MM DT_Oxycodone Definitive Test    MM DT_Oxymorphone Definitive Test    MM DT_Phencyclidine Definitive Test    MM DT_Phenobarbital Definitive Test    MM DT_Phentermine Definitive Test    MM DT_Quetiapine Definitive Test    MM DT_Risperidone Definitive Test    MM DT_Secobarbital Definitive Test    MM DT_Spice Definitive Test    MM DT_Tapentadol Definitive Test    MM DT_Temazapam Definitive Test    MM DT_THC Definitive Test    MM DT_Tramadol Definitive Test

## 2021-04-05 NOTE — PATIENT INSTRUCTIONS
Lyrica 100 mg: Decrease down to 2 pills daily x 7 days, then 1 pill daily x 7 days, then 1 pill every other day x 6 days, then STOP  Opioid Safety   AMBULATORY CARE:   Opioid safety  includes the correct use, storage, and disposal of opioids  Examples of opioid medicines to treat pain include oxycodone, morphine, fentanyl, and codeine  Call your local emergency number (911 in the [address] [facility]), or have someone else call if:   · You have a seizure  · You cannot be woken  · You have trouble staying awake and your breathing is slow or shallow  · Your speech is slurred, or you are confused  · You are dizzy or stumble when you walk  Call your doctor, or have someone close to you call if:   · You are extremely drowsy, or you have trouble staying awake or speaking  · You have pale or clammy skin  · You have blue fingernails or lips  · Your heartbeat is slower than normal     · You cannot stop vomiting  · You have questions or concerns about your condition or care  Use opioids safely:   · Take prescribed opioids exactly as directed  Opioids come with directions based on the kind of opioid and how it is given  Do not take more than the recommended amount, or for longer than needed  · Do not give opioids to others or take opioids that belong to someone else  Misuse of opioids can lead to an addiction or overdose  · Do not mix opioids with other medicines or alcohol  The combination can cause an overdose, or lead to a coma  · Do not drive or operate heavy machinery after you take the opioid  Your provider or pharmacist can tell you how long to wait after a dose before you do these activities  · Talk to your healthcare provider if you have any side effects  He or she can help you prevent or relieve side effects  Side effects include nausea, sleepiness, itching, and trouble thinking clearly  Manage constipation:  Constipation is the most common side effect of opioid medicine  Constipation is when you have hard, dry bowel movements, or you go longer than usual between bowel movements  Tell your healthcare provider about all changes in your bowel movements while you are taking opioids  He or she may recommend laxative medicine to help you have a bowel movement  He or she may also change the kind of opioid you are taking, or change when you take it  The following are more ways you can prevent or relieve constipation:  · Drink liquids as directed  You may need to drink extra liquids to help soften and move your bowels  Ask how much liquid to drink each day and which liquids are best for you  · Eat high-fiber foods  This may help decrease constipation by adding bulk to your bowel movements  High-fiber foods include fruits, vegetables, whole-grain breads and cereals, and beans  Your healthcare provider or dietitian can help you create a high-fiber meal plan  Your provider may also recommend a fiber supplement if you cannot get enough fiber from food  · Exercise regularly  Regular physical activity can help stimulate your intestines  Walking is a good exercise to prevent or relieve constipation  Ask which exercises are best for you  · Schedule a time each day to have a bowel movement  This may help train your body to have regular bowel movements  Bend forward while you are on the toilet to help move the bowel movement out  Sit on the toilet for at least 10 minutes, even if you do not have a bowel movement  Store opioids safely:   · Store opioids where others cannot easily get them  Keep them in a locked cabinet or secure area  Do not  keep them in a purse or other bag you carry with you  A person may be looking for something else and find the opioids  · Make sure opioids are stored out of the reach of children  A child can easily overdose on opioids  Opioids may look like candy to a small child  The best way to dispose of opioids:   The laws vary by country and area  In the [facility], the best way is to return the opioids through a take-back program  This program is offered by the [employer]  The following are options for using the program:  · Take the opioids to a KELLIE collection site  The site is often a law enforcement center  Call your local law enforcement center for scheduled take-back days in your area  You will be given information on where to go if the collection site is in a different location  · Take the opioids to an approved pharmacy or hospital   A pharmacy or hospital may be set up as a collection site  You will need to ask if it is a KELLIE collection site if you were not directed there  A pharmacy or doctor's office may not be able to take back opioids unless it is a KELLIE site  · Use a mail-back system  This means you are given containers to put the opioids into  You will then mail them in the containers  · Use a take-back drop box  This is a place to leave the opioids at any time  People and animals will not be able to get into the box  Your local law enforcement agency can tell you where to find a drop box in your area  Other safe ways to dispose of opioids: The medicine may come with disposal instructions  The instructions may vary depending on the brand of medicine you are using  Instructions may come in a Medication Guide, but not every medicine has one  You may instead get instructions from your pharmacy or doctor  Follow instructions carefully  The following are general guidelines to follow:  · Find out if you can flush the opioid  Some opioids can be flushed down the toilet or poured into the sink  You will need to contact authorities in your area to see if this is an option for you  The FDA also offers a list of medicines that are safe to flush down the toilet  You can check the list if you cannot get the information for your local area      · Ask your waste management company about rules for putting opioids in the trash  The company will be able to give you specific directions  Scratch out personal information on the original medicine label so it cannot be read  Then put it in the trash  Do not label the trash or put any information on it about the opioids  It should look like regular household trash so no one is tempted to look for the opioids  Keep the trash out of the reach of children and animals  Always make sure trash is secure  · Talk to officials if you live in a facility  If you live in a nursing home or assisted living center, talk to an official  The person will know the rules for your area  Other ways to manage pain:   · Ask your healthcare provider about non-opioid medicines to control pain  Nonprescription medicines include NSAIDs (such as ibuprofen) and acetaminophen  Prescription medicines include muscle relaxers, antidepressants, and steroids  · Pain may be managed without any medicines  Some ways to relieve pain include massage, aromatherapy, or meditation  Physical or occupational therapy may also help  For more information:   · Drug Enforcement Administration  [address] [facility] [address]  Phone: 5- 042 - [phone]  Web Address: [facility]/drug_disposal/    · [employer] and Drug Administration  [place] [address] , [address] [place]  Phone: 7- 646 - [phone]  Web Address: http://[employer]/  Follow up with your doctor or pain specialist as directed: You may need to have your dose adjusted  Your doctor or pain specialist can also help you find ways to manage pain without opioids  Write down your questions so you remember to ask them during your visits  © Copyright [address] [place] [address] Drive Information is for End User's use only and may not be sold, redistributed or otherwise used for commercial purposes   All illustrations and images included in CareNotes® are the copyrighted property of [employer]  or Stephanie Peoples  The above information is an  only  It is not intended as medical advice for individual conditions or treatments  Talk to your doctor, nurse or pharmacist before following any medical regimen to see if it is safe and effective for you  Prednisone (By mouth)   Prednisone (PRED-ni-sone)  Treats many diseases and conditions, especially problems related to inflammation  This medicine is a corticosteroid  Brand Name(s): Good, predniSONE Intensol   There may be other brand names for this medicine  When This Medicine Should Not Be Used: This medicine is not right for everyone  Do not use if you had an allergic reaction to prednisone or if you are pregnant  How to Use This Medicine:   Liquid, Tablet, Delayed Release Tablet  · Take your medicine as directed  Your dose may need to be changed several times to find what works best for you  · It is best to take this medicine with food or milk  · Swallow the delayed-release tablet whole  Do not crush, break, or chew it  · Measure the oral liquid medicine with a marked measuring spoon, oral syringe, or medicine cup  · Missed dose: Take a dose as soon as you remember  If it is almost time for your next dose, wait until then and take a regular dose  Do not take extra medicine to make up for a missed dose  · Store the medicine in a closed container at room temperature, away from heat, moisture, and direct light  Do not freeze the oral liquid  Drugs and Foods to Avoid:   Ask your doctor or pharmacist before using any other medicine, including over-the-counter medicines, vitamins, and herbal products  · Tell your doctor if you use any of the following:  ? Aminoglutethimide, amphotericin B, carbamazepine, cholestyramine, cyclosporine, digoxin, isoniazid, ketoconazole, phenobarbital, phenytoin, or rifampin  ? Blood thinner, such as warfarin  ? NSAID pain or arthritis medicine, such as aspirin, diclofenac, ibuprofen, naproxen, celecoxib  ?  Diuretic (water pill)  ? Diabetes medicine  ? Macrolide antibiotic, such as azithromycin, clarithromycin, erythromycin  ? Estrogen, including birth control pills or hormone replacement therapy  · This medicine may interfere with vaccines  Ask your doctor before you get a flu shot or any other vaccines  Warnings While Using This Medicine:   · It is not safe to take this medicine during pregnancy  It could harm an unborn baby  Tell your doctor right away if you become pregnant  · Tell your doctor if you are breastfeeding or if you have kidney problems, heart failure, high blood pressure, a recent heart attack, diabetes, glaucoma, osteoporosis, or thyroid problems  Tell your doctor about any infection you have  Also tell your doctor if you have had mental or emotional problems (such as depression) or stomach or bowel problems (such as an ulcer or diverticulitis)  · This medicine may cause the following problems:  ? Mood or behavior changes  ? Higher blood pressure, retaining water, changes in salt or potassium levels in your body  ? Cataracts or glaucoma (with long-term use)  ? Weak bones or osteoporosis (with long-term use)  ? Slow growth in children (with long-term use)  ? Muscle problems (with high doses, especially if you have myasthenia gravis or similar nerve and muscle problems)  · Do not stop using this medicine suddenly  Your doctor will need to slowly decrease your dose before you stop it completely  · This medicine could cause you to get infections more easily  Tell your doctor right away if you are exposed to chicken pox, measles, or other serious infection  Tell your doctor if you had a serious infection in the past, such as tuberculosis or herpes  · Tell your doctor about any extra stress or anxiety in your life  Your dose might need to be changed for a short time  · Tell any doctor or dentist who treats you that you are using this medicine  This medicine may affect certain medical test results    · Keep all medicine out of the reach of children  Never share your medicine with anyone  Possible Side Effects While Using This Medicine:   Call your doctor right away if you notice any of these side effects:  · Allergic reaction: Itching or hives, swelling in your face or hands, swelling or tingling in your mouth or throat, chest tightness, trouble breathing  · Dark freckles, skin color changes, coldness, weakness, tiredness, nausea, vomiting, weight loss  · Depression, unusual thoughts, feelings, or behaviors, trouble sleeping  · Fever, chills, cough, sore throat, and body aches  · Muscle pain or weakness  · Rapid weight gain, swelling in your hands, ankles, or feet  · Severe stomach pain, nausea, vomiting, or red or black stools  · Skin changes or growths  · Trouble seeing, eye pain, headache  If you notice these less serious side effects, talk with your doctor:   · Increased appetite  · Round, puffy face  · Weight gain around your neck, upper back, breast, face, or waist  If you notice other side effects that you think are caused by this medicine, tell your doctor  Call your doctor for medical advice about side effects  You may report side effects to FDA at 2-934-FDA-4982  © Copyright [address] Information is for End User's use only and may not be sold, redistributed or otherwise used for commercial purposes  The above information is an  only  It is not intended as medical advice for individual conditions or treatments  Talk to your doctor, nurse or pharmacist before following any medical regimen to see if it is safe and effective for you

## 2021-04-07 LAB
6MAM UR QL CFM: NEGATIVE NG/ML
7AMINOCLONAZEPAM UR QL CFM: NEGATIVE NG/ML
A-OH ALPRAZ UR QL CFM: NEGATIVE NG/ML
ACCEPTABLE CREAT UR QL: NORMAL MG/DL
ACCEPTIBLE SP GR UR QL: NORMAL
AMPHET UR QL CFM: NEGATIVE NG/ML
AMPHET UR QL CFM: NEGATIVE NG/ML
BUPRENORPHINE UR QL CFM: NEGATIVE NG/ML
BUTALBITAL UR QL CFM: NEGATIVE NG/ML
BZE UR QL CFM: NEGATIVE NG/ML
CODEINE UR QL CFM: NORMAL
DESIPRAMINE UR QL CFM: NEGATIVE NG/ML
DESIPRAMINE UR QL CFM: NEGATIVE NG/ML
EDDP UR QL CFM: NEGATIVE NG/ML
ETHYL GLUCURONIDE UR QL CFM: NEGATIVE NG/ML
ETHYL SULFATE UR QL SCN: NEGATIVE NG/ML
FENTANYL UR QL CFM: NEGATIVE NG/ML
GLIADIN IGG SER IA-ACNC: NEGATIVE NG/ML
GLUCOSE 30M P 50 G LAC PO SERPL-MCNC: NEGATIVE NG/ML
HYDROCODONE UR QL CFM: NORMAL NG/ML
HYDROMORPHONE UR QL CFM: NORMAL NG/ML
IMIPRAMINE UR QL CFM: NEGATIVE NG/ML
LORAZEPAM UR QL CFM: NEGATIVE NG/ML
MDMA UR QL CFM: NEGATIVE NG/ML
ME-PHENIDATE UR QL CFM: NEGATIVE NG/ML
MEPERIDINE UR QL CFM: NEGATIVE NG/ML
MEPHEDRONE UR QL CFM: NEGATIVE NG/ML
METHADONE UR QL CFM: NEGATIVE NG/ML
METHAMPHET UR QL CFM: NEGATIVE NG/ML
MORPHINE UR QL CFM: NEGATIVE NG/ML
NITRITE UR QL: NORMAL UG/ML
NORBUPRENORPHINE UR QL CFM: NEGATIVE NG/ML
NORDIAZEPAM UR QL CFM: NORMAL NG/ML
NORFENTANYL UR QL CFM: NEGATIVE NG/ML
NORHYDROCODONE UR QL CFM: NORMAL NG/ML
NORMEPERIDINE UR QL CFM: NEGATIVE NG/ML
NOROXYCODONE UR QL CFM: NEGATIVE NG/ML
OLANZAPINE QUANTIFICATION: NEGATIVE NG/ML
OPC-3373 QUANTIFICATION: NEGATIVE
OXAZEPAM UR QL CFM: NORMAL NG/ML
OXYCODONE UR QL CFM: NEGATIVE NG/ML
OXYMORPHONE UR QL CFM: NEGATIVE NG/ML
OXYMORPHONE UR QL CFM: NEGATIVE NG/ML
PCP UR QL CFM: NEGATIVE NG/ML
PHENOBARB UR QL CFM: NEGATIVE NG/ML
RESULT ALL_PRESCRIBED MEDS AND SPECIAL INSTRUCTIONS: NORMAL
SECOBARBITAL UR QL CFM: NEGATIVE NG/ML
SL AMB 3-METHYL-FENTANYL QUANTIFICATION: NORMAL NG/ML
SL AMB 4-ANPP QUANTIFICATION: NORMAL NG/ML
SL AMB 4-FIBF QUANTIFICATION: NORMAL NG/ML
SL AMB 5F-ADB-M7 METABOLITE QUANTIFICATION: NEGATIVE NG/ML
SL AMB 7-OH-MITRAGYNINE (KRATOM ALKALOID) QUANTIFICATION: NEGATIVE NG/ML
SL AMB AB-FUBINACA-M3 METABOLITE QUANTIFICATION: NEGATIVE NG/ML
SL AMB ACETYL FENTANYL QUANTIFICATION: NORMAL NG/ML
SL AMB ACETYL NORFENTANYL QUANTIFICATION: NORMAL NG/ML
SL AMB ACRYL FENTANYL QUANTIFICATION: NORMAL NG/ML
SL AMB BATH SALTS: NEGATIVE NG/ML
SL AMB BUTRYL FENTANYL QUANTIFICATION: NORMAL NG/ML
SL AMB CARFENTANIL QUANTIFICATION: NORMAL NG/ML
SL AMB CLOZAPINE QUANTIFICATION: NEGATIVE NG/ML
SL AMB CTHC (MARIJUANA METABOLITE) QUANTIFICATION: NEGATIVE NG/ML
SL AMB CYCLOPROPYL FENTANYL QUANTIFICATION: NORMAL NG/ML
SL AMB DEXTROMETHORPHAN QUANTIFICATION: NEGATIVE NG/ML
SL AMB DEXTRORPHAN (DEXTROMETHORPHAN METABOLITE) QUANT: NEGATIVE NG/ML
SL AMB DEXTRORPHAN (DEXTROMETHORPHAN METABOLITE) QUANT: NEGATIVE NG/ML
SL AMB FURANYL FENTANYL QUANTIFICATION: NORMAL NG/ML
SL AMB HALOPERIDOL  QUANTIFICATION: NEGATIVE NG/ML
SL AMB HALOPERIDOL METABOLITE QUANTIFICATION: NEGATIVE NG/ML
SL AMB HYDROXYRISPERIDONE QUANTIFICATION: NEGATIVE NG/ML
SL AMB JWH018 METABOLITE QUANTIFICATION: NEGATIVE NG/ML
SL AMB JWH073 METABOLITE QUANTIFICATION: NEGATIVE NG/ML
SL AMB MDMB-FUBINACA-M1 METABOLITE QUANTIFICATION: NEGATIVE NG/ML
SL AMB METHOXYACETYL FENTANYL QUANTIFICATION: NORMAL NG/ML
SL AMB METHYLONE QUANTIFICATION: NEGATIVE NG/ML
SL AMB N-DESMETHYL U-47700 QUANTIFICATION: NORMAL NG/ML
SL AMB N-DESMETHYL-TRAMADOL QUANTIFICATION: NEGATIVE NG/ML
SL AMB N-DESMETHYLCLOZAPINE QUANTIFICATION: NEGATIVE NG/ML
SL AMB NORQUETIAPINE QUANTIFICATION: NEGATIVE NG/ML
SL AMB PHENTERMINE QUANTIFICATION: NEGATIVE NG/ML
SL AMB QUETIAPINE QUANTIFICATION: NEGATIVE NG/ML
SL AMB RCS4 METABOLITE QUANTIFICATION: NEGATIVE NG/ML
SL AMB RISPERIDONE QUANTIFICATION: NEGATIVE NG/ML
SL AMB RITALINIC ACID QUANTIFICATION: NEGATIVE NG/ML
SL AMB U-47700 QUANTIFICATION: NORMAL NG/ML
SPECIMEN DRAWN SERPL: NEGATIVE NG/ML
SPECIMEN PH ACCEPTABLE UR: NORMAL
TAPENTADOL UR QL CFM: NEGATIVE NG/ML
TEMAZEPAM UR QL CFM: NORMAL NG/ML
TEMAZEPAM UR QL CFM: NORMAL NG/ML
TRAMADOL UR QL CFM: NEGATIVE NG/ML
URATE/CREAT 24H UR: NEGATIVE NG/ML

## 2021-04-16 NOTE — PROGRESS NOTES
Assessment/Plan:   Taj Perez  is a 63 y o male who is here for Cyst (New patient consult for 'draining cyst" to right shoulder area  Had opened and drained  Area is now closed over with scab formation  Mild redness  No drainage  No fevr or chills )    Plan: Sebaceous cyst - recent infection has seemed to resolve no need for I+D, would cont local wound care, f/u in 8 weeks to discuss excision and prevent recurrent infection    Preoperative Clearance: None    HPI:  Taj Perez  is a 63 y o male who was referred for evaluation of Cyst (New patient consult for 'draining cyst" to right shoulder area  Had opened and drained  Area is now closed over with scab formation  Mild redness  No drainage  No fevr or chills )    Currently having no pain  ROS:  General ROS: negative  negative for - chills, fatigue, fever or night sweats, weight loss  Respiratory ROS: no cough, shortness of breath, or wheezing  Cardiovascular ROS: no chest pain or dyspnea on exertion  Genito-Urinary ROS: no dysuria, trouble voiding, or hematuria  Musculoskeletal ROS: negative for - gait disturbance, joint pain or muscle pain  Neurological ROS: no TIA or stroke symptoms  Skin lesion    [unfilled]  Fentanyl, Pravastatin, Vytorin [ezetimibe-simvastatin], Penicillins, and Sulfa antibiotics    Current Outpatient Medications:     bisoprolol-hydrochlorothiazide (ZIAC) 2 5-6 25 MG per tablet, TAKE ONE TABLET BY MOUTH EVERY DAY, Disp: 90 tablet, Rfl: 3    diazepam (VALIUM) 10 mg tablet, Take 1 tablet (10 mg total) by mouth daily at bedtime as needed for anxiety, Disp: 30 tablet, Rfl: 1    lactulose 20 g/30 mL, Take 1-2 tablespoons daily PRN as needed for chronic constipation  Please sent to [facility]  , Disp: 473 mL, Rfl: 2    morphine (MSIR) 15 mg tablet, Take 1/2 tablet BID PRN for severe pain in place of hydrocodone occasionally  Please send to [facility]  , Disp: 30 tablet, Rfl: 0    Multiple Vitamin (MULTIVITAMIN) tablet, Take 1 tablet by mouth every other day , Disp: , Rfl:     Naloxone HCl (Narcan) 4 MG/0 1ML LIQD, Spray 2 sprays into 1 nostril for suspected opioid overdose  May repeat in other nostril  Call 911 , Disp: 2 each, Rfl: 0    rosuvastatin (CRESTOR) 10 MG tablet, TAKE ONE TABLET BY MOUTH AT BEDTIME, Disp: 90 tablet, Rfl: 3    Rosuvastatin Calcium (CRESTOR PO), Take by mouth, Disp: , Rfl:     HYDROcodone-acetaminophen (NORCO)  mg per tablet, Take 1 PO TID PRN for pain  Please send to [facility]   DO NOT FILL UNTIL: 6/9/21, Disp: 90 tablet, Rfl: 0    linaCLOtide 145 MCG CAPS, Take 1 capsule (145 mcg total) by mouth daily as needed (constipation), Disp: 30 capsule, Rfl: 3    naproxen sodium (ALEVE) 220 MG tablet, Take 1 tablet (220 mg total) by mouth 2 (two) times a day with meals for 5 days, Disp: 10 tablet, Rfl: 0    predniSONE 10 mg tablet, Take 2 PO TID or 6 pills spread out on the first day, then decrease by 1 pill daily until gone , Disp: 21 tablet, Rfl: 0  Past Medical History:   Diagnosis Date    Acute low back pain     10 AUG 2016 RESOLVED    Angina pectoris (HCC)     Bunion     Bursitis of hip     Carpal tunnel syndrome     Chronic bilateral low back pain with bilateral sciatica     Chronic lumbar radiculopathy     Chronic narcotic dependence (HCC)     Chronic pain syndrome     Constipation due to opioid therapy     DDD (degenerative disc disease), lumbar     Deep vein thrombosis of left upper extremity ([facility] [address] )     [id number]  RESOLVED    Depression     Diverticula of colon     [id number] RESOLVED    DVT (deep venous thrombosis) (HCC)     LUE    Edema     History of staph infection     Hyperglycemia     Hyperlipidemia     Hypertension     Insomnia     Lateral epicondylitis     Left inguinal hernia     Methicillin resistant Staphylococcus aureus septicemia ([address] )     Morbid obesity due to excess calories (HCC)     Obesity     Peripheral neuropathy  Post laminectomy syndrome     RLS (restless legs syndrome)     Septicemia (HCC)     MRSA    Umbilical hernia      Past Surgical History:   Procedure Laterality Date    BACK SURGERY      laminectomy, hardware    CARPAL TUNNEL RELEASE Left     CERVICAL SPINE SURGERY      COLONOSCOPY      ELBOW SURGERY      ELBOW SURGERY      KNEE ARTHROSCOPY Right     knee    KNEE SURGERY Right     ARTHOSCOPY KNEE    NECK SURGERY      1997    MA COLONOSCOPY FLX DX W/COLLJ SPEC WHEN PFRMD N/A 1/24/2017    Procedure: COLONOSCOPY;  Surgeon: Jesus Dobson MD;  Location: [facility];  Service: General    MA WRIST Mace Hesselbach LIG Right 1/2/2020    Procedure: RELEASE CARPAL TUNNEL ENDOSCOPIC, right;  Surgeon: Juan Albarran MD;  Location: [facility];  Service: Orthopedics    SHOULDER SURGERY      SPINAL CORD STIMULATOR IMPLANT       Family History   Problem Relation Age of Onset    Heart disease Mother     Cancer Mother         breast, stomach    Aneurysm Mother     Heart disease Father     Cancer Father         skin, liver, colon DECESED AGE 58      reports that he has never smoked  He has never used smokeless tobacco  He reports current alcohol use  He reports that he does not use drugs      Labs:   Lab Results   Component Value Date    WBC 6 02 12/21/2020    WBC 6 02 12/12/2019    WBC 4 94 06/10/2019    WBC 8 88 11/29/2015    WBC 7 18 11/28/2015    WBC 9 94 11/27/2015    HGB 16 1 12/21/2020    HGB 15 4 12/12/2019    HGB 15 7 06/10/2019    HGB 15 0 11/29/2015    HGB 14 7 11/28/2015    HGB 14 2 11/27/2015     12/21/2020     12/12/2019     06/10/2019     11/29/2015     11/28/2015     11/27/2015     Lab Results   Component Value Date    ALT 33 12/21/2020    ALT 36 06/10/2020    ALT 38 12/12/2019    ALT 47 12/14/2015    ALT 43 11/26/2015    ALT 37 10/28/2015    AST 17 12/21/2020    AST 26 06/10/2020    AST 19 12/12/2019    AST 25 12/14/2015    AST 29 11/26/2015    AST 27 10/28/2015     This SmartLink has not been configured with any valid records  PHYSICAL EXAM  General Appearance:    Alert, cooperative, no distress,    Head:    Normocephalic without obvious abnormality   Eyes:    PERRL, conjunctiva/corneas clear, EOM's intact        Neck:   Supple, no adenopathy, no JVD   Back:     Symmetric, no spinal or CVA tenderness   Lungs:     Clear to auscultation bilaterally, no wheezing or rhonchi   Heart:    Regular rate and rhythm, S1 and S2 normal, no murmur   Abdomen:        Extremities:   Extremities normal  No clubbing, cyanosis or edema   Psych:   Normal Affect, AOx3  Neurologic:  Skin:   CNII-XII intact  Strength symmetric, speech intact    Warm, dry, sebaceous cyst appears to have spontaneously drained and no signs of infection         Physical Exam              Some portions of this record may have been generated with voice recognition software  There may be translation, syntax,  or grammatical errors  Occasional wrong word or "sound-a-like" substitutions may have occurred due to the inherent limitations of the voice recognition software  Read the chart carefully and recognize, using context, where substitutions may have occurred  If you have any questions, please contact the dictating provider for clarification or correction, as needed  This encounter has been coded by a non-certified coder

## 2021-04-19 ENCOUNTER — IMMUNIZATIONS (OUTPATIENT)
Dept: FAMILY MEDICINE CLINIC | Facility: HOSPITAL | Age: 66
End: 2021-04-19

## 2021-04-19 DIAGNOSIS — Z23 ENCOUNTER FOR IMMUNIZATION: Primary | ICD-10-CM

## 2021-04-19 PROCEDURE — 91300 SARS-COV-2 / COVID-19 MRNA VACCINE (PFIZER-BIONTECH) 30 MCG: CPT

## 2021-04-19 PROCEDURE — 0002A SARS-COV-2 / COVID-19 MRNA VACCINE (PFIZER-BIONTECH) 30 MCG: CPT

## 2021-04-26 ENCOUNTER — PROCEDURE VISIT (OUTPATIENT)
Dept: SURGERY | Facility: HOSPITAL | Age: 66
End: 2021-04-26
Payer: COMMERCIAL

## 2021-04-26 VITALS
BODY MASS INDEX: 38.65 KG/M2 | TEMPERATURE: 97.8 F | DIASTOLIC BLOOD PRESSURE: 78 MMHG | WEIGHT: 270 LBS | RESPIRATION RATE: 16 BRPM | HEIGHT: 70 IN | HEART RATE: 76 BPM | SYSTOLIC BLOOD PRESSURE: 141 MMHG

## 2021-04-26 DIAGNOSIS — L72.9 SKIN CYST: Primary | ICD-10-CM

## 2021-04-26 PROCEDURE — 88304 TISSUE EXAM BY PATHOLOGIST: CPT | Performed by: PATHOLOGY

## 2021-04-26 PROCEDURE — 99212 OFFICE O/P EST SF 10 MIN: CPT | Performed by: SURGERY

## 2021-05-21 NOTE — PROGRESS NOTES
Assessment/Plan:   Luis Daniel Charlton  is a 61 y o male who is here for Procedure (Office procedure  Patient had excision of skin cysts x2 from right upper arm and mid chest area  Tolerated procedure well  Pathology sent to lab)    Plan: Right shoulder and chest skin cyst - excised in the office with no issues today, wound care instructions given sent for pathology    Preoperative Clearance: None    HPI:  Luis Daniel Charlton  is a 61 y o male who was referred for evaluation of Procedure (Office procedure  Patient had excision of skin cysts x2 from right upper arm and mid chest area  Tolerated procedure well  Pathology sent to lab)    Currently has two cysts one of which was recently infected  ROS:  General ROS: negative  negative for - chills, fatigue, fever or night sweats, weight loss  Respiratory ROS: no cough, shortness of breath, or wheezing  Cardiovascular ROS: no chest pain or dyspnea on exertion  Genito-Urinary ROS: no dysuria, trouble voiding, or hematuria  Musculoskeletal ROS: negative for - gait disturbance, joint pain or muscle pain  Neurological ROS: no TIA or stroke symptoms  Skin cysts    [unfilled]  Fentanyl, Pravastatin, Vytorin [ezetimibe-simvastatin], Penicillins, and Sulfa antibiotics    Current Outpatient Medications:     bisoprolol-hydrochlorothiazide (ZIAC) 2 5-6 25 MG per tablet, TAKE ONE TABLET BY MOUTH EVERY DAY, Disp: 90 tablet, Rfl: 3    diazepam (VALIUM) 10 mg tablet, Take 1 tablet (10 mg total) by mouth daily at bedtime as needed for anxiety, Disp: 30 tablet, Rfl: 1    HYDROcodone-acetaminophen (NORCO)  mg per tablet, Take 1 PO TID PRN for pain  Please send to [facility]  DO NOT FILL UNTIL: 6/9/21, Disp: 90 tablet, Rfl: 0    lactulose 20 g/30 mL, Take 1-2 tablespoons daily PRN as needed for chronic constipation  Please sent to [facility]  , Disp: 473 mL, Rfl: 2    linaCLOtide 145 MCG CAPS, Take 1 capsule (145 mcg total) by mouth daily as needed (constipation), Disp: 30 capsule, Rfl: 3    morphine (MSIR) 15 mg tablet, Take 1/2 tablet BID PRN for severe pain in place of hydrocodone occasionally  [facility] send to [facility]  , Disp: 30 tablet, Rfl: 0    Multiple Vitamin (MULTIVITAMIN) tablet, Take 1 tablet by mouth every other day , Disp: , Rfl:     Naloxone HCl (Narcan) 4 MG/0 1ML LIQD, Spray 2 sprays into 1 nostril for suspected opioid overdose  May repeat in other nostril   Call 911 , Disp: 2 each, Rfl: 0    naproxen sodium (ALEVE) 220 MG tablet, Take 1 tablet (220 mg total) by mouth 2 (two) times a day with meals for 5 days, Disp: 10 tablet, Rfl: 0    predniSONE 10 mg tablet, Take 2 PO TID or 6 pills spread out on the first day, then decrease by 1 pill daily until gone , Disp: 21 tablet, Rfl: 0    rosuvastatin (CRESTOR) 10 MG tablet, TAKE ONE TABLET BY MOUTH AT BEDTIME, Disp: 90 tablet, Rfl: 3    Rosuvastatin Calcium (CRESTOR PO), Take by mouth, Disp: , Rfl:   Past Medical History:   Diagnosis Date    Acute low back pain     10 AUG 2016 RESOLVED    Angina pectoris (HCC)     Bunion     Bursitis of hip     Carpal tunnel syndrome     Chronic bilateral low back pain with bilateral sciatica     Chronic lumbar radiculopathy     Chronic narcotic dependence (HCC)     Chronic pain syndrome     Constipation due to opioid therapy     DDD (degenerative disc disease), lumbar     Deep vein thrombosis of left upper extremity ([address] )     [id number]  RESOLVED    Depression     Diverticula of colon     [id number] RESOLVED    DVT (deep venous thrombosis) ([facility])     LUE    Edema     History of staph infection     Hyperglycemia     Hyperlipidemia     Hypertension     Insomnia     Lateral epicondylitis     Left inguinal hernia     Methicillin resistant Staphylococcus aureus septicemia ([address] )     Morbid obesity due to excess calories (HCC)     Obesity     Peripheral neuropathy     Post laminectomy syndrome     RLS (restless legs syndrome)     Septicemia ([facility] [address] )     MRSA    Umbilical hernia      Past Surgical History:   Procedure Laterality Date    BACK SURGERY      laminectomy, hardware    CARPAL TUNNEL RELEASE Left     CERVICAL SPINE SURGERY      COLONOSCOPY      ELBOW SURGERY      ELBOW SURGERY      KNEE ARTHROSCOPY Right     knee    KNEE SURGERY Right     ARTHOSCOPY KNEE    NECK SURGERY      1997    CA COLONOSCOPY FLX DX W/COLLJ SPEC WHEN PFRMD N/A 1/24/2017    Procedure: COLONOSCOPY;  Surgeon: Marietta Puentes MD;  Location: [facility];  Service: General    CA WRIST Jamshid Ha LIG Right 1/2/2020    Procedure: RELEASE CARPAL TUNNEL ENDOSCOPIC, right;  Surgeon: Kevin Garner MD;  Location: [facility];  Service: Orthopedics    SHOULDER SURGERY      SPINAL CORD STIMULATOR IMPLANT       Family History   Problem Relation Age of Onset    Heart disease Mother     Cancer Mother         breast, stomach    Aneurysm Mother     Heart disease Father     Cancer Father         skin, liver, colon DECESED AGE 58      reports that he has never smoked  He has never used smokeless tobacco  He reports current alcohol use  He reports that he does not use drugs      Labs:   Lab Results   Component Value Date    WBC 6 02 12/21/2020    WBC 6 02 12/12/2019    WBC 4 94 06/10/2019    WBC 8 88 11/29/2015    WBC 7 18 11/28/2015    WBC 9 94 11/27/2015    HGB 16 1 12/21/2020    HGB 15 4 12/12/2019    HGB 15 7 06/10/2019    HGB 15 0 11/29/2015    HGB 14 7 11/28/2015    HGB 14 2 11/27/2015     12/21/2020     12/12/2019     06/10/2019     11/29/2015     11/28/2015     11/27/2015     Lab Results   Component Value Date    ALT 33 12/21/2020    ALT 36 06/10/2020    ALT 38 12/12/2019    ALT 47 12/14/2015    ALT 43 11/26/2015    ALT 37 10/28/2015    AST 17 12/21/2020    AST 26 06/10/2020    AST 19 12/12/2019    AST 25 12/14/2015    AST 29 11/26/2015    AST 27 10/28/2015     This SmartLink has not been configured with any valid records  PHYSICAL EXAM  General Appearance:    Alert, cooperative, no distress,    Head:    Normocephalic without obvious abnormality   Eyes:    PERRL, conjunctiva/corneas clear, EOM's intact        Neck:   Supple, no adenopathy, no JVD   Back:     Symmetric, no spinal or CVA tenderness   Lungs:     Clear to auscultation bilaterally, no wheezing or rhonchi   Heart:    Regular rate and rhythm, S1 and S2 normal, no murmur   Abdomen:        Extremities:   Extremities normal  No clubbing, cyanosis or edema   Psych:   Normal Affect, AOx3  Neurologic:  Skin:   CNII-XII intact  Strength symmetric, speech intact    Warm, dry, intact, skin cyst x 2         Physical Exam      Skin excision    Date/Time: 4/26/2021 9:01 AM  Performed by: Opal Mcguire MD  Authorized by: Opal Mcguire MD   Universal Protocol:  Consent: Verbal consent obtained  Written consent obtained  Risks and benefits: risks, benefits and alternatives were discussed  Consent given by: patient and spouse  Patient identity confirmed: verbally with patient      Procedure Details - Skin Excision:     Number of Lesions:  2  Lesion 1:     Skin lesion 1 location: right shoulder  Skin lesion 1 size (mm): [id number]  Final defect size (mm): 2x1  5x1cm  Malignancy: benign lesion      Destruction method: scissors used for extraction      Repair type:  Linear closure    Closure complexity: simple       Repair Comments: Closed with 4-0 monocryl in subcuticular fashion    1mm margin  Lesion 2:     Skin lesion 2 location: chest         Skin lesion 2 size (mm): 1 5x1  5x1 0  Final defect size (mm): [id number]      Malignancy: benign lesion      Destruction method: scissors used for extraction      Repair type:  Linear closure    Closure complexity: intermediate       Closed with 3-0 Vicryl subcutaneous sutures Closed with 4-0 monocryl in subcuticular fashion    1mm kelly            Some portions of this record may have been generated with voice recognition software  There may be translation, syntax,  or grammatical errors  Occasional wrong word or "sound-a-like" substitutions may have occurred due to the inherent limitations of the voice recognition software  Read the chart carefully and recognize, using context, where substitutions may have occurred  If you have any questions, please contact the dictating provider for clarification or correction, as needed  This encounter has been coded by a non-certified coder

## 2021-06-18 ENCOUNTER — OFFICE VISIT (OUTPATIENT)
Dept: PAIN MEDICINE | Facility: CLINIC | Age: 66
End: 2021-06-18
Payer: COMMERCIAL

## 2021-06-18 VITALS
HEART RATE: 72 BPM | SYSTOLIC BLOOD PRESSURE: 138 MMHG | TEMPERATURE: 99.4 F | WEIGHT: 275 LBS | BODY MASS INDEX: 39.37 KG/M2 | HEIGHT: 70 IN | DIASTOLIC BLOOD PRESSURE: 82 MMHG

## 2021-06-18 DIAGNOSIS — G60.9 IDIOPATHIC PERIPHERAL NEUROPATHY: ICD-10-CM

## 2021-06-18 DIAGNOSIS — M54.41 CHRONIC BILATERAL LOW BACK PAIN WITH BILATERAL SCIATICA: ICD-10-CM

## 2021-06-18 DIAGNOSIS — G62.9 NEUROPATHY: ICD-10-CM

## 2021-06-18 DIAGNOSIS — M16.0 PRIMARY OSTEOARTHRITIS OF BOTH HIPS: ICD-10-CM

## 2021-06-18 DIAGNOSIS — M48.062 SPINAL STENOSIS OF LUMBAR REGION WITH NEUROGENIC CLAUDICATION: ICD-10-CM

## 2021-06-18 DIAGNOSIS — G89.29 CHRONIC BILATERAL LOW BACK PAIN WITH BILATERAL SCIATICA: ICD-10-CM

## 2021-06-18 DIAGNOSIS — M54.16 CHRONIC LUMBAR RADICULOPATHY: ICD-10-CM

## 2021-06-18 DIAGNOSIS — G89.29 CHRONIC PAIN OF BOTH HIPS: ICD-10-CM

## 2021-06-18 DIAGNOSIS — M54.14 THORACIC RADICULOPATHY: ICD-10-CM

## 2021-06-18 DIAGNOSIS — M54.6 CHRONIC BILATERAL THORACIC BACK PAIN: ICD-10-CM

## 2021-06-18 DIAGNOSIS — M25.551 CHRONIC PAIN OF BOTH HIPS: ICD-10-CM

## 2021-06-18 DIAGNOSIS — M54.42 CHRONIC BILATERAL LOW BACK PAIN WITH BILATERAL SCIATICA: ICD-10-CM

## 2021-06-18 DIAGNOSIS — M50.30 DDD (DEGENERATIVE DISC DISEASE), CERVICAL: ICD-10-CM

## 2021-06-18 DIAGNOSIS — G89.4 CHRONIC PAIN SYNDROME: Primary | ICD-10-CM

## 2021-06-18 DIAGNOSIS — M51.36 LUMBAR DEGENERATIVE DISC DISEASE: ICD-10-CM

## 2021-06-18 DIAGNOSIS — M96.1 LUMBAR POST-LAMINECTOMY SYNDROME: ICD-10-CM

## 2021-06-18 DIAGNOSIS — G89.29 CHRONIC BILATERAL THORACIC BACK PAIN: ICD-10-CM

## 2021-06-18 DIAGNOSIS — M25.552 CHRONIC PAIN OF BOTH HIPS: ICD-10-CM

## 2021-06-18 PROCEDURE — 99214 OFFICE O/P EST MOD 30 MIN: CPT | Performed by: NURSE PRACTITIONER

## 2021-06-18 RX ORDER — HYDROCODONE BITARTRATE AND ACETAMINOPHEN 10; 325 MG/1; MG/1
TABLET ORAL
Qty: 75 TABLET | Refills: 0 | Status: SHIPPED | OUTPATIENT
Start: 2021-06-18 | End: 2021-06-18 | Stop reason: SDUPTHER

## 2021-06-18 RX ORDER — HYDROCODONE BITARTRATE AND ACETAMINOPHEN 10; 325 MG/1; MG/1
TABLET ORAL
Qty: 75 TABLET | Refills: 0 | Status: SHIPPED | OUTPATIENT
Start: 2021-06-18 | End: 2021-09-10 | Stop reason: SDUPTHER

## 2021-06-18 RX ORDER — PREGABALIN 150 MG/1
CAPSULE ORAL
Qty: 90 CAPSULE | Refills: 2 | Status: SHIPPED | OUTPATIENT
Start: 2021-06-18 | End: 2021-08-13 | Stop reason: ALTCHOICE

## 2021-06-18 RX ORDER — PREGABALIN 100 MG/1
CAPSULE ORAL
COMMUNITY
Start: 2021-05-17 | End: 2021-06-18 | Stop reason: SDUPTHER

## 2021-06-18 NOTE — PATIENT INSTRUCTIONS
Darian Ilda Dates: 7/7/21, 8/4/21, 9/1/21    Opioid Safety   AMBULATORY CARE:   Opioid safety  includes the correct use, storage, and disposal of opioids  Examples of opioid medicines to treat pain include oxycodone, morphine, fentanyl, and codeine  Call your local emergency number (911 in the [address]), or have someone else call if:   · You have a seizure  · You cannot be woken  · You have trouble staying awake and your breathing is slow or shallow  · Your speech is slurred, or you are confused  · You are dizzy or stumble when you walk  Call your doctor, or have someone close to you call if:   · You are extremely drowsy, or you have trouble staying awake or speaking  · You have pale or clammy skin  · You have blue fingernails or lips  · Your heartbeat is slower than normal     · You cannot stop vomiting  · You have questions or concerns about your condition or care  Use opioids safely:   · Take prescribed opioids exactly as directed  Opioids come with directions based on the kind of opioid and how it is given  Do not take more than the recommended amount, or for longer than needed  · Do not give opioids to others or take opioids that belong to someone else  Misuse of opioids can lead to an addiction or overdose  · Do not mix opioids with other medicines or alcohol  The combination can cause an overdose, or lead to a coma  · Do not drive or operate heavy machinery after you take the opioid  Your provider or pharmacist can tell you how long to wait after a dose before you do these activities  · Talk to your healthcare provider if you have any side effects  He or she can help you prevent or relieve side effects  Side effects include nausea, sleepiness, itching, and trouble thinking clearly  Manage constipation:  Constipation is the most common side effect of opioid medicine   Constipation is when you have hard, dry bowel movements, or you go longer than usual between bowel movements  Tell your healthcare provider about all changes in your bowel movements while you are taking opioids  He or she may recommend laxative medicine to help you have a bowel movement  He or she may also change the kind of opioid you are taking, or change when you take it  The following are more ways you can prevent or relieve constipation:  · Drink liquids as directed  You may need to drink extra liquids to help soften and move your bowels  Ask how much liquid to drink each day and which liquids are best for you  · Eat high-fiber foods  This may help decrease constipation by adding bulk to your bowel movements  High-fiber foods include fruits, vegetables, whole-grain breads and cereals, and beans  Your healthcare provider or dietitian can help you create a high-fiber meal plan  Your provider may also recommend a fiber supplement if you cannot get enough fiber from food  · Exercise regularly  Regular physical activity can help stimulate your intestines  Walking is a good exercise to prevent or relieve constipation  Ask which exercises are best for you  · Schedule a time each day to have a bowel movement  This may help train your body to have regular bowel movements  Bend forward while you are on the toilet to help move the bowel movement out  Sit on the toilet for at least 10 minutes, even if you do not have a bowel movement  Store opioids safely:   · Store opioids where others cannot easily get them  Keep them in a locked cabinet or secure area  Do not  keep them in a purse or other bag you carry with you  A person may be looking for something else and find the opioids  · Make sure opioids are stored out of the reach of children  A child can easily overdose on opioids  Opioids may look like candy to a small child  The best way to dispose of opioids: The laws vary by country and area   In the [facility], the best way is to return the opioids through a take-back program  This program is offered by the [employer] ([employer])  The following are options for using the program:  · Take the opioids to a [employer] collection site  The site is often a law enforcement center  Call your local law enforcement center for scheduled take-back days in your area  You will be given information on where to go if the collection site is in a different location  · Take the opioids to an approved pharmacy or hospital   A pharmacy or hospital may be set up as a collection site  You will need to ask if it is a [employer] collection site if you were not directed there  A pharmacy or doctor's office may not be able to take back opioids unless it is a [employer] site  · Use a mail-back system  This means you are given containers to put the opioids into  You will then mail them in the containers  · Use a take-back drop box  This is a place to leave the opioids at any time  People and animals will not be able to get into the box  Your local law enforcement agency can tell you where to find a drop box in your area  Other safe ways to dispose of opioids: The medicine may come with disposal instructions  The instructions may vary depending on the brand of medicine you are using  Instructions may come in a Medication Guide, but not every medicine has one  You may instead get instructions from your pharmacy or doctor  Follow instructions carefully  The following are general guidelines to follow:  · Find out if you can flush the opioid  Some opioids can be flushed down the toilet or poured into the sink  You will need to contact authorities in your area to see if this is an option for you  The FDA also offers a list of medicines that are safe to flush down the toilet  You can check the list if you cannot get the information for your local area  · Ask your waste management company about rules for putting opioids in the trash  The company will be able to give you specific directions   Scratch out personal information on the original medicine label so it cannot be read  Then put it in the trash  Do not label the trash or put any information on it about the opioids  It should look like regular household trash so no one is tempted to look for the opioids  Keep the trash out of the reach of children and animals  Always make sure trash is secure  · Talk to officials if you live in a facility  If you live in a nursing home or assisted living center, talk to an official  The person will know the rules for your area  Other ways to manage pain:   · Ask your healthcare provider about non-opioid medicines to control pain  Nonprescription medicines include NSAIDs (such as ibuprofen) and acetaminophen  Prescription medicines include muscle relaxers, antidepressants, and steroids  · Pain may be managed without any medicines  Some ways to relieve pain include massage, aromatherapy, or meditation  Physical or occupational therapy may also help  For more information:   · Drug Enforcement Administration  [address]  Phone: 1- 283 - 055-9529  Web Address: [facility]/drug_disposal/    · [address]  and Drug Administration  [facility] , [address] [facility]  Phone: 5- 238 - 781-8070  Web Address: http://[employer]/  Follow up with your doctor or pain specialist as directed: You may need to have your dose adjusted  Your doctor or pain specialist can also help you find ways to manage pain without opioids  Write down your questions so you remember to ask them during your visits  © Copyright [address] [place] [address] Drive Information is for End User's use only and may not be sold, redistributed or otherwise used for commercial purposes  All illustrations and images included in CareNotes® are the copyrighted property of [employer]  or [facility] Sheila Phelps   The above information is an  only   It is not intended as medical advice for individual conditions or treatments  Talk to your doctor, nurse or pharmacist before following any medical regimen to see if it is safe and effective for you

## 2021-06-18 NOTE — PROGRESS NOTES
Assessment:  1  Chronic pain syndrome    2  Chronic bilateral low back pain with bilateral sciatica    3  Chronic bilateral thoracic back pain    4  Chronic pain of both hips    5  Chronic lumbar radiculopathy    6  Thoracic radiculopathy    7  Neuropathy    8  Idiopathic peripheral neuropathy    9  Lumbar post-laminectomy syndrome    10  Lumbar degenerative disc disease    11  Primary osteoarthritis of both hips    12  DDD (degenerative disc disease), cervical    13  Spinal stenosis of lumbar region with neurogenic claudication        Plan:   While the patient was in the office today, I did have a thorough conversation with the patient regarding their chronic pain syndrome, symptoms, medication regimen, and treatment plan  I discussed with the patient at this point time I feel it is reasonable and appropriate to further titrate the Lyrica to a more therapeutic dose and we will increase it to 150 mg t i d  for the next 3 months and see how he does  I advised the patient that if they experience any side effects or issues with the changes in their medication regiment, they should give our office a call to discuss  I also advised the patient not to drive or operate machinery until they see how the changes in the medication regimen affects them  The patient was agreeable and verbalized an understanding  With regards to the p r n  Norco, I explained the patient, as per his request, I feel it is reasonable to decrease the total amount down to 75 pills per 30 days as that is closer to what he needs or is using  The patient was agreeable and verbalized an understanding  [place] Prescription Drug Monitoring Program report was reviewed and was appropriate     The patient was not picked for a pill count today, but he did bring his medications as required          The patient's opioid scripts were sent to their pharmacy electronically and was given a 3 month supply of prescriptions with a Do Not Fill date(s) of July 7, 2021, August 4, 2021, and September 1, 2021  There are risks associated with opioid medications, including dependence, addiction and tolerance  The patient understands and agrees to use these medications only as prescribed  Potential side effects of the medications include, but are not limited to, constipation, drowsiness, addiction, impaired judgment and risk of fatal overdose if not taken as prescribed  The patient was warned against driving while taking sedation medications  Sharing medications is a felony  At this point in time, the patient is showing no signs of addiction, abuse, diversion or suicidal ideation  The patient will follow-up in 12 weeks for medication prescription refill and reevaluation  The patient was advised to contact the office should their symptoms worsen in the interim  The patient was agreeable and verbalized an understanding  History of Present Illness: The patient is a 61 y o  male last seen on 4/5/2021 who presents for a follow up office visit in regards to  Chronic pain syndrome secondary to lumbar post-laminectomy syndrome with degenerative disc disease, radiculopathy as well as cervical degenerative disc disease and needle path a peripheral neuropathy as well as chronic and diffuse joint pain  The patient currently reports that since his last office visit he actually feels his pain symptoms have slightly improved as he is using less of the p r n  Norco and he did try to titrate off of the Lyrica, however, but realized how much it was helping his neuropathic pain and is back on Lyrica 100 mg t i d   The patient is wondering if maybe we could increase the Lyrica further as this may help his overall pain more consistently and allow him to continue to use less of the p r n  Norco   The patient presents today for regular medication follow-up visit  Current pain medications includes:   Lyrica 100 mg t i d  and Norco 10/325, 1 p o  t i d  p r n  for pain  The patient reports that this regimen is providing 50% pain relief  The patient is reporting no side effects from this pain medication regimen  Pain Contract Signed: 1/13/2021  Last Urine Drug Screen: 4/5/2021    I have personally reviewed and/or updated the patient's past medical history, past surgical history, family history, social history, current medications, allergies, and vital signs today  Review of Systems:    Review of Systems   Respiratory: Positive for shortness of breath  Cardiovascular: Negative for chest pain  Gastrointestinal: Negative for constipation, diarrhea, nausea and vomiting  Musculoskeletal: Positive for gait problem  Negative for arthralgias, joint swelling (joint stiffness) and myalgias  Skin: Negative for rash  Neurological: Positive for weakness  Negative for dizziness and seizures  All other systems reviewed and are negative          Past Medical History:   Diagnosis Date    Acute low back pain     10 AUG 2016 RESOLVED    [facility]     Bunion     Bursitis of hip     Carpal tunnel syndrome     Chronic bilateral low back pain with bilateral sciatica     Chronic lumbar radiculopathy     Chronic narcotic dependence ([facility]     Chronic pain syndrome     Constipation due to opioid therapy     DDD (degenerative disc disease), lumbar     Deep vein thrombosis of left upper extremity ([address] )     [id number]  RESOLVED    Depression     Diverticula of colon     [id number] RESOLVED    DVT (deep venous thrombosis) ([facility]     LUE    Edema     History of staph infection     Hyperglycemia     Hyperlipidemia     Hypertension     Insomnia     Lateral epicondylitis     Left inguinal hernia     Methicillin resistant Staphylococcus aureus septicemia ([address] )     Morbid obesity due to excess calories ([facility])     Obesity     Peripheral neuropathy     Post laminectomy syndrome     RLS (restless legs syndrome)     Septicemia ([address] )     MRSA    Umbilical hernia        Past Surgical History:   Procedure Laterality Date    BACK SURGERY      laminectomy, hardware    CARPAL TUNNEL RELEASE Left     CERVICAL SPINE SURGERY      COLONOSCOPY      ELBOW SURGERY      ELBOW SURGERY      KNEE ARTHROSCOPY Right     knee    KNEE SURGERY Right     ARTHOSCOPY KNEE    NECK SURGERY      1997    PA COLONOSCOPY FLX DX W/COLLJ SPEC WHEN PFRMD N/A 1/24/2017    Procedure: COLONOSCOPY;  Surgeon: Marietta Puentes MD;  [facility];  Service: General    PA WRIST Jamshid Ha LIG Right 1/2/2020    Procedure: RELEASE CARPAL TUNNEL ENDOSCOPIC, right;  Surgeon: Kevin Garner MD;  [facility];  Service: Orthopedics    SHOULDER SURGERY      SPINAL CORD STIMULATOR IMPLANT         Family History   Problem Relation Age of Onset    Heart disease Mother     Cancer Mother         breast, stomach    Aneurysm Mother     Heart disease Father     Cancer Father         skin, liver, colon DECESED AGE 58       Social History     Occupational History    Not on file   Tobacco Use    Smoking status: Never Smoker    Smokeless tobacco: Never Used   Vaping Use    Vaping Use: Never used   Substance and Sexual Activity    Alcohol use: Yes    Drug use: No     Comment: opiod use for chronic pain    Sexual activity: Yes         Current Outpatient Medications:     bisoprolol-hydrochlorothiazide (ZIAC) 2 5-6 25 MG per tablet, TAKE ONE TABLET BY MOUTH EVERY DAY, Disp: 90 tablet, Rfl: 3    diazepam (VALIUM) 10 mg tablet, Take 1 tablet (10 mg total) by mouth daily at bedtime as needed for anxiety, Disp: 30 tablet, Rfl: 1    HYDROcodone-acetaminophen (NORCO)  mg per tablet, Take 1 PO TID PRN for pain  [facility] send to [facility]  DO NOT FILL UNTIL: 7/7/21, Disp: 75 tablet, Rfl: 0    lactulose 20 g/30 mL, Take 1-2 tablespoons daily PRN as needed for chronic constipation  Please sent to [facility]  , Disp: 473 mL, Rfl: 2    linaCLOtide 145 MCG CAPS, Take 1 capsule (145 mcg total) by mouth daily as needed (constipation), Disp: 30 capsule, Rfl: 3    morphine (MSIR) 15 mg tablet, Take 1/2 tablet BID PRN for severe pain in place of hydrocodone occasionally  Please send to [facility]  , Disp: 30 tablet, Rfl: 0    Multiple Vitamin (MULTIVITAMIN) tablet, Take 1 tablet by mouth every other day , Disp: , Rfl:     naproxen sodium (ALEVE) 220 MG tablet, Take 1 tablet (220 mg total) by mouth 2 (two) times a day with meals for 5 days, Disp: 10 tablet, Rfl: 0    pregabalin (LYRICA) 150 mg capsule, Take 1 PO TID  Please send to [facility]  , Disp: 90 capsule, Rfl: 2    rosuvastatin (CRESTOR) 10 MG tablet, TAKE ONE TABLET BY MOUTH AT BEDTIME, Disp: 90 tablet, Rfl: 3    Naloxone HCl (Narcan) 4 MG/0 1ML LIQD, Spray 2 sprays into 1 nostril for suspected opioid overdose  May repeat in other nostril  Call 911 , Disp: 2 each, Rfl: 0    Allergies   Allergen Reactions    Fentanyl Other (See Comments)     "Makes me Suicidal"  Happened when dosage increased   Pravastatin Other (See Comments)     Reaction Date: [id number];   Muscle weakness    Vytorin [Ezetimibe-Simvastatin] Myalgia     Reaction Date: [id number];     Penicillins Other (See Comments)     As a child unknown    Sulfa Antibiotics Other (See Comments)     As a child unknown       Physical Exam:    /82 (BP Location: Left arm, Patient Position: Sitting, Cuff Size: Standard)   Pulse 72   Temp 99 4 °F (37 4 °C)   Ht 5' 10" (1 778 m)   Wt 125 kg (275 lb)   BMI 39 46 kg/m²     Constitutional:normal, well developed, well nourished, alert, in no distress and non-toxic and no overt pain behavior   and obese  Eyes:anicteric  HEENT:grossly intact  Neck:supple, symmetric, trachea midline and no masses   Pulmonary:even and unlabored  Cardiovascular:No edema or pitting edema present  Skin:Normal without rashes or lesions and well hydrated  Psychiatric:Mood and affect appropriate  Neurologic:Cranial Nerves II-XII grossly intact  Musculoskeletal:The patient's gait is slightly antalgic, but steady without the use of any assistive devices  Imaging  No orders to display         No orders of the defined types were placed in this encounter

## 2021-06-24 ENCOUNTER — TELEPHONE (OUTPATIENT)
Dept: FAMILY MEDICINE CLINIC | Facility: HOSPITAL | Age: 66
End: 2021-06-24

## 2021-07-01 DIAGNOSIS — I10 ESSENTIAL HYPERTENSION: ICD-10-CM

## 2021-07-01 RX ORDER — BISOPROLOL FUMARATE AND HYDROCHLOROTHIAZIDE 2.5; 6.25 MG/1; MG/1
1 TABLET ORAL DAILY
Qty: 90 TABLET | Refills: 3 | Status: SHIPPED | OUTPATIENT
Start: 2021-07-01 | End: 2021-12-10 | Stop reason: SDUPTHER

## 2021-07-05 ENCOUNTER — LAB (OUTPATIENT)
Dept: LAB | Facility: HOSPITAL | Age: 66
End: 2021-07-05
Payer: COMMERCIAL

## 2021-07-05 DIAGNOSIS — E78.2 MIXED HYPERLIPIDEMIA: ICD-10-CM

## 2021-07-05 DIAGNOSIS — R73.9 HYPERGLYCEMIA: ICD-10-CM

## 2021-07-05 DIAGNOSIS — I10 ESSENTIAL HYPERTENSION: ICD-10-CM

## 2021-07-05 LAB
ALBUMIN SERPL BCP-MCNC: 3.9 G/DL (ref 3.5–5)
ALP SERPL-CCNC: 55 U/L (ref 46–116)
ALT SERPL W P-5'-P-CCNC: 38 U/L (ref 12–78)
ANION GAP SERPL CALCULATED.3IONS-SCNC: 2 MMOL/L (ref 4–13)
AST SERPL W P-5'-P-CCNC: 25 U/L (ref 5–45)
BILIRUB SERPL-MCNC: 0.55 MG/DL (ref 0.2–1)
BUN SERPL-MCNC: 15 MG/DL (ref 5–25)
CALCIUM SERPL-MCNC: 9.3 MG/DL (ref 8.3–10.1)
CHLORIDE SERPL-SCNC: 107 MMOL/L (ref 100–108)
CHOLEST SERPL-MCNC: 150 MG/DL (ref 50–200)
CO2 SERPL-SCNC: 30 MMOL/L (ref 21–32)
CREAT SERPL-MCNC: 0.97 MG/DL (ref 0.6–1.3)
ERYTHROCYTE [DISTWIDTH] IN BLOOD BY AUTOMATED COUNT: 14.4 % (ref 11.6–15.1)
EST. AVERAGE GLUCOSE BLD GHB EST-MCNC: 114 MG/DL
GFR SERPL CREATININE-BSD FRML MDRD: 82 ML/MIN/1.73SQ M
GLUCOSE P FAST SERPL-MCNC: 100 MG/DL (ref 65–99)
HBA1C MFR BLD: 5.6 %
HCT VFR BLD AUTO: 50.5 % (ref 36.5–49.3)
HDLC SERPL-MCNC: 38 MG/DL
HGB BLD-MCNC: 16.1 G/DL (ref 12–17)
LDLC SERPL CALC-MCNC: 88 MG/DL (ref 0–100)
MCH RBC QN AUTO: 29.3 PG (ref 26.8–34.3)
MCHC RBC AUTO-ENTMCNC: 31.9 G/DL (ref 31.4–37.4)
MCV RBC AUTO: 92 FL (ref 82–98)
PLATELET # BLD AUTO: 260 THOUSANDS/UL (ref 149–390)
PMV BLD AUTO: 9 FL (ref 8.9–12.7)
POTASSIUM SERPL-SCNC: 4 MMOL/L (ref 3.5–5.3)
PROT SERPL-MCNC: 8.1 G/DL (ref 6.4–8.2)
RBC # BLD AUTO: 5.5 MILLION/UL (ref 3.88–5.62)
SODIUM SERPL-SCNC: 139 MMOL/L (ref 136–145)
TRIGL SERPL-MCNC: 121 MG/DL
WBC # BLD AUTO: 6.52 THOUSAND/UL (ref 4.31–10.16)

## 2021-07-05 PROCEDURE — 83036 HEMOGLOBIN GLYCOSYLATED A1C: CPT

## 2021-07-05 PROCEDURE — 85027 COMPLETE CBC AUTOMATED: CPT

## 2021-07-05 PROCEDURE — 80061 LIPID PANEL: CPT

## 2021-07-05 PROCEDURE — 80053 COMPREHEN METABOLIC PANEL: CPT

## 2021-07-05 PROCEDURE — 36415 COLL VENOUS BLD VENIPUNCTURE: CPT

## 2021-07-07 ENCOUNTER — OFFICE VISIT (OUTPATIENT)
Dept: FAMILY MEDICINE CLINIC | Facility: HOSPITAL | Age: 66
End: 2021-07-07
Payer: COMMERCIAL

## 2021-07-07 VITALS
SYSTOLIC BLOOD PRESSURE: 122 MMHG | OXYGEN SATURATION: 94 % | BODY MASS INDEX: 39.48 KG/M2 | TEMPERATURE: 98.5 F | HEIGHT: 70 IN | WEIGHT: 275.8 LBS | DIASTOLIC BLOOD PRESSURE: 70 MMHG | HEART RATE: 76 BPM

## 2021-07-07 DIAGNOSIS — I10 ESSENTIAL HYPERTENSION: Primary | ICD-10-CM

## 2021-07-07 DIAGNOSIS — F11.20 CONTINUOUS OPIOID DEPENDENCE (HCC): ICD-10-CM

## 2021-07-07 DIAGNOSIS — E66.01 CLASS 2 SEVERE OBESITY DUE TO EXCESS CALORIES WITH SERIOUS COMORBIDITY AND BODY MASS INDEX (BMI) OF 39.0 TO 39.9 IN ADULT (HCC): ICD-10-CM

## 2021-07-07 DIAGNOSIS — I86.1 VARICOCELE: ICD-10-CM

## 2021-07-07 DIAGNOSIS — E78.2 MIXED HYPERLIPIDEMIA: ICD-10-CM

## 2021-07-07 DIAGNOSIS — G89.4 CHRONIC PAIN SYNDROME: ICD-10-CM

## 2021-07-07 PROCEDURE — 99214 OFFICE O/P EST MOD 30 MIN: CPT | Performed by: FAMILY MEDICINE

## 2021-07-27 ENCOUNTER — OFFICE VISIT (OUTPATIENT)
Dept: OBGYN CLINIC | Facility: MEDICAL CENTER | Age: 66
End: 2021-07-27
Payer: COMMERCIAL

## 2021-07-27 VITALS
SYSTOLIC BLOOD PRESSURE: 132 MMHG | HEART RATE: 78 BPM | HEIGHT: 70 IN | DIASTOLIC BLOOD PRESSURE: 73 MMHG | WEIGHT: 275 LBS | BODY MASS INDEX: 39.37 KG/M2

## 2021-07-27 DIAGNOSIS — M54.16 CHRONIC LUMBAR RADICULOPATHY: ICD-10-CM

## 2021-07-27 DIAGNOSIS — M65.331 TRIGGER MIDDLE FINGER OF RIGHT HAND: Primary | ICD-10-CM

## 2021-07-27 DIAGNOSIS — M65.351 TRIGGER LITTLE FINGER OF RIGHT HAND: ICD-10-CM

## 2021-07-27 DIAGNOSIS — M65.322 TRIGGER INDEX FINGER OF LEFT HAND: ICD-10-CM

## 2021-07-27 PROCEDURE — 20550 NJX 1 TENDON SHEATH/LIGAMENT: CPT | Performed by: ORTHOPAEDIC SURGERY

## 2021-07-27 PROCEDURE — 99214 OFFICE O/P EST MOD 30 MIN: CPT | Performed by: ORTHOPAEDIC SURGERY

## 2021-07-27 RX ORDER — DIAZEPAM 10 MG/1
10 TABLET ORAL
Qty: 30 TABLET | Refills: 0 | Status: SHIPPED | OUTPATIENT
Start: 2021-07-27 | End: 2021-10-05 | Stop reason: SDUPTHER

## 2021-07-27 RX ORDER — LIDOCAINE HYDROCHLORIDE 10 MG/ML
0.5 INJECTION, SOLUTION INFILTRATION; PERINEURAL
Status: COMPLETED | OUTPATIENT
Start: 2021-07-27 | End: 2021-07-27

## 2021-07-27 RX ORDER — BETAMETHASONE SODIUM PHOSPHATE AND BETAMETHASONE ACETATE 3; 3 MG/ML; MG/ML
3 INJECTION, SUSPENSION INTRA-ARTICULAR; INTRALESIONAL; INTRAMUSCULAR; SOFT TISSUE
Status: COMPLETED | OUTPATIENT
Start: 2021-07-27 | End: 2021-07-27

## 2021-07-27 RX ADMIN — LIDOCAINE HYDROCHLORIDE 0.5 ML: 10 INJECTION, SOLUTION INFILTRATION; PERINEURAL at 10:32

## 2021-07-27 RX ADMIN — BETAMETHASONE SODIUM PHOSPHATE AND BETAMETHASONE ACETATE 3 MG: 3; 3 INJECTION, SUSPENSION INTRA-ARTICULAR; INTRALESIONAL; INTRAMUSCULAR; SOFT TISSUE at 10:32

## 2021-07-27 NOTE — PROGRESS NOTES
Chief Complaint     Right middle, right small and left index finger pain       History of Present Illness     Nomi Greene  is a 61 y o  right hand dominant male who presents to the office today for a follow up regarding left index, right middle and right small trigger fingers  He was seen last in the office on 3/17/2021, at which time he underwent trigger finger CSI's for the affected fingers  He states that the CSI was beneficial for the right small and left index finger  He feels the CSI for the right middle finger was only beneficial for a short period of time  He is having difficulty with full middle finger flexion on the right  Patient is currently retired         Past Medical History:   Diagnosis Date    Acute low back pain     10 AUG 2016 RESOLVED    [facility]is [facility])     Bunion     Bursitis of hip     Carpal tunnel syndrome     Chronic bilateral low back pain with bilateral sciatica     Chronic lumbar radiculopathy     Chronic narcotic dependence ([facility])     Chronic pain syndrome     Constipation due to opioid therapy     DDD (degenerative disc disease), lumbar     Deep vein thrombosis of left upper extremity ([address] )     [id number]  RESOLVED    Depression     Diverticula of colon     [id number] RESOLVED    DVT (deep venous thrombosis) ([facility])     LUE    Edema     History of staph infection     Hyperglycemia     Hyperlipidemia     Hypertension     Insomnia     Lateral epicondylitis     Left inguinal hernia     Methicillin resistant Staphylococcus aureus septicemia ([address] )     Morbid obesity due to excess calories ([facility])     Obesity     Peripheral neuropathy     Post laminectomy syndrome     RLS (restless legs syndrome)     Septicemia ([address] )     MRSA    Umbilical hernia        Past Surgical History:   Procedure Laterality Date    BACK SURGERY      laminectomy, hardware    CARPAL TUNNEL RELEASE Left     CERVICAL SPINE SURGERY      COLONOSCOPY      ELBOW SURGERY  ELBOW SURGERY      KNEE ARTHROSCOPY Right     knee    KNEE SURGERY Right     ARTHOSCOPY KNEE    NECK SURGERY      1997    AR COLONOSCOPY FLX DX W/COLLJ SPEC WHEN PFRMD N/A 1/24/2017    Procedure: COLONOSCOPY;  Surgeon: Evelio Gomez MD;  [facility];  Service: General    AR WRIST Nakita Mourning LIG Right 1/2/2020    Procedure: RELEASE CARPAL TUNNEL ENDOSCOPIC, right;  Surgeon: Harriet Barnes MD;  [facility];  Service: Orthopedics    SHOULDER SURGERY      SPINAL CORD STIMULATOR IMPLANT         Allergies   Allergen Reactions    Fentanyl Other (See Comments)     "Makes me Suicidal"  Happened when dosage increased   Pravastatin Other (See Comments)     Reaction Date: [id number];   Muscle weakness    Vytorin [Ezetimibe-Simvastatin] Myalgia     Reaction Date: [id number];     Penicillins Other (See Comments)     As a child unknown    Sulfa Antibiotics Other (See Comments)     As a child unknown       Current Outpatient Medications on File Prior to Visit   Medication Sig Dispense Refill    bisoprolol-hydrochlorothiazide (ZIAC) 2 5-6 25 MG per tablet Take 1 tablet by mouth daily 90 tablet 3    diazepam (VALIUM) 10 mg tablet Take 1 tablet (10 mg total) by mouth daily at bedtime as needed for anxiety 30 tablet 1    HYDROcodone-acetaminophen (NORCO)  mg per tablet Take 1 PO TID PRN for pain  Please send to [facility]  DO NOT FILL UNTIL: 9/1/21 75 tablet 0    lactulose 20 g/30 mL Take 1-2 tablespoons daily PRN as needed for chronic constipation  Please sent to [facility]  473 mL 2    linaCLOtide 145 MCG CAPS Take 1 capsule (145 mcg total) by mouth daily as needed (constipation) 30 capsule 3    morphine (MSIR) 15 mg tablet Take 1/2 tablet BID PRN for severe pain in place of hydrocodone occasionally  Please send to [facility]   30 tablet 0    Multiple Vitamin (MULTIVITAMIN) tablet Take 1 tablet by mouth every other day       Naloxone HCl (Narcan) 4 MG/0 1ML LIQD Spray 2 sprays into 1 nostril for suspected opioid overdose  May repeat in other nostril  Call 911  2 each 0    naproxen sodium (ALEVE) 220 MG tablet Take 1 tablet (220 mg total) by mouth 2 (two) times a day with meals for 5 days 10 tablet 0    pregabalin (LYRICA) 150 mg capsule Take 1 PO TID  [facility] send to [facility]  90 capsule 2    rosuvastatin (CRESTOR) 10 MG tablet TAKE ONE TABLET BY MOUTH AT BEDTIME 90 tablet 3     No current facility-administered medications on file prior to visit  Social History     Tobacco Use    Smoking status: Never Smoker    Smokeless tobacco: Never Used   Vaping Use    Vaping Use: Never used   Substance Use Topics    Alcohol use: Yes    Drug use: No     Comment: opiod use for chronic pain       Family History   Problem Relation Age of Onset    Heart disease Mother     Cancer Mother         breast, stomach    Aneurysm Mother     Heart disease Father     Cancer Father         skin, liver, colon DECESED AGE 58       Review of Systems     As stated in the HPI  All other systems were reviewed and are negative  Physical Exam     /73   Pulse 78   Ht 5' 10" (1 778 m)   Wt 125 kg (275 lb)   BMI 39 46 kg/m²     GENERAL: This is a well-developed, well-nourished, age-appropriate patient in no acute distress  The patient is alert and oriented x3  Pleasant and cooperative  Eyes: Anicteric sclerae  Extraocular movements appear intact  HENT: Nares are patent with no drainage  Lungs: There is equal chest rise on inspection  Breathing is non-labored with no audible wheezing  Cardiovascular: No cyanosis  No upper extremity lymphadema  Skin: Skin is warm to touch  No obvious skin lesions or rashes other than described below  Neurologic: No ataxia  Psychiatric: Mood and affect are appropriate      Right upper extremity:   No erythema, ecchymosis or edema   Slight Middle finger PIP joint flexion contracture   Base line PIP flexion contracture of the small finger due to old injury   A1 pulley tenderness of the middle finger   Sensation intact in the radial ulnar aspect of the digit  Brisk capillary refill all digits  DPC just over 0  5/5 Motor to the APB, FDI, FDP2, FDP5, EDC  Sensation intact to light touch in the median, radial, and ulnar nerve distribution    Data Review     Labs:  None to review     Electrodiagnostic Testing:  None to review     Imaging:  None to review     Assessment and Plan      Diagnoses and all orders for this visit:    Trigger middle finger of right hand  -     Ambulatory referral to Hand Surgery; Future  -     Hand/upper extremity injection: R long A1    Trigger index finger of left hand    Trigger little finger of right hand           61 y o  male with improved left index and right small finger trigger finger button exacerbation the of the right middle finger trigger  Had a long discussion with the patient about the exacerbates shin  Certainly repeat injection is reasonable at this point with a slightly decreased efficacy compared to initial injection  He had 2 of 3 trigger fingers affectively treated with corticosteroid and so I have recommended a repeat injection as opposed to rushing to surgery  Patient will elect for another injection after risks and benefits once again described  Patient will also follow-up with my partners for discussion of surgery if he has persistent symptomatology    Follow Up: Dr Emmanuel Almaguer     To Do Next Visit:     PROCEDURES PERFORMED:  Hand/upper extremity injection: R long A1  Universal Protocol:  Consent: Verbal consent obtained  Written consent not obtained  Risks and benefits: risks, benefits and alternatives were discussed  Consent given by: patient  Time out: Immediately prior to procedure a "time out" was called to verify the correct patient, procedure, equipment, support staff and site/side marked as required    Patient identity confirmed: verbally with patient    Supporting Documentation  Indications: pain   Procedure Details  Condition:trigger finger Location: long finger - R long A1   Preparation: Patient was prepped and draped in the usual sterile fashion  Needle size: 25 G  Ultrasound guidance: no  Medications administered: 0 5 mL lidocaine 1 %; 3 mg betamethasone acetate-betamethasone sodium phosphate 6 (3-3) mg/mL    Patient tolerance: patient tolerated the procedure well with no immediate complications  Dressing:  Sterile dressing applied              Scribe Attestation    I,:  Shellie Olsen am acting as a scribe while in the presence of the attending physician :       I,:  Paul Kunz MD personally performed the services described in this documentation    as scribed in my presence :

## 2021-08-13 ENCOUNTER — OFFICE VISIT (OUTPATIENT)
Dept: FAMILY MEDICINE CLINIC | Facility: HOSPITAL | Age: 66
End: 2021-08-13
Payer: COMMERCIAL

## 2021-08-13 VITALS
HEIGHT: 70 IN | SYSTOLIC BLOOD PRESSURE: 140 MMHG | HEART RATE: 81 BPM | TEMPERATURE: 97.9 F | DIASTOLIC BLOOD PRESSURE: 78 MMHG | WEIGHT: 275 LBS | BODY MASS INDEX: 39.37 KG/M2

## 2021-08-13 DIAGNOSIS — F43.0 STRESS REACTION CAUSING MIXED DISTURBANCE OF EMOTION AND CONDUCT: Primary | ICD-10-CM

## 2021-08-13 DIAGNOSIS — E66.01 CLASS 2 SEVERE OBESITY DUE TO EXCESS CALORIES WITH SERIOUS COMORBIDITY AND BODY MASS INDEX (BMI) OF 39.0 TO 39.9 IN ADULT (HCC): ICD-10-CM

## 2021-08-13 DIAGNOSIS — G47.00 PERSISTENT INSOMNIA: ICD-10-CM

## 2021-08-13 DIAGNOSIS — I10 ESSENTIAL HYPERTENSION: ICD-10-CM

## 2021-08-13 PROCEDURE — 99214 OFFICE O/P EST MOD 30 MIN: CPT | Performed by: FAMILY MEDICINE

## 2021-08-13 RX ORDER — CLONAZEPAM 0.5 MG/1
0.5 TABLET, ORALLY DISINTEGRATING ORAL 2 TIMES DAILY
Qty: 60 TABLET | Refills: 0 | Status: SHIPPED | OUTPATIENT
Start: 2021-08-13 | End: 2021-12-10 | Stop reason: SDUPTHER

## 2021-08-13 NOTE — PROGRESS NOTES
Assessment/Plan:         Diagnoses and all orders for this visit:    Stress reaction causing mixed disturbance of emotion and conduct  Comments:  Advised trial of Clonazepam for somewhat longer duration and less sedation than Valium  Orders:  -     clonazePAM (KlonoPIN) 0 5 MG disintegrating tablet; Take 1 tablet (0 5 mg total) by mouth 2 (two) times a day    Persistent insomnia  Comments:  OK to continue Valium hs if felt to be beneficial    Essential hypertension  Comments:  Adequate BP control    Class 2 severe obesity due to excess calories with serious comorbidity and body mass index (BMI) of 39 0 to 39 9 in [facility])          Subjective:      Patient ID: Leonel Dunn  is a 61 y o  male  Visit for acute stress reaction     Seen with wife in visit    Oldest son committed suicide several weeks ago under unclear circumstances in present of his wife  They saw him the week before and he seemed well except for being tired  The day before his death he asked them to help get a     He has good and bad days  Some heaviness in chest    Not able to sleep well, trouble falling and staying asleep  Taking Valium 10mg 1 1/2 tablet at night  He has nothing to do during the day  Appetite has been OK overall      The following portions of the patient's history were reviewed and updated as appropriate: allergies, current medications, past family history, past medical history, past social history, past surgical history and problem list     Review of Systems   Constitutional: Positive for fatigue  Negative for unexpected weight change  Musculoskeletal: Positive for arthralgias  Psychiatric/Behavioral: Positive for agitation, dysphoric mood and sleep disturbance  The patient is nervous/anxious  All other systems reviewed and are negative          Objective:      /78   Pulse 81   Temp 97 9 °F (36 6 °C)   Ht 5' 10" (1 778 m)   Wt 125 kg (275 lb)   BMI 39 46 kg/m²          Physical Exam  Vitals and nursing note reviewed  Constitutional:       Appearance: Normal appearance  Neurological:      Mental Status: He is alert  Psychiatric:         Attention and Perception: Attention normal          Mood and Affect: Mood is anxious and depressed  Affect is tearful  Speech: Speech normal          Behavior: Behavior normal  Behavior is cooperative  Thought Content:  Thought content normal          Cognition and Memory: Cognition and memory normal

## 2021-08-23 ENCOUNTER — OFFICE VISIT (OUTPATIENT)
Dept: OBGYN CLINIC | Facility: CLINIC | Age: 66
End: 2021-08-23
Payer: COMMERCIAL

## 2021-08-23 VITALS
WEIGHT: 268.8 LBS | DIASTOLIC BLOOD PRESSURE: 76 MMHG | SYSTOLIC BLOOD PRESSURE: 142 MMHG | HEIGHT: 70 IN | BODY MASS INDEX: 38.48 KG/M2

## 2021-08-23 DIAGNOSIS — M65.331 TRIGGER MIDDLE FINGER OF RIGHT HAND: Primary | ICD-10-CM

## 2021-08-23 PROCEDURE — 99214 OFFICE O/P EST MOD 30 MIN: CPT | Performed by: ORTHOPAEDIC SURGERY

## 2021-08-23 NOTE — H&P
ASSESSMENT/PLAN:    Assessment:   Right long trigger finger     Plan:     Patient has had 2 cortisone injections in long finger and continues with pain, triggering and locking  At this point would recommend surgical trigger finger release of long finger to alleviate symptoms  Patient agree's and will move forward with surgery, consent signed  Do surgery under local anesthesia  No heavy lifting for 2 weeks following surgery      Follow Up: After Surgery      General Discussions:     Trigger FInger: The anatomy and physiology of trigger finger was discussed with the patient today in the office  Edema and increased contact pressure within the flexor tendons at the A1 pulley can cause pain, crepitation, and limitation of function  Treatment options include resting MP blocking splints to decrease edema, oral anti-inflammatory medications, home or formal therapy exercises, up to 2 steroid injections within the tendon sheath, or surgical release  While majority of patients do respond to conservative treatment, up to 20% may require surgical release  Operative Discussions:     Trigger Finger Release: The anatomy and physiology of trigger finger was discussed with the patient today in the office  Edema and increased contact pressure within the flexor tendons at the A1 pulley can cause pain, crepitation, and limitation of function  Treatment options include resting MP blocking splints to decrease edema, oral anti-inflammatory medications, home or formal therapy exercises, up to 2 steroid injections or surgical release  While majority of patients do respond to conservative treatment, up to 20% may require surgical release  The patient has elected release of the trigger finger  The patient has elected to undergo a release of the A1 pulley (trigger finger)  A small incision will be made over the palmar aspect of the hand, the tendon sheath holding the flexor tendons will be released    In the postoperative period, light activities are allowed immediately, driving is allowed when narcotic medication has stopped, and the incision may get wet after 2 days  Heavy activities (lifting more than approximately 10 pounds) will be allowed after the follow up appointment in 1-2 weeks  While the pain and discomfort within the wrist typically improves rapidly, some residual discomfort may be present for up to 6 weeks  The nodule that is typically palpable in the palmar aspect of the hand will not be removed, as this would necessitate removal of a portion of the flexor tendon, however the catching, clicking, and locking should resolve  Approximate success rate is 98%  The risks and benefits of the procedure were explained to the patient, which include, but are not limited to: Bleeding, infection, recurrence, pain, scar, damage to tendons, damage to nerves, and damage to blood vessels, need for future surgery and complications related to anesthesia  If bony work is done, risks also include malunion and nonunion  These risks, along with alternative conservative treatment options, and postoperative protocols were voiced back and understood by the patient  All questions were answered to the patient's satisfaction  The patient agrees to comply with a standard postoperative protocol, and is willing to proceed  Education was provided via written and auditory forms  There were no barriers to learning  Written handouts regarding wound care, incision and scar care, and general preoperative information, as well as risks and benefits were provided to the patient     _____________________________________________________  CHIEF COMPLAINT:  Chief Complaint   Patient presents with    Right Middle Finger - Triggering         SUBJECTIVE:  Megan Vaughan  is a 61 y o  male who presents for evaluation of right long trigger fingers  He has had 2 cortisone injections by Dr Herman Dale, last 7/27/21   Previous fingers injected left index and right small in March  He continues to have intermittent locking of long finger with pain, more bad days than good  Symptoms worse with activity requiring, gripping, grasping  Will bother him in morning waking from sleep     Radiation: None  Previous Treatments: cortisone injections  Associated symptoms: Catching and Locking  Handedness: right    PAST MEDICAL HISTORY:  Past Medical History:   Diagnosis Date    Acute low back pain     10 AUG 2016 RESOLVED    [facility]is [facility])     Bunion     Bursitis of hip     Carpal tunnel syndrome     Chronic bilateral low back pain with bilateral sciatica     Chronic lumbar radiculopathy     Chronic narcotic dependence ([facility])     Chronic pain syndrome     Constipation due to opioid therapy     DDD (degenerative disc disease), lumbar     Deep vein thrombosis of left upper extremity ([facility] [address] )     [id number]  RESOLVED    Depression     Diverticula of colon     [id number] RESOLVED    DVT (deep venous thrombosis) ([facility])     LUE    Edema     History of staph infection     Hyperglycemia     Hyperlipidemia     Hypertension     Insomnia     Lateral epicondylitis     Left inguinal hernia     Methicillin resistant Staphylococcus aureus septicemia ([facility])     Morbid obesity due to excess calories ([facility])     Obesity     Peripheral neuropathy     Post laminectomy syndrome     RLS (restless legs syndrome)     Septicemia ([facility] [address] )     MRSA    Umbilical hernia        PAST SURGICAL HISTORY:  Past Surgical History:   Procedure Laterality Date    BACK SURGERY      laminectomy, hardware    CARPAL TUNNEL RELEASE Left     CERVICAL SPINE SURGERY      COLONOSCOPY      ELBOW SURGERY      ELBOW SURGERY      KNEE ARTHROSCOPY Right     knee    KNEE SURGERY Right     ARTHOSCOPY KNEE    NECK SURGERY      1997    ID COLONOSCOPY FLX DX W/COLLJ SPEC WHEN PFRMD N/A 1/24/2017    Procedure: COLONOSCOPY;  Surgeon: Yunior Valdes MD;  Location:  MAIN OR;  Service: General    ID WRIST Mandi Blackburn LIG Right 1/2/2020    Procedure: RELEASE CARPAL TUNNEL ENDOSCOPIC, right;  Surgeon: Joseluis Bean MD;  Location:  MAIN OR;  Service: Orthopedics    SHOULDER SURGERY      SPINAL CORD STIMULATOR IMPLANT         FAMILY HISTORY:  Family History   Problem Relation Age of Onset    Heart disease Mother     Cancer Mother         breast, stomach    Aneurysm Mother     Heart disease Father     Cancer Father         skin, liver, colon DECESED AGE 58       SOCIAL HISTORY:  Social History     Tobacco Use    Smoking status: Never Smoker    Smokeless tobacco: Never Used   Vaping Use    Vaping Use: Never used   Substance Use Topics    Alcohol use: Yes    Drug use: No     Comment: opiod use for chronic pain       MEDICATIONS:    Current Outpatient Medications:     bisoprolol-hydrochlorothiazide (ZIAC) 2 5-6 25 MG per tablet, Take 1 tablet by mouth daily, Disp: 90 tablet, Rfl: 3    clonazePAM (KlonoPIN) 0 5 MG disintegrating tablet, Take 1 tablet (0 5 mg total) by mouth 2 (two) times a day, Disp: 60 tablet, Rfl: 0    diazepam (VALIUM) 10 mg tablet, Take 1 tablet (10 mg total) by mouth daily at bedtime as needed for anxiety, Disp: 30 tablet, Rfl: 0    HYDROcodone-acetaminophen (NORCO)  mg per tablet, Take 1 PO TID PRN for pain  Please send to [facility]  DO NOT FILL UNTIL: 9/1/21, Disp: 75 tablet, Rfl: 0    lactulose 20 g/30 mL, Take 1-2 tablespoons daily PRN as needed for chronic constipation  Please sent to [facility]  , Disp: 473 mL, Rfl: 2    linaCLOtide 145 MCG CAPS, Take 1 capsule (145 mcg total) by mouth daily as needed (constipation), Disp: 30 capsule, Rfl: 3    morphine (MSIR) 15 mg tablet, Take 1/2 tablet BID PRN for severe pain in place of hydrocodone occasionally  Please send to [facility]  , Disp: 30 tablet, Rfl: 0    Multiple Vitamin (MULTIVITAMIN) tablet, Take 1 tablet by mouth every other day , Disp: , Rfl:     Naloxone HCl (Narcan) 4 MG/0 1ML LIQD, Spray 2 sprays into 1 nostril for suspected opioid overdose  May repeat in other nostril  Call 911 , Disp: 2 each, Rfl: 0    naproxen sodium (ALEVE) 220 MG tablet, Take 1 tablet (220 mg total) by mouth 2 (two) times a day with meals for 5 days, Disp: 10 tablet, Rfl: 0    rosuvastatin (CRESTOR) 10 MG tablet, TAKE ONE TABLET BY MOUTH AT BEDTIME, Disp: 90 tablet, Rfl: 3    ALLERGIES:  Allergies   Allergen Reactions    Fentanyl Other (See Comments)     "Makes me Suicidal"  Happened when dosage increased   Pravastatin Other (See Comments)     Reaction Date: [id number];   Muscle weakness    Vytorin [Ezetimibe-Simvastatin] Myalgia     Reaction Date: [id number];     Penicillins Other (See Comments)     As a child unknown    Sulfa Antibiotics Other (See Comments)     As a child unknown       REVIEW OF SYSTEMS:  Pertinent items are noted in HPI  A comprehensive review of systems was negative      LABS:  HgA1c:   Lab Results   Component Value Date    HGBA1C 5 6 07/05/2021     BMP:   Lab Results   Component Value Date    GLUCOSE 100 12/14/2015    CALCIUM 9 3 07/05/2021     12/14/2015    K 4 0 07/05/2021    CO2 30 07/05/2021     07/05/2021    BUN 15 07/05/2021    CREATININE 0 97 07/05/2021         _____________________________________________________  PHYSICAL EXAMINATION:  Vital signs: /76   Ht 5' 10" (1 778 m)   Wt 122 kg (268 lb 12 8 oz)   BMI 38 57 kg/m²   General: well developed and well nourished, alert, oriented times 3 and appears comfortable  Psychiatric: Normal  HEENT: Trachea Midline, No torticollis  Cardiovascular: No discernable arrhythmia  Pulmonary: No wheezing or stridor  Abdomen: No rebound or guarding  Extremities: No peripheral edema  Skin: No masses, erythema, lacerations, fluctation, ulcerations  Neurovascular: Sensation Intact to the Median, Ulnar, Radial Nerve, Motor Intact to the Median, Ulnar, Radial Nerve and Pulses Intact    MUSCULOSKELETAL EXAMINATION:  Right long finger   No erythema, ecchymosis   Slight Middle finger PIP joint flexion contracture   A1 pulley tenderness of the middle finger   Palpable nodule with crepitation   Sensation intact in the radial ulnar aspect of the digit  Brisk capillary refill all digits    _____________________________________________________  STUDIES REVIEWED:  No Studies to review      PROCEDURES PERFORMED:  Procedures  No Procedures performed today     Scribe Attestation    I,:  Jeff Kerns am acting as a scribe while in the presence of the attending physician :       I,:  Harriet Barnes MD personally performed the services described in this documentation    as scribed in my presence :         Scribe Attestation    I,:  Jeff Kerns am acting as a scribe while in the presence of the attending physician :       I,:  Harriet Barnes MD personally performed the services described in this documentation    as scribed in my presence :

## 2021-08-23 NOTE — H&P (VIEW-ONLY)
ASSESSMENT/PLAN:    Assessment:   Right long trigger finger     Plan:     Patient has had 2 cortisone injections in long finger and continues with pain, triggering and locking  At this point would recommend surgical trigger finger release of long finger to alleviate symptoms  Patient agree's and will move forward with surgery, consent signed  Do surgery under local anesthesia  No heavy lifting for 2 weeks following surgery      Follow Up: After Surgery      General Discussions:     Trigger FInger: The anatomy and physiology of trigger finger was discussed with the patient today in the office  Edema and increased contact pressure within the flexor tendons at the A1 pulley can cause pain, crepitation, and limitation of function  Treatment options include resting MP blocking splints to decrease edema, oral anti-inflammatory medications, home or formal therapy exercises, up to 2 steroid injections within the tendon sheath, or surgical release  While majority of patients do respond to conservative treatment, up to 20% may require surgical release  Operative Discussions:     Trigger Finger Release: The anatomy and physiology of trigger finger was discussed with the patient today in the office  Edema and increased contact pressure within the flexor tendons at the A1 pulley can cause pain, crepitation, and limitation of function  Treatment options include resting MP blocking splints to decrease edema, oral anti-inflammatory medications, home or formal therapy exercises, up to 2 steroid injections or surgical release  While majority of patients do respond to conservative treatment, up to 20% may require surgical release  The patient has elected release of the trigger finger  The patient has elected to undergo a release of the A1 pulley (trigger finger)  A small incision will be made over the palmar aspect of the hand, the tendon sheath holding the flexor tendons will be released    In the postoperative period, light activities are allowed immediately, driving is allowed when narcotic medication has stopped, and the incision may get wet after 2 days  Heavy activities (lifting more than approximately 10 pounds) will be allowed after the follow up appointment in 1-2 weeks  While the pain and discomfort within the wrist typically improves rapidly, some residual discomfort may be present for up to 6 weeks  The nodule that is typically palpable in the palmar aspect of the hand will not be removed, as this would necessitate removal of a portion of the flexor tendon, however the catching, clicking, and locking should resolve  Approximate success rate is 98%  The risks and benefits of the procedure were explained to the patient, which include, but are not limited to: Bleeding, infection, recurrence, pain, scar, damage to tendons, damage to nerves, and damage to blood vessels, need for future surgery and complications related to anesthesia  If bony work is done, risks also include malunion and nonunion  These risks, along with alternative conservative treatment options, and postoperative protocols were voiced back and understood by the patient  All questions were answered to the patient's satisfaction  The patient agrees to comply with a standard postoperative protocol, and is willing to proceed  Education was provided via written and auditory forms  There were no barriers to learning  Written handouts regarding wound care, incision and scar care, and general preoperative information, as well as risks and benefits were provided to the patient     _____________________________________________________  CHIEF COMPLAINT:  Chief Complaint   Patient presents with    Right Middle Finger - Triggering         SUBJECTIVE:  Jacquelyn Miguel  is a 61 y o  male who presents for evaluation of right long trigger fingers  He has had 2 cortisone injections by Dr Olson Eye, last 7/27/21   Previous fingers injected left index and right small in March  He continues to have intermittent locking of long finger with pain, more bad days than good  Symptoms worse with activity requiring, gripping, grasping  Will bother him in morning waking from sleep     Radiation: None  Previous Treatments: cortisone injections  Associated symptoms: Catching and Locking  Handedness: right    PAST MEDICAL HISTORY:  Past Medical History:   Diagnosis Date    Acute low back pain     10 AUG 2016 RESOLVED    [facility])     Bunion     Bursitis of hip     Carpal tunnel syndrome     Chronic bilateral low back pain with bilateral sciatica     Chronic lumbar radiculopathy     Chronic narcotic dependence ([facility])     Chronic pain syndrome     Constipation due to opioid therapy     DDD (degenerative disc disease), lumbar     Deep vein thrombosis of left upper extremity ([facility] [address] )     [id number]  RESOLVED    Depression     Diverticula of colon     [id number] RESOLVED    DVT (deep venous thrombosis) ([facility])     LUE    Edema     History of staph infection     Hyperglycemia     Hyperlipidemia     Hypertension     Insomnia     Lateral epicondylitis     Left inguinal hernia     Methicillin resistant Staphylococcus aureus septicemia ([facility])     Morbid obesity due to excess calories ([facility])     Obesity     Peripheral neuropathy     Post laminectomy syndrome     RLS (restless legs syndrome)     Septicemia ([facility] [address] )     MRSA    Umbilical hernia        PAST SURGICAL HISTORY:  Past Surgical History:   Procedure Laterality Date    BACK SURGERY      laminectomy, hardware    CARPAL TUNNEL RELEASE Left     CERVICAL SPINE SURGERY      COLONOSCOPY      ELBOW SURGERY      ELBOW SURGERY      KNEE ARTHROSCOPY Right     knee    KNEE SURGERY Right     ARTHOSCOPY KNEE    NECK SURGERY      1997    ND COLONOSCOPY FLX DX W/COLLJ SPEC WHEN PFRMD N/A 1/24/2017    Procedure: COLONOSCOPY;  Surgeon: Camilla Otto MD;  Location:  MAIN OR;  Service: General    ND WRIST Mahendra Santiago LIG Right 1/2/2020    Procedure: RELEASE CARPAL TUNNEL ENDOSCOPIC, right;  Surgeon: Cyndi Kathleen MD;  Location:  MAIN OR;  Service: Orthopedics    SHOULDER SURGERY      SPINAL CORD STIMULATOR IMPLANT         FAMILY HISTORY:  Family History   Problem Relation Age of Onset    Heart disease Mother     Cancer Mother         breast, stomach    Aneurysm Mother     Heart disease Father     Cancer Father         skin, liver, colon DECESED AGE 58       SOCIAL HISTORY:  Social History     Tobacco Use    Smoking status: Never Smoker    Smokeless tobacco: Never Used   Vaping Use    Vaping Use: Never used   Substance Use Topics    Alcohol use: Yes    Drug use: No     Comment: opiod use for chronic pain       MEDICATIONS:    Current Outpatient Medications:     bisoprolol-hydrochlorothiazide (ZIAC) 2 5-6 25 MG per tablet, Take 1 tablet by mouth daily, Disp: 90 tablet, Rfl: 3    clonazePAM (KlonoPIN) 0 5 MG disintegrating tablet, Take 1 tablet (0 5 mg total) by mouth 2 (two) times a day, Disp: 60 tablet, Rfl: 0    diazepam (VALIUM) 10 mg tablet, Take 1 tablet (10 mg total) by mouth daily at bedtime as needed for anxiety, Disp: 30 tablet, Rfl: 0    HYDROcodone-acetaminophen (NORCO)  mg per tablet, Take 1 PO TID PRN for pain  Please send to [facility]  DO NOT FILL UNTIL: 9/1/21, Disp: 75 tablet, Rfl: 0    lactulose 20 g/30 mL, Take 1-2 tablespoons daily PRN as needed for chronic constipation  Please sent to [facility]  , Disp: 473 mL, Rfl: 2    linaCLOtide 145 MCG CAPS, Take 1 capsule (145 mcg total) by mouth daily as needed (constipation), Disp: 30 capsule, Rfl: 3    morphine (MSIR) 15 mg tablet, Take 1/2 tablet BID PRN for severe pain in place of hydrocodone occasionally  Please send to [facility]  , Disp: 30 tablet, Rfl: 0    Multiple Vitamin (MULTIVITAMIN) tablet, Take 1 tablet by mouth every other day , Disp: , Rfl:     Naloxone HCl (Narcan) 4 MG/0 1ML LIQD, Spray 2 sprays into 1 nostril for suspected opioid overdose  May repeat in other nostril  Call 911 , Disp: 2 each, Rfl: 0    naproxen sodium (ALEVE) 220 MG tablet, Take 1 tablet (220 mg total) by mouth 2 (two) times a day with meals for 5 days, Disp: 10 tablet, Rfl: 0    rosuvastatin (CRESTOR) 10 MG tablet, TAKE ONE TABLET BY MOUTH AT BEDTIME, Disp: 90 tablet, Rfl: 3    ALLERGIES:  Allergies   Allergen Reactions    Fentanyl Other (See Comments)     "Makes me Suicidal"  Happened when dosage increased   Pravastatin Other (See Comments)     Reaction Date: [id number];   Muscle weakness    Vytorin [Ezetimibe-Simvastatin] Myalgia     Reaction Date: [id number];     Penicillins Other (See Comments)     As a child unknown    Sulfa Antibiotics Other (See Comments)     As a child unknown       REVIEW OF SYSTEMS:  Pertinent items are noted in HPI  A comprehensive review of systems was negative      LABS:  HgA1c:   Lab Results   Component Value Date    HGBA1C 5 6 07/05/2021     BMP:   Lab Results   Component Value Date    GLUCOSE 100 12/14/2015    CALCIUM 9 3 07/05/2021     12/14/2015    K 4 0 07/05/2021    CO2 30 07/05/2021     07/05/2021    BUN 15 07/05/2021    CREATININE 0 97 07/05/2021         _____________________________________________________  PHYSICAL EXAMINATION:  Vital signs: /76   Ht 5' 10" (1 778 m)   Wt 122 kg (268 lb 12 8 oz)   BMI 38 57 kg/m²   General: well developed and well nourished, alert, oriented times 3 and appears comfortable  Psychiatric: Normal  HEENT: Trachea Midline, No torticollis  Cardiovascular: No discernable arrhythmia  Pulmonary: No wheezing or stridor  Abdomen: No rebound or guarding  Extremities: No peripheral edema  Skin: No masses, erythema, lacerations, fluctation, ulcerations  Neurovascular: Sensation Intact to the Median, Ulnar, Radial Nerve, Motor Intact to the Median, Ulnar, Radial Nerve and Pulses Intact    MUSCULOSKELETAL EXAMINATION:  Right long finger   No erythema, ecchymosis   Slight Middle finger PIP joint flexion contracture   A1 pulley tenderness of the middle finger   Palpable nodule with crepitation   Sensation intact in the radial ulnar aspect of the digit  Brisk capillary refill all digits    _____________________________________________________  STUDIES REVIEWED:  No Studies to review      PROCEDURES PERFORMED:  Procedures  No Procedures performed today     Scribe Attestation    I,:  Lance Mckinley am acting as a scribe while in the presence of the attending physician :       I,:  Nika Oro MD personally performed the services described in this documentation    as scribed in my presence :         Scribe Attestation    I,:  Lance Mckinley am acting as a scribe while in the presence of the attending physician :       I,:  Nika Oro MD personally performed the services described in this documentation    as scribed in my presence :

## 2021-08-23 NOTE — PROGRESS NOTES
ASSESSMENT/PLAN:    Assessment:   Right long trigger finger     Plan:     Patient has had 2 cortisone injections in long finger and continues with pain, triggering and locking  At this point would recommend surgical trigger finger release of long finger to alleviate symptoms  Patient agree's and will move forward with surgery, consent signed  Do surgery under local anesthesia  No heavy lifting for 2 weeks following surgery      Follow Up: After Surgery      General Discussions:     Trigger FInger: The anatomy and physiology of trigger finger was discussed with the patient today in the office  Edema and increased contact pressure within the flexor tendons at the A1 pulley can cause pain, crepitation, and limitation of function  Treatment options include resting MP blocking splints to decrease edema, oral anti-inflammatory medications, home or formal therapy exercises, up to 2 steroid injections within the tendon sheath, or surgical release  While majority of patients do respond to conservative treatment, up to 20% may require surgical release  Operative Discussions:     Trigger Finger Release: The anatomy and physiology of trigger finger was discussed with the patient today in the office  Edema and increased contact pressure within the flexor tendons at the A1 pulley can cause pain, crepitation, and limitation of function  Treatment options include resting MP blocking splints to decrease edema, oral anti-inflammatory medications, home or formal therapy exercises, up to 2 steroid injections or surgical release  While majority of patients do respond to conservative treatment, up to 20% may require surgical release  The patient has elected release of the trigger finger  The patient has elected to undergo a release of the A1 pulley (trigger finger)  A small incision will be made over the palmar aspect of the hand, the tendon sheath holding the flexor tendons will be released    In the postoperative period, light activities are allowed immediately, driving is allowed when narcotic medication has stopped, and the incision may get wet after 2 days  Heavy activities (lifting more than approximately 10 pounds) will be allowed after the follow up appointment in 1-2 weeks  While the pain and discomfort within the wrist typically improves rapidly, some residual discomfort may be present for up to 6 weeks  The nodule that is typically palpable in the palmar aspect of the hand will not be removed, as this would necessitate removal of a portion of the flexor tendon, however the catching, clicking, and locking should resolve  Approximate success rate is 98%  The risks and benefits of the procedure were explained to the patient, which include, but are not limited to: Bleeding, infection, recurrence, pain, scar, damage to tendons, damage to nerves, and damage to blood vessels, need for future surgery and complications related to anesthesia  If bony work is done, risks also include malunion and nonunion  These risks, along with alternative conservative treatment options, and postoperative protocols were voiced back and understood by the patient  All questions were answered to the patient's satisfaction  The patient agrees to comply with a standard postoperative protocol, and is willing to proceed  Education was provided via written and auditory forms  There were no barriers to learning  Written handouts regarding wound care, incision and scar care, and general preoperative information, as well as risks and benefits were provided to the patient     _____________________________________________________  CHIEF COMPLAINT:  Chief Complaint   Patient presents with    Right Middle Finger - Triggering         SUBJECTIVE:  Tina Mandel  is a 61 y o  male who presents for evaluation of right long trigger fingers  He has had 2 cortisone injections by Dr Sam Breaux, last 7/27/21   Previous fingers injected left index and right small in March  He continues to have intermittent locking of long finger with pain, more bad days than good  Symptoms worse with activity requiring, gripping, grasping  Will bother him in morning waking from sleep     Radiation: None  Previous Treatments: cortisone injections  Associated symptoms: Catching and Locking  Handedness: right    PAST MEDICAL HISTORY:  Past Medical History:   Diagnosis Date    Acute low back pain     10 AUG 2016 RESOLVED    [facility])     Bunion     Bursitis of hip     Carpal tunnel syndrome     Chronic bilateral low back pain with bilateral sciatica     Chronic lumbar radiculopathy     Chronic narcotic dependence ([facility])     Chronic pain syndrome     Constipation due to opioid therapy     DDD (degenerative disc disease), lumbar     Deep vein thrombosis of left upper extremity ([facility] [address] )     [id number]  RESOLVED    Depression     Diverticula of colon     [id number] RESOLVED    DVT (deep venous thrombosis) ([facility])     LUE    Edema     History of staph infection     Hyperglycemia     Hyperlipidemia     Hypertension     Insomnia     Lateral epicondylitis     Left inguinal hernia     Methicillin resistant Staphylococcus aureus septicemia ([facility])     Morbid obesity due to excess calories ([facility])     Obesity     Peripheral neuropathy     Post laminectomy syndrome     RLS (restless legs syndrome)     Septicemia ([facility] [address] )     MRSA    Umbilical hernia        PAST SURGICAL HISTORY:  Past Surgical History:   Procedure Laterality Date    BACK SURGERY      laminectomy, hardware    CARPAL TUNNEL RELEASE Left     CERVICAL SPINE SURGERY      COLONOSCOPY      ELBOW SURGERY      ELBOW SURGERY      KNEE ARTHROSCOPY Right     knee    KNEE SURGERY Right     ARTHOSCOPY KNEE    NECK SURGERY      1997    NE COLONOSCOPY FLX DX W/COLLJ SPEC WHEN PFRMD N/A 1/24/2017    Procedure: COLONOSCOPY;  Surgeon: Camilla Otto MD;  Location:  MAIN OR;  Service: General    NE WRIST [address] LIG Right 1/2/2020    Procedure: RELEASE CARPAL TUNNEL ENDOSCOPIC, right;  Surgeon: Jerrod Muniz MD;  Location:  MAIN OR;  Service: Orthopedics    SHOULDER SURGERY      SPINAL CORD STIMULATOR IMPLANT         FAMILY HISTORY:  Family History   Problem Relation Age of Onset    Heart disease Mother     Cancer Mother         breast, stomach    Aneurysm Mother     Heart disease Father     Cancer Father         skin, liver, colon DECESED AGE 58       SOCIAL HISTORY:  Social History     Tobacco Use    Smoking status: Never Smoker    Smokeless tobacco: Never Used   Vaping Use    Vaping Use: Never used   Substance Use Topics    Alcohol use: Yes    Drug use: No     Comment: opiod use for chronic pain       MEDICATIONS:    Current Outpatient Medications:     bisoprolol-hydrochlorothiazide (ZIAC) 2 5-6 25 MG per tablet, Take 1 tablet by mouth daily, Disp: 90 tablet, Rfl: 3    clonazePAM (KlonoPIN) 0 5 MG disintegrating tablet, Take 1 tablet (0 5 mg total) by mouth 2 (two) times a day, Disp: 60 tablet, Rfl: 0    diazepam (VALIUM) 10 mg tablet, Take 1 tablet (10 mg total) by mouth daily at bedtime as needed for anxiety, Disp: 30 tablet, Rfl: 0    HYDROcodone-acetaminophen (NORCO)  mg per tablet, Take 1 PO TID PRN for pain  Please send to [facility]  DO NOT FILL UNTIL: 9/1/21, Disp: 75 tablet, Rfl: 0    lactulose 20 g/30 mL, Take 1-2 tablespoons daily PRN as needed for chronic constipation  Please sent to [facility]  , Disp: 473 mL, Rfl: 2    linaCLOtide 145 MCG CAPS, Take 1 capsule (145 mcg total) by mouth daily as needed (constipation), Disp: 30 capsule, Rfl: 3    morphine (MSIR) 15 mg tablet, Take 1/2 tablet BID PRN for severe pain in place of hydrocodone occasionally  Please send to [facility]  , Disp: 30 tablet, Rfl: 0    Multiple Vitamin (MULTIVITAMIN) tablet, Take 1 tablet by mouth every other day , Disp: , Rfl:     Naloxone HCl (Narcan) 4 MG/0 1ML LIQD, Spray 2 sprays into 1 nostril for suspected opioid overdose  May repeat in other nostril  Call 911 , Disp: 2 each, Rfl: 0    naproxen sodium (ALEVE) 220 MG tablet, Take 1 tablet (220 mg total) by mouth 2 (two) times a day with meals for 5 days, Disp: 10 tablet, Rfl: 0    rosuvastatin (CRESTOR) 10 MG tablet, TAKE ONE TABLET BY MOUTH AT BEDTIME, Disp: 90 tablet, Rfl: 3    ALLERGIES:  Allergies   Allergen Reactions    Fentanyl Other (See Comments)     "Makes me Suicidal"  Happened when dosage increased   Pravastatin Other (See Comments)     Reaction Date: [id number];   Muscle weakness    Vytorin [Ezetimibe-Simvastatin] Myalgia     Reaction Date: [id number];     Penicillins Other (See Comments)     As a child unknown    Sulfa Antibiotics Other (See Comments)     As a child unknown       REVIEW OF SYSTEMS:  Pertinent items are noted in HPI  A comprehensive review of systems was negative      LABS:  HgA1c:   Lab Results   Component Value Date    HGBA1C 5 6 07/05/2021     BMP:   Lab Results   Component Value Date    GLUCOSE 100 12/14/2015    CALCIUM 9 3 07/05/2021     12/14/2015    K 4 0 07/05/2021    CO2 30 07/05/2021     07/05/2021    BUN 15 07/05/2021    CREATININE 0 97 07/05/2021         _____________________________________________________  PHYSICAL EXAMINATION:  Vital signs: /76   Ht 5' 10" (1 778 m)   Wt 122 kg (268 lb 12 8 oz)   BMI 38 57 kg/m²   General: well developed and well nourished, alert, oriented times 3 and appears comfortable  Psychiatric: Normal  HEENT: Trachea Midline, No torticollis  Cardiovascular: No discernable arrhythmia  Pulmonary: No wheezing or stridor  Abdomen: No rebound or guarding  Extremities: No peripheral edema  Skin: No masses, erythema, lacerations, fluctation, ulcerations  Neurovascular: Sensation Intact to the Median, Ulnar, Radial Nerve, Motor Intact to the Median, Ulnar, Radial Nerve and Pulses Intact    MUSCULOSKELETAL EXAMINATION:  Right long finger   No erythema, ecchymosis   Slight Middle finger PIP joint flexion contracture   A1 pulley tenderness of the middle finger   Palpable nodule with crepitation   Sensation intact in the radial ulnar aspect of the digit  Brisk capillary refill all digits    _____________________________________________________  STUDIES REVIEWED:  No Studies to review      PROCEDURES PERFORMED:  Procedures  No Procedures performed today     Scribe Attestation    I,:  Edgar Christiansen am acting as a scribe while in the presence of the attending physician :       I,:  Jelena Linda MD personally performed the services described in this documentation    as scribed in my presence :         Scribe Attestation    I,:  Edgar Christiansen am acting as a scribe while in the presence of the attending physician :       I,:  Jelena Linda MD personally performed the services described in this documentation    as scribed in my presence :

## 2021-08-24 RX ORDER — LIDOCAINE HYDROCHLORIDE AND EPINEPHRINE 10; 10 MG/ML; UG/ML
20 INJECTION, SOLUTION INFILTRATION; PERINEURAL ONCE
Status: CANCELLED | OUTPATIENT
Start: 2021-08-24 | End: 2021-08-24

## 2021-09-09 ENCOUNTER — HOSPITAL ENCOUNTER (OUTPATIENT)
Facility: HOSPITAL | Age: 66
Setting detail: OUTPATIENT SURGERY
Discharge: HOME | End: 2021-09-09
Attending: ORTHOPAEDIC SURGERY | Admitting: ORTHOPAEDIC SURGERY
Payer: COMMERCIAL

## 2021-09-09 VITALS
HEIGHT: 70 IN | SYSTOLIC BLOOD PRESSURE: 147 MMHG | OXYGEN SATURATION: 95 % | BODY MASS INDEX: 38.37 KG/M2 | TEMPERATURE: 99.2 F | HEART RATE: 88 BPM | DIASTOLIC BLOOD PRESSURE: 95 MMHG | RESPIRATION RATE: 18 BRPM | WEIGHT: 268 LBS

## 2021-09-09 DIAGNOSIS — M65.331 TRIGGER MIDDLE FINGER OF RIGHT HAND: Primary | ICD-10-CM

## 2021-09-09 PROCEDURE — 26145 TENDON EXCISION PALM/FINGER: CPT | Performed by: ORTHOPAEDIC SURGERY

## 2021-09-09 PROCEDURE — 26145 TENDON EXCISION PALM/FINGER: CPT | Performed by: PHYSICIAN ASSISTANT

## 2021-09-09 RX ORDER — MAGNESIUM HYDROXIDE 1200 MG/15ML
LIQUID ORAL AS NEEDED
Status: DISCONTINUED | OUTPATIENT
Start: 2021-09-09 | End: 2021-09-09 | Stop reason: HOSPADM

## 2021-09-09 RX ORDER — LIDOCAINE HYDROCHLORIDE AND EPINEPHRINE 10; 10 MG/ML; UG/ML
20 INJECTION, SOLUTION INFILTRATION; PERINEURAL ONCE
Status: COMPLETED | OUTPATIENT
Start: 2021-09-09 | End: 2021-09-09

## 2021-09-09 RX ORDER — SENNOSIDES 8.6 MG
650 CAPSULE ORAL EVERY 8 HOURS PRN
Qty: 30 TABLET | Refills: 0 | Status: SHIPPED | OUTPATIENT
Start: 2021-09-09

## 2021-09-09 RX ORDER — NAPROXEN SODIUM 220 MG
220 TABLET ORAL 2 TIMES DAILY WITH MEALS
Qty: 20 TABLET | Refills: 0 | Status: SHIPPED | OUTPATIENT
Start: 2021-09-09

## 2021-09-09 RX ORDER — PREGABALIN 150 MG/1
150 CAPSULE ORAL 3 TIMES DAILY
COMMUNITY
End: 2021-09-10

## 2021-09-09 RX ADMIN — LIDOCAINE HYDROCHLORIDE,EPINEPHRINE BITARTRATE 7 ML: 10; .01 INJECTION, SOLUTION INFILTRATION; PERINEURAL at 10:41

## 2021-09-10 ENCOUNTER — OFFICE VISIT (OUTPATIENT)
Dept: PAIN MEDICINE | Facility: CLINIC | Age: 66
End: 2021-09-10
Payer: COMMERCIAL

## 2021-09-10 VITALS
HEIGHT: 70 IN | WEIGHT: 269 LBS | SYSTOLIC BLOOD PRESSURE: 144 MMHG | TEMPERATURE: 99 F | BODY MASS INDEX: 38.51 KG/M2 | DIASTOLIC BLOOD PRESSURE: 88 MMHG | HEART RATE: 74 BPM

## 2021-09-10 DIAGNOSIS — G89.29 CHRONIC PAIN OF BOTH HIPS: ICD-10-CM

## 2021-09-10 DIAGNOSIS — M48.062 SPINAL STENOSIS OF LUMBAR REGION WITH NEUROGENIC CLAUDICATION: ICD-10-CM

## 2021-09-10 DIAGNOSIS — G89.4 CHRONIC PAIN SYNDROME: Primary | ICD-10-CM

## 2021-09-10 DIAGNOSIS — M96.1 LUMBAR POST-LAMINECTOMY SYNDROME: ICD-10-CM

## 2021-09-10 DIAGNOSIS — M16.0 PRIMARY OSTEOARTHRITIS OF BOTH HIPS: ICD-10-CM

## 2021-09-10 DIAGNOSIS — M25.551 CHRONIC PAIN OF BOTH HIPS: ICD-10-CM

## 2021-09-10 DIAGNOSIS — M54.16 CHRONIC LUMBAR RADICULOPATHY: ICD-10-CM

## 2021-09-10 DIAGNOSIS — M51.36 LUMBAR DEGENERATIVE DISC DISEASE: ICD-10-CM

## 2021-09-10 DIAGNOSIS — G89.29 CHRONIC BILATERAL THORACIC BACK PAIN: ICD-10-CM

## 2021-09-10 DIAGNOSIS — M54.41 CHRONIC BILATERAL LOW BACK PAIN WITH BILATERAL SCIATICA: ICD-10-CM

## 2021-09-10 DIAGNOSIS — M50.30 DDD (DEGENERATIVE DISC DISEASE), CERVICAL: ICD-10-CM

## 2021-09-10 DIAGNOSIS — M54.14 THORACIC RADICULOPATHY: ICD-10-CM

## 2021-09-10 DIAGNOSIS — G62.9 NEUROPATHY: ICD-10-CM

## 2021-09-10 DIAGNOSIS — M54.42 CHRONIC BILATERAL LOW BACK PAIN WITH BILATERAL SCIATICA: ICD-10-CM

## 2021-09-10 DIAGNOSIS — M54.6 CHRONIC BILATERAL THORACIC BACK PAIN: ICD-10-CM

## 2021-09-10 DIAGNOSIS — G60.9 IDIOPATHIC PERIPHERAL NEUROPATHY: ICD-10-CM

## 2021-09-10 DIAGNOSIS — M25.552 CHRONIC PAIN OF BOTH HIPS: ICD-10-CM

## 2021-09-10 DIAGNOSIS — G89.29 CHRONIC BILATERAL LOW BACK PAIN WITH BILATERAL SCIATICA: ICD-10-CM

## 2021-09-10 PROCEDURE — 99214 OFFICE O/P EST MOD 30 MIN: CPT | Performed by: NURSE PRACTITIONER

## 2021-09-10 RX ORDER — HYDROCODONE BITARTRATE AND ACETAMINOPHEN 10; 325 MG/1; MG/1
TABLET ORAL
Qty: 75 TABLET | Refills: 0 | Status: SHIPPED | OUTPATIENT
Start: 2021-09-10 | End: 2021-09-10 | Stop reason: SDUPTHER

## 2021-09-10 RX ORDER — HYDROCODONE BITARTRATE AND ACETAMINOPHEN 10; 325 MG/1; MG/1
TABLET ORAL
Qty: 75 TABLET | Refills: 0 | Status: SHIPPED | OUTPATIENT
Start: 2021-09-10 | End: 2021-11-11 | Stop reason: SDUPTHER

## 2021-09-10 NOTE — PROGRESS NOTES
Assessment:  1  Chronic pain syndrome    2  Chronic bilateral low back pain with bilateral sciatica    3  Chronic bilateral thoracic back pain    4  Chronic pain of both hips    5  Chronic lumbar radiculopathy    6  Lumbar post-laminectomy syndrome    7  Thoracic radiculopathy    8  Neuropathy    9  Idiopathic peripheral neuropathy    10  Lumbar degenerative disc disease    11  Primary osteoarthritis of both hips    12  DDD (degenerative disc disease), cervical    13  Spinal stenosis of lumbar region with neurogenic claudication        Plan:   While the patient was in the office today, I did have a thorough conversation with the patient regarding their chronic pain syndrome, symptoms, medication regimen, and treatment plan  I discussed with the patient at this point time with regards to the Lyrica and medication regimen since he does feel that there was better overall relief on Lyrica and that maybe maximizing the dose just was not in his best interest, I do feel that it would be beneficial for him to restart the Lyrica by taking 150 mg daily for the next few weeks and see how he does  The patient is to call our office before for he is out of the medication and at that point we can decide whether not to try to further titrated to 200 mg daily and proceed from there  In the meantime, I feel it is reasonable appropriate with his underlying etiology and affect he has tried and failed every other alternative medication regimen treatment plan option including a permanent spinal cord stimulator implantation, we will proceed with the p r n  Norco as prescribed and the occasional p r n  morphine sulfate as prescribed, however, he did not need a refill of the morphine  I advised the patient that if they experience any side effects or issues with the changes in their medication regiment, they should give our office a call to discuss   I also advised the patient not to drive or operate machinery until they see how the changes in the medication regimen affects them  The patient was agreeable and verbalized an understanding  I did discuss with the patient that at this point it is quite normal that with the sudden and recent loss of his son that he would be depressed as he was obviously upset, which is completely normal and I explained the patient that I feel it may be in his best interest to talk to a therapist and gave him the name of the therapist we now have an our office, Fabiola Bazzi, and advised him that if he would like to schedule an appointment with her he is to just call our office number as they also schedule her appointments  The patient was very thankful for the information  [place] Prescription Drug Monitoring Program report was reviewed and was appropriate     The patient was not picked for a pill count today, but he did bring his medications as required  The patient's opioid scripts were sent to their pharmacy electronically and was given a 2 month supply of prescriptions with a Do Not Fill date(s) of September 29, 2021 and October 27, 2021  There are risks associated with opioid medications, including dependence, addiction and tolerance  The patient understands and agrees to use these medications only as prescribed  Potential side effects of the medications include, but are not limited to, constipation, drowsiness, addiction, impaired judgment and risk of fatal overdose if not taken as prescribed  The patient was warned against driving while taking sedation medications  Sharing medications is a felony  At this point in time, the patient is showing no signs of addiction, abuse, diversion or suicidal ideation  The patient will follow-up in 8 weeks for medication prescription refill and reevaluation  The patient was advised to contact the office should their symptoms worsen in the interim  The patient was agreeable and verbalized an understanding        History of Present Illness: The patient is a 61 y o  male last seen on 6/18/2021 who presents for a follow up office visit in regards to  Chronic pain syndrome secondary to lumbar post-laminectomy syndrome with degenerative discs, stenosis and radiculopathy as well as chronic osteoarthritis and joint pain  The patient currently reports that since his last office visit overall his pain symptoms have remained relatively the same, however, he had to titrate off of the Lyrica because he was having worsening leg cramps and pain symptoms and that the increase in the Lyrica was the only thing he could relate to a change and once he stopped the Lyrica the leg pain and cramps to get better  However, he reports that up until increase in Lyrica as we are trying to maximize the relief he does feel that his pain was better overall on the Lyrica than off Lyrica but unfortunately he want to wait until the office visit today to discuss how to proceed with the Lyrica in the future as his son unexpectedly passed away 6 weeks ago and everything has just been very stressful and difficult and he want to wait until he was in person to discuss how to proceed with the medication  The patient presents today for regular medication follow-up visit  Current pain medications includes:   Norco 10/325, 1 p o  t i d  p r n  for pain, dispense number 75 pills per 30 days with short-acting morphine on hand for very severe pain days  The patient reports that this regimen is providing   Minimal to moderate pain relief  The patient is reporting no side effects from this pain medication regimen  Pain Contract Signed: 1/13/2021  Last Urine Drug Screen: 4/5/2021    I have personally reviewed and/or updated the patient's past medical history, past surgical history, family history, social history, current medications, allergies, and vital signs today  Review of Systems:    Review of Systems   Respiratory: Positive for shortness of breath  Cardiovascular: Negative for chest pain  Gastrointestinal: Negative for constipation, diarrhea, nausea and vomiting  Musculoskeletal: Positive for gait problem  Negative for arthralgias, joint swelling (joint stiffness) and myalgias  Skin: Negative for rash  Neurological: Positive for weakness  Negative for dizziness and seizures  All other systems reviewed and are negative          Past Medical History:   Diagnosis Date    Acute low back pain     10 AUG 2016 RESOLVED    [facility]ina pectoris ([facility])     Bunion     Bursitis of hip     Carpal tunnel syndrome     Chronic bilateral low back pain with bilateral sciatica     Chronic lumbar radiculopathy     Chronic narcotic dependence (HCC)     Chronic pain syndrome     Constipation due to opioid therapy     DDD (degenerative disc disease), lumbar     Deep vein thrombosis of left upper extremity ([address] )     [id number]  RESOLVED    Depression     Diverticula of colon     [id number] RESOLVED    DVT (deep venous thrombosis) ([facility])     LUE    Edema     History of staph infection     Hyperglycemia     Hyperlipidemia     Hypertension     Insomnia     Lateral epicondylitis     Left inguinal hernia     Methicillin resistant Staphylococcus aureus septicemia ([facility])     Morbid obesity due to excess calories ([facility])     Obesity     Peripheral neuropathy     Post laminectomy syndrome     RLS (restless legs syndrome)     Septicemia ([facility] [address] )     MRSA    Umbilical hernia        Past Surgical History:   Procedure Laterality Date    BACK SURGERY      laminectomy, hardware    CARPAL TUNNEL RELEASE Left     CERVICAL SPINE SURGERY      COLONOSCOPY      ELBOW SURGERY      ELBOW SURGERY      KNEE ARTHROSCOPY Right     knee    KNEE SURGERY Right     ARTHOSCOPY KNEE    NECK SURGERY      1997    CA COLONOSCOPY FLX DX W/COLLJ SPEC WHEN PFRMD N/A 1/24/2017    Procedure: COLONOSCOPY;  Surgeon: Maricruz Rayo MD;  Location:  MAIN OR;  Service: General    CA INCISE FINGER TENDON SHEATH Right 9/9/2021    Procedure: Right long finger trigger finger release;  Surgeon: Constance Hand MD;  Location: [facility];  Service: Orthopedics    CO WRIST [address] LIG Right 1/2/2020    Procedure: RELEASE CARPAL TUNNEL ENDOSCOPIC, right;  Surgeon: Constance Hand MD;  Location:  MAIN OR;  Service: Orthopedics    SHOULDER SURGERY      SPINAL CORD STIMULATOR IMPLANT      SYNOVECTOMY Right 9/9/2021    Procedure: Tenosynovectomy right hand long finger flexor digitorum superficialis and profundus tendon;  Surgeon: Constance Hand MD;  Location: [facility];  Service: Orthopedics       Family History   Problem Relation Age of Onset    Heart disease Mother     Cancer Mother         breast, stomach    Aneurysm Mother     Heart disease Father     Cancer Father         skin, liver, colon DECESED AGE 58       Social History     Occupational History    Not on file   Tobacco Use    Smoking status: Never Smoker    Smokeless tobacco: Never Used   Vaping Use    Vaping Use: Never used   Substance and Sexual Activity    Alcohol use: Yes    Drug use: No     Comment: opiod use for chronic pain    Sexual activity: Yes         Current Outpatient Medications:     bisoprolol-hydrochlorothiazide (ZIAC) 2 5-6 25 MG per tablet, Take 1 tablet by mouth daily, Disp: 90 tablet, Rfl: 3    clonazePAM (KlonoPIN) 0 5 MG disintegrating tablet, Take 1 tablet (0 5 mg total) by mouth 2 (two) times a day, Disp: 60 tablet, Rfl: 0    diazepam (VALIUM) 10 mg tablet, Take 1 tablet (10 mg total) by mouth daily at bedtime as needed for anxiety, Disp: 30 tablet, Rfl: 0    HYDROcodone-acetaminophen (NORCO)  mg per tablet, Take 1 PO TID PRN for pain  [facility] to [facility]  DO NOT FILL UNTIL: 9/29/21, Disp: 75 tablet, Rfl: 0    lactulose 20 g/30 mL, Take 1-2 tablespoons daily PRN as needed for chronic constipation  Please sent to [facility]  , Disp: 473 mL, Rfl: 2    linaCLOtide 145 MCG CAPS, Take 1 capsule (145 mcg total) by mouth daily as needed (constipation), Disp: 30 capsule, Rfl: 3    morphine (MSIR) 15 mg tablet, Take 1/2 tablet BID PRN for severe pain in place of hydrocodone occasionally  [facility]  , Disp: 30 tablet, Rfl: 0    Multiple Vitamin (MULTIVITAMIN) tablet, Take 1 tablet by mouth every other day , Disp: , Rfl:     naproxen sodium (ALEVE) 220 MG tablet, Take 1 tablet (220 mg total) by mouth 2 (two) times a day with meals, Disp: 20 tablet, Rfl: 0    rosuvastatin (CRESTOR) 10 MG tablet, TAKE ONE TABLET BY MOUTH AT BEDTIME, Disp: 90 tablet, Rfl: 3    acetaminophen (TYLENOL) 650 mg CR tablet, Take 1 tablet (650 mg total) by mouth every 8 (eight) hours as needed for mild pain, Disp: 30 tablet, Rfl: 0    Naloxone HCl (Narcan) 4 MG/0 1ML LIQD, Spray 2 sprays into 1 nostril for suspected opioid overdose  May repeat in other nostril  Call 911 , Disp: 2 each, Rfl: 0    naproxen sodium (ALEVE) 220 MG tablet, Take 1 tablet (220 mg total) by mouth 2 (two) times a day with meals for 5 days, Disp: 10 tablet, Rfl: 0    Allergies   Allergen Reactions    Fentanyl Other (See Comments)     "Makes me Suicidal"  Happened when dosage increased   Penicillins Other (See Comments)     As a child unknown    Pravastatin Other (See Comments)     Reaction Date: [id number];   Muscle weakness    Sulfa Antibiotics Other (See Comments)     As a child unknown    Vytorin [Ezetimibe-Simvastatin] Myalgia     Reaction Date: [id number];        Physical Exam:    /88 (BP Location: Left arm, Patient Position: Sitting, Cuff Size: Standard)   Pulse 74   Temp 99 °F (37 2 °C)   Ht 5' 10" (1 778 m)   Wt 122 kg (269 lb)   BMI 38 60 kg/m²     Constitutional:normal, well developed, well nourished, alert, in no distress and non-toxic and no overt pain behavior   and overweight  Eyes:anicteric  HEENT:grossly intact  Neck:supple, symmetric, trachea midline and no masses   Pulmonary:even and unlabored  Cardiovascular:No edema or pitting edema present  Skin:Normal without rashes or lesions and well hydrated  Psychiatric:Mood and affect appropriate  Neurologic:Cranial Nerves II-XII grossly intact  Musculoskeletal:The patient's gait is slightly antalgic, but steady without the use of any assistive devices  Imaging  No orders to display         No orders of the defined types were placed in this encounter

## 2021-09-10 NOTE — PATIENT INSTRUCTIONS
Scott Juarez, therapist in our office: [phone]    Jaspreet Rojas Dates: 9/29/21 & 10/27/21    Opioid Safety   AMBULATORY CARE:   Opioid safety  includes the correct use, storage, and disposal of opioids  Examples of opioid medicines to treat pain include oxycodone, morphine, fentanyl, and codeine  Call your local emergency number (911 in the [address]), or have someone else call if:   · You have a seizure  · You cannot be woken  · You have trouble staying awake and your breathing is slow or shallow  · Your speech is slurred, or you are confused  · You are dizzy or stumble when you walk  Call your doctor, or have someone close to you call if:   · You are extremely drowsy, or you have trouble staying awake or speaking  · You have pale or clammy skin  · You have blue fingernails or lips  · Your heartbeat is slower than normal     · You cannot stop vomiting  · You have questions or concerns about your condition or care  Use opioids safely:   · Take prescribed opioids exactly as directed  Opioids come with directions based on the kind of opioid and how it is given  Do not take more than the recommended amount, or for longer than needed  · Do not give opioids to others or take opioids that belong to someone else  Misuse of opioids can lead to an addiction or overdose  · Do not mix opioids with other medicines or alcohol  The combination can cause an overdose, or lead to a coma  · Do not drive or operate heavy machinery after you take the opioid  Your provider or pharmacist can tell you how long to wait after a dose before you do these activities  · Talk to your healthcare provider if you have any side effects  He or she can help you prevent or relieve side effects  Side effects include nausea, sleepiness, itching, and trouble thinking clearly  Manage constipation:  Constipation is the most common side effect of opioid medicine   Constipation is when you have hard, dry bowel movements, or you go longer than usual between bowel movements  Tell your healthcare provider about all changes in your bowel movements while you are taking opioids  He or she may recommend laxative medicine to help you have a bowel movement  He or she may also change the kind of opioid you are taking, or change when you take it  The following are more ways you can prevent or relieve constipation:  · Drink liquids as directed  You may need to drink extra liquids to help soften and move your bowels  Ask how much liquid to drink each day and which liquids are best for you  · Eat high-fiber foods  This may help decrease constipation by adding bulk to your bowel movements  High-fiber foods include fruits, vegetables, whole-grain breads and cereals, and beans  Your healthcare provider or dietitian can help you create a high-fiber meal plan  Your provider may also recommend a fiber supplement if you cannot get enough fiber from food  · Exercise regularly  Regular physical activity can help stimulate your intestines  Walking is a good exercise to prevent or relieve constipation  Ask which exercises are best for you  · Schedule a time each day to have a bowel movement  This may help train your body to have regular bowel movements  Bend forward while you are on the toilet to help move the bowel movement out  Sit on the toilet for at least 10 minutes, even if you do not have a bowel movement  Store opioids safely:   · Store opioids where others cannot easily get them  Keep them in a locked cabinet or secure area  Do not  keep them in a purse or other bag you carry with you  A person may be looking for something else and find the opioids  · Make sure opioids are stored out of the reach of children  A child can easily overdose on opioids  Opioids may look like candy to a small child  The best way to dispose of opioids: The laws vary by country and area   In the [place], the best way is to return the opioids through a take-back program  This program is offered by the [employer]  The following are options for using the program:  · Take the opioids to a KELLIE collection site  The site is often a law enforcement center  Call your local law enforcement center for scheduled take-back days in your area  You will be given information on where to go if the collection site is in a different location  · Take the opioids to an approved pharmacy or hospital   A pharmacy or hospital may be set up as a collection site  You will need to ask if it is a KELLIE collection site if you were not directed there  A pharmacy or doctor's office may not be able to take back opioids unless it is a KELLIE site  · Use a mail-back system  This means you are given containers to put the opioids into  You will then mail them in the containers  · Use a take-back drop box  This is a place to leave the opioids at any time  People and animals will not be able to get into the box  Your local law enforcement agency can tell you where to find a drop box in your area  Other safe ways to dispose of opioids: The medicine may come with disposal instructions  The instructions may vary depending on the brand of medicine you are using  Instructions may come in a Medication Guide, but not every medicine has one  You may instead get instructions from your pharmacy or doctor  Follow instructions carefully  The following are general guidelines to follow:  · Find out if you can flush the opioid  Some opioids can be flushed down the toilet or poured into the sink  You will need to contact authorities in your area to see if this is an option for you  The FDA also offers a list of medicines that are safe to flush down the toilet  You can check the list if you cannot get the information for your local area  · Ask your waste management company about rules for putting opioids in the trash    The company will be able to give you specific directions  Scratch out personal information on the original medicine label so it cannot be read  Then put it in the trash  Do not label the trash or put any information on it about the opioids  It should look like regular household trash so no one is tempted to look for the opioids  Keep the trash out of the reach of children and animals  Always make sure trash is secure  · Talk to officials if you live in a facility  If you live in a nursing home or assisted living center, talk to an official  The person will know the rules for your area  Other ways to manage pain:   · Ask your healthcare provider about non-opioid medicines to control pain  Nonprescription medicines include NSAIDs (such as ibuprofen) and acetaminophen  Prescription medicines include muscle relaxers, antidepressants, and steroids  · Pain may be managed without any medicines  Some ways to relieve pain include massage, aromatherapy, or meditation  Physical or occupational therapy may also help  For more information:   · Drug Enforcement Administration  [facility] [address]  Phone: 6- 610 - [phone]  Web Address: [facility]/drug_disposal/    · [address] and Drug Administration  [place] Radhah[place] Mims , [address] [facility]  Phone: 4- [phone]  Web Address: http://[employer].l/  Follow up with your doctor or pain specialist as directed: You may need to have your dose adjusted  Your doctor or pain specialist can also help you find ways to manage pain without opioids  Write down your questions so you remember to ask them during your visits  © Copyright [employer] 2021 Information is for End User's use only and may not be sold, redistributed or otherwise used for commercial purposes  All illustrations and images included in CareNotes® are the copyrighted property of [employer]  or Stephanie Peoples  The above information is an  only   It is not intended as medical advice for individual conditions or treatments  Talk to your doctor, nurse or pharmacist before following any medical regimen to see if it is safe and effective for you

## 2021-09-20 ENCOUNTER — OFFICE VISIT (OUTPATIENT)
Dept: OBGYN CLINIC | Facility: CLINIC | Age: 66
End: 2021-09-20

## 2021-09-20 VITALS — HEIGHT: 70 IN | BODY MASS INDEX: 38.57 KG/M2 | WEIGHT: 269.4 LBS

## 2021-09-20 DIAGNOSIS — M65.331 TRIGGER MIDDLE FINGER OF RIGHT HAND: Primary | ICD-10-CM

## 2021-09-20 PROCEDURE — 99024 POSTOP FOLLOW-UP VISIT: CPT | Performed by: ORTHOPAEDIC SURGERY

## 2021-09-20 NOTE — PROGRESS NOTES
Assessment:   S/P Right long finger trigger finger release - Right and Tenosynovectomy right hand long finger flexor digitorum superficialis and profundus tendon - Right on 9/9/2021    Plan:   Resume activities as tolerated  Reviewed with patient that he needs to keep the wound covered  No soaking his hands, no working in dirty environments    Follow Up:  PRN        CHIEF COMPLAINT:  Chief Complaint   Patient presents with    Right Middle Finger - Post-op, Suture / Staple Removal     DOS 9/9/21  TFR         SUBJECTIVE:  Suman Solis  is a 61 y o  male who presents for follow up after Right long finger trigger finger release - Right and Tenosynovectomy right hand long finger flexor digitorum superficialis and profundus tendon - Right on 9/9/2021  Today patient has no significant issues  He took his stitches out on post op day 5 and 6  He states that the scab fell off this AM in the shower  He denies any other acute complaints  PHYSICAL EXAMINATION:  Vital signs: Ht 5' 10" (1 778 m)   Wt 122 kg (269 lb 6 4 oz)   BMI 38 65 kg/m²   General: well developed and well nourished, alert, oriented times 3 and appears comfortable  Psychiatric: Normal    MUSCULOSKELETAL EXAMINATION:  Incision: clean, dry and deep layers closed  superficial wound has not yet healed    Range of Motion: full composite fist possible  Neurovascular status: Neuro intact, good cap refill  Activity Restrictions: No restrictions       STUDIES REVIEWED:  No Studies to review      PROCEDURES PERFORMED:  Procedures  No Procedures performed today       Scribe Attestation    I,:  Alexa Sotelo PA-C am acting as a scribe while in the presence of the attending physician :       I,:  Kiko Fritz MD personally performed the services described in this documentation    as scribed in my presence :           Scribe Attestation    I,:  Alexa Sotelo PA-C am acting as a scribe while in the presence of the attending physician :       I,: Glendale Apley, MD personally performed the services described in this documentation    as scribed in my presence :

## 2021-10-05 DIAGNOSIS — M54.16 CHRONIC LUMBAR RADICULOPATHY: ICD-10-CM

## 2021-10-05 RX ORDER — DIAZEPAM 10 MG/1
10 TABLET ORAL
Qty: 30 TABLET | Refills: 0 | Status: SHIPPED | OUTPATIENT
Start: 2021-10-05 | End: 2021-11-11

## 2021-11-11 ENCOUNTER — OFFICE VISIT (OUTPATIENT)
Dept: PAIN MEDICINE | Facility: CLINIC | Age: 66
End: 2021-11-11
Payer: COMMERCIAL

## 2021-11-11 VITALS
SYSTOLIC BLOOD PRESSURE: 138 MMHG | HEART RATE: 78 BPM | WEIGHT: 269 LBS | BODY MASS INDEX: 38.51 KG/M2 | DIASTOLIC BLOOD PRESSURE: 82 MMHG | HEIGHT: 70 IN | TEMPERATURE: 98.4 F

## 2021-11-11 DIAGNOSIS — M54.16 CHRONIC LUMBAR RADICULOPATHY: ICD-10-CM

## 2021-11-11 DIAGNOSIS — G60.9 IDIOPATHIC PERIPHERAL NEUROPATHY: ICD-10-CM

## 2021-11-11 DIAGNOSIS — M54.6 CHRONIC BILATERAL THORACIC BACK PAIN: ICD-10-CM

## 2021-11-11 DIAGNOSIS — M25.552 CHRONIC PAIN OF BOTH HIPS: ICD-10-CM

## 2021-11-11 DIAGNOSIS — G89.29 CHRONIC BILATERAL LOW BACK PAIN WITH BILATERAL SCIATICA: ICD-10-CM

## 2021-11-11 DIAGNOSIS — G62.9 NEUROPATHY: ICD-10-CM

## 2021-11-11 DIAGNOSIS — M25.551 CHRONIC PAIN OF BOTH HIPS: ICD-10-CM

## 2021-11-11 DIAGNOSIS — M54.14 THORACIC RADICULOPATHY: ICD-10-CM

## 2021-11-11 DIAGNOSIS — M50.30 DDD (DEGENERATIVE DISC DISEASE), CERVICAL: ICD-10-CM

## 2021-11-11 DIAGNOSIS — M16.0 PRIMARY OSTEOARTHRITIS OF BOTH HIPS: ICD-10-CM

## 2021-11-11 DIAGNOSIS — G89.4 CHRONIC PAIN SYNDROME: Primary | ICD-10-CM

## 2021-11-11 DIAGNOSIS — M51.36 LUMBAR DEGENERATIVE DISC DISEASE: ICD-10-CM

## 2021-11-11 DIAGNOSIS — Z79.891 LONG-TERM CURRENT USE OF OPIATE ANALGESIC: ICD-10-CM

## 2021-11-11 DIAGNOSIS — M48.062 SPINAL STENOSIS OF LUMBAR REGION WITH NEUROGENIC CLAUDICATION: ICD-10-CM

## 2021-11-11 DIAGNOSIS — G89.29 CHRONIC PAIN OF BOTH HIPS: ICD-10-CM

## 2021-11-11 DIAGNOSIS — G89.4 CHRONIC PAIN SYNDROME: ICD-10-CM

## 2021-11-11 DIAGNOSIS — M54.41 CHRONIC BILATERAL LOW BACK PAIN WITH BILATERAL SCIATICA: ICD-10-CM

## 2021-11-11 DIAGNOSIS — M96.1 LUMBAR POST-LAMINECTOMY SYNDROME: ICD-10-CM

## 2021-11-11 DIAGNOSIS — G89.29 CHRONIC BILATERAL THORACIC BACK PAIN: ICD-10-CM

## 2021-11-11 DIAGNOSIS — M54.42 CHRONIC BILATERAL LOW BACK PAIN WITH BILATERAL SCIATICA: ICD-10-CM

## 2021-11-11 PROCEDURE — 99214 OFFICE O/P EST MOD 30 MIN: CPT | Performed by: NURSE PRACTITIONER

## 2021-11-11 RX ORDER — MORPHINE SULFATE 15 MG/1
TABLET ORAL
Qty: 30 TABLET | Refills: 0 | Status: SHIPPED | OUTPATIENT
Start: 2021-11-11

## 2021-11-11 RX ORDER — HYDROCODONE BITARTRATE AND ACETAMINOPHEN 10; 325 MG/1; MG/1
TABLET ORAL
Qty: 85 TABLET | Refills: 0 | Status: SHIPPED | OUTPATIENT
Start: 2021-11-11 | End: 2021-11-12 | Stop reason: SDUPTHER

## 2021-11-11 RX ORDER — DIAZEPAM 10 MG/1
TABLET ORAL
Qty: 30 TABLET | Refills: 0 | Status: SHIPPED | OUTPATIENT
Start: 2021-11-11 | End: 2022-03-22 | Stop reason: SDUPTHER

## 2021-11-11 RX ORDER — PREGABALIN 100 MG/1
CAPSULE ORAL
Qty: 60 CAPSULE | Refills: 0
Start: 2021-11-11 | End: 2021-11-15 | Stop reason: SDUPTHER

## 2021-11-11 RX ORDER — PREGABALIN 150 MG/1
CAPSULE ORAL
Qty: 90 CAPSULE | Refills: 0 | OUTPATIENT
Start: 2021-11-11

## 2021-11-12 RX ORDER — HYDROCODONE BITARTRATE AND ACETAMINOPHEN 10; 325 MG/1; MG/1
TABLET ORAL
Qty: 85 TABLET | Refills: 0 | Status: SHIPPED | OUTPATIENT
Start: 2021-11-12 | End: 2022-02-04 | Stop reason: SDUPTHER

## 2021-11-12 RX ORDER — HYDROCODONE BITARTRATE AND ACETAMINOPHEN 10; 325 MG/1; MG/1
TABLET ORAL
Qty: 85 TABLET | Refills: 0 | Status: SHIPPED | OUTPATIENT
Start: 2021-11-12 | End: 2021-11-12 | Stop reason: SDUPTHER

## 2021-11-13 LAB
7AMINOCLONAZEPAM SAL QL CFM: ABNORMAL
AMPHET SAL QL CFM: NEGATIVE NG/ML
BUPRENORPHINE SAL QL SCN: NEGATIVE NG/ML
CARBOXYTHC SAL QL CFM: NEGATIVE NG/ML
CCP IGG SERPL-ACNC: NEGATIVE
COCAINE SAL QL CFM: NEGATIVE NG/ML
CODEINE SAL QL CFM: NEGATIVE NG/ML
EDDP SAL QL CFM: NEGATIVE NG/ML
HYDROCODONE SAL QL CFM: ABNORMAL NG/ML
HYDROCODONE SAL QL CFM: ABNORMAL NG/ML
HYDROMORPHONE SAL QL CFM: ABNORMAL NG/ML
LEUKEMIA MARKERS BLD-IMP: NEGATIVE NG/ML
M PROTEIN 3 UR ELPH-MCNC: NEGATIVE NG/ML
M TB TUBERC IGNF/MITOGEN IGNF CONTROL: NEGATIVE NG/ML
METHADONE SAL QL CFM: NEGATIVE NG/ML
MORPHINE SAL QL CFM: ABNORMAL NG/ML
MORPHINE SAL QL CFM: ABNORMAL NG/ML
OXYMORPHONE SAL QL CFM: NEGATIVE NG/ML
OXYMORPHONE SAL QL CFM: NEGATIVE NG/ML
PCP SAL QL CFM: NEGATIVE NG/ML
RESULT ALL_PRESCRIBED MEDS AND SPECIAL INSTRUCTIONS: NORMAL
SL AMB 6-MAM (HEROIN METABOLITE) QUANTIFICATION: NEGATIVE NG/ML
SL AMB ALPRAZOLAM QUANTIFICATION: NEGATIVE NG/ML
SL AMB ATOMOXETINE QUANTIFICATION: NORMAL
SL AMB CLONAZEPAM QUANTIFICATION: ABNORMAL NG/ML
SL AMB DIAZEPAM QUANTIFICATION: NORMAL NG/ML
SL AMB FENTANYL QUANTIFICATION: NEGATIVE NG/ML
SL AMB MDMA QUANTIFICATION: NEGATIVE NG/ML
SL AMB N-DESMETHYL-TRAMADOL QUANTIFICATION SALIVA: NEGATIVE NG/ML
SL AMB NORBUPRENORPHINE QUANTIFICATION: NEGATIVE NG/ML
SL AMB NORDIAZEPAM QUANTIFICATION: NORMAL NG/ML
SL AMB NORFENTANYL QUANTIFICATION: NEGATIVE NG/ML
SL AMB NORHYDROCODONE QUANTIFICATION: ABNORMAL NG/ML
SL AMB NORHYDROCODONE QUANTIFICATION: ABNORMAL NG/ML
SL AMB NORMEPERIDINE QUANTIFICATION: NEGATIVE NG/ML
SL AMB NOROXYCODONE QUANTIFICATION: NEGATIVE NG/ML
SL AMB OXAZEPAM QUANTIFICATION: NEGATIVE NG/ML
SL AMB RITALINIC ACID QUANTIFICATION: NEGATIVE
SL AMB TEMAZEPAM QUANTIFICATION: NEGATIVE NG/ML
SL AMB TEMAZEPAM QUANTIFICATION: NEGATIVE NG/ML
SL AMB TRAMADOL QUANTIFICATION: NEGATIVE NG/ML
SQUAMOUS #/AREA URNS HPF: NEGATIVE NG/ML

## 2021-11-15 RX ORDER — PREGABALIN 100 MG/1
CAPSULE ORAL
Qty: 60 CAPSULE | Refills: 2 | Status: SHIPPED | OUTPATIENT
Start: 2021-11-15 | End: 2022-02-04

## 2021-12-02 ENCOUNTER — TELEPHONE (OUTPATIENT)
Dept: PAIN MEDICINE | Facility: CLINIC | Age: 66
End: 2021-12-02

## 2021-12-02 DIAGNOSIS — M54.41 CHRONIC BILATERAL LOW BACK PAIN WITH BILATERAL SCIATICA: Primary | ICD-10-CM

## 2021-12-02 DIAGNOSIS — M54.6 CHRONIC BILATERAL THORACIC BACK PAIN: ICD-10-CM

## 2021-12-02 DIAGNOSIS — M54.42 CHRONIC BILATERAL LOW BACK PAIN WITH BILATERAL SCIATICA: Primary | ICD-10-CM

## 2021-12-02 DIAGNOSIS — G89.29 CHRONIC BILATERAL THORACIC BACK PAIN: ICD-10-CM

## 2021-12-02 DIAGNOSIS — G89.4 CHRONIC PAIN SYNDROME: ICD-10-CM

## 2021-12-02 DIAGNOSIS — M96.1 LUMBAR POST-LAMINECTOMY SYNDROME: ICD-10-CM

## 2021-12-02 DIAGNOSIS — G89.29 CHRONIC BILATERAL LOW BACK PAIN WITH BILATERAL SCIATICA: Primary | ICD-10-CM

## 2021-12-02 RX ORDER — HYDROCODONE BITARTRATE AND ACETAMINOPHEN 10; 325 MG/1; MG/1
TABLET ORAL
Qty: 85 TABLET | Refills: 0 | Status: SHIPPED | OUTPATIENT
Start: 2021-12-02 | End: 2022-01-05 | Stop reason: SDUPTHER

## 2021-12-10 DIAGNOSIS — E78.2 MIXED HYPERLIPIDEMIA: ICD-10-CM

## 2021-12-10 DIAGNOSIS — I10 ESSENTIAL HYPERTENSION: ICD-10-CM

## 2021-12-10 DIAGNOSIS — F43.0 STRESS REACTION CAUSING MIXED DISTURBANCE OF EMOTION AND CONDUCT: ICD-10-CM

## 2021-12-10 RX ORDER — ROSUVASTATIN CALCIUM 10 MG/1
10 TABLET, COATED ORAL
Qty: 90 TABLET | Refills: 0 | Status: SHIPPED | OUTPATIENT
Start: 2021-12-10 | End: 2022-03-22 | Stop reason: SDUPTHER

## 2021-12-10 RX ORDER — BISOPROLOL FUMARATE AND HYDROCHLOROTHIAZIDE 2.5; 6.25 MG/1; MG/1
1 TABLET ORAL DAILY
Qty: 90 TABLET | Refills: 0 | Status: SHIPPED | OUTPATIENT
Start: 2021-12-10 | End: 2022-03-22 | Stop reason: SDUPTHER

## 2021-12-11 RX ORDER — CLONAZEPAM 0.5 MG/1
0.5 TABLET, ORALLY DISINTEGRATING ORAL 2 TIMES DAILY
Qty: 60 TABLET | Refills: 0 | Status: SHIPPED | OUTPATIENT
Start: 2021-12-11

## 2022-01-03 ENCOUNTER — TELEPHONE (OUTPATIENT)
Dept: FAMILY MEDICINE CLINIC | Facility: HOSPITAL | Age: 67
End: 2022-01-03

## 2022-01-03 NOTE — TELEPHONE ENCOUNTER
Please advise if he should have any blood work done prior to appt  Virtual Telephone Check-In    [place]ia Null verbally consents to a Virtual/Telephone Check-In visit on 04/10/20. Patient understands and accepts financial responsibility for any deductible, co-insurance and/or co-pays associated with this service. to urogyne (Dr. Taco Hernandez) or GYN (has seen [facility] providers in the past) if symptoms recur    Pt asked to return later this summer for well visit.

## 2022-01-05 ENCOUNTER — OFFICE VISIT (OUTPATIENT)
Dept: FAMILY MEDICINE CLINIC | Facility: HOSPITAL | Age: 67
End: 2022-01-05
Payer: COMMERCIAL

## 2022-01-05 VITALS
BODY MASS INDEX: 38.97 KG/M2 | TEMPERATURE: 98.5 F | SYSTOLIC BLOOD PRESSURE: 138 MMHG | HEIGHT: 70 IN | HEART RATE: 50 BPM | OXYGEN SATURATION: 96 % | DIASTOLIC BLOOD PRESSURE: 80 MMHG | WEIGHT: 272.2 LBS

## 2022-01-05 DIAGNOSIS — S46.002S ROTATOR CUFF INJURY, LEFT, SEQUELA: ICD-10-CM

## 2022-01-05 DIAGNOSIS — E78.2 MIXED HYPERLIPIDEMIA: ICD-10-CM

## 2022-01-05 DIAGNOSIS — G62.9 NEUROPATHY: ICD-10-CM

## 2022-01-05 DIAGNOSIS — E66.01 CLASS 2 SEVERE OBESITY DUE TO EXCESS CALORIES WITH SERIOUS COMORBIDITY AND BODY MASS INDEX (BMI) OF 39.0 TO 39.9 IN ADULT (HCC): ICD-10-CM

## 2022-01-05 DIAGNOSIS — G89.4 CHRONIC PAIN SYNDROME: ICD-10-CM

## 2022-01-05 DIAGNOSIS — S29.019A THORACIC MYOFASCIAL STRAIN, INITIAL ENCOUNTER: ICD-10-CM

## 2022-01-05 DIAGNOSIS — I10 ESSENTIAL HYPERTENSION: Primary | ICD-10-CM

## 2022-01-05 DIAGNOSIS — Z12.5 SCREENING FOR MALIGNANT NEOPLASM OF PROSTATE: ICD-10-CM

## 2022-01-05 DIAGNOSIS — F32.1 CURRENT MODERATE EPISODE OF MAJOR DEPRESSIVE DISORDER WITHOUT PRIOR EPISODE (HCC): ICD-10-CM

## 2022-01-05 DIAGNOSIS — R73.9 HYPERGLYCEMIA: ICD-10-CM

## 2022-01-05 PROCEDURE — 99215 OFFICE O/P EST HI 40 MIN: CPT | Performed by: FAMILY MEDICINE

## 2022-01-05 RX ORDER — ESCITALOPRAM OXALATE 10 MG/1
10 TABLET ORAL DAILY
Qty: 30 TABLET | Refills: 5 | Status: SHIPPED | OUTPATIENT
Start: 2022-01-05 | End: 2022-01-05 | Stop reason: SDUPTHER

## 2022-01-05 RX ORDER — ESCITALOPRAM OXALATE 10 MG/1
10 TABLET ORAL DAILY
Qty: 30 TABLET | Refills: 5 | Status: SHIPPED | OUTPATIENT
Start: 2022-01-05

## 2022-01-05 NOTE — PROGRESS NOTES
Assessment/Plan:         Diagnoses and all orders for this visit:    Essential hypertension    Mixed hyperlipidemia  -     Lipid Panel with Direct LDL reflex; Future    Class 2 severe obesity due to excess calories with serious comorbidity and body mass index (BMI) of 39 0 to 39 9 in adult [facility])    Chronic pain syndrome    Thoracic myofascial strain, initial encounter    Rotator cuff injury, left, sequela  -     Ambulatory referral to Orthopedic Surgery; Future    Current moderate episode of major depressive disorder without prior episode (HCC)  -     escitalopram (Lexapro) 10 mg tablet; Take 1 tablet (10 mg total) by mouth daily    Neuropathy  -     CBC and Platelet; Future  -     Vitamin B12; Future    Hyperglycemia  -     Comprehensive metabolic panel; Future  -     HEMOGLOBIN A1C W/ EAG ESTIMATION; Future    Screening for malignant neoplasm of prostate  -     PSA, Total Screen; Future          Subjective:      Patient ID: Luis Daniel Charlton  is a 61 y o  male  6 month follow up  No recent illness or injury  Feeling lots of pain from arthritis worse with weather change    Alina Crane a few months ago,tripped on a tarp  Pulled his L shoulder and persists, has weakness and decreased ROM    Also strained R posterior rib cage, pain with deep breath    Feet feel like standing on ice  Some reduction in sensation like numbness      The following portions of the patient's history were reviewed and updated as appropriate: allergies, current medications, past family history, past medical history, past social history, past surgical history and problem list     Review of Systems   Constitutional: Negative for unexpected weight change  Respiratory: Negative  Cardiovascular: Negative  Musculoskeletal: Positive for arthralgias, back pain, gait problem, joint swelling, myalgias, neck pain and neck stiffness  Psychiatric/Behavioral: Negative  All other systems reviewed and are negative          Objective:      BP 138/80 (Patient Position: Sitting, Cuff Size: Large)   Pulse (!) 50   Temp 98 5 °F (36 9 °C) (Tympanic)   Ht 5' 10" (1 778 m)   Wt 123 kg (272 lb 3 2 oz)   SpO2 96%   BMI 39 06 kg/m²          Physical Exam

## 2022-01-06 ENCOUNTER — APPOINTMENT (OUTPATIENT)
Dept: LAB | Facility: HOSPITAL | Age: 67
End: 2022-01-06
Payer: COMMERCIAL

## 2022-01-06 DIAGNOSIS — E78.2 MIXED HYPERLIPIDEMIA: ICD-10-CM

## 2022-01-06 DIAGNOSIS — Z12.5 SCREENING FOR MALIGNANT NEOPLASM OF PROSTATE: ICD-10-CM

## 2022-01-06 DIAGNOSIS — R73.9 HYPERGLYCEMIA: ICD-10-CM

## 2022-01-06 DIAGNOSIS — G62.9 NEUROPATHY: ICD-10-CM

## 2022-01-06 LAB
ALBUMIN SERPL BCP-MCNC: 3.9 G/DL (ref 3.5–5)
ALP SERPL-CCNC: 59 U/L (ref 46–116)
ALT SERPL W P-5'-P-CCNC: 36 U/L (ref 12–78)
ANION GAP SERPL CALCULATED.3IONS-SCNC: 5 MMOL/L (ref 4–13)
AST SERPL W P-5'-P-CCNC: 20 U/L (ref 5–45)
BILIRUB SERPL-MCNC: 0.33 MG/DL (ref 0.2–1)
BUN SERPL-MCNC: 18 MG/DL (ref 5–25)
CALCIUM SERPL-MCNC: 9.7 MG/DL (ref 8.3–10.1)
CHLORIDE SERPL-SCNC: 106 MMOL/L (ref 100–108)
CHOLEST SERPL-MCNC: 160 MG/DL
CO2 SERPL-SCNC: 30 MMOL/L (ref 21–32)
CREAT SERPL-MCNC: 1.09 MG/DL (ref 0.6–1.3)
ERYTHROCYTE [DISTWIDTH] IN BLOOD BY AUTOMATED COUNT: 14.2 % (ref 11.6–15.1)
GFR SERPL CREATININE-BSD FRML MDRD: 71 ML/MIN/1.73SQ M
GLUCOSE P FAST SERPL-MCNC: 112 MG/DL (ref 65–99)
HCT VFR BLD AUTO: 51.2 % (ref 36.5–49.3)
HDLC SERPL-MCNC: 35 MG/DL
HGB BLD-MCNC: 16.5 G/DL (ref 12–17)
LDLC SERPL CALC-MCNC: 96 MG/DL (ref 0–100)
MCH RBC QN AUTO: 29.2 PG (ref 26.8–34.3)
MCHC RBC AUTO-ENTMCNC: 32.2 G/DL (ref 31.4–37.4)
MCV RBC AUTO: 91 FL (ref 82–98)
PLATELET # BLD AUTO: 242 THOUSANDS/UL (ref 149–390)
PMV BLD AUTO: 9 FL (ref 8.9–12.7)
POTASSIUM SERPL-SCNC: 4.4 MMOL/L (ref 3.5–5.3)
PROT SERPL-MCNC: 8.2 G/DL (ref 6.4–8.2)
PSA SERPL-MCNC: 0.6 NG/ML (ref 0–4)
RBC # BLD AUTO: 5.65 MILLION/UL (ref 3.88–5.62)
SODIUM SERPL-SCNC: 141 MMOL/L (ref 136–145)
TRIGL SERPL-MCNC: 146 MG/DL
VIT B12 SERPL-MCNC: 555 PG/ML (ref 100–900)
WBC # BLD AUTO: 7.57 THOUSAND/UL (ref 4.31–10.16)

## 2022-01-06 PROCEDURE — 82607 VITAMIN B-12: CPT

## 2022-01-06 PROCEDURE — 80061 LIPID PANEL: CPT

## 2022-01-06 PROCEDURE — 83036 HEMOGLOBIN GLYCOSYLATED A1C: CPT

## 2022-01-06 PROCEDURE — 85027 COMPLETE CBC AUTOMATED: CPT

## 2022-01-06 PROCEDURE — G0103 PSA SCREENING: HCPCS

## 2022-01-06 PROCEDURE — 36415 COLL VENOUS BLD VENIPUNCTURE: CPT

## 2022-01-06 PROCEDURE — 80053 COMPREHEN METABOLIC PANEL: CPT

## 2022-01-07 LAB
EST. AVERAGE GLUCOSE BLD GHB EST-MCNC: 123 MG/DL
HBA1C MFR BLD: 5.9 %

## 2022-02-04 ENCOUNTER — OFFICE VISIT (OUTPATIENT)
Dept: PAIN MEDICINE | Facility: CLINIC | Age: 67
End: 2022-02-04
Payer: COMMERCIAL

## 2022-02-04 VITALS
WEIGHT: 268 LBS | HEART RATE: 68 BPM | BODY MASS INDEX: 38.37 KG/M2 | SYSTOLIC BLOOD PRESSURE: 132 MMHG | HEIGHT: 70 IN | TEMPERATURE: 97.9 F | DIASTOLIC BLOOD PRESSURE: 80 MMHG

## 2022-02-04 DIAGNOSIS — M96.1 LUMBAR POST-LAMINECTOMY SYNDROME: ICD-10-CM

## 2022-02-04 DIAGNOSIS — G89.29 CHRONIC BILATERAL LOW BACK PAIN WITH BILATERAL SCIATICA: ICD-10-CM

## 2022-02-04 DIAGNOSIS — M50.30 DDD (DEGENERATIVE DISC DISEASE), CERVICAL: ICD-10-CM

## 2022-02-04 DIAGNOSIS — G60.9 IDIOPATHIC PERIPHERAL NEUROPATHY: ICD-10-CM

## 2022-02-04 DIAGNOSIS — M25.552 CHRONIC PAIN OF BOTH HIPS: ICD-10-CM

## 2022-02-04 DIAGNOSIS — M48.062 SPINAL STENOSIS OF LUMBAR REGION WITH NEUROGENIC CLAUDICATION: ICD-10-CM

## 2022-02-04 DIAGNOSIS — M16.0 PRIMARY OSTEOARTHRITIS OF BOTH HIPS: ICD-10-CM

## 2022-02-04 DIAGNOSIS — G62.9 NEUROPATHY: ICD-10-CM

## 2022-02-04 DIAGNOSIS — G89.4 CHRONIC PAIN SYNDROME: Primary | ICD-10-CM

## 2022-02-04 DIAGNOSIS — M54.14 THORACIC RADICULOPATHY: ICD-10-CM

## 2022-02-04 DIAGNOSIS — G89.29 CHRONIC BILATERAL THORACIC BACK PAIN: ICD-10-CM

## 2022-02-04 DIAGNOSIS — M54.6 CHRONIC BILATERAL THORACIC BACK PAIN: ICD-10-CM

## 2022-02-04 DIAGNOSIS — M54.41 CHRONIC BILATERAL LOW BACK PAIN WITH BILATERAL SCIATICA: ICD-10-CM

## 2022-02-04 DIAGNOSIS — M54.42 CHRONIC BILATERAL LOW BACK PAIN WITH BILATERAL SCIATICA: ICD-10-CM

## 2022-02-04 DIAGNOSIS — M25.551 CHRONIC PAIN OF BOTH HIPS: ICD-10-CM

## 2022-02-04 DIAGNOSIS — M51.36 LUMBAR DEGENERATIVE DISC DISEASE: ICD-10-CM

## 2022-02-04 DIAGNOSIS — M54.16 CHRONIC LUMBAR RADICULOPATHY: ICD-10-CM

## 2022-02-04 DIAGNOSIS — G89.29 CHRONIC PAIN OF BOTH HIPS: ICD-10-CM

## 2022-02-04 PROCEDURE — 99214 OFFICE O/P EST MOD 30 MIN: CPT | Performed by: NURSE PRACTITIONER

## 2022-02-04 RX ORDER — HYDROCODONE BITARTRATE AND ACETAMINOPHEN 10; 325 MG/1; MG/1
TABLET ORAL
Qty: 85 TABLET | Refills: 0 | Status: SHIPPED | OUTPATIENT
Start: 2022-02-04 | End: 2022-04-29 | Stop reason: SDUPTHER

## 2022-02-04 RX ORDER — HYDROCODONE BITARTRATE AND ACETAMINOPHEN 10; 325 MG/1; MG/1
TABLET ORAL
Qty: 85 TABLET | Refills: 0 | Status: SHIPPED | OUTPATIENT
Start: 2022-03-21 | End: 2022-04-29 | Stop reason: SDUPTHER

## 2022-02-04 RX ORDER — HYDROCODONE BITARTRATE AND ACETAMINOPHEN 10; 325 MG/1; MG/1
TABLET ORAL
Qty: 85 TABLET | Refills: 0 | Status: SHIPPED | OUTPATIENT
Start: 2022-04-19 | End: 2022-04-29 | Stop reason: SDUPTHER

## 2022-02-04 RX ORDER — HYDROCODONE BITARTRATE AND ACETAMINOPHEN 10; 325 MG/1; MG/1
TABLET ORAL
Qty: 85 TABLET | Refills: 0 | Status: SHIPPED | OUTPATIENT
Start: 2022-02-23 | End: 2022-02-04 | Stop reason: SDUPTHER

## 2022-02-04 NOTE — PATIENT INSTRUCTIONS
Charmayne Vita Dates: 2/21/22, 3/21/22, 4/18/22    Opioid Safety   AMBULATORY CARE:   Opioid safety  includes the correct use, storage, and disposal of opioids  Examples of opioid medicines to treat pain include oxycodone, morphine, fentanyl, and codeine  Call your local emergency number (911 in the [address]), or have someone else call if:   · You have a seizure  · You cannot be woken  · You have trouble staying awake and your breathing is slow or shallow  · Your speech is slurred, or you are confused  · You are dizzy or stumble when you walk  Call your doctor, or have someone close to you call if:   · You are extremely drowsy, or you have trouble staying awake or speaking  · You have pale or clammy skin  · You have blue fingernails or lips  · Your heartbeat is slower than normal     · You cannot stop vomiting  · You have questions or concerns about your condition or care  Use opioids safely:   · Take prescribed opioids exactly as directed  Opioids come with directions based on the kind of opioid and how it is given  Do not take more than the recommended amount, or for longer than needed  · Do not give opioids to others or take opioids that belong to someone else  Misuse of opioids can lead to an addiction or overdose  · Do not mix opioids with other medicines or alcohol  The combination can cause an overdose, or lead to a coma  · Do not drive or operate heavy machinery after you take the opioid  Your provider or pharmacist can tell you how long to wait after a dose before you do these activities  · Talk to your healthcare provider if you have any side effects  He or she can help you prevent or relieve side effects  Side effects include nausea, sleepiness, itching, and trouble thinking clearly  Manage constipation:  Constipation is the most common side effect of opioid medicine   Constipation is when you have hard, dry bowel movements, or you go longer than usual between bowel movements  Tell your healthcare provider about all changes in your bowel movements while you are taking opioids  He or she may recommend laxative medicine to help you have a bowel movement  He or she may also change the kind of opioid you are taking, or change when you take it  The following are more ways you can prevent or relieve constipation:  · Drink liquids as directed  You may need to drink extra liquids to help soften and move your bowels  Ask how much liquid to drink each day and which liquids are best for you  · Eat high-fiber foods  This may help decrease constipation by adding bulk to your bowel movements  High-fiber foods include fruits, vegetables, whole-grain breads and cereals, and beans  Your healthcare provider or dietitian can help you create a high-fiber meal plan  Your provider may also recommend a fiber supplement if you cannot get enough fiber from food  · Exercise regularly  Regular physical activity can help stimulate your intestines  Walking is a good exercise to prevent or relieve constipation  Ask which exercises are best for you  · Schedule a time each day to have a bowel movement  This may help train your body to have regular bowel movements  Bend forward while you are on the toilet to help move the bowel movement out  Sit on the toilet for at least 10 minutes, even if you do not have a bowel movement  Store opioids safely:   · Store opioids where others cannot easily get them  Keep them in a locked cabinet or secure area  Do not  keep them in a purse or other bag you carry with you  A person may be looking for something else and find the opioids  · Make sure opioids are stored out of the reach of children  A child can easily overdose on opioids  Opioids may look like candy to a small child  The best way to dispose of opioids: The laws vary by country and area   In the [facility], the best way is to return the opioids through a take-back program  This program is offered by the [employer])  The following are options for using the program:  · Take the opioids to a KELLIE collection site  The site is often a law enforcement center  Call your local law enforcement center for scheduled take-back days in your area  You will be given information on where to go if the collection site is in a different location  · Take the opioids to an approved pharmacy or hospital   A pharmacy or hospital may be set up as a collection site  You will need to ask if it is a KELLIE collection site if you were not directed there  A pharmacy or doctor's office may not be able to take back opioids unless it is a KELLIE site  · Use a mail-back system  This means you are given containers to put the opioids into  You will then mail them in the containers  · Use a take-back drop box  This is a place to leave the opioids at any time  People and animals will not be able to get into the box  Your local law enforcement agency can tell you where to find a drop box in your area  Other safe ways to dispose of opioids: The medicine may come with disposal instructions  The instructions may vary depending on the brand of medicine you are using  Instructions may come in a Medication Guide, but not every medicine has one  You may instead get instructions from your pharmacy or doctor  Follow instructions carefully  The following are general guidelines to follow:  · Find out if you can flush the opioid  Some opioids can be flushed down the toilet or poured into the sink  You will need to contact authorities in your area to see if this is an option for you  The FDA also offers a list of medicines that are safe to flush down the toilet  You can check the list if you cannot get the information for your local area  · Ask your waste management company about rules for putting opioids in the trash  The company will be able to give you specific directions   Scratch out personal information on the original medicine label so it cannot be read  Then put it in the trash  Do not label the trash or put any information on it about the opioids  It should look like regular household trash so no one is tempted to look for the opioids  Keep the trash out of the reach of children and animals  Always make sure trash is secure  · Talk to officials if you live in a facility  If you live in a nursing home or assisted living center, talk to an official  The person will know the rules for your area  Other ways to manage pain:   · Ask your healthcare provider about non-opioid medicines to control pain  Nonprescription medicines include NSAIDs (such as ibuprofen) and acetaminophen  Prescription medicines include muscle relaxers, antidepressants, and steroids  · Pain may be managed without any medicines  Some ways to relieve pain include massage, aromatherapy, or meditation  Physical or occupational therapy may also help  For more information:   · Drug Enforcement Administration  [facility] [address]  Phone: 7- [phone] - [phone]  Web Address: [facility]/drug_disposal/    · [address]  and Drug Administration  [place] Radha  [place] , [address] [facility]  Phone: 4- [phone] - [phone]  Web Address: http://[employer]/    Follow up with your doctor or pain specialist as directed: You may need to have your dose adjusted  Your doctor or pain specialist can also help you find ways to manage pain without opioids  Write down your questions so you remember to ask them during your visits  © Copyright [employer] 2021 Information is for End User's use only and may not be sold, redistributed or otherwise used for commercial purposes  All illustrations and images included in CareNotes® are the copyrighted property of [employer]  or Stephanie Peoples  The above information is an  only   It is not intended as medical advice for individual conditions or treatments  Talk to your doctor, nurse or pharmacist before following any medical regimen to see if it is safe and effective for you

## 2022-02-04 NOTE — PROGRESS NOTES
Assessment:  1  Chronic pain syndrome    2  Chronic bilateral low back pain with bilateral sciatica    3  Chronic pain of both hips    4  Chronic bilateral thoracic back pain    5  Chronic lumbar radiculopathy    6  Thoracic radiculopathy    7  Neuropathy    8  Idiopathic peripheral neuropathy    9  Lumbar post-laminectomy syndrome    10  Lumbar degenerative disc disease    11  Primary osteoarthritis of both hips    12  DDD (degenerative disc disease), cervical    13  Spinal stenosis of lumbar region with neurogenic claudication        Plan:  While the patient was in the office today, I did have a thorough conversation with the patient regarding their chronic pain syndrome, symptoms, medication regimen, and treatment plan  I discussed with the patient that since he did not notice a difference from the increase in Lyrica from the last office visit that it is fine if he does not want to take it  He can continue the Norco 10/325 mg as needed and morphine sulfate IR as prescribed  I advised the patient that if they experience any side effects or issues with the changes in their medication regiment, they should notify our office  The patient was agreeable and verbalized an understanding  [place] Prescription Drug Monitoring Program report was reviewed and was appropriate      While the patient was in the office today, an annual review of the opioid contract/agreement was conducted, the patient was agreeable to continuing the contract as previously agreed upon and signed for this calendar year  The patient was selected for a random pill count at todays office visit  The medication was available for the count and the amount present was found to be appropriate according to the date(s) filled and the medication prescription instructions noted on the prescription container  The medication was returned to the patient and the documentation form can be found in the patient's chart           The patient's opioid scripts were sent to their pharmacy electronically and was given a 3 month supply of prescriptions with a Do Not Fill date(s) of February 21, 2022, March 21, 2022, and April 18, 2022  There are risks associated with opioid medications, including dependence, addiction and tolerance  The patient understands and agrees to use these medications only as prescribed  Potential side effects of the medications include, but are not limited to, constipation, drowsiness, addiction, impaired judgment and risk of fatal overdose if not taken as prescribed  The patient was warned against driving while taking sedation medications  Sharing medications is a felony  At this point in time, the patient is showing no signs of addiction, abuse, diversion or suicidal ideation  The patient will follow-up in 12 weeks for medication prescription refill and reevaluation  The patient was advised to contact the office should their symptoms worsen in the interim  The patient was agreeable and verbalized an understanding  IKatrina CRNP, have personally seen, examined, and evaluated this patient as well, and agree with the above treatment plan  History of Present Illness: The patient is a 61 y o  male last seen on November 11, 2021  who presents for a follow up office visit in regards to chronic pain syndrome secondary to lumbar post-laminectomy syndrome with radiculopathy and degenerative discs as well as stenosis as well as chronic bilateral hip pain and cervical pain with spondylosis and neuropathy  The patient currently reports that since his last office visit overall his pain symptoms have been stable and states that he experiences good days and bad days, bad days are associated with change in weather  At his last office visit his Lyrica was increased to 200 mg twice daily however the patient states he did not notice a difference and stopped taking the Lyrica approximately 1 month ago    He states he has not noticed any difference in his pain level since stopping the Lyrica  The patient presents today for regular medication follow-up visit  Current pain medications includes:  Norco 10/325 mg 1 tablet 3 times a day as needed for pain, morphine sulfate IR 15 mg half a tablet twice a day as needed for severe pain, which he uses in place of the Norco occasionally and the last script being filled November of 2021 the patient reports that this medication regimen is providing 50% pain relief is currently reporting no side effects     The patient reports that this regimen is providing 50% pain relief  Pain Contract Signed: 2/4/2022  Last Urine Drug Screen: 11/11/2021    I have personally reviewed and/or updated the patient's past medical history, past surgical history, family history, social history, current medications, allergies, and vital signs today  Review of Systems:    Review of Systems   Respiratory: Positive for shortness of breath  Cardiovascular: Negative for chest pain  Gastrointestinal: Negative for constipation, diarrhea, nausea and vomiting  Musculoskeletal: Positive for gait problem  Negative for arthralgias, joint swelling (joint stiffness) and myalgias  Skin: Negative for rash  Neurological: Positive for weakness  Negative for dizziness and seizures  All other systems reviewed and are negative          Past Medical History:   Diagnosis Date    Acute low back pain     10 AUG 2016 RESOLVED    Angina pectoris (HCC)     Bunion     Bursitis of hip     Carpal tunnel syndrome     Chronic bilateral low back pain with bilateral sciatica     Chronic lumbar radiculopathy     Chronic narcotic dependence (HCC)     Chronic pain syndrome     Constipation due to opioid therapy     DDD (degenerative disc disease), lumbar     Deep vein thrombosis of left upper extremity ([address] )     [id number]  RESOLVED    Depression     Diverticula of colon     [id number] RESOLVED    DVT (deep venous thrombosis) ([facility] [address] )     LUE    Edema     History of staph infection     Hyperglycemia     Hyperlipidemia     Hypertension     Insomnia     Lateral epicondylitis     Left inguinal hernia     Methicillin resistant Staphylococcus aureus septicemia (HCC)     Morbid obesity due to excess calories (HCC)     Obesity     Peripheral neuropathy     Post laminectomy syndrome     RLS (restless legs syndrome)     Septicemia ([facility] [address] )     MRSA    Umbilical hernia        Past Surgical History:   Procedure Laterality Date    BACK SURGERY      laminectomy, hardware    CARPAL TUNNEL RELEASE Left     CERVICAL SPINE SURGERY      COLONOSCOPY      ELBOW SURGERY      ELBOW SURGERY      KNEE ARTHROSCOPY Right     knee    KNEE SURGERY Right     ARTHOSCOPY KNEE    NECK SURGERY      1997    PA COLONOSCOPY FLX DX W/COLLJ SPEC WHEN PFRMD N/A 1/24/2017    Procedure: COLONOSCOPY;  Surgeon: Opal Mcguire MD;  Location: [facility];  Service: General    PA INCISE FINGER TENDON SHEATH Right 9/9/2021    Procedure: Right long finger trigger finger release;  Surgeon: Jonathan Duffy MD;  Location:  MAIN OR;  Service: Orthopedics    PA WRIST Oriana Brownie LIG Right 1/2/2020    Procedure: RELEASE CARPAL TUNNEL ENDOSCOPIC, right;  Surgeon: Jonathan Duffy MD;  Location: [facility];  Service: Orthopedics    [facility] IMPLANT      SYNOVECTOMY Right 9/9/2021    Procedure: Tenosynovectomy right hand long finger flexor digitorum superficialis and profundus tendon;  Surgeon: Jonathan Duffy MD;  Location: [facility];  Service: Orthopedics       Family History   Problem Relation Age of Onset    Heart disease Mother     Cancer Mother         breast, stomach    Aneurysm Mother     Heart disease Father     Cancer Father         skin, liver, colon DECESED AGE 58       Social History     Occupational History    Not on file   Tobacco Use    Smoking status: Never Smoker    Smokeless tobacco: Never Used   Vaping Use    Vaping Use: Never used   Substance and Sexual Activity    Alcohol use: Yes    Drug use: No     Comment: opiod use for chronic pain    Sexual activity: Yes         Current Outpatient Medications:     acetaminophen (TYLENOL) 650 mg CR tablet, Take 1 tablet (650 mg total) by mouth every 8 (eight) hours as needed for mild pain, Disp: 30 tablet, Rfl: 0    bisoprolol-hydrochlorothiazide (ZIAC) 2 5-6 25 MG per tablet, Take 1 tablet by mouth daily, Disp: 90 tablet, Rfl: 0    clonazePAM (KlonoPIN) 0 5 MG disintegrating tablet, Take 1 tablet (0 5 mg total) by mouth 2 (two) times a day, Disp: 60 tablet, Rfl: 0    diazepam (VALIUM) 10 mg tablet, TAKE ONE TABLET BY MOUTH AT BEDTIME AS NEEDED FOR ANXIETY, Disp: 30 tablet, Rfl: 0    escitalopram (Lexapro) 10 mg tablet, Take 1 tablet (10 mg total) by mouth daily, Disp: 30 tablet, Rfl: 5    [START ON 4/19/2022] HYDROcodone-acetaminophen (NORCO)  mg per tablet, Take 1 PO TID PRN for pain  Please send to [facility]  DO NOT FILL UNTIL: 4/19/22, Disp: 85 tablet, Rfl: 0    [START ON 3/21/2022] HYDROcodone-acetaminophen (NORCO)  mg per tablet, Take 1 PO TID PRN for pain  Please send to [facility]  DO NOT FILL UNTIL: 3/21/22, Disp: 85 tablet, Rfl: 0    lactulose 20 g/30 mL, Take 1-2 tablespoons daily PRN as needed for chronic constipation  Please sent to [facility]  , Disp: 473 mL, Rfl: 2    linaCLOtide 145 MCG CAPS, Take 1 capsule (145 mcg total) by mouth daily as needed (constipation), Disp: 30 capsule, Rfl: 3    morphine (MSIR) 15 mg tablet, Take 1/2 tablet BID PRN for severe pain in place of hydrocodone occasionally  Please send to [facility]  , Disp: 30 tablet, Rfl: 0    Multiple Vitamin (MULTIVITAMIN) tablet, Take 1 tablet by mouth every other day , Disp: , Rfl:     naproxen sodium (ALEVE) 220 MG tablet, Take 1 tablet (220 mg total) by mouth 2 (two) times a day with meals, Disp: 20 tablet, Rfl: 0    rosuvastatin (CRESTOR) 10 MG tablet, Take 1 tablet (10 mg total) by mouth daily at bedtime, Disp: 90 tablet, Rfl: 0    HYDROcodone-acetaminophen (NORCO)  mg per tablet, Take 1 PO TID PRN for pain  DO NOT FILL UNTIL: 2/21/22  Please send to [facility]  , Disp: 85 tablet, Rfl: 0    Naloxone HCl (Narcan) 4 MG/0 1ML LIQD, Spray 2 sprays into 1 nostril for suspected opioid overdose  May repeat in other nostril  Call 911 , Disp: 2 each, Rfl: 0    naproxen sodium (ALEVE) 220 MG tablet, Take 1 tablet (220 mg total) by mouth 2 (two) times a day with meals for 5 days, Disp: 10 tablet, Rfl: 0    Allergies   Allergen Reactions    Fentanyl Other (See Comments)     "Makes me Suicidal"  Happened when dosage increased   Penicillins Other (See Comments)     As a child unknown    Pravastatin Other (See Comments)     Reaction Date: [id number];   Muscle weakness    Sulfa Antibiotics Other (See Comments)     As a child unknown    Vytorin [Ezetimibe-Simvastatin] Myalgia     Reaction Date: [id number];        Physical Exam:    /80 (BP Location: Left arm, Patient Position: Sitting, Cuff Size: Standard)   Pulse 68   Temp 97 9 °F (36 6 °C)   Ht 5' 10" (1 778 m)   Wt 122 kg (268 lb)   BMI 38 45 kg/m²     Constitutional:normal, well developed, well nourished, alert, in no distress and non-toxic and no overt pain behavior  Eyes:anicteric  HEENT:grossly intact  Neck:supple, symmetric, trachea midline and no masses   Pulmonary:even and unlabored  Cardiovascular:No edema or pitting edema present  Skin:Normal without rashes or lesions and well hydrated  Psychiatric:Mood and affect appropriate  Neurologic:Cranial Nerves II-XII grossly intact  Musculoskeletal:normal      Imaging  No orders to display         No orders of the defined types were placed in this encounter

## 2022-03-22 DIAGNOSIS — I10 ESSENTIAL HYPERTENSION: ICD-10-CM

## 2022-03-22 DIAGNOSIS — M54.16 CHRONIC LUMBAR RADICULOPATHY: ICD-10-CM

## 2022-03-22 DIAGNOSIS — E78.2 MIXED HYPERLIPIDEMIA: ICD-10-CM

## 2022-03-23 RX ORDER — DIAZEPAM 10 MG/1
10 TABLET ORAL
Qty: 30 TABLET | Refills: 0 | Status: SHIPPED | OUTPATIENT
Start: 2022-03-23 | End: 2022-06-03 | Stop reason: SDUPTHER

## 2022-03-23 RX ORDER — BISOPROLOL FUMARATE AND HYDROCHLOROTHIAZIDE 2.5; 6.25 MG/1; MG/1
1 TABLET ORAL DAILY
Qty: 90 TABLET | Refills: 0 | Status: SHIPPED | OUTPATIENT
Start: 2022-03-23

## 2022-03-23 RX ORDER — ROSUVASTATIN CALCIUM 10 MG/1
10 TABLET, COATED ORAL
Qty: 90 TABLET | Refills: 0 | Status: SHIPPED | OUTPATIENT
Start: 2022-03-23 | End: 2022-06-30 | Stop reason: SDUPTHER

## 2022-04-28 DIAGNOSIS — K59.00 OBSTIPATION: ICD-10-CM

## 2022-04-29 ENCOUNTER — OFFICE VISIT (OUTPATIENT)
Dept: PAIN MEDICINE | Facility: CLINIC | Age: 67
End: 2022-04-29
Payer: COMMERCIAL

## 2022-04-29 VITALS
HEART RATE: 63 BPM | DIASTOLIC BLOOD PRESSURE: 80 MMHG | TEMPERATURE: 98.8 F | WEIGHT: 267 LBS | BODY MASS INDEX: 38.22 KG/M2 | HEIGHT: 70 IN | SYSTOLIC BLOOD PRESSURE: 124 MMHG

## 2022-04-29 DIAGNOSIS — M54.14 THORACIC RADICULOPATHY: ICD-10-CM

## 2022-04-29 DIAGNOSIS — G89.29 CHRONIC PAIN OF BOTH HIPS: ICD-10-CM

## 2022-04-29 DIAGNOSIS — G89.4 CHRONIC PAIN SYNDROME: Primary | ICD-10-CM

## 2022-04-29 DIAGNOSIS — M25.551 CHRONIC PAIN OF BOTH HIPS: ICD-10-CM

## 2022-04-29 DIAGNOSIS — M54.16 CHRONIC LUMBAR RADICULOPATHY: ICD-10-CM

## 2022-04-29 DIAGNOSIS — G89.29 CHRONIC BILATERAL LOW BACK PAIN WITH BILATERAL SCIATICA: ICD-10-CM

## 2022-04-29 DIAGNOSIS — M50.30 DDD (DEGENERATIVE DISC DISEASE), CERVICAL: ICD-10-CM

## 2022-04-29 DIAGNOSIS — M54.42 CHRONIC BILATERAL LOW BACK PAIN WITH BILATERAL SCIATICA: ICD-10-CM

## 2022-04-29 DIAGNOSIS — M16.0 PRIMARY OSTEOARTHRITIS OF BOTH HIPS: ICD-10-CM

## 2022-04-29 DIAGNOSIS — G60.9 IDIOPATHIC PERIPHERAL NEUROPATHY: ICD-10-CM

## 2022-04-29 DIAGNOSIS — F11.20 CONTINUOUS OPIOID DEPENDENCE (HCC): ICD-10-CM

## 2022-04-29 DIAGNOSIS — M48.062 SPINAL STENOSIS OF LUMBAR REGION WITH NEUROGENIC CLAUDICATION: ICD-10-CM

## 2022-04-29 DIAGNOSIS — M25.552 CHRONIC PAIN OF BOTH HIPS: ICD-10-CM

## 2022-04-29 DIAGNOSIS — M54.6 CHRONIC BILATERAL THORACIC BACK PAIN: ICD-10-CM

## 2022-04-29 DIAGNOSIS — M96.1 LUMBAR POST-LAMINECTOMY SYNDROME: ICD-10-CM

## 2022-04-29 DIAGNOSIS — G62.9 NEUROPATHY: ICD-10-CM

## 2022-04-29 DIAGNOSIS — M54.41 CHRONIC BILATERAL LOW BACK PAIN WITH BILATERAL SCIATICA: ICD-10-CM

## 2022-04-29 DIAGNOSIS — G89.29 CHRONIC BILATERAL THORACIC BACK PAIN: ICD-10-CM

## 2022-04-29 DIAGNOSIS — M51.36 LUMBAR DEGENERATIVE DISC DISEASE: ICD-10-CM

## 2022-04-29 PROCEDURE — 99214 OFFICE O/P EST MOD 30 MIN: CPT | Performed by: NURSE PRACTITIONER

## 2022-04-29 RX ORDER — HYDROCODONE BITARTRATE AND ACETAMINOPHEN 10; 325 MG/1; MG/1
TABLET ORAL
Qty: 85 TABLET | Refills: 0 | Status: SHIPPED | OUTPATIENT
Start: 2022-06-19 | End: 2022-07-25 | Stop reason: SDUPTHER

## 2022-04-29 RX ORDER — NALOXONE HYDROCHLORIDE 4 MG/.1ML
SPRAY NASAL
Qty: 2 EACH | Refills: 0 | Status: SHIPPED | OUTPATIENT
Start: 2022-04-29

## 2022-04-29 RX ORDER — HYDROCODONE BITARTRATE AND ACETAMINOPHEN 10; 325 MG/1; MG/1
TABLET ORAL
Qty: 85 TABLET | Refills: 0 | Status: SHIPPED | OUTPATIENT
Start: 2022-04-29 | End: 2022-07-25 | Stop reason: SDUPTHER

## 2022-04-29 NOTE — PROGRESS NOTES
Assessment:  1  Chronic pain syndrome    2  Chronic bilateral low back pain with bilateral sciatica    3  Chronic bilateral thoracic back pain    4  Chronic pain of both hips    5  Chronic lumbar radiculopathy    6  Thoracic radiculopathy    7  Lumbar post-laminectomy syndrome    8  Neuropathy    9  Idiopathic peripheral neuropathy    10  Lumbar degenerative disc disease    11  Primary osteoarthritis of both hips    12  DDD (degenerative disc disease), cervical    13  Spinal stenosis of lumbar region with neurogenic claudication    14  Continuous opioid dependence ([facility] [address] )        Plan:  While the patient was in the office today, I did have a thorough conversation with the patient regarding their chronic pain syndrome, symptoms, medication regimen, and treatment plan  I discussed with the patient at this point time since he is noting at least moderate stable relief and he has tried and failed every other alternative medication regimen treatment plan option and he does have significant and supportive underlying etiology, I feel it is reasonable and appropriate to continue with the p r n  Norco as prescribed  The patient was agreeable and verbalized an understanding  [place] Prescription Drug Monitoring Program report was reviewed and was appropriate     The patient was not picked for a pill count today, but he did bring his medications as required  The patient's opioid scripts were sent to their pharmacy electronically and was given a 3 month supply of prescriptions with a Do Not Fill date(s) of May 17, 2022, June 14, 2022, and July 12, 2022  There are risks associated with opioid medications, including dependence, addiction and tolerance  The patient understands and agrees to use these medications only as prescribed   Potential side effects of the medications include, but are not limited to, constipation, drowsiness, addiction, impaired judgment and risk of fatal overdose if not taken as prescribed  The patient was warned against driving while taking sedation medications  Sharing medications is a felony  At this point in time, the patient is showing no signs of addiction, abuse, diversion or suicidal ideation  An oral drug screen swab was collected at today's office visit  The swab will be sent for confirmatory testing  The drug screen is medically necessary because the patient is either dependent on opioid medication or is being considered for opioid medication therapy and the results could impact ongoing or future treatment  The drug screen is to evaluate for the presences or absence of prescribed, non-prescribed, and/or illicit drugs/substances  While the patient was in the office today, I discussed with the patient that at this point they have been identified as a patient who is at significant risk for respiratory depression and/or even death because they are being, appropriately prescribed,  a dosage of opioid pain medication that is equal to or greater than 40 milligram equivalents of Morphine Sulfates, on  a long acting medication, and/or the patient's age and other comorbidities puts them at increased risk for respiratory depression and possibly death  I, again, reviewed the dangers of being on an opioid and at this point since we feel it is appropriate that they continue the opioid medication at this level, for now, we will continue the dosage as prescribed  However, we feel that it is best and safe practice to prescribe narcan nasal spray to be used if the patient is experiencing respiratory depression as a result of suspected intentional or unintentional opioid over dose   I reviewed with the patient how to use the nasal spray and to make sure that they educated a family member on how to use it and that no matter what emergency personnel must be notified as the Narcan nasal spray is only meant to give the patient more time to get to the nearest emergency room for a more thorough evaluation  The patient was agreeable and verbalized an understanding  A signed copy of the patient education sheet reviewing the risks and reasons for prescribing this medication is available in the patient's chart and a copy was also sent home with the patient  The patient will follow-up in 12 weeks for medication prescription refill and reevaluation  The patient was advised to contact the office should their symptoms worsen in the interim  The patient was agreeable and verbalized an understanding  History of Present Illness: The patient is a 61 y o  male last seen on 2/4/2022 who presents for a follow up office visit in regards to chronic pain syndrome, as the patient's pain has been ongoing for greater than a year, secondary to lumbar post-laminectomy syndrome with degenerative discs, radiculopathy as well as chronic hip and thoracic pain as well as idiopathic peripheral neuropathy status post permanent thoracic lead spinal cord stimulator implantation  The patient currently reports at this point time as he is still not using his spinal cord stimulator he feels that his medication regimen is currently keeping his pain stable and well managed  The patient presents today for regular medication follow-up visit  Current pain medications includes:  Norco 10/325, 1 p o  t i d  p r n  for pain, dispense number 85 pills per 30 days  The patient reports that this regimen is providing 50% pain relief  The patient is reporting no side effects from this pain medication regimen  Pain Contract Signed: 2/4/2022  Last Urine Drug Screen: 11/11/2021    I have personally reviewed and/or updated the patient's past medical history, past surgical history, family history, social history, current medications, allergies, and vital signs today  Review of Systems:    Review of Systems   Respiratory: Negative for shortness of breath  Cardiovascular: Negative for chest pain     Gastrointestinal: Negative for constipation, diarrhea, nausea and vomiting  Musculoskeletal: Positive for gait problem  Negative for arthralgias, joint swelling (joint stiffness) and myalgias  Skin: Negative for rash  Neurological: Positive for dizziness and weakness  Negative for seizures  All other systems reviewed and are negative          Past Medical History:   Diagnosis Date    Acute low back pain     10 AUG 2016 RESOLVED    [facility] pectoris ([facility])     Bunion     Bursitis of hip     Carpal tunnel syndrome     Chronic bilateral low back pain with bilateral sciatica     Chronic lumbar radiculopathy     Chronic narcotic dependence ([facility])     Chronic pain syndrome     Constipation due to opioid therapy     DDD (degenerative disc disease), lumbar     Deep vein thrombosis of left upper extremity ([facility] [address] )     [id number]  RESOLVED    Depression     Diverticula of colon     [id number] RESOLVED    DVT (deep venous thrombosis) ([facility])     LUE    Edema     History of staph infection     Hyperglycemia     Hyperlipidemia     Hypertension     Insomnia     Lateral epicondylitis     Left inguinal hernia     Methicillin resistant Staphylococcus aureus septicemia ([facility] [address] )     Morbid obesity due to excess calories ([facility])     Obesity     Peripheral neuropathy     Post laminectomy syndrome     RLS (restless legs syndrome)     [facility]     MRSA    Umbilical hernia        Past Surgical History:   Procedure Laterality Date    BACK SURGERY      laminectomy, hardware    CARPAL TUNNEL RELEASE Left     CERVICAL SPINE SURGERY      COLONOSCOPY      ELBOW SURGERY      ELBOW SURGERY      KNEE ARTHROSCOPY Right     knee    KNEE SURGERY Right     ARTHOSCOPY KNEE    NECK SURGERY      1997    IL COLONOSCOPY FLX DX W/COLLJ SPEC WHEN PFRMD N/A 1/24/2017    Procedure: COLONOSCOPY;  Surgeon: Claudy Ly MD;  Location:  MAIN OR;  Service: General    IL INCISE FINGER TENDON SHEATH Right 9/9/2021    Procedure: Right long finger trigger finger release;  Surgeon: Pinky Hernandez MD;  Location:  MAIN OR;  Service: Orthopedics    HI WRIST Dichristine Vasquez LIG Right 1/2/2020    Procedure: RELEASE CARPAL TUNNEL ENDOSCOPIC, right;  Surgeon: Pinky Hernandez MD;  Location:  MAIN OR;  Service: Orthopedics    SHOULDER SURGERY      SPINAL CORD STIMULATOR IMPLANT      SYNOVECTOMY Right 9/9/2021    Procedure: Tenosynovectomy right hand long finger flexor digitorum superficialis and profundus tendon;  Surgeon: Pinky Hernandez MD;  Location: [facility] MAIN OR;  Service: Orthopedics       Family History   Problem Relation Age of Onset    Heart disease Mother     Cancer Mother         breast, stomach    Aneurysm Mother     Heart disease Father     Cancer Father         skin, liver, colon DECESED AGE 58       Social History     Occupational History    Not on file   Tobacco Use    Smoking status: Never Smoker    Smokeless tobacco: Never Used   Vaping Use    Vaping Use: Never used   Substance and Sexual Activity    Alcohol use: Yes    Drug use: No     Comment: opiod use for chronic pain    Sexual activity: Yes         Current Outpatient Medications:     acetaminophen (TYLENOL) 650 mg CR tablet, Take 1 tablet (650 mg total) by mouth every 8 (eight) hours as needed for mild pain, Disp: 30 tablet, Rfl: 0    bisoprolol-hydrochlorothiazide (ZIAC) 2 5-6 25 MG per tablet, Take 1 tablet by mouth daily, Disp: 90 tablet, Rfl: 0    clonazePAM (KlonoPIN) 0 5 MG disintegrating tablet, Take 1 tablet (0 5 mg total) by mouth 2 (two) times a day, Disp: 60 tablet, Rfl: 0    diazepam (VALIUM) 10 mg tablet, Take 1 tablet (10 mg total) by mouth daily at bedtime as needed for anxiety, Disp: 30 tablet, Rfl: 0    escitalopram (Lexapro) 10 mg tablet, Take 1 tablet (10 mg total) by mouth daily, Disp: 30 tablet, Rfl: 5    lactulose 20 g/30 mL, Take 1-2 tablespoons daily PRN as needed for chronic constipation  Please sent to [facility]  , Disp: 473 mL, Rfl: 2    linaCLOtide 145 MCG CAPS, Take 1 capsule (145 mcg total) by mouth daily as needed (constipation), Disp: 30 capsule, Rfl: 0    morphine (MSIR) 15 mg tablet, Take 1/2 tablet BID PRN for severe pain in place of hydrocodone occasionally  Please send to [facility]  , Disp: 30 tablet, Rfl: 0    Multiple Vitamin (MULTIVITAMIN) tablet, Take 1 tablet by mouth every other day , Disp: , Rfl:     naproxen sodium (ALEVE) 220 MG tablet, Take 1 tablet (220 mg total) by mouth 2 (two) times a day with meals, Disp: 20 tablet, Rfl: 0    rosuvastatin (CRESTOR) 10 MG tablet, Take 1 tablet (10 mg total) by mouth daily at bedtime, Disp: 90 tablet, Rfl: 0    HYDROcodone-acetaminophen (NORCO)  mg per tablet, Take 1 PO TID PRN for pain  Please send to [facility]  DO NOT FILL UNTIL: 5/17/22, Disp: 85 tablet, Rfl: 0    [START ON 6/19/2022] HYDROcodone-acetaminophen (NORCO)  mg per tablet, Take 1 PO TID PRN for pain  Please send to [facility]  DO NOT FILL UNTIL: 6/19/22, Disp: 85 tablet, Rfl: 0    HYDROcodone-acetaminophen (NORCO)  mg per tablet, Take 1 PO TID PRN for pain  DO NOT FILL UNTIL: 7/12/22  Please send to [facility]  , Disp: 85 tablet, Rfl: 0    naloxone (Narcan) 4 mg/0 1 mL nasal spray, Spray 2 sprays into 1 nostril for suspected opioid overdose  May repeat in other nostril  Call 911 , Disp: 2 each, Rfl: 0    naproxen sodium (ALEVE) 220 MG tablet, Take 1 tablet (220 mg total) by mouth 2 (two) times a day with meals for 5 days, Disp: 10 tablet, Rfl: 0    Allergies   Allergen Reactions    Fentanyl Other (See Comments)     "Makes me Suicidal"  Happened when dosage increased      Penicillins Other (See Comments)     As a child unknown    Pravastatin Other (See Comments)     Reaction Date: [id number];   Muscle weakness    Sulfa Antibiotics Other (See Comments)     As a child unknown    Vytorin [Ezetimibe-Simvastatin] Myalgia     Reaction Date: [id number];        Physical Exam:    /80 (BP Location: Left arm, Patient Position: Sitting, Cuff Size: Standard)   Pulse 63   Temp 98 8 °F (37 1 °C)   Ht 5' 10" (1 778 m)   Wt 121 kg (267 lb)   BMI 38 31 kg/m²     Constitutional:normal, well developed, well nourished, alert, in no distress and non-toxic and no overt pain behavior  and overweight  Eyes:anicteric  HEENT:grossly intact  Neck:supple, symmetric, trachea midline and no masses   Pulmonary:even and unlabored  Cardiovascular:No edema or pitting edema present  Skin:Normal without rashes or lesions and well hydrated  Psychiatric:Mood and affect appropriate  Neurologic:Cranial Nerves II-XII grossly intact  Musculoskeletal:normal      Imaging  No orders to display         No orders of the defined types were placed in this encounter

## 2022-04-29 NOTE — PATIENT INSTRUCTIONS
Opioid Safety   AMBULATORY CARE:   Opioid safety  includes the correct use, storage, and disposal of opioids  Examples of opioid medicines to treat pain include oxycodone, morphine, fentanyl, and codeine  Call your local emergency number (911 in the [address]), or have someone else call if:   · You have a seizure  · You cannot be woken  · You have trouble staying awake and your breathing is slow or shallow  · Your speech is slurred, or you are confused  · You are dizzy or stumble when you walk  Call your doctor, or have someone close to you call if:   · You are extremely drowsy, or you have trouble staying awake or speaking  · You have pale or clammy skin  · You have blue fingernails or lips  · Your heartbeat is slower than normal     · You cannot stop vomiting  · You have questions or concerns about your condition or care  Use opioids safely:   · Take prescribed opioids exactly as directed  Opioids come with directions based on the kind of opioid and how it is given  Do not take more than the recommended amount, or for longer than needed  · Do not give opioids to others or take opioids that belong to someone else  Misuse of opioids can lead to an addiction or overdose  · Do not mix opioids with other medicines or alcohol  The combination can cause an overdose, or lead to a coma  · Do not drive or operate heavy machinery after you take the opioid  Your provider or pharmacist can tell you how long to wait after a dose before you do these activities  · Talk to your healthcare provider if you have any side effects  He or she can help you prevent or relieve side effects  Side effects include nausea, sleepiness, itching, and trouble thinking clearly  Manage constipation:  Constipation is the most common side effect of opioid medicine  Constipation is when you have hard, dry bowel movements, or you go longer than usual between bowel movements   Tell your healthcare provider about all changes in your bowel movements while you are taking opioids  He or she may recommend laxative medicine to help you have a bowel movement  He or she may also change the kind of opioid you are taking, or change when you take it  The following are more ways you can prevent or relieve constipation:  · Drink liquids as directed  You may need to drink extra liquids to help soften and move your bowels  Ask how much liquid to drink each day and which liquids are best for you  · Eat high-fiber foods  This may help decrease constipation by adding bulk to your bowel movements  High-fiber foods include fruits, vegetables, whole-grain breads and cereals, and beans  Your healthcare provider or dietitian can help you create a high-fiber meal plan  Your provider may also recommend a fiber supplement if you cannot get enough fiber from food  · Exercise regularly  Regular physical activity can help stimulate your intestines  Walking is a good exercise to prevent or relieve constipation  Ask which exercises are best for you  · Schedule a time each day to have a bowel movement  This may help train your body to have regular bowel movements  Bend forward while you are on the toilet to help move the bowel movement out  Sit on the toilet for at least 10 minutes, even if you do not have a bowel movement  Store opioids safely:   · Store opioids where others cannot easily get them  Keep them in a locked cabinet or secure area  Do not  keep them in a purse or other bag you carry with you  A person may be looking for something else and find the opioids  · Make sure opioids are stored out of the reach of children  A child can easily overdose on opioids  Opioids may look like candy to a small child  The best way to dispose of opioids: The laws vary by country and area   In the [place], the best way is to return the opioids through a take-back program  This program is offered by the [employer] [employer] ([address])  The following are options for using the program:  · Take the opioids to a [facility] collection site  The site is often a law enforcement center  Call your local law enforcement center for scheduled take-back days in your area  You will be given information on where to go if the collection site is in a different location  · Take the opioids to an approved pharmacy or hospital   A pharmacy or hospital may be set up as a collection site  You will need to ask if it is a [facility]LLIE collection site if you were not directed there  A pharmacy or doctor's office may not be able to take back opioids unless it is a KELLIE site  · Use a mail-back system  This means you are given containers to put the opioids into  You will then mail them in the containers  · Use a take-back drop box  This is a place to leave the opioids at any time  People and animals will not be able to get into the box  Your local law enforcement agency can tell you where to find a drop box in your area  Other safe ways to dispose of opioids: The medicine may come with disposal instructions  The instructions may vary depending on the brand of medicine you are using  Instructions may come in a Medication Guide, but not every medicine has one  You may instead get instructions from your pharmacy or doctor  Follow instructions carefully  The following are general guidelines to follow:  · Find out if you can flush the opioid  Some opioids can be flushed down the toilet or poured into the sink  You will need to contact authorities in your area to see if this is an option for you  The FDA also offers a list of medicines that are safe to flush down the toilet  You can check the list if you cannot get the information for your local area  · Ask your waste management company about rules for putting opioids in the trash  The company will be able to give you specific directions   Scratch out personal information on the original medicine label so it cannot be read  Then put it in the trash  Do not label the trash or put any information on it about the opioids  It should look like regular household trash so no one is tempted to look for the opioids  Keep the trash out of the reach of children and animals  Always make sure trash is secure  · Talk to officials if you live in a facility  If you live in a nursing home or assisted living center, talk to an official  The person will know the rules for your area  Other ways to manage pain:   · Ask your healthcare provider about non-opioid medicines to control pain  Nonprescription medicines include NSAIDs (such as ibuprofen) and acetaminophen  Prescription medicines include muscle relaxers, antidepressants, and steroids  · Pain may be managed without any medicines  Some ways to relieve pain include massage, aromatherapy, or meditation  Physical or occupational therapy may also help  For more information:   · Drug Enforcement Administration  [facility] [address]  Phone: 4- 217 - 516-9978  Web Address: [facility]/drug_disposal/    · [address]  and Drug Administration  [facility] , [address] [place] [address]  Phone: 3- 740 - 040-7258  Web Address: http://[employer]/    Follow up with your doctor or pain specialist as directed: You may need to have your dose adjusted  Your doctor or pain specialist can also help you find ways to manage pain without opioids  Write down your questions so you remember to ask them during your visits  © Copyright [employer] 2022 Information is for End User's use only and may not be sold, redistributed or otherwise used for commercial purposes  All illustrations and images included in CareNotes® are the copyrighted property of [employer] A M [employer] Inc  or Stephanie Peoples  The above information is an  only  It is not intended as medical advice for individual conditions or treatments   Talk to your doctor, nurse or pharmacist before following any medical regimen to see if it is safe and effective for you

## 2022-05-10 ENCOUNTER — TELEPHONE (OUTPATIENT)
Dept: FAMILY MEDICINE CLINIC | Facility: HOSPITAL | Age: 67
End: 2022-05-10

## 2022-05-10 PROCEDURE — U0005 INFEC AGEN DETEC AMPLI PROBE: HCPCS | Performed by: FAMILY MEDICINE

## 2022-05-10 PROCEDURE — U0003 INFECTIOUS AGENT DETECTION BY NUCLEIC ACID (DNA OR RNA); SEVERE ACUTE RESPIRATORY SYNDROME CORONAVIRUS 2 (SARS-COV-2) (CORONAVIRUS DISEASE [COVID-19]), AMPLIFIED PROBE TECHNIQUE, MAKING USE OF HIGH THROUGHPUT TECHNOLOGIES AS DESCRIBED BY CMS-2020-01-R: HCPCS | Performed by: FAMILY MEDICINE

## 2022-05-10 NOTE — TELEPHONE ENCOUNTER
Luis Ramón - 2-3 wks  Diarrhea - yesterday, still has it  Achey, chills, sore throat which has improved, post nasal drip  No covid tests in the home  Do you think a PCR should be ordered for urgent care?     Call back to [facility] @ [phone]

## 2022-06-01 NOTE — MISCELLANEOUS
Called and spoke to pt about missed/cancelled appts  Asked pt if she was still interested in bariatric surgery  Pt states she is and apologies for not rescheduling; "life has been crazy"  Reminded pt she needs to stay connected with the program in order to proceed forward  Reminded pt to schedule EGD (pt will schedule with GI to also complete her colonoscopy) and her cardio risk assessment  Pt states she is about to go to a 10am meeting therefore RD offered to email pt information  Pt appreciative  Sent pt following email:    Eliane Wahl We would like to ensure that you come in on a monthly basis to check in until surgery is scheduled  You need to be connected with the program in order to properly be prepared for surgery  If we can schedule an appt for as soon as possible since you were last in the office in April  Here are some options  Tomorrow, Thursday, June 2nd at [address] [facility] [address] with                   Michael Paez the  OR  Friday, June 3rd at 8:30am or any time between              9:30 to 11am OR 1:30pm, 2pm, 2:30 or                        3:30pm with Michael Paez the      Schedule your colonoscopy/EGD:  Here is the number to gastro in [facility]:  [phone]     Schedule your cardio risk assessment:  Attached is a list of all the cardiologist locations for [facility] so you can call the one most convenient to you   Get your labs completed:  I gave you an prescription for the lab work when we met on 4/28/22     Complete support group:  Anabel Mcgowan gave you the info on this when you met with her     Please obtain a letter of support from your PCP    Above is your to do list of what needs to be completed in order to submit for authorization to insurance  Please let us know if you have any questions at all  Let me know what date above works for your next appt        Talk cara,    Shelbi Guadarrama, FAN, LDN  Surgical Dietitian  [facility]  [address] [facility] [address] Message   Recorded as Task   Date: 06/29/2016 10:41 AM, Created By: System   Task Name: Schedule Appointment   Assigned To: [facility],Team   Regarding Patient: Nallely Gaytan, Status: In Progress   Comment:    System - 29 Jun 2016 10:41 AM        Isatu Soto - 29 Jun 2016 10:41 AM     Cristobal Rodríguez - 29 Jun 2016 10:42 AM     TASK REASSIGNED: Previously Assigned To Ang Nunez  please call pt on cell for apt last seen nov 2015 order in chart thanks ! Nola Longoria - 29 Jun 2016 12:33 PM     TASK IN PROGRESS   Nola Longoria - 29 Jun 2016 12:34 PM     TASK REPLIED TO: Previously Assigned To Satya SHEPARD  2  for pt to Maegan Vu - 29 Jun 2016 1:37 PM     TASK REPLIED TO: Previously Assigned To [facility],Team  APPT SCHEDULED        Active Problems    1  Acute low back pain (724 2) (M54 5)   2  Bursitis of hip (726 5) (M70 70)   3  Carpal tunnel syndrome, unspecified laterality (354 0) (G56 00)   4  Chronic back pain (724 5,338 29) (M54 9,G89 29)   5  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)   6  Edema (782 3) (R60 9)   7  Encounter for screening colonoscopy (V76 51) (Z12 11)   8  Flu vaccine need (V04 81) (Z23)   9  Hyperglycemia (790 29) (R73 9)   10  Hyperlipidemia (272 4) (E78 5)   11  Hypertension (401 9) (I10)   12  Lateral epicondylitis (726 32) (M77 10)   13  Lumbar radicular pain (724 4) (M54 16)   14  Lumbar sprain, sequela (905 7,847 2) (S33 5XXS)   15  Neurogenic claudication due to lumbar spinal stenosis (724 03) (M48 06)   16  Pain in left hip (719 45) (M25 552)   17  Peripheral neuropathy (356 9) (G62 9)   18  Postlaminectomy syndrome of lumbosacral region (722 83) (M96 1)   19  Restless legs syndrome (333 94) (G25 81)   20  Special screening examination for neoplasm of prostate (V76 44) (Z12 5)   21  Staphylococcus infection (041 10) (B95 8)   22  Thoracic injury, initial encounter (959 11) (S29 9XXA)   23   Thoracic radiculopathy (724 4) (M54 14)   24  Thoracic spinal stenosis (724 01) (M48 04)   25  Umbilical hernia (620 8) (K42 9)    Current Meds   1  Aleve 220 MG Oral Tablet; Take 1 tablet twice daily; Therapy: (Leonarda Sandoval) to Recorded   2  Aspirin 81 MG CAPS; take 1 capsule daily; Therapy: (Leonarda Sandoval) to Recorded   3  Belsomra 15 MG Oral Tablet; take 1 tablet at  bedtime as needed for insomnia; Therapy: 04GZM3980 to (Evaluate:88Frk0121) Recorded   4  Bisoprolol-Hydrochlorothiazide 2 5-6 25 MG Oral Tablet; take one tablet by mouth every   day; Therapy: 38MTC1081 to (Last Rx:86Zvw3613)  Requested for: 84EHS4312 Ordered   5  ClonazePAM 0 5 MG Oral Tablet; TAKE ONE TABLET BY MOUTH EVERY EVENING FOR   RESTLESS LEG; Therapy: 79SNA9664 to (Last Rx:26Jan2016)  Requested for: 62YRU8928 Ordered   6  Daily Multivitamin TABS; TAKE 1 TABLET DAILY; Therapy: (Leonarda Sandoval) to Recorded   7  Diazepam 10 MG Oral Tablet; TAKE TABLET  1/2-1 tablet as needed daily; Last   Rx:82Yiw8564 Ordered   8  Flector 1 3 % Transdermal Patch; APPLY PATCH TO AFFECTED AREA TWICE DAILY; Therapy: (Recorded:29Jun2016) to Recorded   9  Hydrocodone-Acetaminophen  MG Oral Tablet; Take 1/2 -1 tab every 6 hrs prn; Therapy: 27NLG0700 to (Evaluate:30Jun2016)  Requested for: 92DHP8963; Last   Rx:91Fqz5652 Ordered   10  Opana ER 10 MG TB12 (Oxymorphone HCl ER); TAKE TABLET  as needed for pain; Therapy: (Abdifatah Spence) to Recorded   11  Rosuvastatin Calcium 10 MG Oral Tablet; TAKE 1 TABLET DAILY; Therapy: 07Hmh7892 to (Evaluate:77Fhg0017); Last Rx:22Jun2016 Ordered   12  TraMADol HCl - 50 MG Oral Tablet; TAKE 1 TABLET BY MOUTH EVERY 6 HOURS AS    NEEDED; Therapy: 95MSH1949 to (Evaluate:88Cyq7358); Last Rx:22Jun2016 Ordered    Allergies    1  fentanyl   2  Penicillins   3  Pravachol TABS   4  Sulfa Drugs   5  Vytorin TABS    Signatures   Electronically signed by :  Gary Navarro, ; Jul 1 2016 11:20AM EST [facility]  [address]  Direct Phone:  [phone]  P: [phone]   F: [phone]  [email or web address] (Author)

## 2022-06-03 DIAGNOSIS — M54.16 CHRONIC LUMBAR RADICULOPATHY: ICD-10-CM

## 2022-06-03 RX ORDER — DIAZEPAM 10 MG/1
10 TABLET ORAL
Qty: 30 TABLET | Refills: 0 | Status: SHIPPED | OUTPATIENT
Start: 2022-06-03

## 2022-06-30 DIAGNOSIS — E78.2 MIXED HYPERLIPIDEMIA: ICD-10-CM

## 2022-06-30 RX ORDER — ROSUVASTATIN CALCIUM 10 MG/1
10 TABLET, COATED ORAL
Qty: 90 TABLET | Refills: 0 | Status: SHIPPED | OUTPATIENT
Start: 2022-06-30 | End: 2022-07-14 | Stop reason: SDUPTHER

## 2022-07-11 ENCOUNTER — OFFICE VISIT (OUTPATIENT)
Dept: FAMILY MEDICINE CLINIC | Facility: HOSPITAL | Age: 67
End: 2022-07-11
Payer: COMMERCIAL

## 2022-07-11 VITALS
WEIGHT: 263.6 LBS | OXYGEN SATURATION: 96 % | TEMPERATURE: 99.6 F | DIASTOLIC BLOOD PRESSURE: 80 MMHG | SYSTOLIC BLOOD PRESSURE: 120 MMHG | BODY MASS INDEX: 37.74 KG/M2 | HEIGHT: 70 IN | HEART RATE: 82 BPM

## 2022-07-11 DIAGNOSIS — G62.9 NEUROPATHY: Primary | ICD-10-CM

## 2022-07-11 DIAGNOSIS — Z23 ENCOUNTER FOR IMMUNIZATION: ICD-10-CM

## 2022-07-11 DIAGNOSIS — E66.01 CLASS 2 SEVERE OBESITY DUE TO EXCESS CALORIES WITH SERIOUS COMORBIDITY AND BODY MASS INDEX (BMI) OF 37.0 TO 37.9 IN ADULT (HCC): ICD-10-CM

## 2022-07-11 DIAGNOSIS — I20.9 ANGINA PECTORIS, UNSPECIFIED (HCC): ICD-10-CM

## 2022-07-11 DIAGNOSIS — I10 ESSENTIAL HYPERTENSION: ICD-10-CM

## 2022-07-11 DIAGNOSIS — F11.20 CONTINUOUS OPIOID DEPENDENCE (HCC): ICD-10-CM

## 2022-07-11 DIAGNOSIS — M54.16 CHRONIC LUMBAR RADICULOPATHY: ICD-10-CM

## 2022-07-11 DIAGNOSIS — L21.9 SEBORRHEIC ECZEMA: ICD-10-CM

## 2022-07-11 DIAGNOSIS — M62.838 CERVICAL PARASPINAL MUSCLE SPASM: ICD-10-CM

## 2022-07-11 PROCEDURE — 90677 PCV20 VACCINE IM: CPT

## 2022-07-11 PROCEDURE — 90471 IMMUNIZATION ADMIN: CPT

## 2022-07-11 PROCEDURE — 99215 OFFICE O/P EST HI 40 MIN: CPT | Performed by: FAMILY MEDICINE

## 2022-07-11 RX ORDER — CYCLOBENZAPRINE HCL 10 MG
10 TABLET ORAL 3 TIMES DAILY PRN
Qty: 45 TABLET | Refills: 1 | Status: SHIPPED | OUTPATIENT
Start: 2022-07-11

## 2022-07-11 RX ORDER — CARBAMAZEPINE 200 MG/1
200 TABLET ORAL 2 TIMES DAILY
Qty: 60 TABLET | Refills: 2 | Status: SHIPPED | OUTPATIENT
Start: 2022-07-11

## 2022-07-11 NOTE — PROGRESS NOTES
Assessment/Plan:         Diagnoses and all orders for this visit:    Neuropathy  -     carBAMazepine (TEGretol) 200 mg tablet; Take 1 tablet (200 mg total) by mouth 2 (two) times a day    Essential hypertension    Chronic lumbar radiculopathy    Encounter for immunization  -     Pneumococcal Conjugate Vaccine 20-valent (Pcv20)    Class 2 severe obesity due to excess calories with serious comorbidity and body mass index (BMI) of 37 0 to 37 9 in adult [facility])    Cervical paraspinal muscle spasm  -     cyclobenzaprine (FLEXERIL) 10 mg tablet; Take 1 tablet (10 mg total) by mouth 3 (three) times a day as needed for muscle spasms    Angina pectoris, unspecified (HCC)    Continuous opioid dependence (HCC)    Seborrheic eczema          Subjective:      Patient ID: Reshma Caro  is a 74 y o  male  6 month follow up  No recent illness or injury    Medications reviewed and revised    Still out of breath since COVID    Neck stiffness to R side for past 2 weeks    Skin rash on face and behind ears  Neuropathic symptoms of both feet      The following portions of the patient's history were reviewed and updated as appropriate: allergies, current medications, past family history, past medical history, past social history, past surgical history and problem list     Review of Systems   Constitutional: Negative for unexpected weight change  Respiratory: Positive for shortness of breath  Cardiovascular: Positive for chest pain  Gastrointestinal: Negative  Musculoskeletal: Positive for arthralgias, back pain, gait problem, joint swelling, myalgias, neck pain and neck stiffness  Psychiatric/Behavioral: Positive for dysphoric mood  All other systems reviewed and are negative          Objective:      /80 (Patient Position: Sitting, Cuff Size: Large)   Pulse 82   Temp 99 6 °F (37 6 °C) (Tympanic)   Ht 5' 10" (1 778 m)   Wt 120 kg (263 lb 9 6 oz)   SpO2 96%   BMI 37 82 kg/m²          Physical Exam  Vitals reviewed  Constitutional:       Appearance: He is obese  Neck:      Vascular: No carotid bruit  Cardiovascular:      Rate and Rhythm: Regular rhythm  Pulses: Normal pulses  Heart sounds: Normal heart sounds  Pulmonary:      Effort: Pulmonary effort is normal       Breath sounds: Normal breath sounds  Musculoskeletal:      Right lower leg: No edema  Left lower leg: No edema  Skin:     Findings: Rash present  Neurological:      General: No focal deficit present  Mental Status: He is oriented to person, place, and time

## 2022-07-14 DIAGNOSIS — E78.2 MIXED HYPERLIPIDEMIA: ICD-10-CM

## 2022-07-14 RX ORDER — ROSUVASTATIN CALCIUM 10 MG/1
10 TABLET, COATED ORAL
Qty: 90 TABLET | Refills: 0 | Status: SHIPPED | OUTPATIENT
Start: 2022-07-14 | End: 2022-10-05 | Stop reason: SDUPTHER

## 2022-07-14 NOTE — TELEPHONE ENCOUNTER
Patient is calling for Crestor refill  The original script was sent to a pharmacy in [facility] in June  This is not, nor has it ever been, his pharmacy  Patient is almost out of medication  He is looking for 90 day supply to be sent to [facility] in [place] (NOT mail order)  Sent to [facility] of [facility] because [facility] may reject due to the ordering of meds too soon after original script

## 2022-07-25 ENCOUNTER — OFFICE VISIT (OUTPATIENT)
Dept: PAIN MEDICINE | Facility: CLINIC | Age: 67
End: 2022-07-25
Payer: COMMERCIAL

## 2022-07-25 VITALS
HEIGHT: 70 IN | TEMPERATURE: 99.2 F | DIASTOLIC BLOOD PRESSURE: 78 MMHG | SYSTOLIC BLOOD PRESSURE: 122 MMHG | BODY MASS INDEX: 37.51 KG/M2 | HEART RATE: 78 BPM | WEIGHT: 262 LBS

## 2022-07-25 DIAGNOSIS — M54.16 CHRONIC LUMBAR RADICULOPATHY: ICD-10-CM

## 2022-07-25 DIAGNOSIS — G89.4 CHRONIC PAIN SYNDROME: Primary | ICD-10-CM

## 2022-07-25 DIAGNOSIS — M54.42 CHRONIC BILATERAL LOW BACK PAIN WITH BILATERAL SCIATICA: ICD-10-CM

## 2022-07-25 DIAGNOSIS — M50.30 DDD (DEGENERATIVE DISC DISEASE), CERVICAL: ICD-10-CM

## 2022-07-25 DIAGNOSIS — G60.9 IDIOPATHIC PERIPHERAL NEUROPATHY: ICD-10-CM

## 2022-07-25 DIAGNOSIS — M48.062 SPINAL STENOSIS OF LUMBAR REGION WITH NEUROGENIC CLAUDICATION: ICD-10-CM

## 2022-07-25 DIAGNOSIS — G89.29 CHRONIC BILATERAL LOW BACK PAIN WITH BILATERAL SCIATICA: ICD-10-CM

## 2022-07-25 DIAGNOSIS — G89.29 CHRONIC BILATERAL THORACIC BACK PAIN: ICD-10-CM

## 2022-07-25 DIAGNOSIS — M25.552 CHRONIC PAIN OF BOTH HIPS: ICD-10-CM

## 2022-07-25 DIAGNOSIS — G89.29 CHRONIC PAIN OF BOTH HIPS: ICD-10-CM

## 2022-07-25 DIAGNOSIS — M16.0 PRIMARY OSTEOARTHRITIS OF BOTH HIPS: ICD-10-CM

## 2022-07-25 DIAGNOSIS — M54.41 CHRONIC BILATERAL LOW BACK PAIN WITH BILATERAL SCIATICA: ICD-10-CM

## 2022-07-25 DIAGNOSIS — M54.6 CHRONIC BILATERAL THORACIC BACK PAIN: ICD-10-CM

## 2022-07-25 DIAGNOSIS — M54.14 THORACIC RADICULOPATHY: ICD-10-CM

## 2022-07-25 DIAGNOSIS — M96.1 LUMBAR POST-LAMINECTOMY SYNDROME: ICD-10-CM

## 2022-07-25 DIAGNOSIS — M51.36 LUMBAR DEGENERATIVE DISC DISEASE: ICD-10-CM

## 2022-07-25 DIAGNOSIS — G62.9 NEUROPATHY: ICD-10-CM

## 2022-07-25 DIAGNOSIS — M25.551 CHRONIC PAIN OF BOTH HIPS: ICD-10-CM

## 2022-07-25 DIAGNOSIS — Z79.891 LONG-TERM CURRENT USE OF OPIATE ANALGESIC: ICD-10-CM

## 2022-07-25 PROCEDURE — 99214 OFFICE O/P EST MOD 30 MIN: CPT | Performed by: NURSE PRACTITIONER

## 2022-07-25 RX ORDER — HYDROCODONE BITARTRATE AND ACETAMINOPHEN 10; 325 MG/1; MG/1
TABLET ORAL
Qty: 85 TABLET | Refills: 0 | Status: CANCELLED | OUTPATIENT
Start: 2022-07-25

## 2022-07-25 RX ORDER — HYDROCODONE BITARTRATE AND ACETAMINOPHEN 10; 325 MG/1; MG/1
TABLET ORAL
Qty: 85 TABLET | Refills: 0 | Status: SHIPPED | OUTPATIENT
Start: 2022-07-25 | End: 2022-10-17 | Stop reason: SDUPTHER

## 2022-07-25 NOTE — PATIENT INSTRUCTIONS

## 2022-07-25 NOTE — PROGRESS NOTES
Assessment:  1  Chronic pain syndrome    2  Chronic bilateral low back pain with bilateral sciatica    3  Chronic bilateral thoracic back pain    4  Chronic pain of both hips    5  Chronic lumbar radiculopathy    6  Thoracic radiculopathy    7  Lumbar post-laminectomy syndrome    8  Neuropathy    9  Idiopathic peripheral neuropathy    10  Lumbar degenerative disc disease    11  Primary osteoarthritis of both hips    12  DDD (degenerative disc disease), cervical    13  Spinal stenosis of lumbar region with neurogenic claudication    14  Long-term current use of opiate analgesic        Plan:  While the patient was in the office today, I did have a thorough conversation with the patient regarding their chronic pain syndrome, symptoms, medication regimen, and treatment plan  I did discuss with the patient that if he feels his hip pain is worse and he would like to proceed with repeat injections, he should call our office and we will proceed from there is appropriate and/or we can discuss at his next office visit  The patient was agreeable and verbalized an understanding  With regards to his medication regimen, I encouraged the patient to continue to follow up with his primary care provider regarding the Tegretol and overall since he is noting at least moderate and stable with the eye felt it is reasonable and appropriate for him to continue with the p r n  Norco as prescribed and he did not need a refill of the morphine sulfate IR  The patient was agreeable and verbalized an understanding  [place] Prescription Drug Monitoring Program report was reviewed and was appropriate     The patient was not picked for a pill count today, but he did bring his medications as required  The patient's opioid scripts were sent to their pharmacy electronically and was given a 3 month supply of prescriptions with a Do Not Fill date(s) of August 9, 2022, September 6, 2022, and October 4, 2022          There are risks associated with opioid medications, including dependence, addiction and tolerance  The patient understands and agrees to use these medications only as prescribed  Potential side effects of the medications include, but are not limited to, constipation, drowsiness, addiction, impaired judgment and risk of fatal overdose if not taken as prescribed  The patient was warned against driving while taking sedation medications  Sharing medications is a felony  At this point in time, the patient is showing no signs of addiction, abuse, diversion or suicidal ideation  An oral drug screen swab was collected at today's office visit  The swab will be sent for confirmatory testing  The drug screen is medically necessary because the patient is either dependent on opioid medication or is being considered for opioid medication therapy and the results could impact ongoing or future treatment  The drug screen is to evaluate for the presences or absence of prescribed, non-prescribed, and/or illicit drugs/substances  The patient will follow-up in 12 weeks for medication prescription refill and reevaluation  The patient was advised to contact the office should their symptoms worsen in the interim  The patient was agreeable and verbalized an understanding  History of Present Illness: The patient is a 74 y o  male last seen on 4/29/2022 who presents for a follow up office visit in regards to chronic pain syndrome, as the patient's pain has been ongoing for greater than a year, secondary to lumbar post-laminectomy syndrome with stenosis, osteoarthritis, radiculopathy follow this cervical disc degeneration, peripheral neuropathy and chronic hip pain    The patient currently reports that since his last office visit overall his pain symptoms have remained relatively stable even though he does feel his bilateral hip pain is starting to worsen and was considering repeat greater trochanter bursa injections, however, for now, feels that the pain is still manageable and would like to hold off the injections until his next office visit  Since his last office visit his primary care provider did start him on Tegretol to help with the chronic neuropathy and burning symptoms in his feet which he does feel has been helpful because in the past he has tried and failed gabapentin, Lyrica, Cymbalta, and nortriptyline  The patient presents today for regular medication follow-up visit  Current pain medications includes:  Norco 10/325, 1 p o  t i d  p r n  for pain, dispense number 85 pills per 30 days and once a year a small prescription for morphine sulfate IR 15 mg for severe pain only  The patient reports that this regimen is providing 50% pain relief  The patient is reporting no side effects from this pain medication regimen  Pain Contract Signed: 2/4/2022  Last Urine Drug Screen: 7/25/2022    I have personally reviewed and/or updated the patient's past medical history, past surgical history, family history, social history, current medications, allergies, and vital signs today  Review of Systems:    Review of Systems   Respiratory: Positive for shortness of breath  Cardiovascular: Negative for chest pain  Gastrointestinal: Negative for constipation, diarrhea, nausea and vomiting  Musculoskeletal: Positive for gait problem  Negative for arthralgias, joint swelling (joint stiffness) and myalgias  Skin: Negative for rash  Neurological: Positive for weakness  Negative for dizziness and seizures  All other systems reviewed and are negative          Past Medical History:   Diagnosis Date    Acute low back pain     10 AUG 2016 RESOLVED    Angina pectoris (HCC)     Bunion     Bursitis of hip     Carpal tunnel syndrome     Chronic bilateral low back pain with bilateral sciatica     Chronic lumbar radiculopathy     Chronic narcotic dependence (HCC)     Chronic pain syndrome     Constipation due to opioid therapy  DDD (degenerative disc disease), lumbar     Deep vein thrombosis of left upper extremity ([facility] [address] )     [id number]  RESOLVED    Depression     Diverticula of colon     [id number] RESOLVED    [facility]) [facility])     LUE    Edema     History of staph infection     Hyperglycemia     Hyperlipidemia     Hypertension     Insomnia     Lateral epicondylitis     Left inguinal hernia     Methicillin resistant Staphylococcus aureus septicemia ([facility] [address] )     Morbid obesity due to excess calories ([facility])     Obesity     Peripheral neuropathy     Post laminectomy syndrome     RLS (restless legs syndrome)     Septicemia ([facility] [address] )     MRSA    Umbilical hernia        Past Surgical History:   Procedure Laterality Date    BACK SURGERY      laminectomy, hardware    CARPAL TUNNEL RELEASE Left     CERVICAL SPINE SURGERY      COLONOSCOPY      ELBOW SURGERY      ELBOW SURGERY      KNEE ARTHROSCOPY Right     knee    KNEE SURGERY Right     ARTHOSCOPY KNEE    NECK SURGERY      1997    ID COLONOSCOPY FLX DX W/COLLJ SPEC WHEN PFRMD N/A 1/24/2017    Procedure: COLONOSCOPY;  Surgeon: Hannah Dubon MD;  Location: [facility] MAIN OR;  Service: General    ID INCISE FINGER TENDON SHEATH Right 9/9/2021    Procedure: Right long finger trigger finger release;  Surgeon: Jihan Solis MD;  Location: [facility] MAIN [facility];  Service: Orthopedics    ID WRIST Deanna Small LIG Right 1/2/2020    Procedure: RELEASE CARPAL TUNNEL ENDOSCOPIC, right;  Surgeon: Jihan Solis MD;  Location: [facility];  Service: Orthopedics    [facility] IMPLANT      SYNOVECTOMY Right 9/9/2021    Procedure: Tenosynovectomy right hand long finger flexor digitorum superficialis and profundus tendon;  Surgeon: Jihan Solis MD;  Location:  MAIN OR;  Service: Orthopedics       Family History   Problem Relation Age of Onset    Heart disease Mother     Cancer Mother         breast, stomach    Aneurysm Mother     Heart disease Father     Cancer Father         skin, liver, colon DECESED AGE 58       Social History     Occupational History    Not on file   Tobacco Use    Smoking status: Never Smoker    Smokeless tobacco: Never Used   Vaping Use    Vaping Use: Never used   Substance and Sexual Activity    Alcohol use: Yes    Drug use: No     Comment: opiod use for chronic pain    Sexual activity: Yes         Current Outpatient Medications:     acetaminophen (TYLENOL) 650 mg CR tablet, Take 1 tablet (650 mg total) by mouth every 8 (eight) hours as needed for mild pain, Disp: 30 tablet, Rfl: 0    bisoprolol-hydrochlorothiazide (ZIAC) 2 5-6 25 MG per tablet, Take 1 tablet by mouth daily, Disp: 90 tablet, Rfl: 0    carBAMazepine (TEGretol) 200 mg tablet, Take 1 tablet (200 mg total) by mouth 2 (two) times a day, Disp: 60 tablet, Rfl: 2    clonazePAM (KlonoPIN) 0 5 MG disintegrating tablet, Take 1 tablet (0 5 mg total) by mouth 2 (two) times a day, Disp: 60 tablet, Rfl: 0    cyclobenzaprine (FLEXERIL) 10 mg tablet, Take 1 tablet (10 mg total) by mouth 3 (three) times a day as needed for muscle spasms, Disp: 45 tablet, Rfl: 1    diazepam (VALIUM) 10 mg tablet, Take 1 tablet (10 mg total) by mouth daily at bedtime as needed for anxiety, Disp: 30 tablet, Rfl: 0    escitalopram (Lexapro) 10 mg tablet, Take 1 tablet (10 mg total) by mouth daily, Disp: 30 tablet, Rfl: 5    HYDROcodone-acetaminophen (NORCO)  mg per tablet, Take 1 PO TID PRN for pain  Please send to [facility]  DO NOT FILL UNTIL: 8/9/22, Disp: 85 tablet, Rfl: 0    HYDROcodone-acetaminophen (NORCO)  mg per tablet, Take 1 PO TID PRN for pain  Please send to [facility]  DO NOT FILL UNTIL: 9/6/22, Disp: 85 tablet, Rfl: 0    HYDROcodone-acetaminophen (NORCO)  mg per tablet, Take 1 PO TID PRN for pain  DO NOT FILL UNTIL: 10/4/22  Please send to [address] [place] [address]  , Disp: 85 tablet, Rfl: 0    lactulose 20 g/30 mL, Take 1-2 tablespoons daily PRN as needed for chronic constipation  Please sent to [facility]  , Disp: 473 mL, Rfl: 2    linaCLOtide 145 MCG CAPS, Take 1 capsule (145 mcg total) by mouth daily as needed (constipation), Disp: 30 capsule, Rfl: 0    morphine (MSIR) 15 mg tablet, Take 1/2 tablet BID PRN for severe pain in place of hydrocodone occasionally  Please send to [facility]  , Disp: 30 tablet, Rfl: 0    Multiple Vitamin (MULTIVITAMIN) tablet, Take 1 tablet by mouth every other day , Disp: , Rfl:     naproxen sodium (ALEVE) 220 MG tablet, Take 1 tablet (220 mg total) by mouth 2 (two) times a day with meals, Disp: 20 tablet, Rfl: 0    rosuvastatin (CRESTOR) 10 MG tablet, Take 1 tablet (10 mg total) by mouth daily at bedtime, Disp: 90 tablet, Rfl: 0    naloxone (Narcan) 4 mg/0 1 mL nasal spray, Spray 2 sprays into 1 nostril for suspected opioid overdose  May repeat in other nostril  Call 911 , Disp: 2 each, Rfl: 0    naproxen sodium (ALEVE) 220 MG tablet, Take 1 tablet (220 mg total) by mouth 2 (two) times a day with meals for 5 days, Disp: 10 tablet, Rfl: 0    Allergies   Allergen Reactions    Fentanyl Other (See Comments)     "Makes me Suicidal"  Happened when dosage increased   Penicillins Other (See Comments)     As a child unknown    Pravastatin Other (See Comments)     Reaction Date: [id number];   Muscle weakness    Sulfa Antibiotics Other (See Comments)     As a child unknown    Vytorin [Ezetimibe-Simvastatin] Myalgia     Reaction Date: [id number];        Physical Exam:    /78 (BP Location: Left arm, Patient Position: Sitting, Cuff Size: Standard)   Pulse 78   Temp 99 2 °F (37 3 °C)   Ht 5' 10" (1 778 m)   Wt 119 kg (262 lb)   BMI 37 59 kg/m²     Constitutional:normal, well developed, well nourished, alert, in no distress and non-toxic and no overt pain behavior   and overweight  Eyes:anicteric  HEENT:grossly intact  Neck:supple, symmetric, trachea midline and no masses   Pulmonary:even and unlabored  Cardiovascular:No edema or pitting edema present  Skin:Normal without rashes or lesions and well hydrated  Psychiatric:Mood and affect appropriate  Neurologic:Cranial Nerves II-XII grossly intact  Musculoskeletal:The patient's gait is slightly antalgic, but steady without the use of any assistive devices  Imaging  No orders to display         No orders of the defined types were placed in this encounter

## 2022-07-29 LAB
7AMINOCLONAZEPAM SAL QL CFM: ABNORMAL NG/ML
AMPHET SAL QL CFM: NEGATIVE NG/ML
BUPRENORPHINE SAL QL SCN: NEGATIVE NG/ML
CARBOXYTHC SAL QL CFM: NEGATIVE NG/ML
CCP IGG SERPL-ACNC: NEGATIVE
COCAINE SAL QL CFM: NEGATIVE NG/ML
CODEINE SAL QL CFM: NEGATIVE NG/ML
DXO+LEVORPHANOL SAL QL CFM: NEGATIVE NG/ML
EDDP SAL QL CFM: NEGATIVE NG/ML
GABAPENTIN SAL QL CFM: NEGATIVE NG/ML
HYDROCODONE SAL QL CFM: ABNORMAL NG/ML
LEUKEMIA MARKERS BLD-IMP: NEGATIVE NG/ML
M PROTEIN 3 UR ELPH-MCNC: NEGATIVE NG/ML
M TB TUBERC IGNF/MITOGEN IGNF CONTROL: NEGATIVE NG/ML
METHADONE SAL QL CFM: NEGATIVE NG/ML
MORPHINE SAL QL CFM: ABNORMAL NG/ML
NALTREXOL SAL QL CFM: NEGATIVE NG/ML
NALTREXONE SAL QL CFM: NEGATIVE NG/ML
OXYMORPHONE SAL QL CFM: NEGATIVE NG/ML
OXYMORPHONE SAL QL CFM: NEGATIVE NG/ML
PREGABALIN SAL QL CFM: NEGATIVE NG/ML
RESULT ALL_PRESCRIBED MEDS AND SPECIAL INSTRUCTIONS: NORMAL
SL AMB 6-MAM (HEROIN METABOLITE) QUANTIFICATION: NEGATIVE NG/ML
SL AMB ALPRAZOLAM QUANTIFICATION: NEGATIVE NG/ML
SL AMB CLONAZEPAM QUANTIFICATION: ABNORMAL NG/ML
SL AMB DIAZEPAM QUANTIFICATION: NEGATIVE NG/ML
SL AMB FENTANYL QUANTIFICATION: NEGATIVE NG/ML
SL AMB N-DESMETHYL-TRAMADOL QUANTIFICATION SALIVA: NEGATIVE NG/ML
SL AMB NORBUPRENORPHINE QUANTIFICATION: NEGATIVE NG/ML
SL AMB NORDIAZEPAM QUANTIFICATION: NORMAL NG/ML
SL AMB NORFENTANYL QUANTIFICATION: NEGATIVE NG/ML
SL AMB NORHYDROCODONE QUANTIFICATION: ABNORMAL NG/ML
SL AMB NORMEPERIDINE QUANTIFICATION: NEGATIVE NG/ML
SL AMB NOROXYCODONE QUANTIFICATION: NEGATIVE NG/ML
SL AMB OXAZEPAM QUANTIFICATION: NEGATIVE NG/ML
SL AMB RITALINIC ACID QUANTIFICATION: NEGATIVE
SL AMB TEMAZEPAM QUANTIFICATION: NEGATIVE NG/ML
SL AMB TEMAZEPAM QUANTIFICATION: NEGATIVE NG/ML
SL AMB TRAMADOL QUANTIFICATION: NEGATIVE NG/ML
SQUAMOUS #/AREA URNS HPF: NEGATIVE NG/ML
TAPENTADOL SAL QL CFM: NEGATIVE NG/ML

## 2022-08-17 ENCOUNTER — TELEPHONE (OUTPATIENT)
Dept: PAIN MEDICINE | Facility: CLINIC | Age: 67
End: 2022-08-17

## 2022-08-17 DIAGNOSIS — G89.29 CHRONIC PAIN OF BOTH HIPS: ICD-10-CM

## 2022-08-17 DIAGNOSIS — M16.0 PRIMARY OSTEOARTHRITIS OF BOTH HIPS: Primary | ICD-10-CM

## 2022-08-17 DIAGNOSIS — M25.551 CHRONIC PAIN OF BOTH HIPS: ICD-10-CM

## 2022-08-17 DIAGNOSIS — M25.552 CHRONIC PAIN OF BOTH HIPS: ICD-10-CM

## 2022-08-17 NOTE — TELEPHONE ENCOUNTER
Can we find out is he talking about a B/L [address] injection? Is the pain in is outer hips like we talked about at his last OV?

## 2022-08-17 NOTE — TELEPHONE ENCOUNTER
Wife) called in to schedule an injection for the pt  Please be advised thank you    Pt can be reached at @  [phone] or [phone]

## 2022-08-18 NOTE — TELEPHONE ENCOUNTER
Attempted to contact pt, phone rang 1x and then disconnected  2 attempts  Will fu        Ok to leave a detailed message on machine per medical communication consent on file

## 2022-08-19 NOTE — TELEPHONE ENCOUNTER
Can you please call the patient and schedule him for bilateral hip injections with Dr Johnathon Barragan? I put in the order and we will follow-up with him regarding his hip injections at his medication follow-up visit which is already scheduled

## 2022-08-19 NOTE — TELEPHONE ENCOUNTER
S/w pt's wife, Yomaira Hart, per medical communication consent on file  Per radha, pt is c/o b/l hip pain  Advised Radha of above  Per Yomaira Hart, she is assuming the pt would want to proceed with the injections he has had in the past  Per EPIC, pt has had b/l hip injections in the past  Advised Radha, the writer will make DG aware  Anticipate a cb from [address] [place] [address] to schedule  Please cb if the pt's pain is any different from what has been discussed - pain in the outer hips  Yomaira Hart added that the pt's scs is not charging any more  Advised Radha, the writer will make DG aware however, the pt will need to contact his scs rep  The writer will cb if there is anything different or additional  Pt verbalized understanding and appreciation

## 2022-08-19 NOTE — TELEPHONE ENCOUNTER
Attempted to contact pt / Jimbo Breeding at home #  Rang and disconnected     Attempted to contact Trina Boydmaco at her cell # per Medical communication consent on file  LMOM to cb  Provided cb number and office hours

## 2022-08-19 NOTE — TELEPHONE ENCOUNTER
Patient's wife Jus Gandhi is requesting another phone number for Harjit Miner [facility] rep, since the one provided for her isn't valid   Please reach out to her at # [phone], thx

## 2022-08-19 NOTE — TELEPHONE ENCOUNTER
Patient's wife Liliana Agosto is returning nurses call   Please reach out to pt at  # [phone], thx

## 2022-08-22 ENCOUNTER — TELEPHONE (OUTPATIENT)
Dept: PAIN MEDICINE | Facility: MEDICAL CENTER | Age: 67
End: 2022-08-22

## 2022-08-22 NOTE — TELEPHONE ENCOUNTER
Patient calling to schedule procedure please advise.    Patient can be reached at [phone]. TY

## 2022-08-22 NOTE — TELEPHONE ENCOUNTER
PRE OP INSTRUCTIONS:     -If you are on prescription blood thinners, you may have to hold the medication for several days before the procedure. Please call the office to    discuss medication holds at [phone].  -Do not eat or drink ONE HOUR prior to your procedure. If you are diabetic, may follow regular breakfast/lunch schedule and take usual    diabetic medications.  -Lumbar( low back) procedure, please wear comfortable slacks/pants.  -Cervical (neck) procedure, please wear a shirt/blouse that is easy to remove.  -A  is required to take you home form your procedure.  -Continue all to take prescribed medication the day of your procedure, including blood pressure medications.  -If you are prescribe antibiotics, have an active infection or have an open wound, please contact the office at [phone].  -Please refrain from any vaccinations two weeks before and two weeks after injection.  -Insurance authorization received in not a guarantee of payment per your insurance company's authorization disclaimer and it is    your responsibility to verify your benefits.          -If you have any questions about the instructions, please call me at [phone]    Hold medication  x _ full days prior, last dose on _  Patient advised to hold medication from Date till their appointment at that time instructions to restart will be given.   Patient stated verbal understanding. Aware that nursing will call her to review hold dates as well.

## 2022-08-22 NOTE — TELEPHONE ENCOUNTER
Patient calling needs to speak to someone regarding battery to Stim unable to charge.    Please advise     Patient can be reached at [phone]. ZV

## 2022-08-22 NOTE — TELEPHONE ENCOUNTER
Another email was sent to [employer] to please call pt re: battery issue.    Tyra- please see below re: pt procedure.

## 2022-09-08 ENCOUNTER — HOSPITAL ENCOUNTER (OUTPATIENT)
Dept: RADIOLOGY | Facility: CLINIC | Age: 67
Discharge: HOME | End: 2022-09-08
Payer: COMMERCIAL

## 2022-09-08 VITALS
TEMPERATURE: 97.2 F | SYSTOLIC BLOOD PRESSURE: 138 MMHG | DIASTOLIC BLOOD PRESSURE: 82 MMHG | OXYGEN SATURATION: 93 % | RESPIRATION RATE: 20 BRPM | HEART RATE: 76 BPM

## 2022-09-08 DIAGNOSIS — M25.552 CHRONIC PAIN OF BOTH HIPS: ICD-10-CM

## 2022-09-08 DIAGNOSIS — M16.0 PRIMARY OSTEOARTHRITIS OF BOTH HIPS: ICD-10-CM

## 2022-09-08 DIAGNOSIS — G89.29 CHRONIC PAIN OF BOTH HIPS: ICD-10-CM

## 2022-09-08 DIAGNOSIS — M25.551 CHRONIC PAIN OF BOTH HIPS: ICD-10-CM

## 2022-09-08 PROCEDURE — 20610 DRAIN/INJ JOINT/BURSA W/O US: CPT | Performed by: ANESTHESIOLOGY

## 2022-09-08 PROCEDURE — 77002 NEEDLE LOCALIZATION BY XRAY: CPT | Performed by: ANESTHESIOLOGY

## 2022-09-08 PROCEDURE — 77002 NEEDLE LOCALIZATION BY XRAY: CPT

## 2022-09-08 RX ORDER — METHYLPREDNISOLONE ACETATE 80 MG/ML
80 INJECTION, SUSPENSION INTRA-ARTICULAR; INTRALESIONAL; INTRAMUSCULAR; PARENTERAL; SOFT TISSUE ONCE
Status: COMPLETED | OUTPATIENT
Start: 2022-09-08 | End: 2022-09-08

## 2022-09-08 RX ADMIN — LIDOCAINE HYDROCHLORIDE 5 ML: 20 INJECTION, SOLUTION EPIDURAL; INFILTRATION; INTRACAUDAL at 11:20

## 2022-09-08 RX ADMIN — IOHEXOL 1 ML: 300 INJECTION, SOLUTION INTRAVENOUS at 11:20

## 2022-09-08 RX ADMIN — METHYLPREDNISOLONE ACETATE 80 MG: 80 INJECTION, SUSPENSION INTRA-ARTICULAR; INTRALESIONAL; INTRAMUSCULAR; SOFT TISSUE at 11:20

## 2022-09-08 NOTE — DISCHARGE INSTRUCTIONS
Steroid Joint Injection   WHAT YOU NEED TO KNOW:   A steroid joint injection is a procedure to inject steroid medicine into a joint  Steroid medicine decreases pain and inflammation  The injection may also contain an anesthetic (numbing medicine) to decrease pain  It may be done to treat conditions such as arthritis, gout, or carpal tunnel syndrome  The injections may be given in your knee, ankle, shoulder, elbow, wrist, ankle or sacroiliac joint  Do not apply heat to any area that is numb  If you have discomfort or soreness at the injection site, you may apply ice today, 20 minutes on and 20 minutes off  Tomorrow you may use ice or warm, moist heat  Do not apply ice or heat directly to the skin  You may have an increase or change in the discomfort for 36-48 hours after your treatment  Apply ice and continue with any pain medicine you have been prescribed  Do not do anything strenuous today  You may shower, but no tub baths or hot tubs today  You may resume your normal activities tomorrow, but do not overdo it  Resume normal activities slowly when you are feeling better  If you experience redness, drainage or swelling at the injection site, or if you develop a fever above 100 degrees, please call [facility] at [phone] or go to the Emergency Room  Continue to take all routine medicines prescribed by your primary care physician unless otherwise instructed by our staff  Most blood thinners should be started again according to your regularly scheduled dosing  If you have any questions, please give our office a call  As no general anesthesia was used in today's procedure, you should not experience any side effects related to anesthesia  If you are diabetic, the steroids used in today's injection may temporarily increase your blood sugar levels after the first few days after your injection   Please keep a close eye on your sugars and alert the doctor who manages your diabetes if your sugars are significantly high from your baseline or you are symptomatic  If you have a problem specifically related to your procedure, please call our office at [phone]  Problems not related to your procedure should be directed to your primary care physician

## 2022-09-08 NOTE — H&P
History of Present Illness: The patient is a 74 y o  male who presents with complaints of low back and hip pain      Patient Active Problem List   Diagnosis    Carpal tunnel syndrome    Chronic bilateral low back pain with bilateral sciatica    Chronic lumbar radiculopathy    Chronic pain syndrome    Constipation due to opioid therapy    Continuous opioid dependence ([address] )    Depression    Hyperglycemia    Mixed hyperlipidemia    Essential hypertension    Left inguinal hernia    Lumbar canal stenosis    Lumbar degenerative disc disease    Lumbar post-laminectomy syndrome    Class 2 severe obesity due to excess calories with serious comorbidity and body mass index (BMI) of 37 0 to 37 9 in adult [facility])    Neuropathy, ilioinguinal nerve, left    Chronic pain of both hips    Peripheral neuropathy    Persistent insomnia    Rectus diastasis    Restless legs syndrome    Retrolisthesis of vertebrae    Statin myopathy    Thoracic radiculopathy    Umbilical hernia without obstruction and without gangrene    Multiple pulmonary nodules    Shortness of breath    Anxiety    Obstructive sleep apnea    Benign prostatic hyperplasia with incomplete bladder emptying    Chronic bilateral thoracic back pain    DDD (degenerative disc disease), cervical    Primary osteoarthritis of both hips    Medicare annual wellness visit, subsequent    Chest pain, exertional    Carpal tunnel syndrome on right    Bilateral foot-drop    OAB (overactive bladder)    Neuropathy    Obstipation    Bilateral hip joint arthritis    Varicocele    Trigger middle finger of right hand    Thoracic myofascial strain    Rotator cuff injury, left, sequela    Angina pectoris, unspecified (HCC)    Seborrheic eczema       Past Medical History:   Diagnosis Date    Acute low back pain     10 AUG 2016 RESOLVED    Angina pectoris (HCC)     Bunion     Bursitis of hip     Carpal tunnel syndrome     Chronic bilateral low back pain with bilateral sciatica     Chronic lumbar radiculopathy     Chronic narcotic dependence (HCC)     Chronic pain syndrome     Constipation due to opioid therapy     DDD (degenerative disc disease), lumbar     Deep vein thrombosis of left upper extremity [address] )     [id number]  RESOLVED    Depression     Diverticula of colon     [id number] RESOLVED    DVT (deep venous thrombosis) ([facility])     LUE    Edema     History of staph infection     Hyperglycemia     Hyperlipidemia     Hypertension     Insomnia     Lateral epicondylitis     Left inguinal hernia     Methicillin resistant Staphylococcus aureus septicemia ([facility] )     Morbid obesity due to excess calories ([facility])     Obesity     Peripheral neuropathy     Post laminectomy syndrome     RLS (restless legs syndrome)     Septicemia ([facility] )     MRSA    Umbilical hernia        Past Surgical History:   Procedure Laterality Date    BACK SURGERY      laminectomy, hardware    CARPAL TUNNEL RELEASE Left     CERVICAL SPINE SURGERY      COLONOSCOPY      ELBOW SURGERY      ELBOW SURGERY      KNEE ARTHROSCOPY Right     knee    KNEE SURGERY Right     ARTHOSCOPY KNEE    NECK SURGERY      1997    IN COLONOSCOPY FLX DX W/COLLJ SPEC WHEN PFRMD N/A 1/24/2017    Procedure: COLONOSCOPY;  Surgeon: Joan Miranda MD;  [facility];  Service: General    IN INCISE FINGER TENDON SHEATH Right 9/9/2021    Procedure: Right long finger trigger finger release;  Surgeon: Anirudh Maynard MD;  [facility];  Service: Orthopedics    IN WRIST Richard Enter LIG Right 1/2/2020    Procedure: RELEASE CARPAL TUNNEL ENDOSCOPIC, right;  Surgeon: Anirudh Maynard MD;  [facility];  Service: Orthopedics    SHOULDER SURGERY      SPINAL CORD STIMULATOR IMPLANT      SYNOVECTOMY Right 9/9/2021    Procedure: Tenosynovectomy right hand long finger flexor digitorum superficialis and profundus tendon;  Surgeon: Anirudh Maynard MD;  [facility];  Service: Orthopedics         Current Outpatient Medications:     acetaminophen (TYLENOL) 650 mg CR tablet, Take 1 tablet (650 mg total) by mouth every 8 (eight) hours as needed for mild pain, Disp: 30 tablet, Rfl: 0    bisoprolol-hydrochlorothiazide (ZIAC) 2 5-6 25 MG per tablet, Take 1 tablet by mouth daily, Disp: 90 tablet, Rfl: 0    carBAMazepine (TEGretol) 200 mg tablet, Take 1 tablet (200 mg total) by mouth 2 (two) times a day, Disp: 60 tablet, Rfl: 2    clonazePAM (KlonoPIN) 0 5 MG disintegrating tablet, Take 1 tablet (0 5 mg total) by mouth 2 (two) times a day, Disp: 60 tablet, Rfl: 0    cyclobenzaprine (FLEXERIL) 10 mg tablet, Take 1 tablet (10 mg total) by mouth 3 (three) times a day as needed for muscle spasms, Disp: 45 tablet, Rfl: 1    diazepam (VALIUM) 10 mg tablet, Take 1 tablet (10 mg total) by mouth daily at bedtime as needed for anxiety, Disp: 30 tablet, Rfl: 0    escitalopram (Lexapro) 10 mg tablet, Take 1 tablet (10 mg total) by mouth daily, Disp: 30 tablet, Rfl: 5    HYDROcodone-acetaminophen (NORCO)  mg per tablet, Take 1 PO TID PRN for pain  Please send to [facility]  DO NOT FILL UNTIL: 8/9/22, Disp: 85 tablet, Rfl: 0    HYDROcodone-acetaminophen (NORCO)  mg per tablet, Take 1 PO TID PRN for pain  Please send to [facility]  DO NOT FILL UNTIL: 9/6/22, Disp: 85 tablet, Rfl: 0    HYDROcodone-acetaminophen (NORCO)  mg per tablet, Take 1 PO TID PRN for pain  DO NOT FILL UNTIL: 10/4/22  Please send to [facility]  , Disp: 85 tablet, Rfl: 0    lactulose 20 g/30 mL, Take 1-2 tablespoons daily PRN as needed for chronic constipation  Please sent to [facility]  , Disp: 473 mL, Rfl: 2    linaCLOtide 145 MCG CAPS, Take 1 capsule (145 mcg total) by mouth daily as needed (constipation), Disp: 30 capsule, Rfl: 0    morphine (MSIR) 15 mg tablet, Take 1/2 tablet BID PRN for severe pain in place of hydrocodone occasionally  [facility] to [facility]  , Disp: 30 tablet, Rfl: 0    Multiple Vitamin (MULTIVITAMIN) tablet, Take 1 tablet by mouth every other day , Disp: , Rfl:     naloxone (Narcan) 4 mg/0 1 mL nasal spray, Spray 2 sprays into 1 nostril for suspected opioid overdose  May repeat in other nostril  Call 911 , Disp: 2 each, Rfl: 0    naproxen sodium (ALEVE) 220 MG tablet, Take 1 tablet (220 mg total) by mouth 2 (two) times a day with meals for 5 days, Disp: 10 tablet, Rfl: 0    naproxen sodium (ALEVE) 220 MG tablet, Take 1 tablet (220 mg total) by mouth 2 (two) times a day with meals, Disp: 20 tablet, Rfl: 0    rosuvastatin (CRESTOR) 10 MG tablet, Take 1 tablet (10 mg total) by mouth daily at bedtime, Disp: 90 tablet, Rfl: 0    Current Facility-Administered Medications:     iohexol (OMNIPAQUE) 300 mg/mL injection 50 mL, 50 mL, Intra-articular, Once, Sudhir Lyn DO    lidocaine (PF) (XYLOCAINE-MPF) 2 % injection 5 mL, 5 mL, Intra-articular, Once, Sudhir Lyn DO    methylPREDNISolone acetate (DEPO-MEDROL) injection 80 mg, 80 mg, Intra-articular, Once, Sudhir Lyn DO    Allergies   Allergen Reactions    Fentanyl Other (See Comments)     "Makes me Suicidal"  Happened when dosage increased      Penicillins Other (See Comments)     As a child unknown    Pravastatin Other (See Comments)     Reaction Date: [id number];   Muscle weakness    Sulfa Antibiotics Other (See Comments)     As a child unknown    Vytorin [Ezetimibe-Simvastatin] Myalgia     Reaction Date: [id number];        Physical Exam:   Vitals:    09/08/22 1106   BP: 128/84   Pulse: 79   Resp: 20   Temp: (!) 97 2 °F (36 2 °C)   SpO2: 94%     General: Awake, Alert, Oriented x 3, Mood and affect appropriate  Respiratory: Respirations even and unlabored  Cardiovascular: Peripheral pulses intact; no edema  Musculoskeletal Exam:  Antalgic gait    ASA Score: III    Patient/Chart Verification  Patient ID Verified: Verbal  ID Band Applied: No  Consents Confirmed: Procedural, To be obtained in the Pre-Procedure area  Interval H&P(within 24 hr) Complete (required for Outpatients and Surgery Admit only): To be obtained in the Pre-Procedure area  Allergies Reviewed: Yes  Anticoag/NSAID held?: NA  Currently on antibiotics?: No    Assessment:   1  Primary osteoarthritis of both hips    2   Chronic pain of both hips        Plan: B/L Hip Injection

## 2022-09-08 NOTE — H&P (VIEW-ONLY)
History of Present Illness: The patient is a 74 y o  male who presents with complaints of low back and hip pain      Patient Active Problem List   Diagnosis    Carpal tunnel syndrome    Chronic bilateral low back pain with bilateral sciatica    Chronic lumbar radiculopathy    Chronic pain syndrome    Constipation due to opioid therapy    Continuous opioid dependence ([address] )    Depression    Hyperglycemia    Mixed hyperlipidemia    Essential hypertension    Left inguinal hernia    Lumbar canal stenosis    Lumbar degenerative disc disease    Lumbar post-laminectomy syndrome    Class 2 severe obesity due to excess calories with serious comorbidity and body mass index (BMI) of 37 0 to 37 9 in adult [facility])    Neuropathy, ilioinguinal nerve, left    Chronic pain of both hips    Peripheral neuropathy    Persistent insomnia    Rectus diastasis    Restless legs syndrome    Retrolisthesis of vertebrae    Statin myopathy    Thoracic radiculopathy    Umbilical hernia without obstruction and without gangrene    Multiple pulmonary nodules    Shortness of breath    Anxiety    Obstructive sleep apnea    Benign prostatic hyperplasia with incomplete bladder emptying    Chronic bilateral thoracic back pain    DDD (degenerative disc disease), cervical    Primary osteoarthritis of both hips    Medicare annual wellness visit, subsequent    Chest pain, exertional    Carpal tunnel syndrome on right    Bilateral foot-drop    OAB (overactive bladder)    Neuropathy    Obstipation    Bilateral hip joint arthritis    Varicocele    Trigger middle finger of right hand    Thoracic myofascial strain    Rotator cuff injury, left, sequela    Angina pectoris, unspecified (HCC)    Seborrheic eczema       Past Medical History:   Diagnosis Date    Acute low back pain     10 AUG 2016 RESOLVED    Angina pectoris (HCC)     Bunion     Bursitis of hip     Carpal tunnel syndrome     Chronic bilateral low back pain with bilateral sciatica     Chronic lumbar radiculopathy     Chronic narcotic dependence (HCC)     Chronic pain syndrome     Constipation due to opioid therapy     DDD (degenerative disc disease), lumbar     Deep vein thrombosis of left upper extremity ([facility] [address] )     [id number]  RESOLVED    Depression     Diverticula of colon     [id number] RESOLVED    DVT (deep venous thrombosis) ([facility])     LUE    Edema     History of staph infection     Hyperglycemia     Hyperlipidemia     Hypertension     Insomnia     Lateral epicondylitis     Left inguinal hernia     Methicillin resistant Staphylococcus aureus septicemia ([facility] )     Morbid obesity due to excess calories ([facility])     Obesity     Peripheral neuropathy     Post laminectomy syndrome     RLS (restless legs syndrome)     Septicemia ([facility] [address] )     MRSA    Umbilical hernia        Past Surgical History:   Procedure Laterality Date    BACK SURGERY      laminectomy, hardware    CARPAL TUNNEL RELEASE Left     CERVICAL SPINE SURGERY      COLONOSCOPY      ELBOW SURGERY      ELBOW SURGERY      KNEE ARTHROSCOPY Right     knee    KNEE SURGERY Right     ARTHOSCOPY KNEE    NECK SURGERY      1997    MO COLONOSCOPY FLX DX W/COLLJ SPEC WHEN PFRMD N/A 1/24/2017    Procedure: COLONOSCOPY;  Surgeon: Leatha Ewing MD;  [facility];  Service: General    MO INCISE FINGER TENDON SHEATH Right 9/9/2021    Procedure: Right long finger trigger finger release;  Surgeon: Cora Steward MD;  [facility];  Service: Orthopedics    MO WRIST Charmel Arik LIG Right 1/2/2020    Procedure: RELEASE CARPAL TUNNEL ENDOSCOPIC, right;  Surgeon: Cora Steward MD;  [facility];  Service: Orthopedics    SHOULDER SURGERY      SPINAL CORD STIMULATOR IMPLANT      SYNOVECTOMY Right 9/9/2021    Procedure: Tenosynovectomy right hand long finger flexor digitorum superficialis and profundus tendon;  Surgeon: Cora Steward MD;  [facility];  Service: Orthopedics         Current Outpatient Medications:     acetaminophen (TYLENOL) 650 mg CR tablet, Take 1 tablet (650 mg total) by mouth every 8 (eight) hours as needed for mild pain, Disp: 30 tablet, Rfl: 0    bisoprolol-hydrochlorothiazide (ZIAC) 2 5-6 25 MG per tablet, Take 1 tablet by mouth daily, Disp: 90 tablet, Rfl: 0    carBAMazepine (TEGretol) 200 mg tablet, Take 1 tablet (200 mg total) by mouth 2 (two) times a day, Disp: 60 tablet, Rfl: 2    clonazePAM (KlonoPIN) 0 5 MG disintegrating tablet, Take 1 tablet (0 5 mg total) by mouth 2 (two) times a day, Disp: 60 tablet, Rfl: 0    cyclobenzaprine (FLEXERIL) 10 mg tablet, Take 1 tablet (10 mg total) by mouth 3 (three) times a day as needed for muscle spasms, Disp: 45 tablet, Rfl: 1    diazepam (VALIUM) 10 mg tablet, Take 1 tablet (10 mg total) by mouth daily at bedtime as needed for anxiety, Disp: 30 tablet, Rfl: 0    escitalopram (Lexapro) 10 mg tablet, Take 1 tablet (10 mg total) by mouth daily, Disp: 30 tablet, Rfl: 5    HYDROcodone-acetaminophen (NORCO)  mg per tablet, Take 1 PO TID PRN for pain  Please send to [facility]  DO NOT FILL UNTIL: 8/9/22, Disp: 85 tablet, Rfl: 0    HYDROcodone-acetaminophen (NORCO)  mg per tablet, Take 1 PO TID PRN for pain  Please send to [facility]  DO NOT FILL UNTIL: 9/6/22, Disp: 85 tablet, Rfl: 0    HYDROcodone-acetaminophen (NORCO)  mg per tablet, Take 1 PO TID PRN for pain  DO NOT FILL UNTIL: 10/4/22  Please send to [address] [place]  , Disp: 85 tablet, Rfl: 0    lactulose 20 g/30 mL, Take 1-2 tablespoons daily PRN as needed for chronic constipation  Please sent to [facility]  , Disp: 473 mL, Rfl: 2    linaCLOtide 145 MCG CAPS, Take 1 capsule (145 mcg total) by mouth daily as needed (constipation), Disp: 30 capsule, Rfl: 0    morphine (MSIR) 15 mg tablet, Take 1/2 tablet BID PRN for severe pain in place of hydrocodone occasionally  Please send to [facility]  , Disp: 30 tablet, Rfl: 0    Multiple Vitamin (MULTIVITAMIN) tablet, Take 1 tablet by mouth every other day , Disp: , Rfl:     naloxone (Narcan) 4 mg/0 1 mL nasal spray, Spray 2 sprays into 1 nostril for suspected opioid overdose  May repeat in other nostril  Call 911 , Disp: 2 each, Rfl: 0    naproxen sodium (ALEVE) 220 MG tablet, Take 1 tablet (220 mg total) by mouth 2 (two) times a day with meals for 5 days, Disp: 10 tablet, Rfl: 0    naproxen sodium (ALEVE) 220 MG tablet, Take 1 tablet (220 mg total) by mouth 2 (two) times a day with meals, Disp: 20 tablet, Rfl: 0    rosuvastatin (CRESTOR) 10 MG tablet, Take 1 tablet (10 mg total) by mouth daily at bedtime, Disp: 90 tablet, Rfl: 0    Current Facility-Administered Medications:     iohexol (OMNIPAQUE) 300 mg/mL injection 50 mL, 50 mL, Intra-articular, Once, Sudhir Lyn DO    lidocaine (PF) (XYLOCAINE-MPF) 2 % injection 5 mL, 5 mL, Intra-articular, Once, Sudhir Lyn DO    methylPREDNISolone acetate (DEPO-MEDROL) injection 80 mg, 80 mg, Intra-articular, Once, Sudhir Lyn DO    Allergies   Allergen Reactions    Fentanyl Other (See Comments)     "Makes me Suicidal"  Happened when dosage increased      Penicillins Other (See Comments)     As a child unknown    Pravastatin Other (See Comments)     Reaction Date: [id number];   Muscle weakness    Sulfa Antibiotics Other (See Comments)     As a child unknown    Vytorin [Ezetimibe-Simvastatin] Myalgia     Reaction Date: [id number];        Physical Exam:   Vitals:    09/08/22 1106   BP: 128/84   Pulse: 79   Resp: 20   Temp: (!) 97 2 °F (36 2 °C)   SpO2: 94%     General: Awake, Alert, Oriented x 3, Mood and affect appropriate  Respiratory: Respirations even and unlabored  Cardiovascular: Peripheral pulses intact; no edema  Musculoskeletal Exam:  Antalgic gait    ASA Score: III    Patient/Chart Verification  Patient ID Verified: Verbal  ID Band Applied: No  Consents Confirmed: Procedural, To be obtained in the Pre-Procedure area  Interval H&P(within 24 hr) Complete (required for Outpatients and Surgery Admit only): To be obtained in the Pre-Procedure area  Allergies Reviewed: Yes  Anticoag/NSAID held?: NA  Currently on antibiotics?: No    Assessment:   1  Primary osteoarthritis of both hips    2   Chronic pain of both hips        Plan: B/L Hip Injection

## 2022-09-23 ENCOUNTER — OFFICE VISIT (OUTPATIENT)
Dept: SURGERY | Facility: HOSPITAL | Age: 67
End: 2022-09-23
Payer: COMMERCIAL

## 2022-09-23 ENCOUNTER — HOSPITAL ENCOUNTER (OUTPATIENT)
Dept: RADIOLOGY | Facility: HOSPITAL | Age: 67
Discharge: HOME | End: 2022-09-23
Attending: SURGERY
Payer: COMMERCIAL

## 2022-09-23 ENCOUNTER — APPOINTMENT (OUTPATIENT)
Dept: LAB | Facility: HOSPITAL | Age: 67
End: 2022-09-23
Attending: SURGERY
Payer: COMMERCIAL

## 2022-09-23 VITALS
TEMPERATURE: 98.3 F | HEIGHT: 70 IN | SYSTOLIC BLOOD PRESSURE: 142 MMHG | HEART RATE: 71 BPM | WEIGHT: 263.4 LBS | BODY MASS INDEX: 37.71 KG/M2 | DIASTOLIC BLOOD PRESSURE: 76 MMHG

## 2022-09-23 DIAGNOSIS — K42.9 UMBILICAL HERNIA WITHOUT OBSTRUCTION OR GANGRENE: ICD-10-CM

## 2022-09-23 DIAGNOSIS — K42.9 UMBILICAL HERNIA WITHOUT OBSTRUCTION OR GANGRENE: Primary | ICD-10-CM

## 2022-09-23 DIAGNOSIS — Z12.11 ENCOUNTER FOR SCREENING COLONOSCOPY: ICD-10-CM

## 2022-09-23 DIAGNOSIS — E66.9 OBESITY (BMI 30-39.9): ICD-10-CM

## 2022-09-23 LAB
ANION GAP SERPL CALCULATED.3IONS-SCNC: 9 MMOL/L (ref 4–13)
BASOPHILS # BLD AUTO: 0.05 THOUSANDS/ΜL (ref 0–0.1)
BASOPHILS NFR BLD AUTO: 1 % (ref 0–1)
BUN SERPL-MCNC: 19 MG/DL (ref 5–25)
CALCIUM SERPL-MCNC: 9.3 MG/DL (ref 8.3–10.1)
CHLORIDE SERPL-SCNC: 106 MMOL/L (ref 96–108)
CO2 SERPL-SCNC: 25 MMOL/L (ref 21–32)
CREAT SERPL-MCNC: 1.08 MG/DL (ref 0.6–1.3)
EOSINOPHIL # BLD AUTO: 0.1 THOUSAND/ΜL (ref 0–0.61)
EOSINOPHIL NFR BLD AUTO: 1 % (ref 0–6)
ERYTHROCYTE [DISTWIDTH] IN BLOOD BY AUTOMATED COUNT: 14 % (ref 11.6–15.1)
EST. AVERAGE GLUCOSE BLD GHB EST-MCNC: 120 MG/DL
GFR SERPL CREATININE-BSD FRML MDRD: 71 ML/MIN/1.73SQ M
GLUCOSE P FAST SERPL-MCNC: 99 MG/DL (ref 65–99)
HBA1C MFR BLD: 5.8 %
HCT VFR BLD AUTO: 50.3 % (ref 36.5–49.3)
HGB BLD-MCNC: 16.2 G/DL (ref 12–17)
IMM GRANULOCYTES # BLD AUTO: 0.04 THOUSAND/UL (ref 0–0.2)
IMM GRANULOCYTES NFR BLD AUTO: 1 % (ref 0–2)
LYMPHOCYTES # BLD AUTO: 2.06 THOUSANDS/ΜL (ref 0.6–4.47)
LYMPHOCYTES NFR BLD AUTO: 27 % (ref 14–44)
MCH RBC QN AUTO: 29.8 PG (ref 26.8–34.3)
MCHC RBC AUTO-ENTMCNC: 32.2 G/DL (ref 31.4–37.4)
MCV RBC AUTO: 93 FL (ref 82–98)
MONOCYTES # BLD AUTO: 0.8 THOUSAND/ΜL (ref 0.17–1.22)
MONOCYTES NFR BLD AUTO: 11 % (ref 4–12)
NEUTROPHILS # BLD AUTO: 4.49 THOUSANDS/ΜL (ref 1.85–7.62)
NEUTS SEG NFR BLD AUTO: 59 % (ref 43–75)
NRBC BLD AUTO-RTO: 0 /100 WBCS
PLATELET # BLD AUTO: 237 THOUSANDS/UL (ref 149–390)
PMV BLD AUTO: 8.4 FL (ref 8.9–12.7)
POTASSIUM SERPL-SCNC: 4.2 MMOL/L (ref 3.5–5.3)
RBC # BLD AUTO: 5.43 MILLION/UL (ref 3.88–5.62)
SODIUM SERPL-SCNC: 140 MMOL/L (ref 135–147)
WBC # BLD AUTO: 7.54 THOUSAND/UL (ref 4.31–10.16)

## 2022-09-23 PROCEDURE — 80048 BASIC METABOLIC PNL TOTAL CA: CPT

## 2022-09-23 PROCEDURE — 99244 OFF/OP CNSLTJ NEW/EST MOD 40: CPT | Performed by: SURGERY

## 2022-09-23 PROCEDURE — 85025 COMPLETE CBC W/AUTO DIFF WBC: CPT

## 2022-09-23 PROCEDURE — 71046 X-RAY EXAM CHEST 2 VIEWS: CPT

## 2022-09-23 PROCEDURE — 36415 COLL VENOUS BLD VENIPUNCTURE: CPT

## 2022-09-23 PROCEDURE — 83036 HEMOGLOBIN GLYCOSYLATED A1C: CPT

## 2022-09-26 LAB
ATRIAL RATE: 70 BPM
P AXIS: 28 DEGREES
PR INTERVAL: 212 MS
QRS AXIS: 30 DEGREES
QRSD INTERVAL: 82 MS
QT INTERVAL: 370 MS
QTC INTERVAL: 399 MS
T WAVE AXIS: 30 DEGREES
VENTRICULAR RATE: 70 BPM

## 2022-09-26 PROCEDURE — 93010 ELECTROCARDIOGRAM REPORT: CPT | Performed by: INTERNAL MEDICINE

## 2022-09-29 RX ORDER — DOCUSATE SODIUM 100 MG/1
100 CAPSULE, LIQUID FILLED ORAL 2 TIMES DAILY
COMMUNITY

## 2022-09-29 NOTE — PRE-PROCEDURE INSTRUCTIONS
Pre-Surgery Instructions:   Medication Instructions    acetaminophen (TYLENOL) 650 mg CR tablet Continue to take as prescribed including DOS with a small sip of water, unless usually taken at night    bisoprolol-hydrochlorothiazide (ZIAC) 2 5-6 25 MG per tablet Continue to take as prescribed including DOS with a small sip of water, unless usually taken at night    carBAMazepine (TEGretol) 200 mg tablet Continue to take as prescribed including DOS with a small sip of water, unless usually taken at night    clonazePAM (KlonoPIN) 0 5 MG disintegrating tablet Continue to take as prescribed including DOS with a small sip of water, unless usually taken at night    cyclobenzaprine (FLEXERIL) 10 mg tablet Continue to take as prescribed including DOS with a small sip of water, unless usually taken at night    diazepam (VALIUM) 10 mg tablet Continue to take as prescribed including DOS with a small sip of water, unless usually taken at night    docusate sodium (COLACE) 100 mg capsule Continue to take as prescribed including DOS with a small sip of water, unless usually taken at night    HYDROcodone-acetaminophen (NORCO)  mg per tablet Continue to take as prescribed including DOS with a small sip of water, unless usually taken at night    lactulose 20 g/30 mL continue as prescribed excluding DOS    linaCLOtide 145 MCG CAPS Continue to take as prescribed including DOS with a small sip of water, unless usually taken at night    morphine (MSIR) 15 mg tablet Continue to take as prescribed including DOS with a small sip of water, unless usually taken at night    Multiple Vitamin (MULTIVITAMIN) tablet Avoid 1 week prior to surgery     naproxen sodium (ALEVE) 220 MG tablet Avoid for at least 3 days prior to DOS    rosuvastatin (CRESTOR) 10 MG tablet Continue to take as prescribed including DOS with a small sip of water, unless usually taken at night    Patient instructed to avoid ASPIRIN, OTC vitamins and NSAIDS prior to surgery  Tylenol okay PRN  Patient instructed to continue scheduled medications excluding DOS  Avoid smoking prior to Surgery   Patient instructed to avoid alcohol, illicit drugs and marijuana for 24 hours prior to DOS  Patient given NPO instructions  Patient given CHG bathing instruction per protocol  Patient instructed to avoid shaving for 24 hours prior to DOS  Patient instructed to avoid using lotions, powders, oils, etc  DOS  Patient given up to date visitor guidelines  Patient instructed to have a ride home after surgery  Patient instructed to remove jewelry and not to bring valuables DOS  Patient informed that dentures and contact lenses will have to be removed for surgery  Patient understands he or she will receive a call the afternoon before surgery regarding an arrival time  Patient verbalized understanding of all instructions

## 2022-10-03 RX ORDER — LIDOCAINE HYDROCHLORIDE 10 MG/ML
0.5 INJECTION, SOLUTION EPIDURAL; INFILTRATION; INTRACAUDAL; PERINEURAL ONCE AS NEEDED
Status: CANCELLED | OUTPATIENT
Start: 2022-10-03

## 2022-10-03 RX ORDER — ONDANSETRON 2 MG/ML
4 INJECTION INTRAMUSCULAR; INTRAVENOUS ONCE AS NEEDED
Status: CANCELLED | OUTPATIENT
Start: 2022-10-03

## 2022-10-03 RX ORDER — METOCLOPRAMIDE HYDROCHLORIDE 5 MG/ML
10 INJECTION INTRAMUSCULAR; INTRAVENOUS ONCE AS NEEDED
Status: CANCELLED | OUTPATIENT
Start: 2022-10-03

## 2022-10-03 RX ORDER — SODIUM CHLORIDE, SODIUM LACTATE, POTASSIUM CHLORIDE, CALCIUM CHLORIDE 600; 310; 30; 20 MG/100ML; MG/100ML; MG/100ML; MG/100ML
125 INJECTION, SOLUTION INTRAVENOUS CONTINUOUS
Status: CANCELLED | OUTPATIENT
Start: 2022-10-03

## 2022-10-03 RX ORDER — DIPHENHYDRAMINE HYDROCHLORIDE 50 MG/ML
12.5 INJECTION INTRAMUSCULAR; INTRAVENOUS ONCE AS NEEDED
Status: CANCELLED | OUTPATIENT
Start: 2022-10-03

## 2022-10-04 ENCOUNTER — HOSPITAL ENCOUNTER (OUTPATIENT)
Dept: GASTROENTEROLOGY | Facility: HOSPITAL | Age: 67
Setting detail: OUTPATIENT SURGERY
Discharge: HOME | End: 2022-10-04
Payer: COMMERCIAL

## 2022-10-04 ENCOUNTER — ANESTHESIA EVENT (OUTPATIENT)
Dept: GASTROENTEROLOGY | Facility: HOSPITAL | Age: 67
End: 2022-10-04

## 2022-10-04 ENCOUNTER — ANESTHESIA (OUTPATIENT)
Dept: GASTROENTEROLOGY | Facility: HOSPITAL | Age: 67
End: 2022-10-04

## 2022-10-04 VITALS
SYSTOLIC BLOOD PRESSURE: 104 MMHG | HEART RATE: 65 BPM | OXYGEN SATURATION: 94 % | DIASTOLIC BLOOD PRESSURE: 64 MMHG | TEMPERATURE: 98.2 F | RESPIRATION RATE: 18 BRPM

## 2022-10-04 DIAGNOSIS — Z80.0 FAMILY HX OF COLON CANCER: ICD-10-CM

## 2022-10-04 DIAGNOSIS — Z12.11 SPECIAL SCREENING FOR MALIGNANT NEOPLASMS, COLON: ICD-10-CM

## 2022-10-04 PROCEDURE — 88305 TISSUE EXAM BY PATHOLOGIST: CPT | Performed by: SPECIALIST

## 2022-10-04 PROCEDURE — 45380 COLONOSCOPY AND BIOPSY: CPT | Performed by: SURGERY

## 2022-10-04 RX ORDER — LIDOCAINE HYDROCHLORIDE 10 MG/ML
INJECTION, SOLUTION EPIDURAL; INFILTRATION; INTRACAUDAL; PERINEURAL AS NEEDED
Status: DISCONTINUED | OUTPATIENT
Start: 2022-10-04 | End: 2022-10-04

## 2022-10-04 RX ORDER — PROPOFOL 10 MG/ML
INJECTION, EMULSION INTRAVENOUS AS NEEDED
Status: DISCONTINUED | OUTPATIENT
Start: 2022-10-04 | End: 2022-10-04

## 2022-10-04 RX ORDER — SODIUM CHLORIDE, SODIUM LACTATE, POTASSIUM CHLORIDE, CALCIUM CHLORIDE 600; 310; 30; 20 MG/100ML; MG/100ML; MG/100ML; MG/100ML
125 INJECTION, SOLUTION INTRAVENOUS CONTINUOUS
Status: CANCELLED | OUTPATIENT
Start: 2022-10-05

## 2022-10-04 RX ORDER — SODIUM CHLORIDE 9 MG/ML
INJECTION, SOLUTION INTRAVENOUS CONTINUOUS PRN
Status: DISCONTINUED | OUTPATIENT
Start: 2022-10-04 | End: 2022-10-04

## 2022-10-04 RX ADMIN — PROPOFOL 100 MG: 10 INJECTION, EMULSION INTRAVENOUS at 07:34

## 2022-10-04 RX ADMIN — PROPOFOL 30 MG: 10 INJECTION, EMULSION INTRAVENOUS at 07:40

## 2022-10-04 RX ADMIN — SODIUM CHLORIDE: 9 INJECTION, SOLUTION INTRAVENOUS at 07:10

## 2022-10-04 RX ADMIN — PROPOFOL 20 MG: 10 INJECTION, EMULSION INTRAVENOUS at 07:51

## 2022-10-04 RX ADMIN — PROPOFOL 30 MG: 10 INJECTION, EMULSION INTRAVENOUS at 07:48

## 2022-10-04 RX ADMIN — PROPOFOL 30 MG: 10 INJECTION, EMULSION INTRAVENOUS at 07:36

## 2022-10-04 RX ADMIN — PROPOFOL 20 MG: 10 INJECTION, EMULSION INTRAVENOUS at 07:53

## 2022-10-04 RX ADMIN — PROPOFOL 40 MG: 10 INJECTION, EMULSION INTRAVENOUS at 07:45

## 2022-10-04 RX ADMIN — PROPOFOL 30 MG: 10 INJECTION, EMULSION INTRAVENOUS at 07:38

## 2022-10-04 RX ADMIN — LIDOCAINE HYDROCHLORIDE 50 MG: 10 INJECTION, SOLUTION EPIDURAL; INFILTRATION; INTRACAUDAL; PERINEURAL at 07:33

## 2022-10-04 RX ADMIN — PROPOFOL 20 MG: 10 INJECTION, EMULSION INTRAVENOUS at 07:41

## 2022-10-04 RX ADMIN — PROPOFOL 20 MG: 10 INJECTION, EMULSION INTRAVENOUS at 07:59

## 2022-10-04 NOTE — PROGRESS NOTES
Assessment/Plan:   Ramón Guthrie  is a 74 y  o male who is here for Screening Colonoscopy (Est patient here for a 5 year repeat colonoscopy)    Plan: Screening colonoscopy - discussed risk and benefits of screening colonoscopy pt understands and wishes to proceed    Umbilical hernia - discussed operative vs conservative mgt, surgical approaches, risks and benefits and patient has decided to proceed with open umbilical hernia repair  I will schedule at their earliest convenience  Obesity -weight loss diet exercise d/w pt increased risks associated with surgery and obesity    Preoperative Clearance: None    HPI:  Ramón Guthrie  is a 74 y  o male who was referred for evaluation of Screening Colonoscopy (Est patient here for a 5 year repeat colonoscopy)    Currently umbilical lump and pain       ROS:  General ROS: negative  negative for - chills, fatigue, fever or night sweats, weight loss  Respiratory ROS: no cough, shortness of breath, or wheezing  Cardiovascular ROS: no chest pain or dyspnea on exertion  Genito-Urinary ROS: no dysuria, trouble voiding, or hematuria  Musculoskeletal ROS: negative for - gait disturbance, joint pain or muscle pain  Neurological ROS: no TIA or stroke symptoms  abdominal pain    [unfilled]  Fentanyl, Penicillins, Pravastatin, Sulfa antibiotics, and Vytorin [ezetimibe-simvastatin]    Current Outpatient Medications:     acetaminophen (TYLENOL) 650 mg CR tablet, Take 1 tablet (650 mg total) by mouth every 8 (eight) hours as needed for mild pain, Disp: 30 tablet, Rfl: 0    bisoprolol-hydrochlorothiazide (ZIAC) 2 5-6 25 MG per tablet, Take 1 tablet by mouth daily, Disp: 90 tablet, Rfl: 3    carBAMazepine (TEGretol) 200 mg tablet, Take 1 tablet (200 mg total) by mouth 2 (two) times a day, Disp: 60 tablet, Rfl: 2    clonazePAM (KlonoPIN) 0 5 MG disintegrating tablet, Take 1 tablet (0 5 mg total) by mouth 2 (two) times a day, Disp: 60 tablet, Rfl: 0   cyclobenzaprine (FLEXERIL) 10 mg tablet, Take 1 tablet (10 mg total) by mouth 3 (three) times a day as needed for muscle spasms, Disp: 45 tablet, Rfl: 1    diazepam (VALIUM) 10 mg tablet, Take 1 tablet (10 mg total) by mouth daily at bedtime as needed for anxiety, Disp: 30 tablet, Rfl: 0    docusate sodium (COLACE) 100 mg capsule, Take 100 mg by mouth 2 (two) times a day, Disp: , Rfl:     HYDROcodone-acetaminophen (NORCO)  mg per tablet, Take 1 PO TID PRN for pain  Please send to [facility]  DO NOT FILL UNTIL: 8/9/22, Disp: 85 tablet, Rfl: 0    HYDROcodone-acetaminophen (NORCO)  mg per tablet, Take 1 PO TID PRN for pain  Please send to [facility]  DO NOT FILL UNTIL: 9/6/22, Disp: 85 tablet, Rfl: 0    HYDROcodone-acetaminophen (NORCO)  mg per tablet, Take 1 PO TID PRN for pain  DO NOT FILL UNTIL: 10/4/22  Please send to [facility]  , Disp: 85 tablet, Rfl: 0    lactulose 20 g/30 mL, Take 1-2 tablespoons daily PRN as needed for chronic constipation  Please sent to [facility]  , Disp: 473 mL, Rfl: 2    linaCLOtide 145 MCG CAPS, Take 1 capsule (145 mcg total) by mouth daily as needed (constipation), Disp: 30 capsule, Rfl: 0    morphine (MSIR) 15 mg tablet, Take 1/2 tablet BID PRN for severe pain in place of hydrocodone occasionally  Please send to [facility]  , Disp: 30 tablet, Rfl: 0    Multiple Vitamin (MULTIVITAMIN) tablet, Take 1 tablet by mouth every other day , Disp: , Rfl:     naloxone (Narcan) 4 mg/0 1 mL nasal spray, Spray 2 sprays into 1 nostril for suspected opioid overdose  May repeat in other nostril   Call 911 , Disp: 2 each, Rfl: 0    naproxen sodium (ALEVE) 220 MG tablet, Take 1 tablet (220 mg total) by mouth 2 (two) times a day with meals, Disp: 20 tablet, Rfl: 0    rosuvastatin (CRESTOR) 10 MG tablet, Take 1 tablet (10 mg total) by mouth daily at bedtime, Disp: 90 tablet, Rfl: 0  Past Medical History:   Diagnosis Date    Acute low back pain     10 AUG 2016 RESOLVED    Angina pectoris (HCC)     Bunion     Bursitis of hip     Carpal tunnel syndrome     Chronic bilateral low back pain with bilateral sciatica     Chronic lumbar radiculopathy     Chronic narcotic dependence (HCC)     Chronic pain syndrome     Constipation due to opioid therapy     DDD (degenerative disc disease), lumbar     Deep vein thrombosis of left upper extremity ([facility] [address] )     [id number]  RESOLVED    Depression     Diverticula of colon     [id number] RESOLVED    DVT (deep venous thrombosis) ([facility])     LUE    Edema     History of staph infection     Hyperglycemia     Hyperlipidemia     Hypertension     Insomnia     Lateral epicondylitis     Left inguinal hernia     Methicillin resistant Staphylococcus aureus septicemia ([facility])     Morbid obesity due to excess calories ([facility])     Obesity     Peripheral neuropathy     Post laminectomy syndrome     RLS (restless legs syndrome)     Septicemia ([facility] [address] )     MRSA    Umbilical hernia      Past Surgical History:   Procedure Laterality Date    BACK SURGERY      laminectomy, hardware    CARPAL TUNNEL RELEASE Left     CERVICAL SPINE SURGERY      COLONOSCOPY      ELBOW SURGERY      ELBOW SURGERY      KNEE ARTHROSCOPY Right     knee    KNEE SURGERY Right     ARTHOSCOPY KNEE    NECK SURGERY      1997    MI COLONOSCOPY FLX DX W/COLLJ SPEC WHEN PFRMD N/A 1/24/2017    Procedure: COLONOSCOPY;  Surgeon: Renu Bellamy MD;  Location: [facility];  Service: General    MI INCISE FINGER TENDON SHEATH Right 9/9/2021    Procedure: Right long finger trigger finger release;  Surgeon: Char Petersen MD;  Location: [facility];  Service: Orthopedics    MI WRIST Hurtis Diane LIG Right 1/2/2020    Procedure: RELEASE CARPAL TUNNEL ENDOSCOPIC, right;  Surgeon: Char Petersen MD;  Location: [facility];  Service: Orthopedics    SHOULDER SURGERY      SPINAL CORD STIMULATOR IMPLANT      SYNOVECTOMY Right 9/9/2021    Procedure: Tenosynovectomy right hand long finger flexor digitorum superficialis and profundus tendon;  Surgeon: Jearldine Goodell, MD;  Location:  MAIN OR;  Service: Orthopedics     Family History   Problem Relation Age of Onset    Heart disease Mother     Cancer Mother         breast, stomach    Aneurysm Mother     Heart disease Father     Cancer Father         skin, liver, colon DECESED AGE 58      reports that he has never smoked  He has never used smokeless tobacco  He reports current alcohol use  He reports that he does not use drugs  Labs:   Lab Results   Component Value Date    WBC 7 54 09/23/2022    WBC 7 57 01/06/2022    WBC 6 52 07/05/2021    WBC 8 88 11/29/2015    WBC 7 18 11/28/2015    WBC 9 94 11/27/2015    HGB 16 2 09/23/2022    HGB 16 5 01/06/2022    HGB 16 1 07/05/2021    HGB 15 0 11/29/2015    HGB 14 7 11/28/2015    HGB 14 2 11/27/2015     09/23/2022     01/06/2022     07/05/2021     11/29/2015     11/28/2015     11/27/2015     Lab Results   Component Value Date    ALT 36 01/06/2022    ALT 38 07/05/2021    ALT 33 12/21/2020    ALT 47 12/14/2015    ALT 43 11/26/2015    ALT 37 10/28/2015    AST 20 01/06/2022    AST 25 07/05/2021    AST 17 12/21/2020    AST 25 12/14/2015    AST 29 11/26/2015    AST 27 10/28/2015     This SmartLink has not been configured with any valid records         PHYSICAL EXAM  General Appearance:    Alert, cooperative, no distress,    Head:    Normocephalic without obvious abnormality   Eyes:    PERRL, conjunctiva/corneas clear, EOM's intact        Neck:   Supple, no adenopathy, no JVD   Back:     Symmetric, no spinal or CVA tenderness   Lungs:     Clear to auscultation bilaterally, no wheezing or rhonchi   Heart:    Regular rate and rhythm, S1 and S2 normal, no murmur   Abdomen:     Soft +BS ND NT partially reducivlbe umbilical hernia, obese   Extremities:   Extremities normal  No clubbing, cyanosis or edema Psych:   Normal Affect, AOx3  Neurologic:  Skin:   CNII-XII intact  Strength symmetric, speech intact    Warm, dry, intact, no visible rashes or lesions         Physical Exam              Some portions of this record may have been generated with voice recognition software  There may be translation, syntax,  or grammatical errors  Occasional wrong word or "sound-a-like" substitutions may have occurred due to the inherent limitations of the voice recognition software  Read the chart carefully and recognize, using context, where substitutions may have occurred  If you have any questions, please contact the dictating provider for clarification or correction, as needed  This encounter has been coded by a non-certified coder

## 2022-10-04 NOTE — INTERVAL H&P NOTE
H&P reviewed  After examining the patient I find no changes in the patients condition since the H&P had been written      Vitals:    10/04/22 0647   BP: 135/75   Pulse: 76   Resp: 20   Temp: 98 2 °F (36 8 °C)   SpO2: 93%

## 2022-10-04 NOTE — ANESTHESIA POSTPROCEDURE EVALUATION
Post-Op Assessment Note    CV Status:  Stable  Pain Score: 0    Pain management: adequate     Mental Status:  Alert and awake   Hydration Status:  Stable and euvolemic   PONV Controlled:  Controlled   Airway Patency:  Patent   Two or more mitigation strategies used for obstructive sleep apnea   Post Op Vitals Reviewed: Yes      Staff: CRNA         No complications documented      BP   114/74   Temp      Pulse  62   Resp   16   SpO2   99

## 2022-10-04 NOTE — ANESTHESIA PREPROCEDURE EVALUATION
Procedure:  COLONOSCOPY    Relevant Problems   ANESTHESIA (within normal limits)      CARDIO   (+) Angina pectoris, unspecified (HCC)   (+) Chest pain, exertional   (+) Chronic bilateral thoracic back pain   (+) Essential hypertension   (+) Mixed hyperlipidemia      ENDO (within normal limits)      GI/HEPATIC (within normal limits)      /RENAL   (+) Benign prostatic hyperplasia with incomplete bladder emptying      HEMATOLOGY (within normal limits)      MUSCULOSKELETAL   (+) Bilateral hip joint arthritis   (+) Chronic bilateral low back pain with bilateral sciatica   (+) Chronic bilateral thoracic back pain   (+) DDD (degenerative disc disease), cervical   (+) Lumbar degenerative disc disease   (+) Primary osteoarthritis of both hips   (+) Rectus diastasis   (+) Statin myopathy   (+) Thoracic myofascial strain      NEURO/PSYCH   (+) Anxiety   (+) Chronic bilateral low back pain with bilateral sciatica   (+) Chronic bilateral thoracic back pain   (+) Chronic pain syndrome   (+) Continuous opioid dependence (HCC)   (+) Depression      PULMONARY   (+) Obstructive sleep apnea      Digestive   (+) Constipation due to opioid therapy      Nervous and Auditory   (+) Carpal tunnel syndrome   (+) Chronic lumbar radiculopathy   (+) Neuropathy, ilioinguinal nerve, left   (+) Peripheral neuropathy   (+) Thoracic radiculopathy      Musculoskeletal and Integument   (+) Retrolisthesis of vertebrae   (+) Trigger middle finger of right hand      Other   (+) Bilateral foot-drop   (+) Chronic pain of both hips   (+) Class 2 severe obesity due to excess calories with serious comorbidity and body mass index (BMI) of 37 0 to 37 9 in adult (HCC)   (+) Lumbar canal stenosis   (+) Lumbar post-laminectomy syndrome        Physical Exam    Airway    Mallampati score: II  TM Distance: >3 FB  Neck ROM: full     Dental   No notable dental hx     Cardiovascular  Rhythm: regular, Rate: normal, Cardiovascular exam normal    Pulmonary  Pulmonary exam normal Breath sounds clear to auscultation,     Other Findings        Anesthesia Plan  ASA Score- 3     Anesthesia Type- IV sedation with anesthesia with ASA Monitors  Additional Monitors:   Airway Plan:           Plan Factors-    Chart reviewed  Existing labs reviewed  Patient summary reviewed  Obstructive sleep apnea risk education given perioperatively  Induction- intravenous  Postoperative Plan-     Informed Consent- Anesthetic plan and risks discussed with patient  I personally reviewed this patient with the CRNA  Discussed and agreed on the Anesthesia Plan with the CRNA  Ciara Martniez

## 2022-10-05 ENCOUNTER — HOSPITAL ENCOUNTER (OUTPATIENT)
Facility: HOSPITAL | Age: 67
Setting detail: OUTPATIENT SURGERY
Discharge: HOME | End: 2022-10-05
Attending: SURGERY | Admitting: SURGERY
Payer: COMMERCIAL

## 2022-10-05 ENCOUNTER — ANESTHESIA (OUTPATIENT)
Dept: PERIOP | Facility: HOSPITAL | Age: 67
End: 2022-10-05
Payer: COMMERCIAL

## 2022-10-05 ENCOUNTER — ANESTHESIA EVENT (OUTPATIENT)
Dept: PERIOP | Facility: HOSPITAL | Age: 67
End: 2022-10-05
Payer: COMMERCIAL

## 2022-10-05 VITALS
BODY MASS INDEX: 37.37 KG/M2 | DIASTOLIC BLOOD PRESSURE: 69 MMHG | WEIGHT: 261 LBS | SYSTOLIC BLOOD PRESSURE: 138 MMHG | HEART RATE: 82 BPM | TEMPERATURE: 97.4 F | HEIGHT: 70 IN | RESPIRATION RATE: 21 BRPM | OXYGEN SATURATION: 93 %

## 2022-10-05 DIAGNOSIS — E78.2 MIXED HYPERLIPIDEMIA: ICD-10-CM

## 2022-10-05 LAB
ATRIAL RATE: 86 BPM
P AXIS: 34 DEGREES
PR INTERVAL: 190 MS
QRS AXIS: -14 DEGREES
QRSD INTERVAL: 84 MS
QT INTERVAL: 384 MS
QTC INTERVAL: 459 MS
T WAVE AXIS: 21 DEGREES
VENTRICULAR RATE: 86 BPM

## 2022-10-05 PROCEDURE — 93005 ELECTROCARDIOGRAM TRACING: CPT

## 2022-10-05 PROCEDURE — 93010 ELECTROCARDIOGRAM REPORT: CPT | Performed by: INTERNAL MEDICINE

## 2022-10-05 PROCEDURE — 49585 PR REPAIR UMBILICAL HERN,5+Y/O,REDUC: CPT | Performed by: PHYSICIAN ASSISTANT

## 2022-10-05 PROCEDURE — C1781 MESH (IMPLANTABLE): HCPCS | Performed by: SURGERY

## 2022-10-05 PROCEDURE — 49585 PR REPAIR UMBILICAL HERN,5+Y/O,REDUC: CPT | Performed by: SURGERY

## 2022-10-05 DEVICE — VENTRALEX ST HERNIA PATCH
Type: IMPLANTABLE DEVICE | Site: ABDOMEN | Status: FUNCTIONAL
Brand: VENTRALEX ST HERNIA PATCH

## 2022-10-05 RX ORDER — OXYCODONE HYDROCHLORIDE 5 MG/1
10 TABLET ORAL EVERY 4 HOURS PRN
Status: CANCELLED | OUTPATIENT
Start: 2022-10-05

## 2022-10-05 RX ORDER — SUCCINYLCHOLINE/SOD CL,ISO/PF 100 MG/5ML
SYRINGE (ML) INTRAVENOUS AS NEEDED
Status: DISCONTINUED | OUTPATIENT
Start: 2022-10-05 | End: 2022-10-05

## 2022-10-05 RX ORDER — ONDANSETRON 2 MG/ML
4 INJECTION INTRAMUSCULAR; INTRAVENOUS EVERY 6 HOURS PRN
Status: CANCELLED | OUTPATIENT
Start: 2022-10-05

## 2022-10-05 RX ORDER — DEXAMETHASONE SODIUM PHOSPHATE 10 MG/ML
INJECTION, SOLUTION INTRAMUSCULAR; INTRAVENOUS AS NEEDED
Status: DISCONTINUED | OUTPATIENT
Start: 2022-10-05 | End: 2022-10-05

## 2022-10-05 RX ORDER — CEFAZOLIN SODIUM 1 G/50ML
1000 SOLUTION INTRAVENOUS ONCE
Status: COMPLETED | OUTPATIENT
Start: 2022-10-05 | End: 2022-10-05

## 2022-10-05 RX ORDER — ACETAMINOPHEN 325 MG/1
650 TABLET ORAL EVERY 6 HOURS PRN
Status: CANCELLED | OUTPATIENT
Start: 2022-10-05

## 2022-10-05 RX ORDER — GLYCOPYRROLATE 0.2 MG/ML
INJECTION INTRAMUSCULAR; INTRAVENOUS AS NEEDED
Status: DISCONTINUED | OUTPATIENT
Start: 2022-10-05 | End: 2022-10-05

## 2022-10-05 RX ORDER — HYDROMORPHONE HCL/PF 1 MG/ML
SYRINGE (ML) INJECTION AS NEEDED
Status: DISCONTINUED | OUTPATIENT
Start: 2022-10-05 | End: 2022-10-05

## 2022-10-05 RX ORDER — PROPOFOL 10 MG/ML
INJECTION, EMULSION INTRAVENOUS AS NEEDED
Status: DISCONTINUED | OUTPATIENT
Start: 2022-10-05 | End: 2022-10-05

## 2022-10-05 RX ORDER — ONDANSETRON 2 MG/ML
INJECTION INTRAMUSCULAR; INTRAVENOUS AS NEEDED
Status: DISCONTINUED | OUTPATIENT
Start: 2022-10-05 | End: 2022-10-05

## 2022-10-05 RX ORDER — LIDOCAINE HYDROCHLORIDE 10 MG/ML
INJECTION, SOLUTION EPIDURAL; INFILTRATION; INTRACAUDAL; PERINEURAL AS NEEDED
Status: DISCONTINUED | OUTPATIENT
Start: 2022-10-05 | End: 2022-10-05

## 2022-10-05 RX ORDER — ROSUVASTATIN CALCIUM 10 MG/1
10 TABLET, COATED ORAL
Qty: 90 TABLET | Refills: 3 | Status: SHIPPED | OUTPATIENT
Start: 2022-10-05

## 2022-10-05 RX ORDER — SODIUM CHLORIDE, SODIUM LACTATE, POTASSIUM CHLORIDE, CALCIUM CHLORIDE 600; 310; 30; 20 MG/100ML; MG/100ML; MG/100ML; MG/100ML
125 INJECTION, SOLUTION INTRAVENOUS CONTINUOUS
Status: DISCONTINUED | OUTPATIENT
Start: 2022-10-05 | End: 2022-10-05 | Stop reason: HOSPADM

## 2022-10-05 RX ORDER — CEFAZOLIN SODIUM 2 G/50ML
2000 SOLUTION INTRAVENOUS ONCE
Status: COMPLETED | OUTPATIENT
Start: 2022-10-05 | End: 2022-10-05

## 2022-10-05 RX ORDER — MIDAZOLAM HYDROCHLORIDE 2 MG/2ML
INJECTION, SOLUTION INTRAMUSCULAR; INTRAVENOUS AS NEEDED
Status: DISCONTINUED | OUTPATIENT
Start: 2022-10-05 | End: 2022-10-05

## 2022-10-05 RX ORDER — OXYCODONE HYDROCHLORIDE 5 MG/1
5 TABLET ORAL EVERY 4 HOURS PRN
Status: CANCELLED | OUTPATIENT
Start: 2022-10-05

## 2022-10-05 RX ORDER — ROCURONIUM BROMIDE 10 MG/ML
INJECTION, SOLUTION INTRAVENOUS AS NEEDED
Status: DISCONTINUED | OUTPATIENT
Start: 2022-10-05 | End: 2022-10-05

## 2022-10-05 RX ORDER — MAGNESIUM HYDROXIDE 1200 MG/15ML
LIQUID ORAL AS NEEDED
Status: DISCONTINUED | OUTPATIENT
Start: 2022-10-05 | End: 2022-10-05 | Stop reason: HOSPADM

## 2022-10-05 RX ADMIN — PROPOFOL 200 MG: 10 INJECTION, EMULSION INTRAVENOUS at 09:24

## 2022-10-05 RX ADMIN — Medication 200 MG: at 09:24

## 2022-10-05 RX ADMIN — ONDANSETRON 4 MG: 2 INJECTION INTRAMUSCULAR; INTRAVENOUS at 09:24

## 2022-10-05 RX ADMIN — ROCURONIUM BROMIDE 30 MG: 10 INJECTION INTRAVENOUS at 09:30

## 2022-10-05 RX ADMIN — HYDROMORPHONE HYDROCHLORIDE 0.5 MG: 1 INJECTION, SOLUTION INTRAMUSCULAR; INTRAVENOUS; SUBCUTANEOUS at 09:30

## 2022-10-05 RX ADMIN — SODIUM CHLORIDE, SODIUM LACTATE, POTASSIUM CHLORIDE, AND CALCIUM CHLORIDE: .6; .31; .03; .02 INJECTION, SOLUTION INTRAVENOUS at 08:11

## 2022-10-05 RX ADMIN — DEXAMETHASONE SODIUM PHOSPHATE 10 MG: 10 INJECTION, SOLUTION INTRAMUSCULAR; INTRAVENOUS at 09:47

## 2022-10-05 RX ADMIN — HYDROMORPHONE HYDROCHLORIDE 0.5 MG: 1 INJECTION, SOLUTION INTRAMUSCULAR; INTRAVENOUS; SUBCUTANEOUS at 10:32

## 2022-10-05 RX ADMIN — LIDOCAINE HYDROCHLORIDE 50 MG: 10 INJECTION, SOLUTION EPIDURAL; INFILTRATION; INTRACAUDAL; PERINEURAL at 09:24

## 2022-10-05 RX ADMIN — CEFAZOLIN SODIUM 2000 MG: 2 SOLUTION INTRAVENOUS at 09:30

## 2022-10-05 RX ADMIN — MIDAZOLAM 2 MG: 1 INJECTION INTRAMUSCULAR; INTRAVENOUS at 09:19

## 2022-10-05 RX ADMIN — GLYCOPYRROLATE 0.4 MG: 0.2 INJECTION, SOLUTION INTRAMUSCULAR; INTRAVENOUS at 09:24

## 2022-10-05 RX ADMIN — SUGAMMADEX 300 MG: 100 INJECTION, SOLUTION INTRAVENOUS at 10:10

## 2022-10-05 RX ADMIN — CEFAZOLIN SODIUM 1000 MG: 1 SOLUTION INTRAVENOUS at 09:30

## 2022-10-05 RX ADMIN — HYDROMORPHONE HYDROCHLORIDE 0.5 MG: 1 INJECTION, SOLUTION INTRAMUSCULAR; INTRAVENOUS; SUBCUTANEOUS at 09:49

## 2022-10-05 RX ADMIN — PHENYLEPHRINE HYDROCHLORIDE 50 MCG/MIN: 10 INJECTION INTRAVENOUS at 09:38

## 2022-10-05 NOTE — DISCHARGE INSTRUCTIONS
Yanira Cancel Instructions  Dr Gardiner Sicard MD, [facility]  [phone]    1  General: You will feel pulling sensations around the wound or funny aches and pains around the incisions  This is normal  Even minor surgery is a change in your body and this is your body's way of reacting to it  If you have had abdominal surgery, it may help to support the incision with a small pillow or blanket for comfort when moving or coughing  2  Wound care:  Okay to shower  The glue will fall off over the next week or 2  Use ice for the first 5 days after surgery  Do not use for longer than 20 minutes at a time  Use ice 5 times per day  3  Water: You may shower over the wounds  Do not bathe or use a pool or hot tub until cleared by the physician  If you were discharged with a drain, make sure drain site is covered with plastic wrap before showering  4  Activity: You may go up and down stairs, walk as much as you are comfortable, but walk at least 3 times each day  If you have had abdominal surgery, do not lift anything heavier than 15 lbs for the 1st 2 weeks and 25 lbs for weeks 3 and 4      5  Diet: You may resume a regular diet  If you had a same-day surgery or overnight stay surgery, you may wish to eat lightly for a few days: soups, crackers, and sandwiches  You may resume a regular diet when ready  6  Medications: Resume all of your previous medications, unless told otherwise by the doctor  Avoid aspirin products for 2-3 days after the date of surgery  You may, at that time, begin to take them again  Use Tylenol and Ibuprofen for pain control  You may alternate these medications every 3 hours  For example: you may take Tylenol at noon, Ibuprofen at 3:00 p m , and Tylenol again at 6:00 p m , etc   You should use ice to assist with pain control as above  You do not need to take narcotic pain medication unless you are having significant pain    Continue pain medication regimen as directed by pain management physician  No new medications prescribed today  Contact pain management physician with breakthrough pain issues  7  Driving: You will need someone to drive you home on the day of surgery or discharge  Do not drive or make any important decisions while on narcotic pain medication or 24 hours and after anesthesia or sedation for surgery  Generally, you may drive when your off all narcotic pain medications and you are comfortable  8  Upset Stomach: You may take Maalox, Tums, or similar items for an upset stomach  If your narcotic pain medication causes an upset stomach, do not take it on an empty stomach  Try taking it with at least some crackers or toast      9  Constipation: Patients often experience constipation after surgery  You may take over-the-counter medication for this, such as Metamucil, Senokot, Dulcolax, milk of magnesia, etc  You may take a suppository unless you have had anorectal surgery such as a procedure on your hemorrhoids  If you experience significant nausea or vomiting after abdominal surgery, call the office before trying any of these medications  10  Call the office: If you are experiencing any of the following: fevers above 101 5°, significant nausea or vomiting, if the wound develops drainage and/or there is excessive redness around the wound, or if you have significant diarrhea or other worsening symptoms  11  Pain: You may be given a prescription for pain medication  This will be sent to your pharmacy prior to discharge  12  Sexual Activity: You may resume sexual activity when you feel ready and comfortable and your incision is sealed and healed without apparent infection risk  13  Urination: If you have not urinated in 6 hours, go directly to the ER for evaluation for urinary retention  14  Follow-up in 2 weeks        ***READ ONLY IF YOU HAVE BEEN DISCHARGED WITH A URINARY CATHETER***  Parkinson Insertion for Post-Op Urinary Retention    - A prescription for Flomax will be sent to your pharmacy  This should be taken daily while the urinary catheter remains in place  You will not be given a prescription for Flomax if your prostate has been removed  If you are already taking Flomax, continue the medication as prescribed  - We will send a message to the urology group who will contact you within the next 48 hours with further instructions and to schedule an appointment for voiding trial and catheter removal   The urinary catheter will remain in place for approximately 1 week  If you are not contacted within the next 48 hours please call our office to assist with scheduling your follow-up      - If you have your own urologist, you should contact your physician the day after discharge for instructions and to schedule a voiding trial and catheter removal

## 2022-10-05 NOTE — ANESTHESIA POSTPROCEDURE EVALUATION
Post-Op Assessment Note    CV Status:  Stable  Pain Score: 0    Pain management: adequate     Mental Status:  Sleepy   Hydration Status:  Stable   PONV Controlled:  None   Airway Patency:  Patent      Post Op Vitals Reviewed: Yes      Staff: CRNA         No complications documented      /85 (10/05/22 1039)    Temp 97 6 °F (36 4 °C) (10/05/22 1039)    Pulse 83 (10/05/22 1039)   Resp 16 (10/05/22 1039)    SpO2 100 % (10/05/22 1039)

## 2022-10-05 NOTE — PROGRESS NOTES
1105  Pt's having frequent pvcs  NP made aware  Ekg done as ordered  Pt is asymptomatic    Ekg SR frequent pvs

## 2022-10-05 NOTE — ANESTHESIA PREPROCEDURE EVALUATION
Procedure:  REPAIR HERNIA UMBILICAL (N/A Abdomen)    Relevant Problems   CARDIO   (+) Angina pectoris, unspecified (HCC)   (+) Chest pain, exertional   (+) Chronic bilateral thoracic back pain   (+) Essential hypertension   (+) Mixed hyperlipidemia      /RENAL   (+) Benign prostatic hyperplasia with incomplete bladder emptying      MUSCULOSKELETAL   (+) Bilateral hip joint arthritis   (+) Chronic bilateral low back pain with bilateral sciatica   (+) Chronic bilateral thoracic back pain   (+) DDD (degenerative disc disease), cervical   (+) Lumbar degenerative disc disease   (+) Primary osteoarthritis of both hips   (+) Rectus diastasis   (+) Statin myopathy   (+) Thoracic myofascial strain      NEURO/PSYCH   (+) Anxiety   (+) Chronic bilateral low back pain with bilateral sciatica   (+) Chronic bilateral thoracic back pain   (+) Chronic pain syndrome   (+) Continuous opioid dependence (HCC)   (+) Depression      PULMONARY   (+) Obstructive sleep apnea   (+) Shortness of breath      7/2019: normal biventricular systolic function       Anesthesia Plan  ASA Score- 3     Anesthesia Type- general with ASA Monitors  Additional Monitors:   Airway Plan: LMA  Comment: General anesthesia, LMA; standard ASA monitors  Risks and benefits discussed with patient; patient consented and agrees to proceed  I saw and evaluated the patient  If seen with CRNA, we have discussed the anesthetic plan and I am in agreement that the plan is appropriate for the patient          Plan Factors-    Induction- intravenous  Postoperative Plan- Plan for postoperative opioid use  Planned trial extubation    Informed Consent- Anesthetic plan and risks discussed with patient  I personally reviewed this patient with the CRNA  Discussed and agreed on the Anesthesia Plan with the CRNA  Isiah Paz

## 2022-10-05 NOTE — INTERIM OP NOTE
REPAIR HERNIA UMBILICAL WITH MESH  Postoperative Note  PATIENT NAME: Staci Crenshaw  : 1955  MRN: [id number]  [facility] OR ROOM 02    Surgery Date: 10/5/2022    Preop Diagnosis:  Umbilical hernia without obstruction or gangrene [K42 9]    Post-Op Diagnosis Codes:     * Umbilical hernia without obstruction or gangrene [K42 9]    Procedure(s) (LRB):  REPAIR HERNIA UMBILICAL WITH MESH  (N/A)    Surgeon(s) and Role:     * Jesus Dobson MD - Primary     * Delfina Espana PA-C - Assisting    Specimens:  * No specimens in log *    Estimated Blood Loss:   0 mL    Anesthesia Type:   General  ETA    Findings:   As above    Complications:   None    Hernia Surgery Operative Note    Name: Staci Crenshaw  Gender: male    Age: 74 y o  Race:     BMI: Body mass index is 37 45 kg/m²  DIAGNOSIS: No diagnosis found      Diabetes Mellitis: No    Coronary Heart Disease: No    Cancer: No    Steroid Use: No    Tobacco use: No   Last used: never smoked  Alcohol use: Yes Minimal     Location of Hernia: umbilical   Length:2 cm  Width:1 cm  Primary: Yes  Recurrent: No   Number of recurrences:    Access: Open    Component Separation: No    Mesh:   Yes -  Type: Synthetic   Ventralex Hernia patch, small 4 cm    Operative Time: 49 min             SIGNATURE: Daniele Dent   DATE: 2022   TIME: 10:38 AM

## 2022-10-10 NOTE — OP NOTE
REPAIR HERNIA UMBILICAL WITH MESH  Postoperative Note  PATIENT NAME: Lio Mistry  : 1955  MRN: [id number]  [facility] OR ROOM 02    Surgery Date:     Umbilical hernia without obstruction or gangrene [K42 9]    Post-Op Diagnosis Codes:     * Umbilical hernia without obstruction or gangrene [K42 9]    Procedure(s):  REPAIR HERNIA UMBILICAL WITH MESH  Surgeon(s) and Role:     * Rayray Marroquin MD - Primary     Doris Dent PA-C - Assisting    The Physician Assistant was medically necessary for surgical safety the case including suturing, retraction, and hemostasis  Specimens:  * No specimens in log *    Estimated Blood Loss:   0 mL    Anesthesia Type:   General     Procedure: The patient was seen in the Holding Room  The risks, benefits, complications, treatment options, and expected outcomes were discussed with the patient  The possibilities of reaction to medication, pulmonary aspiration, perforation of viscus, bleeding, recurrent infection, the need for additional procedures, failure to diagnose a condition, and creating a complication requiring transfusion or operation were discussed with the patient  The patient concurred with the proposed plan, giving informed consent  The site of surgery properly noted/marked  The patient was taken to Operating Room  A Time Out was held and the above information confirmed  The patient was prepped and draped in a sterile fashion  An additional timeout was performed  An infraumbilical incision was made, dissection carried out to the hernia sac  Hernia sac was freed of its surrounding adhesions  The hernia sac was opened and its contents reduced  Excess hernia sac was excised  A ventralax mesh was placed into the defect and the patch was placed in a Intra-Peritoneal  postion and secured using interrupted 2-0 Prolene sutures in a circumferential fashion,  assuring there were no defects or gaps in the mesh   This was doubly checked to secure the mesh without any interrupting gaps or defects  The fascia was closed interrupted figure 2-0 Prolene  The subcutaneous tissues were closed using 3-0 Vicryl sutures and the skin closed using a 4-0 Monocryl subcuticular stitch  The wound was dressed, placing positive pressure in the umbilicus with a gauze pack  The wound was dressed with Histacryl  The patient tolerated the procedure well  Instrument, sponge, and needle counts were correct prior to closure and at the conclusion of the case  This text is generated with voice recognition software  There may be translation, syntax,  or grammatical errors  If you have any questions, please contact the dictating provider       Complications: None    Condition: Stable to PACU    SIGNATURE: Renu Bellamy   DATE: October 10, 2022   TIME: 10:02 AM

## 2022-10-11 PROCEDURE — 88305 TISSUE EXAM BY PATHOLOGIST: CPT | Performed by: SPECIALIST

## 2022-10-17 ENCOUNTER — OFFICE VISIT (OUTPATIENT)
Dept: PAIN MEDICINE | Facility: CLINIC | Age: 67
End: 2022-10-17
Payer: COMMERCIAL

## 2022-10-17 VITALS
TEMPERATURE: 98.4 F | HEIGHT: 70 IN | SYSTOLIC BLOOD PRESSURE: 136 MMHG | BODY MASS INDEX: 37.8 KG/M2 | WEIGHT: 264 LBS | HEART RATE: 88 BPM | DIASTOLIC BLOOD PRESSURE: 78 MMHG

## 2022-10-17 DIAGNOSIS — G89.4 CHRONIC PAIN SYNDROME: Primary | ICD-10-CM

## 2022-10-17 DIAGNOSIS — M25.551 CHRONIC PAIN OF BOTH HIPS: ICD-10-CM

## 2022-10-17 DIAGNOSIS — M51.36 LUMBAR DEGENERATIVE DISC DISEASE: ICD-10-CM

## 2022-10-17 DIAGNOSIS — M54.42 CHRONIC BILATERAL LOW BACK PAIN WITH BILATERAL SCIATICA: ICD-10-CM

## 2022-10-17 DIAGNOSIS — G60.9 IDIOPATHIC PERIPHERAL NEUROPATHY: ICD-10-CM

## 2022-10-17 DIAGNOSIS — M54.41 CHRONIC BILATERAL LOW BACK PAIN WITH BILATERAL SCIATICA: ICD-10-CM

## 2022-10-17 DIAGNOSIS — M16.0 PRIMARY OSTEOARTHRITIS OF BOTH HIPS: ICD-10-CM

## 2022-10-17 DIAGNOSIS — G89.29 CHRONIC BILATERAL LOW BACK PAIN WITH BILATERAL SCIATICA: ICD-10-CM

## 2022-10-17 DIAGNOSIS — G62.9 NEUROPATHY: ICD-10-CM

## 2022-10-17 DIAGNOSIS — M54.14 THORACIC RADICULOPATHY: ICD-10-CM

## 2022-10-17 DIAGNOSIS — M48.062 SPINAL STENOSIS OF LUMBAR REGION WITH NEUROGENIC CLAUDICATION: ICD-10-CM

## 2022-10-17 DIAGNOSIS — M50.30 DDD (DEGENERATIVE DISC DISEASE), CERVICAL: ICD-10-CM

## 2022-10-17 DIAGNOSIS — M54.16 CHRONIC LUMBAR RADICULOPATHY: ICD-10-CM

## 2022-10-17 DIAGNOSIS — G89.29 CHRONIC PAIN OF BOTH HIPS: ICD-10-CM

## 2022-10-17 DIAGNOSIS — M25.552 CHRONIC PAIN OF BOTH HIPS: ICD-10-CM

## 2022-10-17 DIAGNOSIS — M54.6 CHRONIC BILATERAL THORACIC BACK PAIN: ICD-10-CM

## 2022-10-17 DIAGNOSIS — M96.1 LUMBAR POST-LAMINECTOMY SYNDROME: ICD-10-CM

## 2022-10-17 DIAGNOSIS — G89.29 CHRONIC BILATERAL THORACIC BACK PAIN: ICD-10-CM

## 2022-10-17 PROCEDURE — 99214 OFFICE O/P EST MOD 30 MIN: CPT | Performed by: NURSE PRACTITIONER

## 2022-10-17 RX ORDER — HYDROCODONE BITARTRATE AND ACETAMINOPHEN 10; 325 MG/1; MG/1
TABLET ORAL
Qty: 110 TABLET | Refills: 0 | Status: SHIPPED | OUTPATIENT
Start: 2022-10-17

## 2022-10-17 RX ORDER — MORPHINE SULFATE 15 MG/1
TABLET ORAL
Qty: 30 TABLET | Refills: 0 | Status: CANCELLED | OUTPATIENT
Start: 2022-10-17

## 2022-10-17 RX ORDER — NORTRIPTYLINE HYDROCHLORIDE 25 MG/1
CAPSULE ORAL
Qty: 60 CAPSULE | Refills: 2 | Status: SHIPPED | OUTPATIENT
Start: 2022-10-17

## 2022-10-17 NOTE — PROGRESS NOTES
Assessment:  1  Chronic pain syndrome    2  Chronic bilateral low back pain with bilateral sciatica    3  Chronic bilateral thoracic back pain    4  Chronic pain of both hips    5  Chronic lumbar radiculopathy    6  Thoracic radiculopathy    7  Neuropathy    8  Idiopathic peripheral neuropathy    9  Lumbar post-laminectomy syndrome    10  Lumbar degenerative disc disease    11  Primary osteoarthritis of both hips    12  DDD (degenerative disc disease), cervical    13  Spinal stenosis of lumbar region with neurogenic claudication        Plan:  While the patient was in the office today, I did have a thorough conversation with the patient regarding their chronic pain syndrome, symptoms, medication regimen, and treatment plan  I did discuss with the patient as he was wondering if there was anything else that we can try with regards to prescribing for his pain or anything new or different, that I explained the patient that it is of my opinion that since he knows the spinal cord stimulator definitely helped his leg pain and he is obviously much more uncomfortable with the stimulator not controlling the leg pain and he did report several times that the leg pain is worse in the back pain, that he needs to give some series consideration to replacing the stimulator IPG to once again obtain relief of the leg pain  The patient reports that he understands this and is going to discuss it with his wife      With regards to medication options as the patient had asked if maybe retry the fentanyl patch would be an option, and I explained the patient that I do not feel that the fentanyl patch is in his best long-term interest especially with some of the other medications he is on and I feel it is in his best interest to avoid the fentanyl patch, especially since I feel a significant amount of his pain is neuropathic in nature as evidence by the fact that now that he does not have the stimulator his leg pain and neuropathy symptoms are worse  The patient was agreeable and verbalized an understanding  I did discuss with the patient after be went through his med list and history that since he has not previously tried and failed nortriptyline and I feel that it could also help with some of his chronic anxiety, I am going to start him on nortriptyline 25 mg and slowly titrate him up to 50 mg over the next few weeks and see how he does  The patient denied being prescribed any anti-depressant and/or psychiatric medications  I reviewed with the patient that it may take 3-4 weeks for the medication's effects to be noticed and that it should never be abruptly stopped  Possible side effects include but are not limited to; vertigo, lethargy, nausea, worsening depression/anxiety, and confustion  I advised the patient to call our office if they experience any side effects  The patient verbalized an understanding  The patient also reported as we were discussing the type of medication nortriptyline is that he only has approximately 10 pills left of the Klonopin as he was prescribed to try to help with his leg pain and at this point since he has not felt it is helpful he was just going to finish out what he had stop the Klonopin  However, I did explained the patient that this is something he should discuss with his primary care provider, but at the very least instructed him to at least take it every other day until he is out of it to slowly wean out of his system as that is also a medication that should not be abruptly discontinued  The patient was agreeable and verbalized an understanding  In the meantime, we will continue the p r n  Norco and I will temporarily increase the amount to 1 p o  q i d , dispense number 110 pills per 30 days so if he needs to take 3-4 day he has the supply to do so  However, our long-term goal is to eventually go back to the current t i d  dosing with 85 pills or less being prescribed on a daily basis    The patient was agreeable and verbalized an understanding  We also briefly discussed medical marijuana as a possible treatment option as the patient had asked about that as well and I did explain to him the information that I have about it and gave him an information sheet for him to consider it as a possible treatment option in the future  However, the patient was very concerned about the possible cost of it and seemed very hesitant to even consider it  [place] Prescription Drug Monitoring Program report was reviewed and was appropriate     The patient was not picked for a pill count today, but he did bring his medications as required  The patient's opioid scripts were sent to their pharmacy electronically and was given a 3 month supply of prescriptions with a Do Not Fill date(s) of October 28, 2022, November 25, 2022, and December 23, 2022  There are risks associated with opioid medications, including dependence, addiction and tolerance  The patient understands and agrees to use these medications only as prescribed  Potential side effects of the medications include, but are not limited to, constipation, drowsiness, addiction, impaired judgment and risk of fatal overdose if not taken as prescribed  The patient was warned against driving while taking sedation medications  Sharing medications is a felony  At this point in time, the patient is showing no signs of addiction, abuse, diversion or suicidal ideation  The patient will follow-up in 12 weeks for medication prescription refill and reevaluation  The patient was advised to contact the office should their symptoms worsen in the interim  The patient was agreeable and verbalized an understanding  History of Present Illness:     The patient is a 74 y o  male last seen on 9/8/2022 who presents for a follow up office visit in regards to chronic pain syndrome, as the patient's pain has been ongoing for greater than a year, secondary to lumbar post-laminectomy syndrome with degenerative disc disease as well as cervical degenerative disc disease, spondylosis and cervical and lumbar stenosis with radiculopathy and idiopathic peripheral neuropathy  The patient currently reports since his last office visit overall his pain symptoms have worsened, particularly his lower extremity radicular symptoms, numbness, tingling and burning as unfortunately his Abbott thoracic lead spinal cord stimulator battery has completely depleted and at this point he notices a definite difference in his leg pain although he notices no difference in his back pain and although he has the chronic and irritating back pain, the leg pain is his worst issue at this point in time  However, the patient reports that because when he had the stimulator implanted he had the staph infection, he is hesitant to consider proceeding with the IPG replacement surgery for fear of getting the infection again  The patient has not yet scheduled a consultation to discuss the IPG replacement with neuro surgery as he is not sure what he is going to do  However, it is quite obvious that he is definitely much more uncomfortable and want to know if there is anything else we can do medication wise as he isn't sleeping well because of the leg pain  The patient reports that he has been occasionally using an extra p r n  Norco and once or twice has used the morphine doses since his stimulator battery has  and feels that if he takes at least half a tablet when the feelings in his legs start to get worse can stay on top of it and was wondering if we could temporarily increase his dosing to the q i d  dosing to maybe 100 or 105 pills per 30 days  The patient presents today for regular medication follow-up visit  Current pain medications includes:  Norco 10/325, 1 p o  t i d  p r n  for pain, dispense number 85 pills per 30 days    The patient reports that this regimen is providing 50% pain relief  The patient is reporting no side effects from this pain medication regimen  Pain Contract Signed: 2/4/2022  Last Urine Drug Screen: 7/25/2022    I have personally reviewed and/or updated the patient's past medical history, past surgical history, family history, social history, current medications, allergies, and vital signs today  Review of Systems:    Review of Systems   Respiratory: Positive for shortness of breath  Cardiovascular: Negative for chest pain  Gastrointestinal: Positive for constipation  Negative for diarrhea, nausea and vomiting  Musculoskeletal: Positive for gait problem  Negative for arthralgias, joint swelling (joint stiffness) and myalgias  Skin: Negative for rash  Neurological: Positive for weakness  Negative for dizziness and seizures  All other systems reviewed and are negative          Past Medical History:   Diagnosis Date   • Acute low back pain     10 AUG 2016 RESOLVED   • Angina pectoris ([facility])    • Bunion    • Bursitis of hip    • Carpal tunnel syndrome    • Chronic bilateral low back pain with bilateral sciatica    • Chronic lumbar radiculopathy    • Chronic narcotic dependence ([facility])    • Chronic pain syndrome    • Constipation due to opioid therapy    • DDD (degenerative disc disease), lumbar    • Deep vein thrombosis of left upper extremity ([facility] )     [id number]  RESOLVED   • Depression    • Diverticula of colon     [id number] RESOLVED   • DVT (deep venous thrombosis) ([facility])     LUE   • Edema    • History of staph infection    • Hyperglycemia    • Hyperlipidemia    • Hypertension    • Insomnia    • Lateral epicondylitis    • Left inguinal hernia    • Methicillin resistant Staphylococcus aureus septicemia ([address] )    • Morbid obesity due to excess calories ([address] )    • Obesity    • Peripheral neuropathy    • Post laminectomy syndrome    • RLS (restless legs syndrome)    • Septicemia ([facility])     MRSA   • Umbilical hernia        Past Surgical History:   Procedure Laterality Date   • BACK SURGERY      laminectomy, hardware   • CARPAL TUNNEL RELEASE Left    • CERVICAL SPINE SURGERY     • COLONOSCOPY     • ELBOW SURGERY     • ELBOW SURGERY     • KNEE ARTHROSCOPY Right     knee   • KNEE SURGERY Right     ARTHOSCOPY KNEE   • NECK SURGERY      1997   • OR COLONOSCOPY FLX DX W/COLLJ SPEC WHEN PFRMD N/A 1/24/2017    Procedure: COLONOSCOPY;  Surgeon: Helena Rand MD;  Location: [facility];  Service: General   • OR INCISE FINGER TENDON SHEATH Right 9/9/2021    Procedure: Right long finger trigger finger release;  Surgeon: Scarlet Barakat MD;  Location: [facility];  Service: Orthopedics   • OR REPAIR UMBILICAL XTRE,5+M/P,SPZKA N/A 10/5/2022    Procedure: REPAIR HERNIA UMBILICAL WITH MESH ;  Surgeon: Helena Rand MD;  Location: [facility];  Service: General   • OR WRIST Pedro Nipper LIG Right 1/2/2020    Procedure: RELEASE CARPAL TUNNEL ENDOSCOPIC, right;  Surgeon: Scarlet Barakat MD;  Location: [facility];  Service: Orthopedics   • SHOULDER SURGERY     • SPINAL CORD STIMULATOR IMPLANT     • SYNOVECTOMY Right 9/9/2021    Procedure: Tenosynovectomy right hand long finger flexor digitorum superficialis and profundus tendon;  Surgeon: Scarlet Barakat MD;  Location: [facility];  Service: Orthopedics       Family History   Problem Relation Age of Onset   • Heart disease Mother    • Cancer Mother         breast, stomach   • Aneurysm Mother    • Heart disease Father    • Cancer Father         skin, liver, colon DECESED AGE 58       Social History     Occupational History   • Not on file   Tobacco Use   • Smoking status: Never Smoker   • Smokeless tobacco: Never Used   Vaping Use   • Vaping Use: Never used   Substance and Sexual Activity   • Alcohol use: Yes     Comment: occasional- less than 1 drink per week   • Drug use: No     Comment: opiod use for chronic pain   • Sexual activity: Yes         Current Outpatient Medications:   •  acetaminophen (TYLENOL) 650 mg CR tablet, Take 1 tablet (650 mg total) by mouth every 8 (eight) hours as needed for mild pain, Disp: 30 tablet, Rfl: 0  •  bisoprolol-hydrochlorothiazide (ZIAC) 2 5-6 25 MG per tablet, Take 1 tablet by mouth daily, Disp: 90 tablet, Rfl: 3  •  carBAMazepine (TEGretol) 200 mg tablet, Take 1 tablet (200 mg total) by mouth 2 (two) times a day, Disp: 60 tablet, Rfl: 2  •  cyclobenzaprine (FLEXERIL) 10 mg tablet, Take 1 tablet (10 mg total) by mouth 3 (three) times a day as needed for muscle spasms, Disp: 45 tablet, Rfl: 1  •  diazepam (VALIUM) 10 mg tablet, Take 1 tablet (10 mg total) by mouth daily at bedtime as needed for anxiety, Disp: 30 tablet, Rfl: 0  •  docusate sodium (COLACE) 100 mg capsule, Take 100 mg by mouth 2 (two) times a day, Disp: , Rfl:   •  HYDROcodone-acetaminophen (NORCO)  mg per tablet, Take 1 PO QID PRN for pain  Please send to [facility]  DO NOT FILL UNTIL: 10/28/22, Disp: 110 tablet, Rfl: 0  •  HYDROcodone-acetaminophen (NORCO)  mg per tablet, Take 1 PO QID PRN for pain  Please send to [facility]  DO NOT FILL UNTIL: 11/25/22, Disp: 110 tablet, Rfl: 0  •  HYDROcodone-acetaminophen (NORCO)  mg per tablet, Take 1 PO QID PRN for pain  DO NOT FILL UNTIL: 12/23/22  Please send to [facility]  , Disp: 110 tablet, Rfl: 0  •  lactulose 20 g/30 mL, Take 1-2 tablespoons daily PRN as needed for chronic constipation  Please sent to [facility]  , Disp: 473 mL, Rfl: 2  •  linaCLOtide 145 MCG CAPS, Take 1 capsule (145 mcg total) by mouth daily as needed (constipation), Disp: 30 capsule, Rfl: 0  •  morphine (MSIR) 15 mg tablet, Take 1/2 tablet BID PRN for severe pain in place of hydrocodone occasionally  Please send to [facility]  , Disp: 30 tablet, Rfl: 0  •  Multiple Vitamin (MULTIVITAMIN) tablet, Take 1 tablet by mouth every other day , Disp: , Rfl:   •  naproxen sodium (ALEVE) 220 MG tablet, Take 1 tablet (220 mg total) by mouth 2 (two) times a day with meals, Disp: 20 tablet, Rfl: 0  •  nortriptyline (PAMELOR) 25 mg capsule, Take 1 PO HS every other night x 10 days, then 1 PO HS x 10 days, then 2 PO HS  Please send to [facility]  , Disp: 60 capsule, Rfl: 2  •  rosuvastatin (CRESTOR) 10 MG tablet, Take 1 tablet (10 mg total) by mouth daily at bedtime, Disp: 90 tablet, Rfl: 3  •  clonazePAM (KlonoPIN) 0 5 MG disintegrating tablet, Take 1 tablet (0 5 mg total) by mouth 2 (two) times a day, Disp: 60 tablet, Rfl: 0  •  naloxone (Narcan) 4 mg/0 1 mL nasal spray, Spray 2 sprays into 1 nostril for suspected opioid overdose  May repeat in other nostril  Call 911 , Disp: 2 each, Rfl: 0    Allergies   Allergen Reactions   • Fentanyl Other (See Comments)     "Makes me Suicidal"  Happened when dosage increased  • Penicillins Other (See Comments)     As a child unknown   • Pravastatin Other (See Comments)     Reaction Date: [id number];   Muscle weakness   • Sulfa Antibiotics Other (See Comments)     As a child unknown   • Vytorin [Ezetimibe-Simvastatin] Myalgia     Reaction Date: [id number];        Physical Exam:    /78 (BP Location: Left arm, Patient Position: Sitting, Cuff Size: Standard)   Pulse 88   Temp 98 4 °F (36 9 °C)   Ht 5' 10" (1 778 m)   Wt 120 kg (264 lb)   BMI 37 88 kg/m²     Constitutional:normal, well developed, well nourished, alert, in no distress and non-toxic and no overt pain behavior  and overweight  Eyes:anicteric  HEENT:grossly intact  Neck:supple, symmetric, trachea midline and no masses   Pulmonary:even and unlabored  Cardiovascular:No edema or pitting edema present  Skin:Normal without rashes or lesions and well hydrated  Psychiatric:Mood and affect appropriate  Neurologic:Cranial Nerves II-XII grossly intact  Musculoskeletal:The patient's gait is slow, painful, slightly antalgic, limping at times, but steady without the use of any assistive devices        Imaging  No orders to display         No orders of the defined types were placed in this encounter

## 2022-10-20 ENCOUNTER — OFFICE VISIT (OUTPATIENT)
Dept: SURGERY | Facility: HOSPITAL | Age: 67
End: 2022-10-20

## 2022-10-20 VITALS — WEIGHT: 264 LBS | BODY MASS INDEX: 37.8 KG/M2 | RESPIRATION RATE: 16 BRPM | TEMPERATURE: 97.6 F | HEIGHT: 70 IN

## 2022-10-20 DIAGNOSIS — Z09 POSTOP CHECK: Primary | ICD-10-CM

## 2022-10-31 NOTE — PROGRESS NOTES
Seen and examined no acute events doing well  avss afebrile  Soft +BS ND NT incision cdi     S/p colonoscopy and umbilical hernia repair   Cont light duty for two weeks, f/u prn, path reviewed and benign, repeat c-scope in 3 years

## 2022-11-07 DIAGNOSIS — M54.16 CHRONIC LUMBAR RADICULOPATHY: ICD-10-CM

## 2022-11-07 RX ORDER — DIAZEPAM 10 MG/1
10 TABLET ORAL
Qty: 30 TABLET | Refills: 0 | Status: SHIPPED | OUTPATIENT
Start: 2022-11-07

## 2022-12-22 ENCOUNTER — TELEPHONE (OUTPATIENT)
Dept: FAMILY MEDICINE CLINIC | Facility: HOSPITAL | Age: 67
End: 2022-12-22

## 2022-12-22 NOTE — TELEPHONE ENCOUNTER
Please advise

## 2022-12-22 NOTE — TELEPHONE ENCOUNTER
Pt asking for routine blood work to be ordered prior to upcoming appt for her and spouse on 1/23   Note in spouses chart as well

## 2023-01-03 DIAGNOSIS — E78.2 MIXED HYPERLIPIDEMIA: ICD-10-CM

## 2023-01-03 DIAGNOSIS — R73.9 HYPERGLYCEMIA: ICD-10-CM

## 2023-01-03 DIAGNOSIS — Z12.5 SCREENING FOR MALIGNANT NEOPLASM OF PROSTATE: Primary | ICD-10-CM

## 2023-01-03 DIAGNOSIS — I10 ESSENTIAL HYPERTENSION: ICD-10-CM

## 2023-01-09 ENCOUNTER — OFFICE VISIT (OUTPATIENT)
Dept: PAIN MEDICINE | Facility: CLINIC | Age: 68
End: 2023-01-09

## 2023-01-09 VITALS
HEART RATE: 83 BPM | BODY MASS INDEX: 39.37 KG/M2 | TEMPERATURE: 97.9 F | HEIGHT: 70 IN | DIASTOLIC BLOOD PRESSURE: 72 MMHG | WEIGHT: 275 LBS | SYSTOLIC BLOOD PRESSURE: 138 MMHG

## 2023-01-09 DIAGNOSIS — M51.36 LUMBAR DEGENERATIVE DISC DISEASE: ICD-10-CM

## 2023-01-09 DIAGNOSIS — G89.4 CHRONIC PAIN SYNDROME: Primary | ICD-10-CM

## 2023-01-09 DIAGNOSIS — M54.41 CHRONIC BILATERAL LOW BACK PAIN WITH BILATERAL SCIATICA: ICD-10-CM

## 2023-01-09 DIAGNOSIS — M16.0 PRIMARY OSTEOARTHRITIS OF BOTH HIPS: ICD-10-CM

## 2023-01-09 DIAGNOSIS — M96.1 LUMBAR POST-LAMINECTOMY SYNDROME: ICD-10-CM

## 2023-01-09 DIAGNOSIS — M54.14 THORACIC RADICULOPATHY: ICD-10-CM

## 2023-01-09 DIAGNOSIS — M25.551 CHRONIC PAIN OF BOTH HIPS: ICD-10-CM

## 2023-01-09 DIAGNOSIS — M54.42 CHRONIC BILATERAL LOW BACK PAIN WITH BILATERAL SCIATICA: ICD-10-CM

## 2023-01-09 DIAGNOSIS — M54.6 CHRONIC BILATERAL THORACIC BACK PAIN: ICD-10-CM

## 2023-01-09 DIAGNOSIS — G89.29 CHRONIC PAIN OF BOTH HIPS: ICD-10-CM

## 2023-01-09 DIAGNOSIS — G89.29 CHRONIC BILATERAL LOW BACK PAIN WITH BILATERAL SCIATICA: ICD-10-CM

## 2023-01-09 DIAGNOSIS — G62.9 NEUROPATHY: ICD-10-CM

## 2023-01-09 DIAGNOSIS — G60.9 IDIOPATHIC PERIPHERAL NEUROPATHY: ICD-10-CM

## 2023-01-09 DIAGNOSIS — M25.552 CHRONIC PAIN OF BOTH HIPS: ICD-10-CM

## 2023-01-09 DIAGNOSIS — Z79.891 LONG-TERM CURRENT USE OF OPIATE ANALGESIC: ICD-10-CM

## 2023-01-09 DIAGNOSIS — M48.062 SPINAL STENOSIS OF LUMBAR REGION WITH NEUROGENIC CLAUDICATION: ICD-10-CM

## 2023-01-09 DIAGNOSIS — M54.16 CHRONIC LUMBAR RADICULOPATHY: ICD-10-CM

## 2023-01-09 DIAGNOSIS — G89.29 CHRONIC BILATERAL THORACIC BACK PAIN: ICD-10-CM

## 2023-01-09 DIAGNOSIS — M50.30 DDD (DEGENERATIVE DISC DISEASE), CERVICAL: ICD-10-CM

## 2023-01-09 DIAGNOSIS — F11.20 UNCOMPLICATED OPIOID DEPENDENCE (HCC): ICD-10-CM

## 2023-01-09 RX ORDER — HYDROCODONE BITARTRATE AND ACETAMINOPHEN 10; 325 MG/1; MG/1
TABLET ORAL
Qty: 100 TABLET | Refills: 0 | Status: SHIPPED | OUTPATIENT
Start: 2023-01-09 | End: 2023-01-23 | Stop reason: SDUPTHER

## 2023-01-09 RX ORDER — HYDROCODONE BITARTRATE AND ACETAMINOPHEN 10; 325 MG/1; MG/1
TABLET ORAL
Qty: 100 TABLET | Refills: 0 | Status: SHIPPED | OUTPATIENT
Start: 2023-01-09

## 2023-01-09 RX ORDER — MORPHINE SULFATE 15 MG/1
TABLET ORAL
Qty: 30 TABLET | Refills: 0 | Status: CANCELLED | OUTPATIENT
Start: 2023-01-09

## 2023-01-09 RX ORDER — NORTRIPTYLINE HYDROCHLORIDE 25 MG/1
CAPSULE ORAL
Qty: 60 CAPSULE | Refills: 2 | Status: CANCELLED | OUTPATIENT
Start: 2023-01-09

## 2023-01-09 NOTE — PROGRESS NOTES
Assessment:  1  Chronic pain syndrome    2  Chronic bilateral low back pain with bilateral sciatica    3  Chronic bilateral thoracic back pain    4  Chronic pain of both hips    5  Chronic lumbar radiculopathy    6  Thoracic radiculopathy    7  Neuropathy    8  Idiopathic peripheral neuropathy    9  Lumbar post-laminectomy syndrome    10  Lumbar degenerative disc disease    11  Primary osteoarthritis of both hips    12  DDD (degenerative disc disease), cervical    13  Spinal stenosis of lumbar region with neurogenic claudication    14  Long-term current use of opiate analgesic        Plan:  While the patient was in the office today, I did have a thorough conversation with the patient regarding their chronic pain syndrome, symptoms, and medication regimen/treatment plan  I discussed with the patient with regards to the nortriptyline as she is not sure how helpful it is, and since he just filled a 30-day prescription, which he has not yet started, he is going to continue the nortriptyline for now when he is down to 10 days with the medication he is going to decrease to just 1 pill at bedtime for 10 days and then titrate himself off of the nortriptyline and see if he notices a difference in his pain with or without the medication  In the meantime he is also going to continue with the as needed Norco the way he has been using it but wanted me to decrease the total amount down from 110 pills to 100 pills per 30 days and if he feels there is a difference with or without the nortriptyline and he would like to go back on it, he will call our office before his next office visit  The patient was agreeable and verbalized an understanding      [place] Prescription Drug Monitoring Program report was reviewed and was appropriate     While the patient was in the office today, an annual review of the opioid contract/agreement was conducted, and the patient was agreeable to continue with the contract as the patient had previously signed  A urine drug screen was collected at today's office visit as part of our medication management protocol  The point of care testing results were appropriate for what was being prescribed  The specimen will be sent for confirmatory testing  The drug screen is medically necessary because the patient is either dependent on opioid medication or is being considered for opioid medication therapy and the results could impact ongoing or future treatment  The drug screen is to evaluate for the presences or absence of prescribed, non-prescribed, and/or illicit drugs/substances  The patient was not picked for a pill count today, but he did bring his medications as required  The patient's opioid scripts were sent to their pharmacy electronically and was given a 3 month supply of prescriptions with a Do Not Fill date(s) of January 25, 2023, February 22, 2023, and March 22, 2023  There are risks associated with opioid medications, including dependence, addiction and tolerance  The patient understands and agrees to use these medications only as prescribed  Potential side effects of the medications include, but are not limited to, constipation, drowsiness, addiction, impaired judgment and risk of fatal overdose if not taken as prescribed  The patient was warned against driving while taking sedation medications  Sharing medications is a felony  At this point in time, the patient is showing no signs of addiction, abuse, diversion or suicidal ideation  The patient will follow-up in 13 weeks for medication prescription refill and reevaluation  The patient was advised to contact the office should their symptoms worsen in the interim  The patient was agreeable and verbalized an understanding  History of Present Illness:     The patient is a 74 y o  male last seen on 10/17/2022 who presents for a follow up office visit in regards to chronic pain syndrome, as the patient's pain has been ongoing for greater than a year, secondary to lumbar postlaminectomy syndrome with degenerative disc disease, stenosis and radiculopathy as well as chronic cervical, thoracic and bilateral hip pain as well as idiopathic peripheral neuropathy  The patient currently reports visit overall he does feel his pain symptoms have actually slightly improved and he is not sure if it is related to the addition of the nortriptyline which she is currently taking 50 mg at bedtime or the fact that he has been much better about staying on top of the pain with as needed hydrocodone before the pain gets worse  The patient presents today for regular medication follow-up visit  Current pain medications includes: Norco 10/325, 1 p o  4 times daily as needed for pain, dispense number 110 pills per 30 days, morphine sulfate IR 15 mg as needed for severe pain only which we typically only give 1 prescription a year for, and nortriptyline 50 mg at bedtime  The patient reports that this regimen is providing 40% pain relief  The patient is reporting no side effects from this pain medication regimen  Pain Contract Signed: 1/9/2023  Last Urine Drug Screen: 1/9/2023    I have personally reviewed and/or updated the patient's past medical history, past surgical history, family history, social history, current medications, allergies, and vital signs today  Review of Systems:    Review of Systems   Respiratory: Positive for shortness of breath  Cardiovascular: Negative for chest pain  Gastrointestinal: Negative for constipation, diarrhea, nausea and vomiting  Musculoskeletal: Positive for gait problem  Negative for arthralgias, joint swelling (joint stiffness) and myalgias  Skin: Negative for rash  Neurological: Positive for weakness  Negative for dizziness and seizures  All other systems reviewed and are negative          Past Medical History:   Diagnosis Date   • Acute low back pain     10 AUG 2016 RESOLVED   • Angina pectoris ([facility] [address] ) • Bunion    • Bursitis of hip    • Carpal tunnel syndrome    • Chronic bilateral low back pain with bilateral sciatica    • Chronic lumbar radiculopathy    • Chronic narcotic dependence (HCC)    • Chronic pain syndrome    • Constipation due to opioid therapy    • DDD (degenerative disc disease), lumbar    • Deep vein thrombosis of left upper extremity ([facility] [address] )     [id number]  RESOLVED   • Depression    • Diverticula of colon     [id number] RESOLVED   • DVT (deep venous thrombosis) ([facility])     LUE   • Edema    • History of staph infection    • Hyperglycemia    • Hyperlipidemia    • Hypertension    • Insomnia    • Lateral epicondylitis    • Left inguinal hernia    • Methicillin resistant Staphylococcus aureus septicemia ([facility] [address] )    • Morbid obesity due to excess calories ([facility])    • Obesity    • Peripheral neuropathy    • Post laminectomy syndrome    • RLS (restless legs syndrome)    • Septicemia ([facility] )     MRSA   • Umbilical hernia        Past Surgical History:   Procedure Laterality Date   • BACK SURGERY      laminectomy, hardware   • CARPAL TUNNEL RELEASE Left    • CERVICAL SPINE SURGERY     • COLONOSCOPY     • ELBOW SURGERY     • ELBOW SURGERY     • KNEE ARTHROSCOPY Right     knee   • KNEE SURGERY Right     ARTHOSCOPY KNEE   • NECK SURGERY      1997   • LA COLONOSCOPY FLX DX W/COLLJ SPEC WHEN PFRMD N/A 1/24/2017    Procedure: COLONOSCOPY;  Surgeon: Lobito Bell MD;  Location:  MAIN OR;  Service: General   • LA [address] [facility] WRST SURG W/RLS TRANSVRS CARPL LIGM Right 1/2/2020    Procedure: RELEASE CARPAL TUNNEL ENDOSCOPIC, right;  Surgeon: Demar Centeno MD;  Location: [facility] MAIN OR;  Service: Orthopedics   • LA RPR UMBILICAL HRNA 5 YRS/> REDUCIBLE N/A 10/5/2022    Procedure: REPAIR HERNIA UMBILICAL WITH MESH ;  Surgeon: Lobito Bell MD;  Location: [facility] MAIN OR;  Service: General   • LA TENDON SHEATH INCISION Right 9/9/2021    Procedure: Right long finger trigger finger release;  Surgeon: Demar Centeno MD;  Location: [facility] MAIN OR;  Service: Orthopedics   • SHOULDER SURGERY     • SPINAL CORD STIMULATOR IMPLANT     • SYNOVECTOMY Right 9/9/2021    Procedure: Tenosynovectomy right hand long finger flexor digitorum superficialis and profundus tendon;  Surgeon: Pauline Miranda MD;  Location:  MAIN OR;  Service: Orthopedics       Family History   Problem Relation Age of Onset   • Heart disease Mother    • Cancer Mother         breast, stomach   • Aneurysm Mother    • Heart disease Father    • Cancer Father         skin, liver, colon DECESED AGE 58       Social History     Occupational History   • Not on file   Tobacco Use   • Smoking status: Never   • Smokeless tobacco: Never   Vaping Use   • Vaping Use: Never used   Substance and Sexual Activity   • Alcohol use: Yes     Comment: occasional- less than 1 drink per week   • Drug use: No     Comment: opiod use for chronic pain   • Sexual activity: Yes         Current Outpatient Medications:   •  acetaminophen (TYLENOL) 650 mg CR tablet, Take 1 tablet (650 mg total) by mouth every 8 (eight) hours as needed for mild pain, Disp: 30 tablet, Rfl: 0  •  bisoprolol-hydrochlorothiazide (ZIAC) 2 5-6 25 MG per tablet, Take 1 tablet by mouth daily, Disp: 90 tablet, Rfl: 3  •  carBAMazepine (TEGretol) 200 mg tablet, Take 1 tablet (200 mg total) by mouth 2 (two) times a day, Disp: 60 tablet, Rfl: 2  •  clonazePAM (KlonoPIN) 0 5 MG disintegrating tablet, Take 1 tablet (0 5 mg total) by mouth 2 (two) times a day, Disp: 60 tablet, Rfl: 0  •  cyclobenzaprine (FLEXERIL) 10 mg tablet, Take 1 tablet (10 mg total) by mouth 3 (three) times a day as needed for muscle spasms, Disp: 45 tablet, Rfl: 1  •  diazepam (VALIUM) 10 mg tablet, Take 1 tablet (10 mg total) by mouth daily at bedtime as needed for anxiety, Disp: 30 tablet, Rfl: 0  •  docusate sodium (COLACE) 100 mg capsule, Take 100 mg by mouth 2 (two) times a day, Disp: , Rfl:   •  HYDROcodone-acetaminophen (NORCO)  mg per tablet, Take 1 PO QID PRN for pain  [facility] to [facility]  DO NOT FILL UNTIL: 1/25/23, Disp: 100 tablet, Rfl: 0  •  HYDROcodone-acetaminophen (NORCO)  mg per tablet, Take 1 PO QID PRN for pain  Please send to Critical access hospital  DO NOT FILL UNTIL: 2/22/23, Disp: 100 tablet, Rfl: 0  •  HYDROcodone-acetaminophen (NORCO)  mg per tablet, Take 1 PO QID PRN for pain  DO NOT FILL UNTIL: 3/22/23  Please send to [facility]  , Disp: 100 tablet, Rfl: 0  •  lactulose 20 g/30 mL, Take 1-2 tablespoons daily PRN as needed for chronic constipation  Please sent to Critical access hospital  , Disp: 473 mL, Rfl: 2  •  linaCLOtide 145 MCG CAPS, Take 1 capsule (145 mcg total) by mouth daily as needed (constipation), Disp: 30 capsule, Rfl: 0  •  morphine (MSIR) 15 mg tablet, Take 1/2 tablet BID PRN for severe pain in place of hydrocodone occasionally  Please send to Critical access hospital  , Disp: 30 tablet, Rfl: 0  •  Multiple Vitamin (MULTIVITAMIN) tablet, Take 1 tablet by mouth every other day , Disp: , Rfl:   •  naproxen sodium (ALEVE) 220 MG tablet, Take 1 tablet (220 mg total) by mouth 2 (two) times a day with meals, Disp: 20 tablet, Rfl: 0  •  nortriptyline (PAMELOR) 25 mg capsule, Take 1 PO HS every other night x 10 days, then 1 PO HS x 10 days, then 2 PO HS  Please send to [facility]  , Disp: 60 capsule, Rfl: 2  •  rosuvastatin (CRESTOR) 10 MG tablet, Take 1 tablet (10 mg total) by mouth daily at bedtime, Disp: 90 tablet, Rfl: 3  •  naloxone (Narcan) 4 mg/0 1 mL nasal spray, Spray 2 sprays into 1 nostril for suspected opioid overdose  May repeat in other nostril  Call 911 , Disp: 2 each, Rfl: 0    Allergies   Allergen Reactions   • Fentanyl Other (See Comments)     "Makes me Suicidal"  Happened when dosage increased     • Penicillins Other (See Comments)     As a child unknown   • Pravastatin Other (See Comments)     Reaction Date: [id number];   Muscle weakness   • Sulfa Antibiotics Other (See Comments) As a child unknown   • Vytorin [Ezetimibe-Simvastatin] Myalgia     Reaction Date: [id number];        Physical Exam:    /72 (BP Location: Left arm, Patient Position: Sitting, Cuff Size: Large)   Pulse 83   Temp 97 9 °F (36 6 °C)   Ht 5' 10" (1 778 m)   Wt 125 kg (275 lb)   BMI 39 46 kg/m²     Constitutional:normal, well developed, well nourished, alert, in no distress and non-toxic and no overt pain behavior  and overweight  Eyes:anicteric  HEENT:grossly intact  Neck:supple, symmetric, trachea midline and no masses   Pulmonary:even and unlabored  Cardiovascular:No edema or pitting edema present  Skin:Normal without rashes or lesions and well hydrated  Psychiatric:Mood and affect appropriate  Neurologic:Cranial Nerves II-XII grossly intact  Musculoskeletal:The patient's gait is slightly antalgic, painful at times, but steady without the use of any assistive devices        Imaging  No orders to display         Orders Placed This Encounter   Procedures   • [facility] All Prescribed Meds and Special Instructions   • Amphetamines, Methamphetamines   • Butalbital   • Phenobarbital   • Secobarbital   • Temazepam   • Alprazolam   • Clonazepam   • Diazepam   • Lorazepam   • Oxazepam   • Gabapentin   • Pregabalin   • Cocaine   • Heroin   • Buprenorphine   • Levorphanol   • Meperidine   • Naltrexone   • Fentanyl   • Methadone   • Oxycodone   • Oxymorphone   • Tapentadol   • THC   • Tramadol   • Codeine, Hydrocodone, Hydropmorphone, Morphine   • Bath Salts   • Ethyl Glucuronide/Ethyl Sulfate   • Kratom   • Spice   • Methylphenidate   • Phentermine   • Validity Oxidant   • Validity Creatinine   • Validity pH   • Validity Specific

## 2023-01-09 NOTE — PATIENT INSTRUCTIONS

## 2023-01-11 LAB
6MAM UR QL CFM: NEGATIVE NG/ML
7AMINOCLONAZEPAM UR QL CFM: ABNORMAL NG/ML
A-OH ALPRAZ UR QL CFM: NEGATIVE NG/ML
ACCEPTABLE CREAT UR QL: NORMAL MG/DL
ACCEPTIBLE SP GR UR QL: NORMAL
AMPHET UR QL CFM: NEGATIVE NG/ML
BUPRENORPHINE UR QL CFM: NEGATIVE NG/ML
BUTALBITAL UR QL CFM: NEGATIVE NG/ML
BZE UR QL CFM: NEGATIVE NG/ML
CODEINE UR QL CFM: NEGATIVE NG/ML
EDDP UR QL CFM: NEGATIVE NG/ML
ETHYL GLUCURONIDE UR QL CFM: NEGATIVE NG/ML
ETHYL SULFATE UR QL SCN: NEGATIVE NG/ML
EUTYLONE UR QL: NEGATIVE NG/ML
FENTANYL UR QL CFM: NEGATIVE NG/ML
GLIADIN IGG SER IA-ACNC: NEGATIVE NG/ML
HYDROCODONE UR QL CFM: NORMAL NG/ML
HYDROMORPHONE UR QL CFM: NORMAL NG/ML
LORAZEPAM UR QL CFM: NEGATIVE NG/ML
ME-PHENIDATE UR QL CFM: NEGATIVE NG/ML
MEPERIDINE UR QL CFM: NEGATIVE NG/ML
METHADONE UR QL CFM: NEGATIVE NG/ML
METHAMPHET UR QL CFM: NEGATIVE NG/ML
MORPHINE UR QL CFM: NEGATIVE NG/ML
NALTREXONE UR QL CFM: NEGATIVE NG/ML
NITRITE UR QL: NORMAL UG/ML
NORBUPRENORPHINE UR QL CFM: NEGATIVE NG/ML
NORDIAZEPAM UR QL CFM: NORMAL NG/ML
NORFENTANYL UR QL CFM: NEGATIVE NG/ML
NORHYDROCODONE UR QL CFM: NORMAL NG/ML
NORMEPERIDINE UR QL CFM: NEGATIVE NG/ML
NOROXYCODONE UR QL CFM: NEGATIVE NG/ML
OXAZEPAM UR QL CFM: NORMAL NG/ML
OXYCODONE UR QL CFM: NEGATIVE NG/ML
OXYMORPHONE UR QL CFM: NEGATIVE NG/ML
OXYMORPHONE UR QL CFM: NEGATIVE NG/ML
PARA-FLUOROFENTANYL QUANTIFICATION: NORMAL NG/ML
PHENOBARB UR QL CFM: NEGATIVE NG/ML
RESULT ALL_PRESCRIBED MEDS AND SPECIAL INSTRUCTIONS: NORMAL
SECOBARBITAL UR QL CFM: NEGATIVE NG/ML
SL AMB 4-ANPP QUANTIFICATION: NORMAL NG/ML
SL AMB 5F-ADB-M7 METABOLITE QUANTIFICATION: NEGATIVE NG/ML
SL AMB 7-OH-MITRAGYNINE (KRATOM ALKALOID) QUANTIFICATION: NEGATIVE NG/ML
SL AMB AB-FUBINACA-M3 METABOLITE QUANTIFICATION: NEGATIVE NG/ML
SL AMB ACETYL FENTANYL QUANTIFICATION: NORMAL NG/ML
SL AMB ACETYL NORFENTANYL QUANTIFICATION: NORMAL NG/ML
SL AMB ACRYL FENTANYL QUANTIFICATION: NORMAL NG/ML
SL AMB CARFENTANIL QUANTIFICATION: NORMAL NG/ML
SL AMB CTHC (MARIJUANA METABOLITE) QUANTIFICATION: NEGATIVE NG/ML
SL AMB DEXTRORPHAN (DEXTROMETHORPHAN METABOLITE) QUANT: NEGATIVE NG/ML
SL AMB GABAPENTIN QUANTIFICATION: NEGATIVE
SL AMB JWH018 METABOLITE QUANTIFICATION: NEGATIVE NG/ML
SL AMB JWH073 METABOLITE QUANTIFICATION: NEGATIVE NG/ML
SL AMB MDMB-FUBINACA-M1 METABOLITE QUANTIFICATION: NEGATIVE NG/ML
SL AMB METHYLONE QUANTIFICATION: NEGATIVE NG/ML
SL AMB N-DESMETHYL-TRAMADOL QUANTIFICATION: NEGATIVE NG/ML
SL AMB PHENTERMINE QUANTIFICATION: NEGATIVE NG/ML
SL AMB PREGABALIN QUANTIFICATION: NEGATIVE
SL AMB RCS4 METABOLITE QUANTIFICATION: NEGATIVE NG/ML
SL AMB RITALINIC ACID QUANTIFICATION: NEGATIVE NG/ML
SMOOTH MUSCLE AB TITR SER IF: NEGATIVE NG/ML
SPECIMEN DRAWN SERPL: NEGATIVE NG/ML
SPECIMEN PH ACCEPTABLE UR: NORMAL
TAPENTADOL UR QL CFM: NEGATIVE NG/ML
TEMAZEPAM UR QL CFM: NORMAL NG/ML
TEMAZEPAM UR QL CFM: NORMAL NG/ML
TRAMADOL UR QL CFM: NEGATIVE NG/ML
URATE/CREAT 24H UR: NEGATIVE NG/ML

## 2023-01-16 ENCOUNTER — APPOINTMENT (OUTPATIENT)
Dept: LAB | Facility: HOSPITAL | Age: 68
End: 2023-01-16

## 2023-01-16 DIAGNOSIS — E78.2 MIXED HYPERLIPIDEMIA: ICD-10-CM

## 2023-01-16 DIAGNOSIS — R73.9 HYPERGLYCEMIA: ICD-10-CM

## 2023-01-16 DIAGNOSIS — I10 ESSENTIAL HYPERTENSION: ICD-10-CM

## 2023-01-16 DIAGNOSIS — Z12.5 SCREENING FOR MALIGNANT NEOPLASM OF PROSTATE: ICD-10-CM

## 2023-01-16 LAB
ALBUMIN SERPL BCP-MCNC: 3.8 G/DL (ref 3.5–5)
ALP SERPL-CCNC: 61 U/L (ref 46–116)
ALT SERPL W P-5'-P-CCNC: 34 U/L (ref 12–78)
ANION GAP SERPL CALCULATED.3IONS-SCNC: 8 MMOL/L (ref 4–13)
AST SERPL W P-5'-P-CCNC: 21 U/L (ref 5–45)
BASOPHILS # BLD AUTO: 0.04 THOUSANDS/ÂΜL (ref 0–0.1)
BASOPHILS NFR BLD AUTO: 1 % (ref 0–1)
BILIRUB SERPL-MCNC: 0.34 MG/DL (ref 0.2–1)
BUN SERPL-MCNC: 12 MG/DL (ref 5–25)
CALCIUM SERPL-MCNC: 9.2 MG/DL (ref 8.3–10.1)
CHLORIDE SERPL-SCNC: 107 MMOL/L (ref 96–108)
CHOLEST SERPL-MCNC: 150 MG/DL
CO2 SERPL-SCNC: 25 MMOL/L (ref 21–32)
CREAT SERPL-MCNC: 1.01 MG/DL (ref 0.6–1.3)
EOSINOPHIL # BLD AUTO: 0.2 THOUSAND/ÂΜL (ref 0–0.61)
EOSINOPHIL NFR BLD AUTO: 3 % (ref 0–6)
ERYTHROCYTE [DISTWIDTH] IN BLOOD BY AUTOMATED COUNT: 13.3 % (ref 11.6–15.1)
EST. AVERAGE GLUCOSE BLD GHB EST-MCNC: 114 MG/DL
GFR SERPL CREATININE-BSD FRML MDRD: 77 ML/MIN/1.73SQ M
GLUCOSE P FAST SERPL-MCNC: 118 MG/DL (ref 65–99)
HBA1C MFR BLD: 5.6 %
HCT VFR BLD AUTO: 51.2 % (ref 36.5–49.3)
HDLC SERPL-MCNC: 44 MG/DL
HGB BLD-MCNC: 16.4 G/DL (ref 12–17)
IMM GRANULOCYTES # BLD AUTO: 0.03 THOUSAND/UL (ref 0–0.2)
IMM GRANULOCYTES NFR BLD AUTO: 1 % (ref 0–2)
LDLC SERPL CALC-MCNC: 81 MG/DL (ref 0–100)
LYMPHOCYTES # BLD AUTO: 2.14 THOUSANDS/ÂΜL (ref 0.6–4.47)
LYMPHOCYTES NFR BLD AUTO: 36 % (ref 14–44)
MCH RBC QN AUTO: 29.3 PG (ref 26.8–34.3)
MCHC RBC AUTO-ENTMCNC: 32 G/DL (ref 31.4–37.4)
MCV RBC AUTO: 91 FL (ref 82–98)
MONOCYTES # BLD AUTO: 0.56 THOUSAND/ÂΜL (ref 0.17–1.22)
MONOCYTES NFR BLD AUTO: 9 % (ref 4–12)
NEUTROPHILS # BLD AUTO: 3.02 THOUSANDS/ÂΜL (ref 1.85–7.62)
NEUTS SEG NFR BLD AUTO: 50 % (ref 43–75)
NRBC BLD AUTO-RTO: 0 /100 WBCS
PLATELET # BLD AUTO: 274 THOUSANDS/UL (ref 149–390)
PMV BLD AUTO: 8.7 FL (ref 8.9–12.7)
POTASSIUM SERPL-SCNC: 4 MMOL/L (ref 3.5–5.3)
PROT SERPL-MCNC: 7.9 G/DL (ref 6.4–8.4)
PSA SERPL-MCNC: 0.5 NG/ML (ref 0–4)
RBC # BLD AUTO: 5.6 MILLION/UL (ref 3.88–5.62)
SODIUM SERPL-SCNC: 140 MMOL/L (ref 135–147)
TRIGL SERPL-MCNC: 123 MG/DL
TSH SERPL DL<=0.05 MIU/L-ACNC: 1.93 UIU/ML (ref 0.45–4.5)
WBC # BLD AUTO: 5.99 THOUSAND/UL (ref 4.31–10.16)

## 2023-01-23 ENCOUNTER — OFFICE VISIT (OUTPATIENT)
Dept: FAMILY MEDICINE CLINIC | Facility: HOSPITAL | Age: 68
End: 2023-01-23

## 2023-01-23 VITALS
HEIGHT: 70 IN | DIASTOLIC BLOOD PRESSURE: 80 MMHG | HEART RATE: 81 BPM | SYSTOLIC BLOOD PRESSURE: 142 MMHG | BODY MASS INDEX: 39.4 KG/M2 | TEMPERATURE: 97.8 F | WEIGHT: 275.2 LBS

## 2023-01-23 DIAGNOSIS — E78.2 MIXED HYPERLIPIDEMIA: ICD-10-CM

## 2023-01-23 DIAGNOSIS — Z23 ENCOUNTER FOR IMMUNIZATION: ICD-10-CM

## 2023-01-23 DIAGNOSIS — M19.049 HAND ARTHRITIS: ICD-10-CM

## 2023-01-23 DIAGNOSIS — F32.1 CURRENT MODERATE EPISODE OF MAJOR DEPRESSIVE DISORDER WITHOUT PRIOR EPISODE (HCC): ICD-10-CM

## 2023-01-23 DIAGNOSIS — F11.20 CONTINUOUS OPIOID DEPENDENCE (HCC): ICD-10-CM

## 2023-01-23 DIAGNOSIS — I10 ESSENTIAL HYPERTENSION: Primary | ICD-10-CM

## 2023-01-23 DIAGNOSIS — M54.41 CHRONIC BILATERAL LOW BACK PAIN WITH BILATERAL SCIATICA: ICD-10-CM

## 2023-01-23 DIAGNOSIS — G47.00 PERSISTENT INSOMNIA: ICD-10-CM

## 2023-01-23 DIAGNOSIS — G89.4 CHRONIC PAIN SYNDROME: ICD-10-CM

## 2023-01-23 DIAGNOSIS — G89.29 CHRONIC BILATERAL LOW BACK PAIN WITH BILATERAL SCIATICA: ICD-10-CM

## 2023-01-23 DIAGNOSIS — M54.42 CHRONIC BILATERAL LOW BACK PAIN WITH BILATERAL SCIATICA: ICD-10-CM

## 2023-01-23 DIAGNOSIS — E66.01 CLASS 2 SEVERE OBESITY DUE TO EXCESS CALORIES WITH SERIOUS COMORBIDITY AND BODY MASS INDEX (BMI) OF 37.0 TO 37.9 IN ADULT (HCC): ICD-10-CM

## 2023-01-23 RX ORDER — ZOLPIDEM TARTRATE 10 MG/1
10 TABLET ORAL
Qty: 30 TABLET | Refills: 0 | Status: SHIPPED | OUTPATIENT
Start: 2023-01-23

## 2023-01-23 NOTE — PROGRESS NOTES
Name: Wojciech Smith  : 1955      MRN: [id number]  Encounter Provider: Delicia Guajardo MD  Encounter Date: 2023   Encounter department: [facility]– [place]note 2[facility]  Essential hypertension  Comments:  Good BP control    2  Chronic bilateral low back pain with bilateral sciatica    3  Chronic pain syndrome    4  Continuous opioid dependence ([facility] )    5  Mixed hyperlipidemia  Comments:  Excellent lipid profile    6  Persistent insomnia  -     zolpidem (AMBIEN) 10 mg tablet; Take 1 tablet (10 mg total) by mouth daily at bedtime as needed for sleep    7  Current moderate episode of major depressive disorder without prior episode (HCC)    8  Class 2 severe obesity due to excess calories with serious comorbidity and body mass index (BMI) of 37 0 to 37 9 in adult ([facility] )    9  Hand arthritis  -     C-reactive protein; Future  -     Anti-Noemi 1 Antibody; Future  -     RF Screen w/ Reflex to Titer; Future    10  Encounter for immunization  -     influenza vaccine, high-dose, PF 0 7 mL (FLUZONE HIGH-DOSE)      BMI Counseling: Body mass index is 39 49 kg/m²  The BMI is above normal  Nutrition recommendations include decreasing portion sizes, encouraging healthy choices of fruits and vegetables, limiting drinks that contain sugar, moderation in carbohydrate intake and reducing intake of cholesterol  Exercise recommendations include exercising 3-5 times per week  No pharmacotherapy was ordered  Rationale for BMI follow-up plan is due to patient being overweight or obese           Subjective      6 month follow up    Having more pain and stiffness    Trouble sleeping, Diazepam intermittent use not doing what it used to do    Stimulator battery ran out, not sure if it was helping  Feet and lower legs feel like ice  Tapering off of nortriptyline    Labs reviewed in detail and copy given  Very good metabolic measures    Review of Systems   Constitutional: Positive for unexpected weight change  HENT: Negative  Respiratory: Negative  Cardiovascular: Negative  Genitourinary: Negative  Musculoskeletal: Positive for arthralgias, back pain, gait problem, joint swelling and myalgias  Psychiatric/Behavioral: Positive for dysphoric mood  All other systems reviewed and are negative  Current Outpatient Medications on File Prior to Visit   Medication Sig   • acetaminophen (TYLENOL) 650 mg CR tablet Take 1 tablet (650 mg total) by mouth every 8 (eight) hours as needed for mild pain   • bisoprolol-hydrochlorothiazide (ZIAC) 2 5-6 25 MG per tablet Take 1 tablet by mouth daily   • clonazePAM (KlonoPIN) 0 5 MG disintegrating tablet Take 1 tablet (0 5 mg total) by mouth 2 (two) times a day   • cyclobenzaprine (FLEXERIL) 10 mg tablet Take 1 tablet (10 mg total) by mouth 3 (three) times a day as needed for muscle spasms   • docusate sodium (COLACE) 100 mg capsule Take 100 mg by mouth 2 (two) times a day   • HYDROcodone-acetaminophen (NORCO)  mg per tablet Take 1 PO QID PRN for pain  Please send to [facility]  DO NOT FILL UNTIL: 1/25/23   • lactulose 20 g/30 mL Take 1-2 tablespoons daily PRN as needed for chronic constipation  Please sent to [facility]  • linaCLOtide 145 MCG CAPS Take 1 capsule (145 mcg total) by mouth daily as needed (constipation)   • morphine (MSIR) 15 mg tablet Take 1/2 tablet BID PRN for severe pain in place of hydrocodone occasionally  Please send to [facility]  • Multiple Vitamin (MULTIVITAMIN) tablet Take 1 tablet by mouth every other day    • naloxone (Narcan) 4 mg/0 1 mL nasal spray Spray 2 sprays into 1 nostril for suspected opioid overdose  May repeat in other nostril  Call 911  • naproxen sodium (ALEVE) 220 MG tablet Take 1 tablet (220 mg total) by mouth 2 (two) times a day with meals   • nortriptyline (PAMELOR) 25 mg capsule Take 1 PO HS every other night x 10 days, then 1 PO HS x 10 days, then 2 PO HS  Please send to [facility]  • rosuvastatin (CRESTOR) 10 MG tablet Take 1 tablet (10 mg total) by mouth daily at bedtime       Objective     /80   Pulse 81   Temp 97 8 °F (36 6 °C)   Ht 5' 10" (1 778 m)   Wt 125 kg (275 lb 3 2 oz)   BMI 39 49 kg/m²     Physical Exam  Vitals and nursing note reviewed  Constitutional:       Appearance: He is obese  Neck:      Vascular: No carotid bruit  Cardiovascular:      Rate and Rhythm: Normal rate and regular rhythm  Pulses: Normal pulses  Heart sounds: Normal heart sounds  Pulmonary:      Effort: Pulmonary effort is normal       Breath sounds: Normal breath sounds  Musculoskeletal:      Right lower leg: No edema  Left lower leg: No edema  Neurological:      Mental Status: He is alert and oriented to person, place, and time     Psychiatric:         Mood and Affect: Mood normal        Delilah Byrne MD

## 2023-01-25 NOTE — TELEPHONE ENCOUNTER
aware Dapsone Pregnancy And Lactation Text: This medication is Pregnancy Category C and is not considered safe during pregnancy or breast feeding.

## 2023-02-03 ENCOUNTER — APPOINTMENT (OUTPATIENT)
Dept: LAB | Facility: HOSPITAL | Age: 68
End: 2023-02-03

## 2023-02-03 DIAGNOSIS — M19.049 HAND ARTHRITIS: ICD-10-CM

## 2023-02-03 LAB — CRP SERPL QL: 3 MG/L

## 2023-02-04 LAB
ENA JO1 AB SER-ACNC: <0.2 AI (ref 0–0.9)
RHEUMATOID FACT SER QL LA: NEGATIVE

## 2023-02-23 DIAGNOSIS — G47.00 PERSISTENT INSOMNIA: Primary | ICD-10-CM

## 2023-02-23 RX ORDER — ESZOPICLONE 2 MG/1
2 TABLET, FILM COATED ORAL
Qty: 30 TABLET | Refills: 1 | Status: SHIPPED | OUTPATIENT
Start: 2023-02-23 | End: 2023-06-06 | Stop reason: SDUPTHER

## 2023-05-11 ENCOUNTER — HOSPITAL ENCOUNTER (OUTPATIENT)
Dept: RADIOLOGY | Facility: CLINIC | Age: 68
Discharge: HOME | End: 2023-05-11
Admitting: ANESTHESIOLOGY

## 2023-05-11 VITALS
HEART RATE: 74 BPM | RESPIRATION RATE: 18 BRPM | SYSTOLIC BLOOD PRESSURE: 128 MMHG | TEMPERATURE: 97.7 F | DIASTOLIC BLOOD PRESSURE: 93 MMHG | OXYGEN SATURATION: 94 %

## 2023-05-11 DIAGNOSIS — M25.552 CHRONIC PAIN OF BOTH HIPS: ICD-10-CM

## 2023-05-11 DIAGNOSIS — M25.551 CHRONIC PAIN OF BOTH HIPS: ICD-10-CM

## 2023-05-11 DIAGNOSIS — M16.0 PRIMARY OSTEOARTHRITIS OF BOTH HIPS: ICD-10-CM

## 2023-05-11 DIAGNOSIS — G89.29 CHRONIC PAIN OF BOTH HIPS: ICD-10-CM

## 2023-05-11 RX ORDER — METHYLPREDNISOLONE ACETATE 80 MG/ML
80 INJECTION, SUSPENSION INTRA-ARTICULAR; INTRALESIONAL; INTRAMUSCULAR; PARENTERAL; SOFT TISSUE ONCE
Status: COMPLETED | OUTPATIENT
Start: 2023-05-11 | End: 2023-05-11

## 2023-05-11 RX ADMIN — METHYLPREDNISOLONE ACETATE 80 MG: 80 INJECTION, SUSPENSION INTRA-ARTICULAR; INTRALESIONAL; INTRAMUSCULAR; SOFT TISSUE at 09:25

## 2023-05-11 RX ADMIN — IOHEXOL 1 ML: 300 INJECTION, SOLUTION INTRAVENOUS at 09:25

## 2023-05-11 RX ADMIN — LIDOCAINE HYDROCHLORIDE 4 ML: 20 INJECTION, SOLUTION EPIDURAL; INFILTRATION; INTRACAUDAL at 09:25

## 2023-05-11 NOTE — DISCHARGE INSTRUCTIONS
Steroid Joint Injection   WHAT YOU NEED TO KNOW:   A steroid joint injection is a procedure to inject steroid medicine into a joint  Steroid medicine decreases pain and inflammation  The injection may also contain an anesthetic (numbing medicine) to decrease pain  It may be done to treat conditions such as arthritis, gout, or carpal tunnel syndrome  The injections may be given in your knee, ankle, shoulder, elbow, wrist, ankle or sacroiliac joint  Do not apply heat to any area that is numb  If you have discomfort or soreness at the injection site, you may apply ice today, 20 minutes on and 20 minutes off  Tomorrow you may use ice or warm, moist heat  Do not apply ice or heat directly to the skin  You may have an increase or change in the discomfort for 36-48 hours after your treatment  Apply ice and continue with any pain medicine you have been prescribed  Do not do anything strenuous today  You may shower, but no tub baths or hot tubs today  You may resume your normal activities tomorrow, but do not “overdo it”  Resume normal activities slowly when you are feeling better  If you experience redness, drainage or swelling at the injection site, or if you develop a fever above 100 degrees, please call [facility] at [phone] or go to the Emergency Room  Continue to take all routine medicines prescribed by your primary care physician unless otherwise instructed by our staff  Most blood thinners should be started again according to your regularly scheduled dosing  If you have any questions, please give our office a call  As no general anesthesia was used in today's procedure, you should not experience any side effects related to anesthesia  If you are diabetic, the steroids used in today's injection may temporarily increase your blood sugar levels after the first few days after your injection   Please keep a close eye on your sugars and alert the doctor who manages your diabetes if your sugars are significantly high from your baseline or you are symptomatic  If you have a problem specifically related to your procedure, please call our office at [phone]  Problems not related to your procedure should be directed to your primary care physician

## 2023-05-11 NOTE — H&P
History of Present Illness: The patient is a 79 y o  male who presents with complaints of hip pain      Past Medical History:   Diagnosis Date   • Acute low back pain     10 AUG 2016 RESOLVED   • Angina pectoris ([facility])    • Bunion    • Bursitis of hip    • Carpal tunnel syndrome    • Chronic bilateral low back pain with bilateral sciatica    • Chronic lumbar radiculopathy    • Chronic narcotic dependence ([facility])    • Chronic pain syndrome    • Constipation due to opioid therapy    • DDD (degenerative disc disease), lumbar    • Deep vein thrombosis of left upper extremity ([facility] )     [id number]  RESOLVED   • Depression    • Diverticula of colon     [id number] RESOLVED   • DVT (deep venous thrombosis) ([facility])     LUE   • Edema    • History of staph infection    • Hyperglycemia    • Hyperlipidemia    • Hypertension    • Insomnia    • Lateral epicondylitis    • Left inguinal hernia    • Methicillin resistant Staphylococcus aureus septicemia ([facility] )    • Morbid obesity due to excess calories ([facility] )    • Obesity    • Peripheral neuropathy    • Post laminectomy syndrome    • RLS (restless legs syndrome)    • Septicemia ([facility] )     MRSA   • Umbilical hernia        Past Surgical History:   Procedure Laterality Date   • BACK SURGERY      laminectomy, hardware   • CARPAL TUNNEL RELEASE Left    • CERVICAL SPINE SURGERY     • COLONOSCOPY     • ELBOW SURGERY     • ELBOW SURGERY     • KNEE ARTHROSCOPY Right     knee   • KNEE SURGERY Right     ARTHOSCOPY KNEE   • NECK SURGERY      1997   • CA COLONOSCOPY FLX DX W/COLLJ SPEC WHEN PFRMD N/A 1/24/2017    Procedure: COLONOSCOPY;  Surgeon: Nehemias Thompson MD;  Location:  MAIN OR;  Service: General   • CA [facility] WRST SURG W/RLS TRANSVRS CARPL LIGM Right 1/2/2020    Procedure: RELEASE CARPAL TUNNEL ENDOSCOPIC, right;  Surgeon: Srinivasan Navarro MD;  Location:  MAIN OR;  Service: Orthopedics   • CA RPR UMBILICAL HRNA 5 YRS/> REDUCIBLE N/A 10/5/2022    Procedure: REPAIR HERNIA UMBILICAL WITH MESH ; Surgeon: Andres Major MD;  Location:  MAIN OR;  Service: General   • RI TENDON SHEATH INCISION Right 9/9/2021    Procedure: Right long finger trigger finger release;  Surgeon: Daniel Bowers MD;  Location:  MAIN OR;  Service: Orthopedics   • SHOULDER SURGERY     • SPINAL CORD STIMULATOR IMPLANT     • SYNOVECTOMY Right 9/9/2021    Procedure: Tenosynovectomy right hand long finger flexor digitorum superficialis and profundus tendon;  Surgeon: Daniel Bowers MD;  Location:  MAIN OR;  Service: Orthopedics         Current Outpatient Medications:   •  bisoprolol-hydrochlorothiazide (ZIAC) 2 5-6 25 MG per tablet, Take 1 tablet by mouth daily, Disp: 90 tablet, Rfl: 3  •  eszopiclone (LUNESTA) 2 mg tablet, Take 1 tablet (2 mg total) by mouth daily at bedtime as needed for sleep Take immediately before bedtime, Disp: 30 tablet, Rfl: 1  •  ibuprofen (MOTRIN) 200 mg tablet, Take by mouth every 6 (six) hours as needed for mild pain, Disp: , Rfl:   •  Multiple Vitamin (MULTIVITAMIN) tablet, Take 1 tablet by mouth every other day  (Patient not taking: Reported on 4/18/2023), Disp: , Rfl:   •  naloxone (Narcan) 4 mg/0 1 mL nasal spray, Spray 2 sprays into 1 nostril for suspected opioid overdose  May repeat in other nostril   Call 911 , Disp: 2 each, Rfl: 0  •  naproxen sodium (ALEVE) 220 MG tablet, Take 1 tablet (220 mg total) by mouth 2 (two) times a day with meals, Disp: 20 tablet, Rfl: 0  •  rosuvastatin (CRESTOR) 10 MG tablet, Take 1 tablet (10 mg total) by mouth daily at bedtime, Disp: 90 tablet, Rfl: 3    Current Facility-Administered Medications:   •  iohexol (OMNIPAQUE) 300 mg/mL injection 50 mL, 50 mL, Intra-articular, Once, Sudhir Lyn DO  •  lidocaine (PF) (XYLOCAINE-MPF) 2 % injection 5 mL, 5 mL, Intra-articular, Once, Sudhir Lyn DO  •  methylPREDNISolone acetate (DEPO-MEDROL) injection 80 mg, 80 mg, Intra-articular, Once, Sudhir Lyn DO    Allergies   Allergen Reactions   • Fentanyl Other (See "Comments)     \"Makes me Suicidal\"  Happened when dosage increased  • Penicillins Other (See Comments)     As a child unknown   • Pravastatin Other (See Comments)     Reaction Date: [id number];   Muscle weakness   • Sulfa Antibiotics Other (See Comments)     As a child unknown   • Vytorin [Ezetimibe-Simvastatin] Myalgia     Reaction Date: [id number];        Physical Exam:   Vitals:    05/11/23 0900   BP: 131/79   Pulse: 70   Resp: 20   Temp: 97 7 °F (36 5 °C)   SpO2: 95%     General: Awake, Alert, Oriented x 3, Mood and affect appropriate  Respiratory: Respirations even and unlabored  Cardiovascular: Peripheral pulses intact; no edema  Musculoskeletal Exam: Antalgic gait    ASA Score: III    Patient/Chart Verification  Patient ID Verified: Verbal  ID Band Applied: No  Consents Confirmed: Procedural, To be obtained in the Pre-Procedure area  Interval H&P(within 24 hr) Complete (required for Outpatients and Surgery Admit only): To be obtained in the Pre-Procedure area  Allergies Reviewed: Yes  Anticoag/NSAID held?: NA  Currently on antibiotics?: No    Assessment:   1  Primary osteoarthritis of both hips    2   Chronic pain of both hips        Plan: B/L Hip Injection    "

## 2023-05-18 ENCOUNTER — TELEPHONE (OUTPATIENT)
Dept: PAIN MEDICINE | Facility: CLINIC | Age: 68
End: 2023-05-18

## 2023-05-25 NOTE — TELEPHONE ENCOUNTER
"Unable to lm to cb with % improvement and pain level 2wk post inj    \"Call cannot be completed at this time\"    "

## 2023-06-06 DIAGNOSIS — G47.00 PERSISTENT INSOMNIA: ICD-10-CM

## 2023-06-06 RX ORDER — ESZOPICLONE 2 MG/1
2 TABLET, FILM COATED ORAL
Qty: 30 TABLET | Refills: 1 | Status: SHIPPED | OUTPATIENT
Start: 2023-06-06 | End: 2023-09-19 | Stop reason: SDUPTHER

## 2023-07-05 ENCOUNTER — TELEPHONE (OUTPATIENT)
Dept: FAMILY MEDICINE CLINIC | Facility: HOSPITAL | Age: 68
End: 2023-07-05

## 2023-07-05 NOTE — TELEPHONE ENCOUNTER
Pt asking if her and spouse should get labs done prior to 7/24 appt's.  Note in spouses chart as well

## 2023-07-06 DIAGNOSIS — R73.9 HYPERGLYCEMIA: ICD-10-CM

## 2023-07-06 DIAGNOSIS — E78.2 MIXED HYPERLIPIDEMIA: Primary | ICD-10-CM

## 2023-07-10 ENCOUNTER — OFFICE VISIT (OUTPATIENT)
Dept: PAIN MEDICINE | Facility: CLINIC | Age: 68
End: 2023-07-10
Payer: COMMERCIAL

## 2023-07-10 VITALS
WEIGHT: 265 LBS | TEMPERATURE: 98.6 F | DIASTOLIC BLOOD PRESSURE: 82 MMHG | BODY MASS INDEX: 37.94 KG/M2 | HEART RATE: 78 BPM | SYSTOLIC BLOOD PRESSURE: 130 MMHG | HEIGHT: 70 IN

## 2023-07-10 DIAGNOSIS — M96.1 POSTLAMINECTOMY SYNDROME OF LUMBAR REGION: ICD-10-CM

## 2023-07-10 DIAGNOSIS — Z79.891 LONG-TERM CURRENT USE OF OPIATE ANALGESIC: ICD-10-CM

## 2023-07-10 DIAGNOSIS — M16.0 PRIMARY OSTEOARTHRITIS OF BOTH HIPS: ICD-10-CM

## 2023-07-10 DIAGNOSIS — F11.20 CONTINUOUS OPIOID DEPENDENCE (HCC): ICD-10-CM

## 2023-07-10 DIAGNOSIS — M47.816 LUMBAR SPONDYLOSIS: ICD-10-CM

## 2023-07-10 DIAGNOSIS — G89.4 CHRONIC PAIN SYNDROME: Primary | ICD-10-CM

## 2023-07-10 DIAGNOSIS — M51.36 LUMBAR DEGENERATIVE DISC DISEASE: ICD-10-CM

## 2023-07-10 PROCEDURE — 99214 OFFICE O/P EST MOD 30 MIN: CPT | Performed by: PHYSICIAN ASSISTANT

## 2023-07-10 RX ORDER — NALOXONE HYDROCHLORIDE 4 MG/.1ML
SPRAY NASAL
Qty: 1 EACH | Refills: 1 | Status: SHIPPED | OUTPATIENT
Start: 2023-07-10 | End: 2024-07-09

## 2023-07-10 NOTE — PROGRESS NOTES
Assessment:  1. Chronic pain syndrome    2. Long-term current use of opiate analgesic    3. Continuous opioid dependence ([address])    4. Lumbar spondylosis    5. Postlaminectomy syndrome of lumbar region    6. Primary osteoarthritis of both hips    7. Lumbar degenerative disc disease        Plan:  While the patient was in the office today, I did have a thorough conversation regarding their chronic pain syndrome, medication management, and treatment plan options. After discussing options, I have recommended that we discontinue the hydrocodone and as needed use of MSIR and proceed with a trial of Nucynta 50 mg every 8 hours as needed pain. The patient was agreeable to this change and it may help with his neuropathic pain better. Follow-up in 3 months or sooner if needed if the pain changes or worsens. [place] Prescription Drug Monitoring Program report was reviewed and was appropriate     A urine drug screen was collected at today's office visit as part of our medication management protocol. The point of care testing results were appropriate for what was being prescribed. The specimen will be sent for confirmatory testing. The drug screen is medically necessary because the patient is either dependent on opioid medication or is being considered for opioid medication therapy and the results could impact ongoing or future treatment. The drug screen is to evaluate for the presences or absence of prescribed, non-prescribed, and/or illicit drugs/substances. There are risks associated with opioid medications, including dependence, addiction and tolerance. The patient understands and agrees to use these medications only as prescribed. Potential side effects of the medications include, but are not limited to, constipation, drowsiness, addiction, impaired judgment and risk of fatal overdose if not taken as prescribed. The patient was warned against driving while taking sedation medications.   Sharing medications is a felony. At this point in time, the patient is showing no signs of addiction, abuse, diversion or suicidal ideation. The patient will follow-up in 12 weeks for medication prescription refill and reevaluation. The patient was advised to contact the office should their symptoms worsen in the interim. The patient was agreeable and verbalized an understanding. History of Present Illness: The patient is a 79 y.o. male last seen on 5/11/2023 who presents for a follow up office visit in regards to chronic pain secondary to lumbar postlaminectomy pain syndrome and peripheral neuropathy. The patient currently reports chronic low back pain and neuropathic pain of the lower extremities that he rates a 6-7 out of 10 on the pain scale and describes it as a constant burning, dull, aching, sharp, pressure-like and shooting pain with numbness and paresthesias in the feet. Patient also has chronic bilateral hip pain which has been alleviated by greater than 50% following intra-articular hip injections done in May. The patient was last prescribed Norco in January and he has been trying to take it very sparingly however his pain is increasing significantly. He also has a nonfunctioning spinal cord stimulator that he does not wish to have replaced. Patient presents today to discuss alternative medication options. He has already tried and failed to respond to neuroleptic agents including gabapentin, Lyrica and duloxetine. Last Urine Drug Screen: 7/10/2023    I have personally reviewed and/or updated the patient's past medical history, past surgical history, family history, social history, current medications, allergies, and vital signs today. Review of Systems:    Review of Systems   Respiratory: Positive for shortness of breath. Cardiovascular: Negative for chest pain. Gastrointestinal: Negative for constipation, diarrhea, nausea and vomiting. Musculoskeletal: Positive for gait problem.  Negative for arthralgias, joint swelling (Joint stiffness) and myalgias. Skin: Negative for rash. Neurological: Positive for weakness. Negative for dizziness and seizures. All other systems reviewed and are negative.         Past Medical History:   Diagnosis Date   • Acute low back pain     10 AUG 2016 RESOLVED   • Angina pectoris ([facility])    • Bunion    • Bursitis of hip    • Carpal tunnel syndrome    • Chronic bilateral low back pain with bilateral sciatica    • Chronic lumbar radiculopathy    • Chronic narcotic dependence ([facility])    • Chronic pain syndrome    • Constipation due to opioid therapy    • DDD (degenerative disc disease), lumbar    • Deep vein thrombosis of left upper extremity ([address])     [id number]  RESOLVED   • Depression    • Diverticula of colon     [id number] RESOLVED   • DVT (deep venous thrombosis) ([facility])     LUE   • Edema    • History of staph infection    • Hyperglycemia    • Hyperlipidemia    • Hypertension    • Insomnia    • Lateral epicondylitis    • Left inguinal hernia    • Methicillin resistant Staphylococcus aureus septicemia ([address])    • Morbid obesity due to excess calories ([address])    • Obesity    • Peripheral neuropathy    • Post laminectomy syndrome    • RLS (restless legs syndrome)    • Septicemia ([facility])     MRSA   • Umbilical hernia        Past Surgical History:   Procedure Laterality Date   • BACK SURGERY      laminectomy, hardware   • CARPAL TUNNEL RELEASE Left    • CERVICAL SPINE SURGERY     • COLONOSCOPY     • ELBOW SURGERY     • ELBOW SURGERY     • KNEE ARTHROSCOPY Right     knee   • KNEE SURGERY Right     ARTHOSCOPY KNEE   • NECK SURGERY      1997   • OR COLONOSCOPY FLX DX W/COLLJ SPEC WHEN PFRMD N/A 1/24/2017    Procedure: COLONOSCOPY;  Surgeon: Suzette Hong MD;  Location:  MAIN OR;  Service: General   • OR [facility] WRST SURG W/RLS TRANSVRS CARPL LIGM Right 1/2/2020    Procedure: RELEASE CARPAL TUNNEL ENDOSCOPIC, right;  Surgeon: Sherie Atkins MD;  Location:  MAIN OR;  Service: Orthopedics   • GA RPR UMBILICAL HRNA 5 YRS/> REDUCIBLE N/A 10/5/2022    Procedure: REPAIR HERNIA UMBILICAL WITH MESH.;  Surgeon: Cari Chaudhary MD;  Location:  MAIN OR;  Service: General   • GA TENDON SHEATH INCISION Right 9/9/2021    Procedure: Right long finger trigger finger release;  Surgeon: Ravi Ewing MD;  Location:  MAIN OR;  Service: Orthopedics   • SHOULDER SURGERY     • SPINAL CORD STIMULATOR IMPLANT     • SYNOVECTOMY Right 9/9/2021    Procedure: Tenosynovectomy right hand long finger flexor digitorum superficialis and profundus tendon;  Surgeon: Ravi Ewing MD;  Location:  MAIN OR;  Service: Orthopedics       Family History   Problem Relation Age of Onset   • Heart disease Mother    • Cancer Mother         breast, stomach   • Aneurysm Mother    • Heart disease Father    • Cancer Father         skin, liver, colon DECESED AGE 58       Social History     Occupational History   • Not on file   Tobacco Use   • Smoking status: Never   • Smokeless tobacco: Never   Vaping Use   • Vaping Use: Never used   Substance and Sexual Activity   • Alcohol use: Yes     Comment: occasional- less than 1 drink per week   • Drug use: No     Comment: opiod use for chronic pain   • Sexual activity: Yes         Current Outpatient Medications:   •  bisoprolol-hydrochlorothiazide (ZIAC) 2.5-6.25 MG per tablet, Take 1 tablet by mouth daily, Disp: 90 tablet, Rfl: 3  •  eszopiclone (LUNESTA) 2 mg tablet, Take 1 tablet (2 mg total) by mouth daily at bedtime as needed for sleep Take immediately before bedtime, Disp: 30 tablet, Rfl: 1  •  ibuprofen (MOTRIN) 200 mg tablet, Take by mouth every 6 (six) hours as needed for mild pain, Disp: , Rfl:   •  naloxone (NARCAN) 4 mg/0.1 mL nasal spray, Administer 1 spray into a nostril.  If no response after 2-3 minutes, give another dose in the other nostril using a new spray., Disp: 1 each, Rfl: 1  •  naproxen sodium (ALEVE) 220 MG tablet, Take 1 tablet (220 mg total) by mouth 2 (two) times a day with meals, Disp: 20 tablet, Rfl: 0  •  rosuvastatin (CRESTOR) 10 MG tablet, Take 1 tablet (10 mg total) by mouth daily at bedtime, Disp: 90 tablet, Rfl: 3  •  tapentadol (NUCYNTA) 50 mg tablet, Take 1 tablet (50 mg total) by mouth every 8 (eight) hours as needed for moderate pain For ongoing therapy Max Daily Amount: 150 mg, Disp: 90 tablet, Rfl: 0  •  Multiple Vitamin (MULTIVITAMIN) tablet, Take 1 tablet by mouth every other day  (Patient not taking: Reported on 4/18/2023), Disp: , Rfl:   •  naloxone (Narcan) 4 mg/0.1 mL nasal spray, Spray 2 sprays into 1 nostril for suspected opioid overdose. May repeat in other nostril. Call 911., Disp: 2 each, Rfl: 0    Allergies   Allergen Reactions   • Fentanyl Other (See Comments)     "Makes me Suicidal"  Happened when dosage increased. • Penicillins Other (See Comments)     As a child unknown   • Pravastatin Other (See Comments)     Reaction Date: [id number];   Muscle weakness   • Sulfa Antibiotics Other (See Comments)     As a child unknown   • Vytorin [Ezetimibe-Simvastatin] Myalgia     Reaction Date: [id number];        Physical Exam:    /82 (BP Location: Left arm, Patient Position: Sitting, Cuff Size: Large)   Pulse 78   Temp 98.6 °F (37 °C)   Ht 5' 10" (1.778 m)   Wt 120 kg (265 lb)   BMI 38.02 kg/m²     Constitutional:normal, well developed, well nourished, alert, in no distress and non-toxic and no overt pain behavior. Eyes:anicteric  HEENT:grossly intact  Neck:supple, symmetric, trachea midline and no masses   Pulmonary:even and unlabored  Cardiovascular:No edema or pitting edema present  Skin:Normal without rashes or lesions and well hydrated  Psychiatric:Mood and affect appropriate  Neurologic:Cranial Nerves II-XII grossly intact  Musculoskeletal: Multiple lumbar scars from prior surgery, gait is slow but stable.       Imaging  No orders to display         Orders Placed This Encounter   Procedures   • [facility] All Prescribed Meds and Special Instructions   • Amphetamines, Methamphetamines   • Butalbital   • Phenobarbital   • Secobarbital   • Alprazolam   • Clonazepam   • Diazepam   • Lorazepam   • Gabapentin   • Pregabalin   • Cocaine   • Heroin   • Buprenorphine   • Levorphanol   • Meperidine   • Naltrexone   • Fentanyl   • Methadone   • Oxycodone   • Tapentadol   • THC   • Tramadol   • Codeine, Hydrocodone, Hydropmorphone, Morphine   • Bath Salts   • Ethyl Glucuronide/Ethyl Sulfate   • Kratom   • Spice   • Methylphenidate   • Phentermine   • Validity Oxidant   • Validity Creatinine   • Validity pH   • Validity Specific

## 2023-07-11 ENCOUNTER — TELEPHONE (OUTPATIENT)
Dept: PAIN MEDICINE | Facility: CLINIC | Age: 68
End: 2023-07-11

## 2023-07-12 LAB
6MAM UR QL CFM: NEGATIVE NG/ML
7AMINOCLONAZEPAM UR QL CFM: NEGATIVE NG/ML
A-OH ALPRAZ UR QL CFM: NEGATIVE NG/ML
ACCEPTABLE CREAT UR QL: NORMAL MG/DL
ACCEPTIBLE SP GR UR QL: NORMAL
AMPHET UR QL CFM: NEGATIVE NG/ML
BUPRENORPHINE UR QL CFM: NEGATIVE NG/ML
BUTALBITAL UR QL CFM: NEGATIVE NG/ML
BZE UR QL CFM: NEGATIVE NG/ML
CODEINE UR QL CFM: NEGATIVE NG/ML
EDDP UR QL CFM: NEGATIVE NG/ML
ETHYL GLUCURONIDE UR QL CFM: NEGATIVE NG/ML
ETHYL SULFATE UR QL SCN: NEGATIVE NG/ML
EUTYLONE UR QL: NEGATIVE NG/ML
FENTANYL UR QL CFM: NEGATIVE NG/ML
GLIADIN IGG SER IA-ACNC: NEGATIVE NG/ML
HYDROCODONE UR QL CFM: NEGATIVE NG/ML
HYDROMORPHONE UR QL CFM: NEGATIVE NG/ML
LORAZEPAM UR QL CFM: NEGATIVE NG/ML
ME-PHENIDATE UR QL CFM: NEGATIVE NG/ML
MEPERIDINE UR QL CFM: NEGATIVE NG/ML
METHADONE UR QL CFM: NEGATIVE NG/ML
METHAMPHET UR QL CFM: NEGATIVE NG/ML
MORPHINE UR QL CFM: ABNORMAL NG/ML
NALTREXONE UR QL CFM: NEGATIVE NG/ML
NITRITE UR QL: NORMAL UG/ML
NORBUPRENORPHINE UR QL CFM: NEGATIVE NG/ML
NORDIAZEPAM UR QL CFM: NEGATIVE NG/ML
NORFENTANYL UR QL CFM: NEGATIVE NG/ML
NORHYDROCODONE UR QL CFM: NEGATIVE NG/ML
NORMEPERIDINE UR QL CFM: NEGATIVE NG/ML
NOROXYCODONE UR QL CFM: NEGATIVE NG/ML
OXAZEPAM UR QL CFM: NEGATIVE NG/ML
OXYCODONE UR QL CFM: NEGATIVE NG/ML
OXYMORPHONE UR QL CFM: NEGATIVE NG/ML
PARA-FLUOROFENTANYL QUANTIFICATION: NORMAL NG/ML
PHENOBARB UR QL CFM: NEGATIVE NG/ML
RESULT ALL_PRESCRIBED MEDS AND SPECIAL INSTRUCTIONS: NORMAL
SECOBARBITAL UR QL CFM: NEGATIVE NG/ML
SL AMB 4-ANPP QUANTIFICATION: NORMAL NG/ML
SL AMB 5F-ADB-M7 METABOLITE QUANTIFICATION: NEGATIVE NG/ML
SL AMB 7-OH-MITRAGYNINE (KRATOM ALKALOID) QUANTIFICATION: NEGATIVE NG/ML
SL AMB AB-FUBINACA-M3 METABOLITE QUANTIFICATION: NEGATIVE NG/ML
SL AMB ACETYL FENTANYL QUANTIFICATION: NORMAL NG/ML
SL AMB ACETYL NORFENTANYL QUANTIFICATION: NORMAL NG/ML
SL AMB ACRYL FENTANYL QUANTIFICATION: NORMAL NG/ML
SL AMB CARFENTANIL QUANTIFICATION: NORMAL NG/ML
SL AMB CTHC (MARIJUANA METABOLITE) QUANTIFICATION: NEGATIVE NG/ML
SL AMB DEXTRORPHAN (DEXTROMETHORPHAN METABOLITE) QUANT: NEGATIVE NG/ML
SL AMB GABAPENTIN QUANTIFICATION: NEGATIVE
SL AMB JWH018 METABOLITE QUANTIFICATION: NEGATIVE NG/ML
SL AMB JWH073 METABOLITE QUANTIFICATION: NEGATIVE NG/ML
SL AMB MDMB-FUBINACA-M1 METABOLITE QUANTIFICATION: NEGATIVE NG/ML
SL AMB METHYLONE QUANTIFICATION: NEGATIVE NG/ML
SL AMB N-DESMETHYL-TRAMADOL QUANTIFICATION: NEGATIVE NG/ML
SL AMB PHENTERMINE QUANTIFICATION: NEGATIVE NG/ML
SL AMB PREGABALIN QUANTIFICATION: NEGATIVE
SL AMB RCS4 METABOLITE QUANTIFICATION: NEGATIVE NG/ML
SL AMB RITALINIC ACID QUANTIFICATION: NEGATIVE NG/ML
SMOOTH MUSCLE AB TITR SER IF: NEGATIVE NG/ML
SPECIMEN DRAWN SERPL: NEGATIVE NG/ML
SPECIMEN PH ACCEPTABLE UR: NORMAL
TAPENTADOL UR QL CFM: ABNORMAL NG/ML
TEMAZEPAM UR QL CFM: NEGATIVE NG/ML
TRAMADOL UR QL CFM: NEGATIVE NG/ML
URATE/CREAT 24H UR: NEGATIVE NG/ML

## 2023-07-14 ENCOUNTER — TELEPHONE (OUTPATIENT)
Dept: PAIN MEDICINE | Facility: CLINIC | Age: 68
End: 2023-07-14

## 2023-07-14 NOTE — TELEPHONE ENCOUNTER
----- Message from Sea Bailey PA-C sent at 7/14/2023 12:23 PM EDT -----  Please inform patient of UDS results positive for unprescribed opioid (morphine). This is a one time warning and any further inconsistencies will result in no further opioid prescriptions.

## 2023-07-17 ENCOUNTER — APPOINTMENT (OUTPATIENT)
Dept: LAB | Facility: HOSPITAL | Age: 68
End: 2023-07-17
Payer: COMMERCIAL

## 2023-07-17 DIAGNOSIS — E78.2 MIXED HYPERLIPIDEMIA: ICD-10-CM

## 2023-07-17 DIAGNOSIS — R73.9 HYPERGLYCEMIA: ICD-10-CM

## 2023-07-17 LAB
ALBUMIN SERPL BCP-MCNC: 3.7 G/DL (ref 3.5–5)
ALP SERPL-CCNC: 58 U/L (ref 46–116)
ALT SERPL W P-5'-P-CCNC: 41 U/L (ref 12–78)
ANION GAP SERPL CALCULATED.3IONS-SCNC: -1 MMOL/L
AST SERPL W P-5'-P-CCNC: 19 U/L (ref 5–45)
BILIRUB SERPL-MCNC: 0.4 MG/DL (ref 0.2–1)
BUN SERPL-MCNC: 16 MG/DL (ref 5–25)
CALCIUM SERPL-MCNC: 9.5 MG/DL (ref 8.3–10.1)
CHLORIDE SERPL-SCNC: 108 MMOL/L (ref 96–108)
CHOLEST SERPL-MCNC: 137 MG/DL
CO2 SERPL-SCNC: 32 MMOL/L (ref 21–32)
CREAT SERPL-MCNC: 1.17 MG/DL (ref 0.6–1.3)
EST. AVERAGE GLUCOSE BLD GHB EST-MCNC: 111 MG/DL
GFR SERPL CREATININE-BSD FRML MDRD: 64 ML/MIN/1.73SQ M
GLUCOSE P FAST SERPL-MCNC: 110 MG/DL (ref 65–99)
HBA1C MFR BLD: 5.5 %
HDLC SERPL-MCNC: 42 MG/DL
LDLC SERPL CALC-MCNC: 72 MG/DL (ref 0–100)
POTASSIUM SERPL-SCNC: 4.8 MMOL/L (ref 3.5–5.3)
PROT SERPL-MCNC: 8 G/DL (ref 6.4–8.4)
SODIUM SERPL-SCNC: 139 MMOL/L (ref 135–147)
TRIGL SERPL-MCNC: 116 MG/DL

## 2023-07-17 PROCEDURE — 80061 LIPID PANEL: CPT

## 2023-07-17 PROCEDURE — 36415 COLL VENOUS BLD VENIPUNCTURE: CPT

## 2023-07-17 PROCEDURE — 80053 COMPREHEN METABOLIC PANEL: CPT

## 2023-07-17 PROCEDURE — 83036 HEMOGLOBIN GLYCOSYLATED A1C: CPT

## 2023-07-17 NOTE — TELEPHONE ENCOUNTER
Per [facility] medication is formulary but a 7 day supply is required since pt has not had an opioid in the past 90 days  The script is voided after filling the 7 day supply, pt will need a new script. Pharmacist is aware.

## 2023-07-17 NOTE — TELEPHONE ENCOUNTER
S/w pharmacy re: Glenis Pablo, advised of above. Per pharmacist, only 7 days of the rx is covered. The rest of the rx will be discarded. Pharmacist confirmed [employer]. Advised that It is advising - prior Erich Jacobo. Advised pharmacist, the writer will joshua. Pharmacist verbalized understanding and appreciation.

## 2023-07-25 DIAGNOSIS — M96.1 POSTLAMINECTOMY SYNDROME OF LUMBAR REGION: ICD-10-CM

## 2023-07-25 NOTE — TELEPHONE ENCOUNTER
Caller: Juan     Doctor: Gregory RIVERA    Reason for call: Prior auth and send over new script for the   tapentadol (NUCYNTA) 50 mg tablet       Call back#: [phone]

## 2023-08-25 DIAGNOSIS — M96.1 POSTLAMINECTOMY SYNDROME OF LUMBAR REGION: ICD-10-CM

## 2023-08-25 RX ORDER — TAPENTADOL HYDROCHLORIDE 50 MG/1
TABLET, FILM COATED ORAL
Qty: 90 TABLET | Refills: 0 | Status: SHIPPED | OUTPATIENT
Start: 2023-08-25

## 2023-08-25 NOTE — TELEPHONE ENCOUNTER
Nucynta script sent to pharmacy. Encourage following through with Dr. Keith Das consult. I will discuss his back pain at his next OV.

## 2023-08-25 NOTE — TELEPHONE ENCOUNTER
S/w pt, confirmed nucynta rx, states that he is taking 1 pill bid w/ + relief of his neuropathic pain in his b/l feet and lower legs but no relief of his back and upper leg pain. Per pt, no se's. Confirmed 10/2/fu ov. Pt added that he has new pain in his L arm, starting in his fingers and going up into his chest. Pt stated that he did discuss these symptoms with Dr. Doc Antonio who referred the pt to Dr. Jo Hernandez for an evaluation. Pt stated that he has not made that appt yet. Advised pt, the writer will fu w/ [place] re: Daxa Canales. Anticipate refills will be sent to get to the 10/2 ov. The writer will cb if there is any question or change in the plan as discussed. Pt verbalized understanding and appreciation.

## 2023-09-12 ENCOUNTER — APPOINTMENT (OUTPATIENT)
Dept: RADIOLOGY | Facility: CLINIC | Age: 68
End: 2023-09-12
Payer: MEDICARE

## 2023-09-12 ENCOUNTER — OFFICE VISIT (OUTPATIENT)
Dept: OBGYN CLINIC | Facility: CLINIC | Age: 68
End: 2023-09-12
Payer: COMMERCIAL

## 2023-09-12 VITALS
BODY MASS INDEX: 38.94 KG/M2 | WEIGHT: 272 LBS | DIASTOLIC BLOOD PRESSURE: 78 MMHG | SYSTOLIC BLOOD PRESSURE: 133 MMHG | HEIGHT: 70 IN | HEART RATE: 76 BPM

## 2023-09-12 DIAGNOSIS — M79.642 LEFT HAND PAIN: ICD-10-CM

## 2023-09-12 DIAGNOSIS — M18.12 ARTHRITIS OF CARPOMETACARPAL (CMC) JOINT OF LEFT THUMB: ICD-10-CM

## 2023-09-12 DIAGNOSIS — M19.032 LOCALIZED PRIMARY OSTEOARTHRITIS OF CARPOMETACARPAL (CMC) JOINT OF LEFT WRIST: Primary | ICD-10-CM

## 2023-09-12 PROCEDURE — 73130 X-RAY EXAM OF HAND: CPT

## 2023-09-12 PROCEDURE — 20600 DRAIN/INJ JOINT/BURSA W/O US: CPT | Performed by: PHYSICIAN ASSISTANT

## 2023-09-12 PROCEDURE — 99213 OFFICE O/P EST LOW 20 MIN: CPT | Performed by: PHYSICIAN ASSISTANT

## 2023-09-12 RX ORDER — TRIAMCINOLONE ACETONIDE 40 MG/ML
20 INJECTION, SUSPENSION INTRA-ARTICULAR; INTRAMUSCULAR
Status: COMPLETED | OUTPATIENT
Start: 2023-09-12 | End: 2023-09-12

## 2023-09-12 RX ORDER — BUPIVACAINE HYDROCHLORIDE 2.5 MG/ML
0.5 INJECTION, SOLUTION INFILTRATION; PERINEURAL
Status: COMPLETED | OUTPATIENT
Start: 2023-09-12 | End: 2023-09-12

## 2023-09-12 RX ADMIN — BUPIVACAINE HYDROCHLORIDE 0.5 ML: 2.5 INJECTION, SOLUTION INFILTRATION; PERINEURAL at 07:30

## 2023-09-12 RX ADMIN — TRIAMCINOLONE ACETONIDE 20 MG: 40 INJECTION, SUSPENSION INTRA-ARTICULAR; INTRAMUSCULAR at 07:30

## 2023-09-12 NOTE — PROGRESS NOTES
Orthopaedic Surgery - Office Note  Ben Foss (70 y.o. male)   : 1955   MRN: [id number]  Encounter Date: 2023    Chief Complaint   Patient presents with   • Left Hand - Swelling, Pain     Fingers into wrist, elbow, shoulder & now chest & under arm   • Right Hand - Pain     Starting with same symptoms         Assessment/Plan  Diagnoses and all orders for this visit:    Localized primary osteoarthritis of carpometacarpal [place]) joint of left wrist  -     Ambulatory Referral to Hand Surgery; Future  -     Durable Medical Equipment    Left hand pain  -     XR hand 3+ vw left; Future  -     Ambulatory Referral to Hand Surgery; Future  -     Durable Medical Equipment    Arthritis of carpometacarpal [place]) joint of left thumb    The diagnosis as well as treatment options were reviewed with the patient in the office today. The left hand osteoarthritis could be treated with a thumb spica brace that may be utilized at night and during the day when active. Intermittent cortisone injections for pain relief were reviewed. Risks and benefits of this were discussed. He would like to proceed with trying an injection. I reviewed with the patient the arthritis in his hand would not account for all his symptoms radiating all the way up the arm and into the thorax. We reviewed this is likely related to spine pathology and I recommend he follow-up with his spine and pain team for this condition. Return for Recheck in 3 months with hand surgery. f/u with spine/pain for arm pain. History of Present Illness  This is a new patient with left hand pain. Patient reports that about a month and a half ago he had finished going to the bathroom and wiping himself when he began to develop left hand pain located at the base of the thumb. He reports this pain has remained and is worse with certain movements of the thumb and gripping activities. No direct trauma to this area is reported.   He reports that over that course of 6 weeks he has also developed several other pain symptoms in his index and middle finger that radiates up the arm past the elbow into his axilla into the chest and thorax region. He reports he also having pain and a sensation that his fingers are being spread wide open involving the middle ring and small finger. This pain shoots up into the forearm around the posterior elbow into the back of the shoulder across to shoulder blades and into his middle back. He denies any numbness or tingling in the ring or small finger. Patient has a history of left carpal tunnel release in the past.  He has a history of cervical neck surgery. He takes Nucynta for chronic pain. He has a history of thoracic radiculopathy. Review of Systems  Pertinent items are noted in HPI. All other systems were reviewed and are negative. Physical Exam  /78 (BP Location: Left arm, Patient Position: Sitting, Cuff Size: Adult)   Pulse 76   Ht 5' 10" (1.778 m)   Wt 123 kg (272 lb)   BMI 39.03 kg/m²   Cons: Appears well. No apparent distress. Psych: Alert. Oriented x3. Mood and affect normal.  On examination patient's left hand has tenderness to palpation at the [facility] joint with positive CMC grind test.  He has a negative Finkelstein's test.  His distal radial and ulnar pulses are +2. He has sensation intact to light touch in all dermatomes. He has full active and passive range of motion of the left wrist.  He has a negative Tinel's at the carpal tunnel. He has no thenar atrophy or wasting. There are no trigger fingers appreciated. X-rays performed in the office today 3 views of the left hand show no acute fractures or dislocations. Advanced degenerative changes of the thumb MCP joint are noted with subluxation. Moderate CMC joint degenerative changes are seen. X-rays were reviewed from orthopedic standpoint will await official radiologist interpretation.           Studies Reviewed  7/24/2023 PCP note was reviewed but incomplete    Small joint arthrocentesis: L thumb CMC  [facility] Protocol:  Consent: Verbal consent obtained. Risks and benefits: risks, benefits and alternatives were discussed  Consent given by: patient  Time out: Immediately prior to procedure a "time out" was called to verify the correct patient, procedure, equipment, support staff and site/side marked as required. Patient understanding: patient states understanding of the procedure being performed  Patient consent: the patient's understanding of the procedure matches consent given  Relevant documents: relevant documents present and verified  Test results: test results available and properly labeled  Site marked: the operative site was marked  Radiology Images displayed and confirmed. If images not available, report reviewed: imaging studies available  Patient identity confirmed: verbally with patient    Supporting Documentation  Indications: pain   Procedure Details  Location: thumb - L thumb CMC  Preparation: Patient was prepped and draped in the usual sterile fashion  Needle size: 22 G  Medications administered: 0.5 mL bupivacaine 0.25 %; 20 mg triamcinolone acetonide 40 mg/mL    Patient tolerance: patient tolerated the procedure well with no immediate complications  Dressing:  Sterile dressing applied        Medical, Surgical, Family, and Social History  The patient's medical history, family history, and social history, were reviewed and updated as appropriate.     Past Medical History:   Diagnosis Date   • Acute low back pain     10 AUG 2016 RESOLVED   • Angina pectoris (HCC)    • Bunion    • Bursitis of hip    • Carpal tunnel syndrome    • Chronic bilateral low back pain with bilateral sciatica    • Chronic lumbar radiculopathy    • Chronic narcotic dependence (HCC)    • Chronic pain syndrome    • Constipation due to opioid therapy    • DDD (degenerative disc disease), lumbar    • Deep vein thrombosis of left upper extremity ([address]) [id number]  RESOLVED   • Depression    • Diverticula of colon     [id number] RESOLVED   • DVT (deep venous thrombosis) (HCC)     LUE   • Edema    • History of staph infection    • Hyperglycemia    • Hyperlipidemia    • Hypertension    • Insomnia    • Lateral epicondylitis    • Left inguinal hernia    • Methicillin resistant Staphylococcus aureus septicemia (HCC)    • Morbid obesity due to excess calories (HCC)    • Obesity    • Peripheral neuropathy    • Post laminectomy syndrome    • RLS (restless legs syndrome)    • Septicemia (HCC)     MRSA   • Umbilical hernia        Past Surgical History:   Procedure Laterality Date   • BACK SURGERY      laminectomy, hardware   • CARPAL TUNNEL RELEASE Left    • CERVICAL SPINE SURGERY     • COLONOSCOPY     • ELBOW SURGERY     • ELBOW SURGERY     • KNEE ARTHROSCOPY Right     knee   • KNEE SURGERY Right     ARTHOSCOPY KNEE   • NECK SURGERY      1997   • ND COLONOSCOPY FLX DX W/COLLJ SPEC WHEN PFRMD N/A 1/24/2017    Procedure: COLONOSCOPY;  Surgeon: Ambreen Morales MD;  [facility];  Service: General   • [place] [id number] WRST SURG W/RLS TRANSVRS CARPL LIGM Right 1/2/2020    Procedure: RELEASE CARPAL TUNNEL ENDOSCOPIC, right;  Surgeon: Darian Gomez MD;  [facility];  Service: Orthopedics   • ND RPR UMBILICAL HRNA 5 YRS/> REDUCIBLE N/A 10/5/2022    Procedure: REPAIR HERNIA UMBILICAL WITH MESH.;  Surgeon: Ambreen Morales MD;  [facility]: [facility];  Service: General   • ND TENDON SHEATH INCISION Right 9/9/2021    Procedure: Right long finger trigger finger release;  Surgeon: Darian Gomez MD;  [facility];  Service: Orthopedics   • SHOULDER SURGERY     • SPINAL CORD STIMULATOR IMPLANT     • SYNOVECTOMY Right 9/9/2021    Procedure: Tenosynovectomy right hand long finger flexor digitorum superficialis and profundus tendon;  Surgeon: Darian Gomez MD;  [facility];  Service: Orthopedics       Family History   Problem Relation Age of Onset   • Heart disease Mother    • Cancer Mother         breast, stomach   • Aneurysm Mother    • Heart disease Father    • Cancer Father         skin, liver, colon DECESED AGE 58       Social History     Occupational History   • Not on file   Tobacco Use   • Smoking status: Never   • Smokeless tobacco: Never   Vaping Use   • Vaping Use: Never used   Substance and Sexual Activity   • Alcohol use: Yes     Comment: occasional- less than 1 drink per week   • Drug use: No     Comment: opiod use for chronic pain   • Sexual activity: Yes       Allergies   Allergen Reactions   • Fentanyl Other (See Comments)     "Makes me Suicidal"  Happened when dosage increased. • Penicillins Other (See Comments)     As a child unknown   • Pravastatin Other (See Comments)     Reaction Date: [id number];   Muscle weakness   • Sulfa Antibiotics Other (See Comments)     As a child unknown   • Vytorin [Ezetimibe-Simvastatin] Myalgia     Reaction Date: [id number];          Current Outpatient Medications:   •  bisoprolol-hydrochlorothiazide (ZIAC) 2.5-6.25 MG per tablet, Take 1 tablet by mouth daily, Disp: 90 tablet, Rfl: 3  •  ibuprofen (MOTRIN) 200 mg tablet, Take by mouth every 6 (six) hours as needed for mild pain, Disp: , Rfl:   •  naloxone (Narcan) 4 mg/0.1 mL nasal spray, Spray 2 sprays into 1 nostril for suspected opioid overdose. May repeat in other nostril. Call 911., Disp: 2 each, Rfl: 0  •  naloxone (NARCAN) 4 mg/0.1 mL nasal spray, Administer 1 spray into a nostril.  If no response after 2-3 minutes, give another dose in the other nostril using a new spray., Disp: 1 each, Rfl: 1  •  naproxen sodium (ALEVE) 220 MG tablet, Take 1 tablet (220 mg total) by mouth 2 (two) times a day with meals, Disp: 20 tablet, Rfl: 0  •  rosuvastatin (CRESTOR) 10 MG tablet, Take 1 tablet (10 mg total) by mouth daily at bedtime, Disp: 90 tablet, Rfl: 3  •  eszopiclone (LUNESTA) 2 mg tablet, Take 1 tablet (2 mg total) by mouth daily at bedtime as needed for sleep Take immediately before bedtime (Patient not taking: Reported on 9/12/2023), Disp: 30 tablet, Rfl: 1  •  Multiple Vitamin (MULTIVITAMIN) tablet, Take 1 tablet by mouth every other day  (Patient not taking: Reported on 7/24/2023), Disp: , Rfl:   •  Nucynta 50 MG tablet, Take 1 tablet (50 mg total) by mouth every 8 (eight) hours as needed for moderate pain For ongoing therapy Max Daily Amount: 150 mg (Patient not taking: Reported on 9/12/2023), Disp: 90 tablet, Rfl: 0      Gume King PA-C

## 2023-09-13 DIAGNOSIS — I10 ESSENTIAL HYPERTENSION: ICD-10-CM

## 2023-09-13 RX ORDER — BISOPROLOL FUMARATE AND HYDROCHLOROTHIAZIDE 2.5; 6.25 MG/1; MG/1
1 TABLET ORAL DAILY
Qty: 90 TABLET | Refills: 0 | Status: SHIPPED | OUTPATIENT
Start: 2023-09-13

## 2023-09-19 DIAGNOSIS — G47.00 PERSISTENT INSOMNIA: ICD-10-CM

## 2023-09-19 RX ORDER — ESZOPICLONE 2 MG/1
2 TABLET, FILM COATED ORAL
Qty: 30 TABLET | Refills: 1 | Status: SHIPPED | OUTPATIENT
Start: 2023-09-19

## 2023-10-02 ENCOUNTER — OFFICE VISIT (OUTPATIENT)
Dept: PAIN MEDICINE | Facility: CLINIC | Age: 68
End: 2023-10-02
Payer: COMMERCIAL

## 2023-10-02 VITALS
HEIGHT: 70 IN | HEART RATE: 73 BPM | TEMPERATURE: 99.1 F | BODY MASS INDEX: 38.8 KG/M2 | DIASTOLIC BLOOD PRESSURE: 82 MMHG | WEIGHT: 271 LBS | SYSTOLIC BLOOD PRESSURE: 138 MMHG

## 2023-10-02 DIAGNOSIS — F11.20 UNCOMPLICATED OPIOID DEPENDENCE (HCC): ICD-10-CM

## 2023-10-02 DIAGNOSIS — G89.4 CHRONIC PAIN SYNDROME: Primary | ICD-10-CM

## 2023-10-02 DIAGNOSIS — M96.1 LUMBAR POST-LAMINECTOMY SYNDROME: ICD-10-CM

## 2023-10-02 DIAGNOSIS — M54.9 MID BACK PAIN: ICD-10-CM

## 2023-10-02 DIAGNOSIS — M54.2 NECK PAIN: ICD-10-CM

## 2023-10-02 DIAGNOSIS — M54.14 THORACIC RADICULOPATHY: ICD-10-CM

## 2023-10-02 DIAGNOSIS — M54.12 CERVICAL RADICULOPATHY: ICD-10-CM

## 2023-10-02 DIAGNOSIS — Z79.891 LONG-TERM CURRENT USE OF OPIATE ANALGESIC: ICD-10-CM

## 2023-10-02 DIAGNOSIS — M47.816 LUMBAR SPONDYLOSIS: ICD-10-CM

## 2023-10-02 PROCEDURE — 99214 OFFICE O/P EST MOD 30 MIN: CPT | Performed by: PHYSICIAN ASSISTANT

## 2023-10-02 RX ORDER — HYDROCODONE BITARTRATE AND ACETAMINOPHEN 10; 325 MG/1; MG/1
1 TABLET ORAL EVERY 8 HOURS PRN
Qty: 90 TABLET | Refills: 0 | Status: SHIPPED | OUTPATIENT
Start: 2023-10-02

## 2023-10-02 NOTE — PROGRESS NOTES
Assessment:  1. Chronic pain syndrome    2. Neck pain    3. Cervical radiculopathy    4. Thoracic radiculopathy    5. Mid back pain    6. Lumbar spondylosis    7. Lumbar post-laminectomy syndrome    8. Uncomplicated opioid dependence ([address])    9. Long-term current use of opiate analgesic        Plan:  While the patient was in the office today, I did have a thorough conversation regarding their chronic pain syndrome, medication management, and treatment plan options. Unfortunately the patient found that the [place] was not beneficial therefore he has discontinued it. In retrospect, he has tried and failed to respond to many medications but feels that the [address] provided him with the most amount of relief and without side effects. I have electronically sent [address] 10/325 3 times daily as needed pain to his pharmacy with a fill date of 10/23/23, 11/21/23 and 12/19/2023. Patient has pills left from a prescription the last time we prescribed this on 1/26/2023. He has enough medication to last him for the next 3 weeks. I have placed orders for CT scan of the cervical and thoracic spine given the significant increase in pain. We will continue to follow with Dr. Talon Schaefer regarding the left hand. [place] Prescription Drug Monitoring Program report was reviewed and was appropriate     There are risks associated with opioid medications, including dependence, addiction and tolerance. The patient understands and agrees to use these medications only as prescribed. Potential side effects of the medications include, but are not limited to, constipation, drowsiness, addiction, impaired judgment and risk of fatal overdose if not taken as prescribed. The patient was warned against driving while taking sedation medications. Sharing medications is a felony. At this point in time, the patient is showing no signs of addiction, abuse, diversion or suicidal ideation.     The patient will follow-up in 12 weeks for medication prescription refill and reevaluation. The patient was advised to contact the office should their symptoms worsen in the interim. The patient was agreeable and verbalized an understanding. History of Present Illness: The patient is a 79 y.o. male last seen on 7/10/2023 who presents for a follow up office visit in regards to chronic pain. The patient currently reports chronic radicular low back pain, neck pain and midthoracic pain that he rates a 7-8 out of 10 on the scale. He describes his pain as constant and burning, dull, aching, sharp and shooting. He reports a significant increase in neck pain with radicular symptoms in the left upper extremity and mid back pain with radiation into the left axillary region. He also has severe left wrist pain secondary to osteoarthritis and is under the care of Dr. Marco Mancia in regards to that. The patient was prescribed Nucynta on his last visit and reports minimal pain relief. In retrospect, he feels that the [facility] was more beneficial.    Pain Contract Signed: 1/9/2023  Last Urine Drug Screen: 7/10/2023    I have personally reviewed and/or updated the patient's past medical history, past surgical history, family history, social history, current medications, allergies, and vital signs today. Review of Systems:    Review of Systems   Respiratory: Positive for shortness of breath. Cardiovascular: Positive for chest pain. Gastrointestinal: Negative for constipation, diarrhea, nausea and vomiting. Musculoskeletal: Positive for gait problem and joint swelling (Joint stiffness). Negative for arthralgias and myalgias. Skin: Negative for rash. Neurological: Positive for weakness. Negative for dizziness and seizures. All other systems reviewed and are negative.         Past Medical History:   Diagnosis Date   • Acute low back pain     10 AUG 2016 RESOLVED   • Angina pectoris (HCC)    • Bunion    • Bursitis of hip    • Carpal tunnel syndrome    • Chronic bilateral low back pain with bilateral sciatica    • Chronic lumbar radiculopathy    • Chronic narcotic dependence (HCC)    • Chronic pain syndrome    • Constipation due to opioid therapy    • DDD (degenerative disc disease), lumbar    • Deep vein thrombosis of left upper extremity ([address])     [id number]  RESOLVED   • Depression    • Diverticula of colon     [id number] RESOLVED   • DVT (deep venous thrombosis) ([facility])     LUE   • Edema    • History of staph infection    • Hyperglycemia    • Hyperlipidemia    • Hypertension    • Insomnia    • Lateral epicondylitis    • Left inguinal hernia    • Methicillin resistant Staphylococcus aureus septicemia ([address])    • Morbid obesity due to excess calories ([facility])    • Obesity    • Peripheral neuropathy    • Post laminectomy syndrome    • RLS (restless legs syndrome)    • Septicemia ([address])     MRSA   • Umbilical hernia        Past Surgical History:   Procedure Laterality Date   • BACK SURGERY      laminectomy, hardware   • CARPAL TUNNEL RELEASE Left    • CERVICAL SPINE SURGERY     • COLONOSCOPY     • ELBOW SURGERY     • ELBOW SURGERY     • KNEE ARTHROSCOPY Right     knee   • KNEE SURGERY Right     ARTHOSCOPY KNEE   • NECK SURGERY      1997   • [place] COLONOSCOPY FLX DX W/COLLJ SPEC WHEN PFRMD N/A 1/24/2017    Procedure: COLONOSCOPY;  Surgeon: Paolo Machuca MD;  [facility];  Service: General   • [place] [address] WRST SURG W/RLS TRANSVRS CARPL LIGM Right 1/2/2020    Procedure: RELEASE CARPAL TUNNEL ENDOSCOPIC, right;  Surgeon: Matthew Hale MD;  [facility];  Service: Orthopedics   • AL RPR UMBILICAL HRNA 5 YRS/> REDUCIBLE N/A 10/5/2022    Procedure: REPAIR HERNIA UMBILICAL WITH MESH.;  Surgeon: Paolo Machuca MD;  [facility];  Service: General   • AL TENDON SHEATH INCISION Right 9/9/2021    Procedure: Right long finger trigger finger release;  Surgeon: Matthew Hale MD;  [facility];  Service: Orthopedics   • SHOULDER SURGERY     • SPINAL CORD STIMULATOR IMPLANT     • SYNOVECTOMY Right 9/9/2021    Procedure: Tenosynovectomy right hand long finger flexor digitorum superficialis and profundus tendon;  Surgeon: Deepak Sprague MD;  Location:  MAIN OR;  Service: Orthopedics       Family History   Problem Relation Age of Onset   • Heart disease Mother    • Cancer Mother         breast, stomach   • Aneurysm Mother    • Heart disease Father    • Cancer Father         skin, liver, colon DECESED AGE 58       Social History     Occupational History   • Not on file   Tobacco Use   • Smoking status: Never   • Smokeless tobacco: Never   Vaping Use   • Vaping Use: Never used   Substance and Sexual Activity   • Alcohol use: Yes     Comment: occasional- less than 1 drink per week   • Drug use: No     Comment: opiod use for chronic pain   • Sexual activity: Yes         Current Outpatient Medications:   •  bisoprolol-hydrochlorothiazide (ZIAC) 2.5-6.25 MG per tablet, TAKE ONE TABLET BY MOUTH DAILY, Disp: 90 tablet, Rfl: 0  •  eszopiclone (LUNESTA) 2 mg tablet, Take 1 tablet (2 mg total) by mouth daily at bedtime as needed for sleep Take immediately before bedtime, Disp: 30 tablet, Rfl: 1  •  HYDROcodone-acetaminophen (NORCO)  mg per tablet, Take 1 tablet by mouth every 8 (eight) hours as needed for moderate pain For ongoing therapy DO NOT FILL BEFORE 10/23/23 Max Daily Amount: 3 tablets, Disp: 90 tablet, Rfl: 0  •  HYDROcodone-acetaminophen (NORCO)  mg per tablet, Take 1 tablet by mouth every 8 (eight) hours as needed for moderate pain For ongoing therapy DO NOT FILL BEFORE 11/21/23 Max Daily Amount: 3 tablets, Disp: 90 tablet, Rfl: 0  •  HYDROcodone-acetaminophen (NORCO)  mg per tablet, Take 1 tablet by mouth every 8 (eight) hours as needed for moderate pain For ongoing therapy, DO NOT FILL BEFORE 12/19/23 Max Daily Amount: 3 tablets, Disp: 90 tablet, Rfl: 0  •  ibuprofen (MOTRIN) 200 mg tablet, Take by mouth every 6 (six) hours as needed for mild pain, Disp: , Rfl:   •  naproxen sodium (ALEVE) 220 MG tablet, Take 1 tablet (220 mg total) by mouth 2 (two) times a day with meals, Disp: 20 tablet, Rfl: 0  •  rosuvastatin (CRESTOR) 10 MG tablet, Take 1 tablet (10 mg total) by mouth daily at bedtime, Disp: 90 tablet, Rfl: 3  •  Multiple Vitamin (MULTIVITAMIN) tablet, Take 1 tablet by mouth every other day  (Patient not taking: Reported on 10/2/2023), Disp: , Rfl:   •  naloxone (Narcan) 4 mg/0.1 mL nasal spray, Spray 2 sprays into 1 nostril for suspected opioid overdose. May repeat in other nostril. Call 911., Disp: 2 each, Rfl: 0  •  naloxone (NARCAN) 4 mg/0.1 mL nasal spray, Administer 1 spray into a nostril. If no response after 2-3 minutes, give another dose in the other nostril using a new spray., Disp: 1 each, Rfl: 1    Allergies   Allergen Reactions   • Fentanyl Other (See Comments)     "Makes me Suicidal"  Happened when dosage increased. • Penicillins Other (See Comments)     As a child unknown   • Pravastatin Other (See Comments)     Reaction Date: [id number];   Muscle weakness   • Sulfa Antibiotics Other (See Comments)     As a child unknown   • Vytorin [Ezetimibe-Simvastatin] Myalgia     Reaction Date: [id number];        Physical Exam:    /82 (BP Location: Right arm, Patient Position: Sitting, Cuff Size: Large)   Pulse 73   Temp 99.1 °F (37.3 °C)   Ht 5' 10" (1.778 m)   Wt 123 kg (271 lb)   BMI 38.88 kg/m²     Constitutional:normal, well developed, well nourished, alert, in no distress and non-toxic and no overt pain behavior.   Eyes:anicteric  HEENT:grossly intact  Neck:supple, symmetric, trachea midline and no masses   Pulmonary:even and unlabored  Cardiovascular:No edema or pitting edema present  Skin:Normal without rashes or lesions and well hydrated  Psychiatric:Mood and affect appropriate  Neurologic:Cranial Nerves II-XII grossly intact  Musculoskeletal: Gait is slow but stable, her cervical facet joints on the left, tender upper thoracic facet joints      Imaging  CT cervical spine without contrast    (Results Pending)   CT spine thoracic wo contrast    (Results Pending)         Orders Placed This Encounter   Procedures   • CT cervical spine without contrast   • CT spine thoracic wo contrast

## 2023-10-17 DIAGNOSIS — E78.2 MIXED HYPERLIPIDEMIA: ICD-10-CM

## 2023-10-17 RX ORDER — ROSUVASTATIN CALCIUM 10 MG/1
10 TABLET, COATED ORAL
Qty: 90 TABLET | Refills: 3 | Status: SHIPPED | OUTPATIENT
Start: 2023-10-17

## 2023-10-23 ENCOUNTER — TELEPHONE (OUTPATIENT)
Age: 68
End: 2023-10-23

## 2023-10-23 NOTE — TELEPHONE ENCOUNTER
Caller: pt    Doctor: vitaliy    Reason for call: pt states they are calling back    Call back#: [phone]

## 2023-10-25 NOTE — TELEPHONE ENCOUNTER
Caller: Carmencita Calix PT    Doctor/Office: Lucretia Falcon     Call regarding :  Returning the nurse      Call was transferred to: Nurse

## 2023-10-26 DIAGNOSIS — G89.29 CHRONIC BILATERAL THORACIC BACK PAIN: ICD-10-CM

## 2023-10-26 DIAGNOSIS — M47.812 CERVICAL SPONDYLOSIS: Primary | ICD-10-CM

## 2023-10-26 DIAGNOSIS — M54.6 CHRONIC BILATERAL THORACIC BACK PAIN: ICD-10-CM

## 2023-11-07 ENCOUNTER — EVALUATION (OUTPATIENT)
Dept: PHYSICAL THERAPY | Facility: CLINIC | Age: 68
End: 2023-11-07
Payer: COMMERCIAL

## 2023-11-07 DIAGNOSIS — M47.812 CERVICAL SPONDYLOSIS: Primary | ICD-10-CM

## 2023-11-07 DIAGNOSIS — G89.29 CHRONIC BILATERAL THORACIC BACK PAIN: ICD-10-CM

## 2023-11-07 DIAGNOSIS — M54.6 CHRONIC BILATERAL THORACIC BACK PAIN: ICD-10-CM

## 2023-11-07 PROCEDURE — 97162 PT EVAL MOD COMPLEX 30 MIN: CPT | Performed by: PHYSICAL THERAPIST

## 2023-11-07 NOTE — PROGRESS NOTES
PT Evaluation     Today's date: 2023  Patient name: Adele Stern : 1955  MRN: [id number]  Referring provider: Julieta Oliveros, *  Dx:   Encounter Diagnosis     ICD-10-CM    1. Cervical spondylosis  M47.812 Ambulatory referral to Physical Therapy      2. Chronic bilateral thoracic back pain  M54.6 Ambulatory referral to Physical Therapy    G89.29                      Assessment  Assessment details: Pt is a 79y.o. year old male coming to outpatient PT with a diagnosis of chronic cervical and thoracic pain with L UE radicular sx. Pt presents with increased pain and TTP, decreased ROM, decreased strength, and overall decreased functional mobility. Pt would benefit from skilled PT services in order to address these deficits and reach maximum level of function. Thank you kindly for the referral!      Impairments: abnormal gait, abnormal or restricted ROM, activity intolerance, impaired physical strength, lacks appropriate home exercise program and pain with function  Understanding of Dx/Px/POC: good   Prognosis: fair    Goals  STG's ( 3-4 weeks)  1. Pt will be independent in HEP  2. Pt will have improved postural awareness  LTG's ( 6- 8 weeks)  1. Improve FOTO score by 4-6 points  2. Pt will have decreased pain to 3/10 at worst  3. Pt will have decreased L UE radicular sx  4.  Pt will have decreased pain at night when sleeping    Plan  Patient would benefit from: skilled physical therapy  Planned modality interventions: TENS  Planned therapy interventions: joint mobilization, manual therapy, neuromuscular re-education, strengthening, stretching, therapeutic activities, therapeutic exercise, functional ROM exercises, flexibility and home exercise program  Frequency: 2x week  Duration in weeks: 6  Plan of Care beginning date: 2023  Plan of Care expiration date: 2023  Treatment plan discussed with: patient and PTA        Subjective Evaluation    History of Present Illness  Mechanism of injury: About 4 months ago, pt began to have pain in his L CMC joint when washing his his hands, then the pain spread to his hand, wrist and L elbow and it gradually increased to his L sided neck and shoulder region. Pt's L hand was visibly swollen. Pt began to have pain spreading to his L pectoral region and L axillary region, which has eased up. Pt is not able to lift heavy objects with his L UE or work overhead. Pt sleeps on his R side with his L arm over pillows. Pt gets a pulsing annoying pain when sleeping. Pt is not able to turn his head as far to the right. Pt has a history of cervical spine surgery. Pt is limited with looking up to the ceiling. Pt has noticed increased shaking or tremors in B Ue's L > R 2* unknown etiology.     Work: retired  Hobbies; none; watches TV  Gait: trunk flexion, mild antalgic gait  Patient Goals  Patient goals for therapy: decreased pain and independence with ADLs/IADLs  Patient goal: to be able to use my L arm better  Pain  At best pain ratin  At worst pain ratin  Location: L UE  Quality: radiating, dull ache and throbbing    Social Support  Lives with: spouse    Employment status: not working  Hand dominance: right    Treatments  Previous treatment: medication        Objective     Neurological Testing     Sensation   Cervical/Thoracic   Left   Intact: light touch    Right   Intact: light touch    Reflexes   Left   Biceps (C5/C6): normal (2+)  Brachioradialis (C6): normal (2+)  Triceps (C7): normal (2+)    Right   Biceps (C5/C6): trace (1+)  Brachioradialis (C6): trace (1+)  Triceps (C7): trace (1+)    Active Range of Motion   Cervical/Thoracic Spine       Cervical    Flexion: 45 degrees   Extension: 54 degrees      Left lateral flexion: 22 degrees      Right lateral flexion: 25 degrees      Left rotation: 55 degrees  Right rotation: 55 degrees       Thoracic    Left rotation:  Restriction level: moderate  Right rotation:  Restriction level: moderate  Left Shoulder Flexion: 140 degrees   Abduction: 120 degrees     Right Shoulder   Flexion: 140 degrees   Abduction: 140 degrees     Additional Active Range of Motion Details  (+) TTP L upper trap  Posture; pt sits with rounded shoulders/ forward head position  (+) hypomobility with cervical and thoracic PAIVM testing  No change with cervical distraction    ULTT L UE  (-) ulnar nerve  (-) radial nerve  Mild sx with median nerve    Strength/Myotome Testing     Left Shoulder     Planes of Motion   Flexion: 4+   Abduction: 5   External rotation at 0°: 5   Internal rotation at 0°: 5     Right Shoulder     Planes of Motion   Flexion: 4+   Abduction: 5   External rotation at 0°: 5   Internal rotation at 0°: 5       Dx: chronic neck and thoracic pain with L UE radicular sx  EPOC: 12/19/23  CO-MORBIDITIES: h/o cervical and multiple lumbar surgeries  PERSONAL FACTORS:   Precautions: none      Manuals             Cervical MFR             cervical / thoracic PA/UPA             Cervical distraction                          Neuro Re-Ed             TB LPD             TB row             TB ER bilat                          Standing row             Standing shld ext                          Ther Ex             BW UBE for postural strengthening             L UT stretch             L Levator scap stretch             Cervical rotation SNAG             S/l open book             Pulleys: flex             Pulleys: scap                                                    Ther Activity                                       Gait Training                                       Modalities

## 2023-11-10 ENCOUNTER — OFFICE VISIT (OUTPATIENT)
Dept: PHYSICAL THERAPY | Facility: CLINIC | Age: 68
End: 2023-11-10
Payer: COMMERCIAL

## 2023-11-10 DIAGNOSIS — M54.6 CHRONIC BILATERAL THORACIC BACK PAIN: ICD-10-CM

## 2023-11-10 DIAGNOSIS — G89.29 CHRONIC BILATERAL THORACIC BACK PAIN: ICD-10-CM

## 2023-11-10 DIAGNOSIS — M47.812 CERVICAL SPONDYLOSIS: Primary | ICD-10-CM

## 2023-11-10 PROCEDURE — 97110 THERAPEUTIC EXERCISES: CPT

## 2023-11-10 PROCEDURE — 97140 MANUAL THERAPY 1/> REGIONS: CPT

## 2023-11-10 NOTE — PROGRESS NOTES
Daily Note     Today's date: 11/10/2023  Patient name: Benji Rodriguez. : 1955  MRN: [id number]  Referring provider: Lili Roldan, *  Dx:   Encounter Diagnosis     ICD-10-CM    1. Cervical spondylosis  M47.812       2. Chronic bilateral thoracic back pain  M54.6     G89.29                      Subjective: Pt reports most his discomfort is in the hand. Objective: See treatment diary below      Assessment: Tolerated treatment fair. Patient was given STM for edema in the hand. Pt would benefit from cont'd PT to work on pain management and UE strengthening. Plan: Continue per plan of care. Progress treatment as tolerated. Dx: chronic neck and thoracic pain with L UE radicular sx  EPOC: 23  CO-MORBIDITIES: h/o cervical and multiple lumbar surgeries  PERSONAL FACTORS:   Precautions: none      Manuals 11/10            Cervical MFR JK            cervical / thoracic PA/UPA             Cervical distraction JK            Retrograde hand STM.  JK            Neuro Re-Ed             TB LPD GTB 15            TB row GTB 15            TB ER bilat BTB 15                         Standing row             Standing shld ext                          Ther Ex             BW UBE for postural strengthening Sit 3'            L UT stretch 20"x3            L Levator scap stretch 3x20"            Cervical rotation SNAG             S/l open book             Pulleys: flex 3'            Pulleys: scap 3'                                                   Ther Activity                                       Gait Training                                       Modalities

## 2023-11-13 ENCOUNTER — OFFICE VISIT (OUTPATIENT)
Dept: PHYSICAL THERAPY | Facility: CLINIC | Age: 68
End: 2023-11-13
Payer: COMMERCIAL

## 2023-11-13 DIAGNOSIS — G89.29 CHRONIC BILATERAL THORACIC BACK PAIN: ICD-10-CM

## 2023-11-13 DIAGNOSIS — M54.6 CHRONIC BILATERAL THORACIC BACK PAIN: ICD-10-CM

## 2023-11-13 DIAGNOSIS — M47.812 CERVICAL SPONDYLOSIS: Primary | ICD-10-CM

## 2023-11-13 PROCEDURE — 97112 NEUROMUSCULAR REEDUCATION: CPT | Performed by: PHYSICAL THERAPIST

## 2023-11-13 PROCEDURE — 97110 THERAPEUTIC EXERCISES: CPT | Performed by: PHYSICAL THERAPIST

## 2023-11-13 PROCEDURE — 97140 MANUAL THERAPY 1/> REGIONS: CPT | Performed by: PHYSICAL THERAPIST

## 2023-11-13 NOTE — PROGRESS NOTES
Daily Note     Today's date: 2023  Patient name: Candy Cavanaugh. : 1955  MRN: [id number]  Referring provider: Juhi Santillan, *  Dx:   Encounter Diagnosis     ICD-10-CM    1. Cervical spondylosis  M47.812       2. Chronic bilateral thoracic back pain  M54.6     G89.29                      Subjective:  Pt reports he had a bad night and day yesterday with increase L hand, arm, low back pain. Pt did not sleep well last night. Objective: See treatment diary below      Assessment: Tolerated treatment well. Patient would benefit from continued PT. Pt needs to sit with tband activities 2* unable to back pain. Plan: Continue per plan of care. Focus on decreasing pain. Dx: chronic neck and thoracic pain with L UE radicular sx  EPOC: 23  CO-MORBIDITIES: h/o cervical and multiple lumbar surgeries  PERSONAL FACTORS:   Precautions: none      Manuals 11/10 11/13           Cervical MFR JK th           cervical / thoracic PA/UPA  th           Cervical distraction JK th           Retrograde hand STM.  JK Th STM [facility]           Neuro Re-Ed             TB LPD GTB 15 GTB  sit x20           TB row GTB 15 GTB x20           TB ER bilat BTB 15 BTB x15           S/l scap squeeze  10x5"           Standing row             Standing shld ext                          Ther Ex             BW UBE for postural strengthening Sit 3' Sit 3'           L UT stretch 20"x3 20"x3           L Levator scap stretch 3x20" 20"x3           Cervical rotation SNAG  10 ea           S/l open book  10           Pulleys: flex 3' 3'           Pulleys: scap 3' 3'           Seated rhomboid stretch  trial                                     Ther Activity                                       Gait Training                                       Modalities

## 2023-11-15 ENCOUNTER — OFFICE VISIT (OUTPATIENT)
Dept: PHYSICAL THERAPY | Facility: CLINIC | Age: 68
End: 2023-11-15
Payer: COMMERCIAL

## 2023-11-15 DIAGNOSIS — M47.812 CERVICAL SPONDYLOSIS: Primary | ICD-10-CM

## 2023-11-15 DIAGNOSIS — G89.29 CHRONIC BILATERAL THORACIC BACK PAIN: ICD-10-CM

## 2023-11-15 DIAGNOSIS — M54.6 CHRONIC BILATERAL THORACIC BACK PAIN: ICD-10-CM

## 2023-11-15 PROCEDURE — 97140 MANUAL THERAPY 1/> REGIONS: CPT

## 2023-11-15 PROCEDURE — 97110 THERAPEUTIC EXERCISES: CPT

## 2023-11-15 NOTE — PROGRESS NOTES
Daily Note     Today's date: 11/15/2023  Patient name: Mak Mas. : 1955  MRN: [id number]  Referring provider: Carlie Granda, *  Dx:   Encounter Diagnosis     ICD-10-CM    1. Cervical spondylosis  M47.812       2. Chronic bilateral thoracic back pain  M54.6     G89.29                      Subjective: Pt reports he had mm soreness the nigh post PT stating it felt like mm's he hadn't used in a while. Objective: See treatment diary below      Assessment: Tolerated treatment fair. Patient demonstrated fatigue post treatment and would benefit from continued PT to [facility] on pain relief and postural strengthening. Pt w/ tightness in the UT w/o any referred radic sxs reported w/ palpation. Plan: Continue per plan of care. Progress treatment as tolerated. Dx: chronic neck and thoracic pain with L UE radicular sx  EPOC: 23  CO-MORBIDITIES: h/o cervical and multiple lumbar surgeries  PERSONAL FACTORS:   Precautions: none      Manuals 11/10 11/13 11/15          Cervical MFR JK th JK          cervical / thoracic PA/UPA  th           Cervical distraction JK th JK          Retrograde hand STM.  JK Th STM CMC np          Neuro Re-Ed             TB LPD GTB 15 GTB  sit x20 BTB 20          TB row GTB 15 GTB x20 BTB 20          TB ER bilat BTB 15 BTB x15 BTB 20          S/l scap squeeze  10x5" 10x5"          Standing row             Standing shld ext                          Ther Ex             BW UBE for postural strengthening Sit 3' Sit 3' Sit 3'          L UT stretch 20"x3 20"x3 nv          L Levator scap stretch 3x20" 20"x3 nv          Cervical rotation SNAG  10 ea 10 ea          S/l open book  10 10          Pulleys: flex 3' 3' 3'          Pulleys: scap 3' 3' 3'          Seated rhomboid stretch  trial                                     Ther Activity                                       Gait Training                                       Modalities

## 2023-11-21 ENCOUNTER — OFFICE VISIT (OUTPATIENT)
Dept: PHYSICAL THERAPY | Facility: CLINIC | Age: 68
End: 2023-11-21
Payer: COMMERCIAL

## 2023-11-21 DIAGNOSIS — G89.29 CHRONIC BILATERAL THORACIC BACK PAIN: ICD-10-CM

## 2023-11-21 DIAGNOSIS — M47.812 CERVICAL SPONDYLOSIS: Primary | ICD-10-CM

## 2023-11-21 DIAGNOSIS — M54.6 CHRONIC BILATERAL THORACIC BACK PAIN: ICD-10-CM

## 2023-11-21 PROCEDURE — 97110 THERAPEUTIC EXERCISES: CPT

## 2023-11-21 PROCEDURE — 97112 NEUROMUSCULAR REEDUCATION: CPT

## 2023-11-21 PROCEDURE — 97140 MANUAL THERAPY 1/> REGIONS: CPT

## 2023-11-21 NOTE — PROGRESS NOTES
Daily Note     Today's date: 2023  Patient name: Matthew Alvarado. : 1955  MRN: [id number]  Referring provider: Tanya Sim, *  Dx:   Encounter Diagnosis     ICD-10-CM    1. Cervical spondylosis  M47.812       2. Chronic bilateral thoracic back pain  M54.6     G89.29                      Subjective: Pt presents w/ a fwd flexed posture reporting he hurts in all his jts due to the weather changing. Objective: See treatment diary below      Assessment: Tolerated treatment fair due to increased pain. Patient's hand pain does not change much. Trial of nerve glides to L UE, no change reported. Cont's w/ edema in the hand. Pt would benefit from cont'd PT. Plan: Continue per plan of care. Progress treatment as tolerated. Dx: chronic neck and thoracic pain with L UE radicular sx  EPOC: 23  CO-MORBIDITIES: h/o cervical and multiple lumbar surgeries  PERSONAL FACTORS:   Precautions: none      Manuals 11/10 11/13 11/15 11/21         Cervical MFR JK th JK JK         cervical / thoracic PA/UPA  th           Cervical distraction JK th JK jk         Retrograde hand STM.  JK Th STM CMC np jK         Neuro Re-Ed             TB LPD GTB 15 GTB  sit x20 BTB 20 BTB 20         TB row GTB 15 GTB x20 BTB 20 BTB 20         TB ER bilat BTB 15 BTB x15 BTB 20 BTB 20         S/l scap squeeze  10x5" 10x5"          Standing row    7# 20 bl         Standing shld ext    7# 20 bl                      Ther Ex             BW UBE for postural strengthening Sit 3' Sit 3' Sit 3' Sit 3'         L UT stretch 20"x3 20"x3 nv nv         L Levator scap stretch 3x20" 20"x3 nv nv         Cervical rotation SNAG  10 ea 10 ea nv         S/l open book  10 10          Pulleys: flex 3' 3' 3' 3'         Pulleys: scap 3' 3' 3' 3'         Seated rhomboid stretch  trial                                     Ther Activity                                       Gait Training                                       Modalities

## 2023-11-22 NOTE — TELEPHONE ENCOUNTER
[facility] database reviewed prior to refilling medication.   Last filled 10/24, #30    Refill sent #30, next refill will be due 12/23  Kay Torres MD      [place]

## 2023-11-27 ENCOUNTER — OFFICE VISIT (OUTPATIENT)
Dept: PHYSICAL THERAPY | Facility: CLINIC | Age: 68
End: 2023-11-27
Payer: COMMERCIAL

## 2023-11-27 DIAGNOSIS — M47.812 CERVICAL SPONDYLOSIS: Primary | ICD-10-CM

## 2023-11-27 DIAGNOSIS — M54.6 CHRONIC BILATERAL THORACIC BACK PAIN: ICD-10-CM

## 2023-11-27 DIAGNOSIS — G89.29 CHRONIC BILATERAL THORACIC BACK PAIN: ICD-10-CM

## 2023-11-27 PROCEDURE — 97110 THERAPEUTIC EXERCISES: CPT | Performed by: PHYSICAL THERAPIST

## 2023-11-27 PROCEDURE — 97140 MANUAL THERAPY 1/> REGIONS: CPT | Performed by: PHYSICAL THERAPIST

## 2023-11-27 NOTE — PROGRESS NOTES
Daily Note     Today's date: 2023  Patient name: Renny Rodriguez. : 1955  MRN: [id number]  Referring provider: Luz Maria Francis, *  Dx:   Encounter Diagnosis     ICD-10-CM    1. Cervical spondylosis  M47.812       2. Chronic bilateral thoracic back pain  M54.6     G89.29                      Subjective:  Pt reports his hand pain is decreasing. It doesn't feel like some is spreading his fingers apart as much. The swelling in his hand has not gone down in 5 months. Pt had increased pain on Wednesday 2* weather changes. Objective: See treatment diary below      Assessment: Tolerated treatment well. Patient exhibited good technique with therapeutic exercises. Pt had increased muscular soreness in L shoulder and L lateral epicondyle region. Manual therapy performed and pt instructed in self stretching. Plan: Continue per plan of care. Focus on deceasing pain     Dx: chronic neck and thoracic pain with L UE radicular sx  EPOC: 23  CO-MORBIDITIES: h/o cervical and multiple lumbar surgeries  PERSONAL FACTORS:   Precautions: none      Manuals 11/10 11/13 11/15 11/21 11/27        Cervical MFR JK th JK JK Th+ shld and elbow        cervical / thoracic PA/UPA  th   th        Cervical distraction JK th JK jk th        Retrograde hand STM.  JK Th STM CMC np jK th        Neuro Re-Ed             TB LPD GTB 15 GTB  sit x20 BTB 20 BTB 20 BTB x20        TB row GTB 15 GTB x20 BTB 20 BTB 20 BTB x20        TB ER bilat BTB 15 BTB x15 BTB 20 BTB 20 BTB x20        S/l scap squeeze  10x5" 10x5"          Standing row    7# 20 bl         Standing shld ext    7# 20 bl                      Ther Ex             BW UBE for postural strengthening Sit 3' Sit 3' Sit 3' Sit 3' Sit 3'        L UT stretch 20"x3 20"x3 nv nv 20"x3        L Levator scap stretch 3x20" 20"x3 nv nv         Cervical rotation SNAG  10 ea 10 ea nv         S/l open book  10 10  10        Pulleys: flex 3' 3' 3' 3' 3'        Pulleys: scap 3' 3' 3' 3' 3'        Lateral epicondyle stretch  trial   3x20"        Chin tuck     10        Thoracic ext over chair     10        Ther Activity                                       Gait Training                                       Modalities

## 2023-11-29 ENCOUNTER — OFFICE VISIT (OUTPATIENT)
Dept: PHYSICAL THERAPY | Facility: CLINIC | Age: 68
End: 2023-11-29
Payer: COMMERCIAL

## 2023-11-29 DIAGNOSIS — M54.6 CHRONIC BILATERAL THORACIC BACK PAIN: ICD-10-CM

## 2023-11-29 DIAGNOSIS — M47.812 CERVICAL SPONDYLOSIS: Primary | ICD-10-CM

## 2023-11-29 DIAGNOSIS — G89.29 CHRONIC BILATERAL THORACIC BACK PAIN: ICD-10-CM

## 2023-11-29 PROCEDURE — 97112 NEUROMUSCULAR REEDUCATION: CPT

## 2023-11-29 PROCEDURE — 97110 THERAPEUTIC EXERCISES: CPT

## 2023-11-29 PROCEDURE — 97140 MANUAL THERAPY 1/> REGIONS: CPT

## 2023-11-29 NOTE — PROGRESS NOTES
Daily Note     Today's date: 2023  Patient name: Bismark Ortiz. : 1955  MRN: [id number]  Referring provider: Mary Lozano, *  Dx:   Encounter Diagnosis     ICD-10-CM    1. Cervical spondylosis  M47.812       2. Chronic bilateral thoracic back pain  M54.6     G89.29                      Subjective: Pt reports he still having pain in the mid uppper arm and at the elbow into the forearm. Reports putting 2 rubber bands around his last three fingers the last 2 nights and he hasn't had the pain on the top of his hand during the night. Objective: See treatment diary below      Assessment: Tolerated treatment fair. Patient gets SOB very quickly and requires ex's modified to sitting due to a history of LBP. Concentrated manuals on lat epicondyle and forearm. Initiated IASTM to that area. Pt would benefit from cont'd PT. Plan: Continue per plan of care. Progress treatment as tolerated. Dx: chronic neck and thoracic pain with L UE radicular sx  EPOC: 23  CO-MORBIDITIES: h/o cervical and multiple lumbar surgeries  PERSONAL FACTORS:   Precautions: none      Manuals 11/10 11/13 11/15 11/21 11/27 11/29       Cervical MFR JK th JK JK Th+ shld and elbow UT JK       cervical / thoracic PA/UPA  th   th                     Cervical distraction JK th JK jk th        Retrograde hand STM.  JK Th STM CMC np jK th jk       Neuro Re-Ed             TB LPD GTB 15 GTB  sit x20 BTB 20 BTB 20 BTB x20 BTB x20       TB row GTB 15 GTB x20 BTB 20 BTB 20 BTB x20 BTB x20       TB ER bilat BTB 15 BTB x15 BTB 20 BTB 20 BTB x20 BTB x20       S/l scap squeeze  10x5" 10x5"          Standing row    7# 20 bl  7# 20 bl       Standing shld ext    7# 20 bl  7# 20 bl                    Ther Ex             BW UBE for postural strengthening Sit 3' Sit 3' Sit 3' Sit 3' Sit 3' Sit 3'       L UT stretch 20"x3 20"x3 nv nv 20"x3 20x3"       L Levator scap stretch 3x20" 20"x3 nv nv         Cervical rotation SNAG 10 ea 10 ea nv         S/l open book  10 10  10 10 bl       Pulleys: flex 3' 3' 3' 3' 3' 3'       Pulleys: scap 3' 3' 3' 3' 3' 3'       Lateral epicondyle stretch  trial   3x20" 3x20"       Chin tuck     10 10       Thoracic ext over chair     10 10       Ther Activity                                       Gait Training                                       Modalities

## 2023-12-04 ENCOUNTER — OFFICE VISIT (OUTPATIENT)
Dept: PHYSICAL THERAPY | Facility: CLINIC | Age: 68
End: 2023-12-04
Payer: COMMERCIAL

## 2023-12-04 DIAGNOSIS — M54.6 CHRONIC BILATERAL THORACIC BACK PAIN: ICD-10-CM

## 2023-12-04 DIAGNOSIS — G89.29 CHRONIC BILATERAL THORACIC BACK PAIN: ICD-10-CM

## 2023-12-04 DIAGNOSIS — M47.812 CERVICAL SPONDYLOSIS: Primary | ICD-10-CM

## 2023-12-04 PROCEDURE — 97112 NEUROMUSCULAR REEDUCATION: CPT

## 2023-12-04 PROCEDURE — 97140 MANUAL THERAPY 1/> REGIONS: CPT

## 2023-12-04 PROCEDURE — 97110 THERAPEUTIC EXERCISES: CPT

## 2023-12-04 NOTE — PROGRESS NOTES
Daily Note     Today's date: 2023  Patient name: Maxime Gross. : 1955  MRN: [id number]  Referring provider: Rebekah Perez, *  Dx:   Encounter Diagnosis     ICD-10-CM    1. Cervical spondylosis  M47.812       2. Chronic bilateral thoracic back pain  M54.6     G89.29           Start Time: 0745  Stop Time: 0830  Total time in clinic (min): 45 minutes    Subjective: Pt reports overall he felt pretty good over the weekend. His shoulder and elbows feel good today, however his hand is more bothersome. His knee and hip is bothering hip today due to a slip on the ground at home. Objective: See treatment diary below      Assessment: Tolerated treatment well. Patient able to progress in resistance today during TB rows and extension to challenge scapular strength with good response. Patient gets SOB during exercises and benefits from cues to slow pacing of exercises. Focused on manuals to hands today with good response, did not feel much resistance to epicondyl region today with graston. Patient would benefit from further PT to address limitations and restore function. Plan: Continue per plan of care. Progress treatment as tolerated. Dx: chronic neck and thoracic pain with L UE radicular sx  EPOC: 23  CO-MORBIDITIES: h/o cervical and multiple lumbar surgeries  PERSONAL FACTORS:   Precautions: none      Manuals 11/10 11/13 11/15 11/21 11/27 11/29 12/4      Cervical MFR JK th JK JK Th+ shld and elbow UT JK NP      cervical / thoracic PA/UPA  th   th                     Cervical distraction JK th JK jk th        Retrograde hand STM.  JK Th STM CMC np jK th jk JM      Neuro Re-Ed             TB LPD GTB 15 GTB  sit x20 BTB 20 BTB 20 BTB x20 BTB x20 STB  x20      TB row GTB 15 GTB x20 BTB 20 BTB 20 BTB x20 BTB x20 PTB  x20      TB ER bilat BTB 15 BTB x15 BTB 20 BTB 20 BTB x20 BTB x20 BTB x20      S/l scap squeeze  10x5" 10x5"          Standing row    7# 20 bl  7# 20 bl 7# 20 bl Standing shld ext    7# 20 bl  7# 20 bl 7# 20 bl                   Ther Ex             BW UBE for postural strengthening Sit 3' Sit 3' Sit 3' Sit 3' Sit 3' Sit 3' Sit 3'      L UT stretch 20"x3 20"x3 nv nv 20"x3 20x3" 20"x3      L Levator scap stretch 3x20" 20"x3 nv nv         Cervical rotation SNAG  10 ea 10 ea nv         S/l open book  10 10  10 10 bl 10 bl      Pulleys: flex 3' 3' 3' 3' 3' 3'         Pulleys: scap 3' 3' 3' 3' 3' 3' 3'      Lateral epicondyle stretch  trial   3x20" 3x20" 3x20"      Chin tuck     10 10 10       Thoracic ext over chair     10 10 10x      Ther Activity                                       Gait Training                                       Modalities

## 2023-12-06 ENCOUNTER — EVALUATION (OUTPATIENT)
Dept: PHYSICAL THERAPY | Facility: CLINIC | Age: 68
End: 2023-12-06
Payer: COMMERCIAL

## 2023-12-06 DIAGNOSIS — M54.6 CHRONIC BILATERAL THORACIC BACK PAIN: ICD-10-CM

## 2023-12-06 DIAGNOSIS — G89.29 CHRONIC BILATERAL THORACIC BACK PAIN: ICD-10-CM

## 2023-12-06 DIAGNOSIS — M47.812 CERVICAL SPONDYLOSIS: Primary | ICD-10-CM

## 2023-12-06 PROCEDURE — 97140 MANUAL THERAPY 1/> REGIONS: CPT | Performed by: PHYSICAL THERAPIST

## 2023-12-06 PROCEDURE — 97110 THERAPEUTIC EXERCISES: CPT | Performed by: PHYSICAL THERAPIST

## 2023-12-06 NOTE — PROGRESS NOTES
PT Re-Evaluation     Today's date: 2023  Patient name: Maritza Hunter. : 1955  MRN: [id number]  Referring provider: Isaura Keating, *  Dx:   Encounter Diagnosis     ICD-10-CM    1. Cervical spondylosis  M47.812       2. Chronic bilateral thoracic back pain  M54.6     G89.29                      Assessment  Assessment details: Since starting skilled PT, pain levels are unchanged, cervical ROM has improved, B shoulder strength has improved without change in pain or functional activities. Recommend pt follow up with physician for further diagnostic testing. Impairments: abnormal gait, abnormal or restricted ROM, activity intolerance, impaired physical strength and pain with function  Understanding of Dx/Px/POC: good   Prognosis: fair    Goals  STG's ( 3-4 weeks)  1. Pt will be independent in HEP- met  2. Pt will have improved postural awareness- met  LTG's ( 6- 8 weeks)  1. Improve FOTO score by 4-6 points- NT  2. Pt will have decreased pain to 3/10 at worst- not met  3. Pt will have decreased L UE radicular sx- not met  4. Pt will have decreased pain at night when sleeping- not met    Plan  Patient would benefit from: skilled physical therapy  Planned modality interventions: TENS  Planned therapy interventions: joint mobilization, manual therapy, neuromuscular re-education, strengthening, stretching, therapeutic activities, therapeutic exercise, functional ROM exercises, flexibility and home exercise program  Frequency: hold on skilled PT. Duration in weeks: 6  Plan of Care beginning date: 2023  Plan of Care expiration date: 2023  Treatment plan discussed with: patient and PTA        Subjective Evaluation    History of Present Illness  Mechanism of injury: I.E: About 4 months ago, pt began to have pain in his L CMC joint when washing his his hands, then the pain spread to his hand, wrist and L elbow and it gradually increased to his L sided neck and shoulder region.  Pt's L hand was visibly swollen. Pt began to have pain spreading to his L pectoral region and L axillary region, which has eased up. Pt is not able to lift heavy objects with his L UE or work overhead. Pt sleeps on his R side with his L arm over pillows. Pt gets a pulsing annoying pain when sleeping. Pt is not able to turn his head as far to the right. Pt has a history of cervical spine surgery. Pt is limited with looking up to the ceiling. Pt has noticed increased shaking or tremors in B Ue's L > R 2* unknown etiology. 23: Pt reports the pain and swelling are still present in his L hand. Pt notes less pulsing pain in his shoulder. Pt continues to have pain in his L epicondyle/ elbow region. No significant functional changes. The pain wakes pt up at night. Pt can feel the pain most often when sitting to watch TV at night.     Work: retired  Hobbies; none; watches TV  Gait: trunk flexion, mild antalgic gait  Patient Goals  Patient goals for therapy: decreased pain and independence with ADLs/IADLs  Patient goal: to be able to use my L arm better  Pain  At best pain ratin  At worst pain ratin  Location: L UE  Quality: radiating, dull ache and throbbing    Social Support  Lives with: spouse    Employment status: not working  Hand dominance: right    Treatments  Previous treatment: medication        Objective     Neurological Testing     Sensation   Cervical/Thoracic   Left   Intact: light touch    Right   Intact: light touch    Reflexes   Left   Biceps (C5/C6): normal (2+)  Brachioradialis (C6): normal (2+)  Triceps (C7): normal (2+)    Right   Biceps (C5/C6): trace (1+)  Brachioradialis (C6): trace (1+)  Triceps (C7): trace (1+)    Active Range of Motion   Cervical/Thoracic Spine       Cervical    Flexion: 25 degrees   Extension: 65 degrees      Left lateral flexion: 28 degrees      Right lateral flexion: 32 degrees      Left rotation: 65 degrees  Right rotation: 62 degrees       Thoracic    Left rotation: Restriction level: moderate  Right rotation:  Restriction level: moderate  Left Shoulder   Flexion: 140 degrees   Abduction: 120 degrees     Right Shoulder   Flexion: 140 degrees   Abduction: 140 degrees     Additional Active Range of Motion Details  (+) TTP L upper trap  Posture; pt sits with rounded shoulders/ forward head position  (+) hypomobility with cervical and thoracic PAIVM testing  No change with cervical distraction    ULTT L UE  (-) ulnar nerve  (-) radial nerve  Mild sx with median nerve    Strength/Myotome Testing     Left Shoulder     Planes of Motion   Flexion: 5   Abduction: 5   External rotation at 0°: 5   Internal rotation at 0°: 5     Right Shoulder     Planes of Motion   Flexion: 5   Abduction: 5   External rotation at 0°: 5   Internal rotation at 0°: 5       Dx: chronic neck and thoracic pain with L UE radicular sx  EPOC: 12/19/23  CO-MORBIDITIES: h/o cervical and multiple lumbar surgeries  PERSONAL FACTORS:   Precautions: none      Manuals 11/10 11/13 11/15 11/21 11/27 11/29 12/4 12/6     Cervical MFR JK th JK JK Th+ shld and elbow UT JK NP th     cervical / thoracic PA/UPA  th   th   Progress note                   Cervical distraction JK th JK jk th        Retrograde hand STM.  JK Th STM CMC np jK th jk JM Th lat epicondyle     Neuro Re-Ed             TB LPD GTB 15 GTB  sit x20 BTB 20 BTB 20 BTB x20 BTB x20 STB  x20 STB x20     TB row GTB 15 GTB x20 BTB 20 BTB 20 BTB x20 BTB x20 PTB  x20 STB x20     TB ER bilat BTB 15 BTB x15 BTB 20 BTB 20 BTB x20 BTB x20 BTB x20 BTB x20     S/l scap squeeze  10x5" 10x5"          Standing row    7# 20 bl  7# 20 bl 7# 20 bl      Standing shld ext    7# 20 bl  7# 20 bl 7# 20 bl                   Ther Ex             BW UBE for postural strengthening Sit 3' Sit 3' Sit 3' Sit 3' Sit 3' Sit 3' Sit 3' Sit 3'     L UT stretch 20"x3 20"x3 nv nv 20"x3 20x3" 20"x3      L Levator scap stretch 3x20" 20"x3 nv nv         Cervical rotation SNAG  10 ea 10 ea nv         S/l open book  10 10  10 10 bl 10 bl      Pulleys: flex 3' 3' 3' 3' 3' 3'    3'     Pulleys: scap 3' 3' 3' 3' 3' 3' 3' 3'     Lateral epicondyle stretch  trial   3x20" 3x20" 3x20"      Chin tuck     10 10 10       Thoracic ext over chair     10 10 10x      Ther Activity                                       Gait Training                                       Modalities

## 2023-12-07 DIAGNOSIS — G47.00 PERSISTENT INSOMNIA: ICD-10-CM

## 2023-12-07 RX ORDER — ESZOPICLONE 2 MG/1
TABLET, FILM COATED ORAL
Qty: 30 TABLET | Refills: 3 | Status: SHIPPED | OUTPATIENT
Start: 2023-12-07

## 2023-12-11 ENCOUNTER — APPOINTMENT (OUTPATIENT)
Dept: RADIOLOGY | Facility: CLINIC | Age: 68
End: 2023-12-11
Payer: COMMERCIAL

## 2023-12-11 ENCOUNTER — OFFICE VISIT (OUTPATIENT)
Dept: OBGYN CLINIC | Facility: CLINIC | Age: 68
End: 2023-12-11
Payer: COMMERCIAL

## 2023-12-11 VITALS
SYSTOLIC BLOOD PRESSURE: 112 MMHG | DIASTOLIC BLOOD PRESSURE: 80 MMHG | HEIGHT: 70 IN | BODY MASS INDEX: 39.37 KG/M2 | WEIGHT: 275 LBS

## 2023-12-11 DIAGNOSIS — M19.032 LOCALIZED PRIMARY OSTEOARTHRITIS OF CARPOMETACARPAL (CMC) JOINT OF LEFT WRIST: ICD-10-CM

## 2023-12-11 DIAGNOSIS — M54.12 CERVICAL RADICULOPATHY: ICD-10-CM

## 2023-12-11 DIAGNOSIS — M79.642 LEFT HAND PAIN: ICD-10-CM

## 2023-12-11 DIAGNOSIS — M54.12 CERVICAL RADICULOPATHY: Primary | ICD-10-CM

## 2023-12-11 PROCEDURE — 72052 X-RAY EXAM NECK SPINE 6/>VWS: CPT

## 2023-12-11 PROCEDURE — 99244 OFF/OP CNSLTJ NEW/EST MOD 40: CPT | Performed by: ORTHOPAEDIC SURGERY

## 2023-12-11 NOTE — PROGRESS NOTES
ASSESSMENT/PLAN:    Assessment:   Cervical radiculopathy of left upper extremity with multilevel degenerative changes and history of cervical fusion    Plan:   Reviewed that his physical exam is concerning for C5-6 degenerative changes. CT of the cervical spine ordered for further evaluation. He was encouraged to continue follow-up and treatment with pain management. Follow Up:  PRN    _____________________________________________________  CHIEF COMPLAINT:  Chief Complaint   Patient presents with    Left Hand - Pain, Swelling     Left thumb CMC-Kenalog given by Kaushal Hale PA-C XR 9/12/23          SUBJECTIVE:  Brittney Saucedo is a 79 y.o. male who presents with left hand pain ongoing for 5 months without any specific injury. He was referred to us by Kaushal Hale PA-C who provided him a left thumb CMC cortisone injection using Celestone without relief. He is experiencing dorsal left hand pain and swelling. He describes his pain as someone  his fingers apart. He has pain that goes up his forearm to his elbow into his shoulder, chest and axilla region. He has been attending physical therapy and getting work-up with pain management. He has a history of cervical fusion.     PAST MEDICAL HISTORY:  Past Medical History:   Diagnosis Date    Acute low back pain     10 AUG 2016 RESOLVED    Angina pectoris (HCC)     Bunion     Bursitis of hip     Carpal tunnel syndrome     Chronic bilateral low back pain with bilateral sciatica     Chronic lumbar radiculopathy     Chronic narcotic dependence (HCC)     Chronic pain syndrome     Constipation due to opioid therapy     DDD (degenerative disc disease), lumbar     Deep vein thrombosis of left upper extremity ([address])     [id number]  RESOLVED    Depression     Diverticula of colon     [id number] RESOLVED    DVT (deep venous thrombosis) ([facility])     LUE    Edema     History of staph infection     Hyperglycemia     Hyperlipidemia     Hypertension     Insomnia     Lateral epicondylitis     Left inguinal hernia     Methicillin resistant Staphylococcus aureus septicemia (HCC)     Morbid obesity due to excess calories (HCC)     Obesity     Peripheral neuropathy     Post laminectomy syndrome     RLS (restless legs syndrome)     Septicemia (HCC)     MRSA    Umbilical hernia        PAST SURGICAL HISTORY:  Past Surgical History:   Procedure Laterality Date    BACK SURGERY      laminectomy, hardware    CARPAL TUNNEL RELEASE Left     CERVICAL SPINE SURGERY      COLONOSCOPY      ELBOW SURGERY      ELBOW SURGERY      KNEE ARTHROSCOPY Right     knee    KNEE SURGERY Right     ARTHOSCOPY KNEE    NECK SURGERY      1997    KS COLONOSCOPY FLX DX W/COLLJ SPEC WHEN PFRMD N/A 1/24/2017    Procedure: COLONOSCOPY;  Surgeon: Ambreen Morales MD;  Location: [facility];  Service: General    KS [address] SURG W/RLS TRANSVRS CARPL LIGM Right 1/2/2020    Procedure: RELEASE CARPAL TUNNEL ENDOSCOPIC, right;  Surgeon: Darian Gomez MD;  Location: [facility];  Service: Orthopedics    KS RPR UMBILICAL HRNA 5 YRS/> REDUCIBLE N/A 10/5/2022    Procedure: REPAIR HERNIA UMBILICAL WITH MESH.;  Surgeon: Ambreen Morales MD;  Location: [facility];  Service: General    KS TENDON SHEATH INCISION Right 9/9/2021    Procedure: Right long finger trigger finger release;  Surgeon: Darian Gomez MD;  Location: [facility];  Service: Orthopedics    SHOULDER SURGERY      SPINAL CORD STIMULATOR IMPLANT      SYNOVECTOMY Right 9/9/2021    Procedure: Tenosynovectomy right hand long finger flexor digitorum superficialis and profundus tendon;  Surgeon: Darian Gomez MD;  Location: [facility];  Service: Orthopedics       FAMILY HISTORY:  Family History   Problem Relation Age of Onset    Heart disease Mother     Cancer Mother         breast, stomach    Aneurysm Mother     Heart disease Father     Cancer Father         skin, liver, colon DECESED AGE 58       SOCIAL HISTORY:  Social History     Tobacco Use    Smoking status: Never    Smokeless tobacco: Never   Vaping Use    Vaping Use: Never used   Substance Use Topics    Alcohol use: Yes     Comment: occasional- less than 1 drink per week    Drug use: No     Comment: opiod use for chronic pain       MEDICATIONS:    Current Outpatient Medications:     bisoprolol-hydrochlorothiazide (ZIAC) 2.5-6.25 MG per tablet, TAKE ONE TABLET BY MOUTH DAILY, Disp: 90 tablet, Rfl: 0    eszopiclone (LUNESTA) 2 mg tablet, Take1 tablet (2 mg total) by mouth daily at bedtime as needed for sleep Take immediately before bedtime, Disp: 30 tablet, Rfl: 3    HYDROcodone-acetaminophen (NORCO)  mg per tablet, Take 1 tablet by mouth every 8 (eight) hours as needed for moderate pain For ongoing therapy, DO NOT FILL BEFORE 12/19/23 Max Daily Amount: 3 tablets, Disp: 90 tablet, Rfl: 0    naproxen sodium (ALEVE) 220 MG tablet, Take 1 tablet (220 mg total) by mouth 2 (two) times a day with meals, Disp: 20 tablet, Rfl: 0    rosuvastatin (CRESTOR) 10 MG tablet, Take 1 tablet (10 mg total) by mouth daily at bedtime, Disp: 90 tablet, Rfl: 3    Multiple Vitamin (MULTIVITAMIN) tablet, Take 1 tablet by mouth every other day  (Patient not taking: Reported on 12/11/2023), Disp: , Rfl:     naloxone (Narcan) 4 mg/0.1 mL nasal spray, Spray 2 sprays into 1 nostril for suspected opioid overdose. May repeat in other nostril. Call 911. (Patient not taking: Reported on 12/11/2023), Disp: 2 each, Rfl: 0    naloxone (NARCAN) 4 mg/0.1 mL nasal spray, Administer 1 spray into a nostril. If no response after 2-3 minutes, give another dose in the other nostril using a new spray., Disp: 1 each, Rfl: 1    ALLERGIES:  Allergies   Allergen Reactions    Fentanyl Other (See Comments)     "Makes me Suicidal"  Happened when dosage increased.     Penicillins Other (See Comments)     As a child unknown    Pravastatin Other (See Comments)     Reaction Date: [id number];   Muscle weakness    Sulfa Antibiotics Other (See Comments)     As a child unknown    Vytorin [Ezetimibe-Simvastatin] Myalgia     Reaction Date: [id number];        REVIEW OF SYSTEMS:  Pertinent items are noted in HPI. A comprehensive review of systems was negative.     LABS:  HgA1c:   Lab Results   Component Value Date    HGBA1C 5.5 07/17/2023     BMP:   Lab Results   Component Value Date    GLUCOSE 100 12/14/2015    CALCIUM 9.5 07/17/2023     12/14/2015    K 4.8 07/17/2023    CO2 32 07/17/2023     07/17/2023    BUN 16 07/17/2023    CREATININE 1.17 07/17/2023         _____________________________________________________  PHYSICAL EXAMINATION:  Vital signs: /80   Ht 5' 10" (1.778 m)   Wt 125 kg (275 lb)   BMI 39.46 kg/m²   General: well developed and well nourished, alert, oriented times 3, and appears comfortable  Psychiatric: Normal  HEENT: Trachea Midline, No torticollis  Cardiovascular: No discernable arrhythmia  Pulmonary: No wheezing or stridor  Abdomen: No rebound or guarding  Extremities: No peripheral edema  Skin: No masses, erythema, lacerations, fluctation, ulcerations  Neurovascular: Sensation Intact to the Median, Ulnar, Radial Nerve, Motor Intact to the Median, Ulnar, Radial Nerve, and Pulses Intact    MUSCULOSKELETAL EXAMINATION:  Left hand: 2+ radial pulses, good ulnar pulses, full cervical motion without recreation of symptoms, 5/5 deltoid, 4+/5 biceps left and 5/5 biceps right, 5/5 triceps, 5/5 wrist extension, 5/5 wrist flexion, 5/5 AIN, 4+/5 APB left and 5/5 APB right, 5/5 intrinsics, negative maria luisa bilaterally, hyporeflexive biceps and triceps, no tenderness over lateral epicondyle, long finger tension test normal, no pain over radial tunnel, full elbow motion without pain    _____________________________________________________  STUDIES REVIEWED:  Images were reviewed in PACS by Dr. Tiara Finney and demonstrate: XR left hand 09/12/2023 were reviewed and shows degenerative changes to [facility] joint  X-ray cervical spine 12/11/2023 was reviewed and shows multilevel degenerative changes      PROCEDURES PERFORMED:  Procedures  No Procedures performed today    Scribe Attestation      I,:  Fracisco Rico am acting as a scribe while in the presence of the attending physician.:       I,:  Nicolette Guevara MD personally performed the services described in this documentation    as scribed in my presence.:

## 2023-12-15 DIAGNOSIS — I10 ESSENTIAL HYPERTENSION: ICD-10-CM

## 2023-12-15 RX ORDER — BISOPROLOL FUMARATE AND HYDROCHLOROTHIAZIDE 2.5; 6.25 MG/1; MG/1
1 TABLET ORAL DAILY
Qty: 90 TABLET | Refills: 0 | Status: SHIPPED | OUTPATIENT
Start: 2023-12-15

## 2023-12-20 ENCOUNTER — HOSPITAL ENCOUNTER (OUTPATIENT)
Dept: CT IMAGING | Facility: HOSPITAL | Age: 68
Discharge: HOME | End: 2023-12-20
Attending: ORTHOPAEDIC SURGERY
Payer: COMMERCIAL

## 2023-12-20 DIAGNOSIS — M54.12 CERVICAL RADICULOPATHY: ICD-10-CM

## 2023-12-20 PROCEDURE — 72125 CT NECK SPINE W/O DYE: CPT

## 2023-12-20 PROCEDURE — G1004 CDSM NDSC: HCPCS

## 2023-12-21 DIAGNOSIS — Z12.5 SCREENING FOR MALIGNANT NEOPLASM OF PROSTATE: Primary | ICD-10-CM

## 2023-12-21 DIAGNOSIS — I10 ESSENTIAL HYPERTENSION: ICD-10-CM

## 2023-12-21 DIAGNOSIS — E78.2 MIXED HYPERLIPIDEMIA: ICD-10-CM

## 2023-12-21 DIAGNOSIS — R73.9 HYPERGLYCEMIA: ICD-10-CM

## 2024-01-02 ENCOUNTER — OFFICE VISIT (OUTPATIENT)
Dept: PAIN MEDICINE | Facility: CLINIC | Age: 69
End: 2024-01-02
Payer: COMMERCIAL

## 2024-01-02 VITALS
TEMPERATURE: 98.6 F | HEIGHT: 70 IN | HEART RATE: 78 BPM | SYSTOLIC BLOOD PRESSURE: 124 MMHG | BODY MASS INDEX: 39.94 KG/M2 | DIASTOLIC BLOOD PRESSURE: 80 MMHG | WEIGHT: 279 LBS

## 2024-01-02 DIAGNOSIS — M96.1 LUMBAR POST-LAMINECTOMY SYNDROME: ICD-10-CM

## 2024-01-02 DIAGNOSIS — G89.4 CHRONIC PAIN SYNDROME: ICD-10-CM

## 2024-01-02 DIAGNOSIS — M47.812 CERVICAL SPONDYLOSIS: ICD-10-CM

## 2024-01-02 DIAGNOSIS — Z79.891 LONG-TERM CURRENT USE OF OPIATE ANALGESIC: ICD-10-CM

## 2024-01-02 DIAGNOSIS — M47.816 LUMBAR SPONDYLOSIS: ICD-10-CM

## 2024-01-02 DIAGNOSIS — M48.02 CERVICAL SPINAL STENOSIS: ICD-10-CM

## 2024-01-02 DIAGNOSIS — M54.12 CERVICAL RADICULOPATHY: ICD-10-CM

## 2024-01-02 DIAGNOSIS — F11.20 UNCOMPLICATED OPIOID DEPENDENCE (HCC): ICD-10-CM

## 2024-01-02 DIAGNOSIS — M48.02 FORAMINAL STENOSIS OF CERVICAL REGION: Primary | ICD-10-CM

## 2024-01-02 PROCEDURE — 99214 OFFICE O/P EST MOD 30 MIN: CPT | Performed by: PHYSICIAN ASSISTANT

## 2024-01-02 RX ORDER — HYDROCODONE BITARTRATE AND ACETAMINOPHEN 10; 325 MG/1; MG/1
TABLET ORAL
Qty: 75 TABLET | Refills: 0 | Status: SHIPPED | OUTPATIENT
Start: 2024-01-02

## 2024-01-02 RX ORDER — HYDROCODONE BITARTRATE AND ACETAMINOPHEN 10; 325 MG/1; MG/1
1 TABLET ORAL EVERY 8 HOURS PRN
Qty: 75 TABLET | Refills: 0 | Status: SHIPPED | OUTPATIENT
Start: 2024-01-02

## 2024-01-02 NOTE — PROGRESS NOTES
4/23/2021 10:53 AM 
 
Ms. Favio Chen [phone] We have tried reaching you by phone unsuccessfully. You are due for a follow up appointment at this time. Please call the number above upon receiving this letter to schedule your appointment. Sincerely, Arjun Howell MD 
 
 Assessment:  1. Foraminal stenosis of cervical region    2. Cervical spinal stenosis    3. Cervical radiculopathy    4. Cervical spondylosis    5. Lumbar post-laminectomy syndrome    6. Lumbar spondylosis    7. Chronic pain syndrome    8. Long-term current use of opiate analgesic    9. Uncomplicated opioid dependence (HCC)        Plan:  While the patient was in the office today, I did have a thorough conversation regarding their chronic pain syndrome, medication management, and treatment plan options.    After discussing options, I feel it is medically reasonable and necessary to obtain an EMG of the upper extremities given the significant pain he experiences primarily localized to the left hand.  He does have significant foraminal stenosis in the cervical spine on his recent CT scan and I think at this point in addition to the EMG will also obtain a consultation with our neurosurgery team.    I discussed with the patient that since he has no neck pain, diagnostic cervical medial branch blocks/radiofrequency ablation is not indicated.  Could consider diagnostic cervical epidural steroid injection in the future.    He will reduce his medication from a quantity of 90 tablets/month of the Norco to 75 tablets.  I have electronically sent this medication to his pharmacy with do not fill date of 1/17/2024, 2/15/2024 and 3/15/2024.    [place] Prescription Drug Monitoring Program report was reviewed and was appropriate     A urine drug screen was collected at today's office visit as part of our medication management protocol. The point of care testing results were appropriate for what was being prescribed. The specimen will be sent for confirmatory testing. The drug screen is medically necessary because the patient is either dependent on opioid medication or is being considered for opioid medication therapy and the results could impact ongoing or future treatment. The drug screen is to evaluate for the presences or  absence of prescribed, non-prescribed, and/or illicit drugs/substances.    There are risks associated with opioid medications, including dependence, addiction and tolerance. The patient understands and agrees to use these medications only as prescribed. Potential side effects of the medications include, but are not limited to, constipation, drowsiness, addiction, impaired judgment and risk of fatal overdose if not taken as prescribed. The patient was warned against driving while taking sedation medications.  Sharing medications is a felony. At this point in time, the patient is showing no signs of addiction, abuse, diversion or suicidal ideation.    The patient will follow-up in 12 weeks for medication prescription refill and reevaluation. The patient was advised to contact the office should their symptoms worsen in the interim. The patient was agreeable and verbalized an understanding.        History of Present Illness:    The patient is a 68 y.o. male last seen on 10/2/2023 who presents for a follow up office visit in regards to chronic pain.  The patient currently reports chronic low back pain with bilateral lower extremity radicular symptoms as well as significant left upper extremity pain primarily involving the dorsum of the left hand.  He also reports swelling of the hand.  His overall pain score is a 6-7 out of 10 and is described as a constant burning, dull, aching, sharp and shooting type of pain with intermittent numbness and paresthesias in the hand as well.  Patient denies any neck pain.  He has finished a course of physical therapy and recently underwent a CT of the cervical spine.  He has followed up with Dr. Quinteros regarding the hand issue who felt that it was likely emanating from cervical spine.  He underwent an left thumb cortisone injection without relief.    Current pain medications includes: Norco 10/325 3 times daily as needed.  The patient reports that this regimen is providing 50% pain  relief.  The patient is reporting no side effects from this pain medication regimen.    Pain Contract Signed: 1/2/2024  Last Urine Drug Screen: 1/2/2024    I have personally reviewed and/or updated the patient's past medical history, past surgical history, family history, social history, current medications, allergies, and vital signs today.       Review of Systems:    Review of Systems   Respiratory:  Positive for shortness of breath.    Cardiovascular:  Positive for chest pain.   Gastrointestinal:  Negative for constipation, diarrhea, nausea and vomiting.   Musculoskeletal:  Positive for gait problem and joint swelling (Joint stiffness). Negative for arthralgias and myalgias.   Skin:  Negative for rash.   Neurological:  Positive for weakness. Negative for dizziness and seizures.   All other systems reviewed and are negative.        Past Medical History:   Diagnosis Date   • Acute low back pain     10 AUG 2016 RESOLVED   • Angina pectoris ([facility])    • Bunion    • Bursitis of hip    • Carpal tunnel syndrome    • Chronic bilateral low back pain with bilateral sciatica    • Chronic lumbar radiculopathy    • Chronic narcotic dependence ([facility])    • Chronic pain syndrome    • Constipation due to opioid therapy    • DDD (degenerative disc disease), lumbar    • Deep vein thrombosis of left upper extremity ([facility])     [id number]  RESOLVED   • Depression    • Diverticula of colon     [id number] RESOLVED   • DVT (deep venous thrombosis) ([facility])     LUE   • Edema    • History of staph infection    • Hyperglycemia    • Hyperlipidemia    • Hypertension    • Insomnia    • Lateral epicondylitis    • Left inguinal hernia    • Methicillin resistant Staphylococcus aureus septicemia ([facility])    • Morbid obesity due to excess calories ([facility])    • Obesity    • Peripheral neuropathy    • Post laminectomy syndrome    • RLS (restless legs syndrome)    • Septicemia ([facility])     MRSA   • Umbilical hernia        Past Surgical History:   Procedure Laterality  Date   • BACK SURGERY      laminectomy, hardware   • CARPAL TUNNEL RELEASE Left    • CERVICAL SPINE SURGERY     • COLONOSCOPY     • ELBOW SURGERY     • ELBOW SURGERY     • KNEE ARTHROSCOPY Right     knee   • KNEE SURGERY Right     ARTHOSCOPY KNEE   • NECK SURGERY      1997   • OR COLONOSCOPY FLX DX W/COLLJ SPEC WHEN PFRMD N/A 1/24/2017    Procedure: COLONOSCOPY;  Surgeon: Pierre Holcomb MD;  [facility];  Service: General   • OR NDSC WRST SURG W/RLS TRANSVRS CARPL LIGM Right 1/2/2020    Procedure: RELEASE CARPAL TUNNEL ENDOSCOPIC, right;  Surgeon: Brayan Quinteros MD;  Location: [facility];  Service: Orthopedics   • OR RPR UMBILICAL HRNA 5 YRS/> REDUCIBLE N/A 10/5/2022    Procedure: REPAIR HERNIA UMBILICAL WITH MESH.;  Surgeon: Pierre Holcomb MD;  Location: [facility];  Service: General   • OR TENDON SHEATH INCISION Right 9/9/2021    Procedure: Right long finger trigger finger release;  Surgeon: Brayan Quinteros MD;  Location: [facility];  Service: Orthopedics   • SHOULDER SURGERY     • SPINAL CORD STIMULATOR IMPLANT     • SYNOVECTOMY Right 9/9/2021    Procedure: Tenosynovectomy right hand long finger flexor digitorum superficialis and profundus tendon;  Surgeon: Brayan Quinteros MD;  Location: [facility];  Service: Orthopedics       Family History   Problem Relation Age of Onset   • Heart disease Mother    • Cancer Mother         breast, stomach   • Aneurysm Mother    • Heart disease Father    • Cancer Father         skin, liver, colon DECESED AGE 62       Social History     Occupational History   • Not on file   Tobacco Use   • Smoking status: Never   • Smokeless tobacco: Never   Vaping Use   • Vaping status: Never Used   Substance and Sexual Activity   • Alcohol use: Yes     Comment: occasional- less than 1 drink per week   • Drug use: No     Comment: opiod use for chronic pain   • Sexual activity: Yes         Current Outpatient Medications:   •  bisoprolol-hydrochlorothiazide (ZIAC) 2.5-6.25  "MG per tablet, TAKE ONE TABLET BY MOUTH DAILY, Disp: 90 tablet, Rfl: 0  •  eszopiclone (LUNESTA) 2 mg tablet, Take1 tablet (2 mg total) by mouth daily at bedtime as needed for sleep Take immediately before bedtime, Disp: 30 tablet, Rfl: 3  •  HYDROcodone-acetaminophen (NORCO)  mg per tablet, Take 1 tablet by mouth every 8 (eight) hours as needed for moderate pain For ongoing therapy, DO NOT FILL BEFORE 1/17/24 Max Daily Amount: 3 tablets, Disp: 75 tablet, Rfl: 0  •  HYDROcodone-acetaminophen (NORCO)  mg per tablet, Take 1 tab every 8 hours prn for ongoing therapy, DO NOT FILL BEFORE: 02/15/24, Disp: 75 tablet, Rfl: 0  •  HYDROcodone-acetaminophen (NORCO)  mg per tablet, Take 1 tab every 8 hrs prn for ongoing therapy DO NOT FILL BEFORE: 03/15/24, Disp: 75 tablet, Rfl: 0  •  naproxen sodium (ALEVE) 220 MG tablet, Take 1 tablet (220 mg total) by mouth 2 (two) times a day with meals, Disp: 20 tablet, Rfl: 0  •  rosuvastatin (CRESTOR) 10 MG tablet, Take 1 tablet (10 mg total) by mouth daily at bedtime, Disp: 90 tablet, Rfl: 3  •  Multiple Vitamin (MULTIVITAMIN) tablet, Take 1 tablet by mouth every other day  (Patient not taking: Reported on 1/2/2024), Disp: , Rfl:   •  naloxone (Narcan) 4 mg/0.1 mL nasal spray, Spray 2 sprays into 1 nostril for suspected opioid overdose. May repeat in other nostril. Call 911. (Patient not taking: Reported on 12/11/2023), Disp: 2 each, Rfl: 0  •  naloxone (NARCAN) 4 mg/0.1 mL nasal spray, Administer 1 spray into a nostril. If no response after 2-3 minutes, give another dose in the other nostril using a new spray., Disp: 1 each, Rfl: 1    Allergies   Allergen Reactions   • Fentanyl Other (See Comments)     \"Makes me Suicidal\"  Happened when dosage increased.   • Penicillins Other (See Comments)     As a child unknown   • Pravastatin Other (See Comments)     Reaction Date: 09Nov2011;   Muscle weakness   • Sulfa Antibiotics Other (See Comments)     As a child unknown   • " "Vytorin [Ezetimibe-Simvastatin] Myalgia     Reaction Date: 09Nov2011;        Physical Exam:    /80 (BP Location: Left arm, Patient Position: Sitting, Cuff Size: Standard)   Pulse 78   Temp 98.6 °F (37 °C)   Ht 5' 10\" (1.778 m)   Wt 127 kg (279 lb)   BMI 40.03 kg/m²     Constitutional:normal, well developed, well nourished, alert, in no distress and non-toxic and no overt pain behavior. and overweight  Eyes:anicteric  HEENT:grossly intact  Neck:supple, symmetric, trachea midline and no masses   Pulmonary:even and unlabored  Cardiovascular:No edema or pitting edema present  Skin:Normal without rashes or lesions and well hydrated  Psychiatric:Mood and affect appropriate  Neurologic:Cranial Nerves II-XII grossly intact  Musculoskeletal:normal      Imaging  No orders to display         Orders Placed This Encounter   Procedures   • [facility] All Prescribed Meds and Special Instructions   • Amphetamines, Methamphetamines   • Butalbital   • Phenobarbital   • Secobarbital   • Alprazolam   • Clonazepam   • Diazepam   • Lorazepam   • Gabapentin   • Pregabalin   • Cocaine   • Heroin   • Buprenorphine   • Levorphanol   • Meperidine   • Naltrexone   • Fentanyl   • Methadone   • Oxycodone   • Tapentadol   • THC   • Tramadol   • Codeine, Hydrocodone, Hydropmorphone, Morphine   • Bath Salts   • Ethyl Glucuronide/Ethyl Sulfate   • Kratom   • Spice   • Methylphenidate   • Phentermine   • Validity Oxidant   • Validity Creatinine   • Validity pH   • Validity Specific   • Ambulatory referral to Neurosurgery   • EMG 2 Limb Upper Extremity       "

## 2024-01-04 LAB
6MAM UR QL CFM: NEGATIVE NG/ML
7AMINOCLONAZEPAM UR QL CFM: NEGATIVE NG/ML
A-OH ALPRAZ UR QL CFM: NEGATIVE NG/ML
ACCEPTABLE CREAT UR QL: NORMAL MG/DL
ACCEPTIBLE SP GR UR QL: NORMAL
AMPHET UR QL CFM: NEGATIVE NG/ML
BUPRENORPHINE UR QL CFM: NEGATIVE NG/ML
BUTALBITAL UR QL CFM: NEGATIVE NG/ML
BZE UR QL CFM: NEGATIVE NG/ML
CODEINE UR QL CFM: NEGATIVE NG/ML
EDDP UR QL CFM: NEGATIVE NG/ML
ETHYL GLUCURONIDE UR QL CFM: NEGATIVE NG/ML
ETHYL SULFATE UR QL SCN: NEGATIVE NG/ML
EUTYLONE UR QL: NEGATIVE NG/ML
FENTANYL UR QL CFM: NEGATIVE NG/ML
GLIADIN IGG SER IA-ACNC: NEGATIVE NG/ML
HYDROCODONE UR QL CFM: NORMAL NG/ML
HYDROMORPHONE UR QL CFM: NORMAL NG/ML
LORAZEPAM UR QL CFM: NEGATIVE NG/ML
ME-PHENIDATE UR QL CFM: NEGATIVE NG/ML
MEPERIDINE UR QL CFM: NEGATIVE NG/ML
METHADONE UR QL CFM: NEGATIVE NG/ML
METHAMPHET UR QL CFM: NEGATIVE NG/ML
MORPHINE UR QL CFM: NEGATIVE NG/ML
NALTREXONE UR QL CFM: NEGATIVE NG/ML
NITRITE UR QL: NORMAL UG/ML
NORBUPRENORPHINE UR QL CFM: NEGATIVE NG/ML
NORDIAZEPAM UR QL CFM: NEGATIVE NG/ML
NORFENTANYL UR QL CFM: NEGATIVE NG/ML
NORHYDROCODONE UR QL CFM: NORMAL NG/ML
NORMEPERIDINE UR QL CFM: NEGATIVE NG/ML
NOROXYCODONE UR QL CFM: NEGATIVE NG/ML
OXAZEPAM UR QL CFM: NEGATIVE NG/ML
OXYCODONE UR QL CFM: NEGATIVE NG/ML
OXYMORPHONE UR QL CFM: NEGATIVE NG/ML
PARA-FLUOROFENTANYL QUANTIFICATION: NORMAL NG/ML
PHENOBARB UR QL CFM: NEGATIVE NG/ML
RESULT ALL_PRESCRIBED MEDS AND SPECIAL INSTRUCTIONS: NORMAL
SECOBARBITAL UR QL CFM: NEGATIVE NG/ML
SL AMB 4-ANPP QUANTIFICATION: NORMAL NG/ML
SL AMB 5F-ADB-M7 METABOLITE QUANTIFICATION: NEGATIVE NG/ML
SL AMB 7-OH-MITRAGYNINE (KRATOM ALKALOID) QUANTIFICATION: NEGATIVE NG/ML
SL AMB AB-FUBINACA-M3 METABOLITE QUANTIFICATION: NEGATIVE NG/ML
SL AMB ACETYL FENTANYL QUANTIFICATION: NORMAL NG/ML
SL AMB ACETYL NORFENTANYL QUANTIFICATION: NORMAL NG/ML
SL AMB ACRYL FENTANYL QUANTIFICATION: NORMAL NG/ML
SL AMB CARFENTANIL QUANTIFICATION: NORMAL NG/ML
SL AMB CTHC (MARIJUANA METABOLITE) QUANTIFICATION: NEGATIVE NG/ML
SL AMB DEXTRORPHAN (DEXTROMETHORPHAN METABOLITE) QUANT: NEGATIVE NG/ML
SL AMB GABAPENTIN QUANTIFICATION: NEGATIVE
SL AMB JWH018 METABOLITE QUANTIFICATION: NEGATIVE NG/ML
SL AMB JWH073 METABOLITE QUANTIFICATION: NEGATIVE NG/ML
SL AMB MDMB-FUBINACA-M1 METABOLITE QUANTIFICATION: NEGATIVE NG/ML
SL AMB METHYLONE QUANTIFICATION: NEGATIVE NG/ML
SL AMB N-DESMETHYL-TRAMADOL QUANTIFICATION: NEGATIVE NG/ML
SL AMB PHENTERMINE QUANTIFICATION: NEGATIVE NG/ML
SL AMB PREGABALIN QUANTIFICATION: NEGATIVE
SL AMB RCS4 METABOLITE QUANTIFICATION: NEGATIVE NG/ML
SL AMB RITALINIC ACID QUANTIFICATION: NEGATIVE NG/ML
SMOOTH MUSCLE AB TITR SER IF: NEGATIVE NG/ML
SPECIMEN DRAWN SERPL: NEGATIVE NG/ML
SPECIMEN PH ACCEPTABLE UR: NORMAL
TAPENTADOL UR QL CFM: NEGATIVE NG/ML
TEMAZEPAM UR QL CFM: NEGATIVE NG/ML
TRAMADOL UR QL CFM: NEGATIVE NG/ML
URATE/CREAT 24H UR: NEGATIVE NG/ML

## 2024-01-08 ENCOUNTER — TELEPHONE (OUTPATIENT)
Dept: FAMILY MEDICINE CLINIC | Facility: HOSPITAL | Age: 69
End: 2024-01-08

## 2024-01-08 NOTE — TELEPHONE ENCOUNTER
Hi, I am calling from [facility] outpatient registration in [place] from downstairs. Just wanted to ask, I have a patient, one of your patient Yayo Quintero, date of birth [id number] office, lab work, lipid panel and CMP. The expected date is on June. According to the patient, it's supposed to be done today. So please can you check it out and change that or give me a call? My desk phone number is [phone]. Again, the patient's last name is RENETTA PRICE, first name Yayo. Date of birth [id number]. Thank you.  You received a voice mail from [facility].

## 2024-01-11 ENCOUNTER — HOSPITAL ENCOUNTER (OUTPATIENT)
Dept: NEUROLOGY | Facility: CLINIC | Age: 69
End: 2024-01-11
Payer: COMMERCIAL

## 2024-01-11 ENCOUNTER — TELEPHONE (OUTPATIENT)
Dept: PAIN MEDICINE | Facility: CLINIC | Age: 69
End: 2024-01-11

## 2024-01-11 DIAGNOSIS — M54.12 CERVICAL RADICULOPATHY: ICD-10-CM

## 2024-01-11 DIAGNOSIS — M48.02 FORAMINAL STENOSIS OF CERVICAL REGION: ICD-10-CM

## 2024-01-11 PROCEDURE — 95911 NRV CNDJ TEST 9-10 STUDIES: CPT | Performed by: PSYCHIATRY & NEUROLOGY

## 2024-01-11 PROCEDURE — 95886 MUSC TEST DONE W/N TEST COMP: CPT | Performed by: PSYCHIATRY & NEUROLOGY

## 2024-01-11 NOTE — TELEPHONE ENCOUNTER
----- Message from Helena Todd PA-C sent at 1/11/2024  1:31 PM EST -----  Please let patient know his EMG shows moderate carpal tunnel syndrome;  he has seen Dr. Quinteros before and I'd recommend he follow up with him regarding these findings.

## 2024-01-18 ENCOUNTER — APPOINTMENT (OUTPATIENT)
Dept: LAB | Facility: HOSPITAL | Age: 69
End: 2024-01-18
Payer: COMMERCIAL

## 2024-01-18 DIAGNOSIS — Z12.5 SCREENING FOR MALIGNANT NEOPLASM OF PROSTATE: ICD-10-CM

## 2024-01-18 DIAGNOSIS — E78.2 MIXED HYPERLIPIDEMIA: ICD-10-CM

## 2024-01-18 DIAGNOSIS — I10 ESSENTIAL HYPERTENSION: ICD-10-CM

## 2024-01-18 DIAGNOSIS — R73.9 HYPERGLYCEMIA: ICD-10-CM

## 2024-01-18 LAB
ALBUMIN SERPL BCP-MCNC: 4.4 G/DL (ref 3.5–5)
ALP SERPL-CCNC: 45 U/L (ref 34–104)
ALT SERPL W P-5'-P-CCNC: 20 U/L (ref 7–52)
ANION GAP SERPL CALCULATED.3IONS-SCNC: 11 MMOL/L
AST SERPL W P-5'-P-CCNC: 20 U/L (ref 13–39)
BASOPHILS # BLD AUTO: 0.05 THOUSANDS/ÂΜL (ref 0–0.1)
BASOPHILS NFR BLD AUTO: 1 % (ref 0–1)
BILIRUB SERPL-MCNC: 0.46 MG/DL (ref 0.2–1)
BUN SERPL-MCNC: 14 MG/DL (ref 5–25)
CALCIUM SERPL-MCNC: 9.6 MG/DL (ref 8.4–10.2)
CHLORIDE SERPL-SCNC: 103 MMOL/L (ref 96–108)
CHOLEST SERPL-MCNC: 127 MG/DL
CO2 SERPL-SCNC: 28 MMOL/L (ref 21–32)
CREAT SERPL-MCNC: 1.03 MG/DL (ref 0.6–1.3)
EOSINOPHIL # BLD AUTO: 0.52 THOUSAND/ÂΜL (ref 0–0.61)
EOSINOPHIL NFR BLD AUTO: 7 % (ref 0–6)
ERYTHROCYTE [DISTWIDTH] IN BLOOD BY AUTOMATED COUNT: 14.3 % (ref 11.6–15.1)
EST. AVERAGE GLUCOSE BLD GHB EST-MCNC: 131 MG/DL
GFR SERPL CREATININE-BSD FRML MDRD: 75 ML/MIN/1.73SQ M
GLUCOSE P FAST SERPL-MCNC: 110 MG/DL (ref 65–99)
HBA1C MFR BLD: 6.2 %
HCT VFR BLD AUTO: 47.9 % (ref 36.5–49.3)
HDLC SERPL-MCNC: 39 MG/DL
HGB BLD-MCNC: 15.2 G/DL (ref 12–17)
IMM GRANULOCYTES # BLD AUTO: 0.03 THOUSAND/UL (ref 0–0.2)
IMM GRANULOCYTES NFR BLD AUTO: 0 % (ref 0–2)
LDLC SERPL CALC-MCNC: 59 MG/DL (ref 0–100)
LYMPHOCYTES # BLD AUTO: 2.39 THOUSANDS/ÂΜL (ref 0.6–4.47)
LYMPHOCYTES NFR BLD AUTO: 31 % (ref 14–44)
MCH RBC QN AUTO: 29 PG (ref 26.8–34.3)
MCHC RBC AUTO-ENTMCNC: 31.7 G/DL (ref 31.4–37.4)
MCV RBC AUTO: 91 FL (ref 82–98)
MONOCYTES # BLD AUTO: 0.65 THOUSAND/ÂΜL (ref 0.17–1.22)
MONOCYTES NFR BLD AUTO: 9 % (ref 4–12)
NEUTROPHILS # BLD AUTO: 4.03 THOUSANDS/ÂΜL (ref 1.85–7.62)
NEUTS SEG NFR BLD AUTO: 52 % (ref 43–75)
NRBC BLD AUTO-RTO: 0 /100 WBCS
PLATELET # BLD AUTO: 367 THOUSANDS/UL (ref 149–390)
PMV BLD AUTO: 8.6 FL (ref 8.9–12.7)
POTASSIUM SERPL-SCNC: 4.8 MMOL/L (ref 3.5–5.3)
PROT SERPL-MCNC: 7.3 G/DL (ref 6.4–8.4)
PSA SERPL-MCNC: 0.79 NG/ML (ref 0–4)
RBC # BLD AUTO: 5.24 MILLION/UL (ref 3.88–5.62)
SODIUM SERPL-SCNC: 142 MMOL/L (ref 135–147)
TRIGL SERPL-MCNC: 144 MG/DL
TSH SERPL DL<=0.05 MIU/L-ACNC: 1.48 UIU/ML (ref 0.45–4.5)
WBC # BLD AUTO: 7.67 THOUSAND/UL (ref 4.31–10.16)

## 2024-01-18 PROCEDURE — 80053 COMPREHEN METABOLIC PANEL: CPT

## 2024-01-18 PROCEDURE — 83036 HEMOGLOBIN GLYCOSYLATED A1C: CPT

## 2024-01-18 PROCEDURE — 84443 ASSAY THYROID STIM HORMONE: CPT

## 2024-01-18 PROCEDURE — 36415 COLL VENOUS BLD VENIPUNCTURE: CPT

## 2024-01-18 PROCEDURE — 80061 LIPID PANEL: CPT

## 2024-01-18 PROCEDURE — 85025 COMPLETE CBC W/AUTO DIFF WBC: CPT

## 2024-01-18 PROCEDURE — G0103 PSA SCREENING: HCPCS

## 2024-01-24 ENCOUNTER — OFFICE VISIT (OUTPATIENT)
Dept: FAMILY MEDICINE CLINIC | Facility: HOSPITAL | Age: 69
End: 2024-01-24
Payer: COMMERCIAL

## 2024-01-24 VITALS
HEART RATE: 83 BPM | SYSTOLIC BLOOD PRESSURE: 143 MMHG | WEIGHT: 276.2 LBS | BODY MASS INDEX: 39.54 KG/M2 | HEIGHT: 70 IN | DIASTOLIC BLOOD PRESSURE: 85 MMHG | TEMPERATURE: 98.4 F | OXYGEN SATURATION: 95 %

## 2024-01-24 DIAGNOSIS — F32.1 CURRENT MODERATE EPISODE OF MAJOR DEPRESSIVE DISORDER WITHOUT PRIOR EPISODE (HCC): ICD-10-CM

## 2024-01-24 DIAGNOSIS — I20.9 ANGINA PECTORIS, UNSPECIFIED (HCC): ICD-10-CM

## 2024-01-24 DIAGNOSIS — R73.9 HYPERGLYCEMIA: ICD-10-CM

## 2024-01-24 DIAGNOSIS — G89.4 CHRONIC PAIN SYNDROME: ICD-10-CM

## 2024-01-24 DIAGNOSIS — R06.09 EXERTIONAL DYSPNEA: ICD-10-CM

## 2024-01-24 DIAGNOSIS — E66.01 CLASS 2 SEVERE OBESITY DUE TO EXCESS CALORIES WITH SERIOUS COMORBIDITY AND BODY MASS INDEX (BMI) OF 39.0 TO 39.9 IN ADULT: ICD-10-CM

## 2024-01-24 DIAGNOSIS — M51.36 LUMBAR DEGENERATIVE DISC DISEASE: ICD-10-CM

## 2024-01-24 DIAGNOSIS — G47.33 OBSTRUCTIVE SLEEP APNEA: ICD-10-CM

## 2024-01-24 DIAGNOSIS — I10 ESSENTIAL HYPERTENSION: Primary | ICD-10-CM

## 2024-01-24 DIAGNOSIS — F11.20 CONTINUOUS OPIOID DEPENDENCE (HCC): ICD-10-CM

## 2024-01-24 DIAGNOSIS — G47.00 PERSISTENT INSOMNIA: ICD-10-CM

## 2024-01-24 PROCEDURE — 99214 OFFICE O/P EST MOD 30 MIN: CPT | Performed by: FAMILY MEDICINE

## 2024-01-24 RX ORDER — MIRTAZAPINE 15 MG/1
15 TABLET, FILM COATED ORAL
Qty: 30 TABLET | Refills: 5 | Status: SHIPPED | OUTPATIENT
Start: 2024-01-24

## 2024-01-24 NOTE — PROGRESS NOTES
Name: Yayo Smith Jr.      : 1955      MRN: [id number]  Encounter Provider: Fidel Thomas MD  Encounter Date: 2024   Encounter department: [facility] [address]     Assessment & Plan     1. Essential hypertension  Assessment & Plan:  Adequate BP control      2. Obstructive sleep apnea    3. Lumbar degenerative disc disease    4. Chronic pain syndrome    5. Class 2 severe obesity due to excess calories with serious comorbidity and body mass index (BMI) of 39.0 to 39.9 in adult     6. Continuous opioid dependence (HCC)    7. Hyperglycemia    8. Persistent insomnia  -     mirtazapine (REMERON) 15 mg tablet; Take 1 tablet (15 mg total) by mouth daily at bedtime    9. Current moderate episode of major depressive disorder without prior episode (HCC)    10. Angina pectoris, unspecified (HCC)    11. Exertional dyspnea  -     Echo complete w/ contrast if indicated; Future; Expected date: 2024        Depression Screening and Follow-up Plan: Patient was screened for depression during today's encounter. They screened negative with a PHQ-9 score of 4.        Subjective     6 month followup    Notes past 3-5 months getting more SOB with bending over, going up stairs,etc  Reviewed echo from 2019  No chest pain    Bad taste from sleeping pill, requests a change    Aware he has been eating improperly and thus elevated labs      Review of Systems   Constitutional:  Positive for unexpected weight change.   HENT: Negative.     Respiratory: Negative.     Cardiovascular: Negative.    Gastrointestinal: Negative.    Genitourinary: Negative.    Musculoskeletal:  Positive for arthralgias, back pain, gait problem, joint swelling and myalgias.   Psychiatric/Behavioral:  Positive for dysphoric mood and sleep disturbance. The patient is nervous/anxious.    All other systems reviewed and are negative.      Past Medical History:   Diagnosis Date    Acute low back pain     10 AUG 2016 RESOLVED     Angina pectoris (HCC)     Bunion     Bursitis of hip     Carpal tunnel syndrome     Chronic bilateral low back pain with bilateral sciatica     Chronic lumbar radiculopathy     Chronic narcotic dependence (HCC)     Chronic pain syndrome     Constipation due to opioid therapy     DDD (degenerative disc disease), lumbar     Deep vein thrombosis of left upper extremity (HCC)     [id number]  RESOLVED    Depression     Diverticula of colon     [id number] RESOLVED    DVT (deep venous thrombosis) (HCC)     LUE    Edema     History of staph infection     Hyperglycemia     Hyperlipidemia     Hypertension     Insomnia     Lateral epicondylitis     Left inguinal hernia     Methicillin resistant Staphylococcus aureus septicemia (HCC)     Morbid obesity due to excess calories (HCC)     Obesity     Peripheral neuropathy     Post laminectomy syndrome     RLS (restless legs syndrome)     Septicemia (HCC)     MRSA    Umbilical hernia      Past Surgical History:   Procedure Laterality Date    BACK SURGERY      laminectomy, hardware    CARPAL TUNNEL RELEASE Left     CERVICAL SPINE SURGERY      COLONOSCOPY      ELBOW SURGERY      ELBOW SURGERY      KNEE ARTHROSCOPY Right     knee    KNEE SURGERY Right     ARTHOSCOPY KNEE    NECK SURGERY      1997    [place] COLONOSCOPY FLX DX W/COLLJ SPEC WHEN PFRMD N/A 1/24/2017    Procedure: COLONOSCOPY;  Surgeon: Pierre Holcomb MD;  Location: [facility];  Service: General    [place] NDSC WRST SURG W/RLS TRANSVRS CARPL LIGM Right 1/2/2020    Procedure: RELEASE CARPAL TUNNEL ENDOSCOPIC, right;  Surgeon: Brayan Quinteros MD;  [facility];  Service: Orthopedics    WV RPR UMBILICAL HRNA 5 YRS/> REDUCIBLE N/A 10/5/2022    Procedure: REPAIR HERNIA UMBILICAL WITH MESH.;  Surgeon: Pierre Holcomb MD;  [facility]: [facility];  Service: General    [place] TENDON SHEATH INCISION Right 9/9/2021    Procedure: Right long finger trigger finger release;  Surgeon: Brayan Quinteros MD;  [facility];  Service:  Orthopedics    SHOULDER SURGERY      SPINAL CORD STIMULATOR IMPLANT      SYNOVECTOMY Right 9/9/2021    Procedure: Tenosynovectomy right hand long finger flexor digitorum superficialis and profundus tendon;  Surgeon: Brayan Quinteros MD;  Location:  MAIN OR;  Service: Orthopedics     Family History   Problem Relation Age of Onset    Heart disease Mother     Cancer Mother         breast, stomach    Aneurysm Mother     Heart disease Father     Cancer Father         skin, liver, colon DECESED AGE 62     Social History     Socioeconomic History    Marital status: /Civil Union     Spouse name: None    Number of children: 3    Years of education: None    Highest education level: None   Occupational History    None   Tobacco Use    Smoking status: Never    Smokeless tobacco: Never   Vaping Use    Vaping status: Never Used   Substance and Sexual Activity    Alcohol use: Yes     Comment: occasional- less than 1 drink per week    Drug use: No     Comment: opiod use for chronic pain    Sexual activity: Yes   Other Topics Concern    None   Social History Narrative    Worked for 14 years in [employer] - talc exposure/dust exposure    DISABLED    NO KNOWN RISK FACTORS    NO KNOWN STD RISK FACTORS     Social Determinants of Health     Financial Resource Strain: Not on file   Food Insecurity: Not on file   Transportation Needs: Not on file   Physical Activity: Not on file   Stress: Not on file   Social Connections: Not on file   Intimate Partner Violence: Not on file   Housing Stability: Not on file     Current Outpatient Medications on File Prior to Visit   Medication Sig    bisoprolol-hydrochlorothiazide (ZIAC) 2.5-6.25 MG per tablet TAKE ONE TABLET BY MOUTH DAILY    eszopiclone (LUNESTA) 2 mg tablet Take1 tablet (2 mg total) by mouth daily at bedtime as needed for sleep Take immediately before bedtime    HYDROcodone-acetaminophen (NORCO)  mg per tablet Take 1 tablet by mouth every 8 (eight) hours as  "needed for moderate pain For ongoing therapy, DO NOT FILL BEFORE 1/17/24 Max Daily Amount: 3 tablets    naloxone (NARCAN) 4 mg/0.1 mL nasal spray Administer 1 spray into a nostril. If no response after 2-3 minutes, give another dose in the other nostril using a new spray.    naproxen sodium (ALEVE) 220 MG tablet Take 1 tablet (220 mg total) by mouth 2 (two) times a day with meals    rosuvastatin (CRESTOR) 10 MG tablet Take 1 tablet (10 mg total) by mouth daily at bedtime    [DISCONTINUED] HYDROcodone-acetaminophen (NORCO)  mg per tablet Take 1 tab every 8 hours prn for ongoing therapy, DO NOT FILL BEFORE: 02/15/24 (Patient not taking: Reported on 1/24/2024)    [DISCONTINUED] HYDROcodone-acetaminophen (NORCO)  mg per tablet Take 1 tab every 8 hrs prn for ongoing therapy DO NOT FILL BEFORE: 03/15/24 (Patient not taking: Reported on 1/24/2024)    [DISCONTINUED] Multiple Vitamin (MULTIVITAMIN) tablet Take 1 tablet by mouth every other day  (Patient not taking: Reported on 1/2/2024)    [DISCONTINUED] naloxone (Narcan) 4 mg/0.1 mL nasal spray Spray 2 sprays into 1 nostril for suspected opioid overdose. May repeat in other nostril. Call 911. (Patient not taking: Reported on 12/11/2023)     Allergies   Allergen Reactions    Fentanyl Other (See Comments)     \"Makes me Suicidal\"  Happened when dosage increased.    Penicillins Other (See Comments)     As a child unknown    Pravastatin Other (See Comments)     Reaction Date: 09Nov2011;   Muscle weakness    Sulfa Antibiotics Other (See Comments)     As a child unknown    Vytorin [Ezetimibe-Simvastatin] Myalgia     Reaction Date: 09Nov2011;      Immunization History   Administered Date(s) Administered    COVID-19 PFIZER VACCINE 0.3 ML IM 03/26/2021, 04/19/2021, 01/17/2022    INFLUENZA 11/05/2008, 12/18/2013, 12/10/2014, 12/16/2015, 09/06/2018    Influenza Quadrivalent 3 years and older 09/06/2018    Influenza Quadrivalent Preservative Free 3 years and older IM " "12/10/2014, 12/16/2015    Influenza, high dose seasonal 0.7 mL 01/23/2023    Influenza, recombinant, quadrivalent,injectable, preservative free 12/30/2020    Influenza, seasonal, injectable 12/18/2013, 09/06/2018    Pneumococcal Conjugate Vaccine 20-valent (Pcv20), Polysace 07/11/2022    Tdap 06/11/2014       Objective     /85 (BP Location: Left arm, Patient Position: Sitting, Cuff Size: Large)   Pulse 83   Temp 98.4 °F (36.9 °C) (Tympanic)   Ht 5' 10\" (1.778 m)   Wt 125 kg (276 lb 3.2 oz)   SpO2 95%   BMI 39.63 kg/m²     Physical Exam  Vitals and nursing note reviewed.   Neck:      Vascular: No carotid bruit.   Cardiovascular:      Rate and Rhythm: Normal rate and regular rhythm.      Pulses: Normal pulses.      Heart sounds: Normal heart sounds.   Pulmonary:      Effort: Pulmonary effort is normal.      Breath sounds: Normal breath sounds.   Musculoskeletal:      Right lower leg: No edema.      Left lower leg: No edema.   Skin:     Findings: No rash.   Neurological:      Mental Status: He is alert and oriented to person, place, and time.   Psychiatric:         Mood and Affect: Mood normal.       Fidel Thomas MD    "

## 2024-01-29 ENCOUNTER — OFFICE VISIT (OUTPATIENT)
Dept: SURGERY | Facility: HOSPITAL | Age: 69
End: 2024-01-29
Payer: COMMERCIAL

## 2024-01-29 VITALS
OXYGEN SATURATION: 98 % | HEART RATE: 83 BPM | SYSTOLIC BLOOD PRESSURE: 143 MMHG | HEIGHT: 70 IN | RESPIRATION RATE: 18 BRPM | BODY MASS INDEX: 39.65 KG/M2 | DIASTOLIC BLOOD PRESSURE: 85 MMHG | WEIGHT: 277 LBS | TEMPERATURE: 98.7 F

## 2024-01-29 DIAGNOSIS — K21.9 GERD (GASTROESOPHAGEAL REFLUX DISEASE): ICD-10-CM

## 2024-01-29 DIAGNOSIS — Z12.11 ENCOUNTER FOR SCREENING COLONOSCOPY: Primary | ICD-10-CM

## 2024-01-29 PROCEDURE — 99243 OFF/OP CNSLTJ NEW/EST LOW 30: CPT | Performed by: SURGERY

## 2024-02-10 DIAGNOSIS — Z00.6 ENCOUNTER FOR EXAMINATION FOR NORMAL COMPARISON OR CONTROL IN CLINICAL RESEARCH PROGRAM: ICD-10-CM

## 2024-02-12 NOTE — H&P (VIEW-ONLY)
Assessment/Plan:   Yayo Smith Jr. is a 68 y.o.male who is here for Consult (Pt needs DX for colonoscopy due to change in bowel habits )  .    Plan: Diagnostic colonoscopy - discussed operative vs conservative mgt, surgical approaches, risks and benefits and patient has decided to proceed with colonoscopy. I will schedule at their earliest convenience.      Preoperative Clearance:   HPI:  Yayo Smith Jr. is a 68 y.o.male who was referred for evaluation of Consult (Pt needs DX for colonoscopy due to change in bowel habits )  .      ROS:  General ROS: negative  negative for - chills, fatigue, fever or night sweats, weight loss  Respiratory ROS: no cough, shortness of breath, or wheezing  Cardiovascular ROS: no chest pain or dyspnea on exertion  Genito-Urinary ROS: no dysuria, trouble voiding, or hematuria  Musculoskeletal ROS: negative for - gait disturbance, joint pain or muscle pain  Neurological ROS: no TIA or stroke symptoms  change in bowel habits    [unfilled]  Fentanyl, Penicillins, Pravastatin, Sulfa antibiotics, and Vytorin [ezetimibe-simvastatin]    Current Outpatient Medications:     bisoprolol-hydrochlorothiazide (ZIAC) 2.5-6.25 MG per tablet, TAKE ONE TABLET BY MOUTH DAILY, Disp: 90 tablet, Rfl: 0    eszopiclone (LUNESTA) 2 mg tablet, Take1 tablet (2 mg total) by mouth daily at bedtime as needed for sleep Take immediately before bedtime, Disp: 30 tablet, Rfl: 3    HYDROcodone-acetaminophen (NORCO)  mg per tablet, Take 1 tablet by mouth every 8 (eight) hours as needed for moderate pain For ongoing therapy, DO NOT FILL BEFORE 1/17/24 Max Daily Amount: 3 tablets, Disp: 75 tablet, Rfl: 0    mirtazapine (REMERON) 15 mg tablet, Take 1 tablet (15 mg total) by mouth daily at bedtime, Disp: 30 tablet, Rfl: 5    naloxone (NARCAN) 4 mg/0.1 mL nasal spray, Administer 1 spray into a nostril. If no response after 2-3 minutes, give another dose in the other nostril using a new spray., Disp: 1 each,  Rfl: 1    naproxen sodium (ALEVE) 220 MG tablet, Take 1 tablet (220 mg total) by mouth 2 (two) times a day with meals, Disp: 20 tablet, Rfl: 0    rosuvastatin (CRESTOR) 10 MG tablet, Take 1 tablet (10 mg total) by mouth daily at bedtime, Disp: 90 tablet, Rfl: 3  Past Medical History:   Diagnosis Date    Acute low back pain     10 AUG 2016 RESOLVED    Angina pectoris (HCC)     Bunion     Bursitis of hip     Carpal tunnel syndrome     Chronic bilateral low back pain with bilateral sciatica     Chronic lumbar radiculopathy     Chronic narcotic dependence (HCC)     Chronic pain syndrome     Constipation due to opioid therapy     DDD (degenerative disc disease), lumbar     Deep vein thrombosis of left upper extremity (HCC)     [id number]  RESOLVED    Depression     Diverticula of colon     [id number] RESOLVED    DVT (deep venous thrombosis) (HCC)     LUE    Edema     History of staph infection     Hyperglycemia     Hyperlipidemia     Hypertension     Insomnia     Lateral epicondylitis     Left inguinal hernia     Methicillin resistant Staphylococcus aureus septicemia (HCC)     Morbid obesity due to excess calories (HCC)     Obesity     Peripheral neuropathy     Post laminectomy syndrome     RLS (restless legs syndrome)     Septicemia (HCC)     MRSA    Umbilical hernia      Past Surgical History:   Procedure Laterality Date    BACK SURGERY      laminectomy, hardware    CARPAL TUNNEL RELEASE Left     CERVICAL SPINE SURGERY      COLONOSCOPY      ELBOW SURGERY      ELBOW SURGERY      KNEE ARTHROSCOPY Right     knee    KNEE SURGERY Right     ARTHOSCOPY KNEE    NECK SURGERY      1997    IA COLONOSCOPY FLX DX W/COLLJ SPEC WHEN PFRMD N/A 1/24/2017    Procedure: COLONOSCOPY;  Surgeon: Pierre Holcomb MD;  Location: [facility];  Service: General    IA NDSC WRST SURG W/RLS TRANSVRS CARPL LIGM Right 1/2/2020    Procedure: RELEASE CARPAL TUNNEL ENDOSCOPIC, right;  Surgeon: Brayan Quinteros MD;  Location: [facility];  Service:  Orthopedics    AK RPR UMBILICAL HRNA 5 YRS/> REDUCIBLE N/A 10/5/2022    Procedure: REPAIR HERNIA UMBILICAL WITH MESH.;  Surgeon: Pierre Holcomb MD;  Location: [facility];  Service: General    AK TENDON SHEATH INCISION Right 9/9/2021    Procedure: Right long finger trigger finger release;  Surgeon: Brayan Quinteros MD;  Location:  MAIN OR;  Service: Orthopedics    SHOULDER SURGERY      SPINAL CORD STIMULATOR IMPLANT      SYNOVECTOMY Right 9/9/2021    Procedure: Tenosynovectomy right hand long finger flexor digitorum superficialis and profundus tendon;  Surgeon: Brayan Quinteros MD;  Location:  MAIN OR;  Service: Orthopedics     Family History   Problem Relation Age of Onset    Heart disease Mother     Cancer Mother         breast, stomach    Aneurysm Mother     Heart disease Father     Cancer Father         skin, liver, colon DECESED AGE 62      reports that he has never smoked. He has never used smokeless tobacco. He reports current alcohol use. He reports that he does not use drugs.    Labs:   Lab Results   Component Value Date    WBC 7.67 01/18/2024    WBC 5.99 01/16/2023    WBC 7.54 09/23/2022    WBC 8.88 11/29/2015    WBC 7.18 11/28/2015    WBC 9.94 11/27/2015    HGB 15.2 01/18/2024    HGB 16.4 01/16/2023    HGB 16.2 09/23/2022    HGB 15.0 11/29/2015    HGB 14.7 11/28/2015    HGB 14.2 11/27/2015     01/18/2024     01/16/2023     09/23/2022     11/29/2015     11/28/2015     11/27/2015     Lab Results   Component Value Date    ALT 20 01/18/2024    ALT 41 07/17/2023    ALT 34 01/16/2023    ALT 47 12/14/2015    ALT 43 11/26/2015    ALT 37 10/28/2015    AST 20 01/18/2024    AST 19 07/17/2023    AST 21 01/16/2023    AST 25 12/14/2015    AST 29 11/26/2015    AST 27 10/28/2015     This SmartLink has not been configured with any valid records.       PHYSICAL EXAM  General Appearance:    Alert, cooperative, no distress,    Head:    Normocephalic without obvious  "abnormality   Eyes:    PERRL, conjunctiva/corneas clear, EOM's intact        Neck:   Supple, no adenopathy, no JVD   Back:     Symmetric, no spinal or CVA tenderness   Lungs:     Clear to auscultation bilaterally, no wheezing or rhonchi   Heart:    Regular rate and rhythm, S1 and S2 normal, no murmur   Abdomen:     Soft +BS ND NT    Extremities:   Extremities normal. No clubbing, cyanosis or edema   Psych:   Normal Affect, AOx3.    Neurologic:  Skin:   CNII-XII intact. Strength symmetric, speech intact    Warm, dry, intact, no visible rashes or lesions         Physical Exam              Some portions of this record may have been generated with voice recognition software. There may be translation, syntax,  or grammatical errors. Occasional wrong word or \"sound-a-like\" substitutions may have occurred due to the inherent limitations of the voice recognition software. Read the chart carefully and recognize, using context, where substitutions may have occurred. If you have any questions, please contact the dictating provider for clarification or correction, as needed. This encounter has been coded by a non-certified coder.   "

## 2024-02-12 NOTE — PROGRESS NOTES
Assessment/Plan:   Yayo Smith Jr. is a 68 y.o.male who is here for Consult (Pt needs DX for colonoscopy due to change in bowel habits )  .    Plan: Diagnostic colonoscopy - discussed operative vs conservative mgt, surgical approaches, risks and benefits and patient has decided to proceed with colonoscopy. I will schedule at their earliest convenience.      Preoperative Clearance:   HPI:  Yayo Smith Jr. is a 68 y.o.male who was referred for evaluation of Consult (Pt needs DX for colonoscopy due to change in bowel habits )  .      ROS:  General ROS: negative  negative for - chills, fatigue, fever or night sweats, weight loss  Respiratory ROS: no cough, shortness of breath, or wheezing  Cardiovascular ROS: no chest pain or dyspnea on exertion  Genito-Urinary ROS: no dysuria, trouble voiding, or hematuria  Musculoskeletal ROS: negative for - gait disturbance, joint pain or muscle pain  Neurological ROS: no TIA or stroke symptoms  change in bowel habits    [unfilled]  Fentanyl, Penicillins, Pravastatin, Sulfa antibiotics, and Vytorin [ezetimibe-simvastatin]    Current Outpatient Medications:     bisoprolol-hydrochlorothiazide (ZIAC) 2.5-6.25 MG per tablet, TAKE ONE TABLET BY MOUTH DAILY, Disp: 90 tablet, Rfl: 0    eszopiclone (LUNESTA) 2 mg tablet, Take1 tablet (2 mg total) by mouth daily at bedtime as needed for sleep Take immediately before bedtime, Disp: 30 tablet, Rfl: 3    HYDROcodone-acetaminophen (NORCO)  mg per tablet, Take 1 tablet by mouth every 8 (eight) hours as needed for moderate pain For ongoing therapy, DO NOT FILL BEFORE 1/17/24 Max Daily Amount: 3 tablets, Disp: 75 tablet, Rfl: 0    mirtazapine (REMERON) 15 mg tablet, Take 1 tablet (15 mg total) by mouth daily at bedtime, Disp: 30 tablet, Rfl: 5    naloxone (NARCAN) 4 mg/0.1 mL nasal spray, Administer 1 spray into a nostril. If no response after 2-3 minutes, give another dose in the other nostril using a new spray., Disp: 1 each,  Rfl: 1    naproxen sodium (ALEVE) 220 MG tablet, Take 1 tablet (220 mg total) by mouth 2 (two) times a day with meals, Disp: 20 tablet, Rfl: 0    rosuvastatin (CRESTOR) 10 MG tablet, Take 1 tablet (10 mg total) by mouth daily at bedtime, Disp: 90 tablet, Rfl: 3  Past Medical History:   Diagnosis Date    Acute low back pain     10 AUG 2016 RESOLVED    Angina pectoris (HCC)     Bunion     Bursitis of hip     Carpal tunnel syndrome     Chronic bilateral low back pain with bilateral sciatica     Chronic lumbar radiculopathy     Chronic narcotic dependence (HCC)     Chronic pain syndrome     Constipation due to opioid therapy     DDD (degenerative disc disease), lumbar     Deep vein thrombosis of left upper extremity (HCC)     [id number]  RESOLVED    Depression     Diverticula of colon     [id number] RESOLVED    DVT (deep venous thrombosis) (HCC)     LUE    Edema     History of staph infection     Hyperglycemia     Hyperlipidemia     Hypertension     Insomnia     Lateral epicondylitis     Left inguinal hernia     Methicillin resistant Staphylococcus aureus septicemia (HCC)     Morbid obesity due to excess calories (HCC)     Obesity     Peripheral neuropathy     Post laminectomy syndrome     RLS (restless legs syndrome)     Septicemia (HCC)     MRSA    Umbilical hernia      Past Surgical History:   Procedure Laterality Date    BACK SURGERY      laminectomy, hardware    CARPAL TUNNEL RELEASE Left     CERVICAL SPINE SURGERY      COLONOSCOPY      ELBOW SURGERY      ELBOW SURGERY      KNEE ARTHROSCOPY Right     knee    KNEE SURGERY Right     ARTHOSCOPY KNEE    NECK SURGERY      1997    GA COLONOSCOPY FLX DX W/COLLJ SPEC WHEN PFRMD N/A 1/24/2017    Procedure: COLONOSCOPY;  Surgeon: Pierre Holcomb MD;  Location: [facility];  Service: General    GA NDSC WRST SURG W/RLS TRANSVRS CARPL LIGM Right 1/2/2020    Procedure: RELEASE CARPAL TUNNEL ENDOSCOPIC, right;  Surgeon: Brayan Quinteros MD;  Location: [facility];  Service:  Orthopedics    MA RPR UMBILICAL HRNA 5 YRS/> REDUCIBLE N/A 10/5/2022    Procedure: REPAIR HERNIA UMBILICAL WITH MESH.;  Surgeon: Pierre Holcomb MD;  Location: [facility];  Service: General    MA TENDON SHEATH INCISION Right 9/9/2021    Procedure: Right long finger trigger finger release;  Surgeon: Brayan Quinteros MD;  Location:  MAIN OR;  Service: Orthopedics    SHOULDER SURGERY      SPINAL CORD STIMULATOR IMPLANT      SYNOVECTOMY Right 9/9/2021    Procedure: Tenosynovectomy right hand long finger flexor digitorum superficialis and profundus tendon;  Surgeon: Brayan Quinteros MD;  Location:  MAIN OR;  Service: Orthopedics     Family History   Problem Relation Age of Onset    Heart disease Mother     Cancer Mother         breast, stomach    Aneurysm Mother     Heart disease Father     Cancer Father         skin, liver, colon DECESED AGE 62      reports that he has never smoked. He has never used smokeless tobacco. He reports current alcohol use. He reports that he does not use drugs.    Labs:   Lab Results   Component Value Date    WBC 7.67 01/18/2024    WBC 5.99 01/16/2023    WBC 7.54 09/23/2022    WBC 8.88 11/29/2015    WBC 7.18 11/28/2015    WBC 9.94 11/27/2015    HGB 15.2 01/18/2024    HGB 16.4 01/16/2023    HGB 16.2 09/23/2022    HGB 15.0 11/29/2015    HGB 14.7 11/28/2015    HGB 14.2 11/27/2015     01/18/2024     01/16/2023     09/23/2022     11/29/2015     11/28/2015     11/27/2015     Lab Results   Component Value Date    ALT 20 01/18/2024    ALT 41 07/17/2023    ALT 34 01/16/2023    ALT 47 12/14/2015    ALT 43 11/26/2015    ALT 37 10/28/2015    AST 20 01/18/2024    AST 19 07/17/2023    AST 21 01/16/2023    AST 25 12/14/2015    AST 29 11/26/2015    AST 27 10/28/2015     This SmartLink has not been configured with any valid records.       PHYSICAL EXAM  General Appearance:    Alert, cooperative, no distress,    Head:    Normocephalic without obvious  "abnormality   Eyes:    PERRL, conjunctiva/corneas clear, EOM's intact        Neck:   Supple, no adenopathy, no JVD   Back:     Symmetric, no spinal or CVA tenderness   Lungs:     Clear to auscultation bilaterally, no wheezing or rhonchi   Heart:    Regular rate and rhythm, S1 and S2 normal, no murmur   Abdomen:     Soft +BS ND NT    Extremities:   Extremities normal. No clubbing, cyanosis or edema   Psych:   Normal Affect, AOx3.    Neurologic:  Skin:   CNII-XII intact. Strength symmetric, speech intact    Warm, dry, intact, no visible rashes or lesions         Physical Exam              Some portions of this record may have been generated with voice recognition software. There may be translation, syntax,  or grammatical errors. Occasional wrong word or \"sound-a-like\" substitutions may have occurred due to the inherent limitations of the voice recognition software. Read the chart carefully and recognize, using context, where substitutions may have occurred. If you have any questions, please contact the dictating provider for clarification or correction, as needed. This encounter has been coded by a non-certified coder.   "

## 2024-02-20 ENCOUNTER — HOSPITAL ENCOUNTER (OUTPATIENT)
Dept: GASTROENTEROLOGY | Facility: HOSPITAL | Age: 69
Setting detail: OUTPATIENT SURGERY
Discharge: HOME | End: 2024-02-20
Attending: SURGERY
Payer: COMMERCIAL

## 2024-02-20 ENCOUNTER — ANESTHESIA EVENT (OUTPATIENT)
Dept: GASTROENTEROLOGY | Facility: HOSPITAL | Age: 69
End: 2024-02-20

## 2024-02-20 ENCOUNTER — ANESTHESIA (OUTPATIENT)
Dept: GASTROENTEROLOGY | Facility: HOSPITAL | Age: 69
End: 2024-02-20

## 2024-02-20 VITALS
RESPIRATION RATE: 18 BRPM | HEART RATE: 80 BPM | TEMPERATURE: 98.2 F | SYSTOLIC BLOOD PRESSURE: 112 MMHG | OXYGEN SATURATION: 94 % | DIASTOLIC BLOOD PRESSURE: 58 MMHG

## 2024-02-20 DIAGNOSIS — Z12.11 ENCOUNTER FOR SCREENING COLONOSCOPY: ICD-10-CM

## 2024-02-20 DIAGNOSIS — K21.9 GERD (GASTROESOPHAGEAL REFLUX DISEASE): ICD-10-CM

## 2024-02-20 PROCEDURE — 88305 TISSUE EXAM BY PATHOLOGIST: CPT | Performed by: PATHOLOGY

## 2024-02-20 RX ORDER — PROPOFOL 10 MG/ML
INJECTION, EMULSION INTRAVENOUS AS NEEDED
Status: DISCONTINUED | OUTPATIENT
Start: 2024-02-20 | End: 2024-02-20

## 2024-02-20 RX ORDER — KETAMINE HYDROCHLORIDE 50 MG/ML
INJECTION, SOLUTION INTRAMUSCULAR; INTRAVENOUS AS NEEDED
Status: DISCONTINUED | OUTPATIENT
Start: 2024-02-20 | End: 2024-02-20

## 2024-02-20 RX ORDER — SODIUM CHLORIDE 9 MG/ML
INJECTION, SOLUTION INTRAVENOUS CONTINUOUS PRN
Status: DISCONTINUED | OUTPATIENT
Start: 2024-02-20 | End: 2024-02-20

## 2024-02-20 RX ORDER — MIDAZOLAM HYDROCHLORIDE 2 MG/2ML
INJECTION, SOLUTION INTRAMUSCULAR; INTRAVENOUS AS NEEDED
Status: DISCONTINUED | OUTPATIENT
Start: 2024-02-20 | End: 2024-02-20

## 2024-02-20 RX ADMIN — KETAMINE HYDROCHLORIDE 25 MG: 50 INJECTION, SOLUTION INTRAMUSCULAR; INTRAVENOUS at 07:45

## 2024-02-20 RX ADMIN — SODIUM CHLORIDE: 0.9 INJECTION, SOLUTION INTRAVENOUS at 07:41

## 2024-02-20 RX ADMIN — MIDAZOLAM 2 MG: 1 INJECTION INTRAMUSCULAR; INTRAVENOUS at 07:45

## 2024-02-20 RX ADMIN — PROPOFOL 50 MG: 10 INJECTION, EMULSION INTRAVENOUS at 07:59

## 2024-02-20 RX ADMIN — PROPOFOL 50 MG: 10 INJECTION, EMULSION INTRAVENOUS at 07:50

## 2024-02-20 RX ADMIN — PROPOFOL 50 MG: 10 INJECTION, EMULSION INTRAVENOUS at 08:02

## 2024-02-20 RX ADMIN — PROPOFOL 100 MG: 10 INJECTION, EMULSION INTRAVENOUS at 07:43

## 2024-02-20 RX ADMIN — PROPOFOL 50 MG: 10 INJECTION, EMULSION INTRAVENOUS at 07:55

## 2024-02-20 NOTE — INTERVAL H&P NOTE
H&P reviewed. After examining the patient I find no changes in the patients condition since the H&P had been written.    Vitals:    02/20/24 0653   BP: 142/93   Pulse: 87   Resp: 14   Temp: 98.2 °F (36.8 °C)   SpO2: 94%

## 2024-02-20 NOTE — ANESTHESIA PREPROCEDURE EVALUATION
Procedure:  COLONOSCOPY  EGD    Relevant Problems   ANESTHESIA   (-) History of anesthesia complications      CARDIO  Mild progressive GUDINO over last 6mo.  No CP, syncope.  Baseline deconditioning and back pain limiting exertion.     (+) Essential hypertension   (+) Mixed hyperlipidemia      NEURO/PSYCH   (+) Anxiety   (+) Continuous opioid dependence (HCC)   (+) Current moderate episode of major depressive disorder without prior episode (HCC)   (+) Depression      PULMONARY  URI last week.  Still coughing. No fever, abx/steroids.     (+) Obstructive sleep apnea      Other   (+) Class 2 severe obesity due to excess calories with serious comorbidity and body mass index (BMI) of 39.0 to 39.9 in adult          LEFT VENTRICLE:  Systolic function was normal. Ejection fraction was estimated to be 60 %.  There were no regional wall motion abnormalities.  Wall thickness was at the upper limits of normal.     MITRAL VALVE:  There was trace regurgitation.     TRICUSPID VALVE:  There was trace regurgitation.  Pulmonary artery systolic pressure was at the upper limits of normal.     PULMONIC VALVE:  There was trace regurgitation.     AORTA:  The root exhibited mild dilatation. 3.7 cm  Physical Exam    Airway    Mallampati score: III  TM Distance: >3 FB  Neck ROM: full     Dental       Cardiovascular      Pulmonary      Other Findings        Anesthesia Plan  ASA Score- 3     Anesthesia Type- IV sedation with anesthesia with ASA Monitors.         Additional Monitors:     Airway Plan:            Plan Factors-Exercise tolerance (METS): <4 METS.    Chart reviewed. EKG reviewed.  Existing labs reviewed. Patient summary reviewed.                  Induction- intravenous.    Postoperative Plan-     Informed Consent- Anesthetic plan and risks discussed with patient.  I personally reviewed this patient with the CRNA. Discussed and agreed on the Anesthesia Plan with the CRNA..

## 2024-02-20 NOTE — DISCHARGE INSTRUCTIONS
[facility]  Endoscopy Post-Operative Instructions  Dr.Emanuel Zhang RUSSELL    Procedure: Colonoscopy and Polypectomy    Findings:  Diverticulosis and Polyp(s)    Follow-Up: Follow-up Colonoscopy is indicated.  You will need a repeat Colonoscopy in 5 years.      You will receive a call from our office with your results, in addition to the the preliminary results you received today.      You are welcome to also schedule an office visit if desired to discuss the results further.     If a follow up endoscopy is needed, you are responsible for arranging that follow up appointment at the appropriate time.  The office may or may not issue a reminder at that future time.  Please take responsibility for your own follow up healthcare.      Diet: Eat a light snack first, and then resume your previous diet.    Medications: Continue taking her established medications. Do not take aspirin or blood thinning medications for 2 days following procedure. Tylenol is okay.    You may have been given a new medication. Please take this (usually an anti-ulcer -type medication) and see your primary care doctor for refills and follow up medications if needed.      After the test: Notify physician for arm swelling or pain at the intravenous site. You may have some abdominal discomfort following the procedure. Belching or passing flatus will help relieve it. Following upper endoscopy, you may experience a temporary sore throat. Saline gargles or lozenges can be taken to relieve sore throat Following lower endoscopy, minor rectal bleeding is not uncommon.     Activity: Do not drive a car, operate machinery, or sign legal documents for 24 hours after your procedure. Normal activity may be resumed on the day following the procedure.     Call the office at  [phone] for any of the following: Severe abdominal pain, significant rectal bleeding, chills, or fever above 100°, new onset of persistent cough or persistent vomiting.

## 2024-02-20 NOTE — DISCHARGE SUMMARY
Discharge Summary - Yayo Smith Jr. 68 y.o. male MRN: [id number]    Unit/Bed#:  Encounter: [id number]      * No Diagnosis Codes entered *  * No Diagnosis Codes entered *      Procedures Performed: Colonoscopy with polypectomy    See H and P for full details of admission and op note.     Patient was seen and examined prior to discharge.        Provisions for Follow-Up Care:  See after visit summary for information related to follow-up care and any pertinent home health orders.      Disposition: Home, in stable condition.     Planned Readmission: No    Discharge Medications:  See after visit summary for reconciled discharge medications provided to patient and family.      Post op instructions reviewed with patient and/or family.  Rx given for discharge.    Pt. discharge in improved and good condition.    Signature:   Pierre Hoclomb MD  Date: 2/20/2024 Time: 8:12 AM

## 2024-02-20 NOTE — ANESTHESIA POSTPROCEDURE EVALUATION
Post-Op Assessment Note    CV Status:  Stable    Pain management: adequate       Mental Status:  Lethargic   Hydration Status:  Stable   PONV Controlled:  None   Airway Patency:  Patent     Post Op Vitals Reviewed: Yes    No anethesia notable event occurred.    Staff: CRNA   Comments: vss              BP   127/59   Temp      Pulse  75   Resp   24   SpO2   98

## 2024-02-21 PROBLEM — Z00.00 MEDICARE ANNUAL WELLNESS VISIT, SUBSEQUENT: Status: RESOLVED | Noted: 2019-06-19 | Resolved: 2024-02-21

## 2024-02-21 PROCEDURE — 88305 TISSUE EXAM BY PATHOLOGIST: CPT | Performed by: PATHOLOGY

## 2024-02-22 ENCOUNTER — TELEPHONE (OUTPATIENT)
Age: 69
End: 2024-02-22

## 2024-02-22 NOTE — TELEPHONE ENCOUNTER
Caller: pharmacy    Doctor: shirley    Reason for call: pt doesn't have a script for his pain meds this month. Hydrocodone    Call back#: [phone]

## 2024-02-22 NOTE — TELEPHONE ENCOUNTER
1/02/24 rx for norco with DNF dates 2/15 and 3/15 have been cancelled by Dr. Thomas in error on 1/25/24. Norco last filled on 1/17/24.    Please resend.

## 2024-02-23 DIAGNOSIS — G89.4 CHRONIC PAIN SYNDROME: ICD-10-CM

## 2024-02-23 DIAGNOSIS — M54.12 CERVICAL RADICULOPATHY: ICD-10-CM

## 2024-02-23 RX ORDER — HYDROCODONE BITARTRATE AND ACETAMINOPHEN 10; 325 MG/1; MG/1
1 TABLET ORAL EVERY 8 HOURS PRN
Qty: 75 TABLET | Refills: 0 | Status: SHIPPED | OUTPATIENT
Start: 2024-02-23

## 2024-02-23 NOTE — TELEPHONE ENCOUNTER
S/w pt and advised script was sent to pharmacy, pt verbalized understanding and appreciative of call.

## 2024-03-07 ENCOUNTER — HOSPITAL ENCOUNTER (OUTPATIENT)
Dept: NON INVASIVE DIAGNOSTICS | Age: 69
Discharge: HOME | End: 2024-03-07
Payer: COMMERCIAL

## 2024-03-07 ENCOUNTER — APPOINTMENT (OUTPATIENT)
Dept: LAB | Facility: HOSPITAL | Age: 69
End: 2024-03-07

## 2024-03-07 VITALS
DIASTOLIC BLOOD PRESSURE: 58 MMHG | BODY MASS INDEX: 39.65 KG/M2 | SYSTOLIC BLOOD PRESSURE: 112 MMHG | HEIGHT: 70 IN | WEIGHT: 277 LBS | HEART RATE: 85 BPM

## 2024-03-07 DIAGNOSIS — Z00.6 ENCOUNTER FOR EXAMINATION FOR NORMAL COMPARISON OR CONTROL IN CLINICAL RESEARCH PROGRAM: ICD-10-CM

## 2024-03-07 DIAGNOSIS — R06.09 EXERTIONAL DYSPNEA: ICD-10-CM

## 2024-03-07 LAB
AORTIC ROOT: 3.4 CM
APICAL FOUR CHAMBER EJECTION FRACTION: 62 %
ASCENDING AORTA: 3.7 CM
BSA FOR ECHO PROCEDURE: 2.4 M2
E WAVE DECELERATION TIME: 221 MS
E/A RATIO: 0.67
FRACTIONAL SHORTENING: 37 (ref 28–44)
INTERVENTRICULAR SEPTUM IN DIASTOLE (PARASTERNAL SHORT AXIS VIEW): 1 CM
INTERVENTRICULAR SEPTUM: 1 CM (ref 0.6–1.1)
LAAS-AP2: 17.8 CM2
LAAS-AP4: 20.4 CM2
LEFT ATRIUM SIZE: 4.2 CM
LEFT ATRIUM VOLUME (MOD BIPLANE): 58 ML
LEFT ATRIUM VOLUME INDEX (MOD BIPLANE): 24.2 ML/M2
LEFT INTERNAL DIMENSION IN SYSTOLE: 3.2 CM (ref 2.1–4)
LEFT VENTRICLE DIASTOLIC VOLUME (MOD BIPLANE): 127 ML
LEFT VENTRICLE DIASTOLIC VOLUME INDEX (MOD BIPLANE): 52.9 ML/M2
LEFT VENTRICLE SYSTOLIC VOLUME (MOD BIPLANE): 48 ML
LEFT VENTRICLE SYSTOLIC VOLUME INDEX (MOD BIPLANE): 20 ML/M2
LEFT VENTRICULAR INTERNAL DIMENSION IN DIASTOLE: 5.1 CM (ref 3.5–6)
LEFT VENTRICULAR POSTERIOR WALL IN END DIASTOLE: 1 CM
LEFT VENTRICULAR STROKE VOLUME: 80 ML
LV EF: 62 %
LVSV (TEICH): 80 ML
MV E'TISSUE VEL-LAT: 9 CM/S
MV E'TISSUE VEL-SEP: 7 CM/S
MV PEAK A VEL: 0.85 M/S
MV PEAK E VEL: 57 CM/S
MV STENOSIS PRESSURE HALF TIME: 64 MS
MV VALVE AREA P 1/2 METHOD: 3.44
RIGHT ATRIUM AREA SYSTOLE A4C: 19 CM2
RIGHT VENTRICLE ID DIMENSION: 3.3 CM
SL CV LEFT ATRIUM LENGTH A2C: 4.9 CM
SL CV LV EF: 60
SL CV PED ECHO LEFT VENTRICLE DIASTOLIC VOLUME (MOD BIPLANE) 2D: 121 ML
SL CV PED ECHO LEFT VENTRICLE SYSTOLIC VOLUME (MOD BIPLANE) 2D: 41 ML
TRICUSPID ANNULAR PLANE SYSTOLIC EXCURSION: 2.4 CM

## 2024-03-07 PROCEDURE — 93306 TTE W/DOPPLER COMPLETE: CPT | Performed by: INTERNAL MEDICINE

## 2024-03-07 PROCEDURE — 36415 COLL VENOUS BLD VENIPUNCTURE: CPT

## 2024-03-07 PROCEDURE — 93306 TTE W/DOPPLER COMPLETE: CPT

## 2024-03-19 ENCOUNTER — OFFICE VISIT (OUTPATIENT)
Dept: FAMILY MEDICINE CLINIC | Facility: HOSPITAL | Age: 69
End: 2024-03-19
Payer: COMMERCIAL

## 2024-03-19 ENCOUNTER — APPOINTMENT (OUTPATIENT)
Dept: LAB | Facility: HOSPITAL | Age: 69
End: 2024-03-19
Payer: COMMERCIAL

## 2024-03-19 ENCOUNTER — HOSPITAL ENCOUNTER (OUTPATIENT)
Dept: CT IMAGING | Facility: HOSPITAL | Age: 69
Discharge: HOME | End: 2024-03-19
Payer: COMMERCIAL

## 2024-03-19 VITALS
OXYGEN SATURATION: 96 % | BODY MASS INDEX: 41.37 KG/M2 | TEMPERATURE: 98.1 F | DIASTOLIC BLOOD PRESSURE: 78 MMHG | HEART RATE: 80 BPM | HEIGHT: 70 IN | WEIGHT: 289 LBS | SYSTOLIC BLOOD PRESSURE: 138 MMHG

## 2024-03-19 DIAGNOSIS — R06.01 ORTHOPNEA: ICD-10-CM

## 2024-03-19 DIAGNOSIS — R60.0 LOCALIZED EDEMA: ICD-10-CM

## 2024-03-19 DIAGNOSIS — R60.0 LOCALIZED EDEMA: Primary | ICD-10-CM

## 2024-03-19 DIAGNOSIS — R06.02 SOB (SHORTNESS OF BREATH): ICD-10-CM

## 2024-03-19 LAB
ANION GAP SERPL CALCULATED.3IONS-SCNC: 7 MMOL/L (ref 4–13)
BASOPHILS # BLD AUTO: 0.06 THOUSANDS/ÂΜL (ref 0–0.1)
BASOPHILS NFR BLD AUTO: 1 % (ref 0–1)
BUN SERPL-MCNC: 13 MG/DL (ref 5–25)
CALCIUM SERPL-MCNC: 9.9 MG/DL (ref 8.4–10.2)
CHLORIDE SERPL-SCNC: 104 MMOL/L (ref 96–108)
CO2 SERPL-SCNC: 30 MMOL/L (ref 21–32)
CREAT SERPL-MCNC: 1.05 MG/DL (ref 0.6–1.3)
D DIMER PPP FEU-MCNC: 1.21 UG/ML FEU
EOSINOPHIL # BLD AUTO: 0.24 THOUSAND/ÂΜL (ref 0–0.61)
EOSINOPHIL NFR BLD AUTO: 3 % (ref 0–6)
ERYTHROCYTE [DISTWIDTH] IN BLOOD BY AUTOMATED COUNT: 14 % (ref 11.6–15.1)
GFR SERPL CREATININE-BSD FRML MDRD: 73 ML/MIN/1.73SQ M
GLUCOSE SERPL-MCNC: 98 MG/DL (ref 65–140)
HCT VFR BLD AUTO: 51.3 % (ref 36.5–49.3)
HGB BLD-MCNC: 15.7 G/DL (ref 12–17)
IMM GRANULOCYTES # BLD AUTO: 0.03 THOUSAND/UL (ref 0–0.2)
IMM GRANULOCYTES NFR BLD AUTO: 0 % (ref 0–2)
LYMPHOCYTES # BLD AUTO: 2.73 THOUSANDS/ÂΜL (ref 0.6–4.47)
LYMPHOCYTES NFR BLD AUTO: 37 % (ref 14–44)
MCH RBC QN AUTO: 27.6 PG (ref 26.8–34.3)
MCHC RBC AUTO-ENTMCNC: 30.6 G/DL (ref 31.4–37.4)
MCV RBC AUTO: 90 FL (ref 82–98)
MONOCYTES # BLD AUTO: 0.81 THOUSAND/ÂΜL (ref 0.17–1.22)
MONOCYTES NFR BLD AUTO: 11 % (ref 4–12)
NEUTROPHILS # BLD AUTO: 3.58 THOUSANDS/ÂΜL (ref 1.85–7.62)
NEUTS SEG NFR BLD AUTO: 48 % (ref 43–75)
NRBC BLD AUTO-RTO: 0 /100 WBCS
PLATELET # BLD AUTO: 363 THOUSANDS/UL (ref 149–390)
PMV BLD AUTO: 8.4 FL (ref 8.9–12.7)
POTASSIUM SERPL-SCNC: 4.7 MMOL/L (ref 3.5–5.3)
RBC # BLD AUTO: 5.68 MILLION/UL (ref 3.88–5.62)
SODIUM SERPL-SCNC: 141 MMOL/L (ref 135–147)
WBC # BLD AUTO: 7.45 THOUSAND/UL (ref 4.31–10.16)

## 2024-03-19 PROCEDURE — 71275 CT ANGIOGRAPHY CHEST: CPT

## 2024-03-19 PROCEDURE — 85379 FIBRIN DEGRADATION QUANT: CPT

## 2024-03-19 PROCEDURE — 85025 COMPLETE CBC W/AUTO DIFF WBC: CPT | Performed by: INTERNAL MEDICINE

## 2024-03-19 PROCEDURE — 36415 COLL VENOUS BLD VENIPUNCTURE: CPT

## 2024-03-19 PROCEDURE — 80048 BASIC METABOLIC PNL TOTAL CA: CPT

## 2024-03-19 PROCEDURE — 93000 ELECTROCARDIOGRAM COMPLETE: CPT | Performed by: INTERNAL MEDICINE

## 2024-03-19 PROCEDURE — 99215 OFFICE O/P EST HI 40 MIN: CPT | Performed by: INTERNAL MEDICINE

## 2024-03-19 RX ADMIN — IOHEXOL 80 ML: 350 INJECTION, SOLUTION INTRAVENOUS at 13:56

## 2024-03-19 NOTE — PROGRESS NOTES
Name: Yayo Smith Jr.      : 1955      MRN: [id number]  Encounter Provider: Meera Aguillon DO  Encounter Date: 3/19/2024   Encounter department: [facility] PRIMARY CARE [address]     Assessment & Plan     1. Localized edema  Comments:  D/dx reviewed: DVT risk high with h/o DVT x 2 after surgery BUT no current provoking events, STAT venous duplex ordered, HF reviewed - has SOB/orthopnea - Echo 3/7/24 was essentially nml, urged to not add any salt at all to diet and avoid NSAIDs, elevated LE at rest, T/C compression stockings and/or diuretic if vascular w/u neg  Orders:  -      VAS VENOUS DUPLEX - LOWER LIMB BILATERAL; Future; Expected date: 2024  -     POCT ECG  -     CBC and differential  -     Basic metabolic panel; Future  -     D-dimer, quantitative; Future  -     CTA chest pe study; Future; Expected date: 2024    2. SOB (shortness of breath)  Comments:  ECG done in office today with 1st degree AV block but otherwise nml, Echo 3/7/24 with nml EF and no valvular dz, BW/CTA ordered, if duplex and CTA neg may trial trial of diuretic & f/u with BMP-D/w pt in detail, discussed ANTONIETA as contribution as well, T/C sleep study and PFT's, to ED with new/worse symptoms  Orders:  -     POCT ECG  -     CBC and differential  -     Basic metabolic panel; Future  -     D-dimer, quantitative; Future  -     CTA chest pe study; Future; Expected date: 2024    3. Orthopnea  Comments:  ECG in office today w/o ischemia or arrhythmia, Echo 3/7/24 w/EF 60% and no wall motion abnormality, grade 1 diastolic dysfunction noted, no valvular dz present, T/C trial of diuretic and/or compression stockings if vascular w/u neg, would also benefit from sleep study - will need to be discussed at future appt  Orders:  -     POCT ECG  -     CBC and differential  -     Basic metabolic panel; Future           Subjective      HPI Pt here for an acute visit    Pt got out of the shower yesterday and noted L>R LE  edema.  He notes no pain/injury/fall/trauma.  He denies any recent travel/long car trips/recent surgery/prolonged immobilization but admits he sits in a chair a lot d/t his chronic back and LE pain.  He notes no redness/warmth to the legs.  He notes no CP but has had some eval with an Echo for SOB.  Echo 3/7/24 and EF was good and no valve or wall motion abnormality was noted.  He has h/o DVT x 2 but they were after surgery.  He denies h/o PE. He states he does add salt to certain foods.  He does not feel he has a high intake of higher sodium foods. He is not using Aleve daily.  He notes his SOB is at rest and with exertion. He notes SOB worse with lying down and has started sleeping in a chair.       Review of Systems   Constitutional:  Positive for fatigue. Negative for chills and fever.   HENT:  Positive for rhinorrhea.    Eyes:  Negative for pain and visual disturbance.   Respiratory:  Positive for cough, shortness of breath and wheezing.    Gastrointestinal:  Negative for abdominal pain, diarrhea, nausea and vomiting.   Musculoskeletal:  Positive for arthralgias, back pain and neck pain.   Skin:  Negative for color change and wound.   Neurological:  Positive for light-headedness. Negative for headaches.   Hematological:  Does not bruise/bleed easily.       Current Outpatient Medications on File Prior to Visit   Medication Sig    bisoprolol-hydrochlorothiazide (ZIAC) 2.5-6.25 MG per tablet TAKE ONE TABLET BY MOUTH DAILY    eszopiclone (LUNESTA) 2 mg tablet Take1 tablet (2 mg total) by mouth daily at bedtime as needed for sleep Take immediately before bedtime    HYDROcodone-acetaminophen (NORCO)  mg per tablet Take 1 tablet by mouth every 8 (eight) hours as needed for moderate pain For ongoing therapy Max Daily Amount: 3 tablets    mirtazapine (REMERON) 15 mg tablet Take 1 tablet (15 mg total) by mouth daily at bedtime    naloxone (NARCAN) 4 mg/0.1 mL nasal spray Administer 1 spray into a nostril. If no  "response after 2-3 minutes, give another dose in the other nostril using a new spray.    naproxen sodium (ALEVE) 220 MG tablet Take 1 tablet (220 mg total) by mouth 2 (two) times a day with meals    rosuvastatin (CRESTOR) 10 MG tablet Take 1 tablet (10 mg total) by mouth daily at bedtime       Objective     /78   Pulse 80   Temp 98.1 °F (36.7 °C)   Ht 5' 10\" (1.778 m)   Wt 131 kg (289 lb)   SpO2 96%   BMI 41.47 kg/m²     Physical Exam  Constitutional:       General: He is not in acute distress.     Appearance: He is well-developed. He is obese. He is not ill-appearing.   HENT:      Head: Normocephalic and atraumatic.   Eyes:      General:         Right eye: No discharge.         Left eye: No discharge.      Conjunctiva/sclera: Conjunctivae normal.   Cardiovascular:      Rate and Rhythm: Normal rate and regular rhythm.      Heart sounds: No murmur heard.     Comments: Trace to +1 LLE edema, very minimal trace RLE edema  Pulmonary:      Effort: No respiratory distress.      Breath sounds: Normal breath sounds. No wheezing, rhonchi or rales.      Comments: SOB with activity, able to talk in phrases  Musculoskeletal:      Comments: No calf pain or palp cord or warmth noted B/L LE, neg Nisha's sign B/L   Lymphadenopathy:      Cervical: No cervical adenopathy.   Skin:     General: Skin is warm and dry.      Coloration: Skin is not pale.      Findings: No erythema or rash.   Neurological:      General: No focal deficit present.      Mental Status: He is alert.      Gait: Gait normal.   Psychiatric:         Behavior: Behavior normal.         Thought Content: Thought content normal.         Judgment: Judgment normal.       Meera Aguillon DO    "

## 2024-03-20 ENCOUNTER — TELEPHONE (OUTPATIENT)
Dept: FAMILY MEDICINE CLINIC | Facility: HOSPITAL | Age: 69
End: 2024-03-20

## 2024-03-20 ENCOUNTER — HOSPITAL ENCOUNTER (OUTPATIENT)
Dept: NON INVASIVE DIAGNOSTICS | Facility: HOSPITAL | Age: 69
Discharge: HOME | End: 2024-03-20
Payer: COMMERCIAL

## 2024-03-20 DIAGNOSIS — R60.0 LOCALIZED EDEMA: ICD-10-CM

## 2024-03-20 DIAGNOSIS — R60.0 LOWER LEG EDEMA: Primary | ICD-10-CM

## 2024-03-20 PROCEDURE — 93970 EXTREMITY STUDY: CPT | Performed by: SURGERY

## 2024-03-20 PROCEDURE — 93970 EXTREMITY STUDY: CPT

## 2024-03-20 RX ORDER — FUROSEMIDE 20 MG/1
20 TABLET ORAL DAILY
Qty: 30 TABLET | Refills: 1 | Status: SHIPPED | OUTPATIENT
Start: 2024-03-20

## 2024-03-21 DIAGNOSIS — R60.0 LOWER LEG EDEMA: Primary | ICD-10-CM

## 2024-03-26 ENCOUNTER — OFFICE VISIT (OUTPATIENT)
Dept: PAIN MEDICINE | Facility: CLINIC | Age: 69
End: 2024-03-26
Payer: COMMERCIAL

## 2024-03-26 VITALS
HEART RATE: 78 BPM | HEIGHT: 70 IN | BODY MASS INDEX: 41.37 KG/M2 | DIASTOLIC BLOOD PRESSURE: 82 MMHG | SYSTOLIC BLOOD PRESSURE: 140 MMHG | TEMPERATURE: 99 F | WEIGHT: 289 LBS

## 2024-03-26 DIAGNOSIS — M25.511 CHRONIC PAIN OF BOTH SHOULDERS: ICD-10-CM

## 2024-03-26 DIAGNOSIS — G89.29 CHRONIC PAIN OF BOTH SHOULDERS: ICD-10-CM

## 2024-03-26 DIAGNOSIS — G89.4 CHRONIC PAIN SYNDROME: ICD-10-CM

## 2024-03-26 DIAGNOSIS — I10 ESSENTIAL HYPERTENSION: ICD-10-CM

## 2024-03-26 DIAGNOSIS — Z79.891 LONG-TERM CURRENT USE OF OPIATE ANALGESIC: ICD-10-CM

## 2024-03-26 DIAGNOSIS — F11.20 UNCOMPLICATED OPIOID DEPENDENCE (HCC): ICD-10-CM

## 2024-03-26 DIAGNOSIS — M47.812 CERVICAL SPONDYLOSIS: ICD-10-CM

## 2024-03-26 DIAGNOSIS — M47.816 LUMBAR SPONDYLOSIS: ICD-10-CM

## 2024-03-26 DIAGNOSIS — M54.2 NECK PAIN: ICD-10-CM

## 2024-03-26 DIAGNOSIS — M25.512 CHRONIC PAIN OF BOTH SHOULDERS: ICD-10-CM

## 2024-03-26 DIAGNOSIS — M54.12 CERVICAL RADICULOPATHY: Primary | ICD-10-CM

## 2024-03-26 PROCEDURE — 99214 OFFICE O/P EST MOD 30 MIN: CPT | Performed by: PHYSICIAN ASSISTANT

## 2024-03-26 RX ORDER — HYDROCODONE BITARTRATE AND ACETAMINOPHEN 10; 325 MG/1; MG/1
1 TABLET ORAL EVERY 8 HOURS PRN
Qty: 75 TABLET | Refills: 0 | Status: SHIPPED | OUTPATIENT
Start: 2024-03-26

## 2024-03-26 RX ORDER — BISOPROLOL FUMARATE AND HYDROCHLOROTHIAZIDE 2.5; 6.25 MG/1; MG/1
1 TABLET ORAL DAILY
Qty: 90 TABLET | Refills: 0 | Status: SHIPPED | OUTPATIENT
Start: 2024-03-26

## 2024-03-26 RX ORDER — MELOXICAM 15 MG/1
15 TABLET ORAL DAILY
Qty: 30 TABLET | Refills: 2 | Status: SHIPPED | OUTPATIENT
Start: 2024-03-26

## 2024-03-26 NOTE — PROGRESS NOTES
Assessment:  1. Cervical radiculopathy    2. Neck pain    3. Cervical spondylosis    4. Lumbar spondylosis    5. Chronic pain of both shoulders    6. Chronic pain syndrome    7. Uncomplicated opioid dependence (HCC)    8. Long-term current use of opiate analgesic        Plan:  While the patient was in the office today, I did have a thorough conversation regarding their chronic pain syndrome, medication management, and treatment plan options.    Patient remains clinically stable and moderately controlled with the Norco 10/325 every 8 hours as needed for pain.  I feel it is reasonable to continue him on this medication therefore I have electronically sent refills to his pharmacy with do not fill dates of 4/13/2024, 5/11/2024 and 6/9/2024.  I have added meloxicam 15 mg daily to try for pain/inflammation.    I have placed orders for x-rays of bilateral shoulders on today's visit.    Patient continues to deal with dyspnea on exertion and has had multiple tests which were within normal limits.  I have encouraged the patient to keep his follow-up appoint with his PCP 1 week from today and go to the emergency room if his symptoms worsen.    [place] Prescription Drug Monitoring Program report was reviewed and was appropriate     There are risks associated with opioid medications, including dependence, addiction and tolerance. The patient understands and agrees to use these medications only as prescribed. Potential side effects of the medications include, but are not limited to, constipation, drowsiness, addiction, impaired judgment and risk of fatal overdose if not taken as prescribed. The patient was warned against driving while taking sedation medications.  Sharing medications is a felony. At this point in time, the patient is showing no signs of addiction, abuse, diversion or suicidal ideation.    The patient will follow-up in 12 weeks for medication prescription refill and reevaluation. The patient was advised to  contact the office should their symptoms worsen in the interim. The patient was agreeable and verbalized an understanding.        History of Present Illness:    The patient is a 68 y.o. male last seen on 1/2/2024 who presents for a follow up office visit in regards to chronic neck and low back pain secondary to spondylosis, degenerative disc disease and stenosis.  The patient currently reports neck pain, bilateral shoulder pain and low back pain that he presently rates a 6-7 out of 10 and describes these areas as constant and burning, sharp, shooting.  Patient has intermittent numbness and paresthesias in the left upper extremity.  He is complaining of increasing pain in the shoulders and reduced range of motion that began recently.  On another note, the patient states that he has been undergoing a multitude of tests regarding shortness of breath on exertion.  He has a follow-up appointment with his PCP next Tuesday.    Current pain medications includes: Norco 10/325 every 8 hours as needed  ?.  The patient reports that this regimen is providing 50% pain relief.  The patient is reporting no side effects from this pain medication regimen.    Pain Contract Signed: 1/2/2024  Last Urine Drug Screen: 1/2/2024    I have personally reviewed and/or updated the patient's past medical history, past surgical history, family history, social history, current medications, allergies, and vital signs today.       Review of Systems:    Review of Systems   Respiratory:  Positive for shortness of breath.    Cardiovascular:  Negative for chest pain.   Gastrointestinal:  Negative for constipation, diarrhea, nausea and vomiting.   Musculoskeletal:  Positive for gait problem and joint swelling (Joint stiffness). Negative for arthralgias and myalgias.   Skin:  Negative for rash.   Neurological:  Positive for dizziness and weakness. Negative for seizures.   All other systems reviewed and are negative.        Past Medical History:    Diagnosis Date   • Acute low back pain     10 AUG 2016 RESOLVED   • Bunion    • Bursitis of hip    • Carpal tunnel syndrome    • Chronic bilateral low back pain with bilateral sciatica    • Chronic lumbar radiculopathy    • Chronic narcotic dependence (HCC)    • Chronic pain syndrome    • Constipation due to opioid therapy    • DDD (degenerative disc disease), lumbar    • Deep vein thrombosis of left upper extremity (HCC)     [id number]  RESOLVED   • Depression    • Diverticula of colon     [id number] RESOLVED   • DVT (deep venous thrombosis) (HCC)     LUE   • Edema    • History of staph infection    • Hyperglycemia    • Hyperlipidemia    • Insomnia    • Lateral epicondylitis    • Left inguinal hernia    • Methicillin resistant Staphylococcus aureus septicemia (HCC)    • Morbid obesity due to excess calories (HCC)    • Obesity    • Peripheral neuropathy    • Post laminectomy syndrome    • RLS (restless legs syndrome)    • Septicemia (HCC)     MRSA   • Umbilical hernia        Past Surgical History:   Procedure Laterality Date   • BACK SURGERY      laminectomy, hardware   • CARPAL TUNNEL RELEASE Left    • CERVICAL SPINE SURGERY     • COLONOSCOPY     • ELBOW SURGERY     • ELBOW SURGERY     • KNEE ARTHROSCOPY Right     knee   • KNEE SURGERY Right     ARTHOSCOPY KNEE   • NECK SURGERY      1997   • VT COLONOSCOPY FLX DX W/COLLJ SPEC WHEN PFRMD N/A 1/24/2017    Procedure: COLONOSCOPY;  Surgeon: Pierre Holcomb MD;  Location: [facility];  Service: General   • VT NDSC WRST SURG W/RLS TRANSVRS CARPL LIGM Right 1/2/2020    Procedure: RELEASE CARPAL TUNNEL ENDOSCOPIC, right;  Surgeon: Brayan Quinteros MD;  Location: [facility];  Service: Orthopedics   • VT RPR UMBILICAL HRNA 5 YRS/> REDUCIBLE N/A 10/5/2022    Procedure: REPAIR HERNIA UMBILICAL WITH MESH.;  Surgeon: Pierre Holcomb MD;  Location: [facility];  Service: General   • VT TENDON SHEATH INCISION Right 9/9/2021    Procedure: Right long finger trigger finger  release;  Surgeon: Brayan Quinteros MD;  Location:  MAIN OR;  Service: Orthopedics   • SHOULDER SURGERY     • SPINAL CORD STIMULATOR IMPLANT     • SYNOVECTOMY Right 9/9/2021    Procedure: Tenosynovectomy right hand long finger flexor digitorum superficialis and profundus tendon;  Surgeon: Brayan Quinteros MD;  Location:  MAIN OR;  Service: Orthopedics       Family History   Problem Relation Age of Onset   • Heart disease Mother    • Cancer Mother         breast, stomach   • Aneurysm Mother    • Heart disease Father    • Cancer Father         skin, liver, colon DECESED AGE 62       Social History     Occupational History   • Not on file   Tobacco Use   • Smoking status: Never   • Smokeless tobacco: Never   Vaping Use   • Vaping status: Never Used   Substance and Sexual Activity   • Alcohol use: Yes     Comment: occasional- less than 1 drink per week   • Drug use: No     Comment: opiod use for chronic pain   • Sexual activity: Yes         Current Outpatient Medications:   •  bisoprolol-hydrochlorothiazide (ZIAC) 2.5-6.25 MG per tablet, TAKE ONE TABLET BY MOUTH DAILY, Disp: 90 tablet, Rfl: 0  •  eszopiclone (LUNESTA) 2 mg tablet, Take1 tablet (2 mg total) by mouth daily at bedtime as needed for sleep Take immediately before bedtime, Disp: 30 tablet, Rfl: 3  •  furosemide (LASIX) 20 mg tablet, Take 1 tablet (20 mg total) by mouth daily, Disp: 30 tablet, Rfl: 1  •  HYDROcodone-acetaminophen (NORCO)  mg per tablet, Take 1 tablet by mouth every 8 (eight) hours as needed for moderate pain For ongoing therapy DO NOT FILL BEFORE: 04/13/24 Max Daily Amount: 3 tablets, Disp: 75 tablet, Rfl: 0  •  HYDROcodone-acetaminophen (NORCO)  mg per tablet, Take 1 tablet by mouth every 8 (eight) hours as needed for moderate pain For ongoing therapy DO NOT FILL BEFORE: 05/11/24 Max Daily Amount: 3 tablets, Disp: 75 tablet, Rfl: 0  •  HYDROcodone-acetaminophen (NORCO)  mg per tablet, Take 1 tablet by mouth every 8  "(eight) hours as needed for moderate pain For ongoing therapy DO NOT FILL BEFORE: 06/09/24 Max Daily Amount: 3 tablets, Disp: 75 tablet, Rfl: 0  •  meloxicam (Mobic) 15 mg tablet, Take 1 tablet (15 mg total) by mouth daily, Disp: 30 tablet, Rfl: 2  •  mirtazapine (REMERON) 15 mg tablet, Take 1 tablet (15 mg total) by mouth daily at bedtime, Disp: 30 tablet, Rfl: 5  •  naproxen sodium (ALEVE) 220 MG tablet, Take 1 tablet (220 mg total) by mouth 2 (two) times a day with meals, Disp: 20 tablet, Rfl: 0  •  rosuvastatin (CRESTOR) 10 MG tablet, Take 1 tablet (10 mg total) by mouth daily at bedtime, Disp: 90 tablet, Rfl: 3  •  naloxone (NARCAN) 4 mg/0.1 mL nasal spray, Administer 1 spray into a nostril. If no response after 2-3 minutes, give another dose in the other nostril using a new spray., Disp: 1 each, Rfl: 1    Allergies   Allergen Reactions   • Fentanyl Other (See Comments)     \"Makes me Suicidal\"  Happened when dosage increased.   • Penicillins Other (See Comments)     As a child unknown   • Pravastatin Other (See Comments)     Reaction Date: 09Nov2011;   Muscle weakness   • Sulfa Antibiotics Other (See Comments)     As a child unknown   • Vytorin [Ezetimibe-Simvastatin] Myalgia     Reaction Date: 09Nov2011;        Physical Exam:    /82 (BP Location: Left arm, Patient Position: Sitting, Cuff Size: Large)   Pulse 78   Temp 99 °F (37.2 °C)   Ht 5' 10\" (1.778 m)   Wt 131 kg (289 lb)   BMI 41.47 kg/m²     Constitutional:normal, well developed, well nourished, alert, in no distress and non-toxic and no overt pain behavior. and obese  Eyes:anicteric  HEENT:grossly intact  Neck:supple, symmetric, trachea midline and no masses   Pulmonary:even and unlabored  Cardiovascular:No edema or pitting edema present  Skin:Normal without rashes or lesions and well hydrated  Psychiatric:Mood and affect appropriate  Neurologic:Cranial Nerves II-XII grossly intact  Musculoskeletal: Limited range of motion bilateral " shoulders      Imaging  XR shoulder 2+ vw right    (Results Pending)   X-ray shoulder left 2+ views    (Results Pending)         Orders Placed This Encounter   Procedures   • XR shoulder 2+ vw right   • X-ray shoulder left 2+ views

## 2024-04-02 ENCOUNTER — OFFICE VISIT (OUTPATIENT)
Dept: FAMILY MEDICINE CLINIC | Facility: HOSPITAL | Age: 69
End: 2024-04-02
Payer: COMMERCIAL

## 2024-04-02 VITALS
BODY MASS INDEX: 42.2 KG/M2 | WEIGHT: 294.8 LBS | TEMPERATURE: 97.8 F | SYSTOLIC BLOOD PRESSURE: 124 MMHG | DIASTOLIC BLOOD PRESSURE: 80 MMHG | HEIGHT: 70 IN | OXYGEN SATURATION: 98 % | HEART RATE: 71 BPM

## 2024-04-02 DIAGNOSIS — R06.02 SHORTNESS OF BREATH: Primary | ICD-10-CM

## 2024-04-02 DIAGNOSIS — R91.8 MULTIPLE PULMONARY NODULES: ICD-10-CM

## 2024-04-02 DIAGNOSIS — I20.9 ANGINA PECTORIS, UNSPECIFIED (HCC): ICD-10-CM

## 2024-04-02 DIAGNOSIS — I10 ESSENTIAL HYPERTENSION: ICD-10-CM

## 2024-04-02 PROCEDURE — 99214 OFFICE O/P EST MOD 30 MIN: CPT | Performed by: FAMILY MEDICINE

## 2024-04-02 RX ORDER — ALBUTEROL SULFATE 90 UG/1
2 AEROSOL, METERED RESPIRATORY (INHALATION) EVERY 6 HOURS PRN
Qty: 18 G | Refills: 5 | Status: SHIPPED | OUTPATIENT
Start: 2024-04-02

## 2024-04-02 RX ORDER — DULOXETIN HYDROCHLORIDE 60 MG/1
CAPSULE, DELAYED RELEASE ORAL
COMMUNITY

## 2024-04-02 NOTE — PROGRESS NOTES
Name: Yayo Smith Jr.      : 1955      MRN: [id number]  Encounter Provider: Fidel Thomas MD  Encounter Date: 2024   Encounter department: [facility] CARE [address]     Assessment & Plan     1. Shortness of breath  -     Complete PFT with post bronchodilator; Future  -     albuterol (Ventolin HFA) 90 mcg/act inhaler; Inhale 2 puffs every 6 (six) hours as needed for wheezing    2. Essential hypertension  Assessment & Plan:  Adequate BP control      3. Multiple pulmonary nodules    4. Adult BMI 40.0-44.9 kg/sq m ([facility])    5. Angina pectoris, unspecified ([facility])  Assessment & Plan:  Not currently active          Depression Screening and Follow-up Plan: Patient assessed for underlying major depression. Brief counseling provided and recommend additional follow-up/re-evaluation next office visit.         Subjective     F/U edema and SOB  Seen by Dr Aguillon for same symptoms, evaluation unrevealing   No real benefit with trial of Furosemide 20 mg once a day  Echo normal EF, slight diastolic dysfunction  CTA chest and venous doppler normal    Note 5 lb weight gain despite diuretic use  He does have significant increase in frequency of urination    Above symptoms have been there for past year but gradually progressing      Review of Systems   Constitutional:  Positive for unexpected weight change.   Respiratory:  Positive for chest tightness, shortness of breath and wheezing. Negative for cough.    Cardiovascular:  Positive for leg swelling. Negative for chest pain.   Genitourinary: Negative.    Musculoskeletal:  Positive for arthralgias, back pain, gait problem and joint swelling.   Hematological: Negative.    Psychiatric/Behavioral: Negative.     All other systems reviewed and are negative.      Past Medical History:   Diagnosis Date    Acute low back pain     10 AUG 2016 RESOLVED    Bunion     Bursitis of hip     Carpal tunnel syndrome     Chronic bilateral low back pain with  bilateral sciatica     Chronic lumbar radiculopathy     Chronic narcotic dependence (HCC)     Chronic pain syndrome     Constipation due to opioid therapy     DDD (degenerative disc disease), lumbar     Deep vein thrombosis of left upper extremity (HCC)     [id number]  RESOLVED    Depression     Diverticula of colon     [id number] RESOLVED    DVT (deep venous thrombosis) (HCC)     LUE    Edema     History of staph infection     Hyperglycemia     Hyperlipidemia     Insomnia     Lateral epicondylitis     Left inguinal hernia     Methicillin resistant Staphylococcus aureus septicemia (HCC)     Morbid obesity due to excess calories (HCC)     Obesity     Peripheral neuropathy     Post laminectomy syndrome     RLS (restless legs syndrome)     Septicemia (HCC)     MRSA    Umbilical hernia      Past Surgical History:   Procedure Laterality Date    BACK SURGERY      laminectomy, hardware    CARPAL TUNNEL RELEASE Left     CERVICAL SPINE SURGERY      COLONOSCOPY      ELBOW SURGERY      ELBOW SURGERY      KNEE ARTHROSCOPY Right     knee    KNEE SURGERY Right     ARTHOSCOPY KNEE    NECK SURGERY      1997    MN COLONOSCOPY FLX DX W/COLLJ SPEC WHEN PFRMD N/A 1/24/2017    Procedure: COLONOSCOPY;  Surgeon: Pierre Holcomb MD;  Location: [facility];  Service: General    MN NDSC WRST SURG W/RLS TRANSVRS CARPL LIGM Right 1/2/2020    Procedure: RELEASE CARPAL TUNNEL ENDOSCOPIC, right;  Surgeon: Brayan Quinteros MD;  Location: [facility];  Service: Orthopedics    MN RPR UMBILICAL HRNA 5 YRS/> REDUCIBLE N/A 10/5/2022    Procedure: REPAIR HERNIA UMBILICAL WITH MESH.;  Surgeon: Pierre Holcomb MD;  Location: [facility];  Service: General    MN TENDON SHEATH INCISION Right 9/9/2021    Procedure: Right long finger trigger finger release;  Surgeon: Brayan Quinteros MD;  Location: [facility];  Service: Orthopedics    SHOULDER SURGERY      SPINAL CORD STIMULATOR IMPLANT      SYNOVECTOMY Right 9/9/2021    Procedure: Tenosynovectomy right  hand long finger flexor digitorum superficialis and profundus tendon;  Surgeon: Brayan Quinteros MD;  Location:  MAIN OR;  Service: Orthopedics     Family History   Problem Relation Age of Onset    Heart disease Mother     Cancer Mother         breast, stomach    Aneurysm Mother     Heart disease Father     Cancer Father         skin, liver, colon DECESED AGE 62     Social History     Socioeconomic History    Marital status: /Civil Union     Spouse name: None    Number of children: 3    Years of education: None    Highest education level: None   Occupational History    None   Tobacco Use    Smoking status: Never    Smokeless tobacco: Never   Vaping Use    Vaping status: Never Used   Substance and Sexual Activity    Alcohol use: Yes     Comment: occasional- less than 1 drink per week    Drug use: No     Comment: opiod use for chronic pain    Sexual activity: Yes   Other Topics Concern    None   Social History Narrative    Worked for 14 years in [employer] - talc exposure/dust exposure    DISABLED    NO KNOWN RISK FACTORS    NO KNOWN STD RISK FACTORS     Social Determinants of Health     Financial Resource Strain: Not on file   Food Insecurity: Not on file   Transportation Needs: Not on file   Physical Activity: Not on file   Stress: Not on file   Social Connections: Not on file   Intimate Partner Violence: Not on file   Housing Stability: Not on file     Current Outpatient Medications on File Prior to Visit   Medication Sig    bisoprolol-hydrochlorothiazide (ZIAC) 2.5-6.25 MG per tablet Take 1 tablet by mouth daily    DULoxetine (CYMBALTA) 60 mg delayed release capsule     eszopiclone (LUNESTA) 2 mg tablet Take1 tablet (2 mg total) by mouth daily at bedtime as needed for sleep Take immediately before bedtime    furosemide (LASIX) 20 mg tablet Take 1 tablet (20 mg total) by mouth daily    HYDROcodone-acetaminophen (NORCO)  mg per tablet Take 1 tablet by mouth every 8 (eight) hours as  "needed for moderate pain For ongoing therapy DO NOT FILL BEFORE: 04/13/24 Max Daily Amount: 3 tablets    HYDROcodone-acetaminophen (NORCO)  mg per tablet Take 1 tablet by mouth every 8 (eight) hours as needed for moderate pain For ongoing therapy DO NOT FILL BEFORE: 05/11/24 Max Daily Amount: 3 tablets    HYDROcodone-acetaminophen (NORCO)  mg per tablet Take 1 tablet by mouth every 8 (eight) hours as needed for moderate pain For ongoing therapy DO NOT FILL BEFORE: 06/09/24 Max Daily Amount: 3 tablets    meloxicam (Mobic) 15 mg tablet Take 1 tablet (15 mg total) by mouth daily    mirtazapine (REMERON) 15 mg tablet Take 1 tablet (15 mg total) by mouth daily at bedtime    naloxone (NARCAN) 4 mg/0.1 mL nasal spray Administer 1 spray into a nostril. If no response after 2-3 minutes, give another dose in the other nostril using a new spray.    naproxen sodium (ALEVE) 220 MG tablet Take 1 tablet (220 mg total) by mouth 2 (two) times a day with meals    Omeprazole 20 MG TBDD     rosuvastatin (CRESTOR) 10 MG tablet Take 1 tablet (10 mg total) by mouth daily at bedtime     Allergies   Allergen Reactions    Fentanyl Other (See Comments)     \"Makes me Suicidal\"  Happened when dosage increased.    Penicillins Other (See Comments)     As a child unknown    Pravastatin Other (See Comments)     Reaction Date: 09Nov2011;   Muscle weakness    Sulfa Antibiotics Other (See Comments)     As a child unknown    Vytorin [Ezetimibe-Simvastatin] Myalgia     Reaction Date: 09Nov2011;      Immunization History   Administered Date(s) Administered    COVID-19 PFIZER VACCINE 0.3 ML IM 03/26/2021, 04/19/2021, 01/17/2022    INFLUENZA 11/05/2008, 12/18/2013, 12/10/2014, 12/16/2015, 09/06/2018    Influenza Quadrivalent 3 years and older 09/06/2018    Influenza Quadrivalent Preservative Free 3 years and older IM 12/10/2014, 12/16/2015    Influenza, high dose seasonal 0.7 mL 01/23/2023    Influenza, recombinant, quadrivalent,injectable, " "preservative free 12/30/2020    Influenza, seasonal, injectable 12/18/2013, 09/06/2018    Pneumococcal Conjugate Vaccine 20-valent (Pcv20), Polysace 07/11/2022    Tdap 06/11/2014       Objective     /80 (BP Location: Left arm, Patient Position: Sitting, Cuff Size: Large)   Pulse 71   Temp 97.8 °F (36.6 °C) (Tympanic)   Ht 5' 10\" (1.778 m)   Wt 134 kg (294 lb 12.8 oz)   SpO2 98%   BMI 42.30 kg/m²     Physical Exam  Vitals and nursing note reviewed.   Constitutional:       Appearance: He is obese.   Neck:      Vascular: No carotid bruit.   Cardiovascular:      Rate and Rhythm: Normal rate and regular rhythm.      Heart sounds: Normal heart sounds.   Pulmonary:      Effort: Pulmonary effort is normal.      Breath sounds: Normal breath sounds.   Musculoskeletal:      Right lower leg: Edema present.      Left lower leg: Edema present.   Neurological:      Mental Status: He is alert.   Psychiatric:         Mood and Affect: Mood normal.       Fidel Thomas MD    "

## 2024-04-05 ENCOUNTER — TELEPHONE (OUTPATIENT)
Age: 69
End: 2024-04-05

## 2024-04-05 NOTE — TELEPHONE ENCOUNTER
Pt missed a call and was calling us back.  I couldn't find any notes that anyone called him but he said it could have been his wife that she works in the office and not to worry about it

## 2024-04-17 ENCOUNTER — HOSPITAL ENCOUNTER (OUTPATIENT)
Dept: PULMONOLOGY | Facility: HOSPITAL | Age: 69
Discharge: HOME | End: 2024-04-17
Payer: COMMERCIAL

## 2024-04-17 DIAGNOSIS — R06.02 SHORTNESS OF BREATH: ICD-10-CM

## 2024-04-17 PROCEDURE — 94729 DIFFUSING CAPACITY: CPT

## 2024-04-17 PROCEDURE — 94726 PLETHYSMOGRAPHY LUNG VOLUMES: CPT | Performed by: INTERNAL MEDICINE

## 2024-04-17 PROCEDURE — 94760 N-INVAS EAR/PLS OXIMETRY 1: CPT

## 2024-04-17 PROCEDURE — 94060 EVALUATION OF WHEEZING: CPT | Performed by: INTERNAL MEDICINE

## 2024-04-17 PROCEDURE — 94060 EVALUATION OF WHEEZING: CPT

## 2024-04-17 PROCEDURE — 94726 PLETHYSMOGRAPHY LUNG VOLUMES: CPT

## 2024-04-17 PROCEDURE — 94729 DIFFUSING CAPACITY: CPT | Performed by: INTERNAL MEDICINE

## 2024-04-17 RX ORDER — ALBUTEROL SULFATE 2.5 MG/3ML
2.5 SOLUTION RESPIRATORY (INHALATION) ONCE
Status: DISCONTINUED | OUTPATIENT
Start: 2024-04-17 | End: 2024-04-17

## 2024-04-17 RX ORDER — ALBUTEROL SULFATE 2.5 MG/3ML
2.5 SOLUTION RESPIRATORY (INHALATION) ONCE
Status: COMPLETED | OUTPATIENT
Start: 2024-04-17 | End: 2024-04-17

## 2024-04-17 RX ADMIN — ALBUTEROL SULFATE 2.5 MG: 2.5 SOLUTION RESPIRATORY (INHALATION) at 09:38

## 2024-04-30 ENCOUNTER — OFFICE VISIT (OUTPATIENT)
Dept: FAMILY MEDICINE CLINIC | Facility: HOSPITAL | Age: 69
End: 2024-04-30
Payer: COMMERCIAL

## 2024-04-30 VITALS
TEMPERATURE: 97.9 F | SYSTOLIC BLOOD PRESSURE: 118 MMHG | HEART RATE: 82 BPM | OXYGEN SATURATION: 95 % | WEIGHT: 290 LBS | BODY MASS INDEX: 41.52 KG/M2 | DIASTOLIC BLOOD PRESSURE: 70 MMHG | HEIGHT: 70 IN

## 2024-04-30 DIAGNOSIS — R06.02 SHORTNESS OF BREATH: ICD-10-CM

## 2024-04-30 DIAGNOSIS — Z11.1 PPD SCREENING TEST: ICD-10-CM

## 2024-04-30 DIAGNOSIS — I10 ESSENTIAL HYPERTENSION: ICD-10-CM

## 2024-04-30 DIAGNOSIS — R94.2 RESTRICTIVE PATTERN PRESENT ON PULMONARY FUNCTION TESTING: ICD-10-CM

## 2024-04-30 DIAGNOSIS — R19.8 ABDOMINAL ENLARGEMENT: ICD-10-CM

## 2024-04-30 DIAGNOSIS — R60.0 LOWER LEG EDEMA: Primary | ICD-10-CM

## 2024-04-30 LAB
APOB+LDLR+PCSK9 GENE MUT ANL BLD/T: NOT DETECTED
BRCA1+BRCA2 DEL+DUP + FULL MUT ANL BLD/T: NOT DETECTED
MLH1+MSH2+MSH6+PMS2 GN DEL+DUP+FUL M: NOT DETECTED

## 2024-04-30 PROCEDURE — 99214 OFFICE O/P EST MOD 30 MIN: CPT | Performed by: FAMILY MEDICINE

## 2024-04-30 PROCEDURE — 86580 TB INTRADERMAL TEST: CPT

## 2024-04-30 RX ORDER — FUROSEMIDE 20 MG/1
20 TABLET ORAL 2 TIMES DAILY
Qty: 60 TABLET | Refills: 3 | Status: SHIPPED | OUTPATIENT
Start: 2024-04-30

## 2024-04-30 NOTE — PROGRESS NOTES
Name: Yayo Smith Jr.      : 1955      MRN: [id number]  Encounter Provider: Fidel Thomas MD  Encounter Date: 2024   Encounter department: [facility] PRIMARY CARE [address]     Assessment & Plan     1. Lower leg edema  -     furosemide (LASIX) 20 mg tablet; Take 1 tablet (20 mg total) by mouth 2 (two) times a day  -     CBC and differential; Future  -     Comprehensive metabolic panel; Future    2. Shortness of breath  Assessment & Plan:  PFT restrictive changes, normal CTA chest.      3. Abdominal enlargement  -     CT abdomen pelvis w contrast; Future; Expected date: 2024    4. Restrictive pattern present on pulmonary function testing    5. PPD screening test  -     TB Skin Test    6. Essential hypertension           Subjective     Follow up on SOB and review of PFT    Still has as much SOB as last time  Some diuresis with Furosemide but fades shortly after    Out of breath if he bends over at the waist  CTA chest negative  PFT restrictive changes  No benefit with bronchodilator      Review of Systems   Constitutional:  Positive for unexpected weight change.   Respiratory:  Positive for shortness of breath. Negative for cough and wheezing.    Cardiovascular: Negative.    Gastrointestinal:  Positive for abdominal distention. Negative for abdominal pain and constipation.   Genitourinary: Negative.    Musculoskeletal:  Positive for arthralgias, back pain, gait problem, joint swelling and myalgias.   Psychiatric/Behavioral:  Positive for dysphoric mood.    All other systems reviewed and are negative.      Past Medical History:   Diagnosis Date    Acute low back pain     10 AUG 2016 RESOLVED    Bunion     Bursitis of hip     Carpal tunnel syndrome     Chronic bilateral low back pain with bilateral sciatica     Chronic lumbar radiculopathy     Chronic narcotic dependence (HCC)     Chronic pain syndrome     Constipation due to opioid therapy     DDD (degenerative disc disease),  lumbar     Deep vein thrombosis of left upper extremity (HCC)     [id number]  RESOLVED    Depression     Diverticula of colon     [id number] RESOLVED    DVT (deep venous thrombosis) (HCC)     LUE    Edema     History of staph infection     Hyperglycemia     Hyperlipidemia     Insomnia     Lateral epicondylitis     Left inguinal hernia     Methicillin resistant Staphylococcus aureus septicemia (HCC)     Morbid obesity due to excess calories (HCC)     Obesity     Peripheral neuropathy     Post laminectomy syndrome     RLS (restless legs syndrome)     Septicemia (HCC)     MRSA    Umbilical hernia      Past Surgical History:   Procedure Laterality Date    BACK SURGERY      laminectomy, hardware    CARPAL TUNNEL RELEASE Left     CERVICAL SPINE SURGERY      COLONOSCOPY      ELBOW SURGERY      ELBOW SURGERY      KNEE ARTHROSCOPY Right     knee    KNEE SURGERY Right     ARTHOSCOPY KNEE    NECK SURGERY      1997    WA COLONOSCOPY FLX DX W/COLLJ SPEC WHEN PFRMD N/A 1/24/2017    Procedure: COLONOSCOPY;  Surgeon: Pierre Holcomb MD;  [facility]: [facility];  Service: General    WA NDSC WRST SURG W/RLS TRANSVRS CARPL LIGM Right 1/2/2020    Procedure: RELEASE CARPAL TUNNEL ENDOSCOPIC, right;  Surgeon: Brayan Quinteros MD;  [facility]: [facility];  Service: Orthopedics    WA RPR UMBILICAL HRNA 5 YRS/> REDUCIBLE N/A 10/5/2022    Procedure: REPAIR HERNIA UMBILICAL WITH MESH.;  Surgeon: Pierre Holcomb MD;  Location: [facility];  Service: General    WA TENDON SHEATH INCISION Right 9/9/2021    Procedure: Right long finger trigger finger release;  Surgeon: Brayan Quinteros MD;  Location: [facility];  Service: Orthopedics    SHOULDER SURGERY      SPINAL CORD STIMULATOR IMPLANT      SYNOVECTOMY Right 9/9/2021    Procedure: Tenosynovectomy right hand long finger flexor digitorum superficialis and profundus tendon;  Surgeon: Brayan Quinteros MD;  Location: [facility];  Service: Orthopedics     Family History   Problem Relation Age of  Onset    Heart disease Mother     Cancer Mother         breast, stomach    Aneurysm Mother     Heart disease Father     Cancer Father         skin, liver, colon DECESED AGE 62     Social History     Socioeconomic History    Marital status: /Civil Union     Spouse name: None    Number of children: 3    Years of education: None    Highest education level: None   Occupational History    None   Tobacco Use    Smoking status: Never    Smokeless tobacco: Never   Vaping Use    Vaping status: Never Used   Substance and Sexual Activity    Alcohol use: Yes     Comment: occasional- less than 1 drink per week    Drug use: No     Comment: opiod use for chronic pain    Sexual activity: Yes   Other Topics Concern    None   Social History Narrative    Worked for 14 years in [employer] - talc exposure/dust exposure    DISABLED    NO KNOWN RISK FACTORS    NO KNOWN STD RISK FACTORS     Social Determinants of Health     Financial Resource Strain: Not on file   Food Insecurity: Not on file   Transportation Needs: Not on file   Physical Activity: Not on file   Stress: Not on file   Social Connections: Not on file   Intimate Partner Violence: Not on file   Housing Stability: Not on file     Current Outpatient Medications on File Prior to Visit   Medication Sig    albuterol (Ventolin HFA) 90 mcg/act inhaler Inhale 2 puffs every 6 (six) hours as needed for wheezing    bisoprolol-hydrochlorothiazide (ZIAC) 2.5-6.25 MG per tablet Take 1 tablet by mouth daily    DULoxetine (CYMBALTA) 60 mg delayed release capsule     eszopiclone (LUNESTA) 2 mg tablet Take1 tablet (2 mg total) by mouth daily at bedtime as needed for sleep Take immediately before bedtime    HYDROcodone-acetaminophen (NORCO)  mg per tablet Take 1 tablet by mouth every 8 (eight) hours as needed for moderate pain For ongoing therapy DO NOT FILL BEFORE: 04/13/24 Max Daily Amount: 3 tablets    HYDROcodone-acetaminophen (NORCO)  mg per tablet Take 1  "tablet by mouth every 8 (eight) hours as needed for moderate pain For ongoing therapy DO NOT FILL BEFORE: 05/11/24 Max Daily Amount: 3 tablets    HYDROcodone-acetaminophen (NORCO)  mg per tablet Take 1 tablet by mouth every 8 (eight) hours as needed for moderate pain For ongoing therapy DO NOT FILL BEFORE: 06/09/24 Max Daily Amount: 3 tablets    meloxicam (Mobic) 15 mg tablet Take 1 tablet (15 mg total) by mouth daily    mirtazapine (REMERON) 15 mg tablet Take 1 tablet (15 mg total) by mouth daily at bedtime    naloxone (NARCAN) 4 mg/0.1 mL nasal spray Administer 1 spray into a nostril. If no response after 2-3 minutes, give another dose in the other nostril using a new spray.    naproxen sodium (ALEVE) 220 MG tablet Take 1 tablet (220 mg total) by mouth 2 (two) times a day with meals    Omeprazole 20 MG TBDD     rosuvastatin (CRESTOR) 10 MG tablet Take 1 tablet (10 mg total) by mouth daily at bedtime     Allergies   Allergen Reactions    Fentanyl Other (See Comments)     \"Makes me Suicidal\"  Happened when dosage increased.    Penicillins Other (See Comments)     As a child unknown    Pravastatin Other (See Comments)     Reaction Date: 09Nov2011;   Muscle weakness    Sulfa Antibiotics Other (See Comments)     As a child unknown    Vytorin [Ezetimibe-Simvastatin] Myalgia     Reaction Date: 09Nov2011;      Immunization History   Administered Date(s) Administered    COVID-19 PFIZER VACCINE 0.3 ML IM 03/26/2021, 04/19/2021, 01/17/2022    INFLUENZA 11/05/2008, 12/18/2013, 12/10/2014, 12/16/2015, 09/06/2018    Influenza Quadrivalent 3 years and older 09/06/2018    Influenza Quadrivalent Preservative Free 3 years and older IM 12/10/2014, 12/16/2015    Influenza, high dose seasonal 0.7 mL 01/23/2023    Influenza, recombinant, quadrivalent,injectable, preservative free 12/30/2020    Influenza, seasonal, injectable 12/18/2013, 09/06/2018    Pneumococcal Conjugate Vaccine 20-valent (Pcv20), Polysace 07/11/2022    Tdap " "06/11/2014    Tuberculin Skin Test-PPD Intradermal 04/30/2024       Objective     /70 (BP Location: Left arm, Patient Position: Sitting, Cuff Size: Large)   Pulse 82   Temp 97.9 °F (36.6 °C) (Tympanic)   Ht 5' 10\" (1.778 m)   Wt 132 kg (290 lb)   SpO2 95%   BMI 41.61 kg/m²     Physical Exam  Vitals and nursing note reviewed.   Constitutional:       Appearance: He is obese.   Neck:      Vascular: No carotid bruit.   Cardiovascular:      Rate and Rhythm: Normal rate and regular rhythm.      Pulses: Normal pulses.      Heart sounds: Normal heart sounds.   Pulmonary:      Effort: Pulmonary effort is normal.      Breath sounds: Normal breath sounds.   Musculoskeletal:      Right lower leg: Edema present.      Left lower leg: Edema present.   Neurological:      Mental Status: He is oriented to person, place, and time.   Psychiatric:         Mood and Affect: Mood normal.       Fidel Thomas MD    "

## 2024-05-02 ENCOUNTER — APPOINTMENT (OUTPATIENT)
Dept: LAB | Facility: HOSPITAL | Age: 69
End: 2024-05-02
Payer: COMMERCIAL

## 2024-05-02 ENCOUNTER — CLINICAL SUPPORT (OUTPATIENT)
Dept: FAMILY MEDICINE CLINIC | Facility: HOSPITAL | Age: 69
End: 2024-05-02

## 2024-05-02 DIAGNOSIS — Z11.1 ENCOUNTER FOR PPD SKIN TEST READING: Primary | ICD-10-CM

## 2024-05-02 DIAGNOSIS — R60.0 LOWER LEG EDEMA: ICD-10-CM

## 2024-05-02 LAB
ALBUMIN SERPL BCP-MCNC: 4.4 G/DL (ref 3.5–5)
ALP SERPL-CCNC: 47 U/L (ref 34–104)
ALT SERPL W P-5'-P-CCNC: 20 U/L (ref 7–52)
ANION GAP SERPL CALCULATED.3IONS-SCNC: 9 MMOL/L (ref 4–13)
AST SERPL W P-5'-P-CCNC: 21 U/L (ref 13–39)
BASOPHILS # BLD AUTO: 0.04 THOUSANDS/ÂΜL (ref 0–0.1)
BASOPHILS NFR BLD AUTO: 1 % (ref 0–1)
BILIRUB SERPL-MCNC: 0.43 MG/DL (ref 0.2–1)
BUN SERPL-MCNC: 16 MG/DL (ref 5–25)
CALCIUM SERPL-MCNC: 9.6 MG/DL (ref 8.4–10.2)
CHLORIDE SERPL-SCNC: 101 MMOL/L (ref 96–108)
CO2 SERPL-SCNC: 31 MMOL/L (ref 21–32)
CREAT SERPL-MCNC: 0.98 MG/DL (ref 0.6–1.3)
EOSINOPHIL # BLD AUTO: 0.2 THOUSAND/ÂΜL (ref 0–0.61)
EOSINOPHIL NFR BLD AUTO: 3 % (ref 0–6)
ERYTHROCYTE [DISTWIDTH] IN BLOOD BY AUTOMATED COUNT: 14.8 % (ref 11.6–15.1)
GFR SERPL CREATININE-BSD FRML MDRD: 80 ML/MIN/1.73SQ M
GLUCOSE SERPL-MCNC: 128 MG/DL (ref 65–140)
HCT VFR BLD AUTO: 49.7 % (ref 36.5–49.3)
HGB BLD-MCNC: 15.4 G/DL (ref 12–17)
IMM GRANULOCYTES # BLD AUTO: 0.01 THOUSAND/UL (ref 0–0.2)
IMM GRANULOCYTES NFR BLD AUTO: 0 % (ref 0–2)
INDURATION: 0 MM
LYMPHOCYTES # BLD AUTO: 2 THOUSANDS/ÂΜL (ref 0.6–4.47)
LYMPHOCYTES NFR BLD AUTO: 30 % (ref 14–44)
MCH RBC QN AUTO: 27.5 PG (ref 26.8–34.3)
MCHC RBC AUTO-ENTMCNC: 31 G/DL (ref 31.4–37.4)
MCV RBC AUTO: 89 FL (ref 82–98)
MONOCYTES # BLD AUTO: 0.61 THOUSAND/ÂΜL (ref 0.17–1.22)
MONOCYTES NFR BLD AUTO: 9 % (ref 4–12)
NEUTROPHILS # BLD AUTO: 3.83 THOUSANDS/ÂΜL (ref 1.85–7.62)
NEUTS SEG NFR BLD AUTO: 57 % (ref 43–75)
NRBC BLD AUTO-RTO: 0 /100 WBCS
PLATELET # BLD AUTO: 305 THOUSANDS/UL (ref 149–390)
PMV BLD AUTO: 8.8 FL (ref 8.9–12.7)
POTASSIUM SERPL-SCNC: 4.1 MMOL/L (ref 3.5–5.3)
PROT SERPL-MCNC: 8 G/DL (ref 6.4–8.4)
RBC # BLD AUTO: 5.59 MILLION/UL (ref 3.88–5.62)
SODIUM SERPL-SCNC: 141 MMOL/L (ref 135–147)
TB SKIN TEST: NEGATIVE
WBC # BLD AUTO: 6.69 THOUSAND/UL (ref 4.31–10.16)

## 2024-05-02 PROCEDURE — 80053 COMPREHEN METABOLIC PANEL: CPT

## 2024-05-02 PROCEDURE — 36415 COLL VENOUS BLD VENIPUNCTURE: CPT

## 2024-05-02 PROCEDURE — 85025 COMPLETE CBC W/AUTO DIFF WBC: CPT

## 2024-05-03 ENCOUNTER — TELEPHONE (OUTPATIENT)
Dept: FAMILY MEDICINE CLINIC | Facility: HOSPITAL | Age: 69
End: 2024-05-03

## 2024-05-03 ENCOUNTER — TELEPHONE (OUTPATIENT)
Age: 69
End: 2024-05-03

## 2024-05-03 NOTE — TELEPHONE ENCOUNTER
Patient called back in regards to test results.     Let patient know about the doctors note, and everything looked good.

## 2024-05-03 NOTE — TELEPHONE ENCOUNTER
PA for Wegovy    Submitted via    []CMM-KEY   [x]Mauro-Case ID # [id number]   []Faxed to plan   []Other website   []Phone call Case ID #     Office notes sent, clinical questions answered. Awaiting determination    Turnaround time for your insurance to make a decision on your Prior Authorization can take 7-21 business days.             Detail Level: Simple Render Risk Assessment In Note?: no Additional Notes: I&D with an 11 blade.

## 2024-05-03 NOTE — TELEPHONE ENCOUNTER
Shannon Rain  5/3/2024  9:45 AM EDT Back to Top      Left message.    Meera Aguillon DO  5/3/2024  8:17 AM EDT       Please notify pt that his kidney tests and electrolytes were normal. Con't all current meds

## 2024-05-06 NOTE — TELEPHONE ENCOUNTER
PA for Wegovy Approved     Date(s) approved 5/1/24-5/1/25    Case #[id number]     Patient advised by          [x] [employer] Message  [] Phone call   []LMOM  []L/M to call office as no active Communication consent on file  []Unable to leave detailed message as VM not approved on Communication consent       Pharmacy advised by    [x]Fax  []Phone call    Approval letter scanned into Media No Approval via [employer]

## 2024-05-10 ENCOUNTER — HOSPITAL ENCOUNTER (OUTPATIENT)
Dept: RADIOLOGY | Facility: HOSPITAL | Age: 69
Discharge: HOME | End: 2024-05-10
Payer: COMMERCIAL

## 2024-05-10 DIAGNOSIS — R19.8 ABDOMINAL ENLARGEMENT: ICD-10-CM

## 2024-05-10 PROCEDURE — 74177 CT ABD & PELVIS W/CONTRAST: CPT

## 2024-05-10 RX ADMIN — IOHEXOL 100 ML: 350 INJECTION, SOLUTION INTRAVENOUS at 07:42

## 2024-05-21 ENCOUNTER — TELEPHONE (OUTPATIENT)
Dept: FAMILY MEDICINE CLINIC | Facility: HOSPITAL | Age: 69
End: 2024-05-21

## 2024-05-21 NOTE — TELEPHONE ENCOUNTER
Patient called scarlet.  Relayed message from Dr. Thomas from CT abdomen.  Patient understands message, no further questions at this time.

## 2024-05-21 NOTE — TELEPHONE ENCOUNTER
"----- Message from Fidel Thomas MD sent at 5/21/2024  7:27 AM EDT -----  CT abdomen is good except for enlarged liver from \"fatty liver\" that contributes to his shortness of breath.  Weight loss is the key to improving this, continue Wegovy  ----- Message -----  From: Interface, Radiology Results In  Sent: 5/21/2024   6:46 AM EDT  To: Fidel Thomas MD  "

## 2024-05-29 ENCOUNTER — TELEPHONE (OUTPATIENT)
Dept: FAMILY MEDICINE CLINIC | Facility: HOSPITAL | Age: 69
End: 2024-05-29

## 2024-05-29 NOTE — TELEPHONE ENCOUNTER
[facility]    Patient asking if he can be moved up to the 0.50mg dose?    If so, can you please send to the pharm? ([email or web address])

## 2024-06-18 ENCOUNTER — OFFICE VISIT (OUTPATIENT)
Dept: PAIN MEDICINE | Facility: CLINIC | Age: 69
End: 2024-06-18
Payer: COMMERCIAL

## 2024-06-18 VITALS
SYSTOLIC BLOOD PRESSURE: 132 MMHG | HEIGHT: 70 IN | TEMPERATURE: 99.4 F | HEART RATE: 74 BPM | DIASTOLIC BLOOD PRESSURE: 80 MMHG | BODY MASS INDEX: 40.52 KG/M2 | WEIGHT: 283 LBS

## 2024-06-18 DIAGNOSIS — M51.36 LUMBAR DEGENERATIVE DISC DISEASE: ICD-10-CM

## 2024-06-18 DIAGNOSIS — Z79.891 LONG-TERM CURRENT USE OF OPIATE ANALGESIC: ICD-10-CM

## 2024-06-18 DIAGNOSIS — G89.4 CHRONIC PAIN SYNDROME: ICD-10-CM

## 2024-06-18 DIAGNOSIS — M96.1 LUMBAR POST-LAMINECTOMY SYNDROME: Primary | ICD-10-CM

## 2024-06-18 DIAGNOSIS — M54.16 LUMBAR RADICULOPATHY: ICD-10-CM

## 2024-06-18 DIAGNOSIS — G60.9 IDIOPATHIC PERIPHERAL NEUROPATHY: ICD-10-CM

## 2024-06-18 DIAGNOSIS — F11.20 UNCOMPLICATED OPIOID DEPENDENCE (HCC): ICD-10-CM

## 2024-06-18 DIAGNOSIS — M25.50 POLYARTHRALGIA: ICD-10-CM

## 2024-06-18 PROCEDURE — 99214 OFFICE O/P EST MOD 30 MIN: CPT | Performed by: PHYSICIAN ASSISTANT

## 2024-06-18 RX ORDER — OXYCODONE AND ACETAMINOPHEN 10; 325 MG/1; MG/1
TABLET ORAL
Qty: 90 TABLET | Refills: 0 | Status: SHIPPED | OUTPATIENT
Start: 2024-06-18

## 2024-06-18 NOTE — PROGRESS NOTES
Assessment:  1. Lumbar post-laminectomy syndrome    2. Lumbar degenerative disc disease    3. Lumbar radiculopathy    4. Polyarthralgia    5. Idiopathic peripheral neuropathy    6. Chronic pain syndrome    7. Uncomplicated opioid dependence (HCC)    8. Long-term current use of opiate analgesic        Plan:  While the patient was in the office today, I did have a thorough conversation regarding their chronic pain syndrome, medication management, and treatment plan options.    Unfortunately the patient continues to deal with not only significant low back pain but increasing polyarthralgias including his hands and his feet.  Patient does not feel adequately controlled on the current medication regimen.  After discussing options, I recommended the patient discontinue hydrocodone and will proceed with Percocet 10/325 every 8 hours as needed for pain.  On today's visit I have electronically sent this medication to his pharmacy with do not fill dates of 7/7/2024, 8/5/2024 and 9/3/2024.  Patient was advised to call with any side effects or problems with the medication.    I have recommended that we proceed with lab work to include rheumatoid factor, Lyme titer and C-reactive protein.  Also consider rheumatological evaluation.    [place] Prescription Drug Monitoring Program report was reviewed and was appropriate     There are risks associated with opioid medications, including dependence, addiction and tolerance. The patient understands and agrees to use these medications only as prescribed. Potential side effects of the medications include, but are not limited to, constipation, drowsiness, addiction, impaired judgment and risk of fatal overdose if not taken as prescribed. The patient was warned against driving while taking sedation medications.  Sharing medications is a felony. At this point in time, the patient is showing no signs of addiction, abuse, diversion or suicidal ideation.    The patient will follow-up in  12 weeks for medication prescription refill and reevaluation. The patient was advised to contact the office should their symptoms worsen in the interim. The patient was agreeable and verbalized an understanding.        History of Present Illness:    The patient is a 69 y.o. male last seen on 3/26/2024 who presents for a follow up office visit in regards to chronic low back pain secondary to lumbar postlaminectomy pain syndrome, lumbar spondylosis, lumbar degenerative disc disease with radiculopathy, idiopathic peripheral neuropathy, chronic multisite polyarthralgias secondary to osteoarthritis.  The patient currently reports a significant increase in widespread joint pain including bilateral hands, bilateral knees and feet.  He feels that his hands are particularly swollen especially in the morning and he has difficulty moving his fingers.  He has had an orthopedic hand eval, EMG studies etc. and he feels frustrated with the lack of specific answers/diagnoses.  His current pain level is a 7-8 out of 10 and is described as a constant burning, dull, aching, sharp, shooting type of pain.  He has intermittent numbness and paresthesias in both feet.  He is currently maintained on Norco 10/325 every 8 hours as well as meloxicam and he does not feel adequately controlled.  He has previously tried and failed to respond to gabapentin, Lyrica and duloxetine.    Pain Contract Signed: 1/12/24  Last Urine Drug Screen: 1/12/24    I have personally reviewed and/or updated the patient's past medical history, past surgical history, family history, social history, current medications, allergies, and vital signs today.       Review of Systems:    Review of Systems   Respiratory:  Positive for shortness of breath.    Cardiovascular:  Negative for chest pain.   Gastrointestinal:  Positive for constipation. Negative for diarrhea, nausea and vomiting.   Musculoskeletal:  Positive for joint swelling (Joint stiffness). Negative for  arthralgias, gait problem and myalgias.   Skin:  Negative for rash.   Neurological:  Positive for weakness. Negative for dizziness and seizures.   All other systems reviewed and are negative.        Past Medical History:   Diagnosis Date   • Acute low back pain     10 AUG 2016 RESOLVED   • Bunion    • Bursitis of hip    • Carpal tunnel syndrome    • Chronic bilateral low back pain with bilateral sciatica    • Chronic lumbar radiculopathy    • Chronic narcotic dependence (HCC)    • Chronic pain syndrome    • Constipation due to opioid therapy    • DDD (degenerative disc disease), lumbar    • Deep vein thrombosis of left upper extremity (HCC)     [id number]  RESOLVED   • Depression    • Diverticula of colon     [id number] RESOLVED   • DVT (deep venous thrombosis) ([facility])     LUE   • Edema    • History of staph infection    • Hyperglycemia    • Hyperlipidemia    • Insomnia    • Lateral epicondylitis    • Left inguinal hernia    • Methicillin resistant Staphylococcus aureus septicemia ([facility])    • Morbid obesity due to excess calories ([facility])    • Obesity    • Peripheral neuropathy    • Post laminectomy syndrome    • RLS (restless legs syndrome)    • Septicemia ([facility])     MRSA   • Umbilical hernia        Past Surgical History:   Procedure Laterality Date   • BACK SURGERY      laminectomy, hardware   • CARPAL TUNNEL RELEASE Left    • CERVICAL SPINE SURGERY     • COLONOSCOPY     • ELBOW SURGERY     • ELBOW SURGERY     • KNEE ARTHROSCOPY Right     knee   • KNEE SURGERY Right     ARTHOSCOPY KNEE   • NECK SURGERY      1997   • MA COLONOSCOPY FLX DX W/COLLJ SPEC WHEN PFRMD N/A 1/24/2017    Procedure: COLONOSCOPY;  Surgeon: Pierre Holcomb MD;  Location:  MAIN OR;  Service: General   • MA NDSC WRST SURG W/RLS TRANSVRS CARPL LIGM Right 1/2/2020    Procedure: RELEASE CARPAL TUNNEL ENDOSCOPIC, right;  Surgeon: Brayan Quinteros MD;  Location:  MAIN OR;  Service: Orthopedics   • MA RPR UMBILICAL HRNA 5 YRS/> REDUCIBLE N/A 10/5/2022     Procedure: REPAIR HERNIA UMBILICAL WITH MESH.;  Surgeon: Pierre Holcomb MD;  Location: [facility] MAIN OR;  Service: General   • LA TENDON SHEATH INCISION Right 9/9/2021    Procedure: Right long finger trigger finger release;  Surgeon: Brayan Quinteros MD;  Location:  MAIN OR;  Service: Orthopedics   • SHOULDER SURGERY     • SPINAL CORD STIMULATOR IMPLANT     • SYNOVECTOMY Right 9/9/2021    Procedure: Tenosynovectomy right hand long finger flexor digitorum superficialis and profundus tendon;  Surgeon: Brayan Quinteros MD;  Location: [facility] MAIN OR;  Service: Orthopedics       Family History   Problem Relation Age of Onset   • Heart disease Mother    • Cancer Mother         breast, stomach   • Aneurysm Mother    • Heart disease Father    • Cancer Father         skin, liver, colon DECESED AGE 62       Social History     Occupational History   • Not on file   Tobacco Use   • Smoking status: Never   • Smokeless tobacco: Never   Vaping Use   • Vaping status: Never Used   Substance and Sexual Activity   • Alcohol use: Yes     Comment: occasional- less than 1 drink per week   • Drug use: No     Comment: opiod use for chronic pain   • Sexual activity: Yes         Current Outpatient Medications:   •  albuterol (Ventolin HFA) 90 mcg/act inhaler, Inhale 2 puffs every 6 (six) hours as needed for wheezing, Disp: 18 g, Rfl: 5  •  bisoprolol-hydrochlorothiazide (ZIAC) 2.5-6.25 MG per tablet, Take 1 tablet by mouth daily, Disp: 90 tablet, Rfl: 0  •  eszopiclone (LUNESTA) 2 mg tablet, Take1 tablet (2 mg total) by mouth daily at bedtime as needed for sleep Take immediately before bedtime, Disp: 30 tablet, Rfl: 3  •  furosemide (LASIX) 20 mg tablet, Take 1 tablet (20 mg total) by mouth 2 (two) times a day, Disp: 60 tablet, Rfl: 3  •  meloxicam (Mobic) 15 mg tablet, Take 1 tablet (15 mg total) by mouth daily, Disp: 30 tablet, Rfl: 2  •  mirtazapine (REMERON) 15 mg tablet, Take 1 tablet (15 mg total) by mouth daily at bedtime, Disp: 30  "tablet, Rfl: 5  •  naproxen sodium (ALEVE) 220 MG tablet, Take 1 tablet (220 mg total) by mouth 2 (two) times a day with meals, Disp: 20 tablet, Rfl: 0  •  Omeprazole 20 MG TBDD, , Disp: , Rfl:   •  oxyCODONE-acetaminophen (PERCOCET)  mg per tablet, Take 1 tab q 8 hours prn For ongoing therapy DO NOT FILL BEFORE: 07/07/24, Disp: 90 tablet, Rfl: 0  •  oxyCODONE-acetaminophen (PERCOCET)  mg per tablet, Take 1 tab q 8 hours prn pain for ongoing therapy DO NOT FILL BEFORE: 08/05/24, Disp: 90 tablet, Rfl: 0  •  oxyCODONE-acetaminophen (PERCOCET)  mg per tablet, Take 1 tab q 8 hours prn for ongoing therapy DO NOT FILL BEFORE: 09/03/24, Disp: 90 tablet, Rfl: 0  •  rosuvastatin (CRESTOR) 10 MG tablet, Take 1 tablet (10 mg total) by mouth daily at bedtime, Disp: 90 tablet, Rfl: 3  •  Semaglutide-Weight Management (WEGOVY) 0.25 MG/0.5ML, Inject 0.5 mL (0.25 mg total) under the skin once a week, Disp: 2 mL, Rfl: 1  •  naloxone (NARCAN) 4 mg/0.1 mL nasal spray, Administer 1 spray into a nostril. If no response after 2-3 minutes, give another dose in the other nostril using a new spray., Disp: 1 each, Rfl: 1    Allergies   Allergen Reactions   • Fentanyl Other (See Comments)     \"Makes me Suicidal\"  Happened when dosage increased.   • Penicillins Other (See Comments)     As a child unknown   • Pravastatin Other (See Comments)     Reaction Date: 09Nov2011;   Muscle weakness   • Sulfa Antibiotics Other (See Comments)     As a child unknown   • Vytorin [Ezetimibe-Simvastatin] Myalgia     Reaction Date: 09Nov2011;        Physical Exam:    /80 (BP Location: Left arm, Patient Position: Sitting, Cuff Size: Large)   Pulse 74   Temp 99.4 °F (37.4 °C)   Ht 5' 10\" (1.778 m)   Wt 128 kg (283 lb)   BMI 40.61 kg/m²     Constitutional:normal, well developed, well nourished, alert, in no distress and non-toxic and no overt pain behavior. and obese  Eyes:anicteric  HEENT:grossly intact  Neck:supple, symmetric, " trachea midline and no masses   Pulmonary:even and unlabored  Cardiovascular:No edema or pitting edema present  Skin:Normal without rashes or lesions and well hydrated  Psychiatric:Mood and affect appropriate  Neurologic:Cranial Nerves II-XII grossly intact  Musculoskeletal: Gait is slow but stable without the use of assistive devices, Limited range of motion of the lumbar spine, edema of both hands, left greater than right with limited range of motion of the fingers      Imaging  No orders to display         Orders Placed This Encounter   Procedures   • RF Screen w/ Reflex to Titer   • Lyme Total AB W Reflex to IGM/IGG   • C-reactive protein

## 2024-06-20 ENCOUNTER — APPOINTMENT (OUTPATIENT)
Dept: LAB | Facility: HOSPITAL | Age: 69
End: 2024-06-20
Payer: COMMERCIAL

## 2024-06-20 DIAGNOSIS — M25.50 POLYARTHRALGIA: ICD-10-CM

## 2024-06-20 DIAGNOSIS — Z00.8 HEALTH EXAMINATION IN POPULATION SURVEY: ICD-10-CM

## 2024-06-20 LAB
CHOLEST SERPL-MCNC: 125 MG/DL
CRP SERPL QL: 4.9 MG/L
EST. AVERAGE GLUCOSE BLD GHB EST-MCNC: 131 MG/DL
HBA1C MFR BLD: 6.2 %
HDLC SERPL-MCNC: 38 MG/DL
LDLC SERPL CALC-MCNC: 62 MG/DL (ref 0–100)
NONHDLC SERPL-MCNC: 87 MG/DL
TRIGL SERPL-MCNC: 124 MG/DL

## 2024-06-20 PROCEDURE — 86618 LYME DISEASE ANTIBODY: CPT

## 2024-06-20 PROCEDURE — 86430 RHEUMATOID FACTOR TEST QUAL: CPT

## 2024-06-20 PROCEDURE — 86140 C-REACTIVE PROTEIN: CPT

## 2024-06-20 PROCEDURE — 83036 HEMOGLOBIN GLYCOSYLATED A1C: CPT

## 2024-06-20 PROCEDURE — 36415 COLL VENOUS BLD VENIPUNCTURE: CPT

## 2024-06-20 PROCEDURE — 80061 LIPID PANEL: CPT

## 2024-06-21 LAB
B BURGDOR IGG+IGM SER QL IA: NEGATIVE
RHEUMATOID FACT SER QL LA: NEGATIVE

## 2024-06-25 ENCOUNTER — TELEPHONE (OUTPATIENT)
Dept: PAIN MEDICINE | Facility: CLINIC | Age: 69
End: 2024-06-25

## 2024-06-25 NOTE — TELEPHONE ENCOUNTER
Attempted to contact pt, lmom to cb. Provided cb number and office hours.         NOTE:  Per MG, OK to fu with PCP re: elevated CRP. Likely due to arthritis.

## 2024-06-25 NOTE — TELEPHONE ENCOUNTER
S/w pt, advised of above. Pt verbalized understanding and appreciation. Per pt, he can hardly bend his fingers s/t swelling. Advised pt, ok to d/w his pcp as discussed. The writer will make MG aware and cb if there is anything different / additional. Pt verbalized understanding and appreciation.

## 2024-06-25 NOTE — TELEPHONE ENCOUNTER
----- Message from Helena Todd PA-C sent at 6/25/2024  8:35 AM EDT -----  Please let the patient know that his Lyme test and rheumatoid factor were both negative.  The CRP level, a marker for inflammation, was elevated.

## 2024-06-27 ENCOUNTER — OFFICE VISIT (OUTPATIENT)
Dept: FAMILY MEDICINE CLINIC | Facility: HOSPITAL | Age: 69
End: 2024-06-27
Payer: COMMERCIAL

## 2024-06-27 VITALS
TEMPERATURE: 99 F | WEIGHT: 282.6 LBS | HEIGHT: 70 IN | DIASTOLIC BLOOD PRESSURE: 80 MMHG | BODY MASS INDEX: 40.46 KG/M2 | SYSTOLIC BLOOD PRESSURE: 130 MMHG

## 2024-06-27 DIAGNOSIS — M79.642 LEFT HAND PAIN: Primary | ICD-10-CM

## 2024-06-27 PROCEDURE — 99214 OFFICE O/P EST MOD 30 MIN: CPT | Performed by: INTERNAL MEDICINE

## 2024-06-27 RX ORDER — SEMAGLUTIDE 0.5 MG/.5ML
INJECTION, SOLUTION SUBCUTANEOUS
Qty: 2 ML | Refills: 0 | Status: SHIPPED | OUTPATIENT
Start: 2024-06-27

## 2024-06-27 RX ORDER — PREDNISONE 10 MG/1
TABLET ORAL
Qty: 39 TABLET | Refills: 0 | Status: SHIPPED | OUTPATIENT
Start: 2024-06-27

## 2024-06-27 NOTE — PROGRESS NOTES
Ambulatory Visit  Name: Yayo Smith Jr.      : 1955      MRN: [id number]  Encounter Provider: Meera Aguillon DO  Encounter Date: 2024   Encounter department: [facility] [address]     Assessment & Plan   1. Left hand pain  Assessment & Plan:  Acute on chronic L hand pain/swelling - d/w pt that I cannot tell him the cause of his chronic L hand pain (has had BW as well as Xrays/EMG/venous duplex UE/Ortho eval and CT neck), I do believe this sounds more isolated to the hand and unlikely related to his known cervical disc dz, encouraged re-eval with hand surgeon, ice and elevation encouraged, Prednisone taper sent and SE of steroids reviewed  Orders:  -     predniSONE 10 mg tablet; 3 tab PO bid x 3 days then 2 tab PO bid x 3 days then 1 tab PO bid x 3 days then the 1 tab PO q day x 3 days then STOP  -     Ambulatory Referral to Orthopedic Surgery; Future       History of Present Illness     HPI Pt here for an acute visit - L hand pain      Pt here with 2 wks of acute on chronic L hand pain.  He cannot id any specific fall/injury/trauma/new activity triggering the pain.  Pain is palmar surface of L hand btw 3rd and 4th digit. He notes no pain at rest and notes the pain is only with making a fist.  He has chronic L hand swelling and notes that is not new or worse.  He notes no redness/warmth to the area and denies numbness/tingling. He cannot grasp objects d/t pain and swelling and inability to close hand but doesn't feel a specific sense of weakness he notes no specific neck pain.  Pt saw Pain Mgt (Helena Todd) 24.  CRP/RF/Lyme were done.  EMG  showed moderate CTS.  L Hand Xray  showed moderate arthritis. He saw Ortho/Dr. Quinteros 23.       Review of Systems   Constitutional:  Negative for chills and fever.   Respiratory:  Positive for shortness of breath. Negative for cough.         SOB at baseline   Cardiovascular:  Negative for chest pain and  "palpitations.   Gastrointestinal:  Negative for abdominal pain, blood in stool, diarrhea, nausea and vomiting.   Musculoskeletal:  Positive for arthralgias and joint swelling. Negative for neck pain.   Skin:  Negative for rash and wound.   Neurological:  Positive for weakness. Negative for dizziness, numbness and headaches.       Objective     /80   Temp 99 °F (37.2 °C)   Ht 5' 10\" (1.778 m)   Wt 128 kg (282 lb 9.6 oz)   BMI 40.55 kg/m²     Physical Exam  Vitals and nursing note reviewed.   Constitutional:       General: He is not in acute distress.     Appearance: He is well-developed. He is obese. He is not ill-appearing.   HENT:      Head: Normocephalic and atraumatic.      Right Ear: External ear normal.      Left Ear: External ear normal.   Eyes:      General:         Right eye: No discharge.         Left eye: No discharge.      Conjunctiva/sclera: Conjunctivae normal.   Neck:      Trachea: No tracheal deviation.   Cardiovascular:      Rate and Rhythm: Normal rate and regular rhythm.      Heart sounds: Normal heart sounds. No murmur heard.  Pulmonary:      Effort: Pulmonary effort is normal. No respiratory distress.      Breath sounds: Normal breath sounds. No wheezing, rhonchi or rales.   Musculoskeletal:         General: Swelling and tenderness present. No deformity or signs of injury.      Cervical back: Neck supple.      Comments: L hand: pain with palp of ventral surface of palm at base of 3rd digit, mild swelling at base of hand/wrist and 1st digit, decrease in hand  strength L hand   Skin:     General: Skin is warm and dry.      Coloration: Skin is not pale.      Findings: No rash.   Neurological:      Mental Status: He is alert.      Sensory: No sensory deficit.      Motor: Weakness present. No abnormal muscle tone.      Gait: Gait normal.      Comments: Weakness L  hand    Psychiatric:         Behavior: Behavior normal.         Thought Content: Thought content normal. "       Administrative Statements

## 2024-06-27 NOTE — ASSESSMENT & PLAN NOTE
Acute on chronic L hand pain/swelling - d/w pt that I cannot tell him the cause of his chronic L hand pain (has had BW as well as Xrays/EMG/venous duplex UE/Ortho eval and CT neck), I do believe this sounds more isolated to the hand and unlikely related to his known cervical disc dz, encouraged re-eval with hand surgeon, ice and elevation encouraged, Prednisone taper sent and SE of steroids reviewed

## 2024-07-29 ENCOUNTER — PREP FOR PROCEDURE (OUTPATIENT)
Dept: OBGYN CLINIC | Facility: CLINIC | Age: 69
End: 2024-07-29

## 2024-07-29 ENCOUNTER — OFFICE VISIT (OUTPATIENT)
Dept: OBGYN CLINIC | Facility: CLINIC | Age: 69
End: 2024-07-29
Payer: COMMERCIAL

## 2024-07-29 VITALS — BODY MASS INDEX: 39.94 KG/M2 | HEIGHT: 70 IN | WEIGHT: 279 LBS

## 2024-07-29 DIAGNOSIS — M65.332 TRIGGER MIDDLE FINGER OF LEFT HAND: Primary | ICD-10-CM

## 2024-07-29 DIAGNOSIS — M65.322 TRIGGER INDEX FINGER OF LEFT HAND: ICD-10-CM

## 2024-07-29 DIAGNOSIS — M65.342 TRIGGER RING FINGER OF LEFT HAND: ICD-10-CM

## 2024-07-29 PROCEDURE — 99214 OFFICE O/P EST MOD 30 MIN: CPT | Performed by: ORTHOPAEDIC SURGERY

## 2024-07-29 RX ORDER — LIDOCAINE HYDROCHLORIDE AND EPINEPHRINE 10; 10 MG/ML; UG/ML
20 INJECTION, SOLUTION INFILTRATION; PERINEURAL ONCE
OUTPATIENT
Start: 2024-07-29 | End: 2024-07-29

## 2024-07-29 NOTE — PROGRESS NOTES
ASSESSMENT/PLAN:    Assessment:   Left index, long and ring trigger fingers  Right index, long, ring and small trigger fingers (not bothersome at this time)     Plan:   Treatment options were discussed in the form of splints, CSI's or surgical intervention.   As Yayo has multiple trigger fingers, he wishes to proceed with surgery.  Left index, long and ring trigger finger releases were discussed at length including risks and benefits. Risks and benefits are listed below. Surgery will be performed under local. Informed electronic surgical consent was signed.   Follow up in the office 10-14 days post op for suture removal.      Follow Up:  After Surgery    To Do Next Visit:    and Sutures out    General Discussions:     Trigger FInger: The anatomy and physiology of trigger finger was discussed with the patient today in the office.  Edema and increased contact pressure within the flexor tendons at the A1 pulley can cause pain, crepitation, and limitation of function.  Treatment options include resting MP blocking splints to decrease edema, oral anti-inflammatory medications, home or formal therapy exercises, up to 2 steroid injections within the tendon sheath, or surgical release.  While majority of patients do respond to conservative treatment, up to 20% may require surgical release.     Operative Discussions:     Trigger Finger Release: The anatomy and physiology of trigger finger was discussed with the patient today in the office.  Edema and increased contact pressure within the flexor tendons at the A1 pulley can cause pain, crepitation, and limitation of function.  Treatment options include resting MP blocking splints to decrease edema, oral anti-inflammatory medications, home or formal therapy exercises, up to 2 steroid injections or surgical release.  While majority of patients do respond to conservative treatment, up to 20% may require surgical release.  The patient has elected release of the trigger finger.   The patient has elected to undergo a release of the A1 pulley (trigger finger).  A small incision will be made over the palmar aspect of the hand, the tendon sheath holding the flexor tendons will be released.  In the postoperative period, light activities are allowed immediately, driving is allowed when narcotic medication has stopped, and the incision may get wet after 2 days.  Heavy activities (lifting more than approximately 10 pounds) will be allowed after the follow up appointment in 1-2 weeks.  While the pain and discomfort within the wrist typically improves rapidly, some residual discomfort may be present for up to 6 weeks.  The nodule that is typically palpable in the palmar aspect of the hand will not be removed, as this would necessitate removal of a portion of the flexor tendon, however the catching, clicking, and locking should resolve.  Approximate success rate is 98%.The risks and benefits of the procedure were explained to the patient, which include, but are not limited to: Bleeding, infection, recurrence, pain, scar, damage to tendons, damage to nerves, and damage to blood vessels, need for future surgery and complications related to anesthesia.  If bony work is done, risks also include malunion and nonunion.  These risks, along with alternative conservative treatment options, and postoperative protocols were voiced back and understood by the patient.  All questions were answered to the patient's satisfaction.  The patient agrees to comply with a standard postoperative protocol, and is willing to proceed.  Education was provided via written and auditory forms.  There were no barriers to learning. Written handouts regarding wound care, incision and scar care, and general preoperative information, as well as risks and benefits were provided to the patient.  Diabetes:   The risks of diabetes were discussed with the patient. These risks include increased risk of infection, delayed wound healing,  increased swelling, increased stiffness, and increased scar formation.  While these risks are generally increased in all diabetics, keeping one's blood sugar under strict control can help decrease problems after surgery. If blood sugar levels are too high, or hemoglobin A1c levels are too high, surgery may be delayed or canceled.    _____________________________________________________  CHIEF COMPLAINT:  Chief Complaint   Patient presents with    Left Hand - Pain, Swelling     Left thumb CMC-Kenalog given by Tanner Cotto PA-C XR 9/12/23          SUBJECTIVE:  Yayo Tineochristopherfrancheska  is a 69 y.o. male who presents with Catching and Locking to the left long finger and ring finger and occasionally the index finger.  This started approx. 1.5 years ago. He notes his long finger is the worst. He is able to flick open the fingers when locking occurs. He notes a nodule to his long finger A1 pulley which did resolve after oral steroids. He does take pain medication for other issues, which also helps with the hand.   Radiation: Yes to the palm   Previous Treatments: None   Associated symptoms: Catching and Locking  Handedness: right      PAST MEDICAL HISTORY:  Past Medical History:   Diagnosis Date    Acute low back pain     10 AUG 2016 RESOLVED    Bunion     Bursitis of hip     Carpal tunnel syndrome     Chronic bilateral low back pain with bilateral sciatica     Chronic lumbar radiculopathy     Chronic narcotic dependence ([facility])     Chronic pain syndrome     Constipation due to opioid therapy     DDD (degenerative disc disease), lumbar     Deep vein thrombosis of left upper extremity ([facility])     [id number]  RESOLVED    Depression     Diverticula of colon     [id number] RESOLVED    DVT (deep venous thrombosis) ([facility])     LUE    Edema     History of staph infection     Hyperglycemia     Hyperlipidemia     Insomnia     Lateral epicondylitis     Left inguinal hernia     Methicillin resistant Staphylococcus aureus septicemia ([facility])      Morbid obesity due to excess calories (HCC)     Obesity     Peripheral neuropathy     Post laminectomy syndrome     RLS (restless legs syndrome)     Septicemia (HCC)     MRSA    Umbilical hernia        PAST SURGICAL HISTORY:  Past Surgical History:   Procedure Laterality Date    BACK SURGERY      laminectomy, hardware    CARPAL TUNNEL RELEASE Left     CERVICAL SPINE SURGERY      COLONOSCOPY      ELBOW SURGERY      ELBOW SURGERY      KNEE ARTHROSCOPY Right     knee    KNEE SURGERY Right     ARTHOSCOPY KNEE    NECK SURGERY      1997    OH COLONOSCOPY FLX DX W/COLLJ SPEC WHEN PFRMD N/A 1/24/2017    Procedure: COLONOSCOPY;  Surgeon: Pierre Holcomb MD;  [facility]: [facility];  Service: General    OH NDSC WRST SURG W/RLS TRANSVRS CARPL LIGM Right 1/2/2020    Procedure: RELEASE CARPAL TUNNEL ENDOSCOPIC, right;  Surgeon: Brayan Quinteros MD;  [facility];  Service: Orthopedics    OH RPR UMBILICAL HRNA 5 YRS/> REDUCIBLE N/A 10/5/2022    Procedure: REPAIR HERNIA UMBILICAL WITH MESH.;  Surgeon: Pierre Holcomb MD;  [facility];  Service: General    OH TENDON SHEATH INCISION Right 9/9/2021    Procedure: Right long finger trigger finger release;  Surgeon: Brayan Quinteros MD;  [facility];  Service: Orthopedics    SHOULDER SURGERY      SPINAL CORD STIMULATOR IMPLANT      SYNOVECTOMY Right 9/9/2021    Procedure: Tenosynovectomy right hand long finger flexor digitorum superficialis and profundus tendon;  Surgeon: Brayan Quinteros MD;  [facility];  Service: Orthopedics       FAMILY HISTORY:  Family History   Problem Relation Age of Onset    Heart disease Mother     Cancer Mother         breast, stomach    Aneurysm Mother     Heart disease Father     Cancer Father         skin, liver, colon DECESED AGE 62       SOCIAL HISTORY:  Social History     Tobacco Use    Smoking status: Never    Smokeless tobacco: Never   Vaping Use    Vaping status: Never Used   Substance Use Topics    Alcohol  use: Yes     Comment: occasional- less than 1 drink per week    Drug use: No     Comment: opiod use for chronic pain       MEDICATIONS:    Current Outpatient Medications:     albuterol (Ventolin HFA) 90 mcg/act inhaler, Inhale 2 puffs every 6 (six) hours as needed for wheezing, Disp: 18 g, Rfl: 5    bisoprolol-hydrochlorothiazide (ZIAC) 2.5-6.25 MG per tablet, Take 1 tablet by mouth daily, Disp: 90 tablet, Rfl: 0    furosemide (LASIX) 20 mg tablet, Take 1 tablet (20 mg total) by mouth 2 (two) times a day, Disp: 60 tablet, Rfl: 3    mirtazapine (REMERON) 15 mg tablet, Take 1 tablet (15 mg total) by mouth daily at bedtime, Disp: 30 tablet, Rfl: 5    naloxone (NARCAN) 4 mg/0.1 mL nasal spray, Administer 1 spray into a nostril. If no response after 2-3 minutes, give another dose in the other nostril using a new spray., Disp: 1 each, Rfl: 1    Omeprazole 20 MG TBDD, , Disp: , Rfl:     oxyCODONE-acetaminophen (PERCOCET)  mg per tablet, Take 1 tab q 8 hours prn For ongoing therapy DO NOT FILL BEFORE: 07/07/24, Disp: 90 tablet, Rfl: 0    rosuvastatin (CRESTOR) 10 MG tablet, Take 1 tablet (10 mg total) by mouth daily at bedtime, Disp: 90 tablet, Rfl: 3    Semaglutide-Weight Management (Wegovy) 0.5 MG/0.5ML, Inject 0.5 mg under the skin weekly, Disp: 2 mL, Rfl: 0    eszopiclone (LUNESTA) 2 mg tablet, Take1 tablet (2 mg total) by mouth daily at bedtime as needed for sleep Take immediately before bedtime, Disp: 30 tablet, Rfl: 3    meloxicam (Mobic) 15 mg tablet, Take 1 tablet (15 mg total) by mouth daily, Disp: 30 tablet, Rfl: 2    naproxen sodium (ALEVE) 220 MG tablet, Take 1 tablet (220 mg total) by mouth 2 (two) times a day with meals, Disp: 20 tablet, Rfl: 0    oxyCODONE-acetaminophen (PERCOCET)  mg per tablet, Take 1 tab q 8 hours prn pain for ongoing therapy DO NOT FILL BEFORE: 08/05/24, Disp: 90 tablet, Rfl: 0    oxyCODONE-acetaminophen (PERCOCET)  mg per tablet, Take 1 tab q 8 hours prn for ongoing  "therapy DO NOT FILL BEFORE: 09/03/24, Disp: 90 tablet, Rfl: 0    predniSONE 10 mg tablet, 3 tab PO bid x 3 days then 2 tab PO bid x 3 days then 1 tab PO bid x 3 days then the 1 tab PO q day x 3 days then STOP, Disp: 39 tablet, Rfl: 0    ALLERGIES:  Allergies   Allergen Reactions    Fentanyl Other (See Comments)     \"Makes me Suicidal\"  Happened when dosage increased.    Penicillins Other (See Comments)     As a child unknown    Pravastatin Other (See Comments)     Reaction Date: 09Nov2011;   Muscle weakness    Sulfa Antibiotics Other (See Comments)     As a child unknown    Vytorin [Ezetimibe-Simvastatin] Myalgia     Reaction Date: 09Nov2011;        REVIEW OF SYSTEMS:  Pertinent items are noted in HPI.  A comprehensive review of systems was negative.    LABS:  HgA1c:   Lab Results   Component Value Date    HGBA1C 6.2 (H) 06/20/2024     BMP:   Lab Results   Component Value Date    GLUCOSE 100 12/14/2015    CALCIUM 9.6 05/02/2024     12/14/2015    K 4.1 05/02/2024    CO2 31 05/02/2024     05/02/2024    BUN 16 05/02/2024    CREATININE 0.98 05/02/2024         _____________________________________________________  PHYSICAL EXAMINATION:  Vital signs: Ht 5' 10\" (1.778 m)   Wt 127 kg (279 lb)   BMI 40.03 kg/m²   General: well developed and well nourished, alert, oriented times 3, and appears comfortable  Psychiatric: Normal  HEENT: Trachea Midline, No torticollis  Cardiovascular: No discernable arrhythmia  Pulmonary: No wheezing or stridor  Abdomen: No rebound or guarding  Extremities: No peripheral edema  Skin: No masses, erythema, lacerations, fluctation, ulcerations  Neurovascular: Sensation Intact to the Median, Ulnar, Radial Nerve, Motor Intact to the Median, Ulnar, Radial Nerve, and Pulses Intact    MUSCULOSKELETAL EXAMINATION:    LEFT SIDE:  Finger:  Triggering  index finger, long finger, ring finger, Palpable clicking index finger, long finger, ring finger, and Nodules  index finger, long finger, ring " finger    Well healed right long trigger finger release incision, + clicking of the index, long, ring and and small fingers      _____________________________________________________  STUDIES REVIEWED:  No Studies to review      PROCEDURES PERFORMED:  Procedures  No Procedures performed today    Scribe Attestation      I,:  Cassi Olsen am acting as a scribe while in the presence of the attending physician.:       I,:  Brayan Quinteros MD personally performed the services described in this documentation    as scribed in my presence.:

## 2024-07-30 RX ORDER — SEMAGLUTIDE 0.5 MG/.5ML
INJECTION, SOLUTION SUBCUTANEOUS
Qty: 2 ML | Refills: 2 | Status: SHIPPED | OUTPATIENT
Start: 2024-07-30

## 2024-08-02 DIAGNOSIS — G47.00 PERSISTENT INSOMNIA: ICD-10-CM

## 2024-08-02 RX ORDER — MIRTAZAPINE 15 MG/1
15 TABLET, FILM COATED ORAL
Qty: 30 TABLET | Refills: 0 | Status: SHIPPED | OUTPATIENT
Start: 2024-08-02

## 2024-08-12 ENCOUNTER — OFFICE VISIT (OUTPATIENT)
Dept: FAMILY MEDICINE CLINIC | Facility: HOSPITAL | Age: 69
End: 2024-08-12
Payer: COMMERCIAL

## 2024-08-12 VITALS
WEIGHT: 276.4 LBS | DIASTOLIC BLOOD PRESSURE: 72 MMHG | HEART RATE: 82 BPM | HEIGHT: 70 IN | TEMPERATURE: 98.8 F | OXYGEN SATURATION: 98 % | SYSTOLIC BLOOD PRESSURE: 122 MMHG | BODY MASS INDEX: 39.57 KG/M2

## 2024-08-12 DIAGNOSIS — N40.1 BENIGN PROSTATIC HYPERPLASIA WITH INCOMPLETE BLADDER EMPTYING: ICD-10-CM

## 2024-08-12 DIAGNOSIS — R39.14 BENIGN PROSTATIC HYPERPLASIA WITH INCOMPLETE BLADDER EMPTYING: ICD-10-CM

## 2024-08-12 DIAGNOSIS — F11.20 CONTINUOUS OPIOID DEPENDENCE (HCC): ICD-10-CM

## 2024-08-12 DIAGNOSIS — I10 ESSENTIAL HYPERTENSION: Primary | ICD-10-CM

## 2024-08-12 DIAGNOSIS — G62.9 PERIPHERAL POLYNEUROPATHY: ICD-10-CM

## 2024-08-12 DIAGNOSIS — M96.1 LUMBAR POST-LAMINECTOMY SYNDROME: ICD-10-CM

## 2024-08-12 DIAGNOSIS — R73.9 HYPERGLYCEMIA: ICD-10-CM

## 2024-08-12 DIAGNOSIS — G47.00 PERSISTENT INSOMNIA: ICD-10-CM

## 2024-08-12 DIAGNOSIS — N32.81 OAB (OVERACTIVE BLADDER): ICD-10-CM

## 2024-08-12 DIAGNOSIS — R94.2 RESTRICTIVE PATTERN PRESENT ON PULMONARY FUNCTION TESTING: ICD-10-CM

## 2024-08-12 DIAGNOSIS — G25.81 RESTLESS LEGS SYNDROME: ICD-10-CM

## 2024-08-12 PROCEDURE — 99214 OFFICE O/P EST MOD 30 MIN: CPT | Performed by: FAMILY MEDICINE

## 2024-08-12 NOTE — PROGRESS NOTES
Ambulatory Visit  Name: Yayo Smith Jr.      : 1955      MRN: [id number]  Encounter Provider: Fidel Thomas MD  Encounter Date: 2024   Encounter department: [facility] [address]     Assessment & Plan   1. Essential hypertension  2. Peripheral polyneuropathy  3. OAB (overactive bladder)  4. Benign prostatic hyperplasia with incomplete bladder emptying  5. Continuous opioid dependence (HCC)  6. Lumbar post-laminectomy syndrome  7. Restless legs syndrome  8. Persistent insomnia  9. Restrictive pattern present on pulmonary function testing  10. Hyperglycemia         History of Present Illness     6 month follow up.    Still with breathing difficulty marline arms over his head  Stair climbing is also problem    Having trigger finger surgery coming up with Dr Quinteros    Doing well with Wegovy and is due next time for higher dose      Review of Systems  Past Medical History:   Diagnosis Date    Acute low back pain     10 AUG 2016 RESOLVED    Bunion     Bursitis of hip     Carpal tunnel syndrome     Chronic bilateral low back pain with bilateral sciatica     Chronic lumbar radiculopathy     Chronic narcotic dependence (HCC)     Chronic pain syndrome     Constipation due to opioid therapy     DDD (degenerative disc disease), lumbar     Deep vein thrombosis of left upper extremity (HCC)     [id number]  RESOLVED    Depression     Diverticula of colon     [id number] RESOLVED    DVT (deep venous thrombosis) (HCC)     LUE    Edema     History of staph infection     Hyperglycemia     Hyperlipidemia     Insomnia     Lateral epicondylitis     Left inguinal hernia     Methicillin resistant Staphylococcus aureus septicemia (HCC)     Morbid obesity due to excess calories (HCC)     Obesity     Peripheral neuropathy     Post laminectomy syndrome     RLS (restless legs syndrome)     Septicemia (HCC)     MRSA    Umbilical hernia      Past Surgical History:   Procedure Laterality Date    BACK  SURGERY      laminectomy, hardware    CARPAL TUNNEL RELEASE Left     CERVICAL SPINE SURGERY      COLONOSCOPY      ELBOW SURGERY      ELBOW SURGERY      KNEE ARTHROSCOPY Right     knee    KNEE SURGERY Right     ARTHOSCOPY KNEE    NECK SURGERY      1997    CA COLONOSCOPY FLX DX W/COLLJ SPEC WHEN PFRMD N/A 1/24/2017    Procedure: COLONOSCOPY;  Surgeon: Pierre Holcomb MD;  [facility];  Service: General    CA NDSC WRST SURG W/RLS TRANSVRS CARPL LIGM Right 1/2/2020    Procedure: RELEASE CARPAL TUNNEL ENDOSCOPIC, right;  Surgeon: Brayan Quinteros MD;  [facility];  Service: Orthopedics    CA RPR UMBILICAL HRNA 5 YRS/> REDUCIBLE N/A 10/5/2022    Procedure: REPAIR HERNIA UMBILICAL WITH MESH.;  Surgeon: Pierre Holcomb MD;  [facility];  Service: General    CA TENDON SHEATH INCISION Right 9/9/2021    Procedure: Right long finger trigger finger release;  Surgeon: Brayan Quinteros MD;  [facility];  Service: Orthopedics    CA TENDON SHEATH INCISION Left 9/3/2024    Procedure: Left thumb, long, small and ring trigger finger release;  Surgeon: Brayan Quinteros MD;  [facility];  Service: Orthopedics    CA TENDON SHEATH INCISION Left 9/3/2024    Procedure: Left thumb, long, small and ring trigger finger release;  Surgeon: Brayan Quinteros MD;  [facility];  Service: Orthopedics    CA TENDON SHEATH INCISION Left 9/3/2024    Procedure: Left thumb, long, small and ring trigger finger release;  Surgeon: Brayan Quinteros MD;  [facility];  Service: Orthopedics    CA TENDON SHEATH INCISION Left 9/3/2024    Procedure: Left thumb, long, small and ring trigger finger release;  Surgeon: Brayan Quinteros MD;  [facility];  Service: Orthopedics    SHOULDER SURGERY      SPINAL CORD STIMULATOR IMPLANT      SYNOVECTOMY Right 9/9/2021    Procedure: Tenosynovectomy right hand long finger flexor digitorum superficialis and profundus tendon;  Surgeon: Brayan  "MD Neli;  Location:  MAIN OR;  Service: Orthopedics     Family History   Problem Relation Age of Onset    Heart disease Mother     Cancer Mother         breast, stomach    Aneurysm Mother     Heart disease Father     Cancer Father         skin, liver, colon DECESED AGE 62     Social History     Tobacco Use    Smoking status: Never    Smokeless tobacco: Never   Vaping Use    Vaping status: Never Used   Substance and Sexual Activity    Alcohol use: Yes     Comment: occasional- less than 1 drink per week    Drug use: No     Comment: opiod use for chronic pain    Sexual activity: Yes     Current Outpatient Medications on File Prior to Visit   Medication Sig    mirtazapine (REMERON) 15 mg tablet Take 1 tablet (15 mg total) by mouth daily at bedtime (Patient not taking: Reported on 9/10/2024)    naproxen sodium (ALEVE) 220 MG tablet Take 1 tablet (220 mg total) by mouth 2 (two) times a day with meals    rosuvastatin (CRESTOR) 10 MG tablet Take 1 tablet (10 mg total) by mouth daily at bedtime    acetaminophen (TYLENOL) 650 mg CR tablet Take 1 tablet (650 mg total) by mouth every 8 (eight) hours as needed for mild pain    naloxone (NARCAN) 4 mg/0.1 mL nasal spray Administer 1 spray into a nostril. If no response after 2-3 minutes, give another dose in the other nostril using a new spray.     Allergies   Allergen Reactions    Fentanyl Other (See Comments)     \"Makes me Suicidal\"  Happened when dosage increased.    Penicillins Other (See Comments)     As a child unknown    Pravastatin Other (See Comments)     Reaction Date: 09Nov2011;   Muscle weakness    Sulfa Antibiotics Other (See Comments)     As a child unknown    Vytorin [Ezetimibe-Simvastatin] Myalgia     Reaction Date: 09Nov2011;      Immunization History   Administered Date(s) Administered    COVID-19 PFIZER VACCINE 0.3 ML IM 03/26/2021, 04/19/2021, 01/17/2022    INFLUENZA 11/05/2008, 12/18/2013, 12/10/2014, 12/16/2015, 09/06/2018    Influenza Quadrivalent 3 " "years and older 09/06/2018    Influenza Quadrivalent Preservative Free 3 years and older IM 12/10/2014, 12/16/2015    Influenza, high dose seasonal 0.7 mL 01/23/2023    Influenza, recombinant, quadrivalent,injectable, preservative free 12/30/2020    Influenza, seasonal, injectable 12/18/2013, 09/06/2018    Pneumococcal Conjugate Vaccine 20-valent (Pcv20), Polysace 07/11/2022    Tdap 06/11/2014    Tuberculin Skin Test-PPD Intradermal 04/30/2024     Objective     /72 (BP Location: Left arm, Patient Position: Sitting, Cuff Size: Large)   Pulse 82   Temp 98.8 °F (37.1 °C) (Tympanic)   Ht 5' 10\" (1.778 m)   Wt 125 kg (276 lb 6.4 oz)   SpO2 98%   BMI 39.66 kg/m²     Physical Exam  Vitals and nursing note reviewed.   Constitutional:       Appearance: He is obese.   Cardiovascular:      Rate and Rhythm: Normal rate and regular rhythm.      Pulses: Normal pulses.      Heart sounds: Normal heart sounds.   Pulmonary:      Breath sounds: Normal breath sounds.   Musculoskeletal:         General: Tenderness present.      Right lower leg: No edema.      Left lower leg: No edema.   Neurological:      Mental Status: He is oriented to person, place, and time.   Psychiatric:         Mood and Affect: Mood normal.         "

## 2024-08-19 RX ORDER — SEMAGLUTIDE 0.5 MG/.5ML
INJECTION, SOLUTION SUBCUTANEOUS
Qty: 2 ML | Refills: 2 | Status: SHIPPED | OUTPATIENT
Start: 2024-08-19 | End: 2024-08-20 | Stop reason: DRUGHIGH

## 2024-08-20 RX ORDER — SEMAGLUTIDE 1 MG/.5ML
INJECTION, SOLUTION SUBCUTANEOUS
Qty: 2 ML | Refills: 0 | Status: SHIPPED | OUTPATIENT
Start: 2024-08-20

## 2024-09-03 ENCOUNTER — HOSPITAL ENCOUNTER (OUTPATIENT)
Age: 69
Setting detail: OUTPATIENT SURGERY
Discharge: HOME | End: 2024-09-03
Attending: ORTHOPAEDIC SURGERY | Admitting: ORTHOPAEDIC SURGERY
Payer: COMMERCIAL

## 2024-09-03 VITALS
WEIGHT: 274 LBS | BODY MASS INDEX: 39.22 KG/M2 | OXYGEN SATURATION: 95 % | HEIGHT: 70 IN | TEMPERATURE: 98.4 F | HEART RATE: 80 BPM | DIASTOLIC BLOOD PRESSURE: 80 MMHG | SYSTOLIC BLOOD PRESSURE: 137 MMHG | RESPIRATION RATE: 17 BRPM

## 2024-09-03 DIAGNOSIS — M65.332 TRIGGER MIDDLE FINGER OF LEFT HAND: ICD-10-CM

## 2024-09-03 DIAGNOSIS — M65.352 TRIGGER FINGER, LEFT LITTLE FINGER: ICD-10-CM

## 2024-09-03 DIAGNOSIS — M65.322 TRIGGER INDEX FINGER OF LEFT HAND: ICD-10-CM

## 2024-09-03 DIAGNOSIS — M65.312 TRIGGER FINGER OF LEFT THUMB: Primary | ICD-10-CM

## 2024-09-03 DIAGNOSIS — M65.342 TRIGGER FINGER, LEFT RING FINGER: ICD-10-CM

## 2024-09-03 PROCEDURE — 26055 INCISE FINGER TENDON SHEATH: CPT | Performed by: ORTHOPAEDIC SURGERY

## 2024-09-03 PROCEDURE — NC001 PR NO CHARGE: Performed by: ORTHOPAEDIC SURGERY

## 2024-09-03 RX ORDER — TRAMADOL HYDROCHLORIDE 50 MG/1
50 TABLET ORAL EVERY 6 HOURS PRN
Qty: 5 TABLET | Refills: 0 | Status: SHIPPED | OUTPATIENT
Start: 2024-09-03 | End: 2024-09-10

## 2024-09-03 RX ORDER — MAGNESIUM HYDROXIDE 1200 MG/15ML
LIQUID ORAL AS NEEDED
Status: DISCONTINUED | OUTPATIENT
Start: 2024-09-03 | End: 2024-09-03 | Stop reason: HOSPADM

## 2024-09-03 RX ORDER — ONDANSETRON 2 MG/ML
4 INJECTION INTRAMUSCULAR; INTRAVENOUS EVERY 6 HOURS PRN
Status: CANCELLED | OUTPATIENT
Start: 2024-09-03

## 2024-09-03 RX ORDER — LIDOCAINE HYDROCHLORIDE AND EPINEPHRINE 10; 10 MG/ML; UG/ML
20 INJECTION, SOLUTION INFILTRATION; PERINEURAL ONCE
Status: DISCONTINUED | OUTPATIENT
Start: 2024-09-03 | End: 2024-09-03 | Stop reason: HOSPADM

## 2024-09-03 RX ORDER — COVID-19 ANTIGEN TEST
220 KIT MISCELLANEOUS 2 TIMES DAILY
Qty: 60 CAPSULE | Refills: 0 | Status: SHIPPED | OUTPATIENT
Start: 2024-09-03 | End: 2024-09-10

## 2024-09-03 RX ORDER — SENNOSIDES 8.6 MG
650 CAPSULE ORAL EVERY 8 HOURS PRN
Qty: 30 TABLET | Refills: 0 | Status: SHIPPED | OUTPATIENT
Start: 2024-09-03

## 2024-09-03 RX ORDER — ACETAMINOPHEN 325 MG/1
650 TABLET ORAL EVERY 6 HOURS PRN
Status: CANCELLED | OUTPATIENT
Start: 2024-09-03

## 2024-09-03 RX ORDER — TRAMADOL HYDROCHLORIDE 50 MG/1
50 TABLET ORAL EVERY 6 HOURS PRN
Status: CANCELLED | OUTPATIENT
Start: 2024-09-03

## 2024-09-03 RX ADMIN — LIDOCAINE HYDROCHLORIDE,EPINEPHRINE BITARTRATE: 10; .01 INJECTION, SOLUTION INFILTRATION; PERINEURAL at 08:04

## 2024-09-03 NOTE — OP NOTE
OPERATIVE REPORT  PATIENT NAME: Yayo Smith Jr.  :  1955  MRN: [id number]  Pt Location: [facility]    SURGERY DATE: 24    Surgeons and Role:     * Brayan Quinteros MD - Primary     * Reagan Hale PA-C - Assisting     * Caty Maza MD - Assisting    Pre-Op Diagnosis:  Trigger middle finger of left hand [M65.332]  Trigger ring finger of left hand [M65.342]  Trigger small finger of the left hand  Trigger thumb of the left hand    Post-Op Diagnosis:  Trigger middle finger of left hand [M65.332]  Trigger ring finger of left hand [M65.342]  Trigger small finger of the left hand  Trigger thumb of the left hand    Procedure(s) (LRB):  Left thumb, long, small and ring trigger finger release (Left)    Specimen(s):  No specimens collected during this procedure.    Estimated Blood Loss:   Minimal      Anesthesia Type:   Local    Operative Indications:  The patient has a history of trigger fingers of the left thumb, long, ring, and small finger that were recalcitrant to conservative management.  The decision was made to bring the patient to the operating room for trigger finger releases left thumb, long, ring, and small finger.  Risks of the procedure were explained which include, but are not limited to bleeding; infection; damage to nerves, arteries,veins, tendons; scar; pain; need for reoperation; failure to give desired result; and risks of anaesthesia.  All questions were answered to satisfaction and they were willing to proceed.         Operative Findings:  Surgical release of trigger fingers left thumb, long, ring, and small finger.  Fraying noted to the flexor pollicis longus tendon of the thumb approximately 5%    Complications:   None    Procedure and Technique:  After the patient, site, and procedure were identified, the patient was brought into the operating room in a supine position.  Local anaesthesia was adminstered in the preoperative holding area.  A tourniquet was not used.   The  left upper extremity was then prepped and drapped in a normal, sterile, orthopedic fashion.    After the patient, site, and procedure were once again identified, attention was turned to the left small finger.  An incision was made over the flexor tendon sheath at the level of the A1 pulley.  Dissection was carried out in-line with the flexor tendon sheath and the radial and ulnar digital artery and nerve were protected.  The A1 pulley was identified at the base of the incision.  Under direct visualization, the A1 pulley was divided along the midline in its entirety with care taken to avoid injury to the underlying tendon.  The tendons were examined to ensure that no further catching, popping, clicking or locking occurred with motion of the finger.     After the patient, site, and procedure were once again identified, attention was turned to the left ring finger.  An incision was made over the flexor tendon sheath at the level of the A1 pulley.  Dissection was carried out in-line with the flexor tendon sheath and the radial and ulnar digital artery and nerve were protected.  The A1 pulley was identified at the base of the incision.  Under direct visualization, the A1 pulley was divided along the midline in its entirety with care taken to avoid injury to the underlying tendon.  The tendons were examined to ensure that no further catching, popping, clicking or locking occurred with motion of the finger.     After the patient, site, and procedure were once again identified, attention was turned to the left long finger.  An incision was made over the flexor tendon sheath at the level of the A1 pulley.  Dissection was carried out in-line with the flexor tendon sheath and the radial and ulnar digital artery and nerve were protected.  The A1 pulley was identified at the base of the incision.  Under direct visualization, the A1 pulley was divided along the midline in its entirety with care taken to avoid injury to the  underlying tendon.  The tendons were examined to ensure that no further catching, popping, clicking or locking occurred with motion of the finger.     After the patient, site, and procedure were once again identified, attention was turned to the left thumb.  An incision was made over the flexor tendon sheath at the level of the A1 pulley.  Dissection was carried out in-line with the flexor tendon sheath and the radial and ulnar digital artery and nerve were protected.  The A1 pulley was identified at the base of the incision.  Under direct visualization, the A1 pulley was divided along the midline in its entirety with care taken to avoid injury to the underlying tendon.  The tendons were examined to ensure that no further catching, popping, clicking or locking occurred with motion of the finger.  Fraying of the flexor pollicis longus tendon was noted of approximately 5%.      At the completion of the procedure, hemostasis was obtained with cautery and direct pressure.  The wounds were copiously irrigated with sterile solution.  The wounds were closed with Prolene.  Sterile dressings were applied, including Xeroform, gauze, tweeners, webril, ACE.  Please note, all sponge, needle, and instrument counts were correct prior to closure.  Loupe magnification was utilized.  The patient tolerated the procedure well.     I was present for all critical portions of the procedure.    Patient Disposition:  PACU  and hemodynamically stable    SIGNATURE: Brayan Quinteros MD  DATE: 09/03/24  TIME: 9:09 AM

## 2024-09-03 NOTE — H&P
Diagnosis: left thumb, long, ring and small trigger finger    Procedure: left thumb, long, ring and small trigger finger release under local anesthesia

## 2024-09-10 ENCOUNTER — OFFICE VISIT (OUTPATIENT)
Dept: PAIN MEDICINE | Facility: CLINIC | Age: 69
End: 2024-09-10
Payer: COMMERCIAL

## 2024-09-10 VITALS
HEART RATE: 88 BPM | HEIGHT: 70 IN | WEIGHT: 277 LBS | SYSTOLIC BLOOD PRESSURE: 134 MMHG | BODY MASS INDEX: 39.65 KG/M2 | TEMPERATURE: 98.2 F | DIASTOLIC BLOOD PRESSURE: 80 MMHG

## 2024-09-10 DIAGNOSIS — M51.36 LUMBAR DEGENERATIVE DISC DISEASE: ICD-10-CM

## 2024-09-10 DIAGNOSIS — G89.4 CHRONIC PAIN SYNDROME: Primary | ICD-10-CM

## 2024-09-10 DIAGNOSIS — M54.16 LUMBAR RADICULOPATHY: ICD-10-CM

## 2024-09-10 DIAGNOSIS — G60.9 IDIOPATHIC PERIPHERAL NEUROPATHY: ICD-10-CM

## 2024-09-10 DIAGNOSIS — M25.50 POLYARTHRALGIA: ICD-10-CM

## 2024-09-10 DIAGNOSIS — M96.1 LUMBAR POST-LAMINECTOMY SYNDROME: ICD-10-CM

## 2024-09-10 DIAGNOSIS — F11.20 UNCOMPLICATED OPIOID DEPENDENCE (HCC): ICD-10-CM

## 2024-09-10 DIAGNOSIS — Z79.891 LONG-TERM CURRENT USE OF OPIATE ANALGESIC: ICD-10-CM

## 2024-09-10 PROCEDURE — 99214 OFFICE O/P EST MOD 30 MIN: CPT | Performed by: PHYSICIAN ASSISTANT

## 2024-09-10 RX ORDER — OXYCODONE AND ACETAMINOPHEN 10; 325 MG/1; MG/1
1 TABLET ORAL EVERY 8 HOURS PRN
Qty: 90 TABLET | Refills: 0 | Status: SHIPPED | OUTPATIENT
Start: 2024-09-10

## 2024-09-10 RX ORDER — CALCIUM CARBONATE 160(400)MG
TABLET,CHEWABLE ORAL DAILY
Qty: 1 EACH | Refills: 0 | Status: SHIPPED | OUTPATIENT
Start: 2024-09-10 | End: 2024-09-17

## 2024-09-10 NOTE — PROGRESS NOTES
Assessment:  1. Lumbar post-laminectomy syndrome    2. Lumbar degenerative disc disease    3. Lumbar radiculopathy    4. Polyarthralgia    5. Idiopathic peripheral neuropathy    6. Chronic pain syndrome    7. Long-term current use of opiate analgesic    8. Uncomplicated opioid dependence (HCC)        Plan:  While the patient was in the office today, I did have a thorough conversation regarding their chronic pain syndrome, medication management, and treatment plan options.    The patient remains clinically stable and moderately controlled on the current medication regimen without any side effects or issues.  Patient reports 40 to 50% reduction in pain with the Percocet 10/325 every 8 hours as needed.  On today's visit I have electronically sent the medication to his pharmacy for the next 3 months with the appropriate fill dates.    I have prescribed the patient a rollator as he is experiencing increasing ambulatory dysfunction likely more related to his peripheral neuropathy but also in addition to his chronic back condition.    [place] Prescription Drug Monitoring Program report was reviewed and was appropriate     A urine drug screen was collected at today's office visit as part of our medication management protocol. The point of care testing results were appropriate for what was being prescribed. The specimen will be sent for confirmatory testing. The drug screen is medically necessary because the patient is either dependent on opioid medication or is being considered for opioid medication therapy and the results could impact ongoing or future treatment. The drug screen is to evaluate for the presences or absence of prescribed, non-prescribed, and/or illicit drugs/substances.    There are risks associated with opioid medications, including dependence, addiction and tolerance. The patient understands and agrees to use these medications only as prescribed. Potential side effects of the medications include, but  are not limited to, constipation, drowsiness, addiction, impaired judgment and risk of fatal overdose if not taken as prescribed. The patient was warned against driving while taking sedation medications.  Sharing medications is a felony. At this point in time, the patient is showing no signs of addiction, abuse, diversion or suicidal ideation.    The patient will follow-up in 12 weeks for medication prescription refill and reevaluation. The patient was advised to contact the office should their symptoms worsen in the interim. The patient was agreeable and verbalized an understanding.        History of Present Illness:    The patient is a 69 y.o. male last seen on 6/18/2024 who presents for a follow up office visit in regards to chronic low back pain secondary to lumbar spondylosis, lumbar degenerative disc disease, lumbar postlaminectomy pain syndrome, multisite polyarthralgias, peripheral neuropathy.  The patient currently reports a pain level of 6 out of 10 and described as a constant dull, aching, sharp, throbbing and shooting type of pain.  He has intermittent numbness and paresthesias in the upper and lower extremities.  Patient feels that he is having increasing difficulty with his gait and inquiring about a rollator or a walker for stability.  Patient recently had trigger finger release surgery on his left hand and has recovered well from that.    Current pain medications includes: Percocet 10/325 3 times daily as needed .  The patient reports that this regimen is providing 40% pain relief.  The patient is reporting no side effects from this pain medication regimen.    Pain Contract Signed: 1/12/2024  Last Urine Drug Screen: 9/10/2024    I have personally reviewed and/or updated the patient's past medical history, past surgical history, family history, social history, current medications, allergies, and vital signs today.       Review of Systems:    Review of Systems   Respiratory:  Positive for shortness of  breath.    Cardiovascular:  Negative for chest pain.   Gastrointestinal:  Positive for constipation. Negative for diarrhea, nausea and vomiting.   Musculoskeletal:  Positive for gait problem and joint swelling (Joint stiffness). Negative for arthralgias and myalgias.   Skin:  Negative for rash.   Neurological:  Positive for weakness. Negative for dizziness and seizures.   All other systems reviewed and are negative.        Past Medical History:   Diagnosis Date    Acute low back pain     10 AUG 2016 RESOLVED    Bunion     Bursitis of hip     Carpal tunnel syndrome     Chronic bilateral low back pain with bilateral sciatica     Chronic lumbar radiculopathy     Chronic narcotic dependence (HCC)     Chronic pain syndrome     Constipation due to opioid therapy     DDD (degenerative disc disease), lumbar     Deep vein thrombosis of left upper extremity (HCC)     [id number]  RESOLVED    Depression     Diverticula of colon     [id number] RESOLVED    DVT (deep venous thrombosis) (HCC)     LUE    Edema     History of staph infection     Hyperglycemia     Hyperlipidemia     Insomnia     Lateral epicondylitis     Left inguinal hernia     Methicillin resistant Staphylococcus aureus septicemia (HCC)     Morbid obesity due to excess calories (HCC)     Obesity     Peripheral neuropathy     Post laminectomy syndrome     RLS (restless legs syndrome)     Septicemia (HCC)     MRSA    Umbilical hernia        Past Surgical History:   Procedure Laterality Date    BACK SURGERY      laminectomy, hardware    CARPAL TUNNEL RELEASE Left     CERVICAL SPINE SURGERY      COLONOSCOPY      ELBOW SURGERY      ELBOW SURGERY      KNEE ARTHROSCOPY Right     knee    KNEE SURGERY Right     ARTHOSCOPY KNEE    NECK SURGERY      1997    MT COLONOSCOPY FLX DX W/COLLJ SPEC WHEN PFRMD N/A 1/24/2017    Procedure: COLONOSCOPY;  Surgeon: Pierre Holcomb MD;  Location:  MAIN OR;  Service: General    MT NDSC WRST SURG W/RLS TRANSVRS CARPL LIGM Right 1/2/2020     Procedure: RELEASE CARPAL TUNNEL ENDOSCOPIC, right;  Surgeon: Brayan Quinteros MD;  Location: [facility];  Service: Orthopedics    MS RPR UMBILICAL HRNA 5 YRS/> REDUCIBLE N/A 10/5/2022    Procedure: REPAIR HERNIA UMBILICAL WITH MESH.;  Surgeon: Pierre Holcomb MD;  Location: [facility];  Service: General    MS TENDON SHEATH INCISION Right 9/9/2021    Procedure: Right long finger trigger finger release;  Surgeon: Brayan Quinteros MD;  Location: [facility];  Service: Orthopedics    MS TENDON SHEATH INCISION Left 9/3/2024    Procedure: Left thumb, long, small and ring trigger finger release;  Surgeon: Brayan Quinteros MD;  Location: [facility];  Service: Orthopedics    MS TENDON SHEATH INCISION Left 9/3/2024    Procedure: Left thumb, long, small and ring trigger finger release;  Surgeon: Brayan Quinteros MD;  Location[facility];  Service: Orthopedics    MS TENDON SHEATH INCISION Left 9/3/2024    Procedure: Left thumb, long, small and ring trigger finger release;  Surgeon: Brayan Quinteros MD;  [facility];  Service: Orthopedics    MS TENDON SHEATH INCISION Left 9/3/2024    Procedure: Left thumb, long, small and ring trigger finger release;  Surgeon: Brayan Quinteros MD;  Location[facility];  Service: Orthopedics    SHOULDER SURGERY      SPINAL CORD STIMULATOR IMPLANT      SYNOVECTOMY Right 9/9/2021    Procedure: Tenosynovectomy right hand long finger flexor digitorum superficialis and profundus tendon;  Surgeon: Brayan Quinteros MD;  Location: [facility];  Service: Orthopedics       Family History   Problem Relation Age of Onset    Heart disease Mother     Cancer Mother         breast, stomach    Aneurysm Mother     Heart disease Father     Cancer Father         skin, liver, colon DECESED AGE 62       Social History     Occupational History    Not on file   Tobacco Use    Smoking status: Never    Smokeless tobacco: Never   Vaping Use    Vaping status: Never Used   Substance and Sexual  Activity    Alcohol use: Yes     Comment: occasional- less than 1 drink per week    Drug use: No     Comment: opiod use for chronic pain    Sexual activity: Yes         Current Outpatient Medications:     acetaminophen (TYLENOL) 650 mg CR tablet, Take 1 tablet (650 mg total) by mouth every 8 (eight) hours as needed for mild pain, Disp: 30 tablet, Rfl: 0    bisoprolol-hydrochlorothiazide (ZIAC) 2.5-6.25 MG per tablet, Take 1 tablet by mouth daily, Disp: 90 tablet, Rfl: 0    Misc. Devices (Rollator Ultra-Light) MISC, Use daily, Disp: 1 each, Rfl: 0    naproxen sodium (ALEVE) 220 MG tablet, Take 1 tablet (220 mg total) by mouth 2 (two) times a day with meals, Disp: 20 tablet, Rfl: 0    oxyCODONE-acetaminophen (PERCOCET)  mg per tablet, Take 1 tablet by mouth every 8 (eight) hours as needed for moderate pain For ongoing therapy DO NOT FILL BEFORE: 10/01/24 Max Daily Amount: 3 tablets, Disp: 90 tablet, Rfl: 0    oxyCODONE-acetaminophen (PERCOCET)  mg per tablet, Take 1 tablet by mouth every 8 (eight) hours as needed for moderate pain for ongoing therapy DO NOT FILL BEFORE: 10/30/24 Max Daily Amount: 3 tablets, Disp: 90 tablet, Rfl: 0    oxyCODONE-acetaminophen (PERCOCET)  mg per tablet, Take 1 tablet by mouth every 8 (eight) hours as needed for moderate pain for ongoing therapy DO NOT FILL BEFORE: 11/28/24 Max Daily Amount: 3 tablets, Disp: 90 tablet, Rfl: 0    rosuvastatin (CRESTOR) 10 MG tablet, Take 1 tablet (10 mg total) by mouth daily at bedtime, Disp: 90 tablet, Rfl: 3    Semaglutide-Weight Management (Wegovy) 1 MG/0.5ML, Inject 1 mg under the skin weekly, Disp: 2 mL, Rfl: 0    mirtazapine (REMERON) 15 mg tablet, Take 1 tablet (15 mg total) by mouth daily at bedtime (Patient not taking: Reported on 9/10/2024), Disp: 30 tablet, Rfl: 0    naloxone (NARCAN) 4 mg/0.1 mL nasal spray, Administer 1 spray into a nostril. If no response after 2-3 minutes, give another dose in the other nostril using a new  "spray., Disp: 1 each, Rfl: 1    Allergies   Allergen Reactions    Fentanyl Other (See Comments)     \"Makes me Suicidal\"  Happened when dosage increased.    Penicillins Other (See Comments)     As a child unknown    Pravastatin Other (See Comments)     Reaction Date: 09Nov2011;   Muscle weakness    Sulfa Antibiotics Other (See Comments)     As a child unknown    Vytorin [Ezetimibe-Simvastatin] Myalgia     Reaction Date: 09Nov2011;        Physical Exam:    /80 (BP Location: Left arm, Patient Position: Sitting, Cuff Size: Large)   Pulse 88   Temp 98.2 °F (36.8 °C)   Ht 5' 10\" (1.778 m)   Wt 126 kg (277 lb)   BMI 39.75 kg/m²     Constitutional:normal, well developed, well nourished, alert, in no distress and non-toxic and no overt pain behavior. and obese  Eyes:anicteric  HEENT:grossly intact  Neck:supple, symmetric, trachea midline and no masses   Pulmonary:even and unlabored  Cardiovascular:No edema or pitting edema present  Skin:Normal without rashes or lesions and well hydrated  Psychiatric:Mood and affect appropriate  Neurologic:Cranial Nerves II-XII grossly intact  Musculoskeletal: Gait is slow but stable without the use of assistive devices      Imaging  No orders to display         No orders of the defined types were placed in this encounter.      "

## 2024-09-11 DIAGNOSIS — I10 ESSENTIAL HYPERTENSION: ICD-10-CM

## 2024-09-11 RX ORDER — BISOPROLOL FUMARATE AND HYDROCHLOROTHIAZIDE 2.5; 6.25 MG/1; MG/1
1 TABLET ORAL DAILY
Qty: 90 TABLET | Refills: 1 | Status: SHIPPED | OUTPATIENT
Start: 2024-09-11 | End: 2024-09-11 | Stop reason: SDUPTHER

## 2024-09-11 RX ORDER — BISOPROLOL FUMARATE AND HYDROCHLOROTHIAZIDE 2.5; 6.25 MG/1; MG/1
1 TABLET ORAL DAILY
Qty: 90 TABLET | Refills: 1 | Status: SHIPPED | OUTPATIENT
Start: 2024-09-11

## 2024-09-12 RX ORDER — SEMAGLUTIDE 1 MG/.5ML
INJECTION, SOLUTION SUBCUTANEOUS
Qty: 2 ML | Refills: 2 | Status: SHIPPED | OUTPATIENT
Start: 2024-09-12 | End: 2024-09-17 | Stop reason: SDUPTHER

## 2024-09-12 NOTE — TELEPHONE ENCOUNTER
Patient will be due for wegovy refill 9/20.    Do you want to keep him on the 1mg or bump up to 1.7mg.

## 2024-09-13 LAB

## 2024-09-16 ENCOUNTER — OFFICE VISIT (OUTPATIENT)
Dept: OBGYN CLINIC | Facility: CLINIC | Age: 69
End: 2024-09-16

## 2024-09-16 ENCOUNTER — PREP FOR PROCEDURE (OUTPATIENT)
Dept: OBGYN CLINIC | Facility: CLINIC | Age: 69
End: 2024-09-16

## 2024-09-16 VITALS — WEIGHT: 277 LBS | HEIGHT: 70 IN | BODY MASS INDEX: 39.65 KG/M2

## 2024-09-16 DIAGNOSIS — M65.311 TRIGGER THUMB OF RIGHT HAND: Primary | ICD-10-CM

## 2024-09-16 DIAGNOSIS — M65.332 TRIGGER MIDDLE FINGER OF LEFT HAND: ICD-10-CM

## 2024-09-16 DIAGNOSIS — M65.342 TRIGGER RING FINGER OF LEFT HAND: ICD-10-CM

## 2024-09-16 PROCEDURE — 99024 POSTOP FOLLOW-UP VISIT: CPT | Performed by: ORTHOPAEDIC SURGERY

## 2024-09-16 RX ORDER — LIDOCAINE HYDROCHLORIDE AND EPINEPHRINE 10; 10 MG/ML; UG/ML
20 INJECTION, SOLUTION INFILTRATION; PERINEURAL ONCE
OUTPATIENT
Start: 2024-09-16 | End: 2024-09-16

## 2024-09-16 NOTE — PROGRESS NOTES
ASSESSMENT/PLAN:    Assessment:   S/p left thumb, long, ring and small trigger finger release performed 9/3/2024    Right trigger thumb    Plan:   Patient was advised to avoid soaking for 2 to 3 days to allow the incision to finish healing  They was advised to use heat massage as demonstrated in the office  Today the patient has some clicking and locking into the right thumb.  He states he would like to proceed with surgical intervention.  Detailed consent was obtained for right trigger thumb release under local anesthesia  They will follow-up with us after surgery for suture removal    Follow Up:  After Surgery    To Do Next Visit:    and Sutures out        Operative Discussions:  Trigger Finger Release: The anatomy and physiology of trigger finger was discussed with the patient today in the office.  Edema and increased contact pressure within the flexor tendons at the A1 pulley can cause pain, crepitation, and limitation of function.  Treatment options include resting MP blocking splints to decrease edema, oral anti-inflammatory medications, home or formal therapy exercises, up to 2 steroid injections or surgical release.  While majority of patients do respond to conservative treatment, up to 20% may require surgical release.  The patient has elected release of the trigger finger.  The patient has elected to undergo a release of the A1 pulley (trigger finger).  A small incision will be made over the palmar aspect of the hand, the tendon sheath holding the flexor tendons will be released.  In the postoperative period, light activities are allowed immediately, driving is allowed when narcotic medication has stopped, and the incision may get wet after 2 days.  Heavy activities (lifting more than approximately 10 pounds) will be allowed after the follow up appointment in 1-2 weeks.  While the pain and discomfort within the wrist typically improves rapidly, some residual discomfort may be present for up to 6 weeks.   The nodule that is typically palpable in the palmar aspect of the hand will not be removed, as this would necessitate removal of a portion of the flexor tendon, however the catching, clicking, and locking should resolve.  Approximate success rate is 98%.The risks and benefits of the procedure were explained to the patient, which include, but are not limited to: Bleeding, infection, recurrence, pain, scar, damage to tendons, damage to nerves, and damage to blood vessels, need for future surgery and complications related to anesthesia.  If bony work is done, risks also include malunion and nonunion.  These risks, along with alternative conservative treatment options, and postoperative protocols were voiced back and understood by the patient.  All questions were answered to the patient's satisfaction.  The patient agrees to comply with a standard postoperative protocol, and is willing to proceed.  Education was provided via written and auditory forms.  There were no barriers to learning. Written handouts regarding wound care, incision and scar care, and general preoperative information, as well as risks and benefits were provided to the patient.    _____________________________________________________  CHIEF COMPLAINT:  Chief Complaint   Patient presents with    Left Hand - Post-op     thumb, long, small and ring TFR-  9/3/24         SUBJECTIVE:  Yayo BANEGAS Luis Pastor is a 69 y.o. male who presents for follow-up regarding s/p left thumb, long, ring and small trigger finger release performed 9/3/24.  He states overall he is doing well.  He does note some discomfort at the PIP joints.  He states he is starting to have clicking and locking of the right thumb.  He states he would like to proceed with surgical intervention.    PAST MEDICAL HISTORY:  Past Medical History:   Diagnosis Date    Acute low back pain     10 AUG 2016 RESOLVED    Bunion     Bursitis of hip     Carpal tunnel syndrome     Chronic bilateral low back  pain with bilateral sciatica     Chronic lumbar radiculopathy     Chronic narcotic dependence (HCC)     Chronic pain syndrome     Constipation due to opioid therapy     DDD (degenerative disc disease), lumbar     Deep vein thrombosis of left upper extremity (HCC)     [id number]  RESOLVED    Depression     Diverticula of colon     [id number] RESOLVED    DVT (deep venous thrombosis) (HCC)     LUE    Edema     History of staph infection     Hyperglycemia     Hyperlipidemia     Insomnia     Lateral epicondylitis     Left inguinal hernia     Methicillin resistant Staphylococcus aureus septicemia (HCC)     Morbid obesity due to excess calories (HCC)     Obesity     Peripheral neuropathy     Post laminectomy syndrome     RLS (restless legs syndrome)     Septicemia (HCC)     MRSA    Umbilical hernia        PAST SURGICAL HISTORY:  Past Surgical History:   Procedure Laterality Date    BACK SURGERY      laminectomy, hardware    CARPAL TUNNEL RELEASE Left     CERVICAL SPINE SURGERY      COLONOSCOPY      ELBOW SURGERY      ELBOW SURGERY      KNEE ARTHROSCOPY Right     knee    KNEE SURGERY Right     ARTHOSCOPY KNEE    NECK SURGERY      1997    AL COLONOSCOPY FLX DX W/COLLJ SPEC WHEN PFRMD N/A 1/24/2017    Procedure: COLONOSCOPY;  Surgeon: Pierre Holcomb MD;  [facility];  Service: General    AL NDSC WRST SURG W/RLS TRANSVRS CARPL LIGM Right 1/2/2020    Procedure: RELEASE CARPAL TUNNEL ENDOSCOPIC, right;  Surgeon: Brayan Quintreos MD;  [facility];  Service: Orthopedics    AL RPR UMBILICAL HRNA 5 YRS/> REDUCIBLE N/A 10/5/2022    Procedure: REPAIR HERNIA UMBILICAL WITH MESH.;  Surgeon: Pierre Holcomb MD;  [facility];  Service: General    AL TENDON SHEATH INCISION Right 9/9/2021    Procedure: Right long finger trigger finger release;  Surgeon: Brayan Quinteros MD;  [facility];  Service: Orthopedics    AL TENDON SHEATH INCISION Left 9/3/2024    Procedure: Left thumb, long, small and ring  trigger finger release;  Surgeon: Brayan Quinteros MD;  [facility];  Service: Orthopedics    LA TENDON SHEATH INCISION Left 9/3/2024    Procedure: Left thumb, long, small and ring trigger finger release;  Surgeon: Brayan Quinteros MD;  [facility];  Service: Orthopedics    LA TENDON SHEATH INCISION Left 9/3/2024    Procedure: Left thumb, long, small and ring trigger finger release;  Surgeon: Brayan Quinteros MD;  [facility];  Service: Orthopedics    LA TENDON SHEATH INCISION Left 9/3/2024    Procedure: Left thumb, long, small and ring trigger finger release;  Surgeon: Brayan Quinteros MD;  [facility];  Service: Orthopedics    SHOULDER SURGERY      SPINAL CORD STIMULATOR IMPLANT      SYNOVECTOMY Right 9/9/2021    Procedure: Tenosynovectomy right hand long finger flexor digitorum superficialis and profundus tendon;  Surgeon: Brayan Quinteros MD;  [facility];  Service: Orthopedics       FAMILY HISTORY:  Family History   Problem Relation Age of Onset    Heart disease Mother     Cancer Mother         breast, stomach    Aneurysm Mother     Heart disease Father     Cancer Father         skin, liver, colon DECESED AGE 62       SOCIAL HISTORY:  Social History     Tobacco Use    Smoking status: Never    Smokeless tobacco: Never   Vaping Use    Vaping status: Never Used   Substance Use Topics    Alcohol use: Yes     Comment: occasional- less than 1 drink per week    Drug use: No     Comment: opiod use for chronic pain       MEDICATIONS:    Current Outpatient Medications:     acetaminophen (TYLENOL) 650 mg CR tablet, Take 1 tablet (650 mg total) by mouth every 8 (eight) hours as needed for mild pain, Disp: 30 tablet, Rfl: 0    bisoprolol-hydrochlorothiazide (ZIAC) 2.5-6.25 MG per tablet, Take 1 tablet by mouth daily, Disp: 90 tablet, Rfl: 1    Misc. Devices (Rollator Ultra-Light) MISC, Use daily, Disp: 1 each, Rfl: 0    naloxone (NARCAN) 4 mg/0.1 mL nasal spray,  "Administer 1 spray into a nostril. If no response after 2-3 minutes, give another dose in the other nostril using a new spray., Disp: 1 each, Rfl: 1    naproxen sodium (ALEVE) 220 MG tablet, Take 1 tablet (220 mg total) by mouth 2 (two) times a day with meals, Disp: 20 tablet, Rfl: 0    oxyCODONE-acetaminophen (PERCOCET)  mg per tablet, Take 1 tablet by mouth every 8 (eight) hours as needed for moderate pain For ongoing therapy DO NOT FILL BEFORE: 10/01/24 Max Daily Amount: 3 tablets, Disp: 90 tablet, Rfl: 0    rosuvastatin (CRESTOR) 10 MG tablet, Take 1 tablet (10 mg total) by mouth daily at bedtime, Disp: 90 tablet, Rfl: 3    Semaglutide-Weight Management (Wegovy) 1 MG/0.5ML, Inject 1 mg under the skin weekly, Disp: 2 mL, Rfl: 2    mirtazapine (REMERON) 15 mg tablet, Take 1 tablet (15 mg total) by mouth daily at bedtime (Patient not taking: Reported on 9/10/2024), Disp: 30 tablet, Rfl: 0    oxyCODONE-acetaminophen (PERCOCET)  mg per tablet, Take 1 tablet by mouth every 8 (eight) hours as needed for moderate pain for ongoing therapy DO NOT FILL BEFORE: 10/30/24 Max Daily Amount: 3 tablets, Disp: 90 tablet, Rfl: 0    oxyCODONE-acetaminophen (PERCOCET)  mg per tablet, Take 1 tablet by mouth every 8 (eight) hours as needed for moderate pain for ongoing therapy DO NOT FILL BEFORE: 11/28/24 Max Daily Amount: 3 tablets, Disp: 90 tablet, Rfl: 0    ALLERGIES:  Allergies   Allergen Reactions    Fentanyl Other (See Comments)     \"Makes me Suicidal\"  Happened when dosage increased.    Penicillins Other (See Comments)     As a child unknown    Pravastatin Other (See Comments)     Reaction Date: 09Nov2011;   Muscle weakness    Sulfa Antibiotics Other (See Comments)     As a child unknown    Vytorin [Ezetimibe-Simvastatin] Myalgia     Reaction Date: 09Nov2011;        REVIEW OF SYSTEMS:  Pertinent items are noted in HPI.  A comprehensive review of systems was negative.    LABS:  HgA1c:   Lab Results   Component " "Value Date    HGBA1C 6.2 (H) 06/20/2024     BMP:   Lab Results   Component Value Date    GLUCOSE 100 12/14/2015    CALCIUM 9.6 05/02/2024     12/14/2015    K 4.1 05/02/2024    CO2 31 05/02/2024     05/02/2024    BUN 16 05/02/2024    CREATININE 0.98 05/02/2024       _____________________________________________________  PHYSICAL EXAMINATION:  Vital signs: Ht 5' 10\" (1.778 m)   Wt 126 kg (277 lb)   BMI 39.75 kg/m²   General: well developed and well nourished, alert, oriented times 3, and appears comfortable  Psychiatric: Normal  HEENT: Trachea Midline, No torticollis  Cardiovascular: No discernable arrhythmia  Pulmonary: No wheezing or stridor  Abdomen: No rebound or guarding  Extremities: No peripheral edema  Skin: No masses, erythema, lacerations, fluctation, ulcerations  Neurovascular: Sensation Intact to the Median, Ulnar, Radial Nerve, Motor Intact to the Median, Ulnar, Radial Nerve, and Pulses Intact    MUSCULOSKELETAL EXAMINATION:  right thumb:  Positive palpable nodule over the A1 pulley.  Positive tenderness to palpation over A1 pulley. Positive clicking.   Negative catching.     Hand  Incisions are healing well with no signs of infection  Patient removed the sutures on his own  He has the ability to make a full composite fist  Mild tenderness to palpation over the incision sites  No clicking or locking appreciated    _____________________________________________________  STUDIES REVIEWED:  No Studies to review      PROCEDURES PERFORMED:  Procedures  No Procedures performed today    Scribe Attestation      I,:  Reagan Hale PA-C am acting as a scribe while in the presence of the attending physician.:       I,:  Brayan Quinteros MD personally performed the services described in this documentation    as scribed in my presence.:             "

## 2024-09-16 NOTE — H&P (VIEW-ONLY)
ASSESSMENT/PLAN:    Assessment:   S/p left thumb, long, ring and small trigger finger release performed 9/3/2024    Right trigger thumb    Plan:   Patient was advised to avoid soaking for 2 to 3 days to allow the incision to finish healing  They was advised to use heat massage as demonstrated in the office  Today the patient has some clicking and locking into the right thumb.  He states he would like to proceed with surgical intervention.  Detailed consent was obtained for right trigger thumb release under local anesthesia  They will follow-up with us after surgery for suture removal    Follow Up:  After Surgery    To Do Next Visit:    and Sutures out        Operative Discussions:  Trigger Finger Release: The anatomy and physiology of trigger finger was discussed with the patient today in the office.  Edema and increased contact pressure within the flexor tendons at the A1 pulley can cause pain, crepitation, and limitation of function.  Treatment options include resting MP blocking splints to decrease edema, oral anti-inflammatory medications, home or formal therapy exercises, up to 2 steroid injections or surgical release.  While majority of patients do respond to conservative treatment, up to 20% may require surgical release.  The patient has elected release of the trigger finger.  The patient has elected to undergo a release of the A1 pulley (trigger finger).  A small incision will be made over the palmar aspect of the hand, the tendon sheath holding the flexor tendons will be released.  In the postoperative period, light activities are allowed immediately, driving is allowed when narcotic medication has stopped, and the incision may get wet after 2 days.  Heavy activities (lifting more than approximately 10 pounds) will be allowed after the follow up appointment in 1-2 weeks.  While the pain and discomfort within the wrist typically improves rapidly, some residual discomfort may be present for up to 6 weeks.   The nodule that is typically palpable in the palmar aspect of the hand will not be removed, as this would necessitate removal of a portion of the flexor tendon, however the catching, clicking, and locking should resolve.  Approximate success rate is 98%.The risks and benefits of the procedure were explained to the patient, which include, but are not limited to: Bleeding, infection, recurrence, pain, scar, damage to tendons, damage to nerves, and damage to blood vessels, need for future surgery and complications related to anesthesia.  If bony work is done, risks also include malunion and nonunion.  These risks, along with alternative conservative treatment options, and postoperative protocols were voiced back and understood by the patient.  All questions were answered to the patient's satisfaction.  The patient agrees to comply with a standard postoperative protocol, and is willing to proceed.  Education was provided via written and auditory forms.  There were no barriers to learning. Written handouts regarding wound care, incision and scar care, and general preoperative information, as well as risks and benefits were provided to the patient.    _____________________________________________________  CHIEF COMPLAINT:  Chief Complaint   Patient presents with    Left Hand - Post-op     thumb, long, small and ring TFR-  9/3/24         SUBJECTIVE:  Yayo BANEGAS Luis Pastor is a 69 y.o. male who presents for follow-up regarding s/p left thumb, long, ring and small trigger finger release performed 9/3/24.  He states overall he is doing well.  He does note some discomfort at the PIP joints.  He states he is starting to have clicking and locking of the right thumb.  He states he would like to proceed with surgical intervention.    PAST MEDICAL HISTORY:  Past Medical History:   Diagnosis Date    Acute low back pain     10 AUG 2016 RESOLVED    Bunion     Bursitis of hip     Carpal tunnel syndrome     Chronic bilateral low back  pain with bilateral sciatica     Chronic lumbar radiculopathy     Chronic narcotic dependence (HCC)     Chronic pain syndrome     Constipation due to opioid therapy     DDD (degenerative disc disease), lumbar     Deep vein thrombosis of left upper extremity (HCC)     [id number]  RESOLVED    Depression     Diverticula of colon     [id number] RESOLVED    DVT (deep venous thrombosis) (HCC)     LUE    Edema     History of staph infection     Hyperglycemia     Hyperlipidemia     Insomnia     Lateral epicondylitis     Left inguinal hernia     Methicillin resistant Staphylococcus aureus septicemia (HCC)     Morbid obesity due to excess calories (HCC)     Obesity     Peripheral neuropathy     Post laminectomy syndrome     RLS (restless legs syndrome)     Septicemia (HCC)     MRSA    Umbilical hernia        PAST SURGICAL HISTORY:  Past Surgical History:   Procedure Laterality Date    BACK SURGERY      laminectomy, hardware    CARPAL TUNNEL RELEASE Left     CERVICAL SPINE SURGERY      COLONOSCOPY      ELBOW SURGERY      ELBOW SURGERY      KNEE ARTHROSCOPY Right     knee    KNEE SURGERY Right     ARTHOSCOPY KNEE    NECK SURGERY      1997    DE COLONOSCOPY FLX DX W/COLLJ SPEC WHEN PFRMD N/A 1/24/2017    Procedure: COLONOSCOPY;  Surgeon: Pierre Holcomb MD;  [facility];  Service: General    DE NDSC WRST SURG W/RLS TRANSVRS CARPL LIGM Right 1/2/2020    Procedure: RELEASE CARPAL TUNNEL ENDOSCOPIC, right;  Surgeon: Brayan Quinteros MD;  [facility];  Service: Orthopedics    DE RPR UMBILICAL HRNA 5 YRS/> REDUCIBLE N/A 10/5/2022    Procedure: REPAIR HERNIA UMBILICAL WITH MESH.;  Surgeon: Pierre Holcomb MD;  [facility];  Service: General    DE TENDON SHEATH INCISION Right 9/9/2021    Procedure: Right long finger trigger finger release;  Surgeon: Brayan Quinteros MD;  [facility];  Service: Orthopedics    DE TENDON SHEATH INCISION Left 9/3/2024    Procedure: Left thumb, long, small and ring  trigger finger release;  Surgeon: Brayan Quinteros MD;  [facility];  Service: Orthopedics    MI TENDON SHEATH INCISION Left 9/3/2024    Procedure: Left thumb, long, small and ring trigger finger release;  Surgeon: Brayan Quinteros MD;  [facility];  Service: Orthopedics    MI TENDON SHEATH INCISION Left 9/3/2024    Procedure: Left thumb, long, small and ring trigger finger release;  Surgeon: Brayan Quinteros MD;  [facility];  Service: Orthopedics    MI TENDON SHEATH INCISION Left 9/3/2024    Procedure: Left thumb, long, small and ring trigger finger release;  Surgeon: Brayan Quinteros MD;  [facility];  Service: Orthopedics    SHOULDER SURGERY      SPINAL CORD STIMULATOR IMPLANT      SYNOVECTOMY Right 9/9/2021    Procedure: Tenosynovectomy right hand long finger flexor digitorum superficialis and profundus tendon;  Surgeon: Brayan Quinteros MD;  [facility];  Service: Orthopedics       FAMILY HISTORY:  Family History   Problem Relation Age of Onset    Heart disease Mother     Cancer Mother         breast, stomach    Aneurysm Mother     Heart disease Father     Cancer Father         skin, liver, colon DECESED AGE 62       SOCIAL HISTORY:  Social History     Tobacco Use    Smoking status: Never    Smokeless tobacco: Never   Vaping Use    Vaping status: Never Used   Substance Use Topics    Alcohol use: Yes     Comment: occasional- less than 1 drink per week    Drug use: No     Comment: opiod use for chronic pain       MEDICATIONS:    Current Outpatient Medications:     acetaminophen (TYLENOL) 650 mg CR tablet, Take 1 tablet (650 mg total) by mouth every 8 (eight) hours as needed for mild pain, Disp: 30 tablet, Rfl: 0    bisoprolol-hydrochlorothiazide (ZIAC) 2.5-6.25 MG per tablet, Take 1 tablet by mouth daily, Disp: 90 tablet, Rfl: 1    Misc. Devices (Rollator Ultra-Light) MISC, Use daily, Disp: 1 each, Rfl: 0    naloxone (NARCAN) 4 mg/0.1 mL nasal spray,  "Administer 1 spray into a nostril. If no response after 2-3 minutes, give another dose in the other nostril using a new spray., Disp: 1 each, Rfl: 1    naproxen sodium (ALEVE) 220 MG tablet, Take 1 tablet (220 mg total) by mouth 2 (two) times a day with meals, Disp: 20 tablet, Rfl: 0    oxyCODONE-acetaminophen (PERCOCET)  mg per tablet, Take 1 tablet by mouth every 8 (eight) hours as needed for moderate pain For ongoing therapy DO NOT FILL BEFORE: 10/01/24 Max Daily Amount: 3 tablets, Disp: 90 tablet, Rfl: 0    rosuvastatin (CRESTOR) 10 MG tablet, Take 1 tablet (10 mg total) by mouth daily at bedtime, Disp: 90 tablet, Rfl: 3    Semaglutide-Weight Management (Wegovy) 1 MG/0.5ML, Inject 1 mg under the skin weekly, Disp: 2 mL, Rfl: 2    mirtazapine (REMERON) 15 mg tablet, Take 1 tablet (15 mg total) by mouth daily at bedtime (Patient not taking: Reported on 9/10/2024), Disp: 30 tablet, Rfl: 0    oxyCODONE-acetaminophen (PERCOCET)  mg per tablet, Take 1 tablet by mouth every 8 (eight) hours as needed for moderate pain for ongoing therapy DO NOT FILL BEFORE: 10/30/24 Max Daily Amount: 3 tablets, Disp: 90 tablet, Rfl: 0    oxyCODONE-acetaminophen (PERCOCET)  mg per tablet, Take 1 tablet by mouth every 8 (eight) hours as needed for moderate pain for ongoing therapy DO NOT FILL BEFORE: 11/28/24 Max Daily Amount: 3 tablets, Disp: 90 tablet, Rfl: 0    ALLERGIES:  Allergies   Allergen Reactions    Fentanyl Other (See Comments)     \"Makes me Suicidal\"  Happened when dosage increased.    Penicillins Other (See Comments)     As a child unknown    Pravastatin Other (See Comments)     Reaction Date: 09Nov2011;   Muscle weakness    Sulfa Antibiotics Other (See Comments)     As a child unknown    Vytorin [Ezetimibe-Simvastatin] Myalgia     Reaction Date: 09Nov2011;        REVIEW OF SYSTEMS:  Pertinent items are noted in HPI.  A comprehensive review of systems was negative.    LABS:  HgA1c:   Lab Results   Component " "Value Date    HGBA1C 6.2 (H) 06/20/2024     BMP:   Lab Results   Component Value Date    GLUCOSE 100 12/14/2015    CALCIUM 9.6 05/02/2024     12/14/2015    K 4.1 05/02/2024    CO2 31 05/02/2024     05/02/2024    BUN 16 05/02/2024    CREATININE 0.98 05/02/2024       _____________________________________________________  PHYSICAL EXAMINATION:  Vital signs: Ht 5' 10\" (1.778 m)   Wt 126 kg (277 lb)   BMI 39.75 kg/m²   General: well developed and well nourished, alert, oriented times 3, and appears comfortable  Psychiatric: Normal  HEENT: Trachea Midline, No torticollis  Cardiovascular: No discernable arrhythmia  Pulmonary: No wheezing or stridor  Abdomen: No rebound or guarding  Extremities: No peripheral edema  Skin: No masses, erythema, lacerations, fluctation, ulcerations  Neurovascular: Sensation Intact to the Median, Ulnar, Radial Nerve, Motor Intact to the Median, Ulnar, Radial Nerve, and Pulses Intact    MUSCULOSKELETAL EXAMINATION:  right thumb:  Positive palpable nodule over the A1 pulley.  Positive tenderness to palpation over A1 pulley. Positive clicking.   Negative catching.     Hand  Incisions are healing well with no signs of infection  Patient removed the sutures on his own  He has the ability to make a full composite fist  Mild tenderness to palpation over the incision sites  No clicking or locking appreciated    _____________________________________________________  STUDIES REVIEWED:  No Studies to review      PROCEDURES PERFORMED:  Procedures  No Procedures performed today    Scribe Attestation      I,:  Reagan Hale PA-C am acting as a scribe while in the presence of the attending physician.:       I,:  Brayan Quinteros MD personally performed the services described in this documentation    as scribed in my presence.:             "

## 2024-09-17 DIAGNOSIS — R26.2 AMBULATORY DYSFUNCTION: ICD-10-CM

## 2024-09-17 DIAGNOSIS — M51.16 LUMBAR DISC DISEASE WITH RADICULOPATHY: Primary | ICD-10-CM

## 2024-09-17 RX ORDER — SEMAGLUTIDE 1 MG/.5ML
INJECTION, SOLUTION SUBCUTANEOUS
Qty: 2 ML | Refills: 2 | Status: SHIPPED | OUTPATIENT
Start: 2024-09-17

## 2024-09-17 NOTE — TELEPHONE ENCOUNTER
Pt wife called stating the pharmacy never received the prescription for  Semaglutide-Weight Management (Wegovy) 1 MG/0.5ML. She will call pharmacy again and call back if she has any issues. Thank you

## 2024-09-19 ENCOUNTER — TELEPHONE (OUTPATIENT)
Dept: PAIN MEDICINE | Facility: CLINIC | Age: 69
End: 2024-09-19

## 2024-09-19 NOTE — TELEPHONE ENCOUNTER
[facility] fax request for chart notes with HT/WT    Faxed to [phone]

## 2024-10-08 ENCOUNTER — HOSPITAL ENCOUNTER (OUTPATIENT)
Age: 69
Setting detail: OUTPATIENT SURGERY
Discharge: HOME | End: 2024-10-08
Attending: ORTHOPAEDIC SURGERY | Admitting: ORTHOPAEDIC SURGERY
Payer: COMMERCIAL

## 2024-10-08 VITALS
WEIGHT: 270 LBS | SYSTOLIC BLOOD PRESSURE: 132 MMHG | HEIGHT: 70 IN | OXYGEN SATURATION: 96 % | HEART RATE: 79 BPM | BODY MASS INDEX: 38.65 KG/M2 | RESPIRATION RATE: 18 BRPM | TEMPERATURE: 98.7 F | DIASTOLIC BLOOD PRESSURE: 72 MMHG

## 2024-10-08 DIAGNOSIS — M65.311 TRIGGER FINGER OF RIGHT THUMB: Primary | ICD-10-CM

## 2024-10-08 DIAGNOSIS — E78.2 MIXED HYPERLIPIDEMIA: ICD-10-CM

## 2024-10-08 PROCEDURE — 26055 INCISE FINGER TENDON SHEATH: CPT | Performed by: ORTHOPAEDIC SURGERY

## 2024-10-08 PROCEDURE — 26055 INCISE FINGER TENDON SHEATH: CPT | Performed by: PHYSICIAN ASSISTANT

## 2024-10-08 RX ORDER — COVID-19 ANTIGEN TEST
220 KIT MISCELLANEOUS 2 TIMES DAILY
Qty: 60 CAPSULE | Refills: 0 | Status: SHIPPED | OUTPATIENT
Start: 2024-10-08 | End: 2024-11-07

## 2024-10-08 RX ORDER — LIDOCAINE HYDROCHLORIDE AND EPINEPHRINE 10; 10 MG/ML; UG/ML
20 INJECTION, SOLUTION INFILTRATION; PERINEURAL ONCE
Status: DISCONTINUED | OUTPATIENT
Start: 2024-10-08 | End: 2024-10-08 | Stop reason: HOSPADM

## 2024-10-08 RX ORDER — ACETAMINOPHEN 325 MG/1
650 TABLET ORAL EVERY 6 HOURS PRN
Status: CANCELLED | OUTPATIENT
Start: 2024-10-08

## 2024-10-08 RX ORDER — TRAMADOL HYDROCHLORIDE 50 MG/1
50 TABLET ORAL EVERY 6 HOURS PRN
Status: CANCELLED | OUTPATIENT
Start: 2024-10-08

## 2024-10-08 RX ORDER — TRAMADOL HYDROCHLORIDE 50 MG/1
50 TABLET ORAL EVERY 6 HOURS PRN
Qty: 5 TABLET | Refills: 0 | Status: SHIPPED | OUTPATIENT
Start: 2024-10-08

## 2024-10-08 RX ORDER — ONDANSETRON 2 MG/ML
4 INJECTION INTRAMUSCULAR; INTRAVENOUS EVERY 6 HOURS PRN
Status: CANCELLED | OUTPATIENT
Start: 2024-10-08

## 2024-10-08 RX ORDER — SENNOSIDES 8.6 MG
650 CAPSULE ORAL EVERY 8 HOURS PRN
Qty: 30 TABLET | Refills: 0 | Status: SHIPPED | OUTPATIENT
Start: 2024-10-08

## 2024-10-08 RX ADMIN — LIDOCAINE HYDROCHLORIDE,EPINEPHRINE BITARTRATE: 10; .01 INJECTION, SOLUTION INFILTRATION; PERINEURAL at 08:54

## 2024-10-08 NOTE — OP NOTE
OPERATIVE REPORT  PATIENT NAME: Yayo Smith Jr.  :  1955  MRN: [id number]  Pt Location: [facility]    SURGERY DATE: 10/08/24    Surgeons and Role:     * Brayan Quinteros MD - Primary     * Reagan Hale PA-C - Assisting    Pre-Op Diagnosis:  Trigger thumb of right hand [M65.311]    Post-Op Diagnosis:  .Trigger thumb of right hand [M65.311]    Procedure(s) (LRB):  Right trigger thumb release (Right)    Specimen(s):  No specimens collected during this procedure.    Estimated Blood Loss:   Minimal      Anesthesia Type:   Local    Operative Indications:  The patient has a history of Trigger Finger  right  thumb that was recalcitrant to conservative management.  The decision was made to bring the patient to the operating room for right thumb trigger finger release.  Risks of the procedure were explained which include, but are not limited to bleeding; infection; damage to nerves, arteries,veins, tendons; scar; pain; need for reoperation; failure to give desired result; and risks of anaesthesia.  All questions were answered to satisfaction and they were willing to proceed.         Operative Findings:  Right trigger thumb successfully released with full range of motion    Complications:   None    Procedure and Technique:  After the patient, site, and procedure were identified, the patient was brought into the operating room in a supine position.  Local anaesthesia was adminstered in the preoperative holding area.  A tourniquet was not used.  The  right upper extremity was then prepped and drapped in a normal, sterile, orthopedic fashion.    After the patient, site, and procedure were once again identified, attention was turned to the right thumb.  An incision was made over the flexor tendon sheath at the level of the A1 pulley.  Dissection was carried out in-line with the flexor tendon sheath and the radial and ulnar digital artery and nerve were protected.  The A1 pulley was identified at the base of the  incision.  Under direct visualization, the A1 pulley was divided along the midline in its entirety with care taken to avoid injury to the underlying tendon.  The tendons were examined to ensure that no further catching, popping, clicking or locking occurred with motion of the finger.       At the completion of the procedure, hemostasis was obtained with cautery and direct pressure.  The wounds were copiously irrigated with sterile solution.  The wounds were closed with Prolene.  Sterile dressings were applied, including Xeroform, gauze, tweeners, webril, ACE.  Please note, all sponge, needle, and instrument counts were correct prior to closure.  Loupe magnification was utilized.  The patient tolerated the procedure well.     I was present for all critical portions of the procedure., A qualified resident physician was not available., and A physician assistant was required during the procedure for retraction, tissue handling, dissection and suturing.    Patient Disposition:  PACU  and hemodynamically stable    SIGNATURE: Brayan Quinteros MD  DATE: 10/08/24  TIME: 9:36 AM

## 2024-10-08 NOTE — INTERVAL H&P NOTE
H&P reviewed. After examining the patient I find no changes in the patients condition since the H&P had been written.    Diagnosis: Right trigger thumb    Procedure: right trigger thumb release under local    There were no vitals filed for this visit.

## 2024-10-10 RX ORDER — ROSUVASTATIN CALCIUM 10 MG/1
10 TABLET, COATED ORAL
Qty: 90 TABLET | Refills: 3 | Status: SHIPPED | OUTPATIENT
Start: 2024-10-10

## 2024-10-16 ENCOUNTER — TELEPHONE (OUTPATIENT)
Dept: PAIN MEDICINE | Facility: CLINIC | Age: 69
End: 2024-10-16

## 2024-10-16 ENCOUNTER — OFFICE VISIT (OUTPATIENT)
Dept: FAMILY MEDICINE CLINIC | Facility: HOSPITAL | Age: 69
End: 2024-10-16
Payer: COMMERCIAL

## 2024-10-16 ENCOUNTER — HOSPITAL ENCOUNTER (OUTPATIENT)
Dept: RADIOLOGY | Facility: HOSPITAL | Age: 69
Discharge: HOME | End: 2024-10-16
Payer: COMMERCIAL

## 2024-10-16 VITALS
DIASTOLIC BLOOD PRESSURE: 82 MMHG | HEART RATE: 84 BPM | WEIGHT: 270.6 LBS | OXYGEN SATURATION: 97 % | HEIGHT: 70 IN | TEMPERATURE: 98.2 F | BODY MASS INDEX: 38.74 KG/M2 | SYSTOLIC BLOOD PRESSURE: 130 MMHG

## 2024-10-16 DIAGNOSIS — M25.551 RIGHT HIP PAIN: ICD-10-CM

## 2024-10-16 DIAGNOSIS — M25.551 RIGHT HIP PAIN: Primary | ICD-10-CM

## 2024-10-16 PROCEDURE — 73502 X-RAY EXAM HIP UNI 2-3 VIEWS: CPT

## 2024-10-16 PROCEDURE — 99214 OFFICE O/P EST MOD 30 MIN: CPT | Performed by: NURSE PRACTITIONER

## 2024-10-16 NOTE — TELEPHONE ENCOUNTER
Caller: Yayo    Doctor: Eboni    Reason for call: calling to schedule next procedure having increased pain in R hip please advise     Call back#: [phone]

## 2024-10-16 NOTE — TELEPHONE ENCOUNTER
Injection type: BL hip injection- pt only has right sided hip pain currently.  Last injection date:5/11/2023  Last office visit: 9/10/2024  Where is pain located: right sided hip pain  Is the pain the same area as before: Yes  Current pain level: 8/10  How much % of relief did the last injection provide: 100%  Duration of relief from last injection: a little over a year  When did pain return: 2-3 months ago  Is the pain effecting your ADLs: yes    Blood thinners/NSAIDS?NA  Diabetes?       no           CGM? Na    Nurse advised pt nurse to discuss with [facility], once okay given to repeat injection (pt only wants right side), SPA  will reach out to pt to get scheduled, pt verbalized understanding and appreciative of call.

## 2024-10-16 NOTE — PROGRESS NOTES
"Ambulatory Visit  Name: Yayo Smith Jr.      : 1955      MRN: [id number]  Encounter Provider: MARIA GUADALUPE Oorsco  Encounter Date: 10/16/2024   Encounter department: [facility] [address]     Assessment & Plan  Right hip pain  Chronic in nature.   He requests seeing DR. Gerber for injection.   Will update imaging of right hip.     Orders:    XR hip/pelv 2-3 vws right if performed; Future    Ambulatory referral to Spine & Pain Management; Future       History of Present Illness     Has right hip pain. Has been on and off over the years. The last 2 months it has gotten worse. Has had injections in both hips by DR. Gerber in the past. Pain is lower buttock and radiates down back of leg. Having difficulty walking. No N/T. No redness or swelling. He has chronic low back pain. Sees pain management for this. Has had multiple back surgeries.         History obtained from : patient  Review of Systems   Constitutional:  Negative for chills and fever.   Musculoskeletal:  Positive for arthralgias. Negative for joint swelling.   Neurological:  Negative for weakness and numbness.           Objective     /82 (BP Location: Left arm, Patient Position: Sitting, Cuff Size: Standard)   Pulse 84   Temp 98.2 °F (36.8 °C) (Tympanic)   Ht 5' 10\" (1.778 m)   Wt 123 kg (270 lb 9.6 oz)   SpO2 97%   BMI 38.83 kg/m²     Physical Exam  Vitals reviewed.   Constitutional:       Appearance: Normal appearance. He is obese.   Cardiovascular:      Rate and Rhythm: Normal rate and regular rhythm.      Heart sounds: Normal heart sounds. No murmur heard.  Pulmonary:      Effort: Pulmonary effort is normal.      Breath sounds: Normal breath sounds.   Musculoskeletal:      Right hip: No tenderness or bony tenderness. Normal range of motion. Normal strength.   Skin:     General: Skin is warm and dry.   Neurological:      Mental Status: He is alert and oriented to person, place, and time.   Psychiatric:         " Mood and Affect: Mood normal.         Behavior: Behavior normal.         Thought Content: Thought content normal.         Judgment: Judgment normal.

## 2024-10-16 NOTE — TELEPHONE ENCOUNTER
It looks like patient saw his PCP today; an x-ray of the hip was ordered.  I would recommend that he proceed with updating the imaging prior to scheduling an injection.

## 2024-10-17 NOTE — TELEPHONE ENCOUNTER
S/w pt's wife (per medical communication consent on file) advising to call our office when the pt's xrays are finalized as we are not the ordering office, Bruna verbalized understanding and appreciative of call.

## 2024-10-18 DIAGNOSIS — E66.812 CLASS 2 SEVERE OBESITY WITH SERIOUS COMORBIDITY AND BODY MASS INDEX (BMI) OF 38.0 TO 38.9 IN ADULT, UNSPECIFIED OBESITY TYPE (HCC): Primary | ICD-10-CM

## 2024-10-18 DIAGNOSIS — E66.01 CLASS 2 SEVERE OBESITY WITH SERIOUS COMORBIDITY AND BODY MASS INDEX (BMI) OF 38.0 TO 38.9 IN ADULT, UNSPECIFIED OBESITY TYPE (HCC): Primary | ICD-10-CM

## 2024-10-18 RX ORDER — SEMAGLUTIDE 1 MG/.5ML
INJECTION, SOLUTION SUBCUTANEOUS
Qty: 2 ML | Refills: 2 | OUTPATIENT
Start: 2024-10-18

## 2024-10-18 NOTE — TELEPHONE ENCOUNTER
Patient needs to go up to the 1.7 or it will need precert so he needs an update script (per the pharmacy)

## 2024-10-21 ENCOUNTER — OFFICE VISIT (OUTPATIENT)
Dept: OBGYN CLINIC | Facility: CLINIC | Age: 69
End: 2024-10-21
Payer: COMMERCIAL

## 2024-10-21 VITALS — WEIGHT: 270 LBS | BODY MASS INDEX: 38.65 KG/M2 | HEIGHT: 70 IN

## 2024-10-21 DIAGNOSIS — M65.342 TRIGGER RING FINGER OF LEFT HAND: ICD-10-CM

## 2024-10-21 DIAGNOSIS — M65.332 TRIGGER MIDDLE FINGER OF LEFT HAND: Primary | ICD-10-CM

## 2024-10-21 PROCEDURE — 99213 OFFICE O/P EST LOW 20 MIN: CPT | Performed by: ORTHOPAEDIC SURGERY

## 2024-10-21 PROCEDURE — 20550 NJX 1 TENDON SHEATH/LIGAMENT: CPT | Performed by: PHYSICIAN ASSISTANT

## 2024-10-21 RX ORDER — LIDOCAINE HYDROCHLORIDE 10 MG/ML
1 INJECTION, SOLUTION INFILTRATION; PERINEURAL
Status: COMPLETED | OUTPATIENT
Start: 2024-10-21 | End: 2024-10-21

## 2024-10-21 RX ORDER — BETAMETHASONE SODIUM PHOSPHATE AND BETAMETHASONE ACETATE 3; 3 MG/ML; MG/ML
6 INJECTION, SUSPENSION INTRA-ARTICULAR; INTRALESIONAL; INTRAMUSCULAR; SOFT TISSUE
Status: COMPLETED | OUTPATIENT
Start: 2024-10-21 | End: 2024-10-21

## 2024-10-21 RX ADMIN — BETAMETHASONE SODIUM PHOSPHATE AND BETAMETHASONE ACETATE 6 MG: 3; 3 INJECTION, SUSPENSION INTRA-ARTICULAR; INTRALESIONAL; INTRAMUSCULAR; SOFT TISSUE at 10:15

## 2024-10-21 RX ADMIN — LIDOCAINE HYDROCHLORIDE 1 ML: 10 INJECTION, SOLUTION INFILTRATION; PERINEURAL at 10:15

## 2024-10-21 NOTE — PROGRESS NOTES
"Assessment:   S/P Right trigger thumb release - Right on 10/8/2024  S/p left thumb, long, ring and small trigger finger release    Plan:   For the right thumb, he was advised to use heat massage as demonstrated in the office  He was advised to allow the wound to finish healing  In regards to the left hand, he does have some clicking of the long and small finger A2 pulley.  He has significant pain and discomfort over the ring finger around the A2 pulley.   Patient was given a left long and ring finger cortisone injection around the A2 pulley.  He tolerated well.  We briefly discussed that if he were to have continued pain and discomfort that we could proceed with a Hemitenectomy   He will follow-up in 6 weeks for reevaluation    Follow Up:  6  week(s)    To Do Next Visit:  Reevaluation      CHIEF COMPLAINT:  Chief Complaint   Patient presents with    Right Thumb - Post-op, Suture / Staple Removal         SUBJECTIVE:  Yayo Smith Jr. is a 69 y.o. male who presents for follow up after Right trigger thumb release - Right on 10/8/2024.  Today patient has improvement in the clicking and locking into the right thumb.  His main complaint today is clicking, locking and pain and swelling of the left long, ring and small finger.  He states progressively it has been getting worse since his surgery.       PHYSICAL EXAMINATION:  Vital signs: Ht 5' 10\" (1.778 m)   Wt 122 kg (270 lb)   BMI 38.74 kg/m²   General: well developed and well nourished, alert, oriented times 3, and appears comfortable  Psychiatric: Normal    MUSCULOSKELETAL EXAMINATION:  Incision: clean, dry, and healed  Range of Motion: As expected and full composite fist possible  Left hand  Incisions are well-healed without signs of infection  Tenderness to palpation over the A2 pulley with a palpable click on the long and small finger, tenderness to palpation over the ring finger at the A2 pulley without clicking.  Neurovascular status: Neuro intact, good " "cap refill  Activity Restrictions: No restrictions  Done today: Sutures out      STUDIES REVIEWED:  No Studies to review      PROCEDURES PERFORMED:  Hand/upper extremity injection: L long A2  Universal Protocol:  Consent: Verbal consent obtained.  Risks and benefits: risks, benefits and alternatives were discussed  Consent given by: patient  Time out: Immediately prior to procedure a \"time out\" was called to verify the correct patient, procedure, equipment, support staff and site/side marked as required.  Patient understanding: patient states understanding of the procedure being performed  Patient consent: the patient's understanding of the procedure matches consent given  Site marked: the operative site was marked  Patient identity confirmed: verbally with patient  Supporting Documentation  Indications: pain   Procedure Details  Condition:trigger finger Location: long finger - L long A2   Preparation: Patient was prepped and draped in the usual sterile fashion  Needle size: 25 G  Approach: volar  Medications administered: 1 mL lidocaine 1 %; 6 mg betamethasone acetate-betamethasone sodium phosphate 6 (3-3) mg/mL  Patient tolerance: patient tolerated the procedure well with no immediate complications  Dressing:  Sterile dressing applied       Hand/upper extremity injection: L ring A2  Universal Protocol:  Consent: Verbal consent obtained.  Risks and benefits: risks, benefits and alternatives were discussed  Consent given by: patient  Time out: Immediately prior to procedure a \"time out\" was called to verify the correct patient, procedure, equipment, support staff and site/side marked as required.  Patient understanding: patient states understanding of the procedure being performed  Patient consent: the patient's understanding of the procedure matches consent given  Site marked: the operative site was marked  Patient identity confirmed: verbally with patient  Supporting Documentation  Indications: pain   Procedure " Details  Condition:trigger finger Location: ring finger - L ring A2   Preparation: Patient was prepped and draped in the usual sterile fashion  Needle size: 25 G  Approach: volar  Medications administered: 1 mL lidocaine 1 %; 6 mg betamethasone acetate-betamethasone sodium phosphate 6 (3-3) mg/mL  Patient tolerance: patient tolerated the procedure well with no immediate complications  Dressing:  Sterile dressing applied

## 2024-10-22 DIAGNOSIS — M16.11 PRIMARY OSTEOARTHRITIS OF RIGHT HIP: Primary | ICD-10-CM

## 2024-10-24 NOTE — TELEPHONE ENCOUNTER
Spoke with wife scheduled patient for procedure.      PRE OP INSTRUCTIONS:           -If you are on prescription blood thinners, you may have to hold the medication for several days before the procedure.    Please call the office to discuss medication holds at [phone].  -Do not eat or drink ONE HOUR prior to your procedure. If you are diabetic, may follow regular breakfast/lunch schedule and take usual    diabetic medications.  -Lumbar( low back) procedure, please wear comfortable slacks/pants.  -Cervical (neck) procedure, please wear a shirt/blouse that is easy to remove.  -A  is required to take you home form your procedure.  -Continue all to take prescribed medication the day of your procedure, including blood pressure medications.  -If you are prescribe antibiotics, have an active infection or have an open wound, please contact the office at [phone].  -Please refrain from any vaccinations two weeks before and two weeks after injection.  -Insurance authorization received in not a guarantee of payment per your insurance company's authorization disclaimer and it is    your responsibility to verify your benefits.          -If you have any questions about the instructions, please call me at [phone]          -PATIENT INSTRUCTED TO STOP ALL NSAID'S 4 DAYS PRIOR TO PROCEDURE            EXCEPT FOR IBUPROFEN IT CAN BE TAKEN UP TO 24 HOURS PRIOR TO PROCEDURE.

## 2024-10-24 NOTE — TELEPHONE ENCOUNTER
Caller: otoniel Mcfarland's wife    Doctor: Eboni    Reason for call: pt's xray is finalized and ready for review    Call back#: [phone]

## 2024-10-24 NOTE — TELEPHONE ENCOUNTER
Caller: Bruna pt's wife    Doctor: Eboni    Reason for call: please call pt's wife to schedule procedure    Call back#: [phone]

## 2024-11-11 ENCOUNTER — HOSPITAL ENCOUNTER (OUTPATIENT)
Dept: RADIOLOGY | Facility: CLINIC | Age: 69
Discharge: HOME | End: 2024-11-11
Admitting: ANESTHESIOLOGY
Payer: COMMERCIAL

## 2024-11-11 VITALS
HEART RATE: 79 BPM | OXYGEN SATURATION: 94 % | SYSTOLIC BLOOD PRESSURE: 136 MMHG | RESPIRATION RATE: 20 BRPM | DIASTOLIC BLOOD PRESSURE: 78 MMHG | TEMPERATURE: 98.4 F

## 2024-11-11 DIAGNOSIS — M16.11 PRIMARY OSTEOARTHRITIS OF RIGHT HIP: ICD-10-CM

## 2024-11-11 PROCEDURE — 77002 NEEDLE LOCALIZATION BY XRAY: CPT | Performed by: ANESTHESIOLOGY

## 2024-11-11 PROCEDURE — 20610 DRAIN/INJ JOINT/BURSA W/O US: CPT | Performed by: ANESTHESIOLOGY

## 2024-11-11 RX ORDER — METHYLPREDNISOLONE ACETATE 80 MG/ML
40 INJECTION, SUSPENSION INTRA-ARTICULAR; INTRALESIONAL; INTRAMUSCULAR; PARENTERAL; SOFT TISSUE ONCE
Status: COMPLETED | OUTPATIENT
Start: 2024-11-11 | End: 2024-11-11

## 2024-11-11 RX ORDER — ROPIVACAINE HYDROCHLORIDE 2 MG/ML
2 INJECTION, SOLUTION EPIDURAL; INFILTRATION; PERINEURAL ONCE
Status: COMPLETED | OUTPATIENT
Start: 2024-11-11 | End: 2024-11-11

## 2024-11-11 RX ADMIN — IOHEXOL 1 ML: 300 INJECTION, SOLUTION INTRAVENOUS at 14:14

## 2024-11-11 RX ADMIN — METHYLPREDNISOLONE ACETATE 40 MG: 80 INJECTION, SUSPENSION INTRA-ARTICULAR; INTRALESIONAL; INTRAMUSCULAR; SOFT TISSUE at 14:14

## 2024-11-11 RX ADMIN — ROPIVACAINE HYDROCHLORIDE 2 ML: 2 INJECTION EPIDURAL; INFILTRATION; PERINEURAL at 14:14

## 2024-11-11 NOTE — DISCHARGE INSTRUCTIONS

## 2024-11-11 NOTE — H&P
History of Present Illness: The patient is a 69 y.o. male who presents with complaints of hip pain.    Past Medical History:   Diagnosis Date    Acute low back pain     10 AUG 2016 RESOLVED    Bunion     Bursitis of hip     Carpal tunnel syndrome     Chronic bilateral low back pain with bilateral sciatica     Chronic lumbar radiculopathy     Chronic narcotic dependence (HCC)     Chronic pain syndrome     Constipation due to opioid therapy     DDD (degenerative disc disease), lumbar     Deep vein thrombosis of left upper extremity (HCC)     [id number]  RESOLVED    Depression     Diverticula of colon     [id number] RESOLVED    DVT (deep venous thrombosis) (HCC)     LUE    Edema     History of staph infection     Hyperglycemia     Hyperlipidemia     Insomnia     Lateral epicondylitis     Left inguinal hernia     Methicillin resistant Staphylococcus aureus septicemia (HCC)     Morbid obesity due to excess calories (HCC)     Obesity     Peripheral neuropathy     Post laminectomy syndrome     RLS (restless legs syndrome)     Septicemia (HCC)     MRSA    Umbilical hernia        Past Surgical History:   Procedure Laterality Date    BACK SURGERY      laminectomy, hardware    CARPAL TUNNEL RELEASE Left     CERVICAL SPINE SURGERY      COLONOSCOPY      ELBOW SURGERY      ELBOW SURGERY      KNEE ARTHROSCOPY Right     knee    KNEE SURGERY Right     ARTHOSCOPY KNEE    NECK SURGERY      1997    ME COLONOSCOPY FLX DX W/COLLJ SPEC WHEN PFRMD N/A 1/24/2017    Procedure: COLONOSCOPY;  Surgeon: Pierre Holcomb MD;  Location: [facility];  Service: General    ME NDSC WRST SURG W/RLS TRANSVRS CARPL LIGM Right 1/2/2020    Procedure: RELEASE CARPAL TUNNEL ENDOSCOPIC, right;  Surgeon: Brayan Quinteros MD;  Location: [facility];  Service: Orthopedics    ME RPR UMBILICAL HRNA 5 YRS/> REDUCIBLE N/A 10/5/2022    Procedure: REPAIR HERNIA UMBILICAL WITH MESH.;  Surgeon: Pierre Holcomb MD;  Location: [facility];  Service: General    ME TENDON  SHEATH INCISION Right 9/9/2021    Procedure: Right long finger trigger finger release;  Surgeon: Brayan Quinteros MD;  [facility];  Service: Orthopedics    VT TENDON SHEATH INCISION Left 9/3/2024    Procedure: Left thumb, long, small and ring trigger finger release;  Surgeon: Brayan Quinteros MD;  Location: [facility];  Service: Orthopedics    VT TENDON SHEATH INCISION Left 9/3/2024    Procedure: Left thumb, long, small and ring trigger finger release;  Surgeon: Brayan Quinteros MD;  [facility]: [facility];  Service: Orthopedics    VT TENDON SHEATH INCISION Left 9/3/2024    Procedure: Left thumb, long, small and ring trigger finger release;  Surgeon: Brayan Quinteros MD;  [facility];  Service: Orthopedics    VT TENDON SHEATH INCISION Left 9/3/2024    Procedure: Left thumb, long, small and ring trigger finger release;  Surgeon: Brayan Quinteros MD;  [facility];  Service: Orthopedics    VT TENDON SHEATH INCISION Right 10/8/2024    Procedure: Right trigger thumb release;  Surgeon: Brayan Quinteros MD;  [facility];  Service: Orthopedics    SHOULDER SURGERY      SPINAL CORD STIMULATOR IMPLANT      SYNOVECTOMY Right 9/9/2021    Procedure: Tenosynovectomy right hand long finger flexor digitorum superficialis and profundus tendon;  Surgeon: Brayan Quinteros MD;  [facility];  Service: Orthopedics         Current Outpatient Medications:     acetaminophen (TYLENOL) 650 mg CR tablet, Take 1 tablet (650 mg total) by mouth every 8 (eight) hours as needed for mild pain (Patient not taking: Reported on 10/16/2024), Disp: 30 tablet, Rfl: 0    acetaminophen (TYLENOL) 650 mg CR tablet, Take 1 tablet (650 mg total) by mouth every 8 (eight) hours as needed for mild pain (Patient not taking: Reported on 10/16/2024), Disp: 30 tablet, Rfl: 0    bisoprolol-hydrochlorothiazide (ZIAC) 2.5-6.25 MG per tablet, Take 1 tablet by mouth daily, Disp: 90 tablet, Rfl: 1    mirtazapine  (REMERON) 15 mg tablet, Take 1 tablet (15 mg total) by mouth daily at bedtime, Disp: 30 tablet, Rfl: 0    Misc. Devices (Roller Walker) MISC, Please provide rollator (Patient not taking: Reported on 10/16/2024), Disp: 1 each, Rfl: 0    naloxone (NARCAN) 4 mg/0.1 mL nasal spray, Administer 1 spray into a nostril. If no response after 2-3 minutes, give another dose in the other nostril using a new spray., Disp: 1 each, Rfl: 1    naproxen sodium (ALEVE) 220 MG tablet, Take 1 tablet (220 mg total) by mouth 2 (two) times a day with meals, Disp: 20 tablet, Rfl: 0    Naproxen Sodium 220 MG CAPS, Take 1 capsule (220 mg total) by mouth 2 (two) times a day (Patient not taking: Reported on 10/21/2024), Disp: 60 capsule, Rfl: 0    oxyCODONE-acetaminophen (PERCOCET)  mg per tablet, Take 1 tablet by mouth every 8 (eight) hours as needed for moderate pain For ongoing therapy DO NOT FILL BEFORE: 10/01/24 Max Daily Amount: 3 tablets, Disp: 90 tablet, Rfl: 0    oxyCODONE-acetaminophen (PERCOCET)  mg per tablet, Take 1 tablet by mouth every 8 (eight) hours as needed for moderate pain for ongoing therapy DO NOT FILL BEFORE: 10/30/24 Max Daily Amount: 3 tablets, Disp: 90 tablet, Rfl: 0    oxyCODONE-acetaminophen (PERCOCET)  mg per tablet, Take 1 tablet by mouth every 8 (eight) hours as needed for moderate pain for ongoing therapy DO NOT FILL BEFORE: 11/28/24 Max Daily Amount: 3 tablets, Disp: 90 tablet, Rfl: 0    rosuvastatin (CRESTOR) 10 MG tablet, Take 1 tablet (10 mg total) by mouth daily at bedtime, Disp: 90 tablet, Rfl: 3    Semaglutide-Weight Management (WEGOVY) 1.7 MG/0.75ML, Inject 0.75 mL (1.7 mg total) under the skin once a week for 28 days, Disp: 3 mL, Rfl: 0    traMADol (Ultram) 50 mg tablet, Take 1 tablet (50 mg total) by mouth every 6 (six) hours as needed for moderate pain (Patient not taking: Reported on 10/16/2024), Disp: 5 tablet, Rfl: 0    Current Facility-Administered Medications:     iohexol  "(OMNIPAQUE) 300 mg/mL injection 1 mL, 1 mL, Intra-articular, Once, Sudhir Lyn DO    methylPREDNISolone acetate (DEPO-MEDROL) injection 40 mg, 40 mg, Intra-articular, Once, Sudhir Lyn DO    ropivacaine (NAROPIN) injection 2 mL, 2 mL, Intra-articular, Once, Sudhir Lyn DO    Allergies   Allergen Reactions    Fentanyl Other (See Comments)     \"Makes me Suicidal\"  Happened when dosage increased.    Penicillins Other (See Comments)     As a child unknown    Pravastatin Other (See Comments)     Reaction Date: 09Nov2011;   Muscle weakness    Sulfa Antibiotics Other (See Comments)     As a child unknown    Vytorin [Ezetimibe-Simvastatin] Myalgia     Reaction Date: 09Nov2011;        Physical Exam:   General: Awake, Alert, Oriented x 3, Mood and affect appropriate  Respiratory: Respirations even and unlabored  Cardiovascular: Peripheral pulses intact; no edema  Musculoskeletal Exam: Decreased range of motion right hip    ASA Score: III         Assessment:   1. Primary osteoarthritis of right hip        Plan: RT hip joint inj    "

## 2024-11-18 DIAGNOSIS — E66.812 CLASS 2 SEVERE OBESITY WITH SERIOUS COMORBIDITY AND BODY MASS INDEX (BMI) OF 38.0 TO 38.9 IN ADULT, UNSPECIFIED OBESITY TYPE (HCC): Primary | ICD-10-CM

## 2024-11-18 DIAGNOSIS — E66.01 CLASS 2 SEVERE OBESITY WITH SERIOUS COMORBIDITY AND BODY MASS INDEX (BMI) OF 38.0 TO 38.9 IN ADULT, UNSPECIFIED OBESITY TYPE (HCC): Primary | ICD-10-CM

## 2024-11-18 RX ORDER — SEMAGLUTIDE 2.4 MG/.75ML
INJECTION, SOLUTION SUBCUTANEOUS
Qty: 3 ML | Refills: 2 | Status: SHIPPED | OUTPATIENT
Start: 2024-11-18 | End: 2024-12-09

## 2024-11-19 ENCOUNTER — TELEPHONE (OUTPATIENT)
Age: 69
End: 2024-11-19

## 2024-11-25 ENCOUNTER — TELEPHONE (OUTPATIENT)
Dept: PAIN MEDICINE | Facility: CLINIC | Age: 69
End: 2024-11-25

## 2024-12-02 ENCOUNTER — HOSPITAL ENCOUNTER (OUTPATIENT)
Dept: RADIOLOGY | Facility: CLINIC | Age: 69
Discharge: HOME | End: 2024-12-02
Payer: COMMERCIAL

## 2024-12-02 ENCOUNTER — OFFICE VISIT (OUTPATIENT)
Dept: OBGYN CLINIC | Facility: CLINIC | Age: 69
End: 2024-12-02

## 2024-12-02 VITALS
HEART RATE: 75 BPM | TEMPERATURE: 98 F | DIASTOLIC BLOOD PRESSURE: 80 MMHG | RESPIRATION RATE: 16 BRPM | SYSTOLIC BLOOD PRESSURE: 157 MMHG | OXYGEN SATURATION: 93 %

## 2024-12-02 DIAGNOSIS — M65.342 TRIGGER RING FINGER OF LEFT HAND: ICD-10-CM

## 2024-12-02 DIAGNOSIS — M25.552 PAIN IN LEFT HIP: ICD-10-CM

## 2024-12-02 DIAGNOSIS — M65.332 TRIGGER MIDDLE FINGER OF LEFT HAND: Primary | ICD-10-CM

## 2024-12-02 PROCEDURE — 77002 NEEDLE LOCALIZATION BY XRAY: CPT | Performed by: ANESTHESIOLOGY

## 2024-12-02 PROCEDURE — 20610 DRAIN/INJ JOINT/BURSA W/O US: CPT | Performed by: ANESTHESIOLOGY

## 2024-12-02 PROCEDURE — 99024 POSTOP FOLLOW-UP VISIT: CPT | Performed by: ORTHOPAEDIC SURGERY

## 2024-12-02 RX ORDER — ROPIVACAINE HYDROCHLORIDE 2 MG/ML
2 INJECTION, SOLUTION EPIDURAL; INFILTRATION; PERINEURAL ONCE
Status: COMPLETED | OUTPATIENT
Start: 2024-12-02 | End: 2024-12-02

## 2024-12-02 RX ORDER — METHYLPREDNISOLONE ACETATE 80 MG/ML
40 INJECTION, SUSPENSION INTRA-ARTICULAR; INTRALESIONAL; INTRAMUSCULAR; PARENTERAL; SOFT TISSUE ONCE
Status: COMPLETED | OUTPATIENT
Start: 2024-12-02 | End: 2024-12-02

## 2024-12-02 RX ADMIN — METHYLPREDNISOLONE ACETATE 40 MG: 80 INJECTION, SUSPENSION INTRA-ARTICULAR; INTRALESIONAL; INTRAMUSCULAR; SOFT TISSUE at 08:54

## 2024-12-02 RX ADMIN — IOHEXOL 1 ML: 300 INJECTION, SOLUTION INTRAVENOUS at 08:54

## 2024-12-02 RX ADMIN — ROPIVACAINE HYDROCHLORIDE 2 ML: 2 INJECTION EPIDURAL; INFILTRATION; PERINEURAL at 08:54

## 2024-12-02 NOTE — DISCHARGE INSTRUCTIONS

## 2024-12-02 NOTE — H&P
History of Present Illness: The patient is a 69 y.o. male who presents with complaints of hip pain.    Past Medical History:   Diagnosis Date    Acute low back pain     10 AUG 2016 RESOLVED    Bunion     Bursitis of hip     Carpal tunnel syndrome     Chronic bilateral low back pain with bilateral sciatica     Chronic lumbar radiculopathy     Chronic narcotic dependence (HCC)     Chronic pain syndrome     Constipation due to opioid therapy     DDD (degenerative disc disease), lumbar     Deep vein thrombosis of left upper extremity (HCC)     [id number]  RESOLVED    Depression     Diverticula of colon     [id number] RESOLVED    DVT (deep venous thrombosis) (HCC)     LUE    Edema     History of staph infection     Hyperglycemia     Hyperlipidemia     Insomnia     Lateral epicondylitis     Left inguinal hernia     Methicillin resistant Staphylococcus aureus septicemia (HCC)     Morbid obesity due to excess calories (HCC)     Obesity     Peripheral neuropathy     Post laminectomy syndrome     RLS (restless legs syndrome)     Septicemia (HCC)     MRSA    Umbilical hernia        Past Surgical History:   Procedure Laterality Date    BACK SURGERY      laminectomy, hardware    CARPAL TUNNEL RELEASE Left     CERVICAL SPINE SURGERY      COLONOSCOPY      ELBOW SURGERY      ELBOW SURGERY      KNEE ARTHROSCOPY Right     knee    KNEE SURGERY Right     ARTHOSCOPY KNEE    NECK SURGERY      1997    RI COLONOSCOPY FLX DX W/COLLJ SPEC WHEN PFRMD N/A 1/24/2017    Procedure: COLONOSCOPY;  Surgeon: Pierre Holcomb MD;  [facility];  Service: General    RI NDSC WRST SURG W/RLS TRANSVRS CARPL LIGM Right 1/2/2020    Procedure: RELEASE CARPAL TUNNEL ENDOSCOPIC, right;  Surgeon: Brayan Quinteros MD;  [facility];  Service: Orthopedics    RI RPR UMBILICAL HRNA 5 YRS/> REDUCIBLE N/A 10/5/2022    Procedure: REPAIR HERNIA UMBILICAL WITH MESH.;  Surgeon: Pierre Holcomb MD;  [facility];  Service: General    RI TENDON  SHEATH INCISION Right 9/9/2021    Procedure: Right long finger trigger finger release;  Surgeon: Brayan Quinteros MD;  [facility];  Service: Orthopedics    TN TENDON SHEATH INCISION Left 9/3/2024    Procedure: Left thumb, long, small and ring trigger finger release;  Surgeon: Brayan Quinteros MD;  [facility];  Service: Orthopedics    TN TENDON SHEATH INCISION Left 9/3/2024    Procedure: Left thumb, long, small and ring trigger finger release;  Surgeon: Brayan Quinteros MD;  [facility];  Service: Orthopedics    TN TENDON SHEATH INCISION Left 9/3/2024    Procedure: Left thumb, long, small and ring trigger finger release;  Surgeon: Brayan Quinteros MD;  [facility];  Service: Orthopedics    TN TENDON SHEATH INCISION Left 9/3/2024    Procedure: Left thumb, long, small and ring trigger finger release;  Surgeon: Brayan Quinteros MD;  [facility];  Service: Orthopedics    TN TENDON SHEATH INCISION Right 10/8/2024    Procedure: Right trigger thumb release;  Surgeon: Brayan Quinteros MD;  [facility];  Service: Orthopedics    SHOULDER SURGERY      SPINAL CORD STIMULATOR IMPLANT      SYNOVECTOMY Right 9/9/2021    Procedure: Tenosynovectomy right hand long finger flexor digitorum superficialis and profundus tendon;  Surgeon: Brayan Quinteros MD;  [facility];  Service: Orthopedics         Current Outpatient Medications:     acetaminophen (TYLENOL) 650 mg CR tablet, Take 1 tablet (650 mg total) by mouth every 8 (eight) hours as needed for mild pain (Patient not taking: Reported on 12/2/2024), Disp: 30 tablet, Rfl: 0    acetaminophen (TYLENOL) 650 mg CR tablet, Take 1 tablet (650 mg total) by mouth every 8 (eight) hours as needed for mild pain (Patient not taking: Reported on 12/2/2024), Disp: 30 tablet, Rfl: 0    bisoprolol-hydrochlorothiazide (ZIAC) 2.5-6.25 MG per tablet, Take 1 tablet by mouth daily, Disp: 90 tablet, Rfl: 1    mirtazapine  (REMERON) 15 mg tablet, Take 1 tablet (15 mg total) by mouth daily at bedtime, Disp: 30 tablet, Rfl: 0    Misc. Devices (Roller Walker) MISC, Please provide rollator (Patient not taking: Reported on 10/16/2024), Disp: 1 each, Rfl: 0    naloxone (NARCAN) 4 mg/0.1 mL nasal spray, Administer 1 spray into a nostril. If no response after 2-3 minutes, give another dose in the other nostril using a new spray., Disp: 1 each, Rfl: 1    naproxen sodium (ALEVE) 220 MG tablet, Take 1 tablet (220 mg total) by mouth 2 (two) times a day with meals (Patient not taking: Reported on 12/2/2024), Disp: 20 tablet, Rfl: 0    Naproxen Sodium 220 MG CAPS, Take 1 capsule (220 mg total) by mouth 2 (two) times a day (Patient not taking: Reported on 12/2/2024), Disp: 60 capsule, Rfl: 0    oxyCODONE-acetaminophen (PERCOCET)  mg per tablet, Take 1 tablet by mouth every 8 (eight) hours as needed for moderate pain For ongoing therapy DO NOT FILL BEFORE: 10/01/24 Max Daily Amount: 3 tablets, Disp: 90 tablet, Rfl: 0    oxyCODONE-acetaminophen (PERCOCET)  mg per tablet, Take 1 tablet by mouth every 8 (eight) hours as needed for moderate pain for ongoing therapy DO NOT FILL BEFORE: 10/30/24 Max Daily Amount: 3 tablets (Patient not taking: Reported on 12/2/2024), Disp: 90 tablet, Rfl: 0    oxyCODONE-acetaminophen (PERCOCET)  mg per tablet, Take 1 tablet by mouth every 8 (eight) hours as needed for moderate pain for ongoing therapy DO NOT FILL BEFORE: 11/28/24 Max Daily Amount: 3 tablets (Patient not taking: Reported on 12/2/2024), Disp: 90 tablet, Rfl: 0    rosuvastatin (CRESTOR) 10 MG tablet, Take 1 tablet (10 mg total) by mouth daily at bedtime, Disp: 90 tablet, Rfl: 3    Semaglutide-Weight Management (Wegovy) 2.4 MG/0.75ML, Inject 2.4 mg under the skin weekly, Disp: 3 mL, Rfl: 2    traMADol (Ultram) 50 mg tablet, Take 1 tablet (50 mg total) by mouth every 6 (six) hours as needed for moderate pain (Patient not taking: Reported on  "10/16/2024), Disp: 5 tablet, Rfl: 0    Current Facility-Administered Medications:     iohexol (OMNIPAQUE) 300 mg/mL injection 1 mL, 1 mL, Intra-articular, Once, Sudhir Lyn,     methylPREDNISolone acetate (DEPO-MEDROL) injection 40 mg, 40 mg, Intra-articular, Once, Sudhir Lyn,     ropivacaine (NAROPIN) injection 2 mL, 2 mL, Intra-articular, Once, Sudhir Lyn,     Allergies   Allergen Reactions    Fentanyl Other (See Comments)     \"Makes me Suicidal\"  Happened when dosage increased.    Penicillins Other (See Comments)     As a child unknown    Pravastatin Other (See Comments)     Reaction Date: 09Nov2011;   Muscle weakness    Sulfa Antibiotics Other (See Comments)     As a child unknown    Vytorin [Ezetimibe-Simvastatin] Myalgia     Reaction Date: 09Nov2011;        Physical Exam:   Vitals:    12/02/24 0842   BP: 145/89   Pulse: 75   Resp: 16   Temp: 98 °F (36.7 °C)   SpO2: 96%     General: Awake, Alert, Oriented x 3, Mood and affect appropriate  Respiratory: Respirations even and unlabored  Cardiovascular: Peripheral pulses intact; no edema  Musculoskeletal Exam: Decreased range of motion left hip    ASA Score: II    Patient/Chart Verification  Patient ID Verified: Verbal  ID Band Applied: No  Consents Confirmed: Procedural, To be obtained in the Pre-Procedure area  Interval H&P(within 24 hr) Complete (required for Outpatients and Surgery Admit only): To be obtained in the Procedural area  Allergies Reviewed: Yes  Anticoag/NSAID held?: NA  Currently on antibiotics?: No    Assessment:   1. Pain in left hip        Plan: Left hip inj 20610    "

## 2024-12-02 NOTE — PROGRESS NOTES
Assessment:   S/P Right trigger thumb release - Right on 10/8/2024  S/p left thumb, long, ring and small trigger finger release    Plan:   Patient is doing well after the cortisone injections that were given to him into the long and the ring finger.  He states he occasionally has some clicking of the small finger on the left side of which she would like to continue to observe  He can use his hand as tolerated for his activities of daily living  He will follow-up with us as needed      Follow Up:  As needed    To Do Next Visit:  Reevaluation      CHIEF COMPLAINT:  Chief Complaint   Patient presents with    Left Hand - Follow-up     Long & Ring TFR- 9/3/24  Long & Ring TF- injection- 10/21/24         SUBJECTIVE:  Yayo BANEGAS Luis Pastor is a 69 y.o. male who presents for follow up after Right trigger thumb release - Right on 10/8/2024.  Today patient is doing well with no complaints.  He states that the cortisone injections for the long and the ring finger on the left seem to help with pain, discomfort and any residual clicking.  He will still note occasional clicking of the left small finger from time to time but it does not cause pain or discomfort.    PHYSICAL EXAMINATION:  Vital signs: There were no vitals taken for this visit.  General: well developed and well nourished, alert, oriented times 3, and appears comfortable  Psychiatric: Normal    MUSCULOSKELETAL EXAMINATION:  Incision: clean, dry, and healed  Range of Motion: As expected and full composite fist possible  Left hand  Incisions are well-healed without signs of infection  No tenderness to palpation over the left hand long, ring and small finger  No clicking appreciated of the digits  Neurovascular status: Neuro intact, good cap refill  Activity Restrictions: No restrictions        STUDIES REVIEWED:  No Studies to review      PROCEDURES PERFORMED:  Procedures   No additional procedures were performed at today's visit    Scribe Attestation      I,:  Reagan  WILLIAM Hale PA-C am acting as a scribe while in the presence of the attending physician.:       I,:  Brayan Quinteros MD personally performed the services described in this documentation    as scribed in my presence.:

## 2024-12-03 ENCOUNTER — OFFICE VISIT (OUTPATIENT)
Dept: PAIN MEDICINE | Facility: CLINIC | Age: 69
End: 2024-12-03
Payer: COMMERCIAL

## 2024-12-03 VITALS
TEMPERATURE: 98 F | WEIGHT: 270 LBS | BODY MASS INDEX: 38.65 KG/M2 | SYSTOLIC BLOOD PRESSURE: 132 MMHG | HEART RATE: 71 BPM | DIASTOLIC BLOOD PRESSURE: 82 MMHG | HEIGHT: 70 IN

## 2024-12-03 DIAGNOSIS — M51.369 LUMBAR DEGENERATIVE DISC DISEASE: ICD-10-CM

## 2024-12-03 DIAGNOSIS — M25.50 POLYARTHRALGIA: ICD-10-CM

## 2024-12-03 DIAGNOSIS — M96.1 LUMBAR POST-LAMINECTOMY SYNDROME: ICD-10-CM

## 2024-12-03 DIAGNOSIS — G89.4 CHRONIC PAIN SYNDROME: ICD-10-CM

## 2024-12-03 DIAGNOSIS — F11.20 UNCOMPLICATED OPIOID DEPENDENCE (HCC): ICD-10-CM

## 2024-12-03 DIAGNOSIS — M16.0 PRIMARY OSTEOARTHRITIS OF BOTH HIPS: Primary | ICD-10-CM

## 2024-12-03 DIAGNOSIS — M47.816 LUMBAR SPONDYLOSIS: ICD-10-CM

## 2024-12-03 DIAGNOSIS — G60.9 IDIOPATHIC PERIPHERAL NEUROPATHY: ICD-10-CM

## 2024-12-03 DIAGNOSIS — M54.16 LUMBAR RADICULOPATHY: ICD-10-CM

## 2024-12-03 DIAGNOSIS — Z79.891 LONG-TERM CURRENT USE OF OPIATE ANALGESIC: ICD-10-CM

## 2024-12-03 PROCEDURE — 99214 OFFICE O/P EST MOD 30 MIN: CPT | Performed by: PHYSICIAN ASSISTANT

## 2024-12-03 RX ORDER — OXYCODONE AND ACETAMINOPHEN 10; 325 MG/1; MG/1
1 TABLET ORAL EVERY 8 HOURS PRN
Qty: 90 TABLET | Refills: 0 | Status: CANCELLED | OUTPATIENT
Start: 2024-12-03

## 2024-12-03 RX ORDER — HYDROCODONE BITARTRATE AND ACETAMINOPHEN 10; 325 MG/1; MG/1
1 TABLET ORAL EVERY 6 HOURS PRN
Qty: 30 TABLET | Refills: 0 | Status: SHIPPED | OUTPATIENT
Start: 2024-12-03

## 2024-12-03 RX ORDER — HYDROCODONE BITARTRATE AND ACETAMINOPHEN 10; 325 MG/1; MG/1
1 TABLET ORAL EVERY 8 HOURS PRN
Qty: 90 TABLET | Refills: 0 | Status: SHIPPED | OUTPATIENT
Start: 2024-12-03

## 2024-12-03 NOTE — PROGRESS NOTES
Assessment:  1. Primary osteoarthritis of both hips    2. Lumbar spondylosis    3. Lumbar post-laminectomy syndrome    4. Lumbar degenerative disc disease    5. Lumbar radiculopathy    6. Polyarthralgia    7. Idiopathic peripheral neuropathy    8. Chronic pain syndrome    9. Uncomplicated opioid dependence (HCC)    10. Long-term current use of opiate analgesic        Plan:  While the patient was in the office today, I did have a thorough conversation regarding their chronic pain syndrome, medication management, and treatment plan options.    After discussing options, the patient feels in retrospect that the Norco provided more relief than the Percocet.  We will switch him back to Norco 10/325 every 8 hours as needed for pain.  The medication was electronically sent to his pharmacy for the next 3 months with the appropriate fill dates.    I have placed an order for an x-ray of the lumbar spine on today's visit due to his increasing complaints.  Consider left SI joint injection.    Consider orthopedic referral regarding the hip pain/moderate osteoarthritis.    [place] Prescription Drug Monitoring Program report was reviewed and was appropriate     There are risks associated with opioid medications, including dependence, addiction and tolerance. The patient understands and agrees to use these medications only as prescribed. Potential side effects of the medications include, but are not limited to, constipation, drowsiness, addiction, impaired judgment and risk of fatal overdose if not taken as prescribed. The patient was warned against driving while taking sedation medications.  Sharing medications is a felony. At this point in time, the patient is showing no signs of addiction, abuse, diversion or suicidal ideation.    The patient will follow-up in 12 weeks for medication prescription refill and reevaluation. The patient was advised to contact the office should their symptoms worsen in the interim. The patient  was agreeable and verbalized an understanding.        History of Present Illness:    The patient is a 69 y.o. male last seen on 12/2/2024 who presents for a follow up office visit in regards to chronic low back pain secondary to lumbar postlaminectomy pain syndrome, lumbar spondylosis, sacroiliitis as well as peripheral neuropathy lower extremities and bilateral hip pain secondary to moderate osteoarthritis.   The patient currently reports an overall pain level of 7 out of 10 and described as a constant burning, sharp, throbbing and pressure-like and shooting pain.  Patient has intermittent numbness and paresthesias in bilateral feet.  On his last visit he underwent a left hip joint injection proceeded by the right hip joint.  Patient states that the right hip joint injection did not provide as much relief as it had in the past.  He is also noting increased left-sided low back pain/sacral pain with referred pain into the buttock and hip.  He has been taking Percocet 10/325 3 times daily as needed and does not feel that it helps him as much as the Norco did.    Pain Contract Signed: 1/12/2024  Last Urine Drug Screen: 9/10/2024    I have personally reviewed and/or updated the patient's past medical history, past surgical history, family history, social history, current medications, allergies, and vital signs today.       Review of Systems:    Review of Systems   Respiratory:  Positive for shortness of breath.    Cardiovascular:  Negative for chest pain.   Gastrointestinal:  Negative for constipation, diarrhea, nausea and vomiting.   Musculoskeletal:  Positive for gait problem. Negative for arthralgias, joint swelling (Joint stiffness) and myalgias.   Skin:  Negative for rash.   Neurological:  Positive for weakness. Negative for dizziness and seizures.   All other systems reviewed and are negative.        Past Medical History:   Diagnosis Date    Acute low back pain     10 AUG 2016 RESOLVED    Bunion     Bursitis of  hip     Carpal tunnel syndrome     Chronic bilateral low back pain with bilateral sciatica     Chronic lumbar radiculopathy     Chronic narcotic dependence (HCC)     Chronic pain syndrome     Constipation due to opioid therapy     DDD (degenerative disc disease), lumbar     Deep vein thrombosis of left upper extremity (HCC)     [id number]  RESOLVED    Depression     Diverticula of colon     [id number] RESOLVED    DVT (deep venous thrombosis) (HCC)     LUE    Edema     History of staph infection     Hyperglycemia     Hyperlipidemia     Insomnia     Lateral epicondylitis     Left inguinal hernia     Methicillin resistant Staphylococcus aureus septicemia (HCC)     Morbid obesity due to excess calories (HCC)     Obesity     Peripheral neuropathy     Post laminectomy syndrome     RLS (restless legs syndrome)     Septicemia (HCC)     MRSA    Umbilical hernia        Past Surgical History:   Procedure Laterality Date    BACK SURGERY      laminectomy, hardware    CARPAL TUNNEL RELEASE Left     CERVICAL SPINE SURGERY      COLONOSCOPY      ELBOW SURGERY      ELBOW SURGERY      KNEE ARTHROSCOPY Right     knee    KNEE SURGERY Right     ARTHOSCOPY KNEE    NECK SURGERY      1997    CT COLONOSCOPY FLX DX W/COLLJ SPEC WHEN PFRMD N/A 1/24/2017    Procedure: COLONOSCOPY;  Surgeon: Pierre Holcomb MD;  [facility];  Service: General    CT NDSC WRST SURG W/RLS TRANSVRS CARPL LIGM Right 1/2/2020    Procedure: RELEASE CARPAL TUNNEL ENDOSCOPIC, right;  Surgeon: Brayan Quinteros MD;  [facility];  Service: Orthopedics    CT RPR UMBILICAL HRNA 5 YRS/> REDUCIBLE N/A 10/5/2022    Procedure: REPAIR HERNIA UMBILICAL WITH MESH.;  Surgeon: Pierre Holcomb MD;  [facility];  Service: General    CT TENDON SHEATH INCISION Right 9/9/2021    Procedure: Right long finger trigger finger release;  Surgeon: Brayan Quinteros MD;  [facility];  Service: Orthopedics    CT TENDON SHEATH INCISION Left 9/3/2024     Procedure: Left thumb, long, small and ring trigger finger release;  Surgeon: Brayan Quinteros MD;  [facility];  Service: Orthopedics    MD TENDON SHEATH INCISION Left 9/3/2024    Procedure: Left thumb, long, small and ring trigger finger release;  Surgeon: Brayan Quinteros MD;  [facility];  Service: Orthopedics    MD TENDON SHEATH INCISION Left 9/3/2024    Procedure: Left thumb, long, small and ring trigger finger release;  Surgeon: Brayan Quinteros MD;  [facility];  Service: Orthopedics    MD TENDON SHEATH INCISION Left 9/3/2024    Procedure: Left thumb, long, small and ring trigger finger release;  Surgeon: Brayan Quinteros MD;  [facility];  Service: Orthopedics    MD TENDON SHEATH INCISION Right 10/8/2024    Procedure: Right trigger thumb release;  Surgeon: Brayan Quinteros MD;  [facility];  Service: Orthopedics    SHOULDER SURGERY      SPINAL CORD STIMULATOR IMPLANT      SYNOVECTOMY Right 9/9/2021    Procedure: Tenosynovectomy right hand long finger flexor digitorum superficialis and profundus tendon;  Surgeon: Brayan Quinteros MD;  [facility];  Service: Orthopedics       Family History   Problem Relation Age of Onset    Heart disease Mother     Cancer Mother         breast, stomach    Aneurysm Mother     Heart disease Father     Cancer Father         skin, liver, colon DECESED AGE 62       Social History     Occupational History    Not on file   Tobacco Use    Smoking status: Never    Smokeless tobacco: Never   Vaping Use    Vaping status: Never Used   Substance and Sexual Activity    Alcohol use: Yes     Comment: occasional- less than 1 drink per week    Drug use: No     Comment: opiod use for chronic pain    Sexual activity: Yes         Current Outpatient Medications:     bisoprolol-hydrochlorothiazide (ZIAC) 2.5-6.25 MG per tablet, Take 1 tablet by mouth daily, Disp: 90 tablet, Rfl: 1    HYDROcodone-acetaminophen (NORCO)  mg per  tablet, Take 1 tablet by mouth every 8 (eight) hours as needed for moderate pain For ongoing therapy Max Daily Amount: 3 tablets, Disp: 90 tablet, Rfl: 0    HYDROcodone-acetaminophen (NORCO)  mg per tablet, Take 1 tablet by mouth every 6 (six) hours as needed for moderate pain For ongoing therapy DO NOT FILL BEFORE: 12/31/24 Max Daily Amount: 4 tablets, Disp: 30 tablet, Rfl: 0    HYDROcodone-acetaminophen (NORCO)  mg per tablet, Take 1 tablet by mouth every 8 (eight) hours as needed for moderate pain For ongoing therapy DO NOT FILL BEFORE: 01/29/25 Max Daily Amount: 3 tablets, Disp: 90 tablet, Rfl: 0    HYDROCODONE-ACETAMINOPHEN PO, Take by mouth, Disp: , Rfl:     mirtazapine (REMERON) 15 mg tablet, Take 1 tablet (15 mg total) by mouth daily at bedtime, Disp: 30 tablet, Rfl: 0    naproxen sodium (ALEVE) 220 MG tablet, Take 1 tablet (220 mg total) by mouth 2 (two) times a day with meals, Disp: 20 tablet, Rfl: 0    rosuvastatin (CRESTOR) 10 MG tablet, Take 1 tablet (10 mg total) by mouth daily at bedtime, Disp: 90 tablet, Rfl: 3    Semaglutide-Weight Management (Wegovy) 2.4 MG/0.75ML, Inject 2.4 mg under the skin weekly, Disp: 3 mL, Rfl: 2    acetaminophen (TYLENOL) 650 mg CR tablet, Take 1 tablet (650 mg total) by mouth every 8 (eight) hours as needed for mild pain (Patient not taking: Reported on 10/16/2024), Disp: 30 tablet, Rfl: 0    acetaminophen (TYLENOL) 650 mg CR tablet, Take 1 tablet (650 mg total) by mouth every 8 (eight) hours as needed for mild pain (Patient not taking: Reported on 10/16/2024), Disp: 30 tablet, Rfl: 0    Misc. Devices (Roller Walker) MISC, Please provide rollator (Patient not taking: Reported on 10/16/2024), Disp: 1 each, Rfl: 0    naloxone (NARCAN) 4 mg/0.1 mL nasal spray, Administer 1 spray into a nostril. If no response after 2-3 minutes, give another dose in the other nostril using a new spray., Disp: 1 each, Rfl: 1    Naproxen Sodium 220 MG CAPS, Take 1 capsule (220 mg  "total) by mouth 2 (two) times a day (Patient not taking: Reported on 12/2/2024), Disp: 60 capsule, Rfl: 0  No current facility-administered medications for this visit.    Allergies   Allergen Reactions    Fentanyl Other (See Comments)     \"Makes me Suicidal\"  Happened when dosage increased.    Penicillins Other (See Comments)     As a child unknown    Pravastatin Other (See Comments)     Reaction Date: 09Nov2011;   Muscle weakness    Sulfa Antibiotics Other (See Comments)     As a child unknown    Vytorin [Ezetimibe-Simvastatin] Myalgia     Reaction Date: 09Nov2011;        Physical Exam:    /82 (BP Location: Left arm, Patient Position: Sitting, Cuff Size: Large)   Pulse 71   Temp 98 °F (36.7 °C)   Ht 5' 10\" (1.778 m)   Wt 122 kg (270 lb)   BMI 38.74 kg/m²     Constitutional:normal, well developed, well nourished, alert, in no distress and non-toxic and no overt pain behavior. and obese  Eyes:anicteric  HEENT:grossly intact  Neck:supple, symmetric, trachea midline and no masses   Pulmonary:even and unlabored  Cardiovascular:No edema or pitting edema present  Skin:Normal without rashes or lesions and well hydrated  Psychiatric:Mood and affect appropriate  Neurologic:Cranial Nerves II-XII grossly intact  Musculoskeletal: Lumbar scar from prior surgery well-healed, tender left sacroiliac joint, + Cristela finger sign + Patricks test +Gaenslen's test+ Posterior pelvic pain provocation, stable gait      Imaging  XR spine lumbar 2 or 3 views injury    (Results Pending)         Orders Placed This Encounter   Procedures    XR spine lumbar 2 or 3 views injury       "

## 2024-12-05 ENCOUNTER — HOSPITAL ENCOUNTER (OUTPATIENT)
Dept: RADIOLOGY | Facility: HOSPITAL | Age: 69
End: 2024-12-05
Payer: COMMERCIAL

## 2024-12-05 DIAGNOSIS — M51.369 LUMBAR DEGENERATIVE DISC DISEASE: ICD-10-CM

## 2024-12-05 DIAGNOSIS — M47.816 LUMBAR SPONDYLOSIS: ICD-10-CM

## 2024-12-05 DIAGNOSIS — M96.1 LUMBAR POST-LAMINECTOMY SYNDROME: ICD-10-CM

## 2024-12-05 DIAGNOSIS — M54.16 LUMBAR RADICULOPATHY: ICD-10-CM

## 2024-12-05 PROCEDURE — 72100 X-RAY EXAM L-S SPINE 2/3 VWS: CPT

## 2024-12-09 ENCOUNTER — RESULTS FOLLOW-UP (OUTPATIENT)
Dept: PAIN MEDICINE | Facility: CLINIC | Age: 69
End: 2024-12-09

## 2024-12-09 DIAGNOSIS — E66.812 CLASS 2 SEVERE OBESITY WITH SERIOUS COMORBIDITY AND BODY MASS INDEX (BMI) OF 38.0 TO 38.9 IN ADULT, UNSPECIFIED OBESITY TYPE (HCC): ICD-10-CM

## 2024-12-09 DIAGNOSIS — I10 ESSENTIAL HYPERTENSION: ICD-10-CM

## 2024-12-09 DIAGNOSIS — E66.01 CLASS 2 SEVERE OBESITY WITH SERIOUS COMORBIDITY AND BODY MASS INDEX (BMI) OF 38.0 TO 38.9 IN ADULT, UNSPECIFIED OBESITY TYPE (HCC): ICD-10-CM

## 2024-12-09 RX ORDER — SEMAGLUTIDE 2.4 MG/.75ML
INJECTION, SOLUTION SUBCUTANEOUS
Qty: 3 ML | Refills: 2 | Status: SHIPPED | OUTPATIENT
Start: 2024-12-09 | End: 2024-12-30

## 2024-12-09 NOTE — TELEPHONE ENCOUNTER
Caller: patient    Doctor: Mik    Reason for call: Calling back, regarding his Xray results    Call back#:

## 2024-12-09 NOTE — TELEPHONE ENCOUNTER
December 9, 2024         12/9/24  3:05 PM  Ivonne Starr routed this conversation to [facility]  Ivonne POWER    12/9/24  3:05 PM  Note     Caller: patient     Doctor: Mik     Reason for call: Calling back, regarding his Xray results     Call back#:

## 2024-12-09 NOTE — TELEPHONE ENCOUNTER
Medication: bisoprolol-hydrochlorothiazide (ZIAC) 2.5-6.25 MG per tablet     Dose/Frequency:     Take 1 tablet by mouth daily       Quantity: 90 tabs    Pharmacy:  [facility]     Office:   [x] PCP/Provider -   [] Speciality/Provider -     Does the patient have enough for 3 days?   [] Yes   [] No - Send as HP to POD

## 2024-12-09 NOTE — TELEPHONE ENCOUNTER
----- Message from Helena Todd PA-C sent at 12/9/2024  6:29 AM EST -----  Please let patient know his lumbar xray shows stable arthritic and post-op changes.

## 2024-12-10 RX ORDER — BISOPROLOL FUMARATE AND HYDROCHLOROTHIAZIDE 2.5; 6.25 MG/1; MG/1
1 TABLET ORAL DAILY
Qty: 90 TABLET | Refills: 0 | Status: SHIPPED | OUTPATIENT
Start: 2024-12-10

## 2024-12-16 ENCOUNTER — TELEPHONE (OUTPATIENT)
Dept: PAIN MEDICINE | Facility: CLINIC | Age: 69
End: 2024-12-16

## 2024-12-17 DIAGNOSIS — E78.2 MIXED HYPERLIPIDEMIA: ICD-10-CM

## 2024-12-18 RX ORDER — ROSUVASTATIN CALCIUM 10 MG/1
10 TABLET, COATED ORAL
Qty: 90 TABLET | Refills: 1 | Status: SHIPPED | OUTPATIENT
Start: 2024-12-18

## 2024-12-31 DIAGNOSIS — M47.816 LUMBAR SPONDYLOSIS: ICD-10-CM

## 2024-12-31 DIAGNOSIS — M51.369 LUMBAR DEGENERATIVE DISC DISEASE: ICD-10-CM

## 2024-12-31 DIAGNOSIS — M25.50 POLYARTHRALGIA: ICD-10-CM

## 2024-12-31 DIAGNOSIS — G89.4 CHRONIC PAIN SYNDROME: ICD-10-CM

## 2024-12-31 DIAGNOSIS — M54.16 LUMBAR RADICULOPATHY: ICD-10-CM

## 2024-12-31 DIAGNOSIS — M96.1 LUMBAR POST-LAMINECTOMY SYNDROME: ICD-10-CM

## 2024-12-31 DIAGNOSIS — G60.9 IDIOPATHIC PERIPHERAL NEUROPATHY: ICD-10-CM

## 2024-12-31 NOTE — TELEPHONE ENCOUNTER
[id number] 12/03/2024 12/03/2024 HYDROcodone BITARTRATE 10 MG ORAL TABLET/ACETAMINOPHEN 325 MG (Tablet) 90.0 30 325 MG-10 MG 30.0 [facility]

## 2025-01-02 RX ORDER — HYDROCODONE BITARTRATE AND ACETAMINOPHEN 10; 325 MG/1; MG/1
1 TABLET ORAL EVERY 8 HOURS PRN
Qty: 90 TABLET | Refills: 0 | OUTPATIENT
Start: 2025-01-02

## 2025-01-02 NOTE — TELEPHONE ENCOUNTER
S/w pt, advised of norco rx with DNF 12/31/24 and 1/29/25. Please fu with [facility]. Cb if questions / issues arise. Pt verbalized understanding and appreciation.

## 2025-01-06 NOTE — TELEPHONE ENCOUNTER
S/w pharmacist and confirmed [place] #30 pills were dispensed as written for Rx dated 12/3/24, DNF 12/31/24. Rx dated 12/3 and rx dated 12/3 with dnf 1/29/25 are written for #90 pills. Pt is calling for the balance of the rx. Please advise.

## 2025-01-06 NOTE — TELEPHONE ENCOUNTER
Caller: Bruna (Spouse)      Doctor: Perez      Reason for call: Patients hydrocodone was filled the [facility] in [facility].     He typically gets 90 for the month because he does take it up 4x a day and this will not cover that.      Asking for the remainder to be sent to the pharmacy.     Please advise      Call back#: [phone]

## 2025-01-06 NOTE — TELEPHONE ENCOUNTER
Caller: Bruna (Spouse)     Doctor: Perez     Reason for call: Patients hydrocodone was filled the [facility] in [facility].    He typically gets 90 for the month because he does take it up 4x a day and this will not cover that.     Asking for the remainder to be sent to the pharmacy.    Please advise     Call back#: [phone]

## 2025-01-07 DIAGNOSIS — G60.9 IDIOPATHIC PERIPHERAL NEUROPATHY: ICD-10-CM

## 2025-01-07 DIAGNOSIS — M51.369 LUMBAR DEGENERATIVE DISC DISEASE: ICD-10-CM

## 2025-01-07 DIAGNOSIS — M25.50 POLYARTHRALGIA: ICD-10-CM

## 2025-01-07 DIAGNOSIS — M96.1 LUMBAR POST-LAMINECTOMY SYNDROME: ICD-10-CM

## 2025-01-07 DIAGNOSIS — M47.816 LUMBAR SPONDYLOSIS: ICD-10-CM

## 2025-01-07 DIAGNOSIS — M54.16 LUMBAR RADICULOPATHY: ICD-10-CM

## 2025-01-07 DIAGNOSIS — G89.4 CHRONIC PAIN SYNDROME: ICD-10-CM

## 2025-01-07 RX ORDER — HYDROCODONE BITARTRATE AND ACETAMINOPHEN 10; 325 MG/1; MG/1
1 TABLET ORAL EVERY 8 HOURS PRN
Qty: 60 TABLET | Refills: 0 | Status: SHIPPED | OUTPATIENT
Start: 2025-01-07

## 2025-01-07 NOTE — TELEPHONE ENCOUNTER
S/w pt's wife, Bruna, per medical communication consent on file. Advised of above. Bruna verbalized understanding and appreciation. Will proceed as discussed.

## 2025-01-13 DIAGNOSIS — E66.01 CLASS 2 SEVERE OBESITY WITH SERIOUS COMORBIDITY AND BODY MASS INDEX (BMI) OF 38.0 TO 38.9 IN ADULT, UNSPECIFIED OBESITY TYPE (HCC): Primary | ICD-10-CM

## 2025-01-13 DIAGNOSIS — E66.812 CLASS 2 SEVERE OBESITY WITH SERIOUS COMORBIDITY AND BODY MASS INDEX (BMI) OF 38.0 TO 38.9 IN ADULT, UNSPECIFIED OBESITY TYPE (HCC): Primary | ICD-10-CM

## 2025-01-13 RX ORDER — SEMAGLUTIDE 2.4 MG/.75ML
INJECTION, SOLUTION SUBCUTANEOUS
Qty: 3 ML | Refills: 5 | Status: SHIPPED | OUTPATIENT
Start: 2025-01-13 | End: 2025-02-10

## 2025-02-24 ENCOUNTER — OFFICE VISIT (OUTPATIENT)
Dept: PAIN MEDICINE | Facility: CLINIC | Age: 70
End: 2025-02-24
Payer: COMMERCIAL

## 2025-02-24 ENCOUNTER — OFFICE VISIT (OUTPATIENT)
Dept: FAMILY MEDICINE CLINIC | Facility: HOSPITAL | Age: 70
End: 2025-02-24
Payer: COMMERCIAL

## 2025-02-24 VITALS
HEIGHT: 70 IN | BODY MASS INDEX: 38.74 KG/M2 | SYSTOLIC BLOOD PRESSURE: 124 MMHG | HEART RATE: 72 BPM | WEIGHT: 270.6 LBS | DIASTOLIC BLOOD PRESSURE: 78 MMHG | TEMPERATURE: 97.8 F

## 2025-02-24 VITALS — WEIGHT: 273 LBS | OXYGEN SATURATION: 95 % | HEART RATE: 75 BPM | BODY MASS INDEX: 39.08 KG/M2 | HEIGHT: 70 IN

## 2025-02-24 DIAGNOSIS — M96.1 LUMBAR POST-LAMINECTOMY SYNDROME: ICD-10-CM

## 2025-02-24 DIAGNOSIS — E66.01 CLASS 2 SEVERE OBESITY DUE TO EXCESS CALORIES WITH SERIOUS COMORBIDITY AND BODY MASS INDEX (BMI) OF 38.0 TO 38.9 IN ADULT (HCC): ICD-10-CM

## 2025-02-24 DIAGNOSIS — Z00.00 ANNUAL PHYSICAL EXAM: ICD-10-CM

## 2025-02-24 DIAGNOSIS — M25.511 CHRONIC RIGHT SHOULDER PAIN: ICD-10-CM

## 2025-02-24 DIAGNOSIS — Z12.5 SCREENING FOR PROSTATE CANCER: ICD-10-CM

## 2025-02-24 DIAGNOSIS — G60.9 IDIOPATHIC PERIPHERAL NEUROPATHY: ICD-10-CM

## 2025-02-24 DIAGNOSIS — M51.369 LUMBAR DEGENERATIVE DISC DISEASE: ICD-10-CM

## 2025-02-24 DIAGNOSIS — K59.09 CHRONIC CONSTIPATION: ICD-10-CM

## 2025-02-24 DIAGNOSIS — F11.20 CONTINUOUS OPIOID DEPENDENCE (HCC): ICD-10-CM

## 2025-02-24 DIAGNOSIS — I10 ESSENTIAL HYPERTENSION: Primary | ICD-10-CM

## 2025-02-24 DIAGNOSIS — F11.20 UNCOMPLICATED OPIOID DEPENDENCE (HCC): ICD-10-CM

## 2025-02-24 DIAGNOSIS — M47.816 LUMBAR SPONDYLOSIS: Primary | ICD-10-CM

## 2025-02-24 DIAGNOSIS — G89.4 CHRONIC PAIN SYNDROME: ICD-10-CM

## 2025-02-24 DIAGNOSIS — R73.03 PREDIABETES: ICD-10-CM

## 2025-02-24 DIAGNOSIS — M25.50 POLYARTHRALGIA: ICD-10-CM

## 2025-02-24 DIAGNOSIS — F32.1 CURRENT MODERATE EPISODE OF MAJOR DEPRESSIVE DISORDER WITHOUT PRIOR EPISODE (HCC): ICD-10-CM

## 2025-02-24 DIAGNOSIS — M54.16 LUMBAR RADICULOPATHY: ICD-10-CM

## 2025-02-24 DIAGNOSIS — E78.2 MIXED HYPERLIPIDEMIA: ICD-10-CM

## 2025-02-24 DIAGNOSIS — G89.29 CHRONIC RIGHT SHOULDER PAIN: ICD-10-CM

## 2025-02-24 DIAGNOSIS — Z79.891 LONG-TERM CURRENT USE OF OPIATE ANALGESIC: ICD-10-CM

## 2025-02-24 DIAGNOSIS — G47.33 OBSTRUCTIVE SLEEP APNEA: ICD-10-CM

## 2025-02-24 DIAGNOSIS — E66.812 CLASS 2 SEVERE OBESITY DUE TO EXCESS CALORIES WITH SERIOUS COMORBIDITY AND BODY MASS INDEX (BMI) OF 38.0 TO 38.9 IN ADULT (HCC): ICD-10-CM

## 2025-02-24 PROBLEM — R60.0 LOWER LEG EDEMA: Status: RESOLVED | Noted: 2024-04-30 | Resolved: 2025-02-24

## 2025-02-24 PROBLEM — R07.9 CHEST PAIN, EXERTIONAL: Status: RESOLVED | Noted: 2019-06-19 | Resolved: 2025-02-24

## 2025-02-24 PROBLEM — R19.8: Status: RESOLVED | Noted: 2024-04-30 | Resolved: 2025-02-24

## 2025-02-24 PROBLEM — M79.642 LEFT HAND PAIN: Status: RESOLVED | Noted: 2023-09-12 | Resolved: 2025-02-24

## 2025-02-24 PROCEDURE — 99214 OFFICE O/P EST MOD 30 MIN: CPT | Performed by: PHYSICIAN ASSISTANT

## 2025-02-24 PROCEDURE — 99214 OFFICE O/P EST MOD 30 MIN: CPT | Performed by: NURSE PRACTITIONER

## 2025-02-24 PROCEDURE — 99397 PER PM REEVAL EST PAT 65+ YR: CPT | Performed by: NURSE PRACTITIONER

## 2025-02-24 RX ORDER — TIRZEPATIDE 2.5 MG/.5ML
2.5 INJECTION, SOLUTION SUBCUTANEOUS WEEKLY
Qty: 2 ML | Refills: 0 | Status: SHIPPED | OUTPATIENT
Start: 2025-02-24

## 2025-02-24 RX ORDER — TIRZEPATIDE 5 MG/.5ML
5 INJECTION, SOLUTION SUBCUTANEOUS WEEKLY
Qty: 2 ML | Refills: 0 | Status: SHIPPED | OUTPATIENT
Start: 2025-02-24

## 2025-02-24 RX ORDER — HYDROCODONE BITARTRATE AND ACETAMINOPHEN 10; 325 MG/1; MG/1
1 TABLET ORAL EVERY 8 HOURS PRN
Qty: 90 TABLET | Refills: 0 | Status: SHIPPED | OUTPATIENT
Start: 2025-02-24

## 2025-02-24 RX ORDER — TIRZEPATIDE 7.5 MG/.5ML
7.5 INJECTION, SOLUTION SUBCUTANEOUS WEEKLY
Qty: 2 ML | Refills: 0 | Status: SHIPPED | OUTPATIENT
Start: 2025-02-24

## 2025-02-24 NOTE — ASSESSMENT & PLAN NOTE
Compliant w/rosuvastatin as rx'd  Update labs as ordered     Orders:    CBC and differential; Future    Comprehensive metabolic panel; Future    TSH, 3rd generation with Free T4 reflex; Future    Lipid Panel with Direct LDL reflex; Future

## 2025-02-24 NOTE — PATIENT INSTRUCTIONS
"Patient Education     Routine physical for adults   The Basics   Written by the doctors and editors at [facility]   What is a physical? -- A physical is a routine visit, or \"check-up,\" with your doctor. You might also hear it called a \"wellness visit\" or \"preventive visit.\"  During each visit, the doctor will:   Ask about your physical and mental health   Ask about your habits, behaviors, and lifestyle   Do an exam   Give you vaccines if needed   Talk to you about any medicines you take   Give advice about your health   Answer your questions  Getting regular check-ups is an important part of taking care of your health. It can help your doctor find and treat any problems you have. But it's also important for preventing health problems.  A routine physical is different from a \"sick visit.\" A sick visit is when you see a doctor because of a health concern or problem. Since physicals are scheduled ahead of time, you can think about what you want to ask the doctor.  How often should I get a physical? -- It depends on your age and health. In general, for people age 21 years and older:   If you are younger than 50 years, you might be able to get a physical every 3 years.   If you are 50 years or older, your doctor might recommend a physical every year.  If you have an ongoing health condition, like diabetes or high blood pressure, your doctor will probably want to see you more often.  What happens during a physical? -- In general, each visit will include:   Physical exam - The doctor or nurse will check your height, weight, heart rate, and blood pressure. They will also look at your eyes and ears. They will ask about how you are feeling and whether you have any symptoms that bother you.   Medicines - It's a good idea to bring a list of all the medicines you take to each doctor visit. Your doctor will talk to you about your medicines and answer any questions. Tell them if you are having any side effects that bother you. You " "should also tell them if you are having trouble paying for any of your medicines.   Habits and behaviors - This includes:   Your diet   Your exercise habits   Whether you smoke, drink alcohol, or use drugs   Whether you are sexually active   Whether you feel safe at home  Your doctor will talk to you about things you can do to improve your health and lower your risk of health problems. They will also offer help and support. For example, if you want to quit smoking, they can give you advice and might prescribe medicines. If you want to improve your diet or get more physical activity, they can help you with this, too.   Lab tests, if needed - The tests you get will depend on your age and situation. For example, your doctor might want to check your:   Cholesterol   Blood sugar   Iron level   Vaccines - The recommended vaccines will depend on your age, health, and what vaccines you already had. Vaccines are very important because they can prevent certain serious or deadly infections.   Discussion of screening - \"Screening\" means checking for diseases or other health problems before they cause symptoms. Your doctor can recommend screening based on your age, risk, and preferences. This might include tests to check for:   Cancer, such as breast, prostate, cervical, ovarian, colorectal, prostate, lung, or skin cancer   Sexually transmitted infections, such as chlamydia and gonorrhea   Mental health conditions like depression and anxiety  Your doctor will talk to you about the different types of screening tests. They can help you decide which screenings to have. They can also explain what the results might mean.   Answering questions - The physical is a good time to ask the doctor or nurse questions about your health. If needed, they can refer you to other doctors or specialists, too.  Adults older than 65 years often need other care, too. As you get older, your doctor will talk to you about:   How to prevent falling at " home   Hearing or vision tests   Memory testing   How to take your medicines safely   Making sure that you have the help and support you need at home  All topics are updated as new evidence becomes available and our peer review process is complete.  This topic retrieved from [employer] on: May 02, 2024.  Topic 802976 Version 1.0  Release: 32.4.3 - C32.122  © 2024 UpToDate, Inc. and/or its affiliates. All rights reserved.  Consumer Information Use and Disclaimer   Disclaimer: This generalized information is a limited summary of diagnosis, treatment, and/or medication information. It is not meant to be comprehensive and should be used as a tool to help the user understand and/or assess potential diagnostic and treatment options. It does NOT include all information about conditions, treatments, medications, side effects, or risks that may apply to a specific patient. It is not intended to be medical advice or a substitute for the medical advice, diagnosis, or treatment of a health care provider based on the health care provider's examination and assessment of a patient's specific and unique circumstances. Patients must speak with a health care provider for complete information about their health, medical questions, and treatment options, including any risks or benefits regarding use of medications. This information does not endorse any treatments or medications as safe, effective, or approved for treating a specific patient. UpToDate, Inc. and its affiliates disclaim any warranty or liability relating to this information or the use thereof.The use of this information is governed by the Terms of Use, available at https://www.[employer].com/en/know/clinical-effectiveness-terms. 2024© UpToDate, Inc. and its affiliates and/or licensors. All rights reserved.  Copyright   © 2024 UpToDate, Inc. and/or its affiliates. All rights reserved.

## 2025-02-24 NOTE — ASSESSMENT & PLAN NOTE
BP well controlled  Continue same ziac as rx'd  Return in 6 months    Orders:    CBC and differential; Future    Comprehensive metabolic panel; Future

## 2025-02-24 NOTE — ASSESSMENT & PLAN NOTE
Remote history of - pt unaware when/how this was diagnosed  Will likely need update in sleep study for zepbound approval

## 2025-02-24 NOTE — PROGRESS NOTES
Adult Annual Physical  Name: Yayo Smith Jr.      : 1955      MRN: [id number]  Encounter Provider: MARIA GUADALUPE Parks  Encounter Date: 2025   Encounter department: [facility] [address]     Assessment & Plan  Essential hypertension  BP well controlled  Continue same ziac as rx'd  Return in 6 months    Orders:    CBC and differential; Future    Comprehensive metabolic panel; Future    Mixed hyperlipidemia  Compliant w/rosuvastatin as rx'd  Update labs as ordered     Orders:    CBC and differential; Future    Comprehensive metabolic panel; Future    TSH, 3rd generation with Free T4 reflex; Future    Lipid Panel with Direct LDL reflex; Future    Obstructive sleep apnea  Remote history of - pt unaware when/how this was diagnosed  Will likely need update in sleep study for zepbound approval       Prediabetes  Managing w/lifestyle   Update labs as ordered     Orders:    Comprehensive metabolic panel; Future    Hemoglobin A1C; Future    Annual physical exam  PE updated, next due in 1 year  Colonoscopy UTD  Update PSA w/next fasting labs ordered       Current moderate episode of major depressive disorder without prior episode ([facility])  Mood stable w/PHQ score of 0 currently off medication       Chronic constipation  Secondary to chronic opioid use  Linzess refill issued as he requests    Orders:    linaCLOtide 145 MCG CAPS; Take 1 tablet daily as needed for constipation    Class 2 severe obesity due to excess calories with serious comorbidity and body mass index (BMI) of 38.0 to 38.9 in adult (HCC)  Prior Authorization Clinical Questions for Weight Management Pharmacotherapy    2. Does the patient have a diagnosis of obesity, confirmed by a BMI greater than or equal to 30 kg/m^2?: Yes  3. Does the patient have a BMI of greater than or equal to 27 kg/m^2 with at least one weight-related comorbidity/risk factor/complication (e.g. diabetes, dyslipidemia, coronary artery disease)?:  Yes  4. Weight-related co-morbidities/risk factors: prediabetes, dyslipidemia, hypertension, obstructive sleep apnea ([facility])  9. Does the patient have a history of type 2 diabetes?: No  11. Will the member use requested medication in combination with another GLP agonist or weight loss drug?: No  12. Is the medication a controlled substance?: No  For renewals: Has the patient had a positive outcome with current weight management medication (i.e., change in body weight of at least 4-5% after 12-16 weeks on maximally tolerated dose)?: Yes     Baseline weight (in pounds): 290 lbs  Current weight (in pounds): 270 lbs  Weight loss percentage: -6.9%       Intolerant to max dose wegovy with intolerable gaseousness  Zepbound rx'd as he requests trial of alternative med    Orders:    Zepbound 2.5 MG/0.5ML auto-injector; Inject 0.5 mL (2.5 mg total) under the skin once a week    Zepbound 5 MG/0.5ML auto-injector; Inject 0.5 mL (5 mg total) under the skin once a week    Zepbound 7.5 MG/0.5ML auto-injector; Inject 0.5 mL (7.5 mg total) under the skin once a week    Continuous opioid dependence (HCC)  Continue management with spine/pain management as scheduled       Screening for prostate cancer    Orders:    PSA, Total Screen; Future    Immunizations and preventive care screenings were discussed with patient today. Appropriate education was printed on patient's after visit summary.    Discussed risks and benefits of prostate cancer screening. We discussed the controversial history of PSA screening for prostate cancer in the [place] as well as the risk of over detection and over treatment of prostate cancer by way of PSA screening.  The patient understands that PSA blood testing is an imperfect way to screen for prostate cancer and that elevated PSA levels in the blood may also be caused by infection, inflammation, prostatic trauma or manipulation, urological procedures, or by benign prostatic enlargement.    The role of the  "digital rectal examination in prostate cancer screening was also discussed and I discussed with him that there is large interobserver variability in the findings of digital rectal examination.    Counseling:  Dental Health: discussed importance of regular tooth brushing, flossing, and dental visits.  Exercise: the importance of regular exercise/physical activity was discussed. Recommend exercise 3-5 times per week for at least 30 minutes.       Depression Screening and Follow-up Plan: Patient was screened for depression during today's encounter. They screened negative with a PHQ-9 score of 0.          History of Present Illness         Adult Annual Physical:  Patient presents for annual physical. Here with wife for routine follow up and due to update annual PE. States he stopped wegovy x2 weeks due to increase belching and flatus. Was on max 2.4mg dose. Does not recall this effect on lower doses.  .     Diet and Physical Activity:  - Diet/Nutrition: well balanced diet and portion control.  - Exercise: no formal exercise.    Depression Screening:    - PHQ-9 Score: 0    General Health:  - Sleep: snores loudly.  - Hearing: normal hearing bilateral ears.  - Vision: goes for regular eye exams.  - Dental: no dental visits for > 1 year.     Health:    - Urinary symptoms: none.         Review of Systems   Respiratory: Negative.     Cardiovascular: Negative.    Gastrointestinal: Negative.    Genitourinary: Negative.    Neurological: Negative.    Psychiatric/Behavioral:  Negative for dysphoric mood.          Objective   /78   Pulse 72   Temp 97.8 °F (36.6 °C)   Ht 5' 10\" (1.778 m)   Wt 123 kg (270 lb 9.6 oz)   BMI 38.83 kg/m²         Physical Exam  Vitals reviewed.   Constitutional:       General: He is not in acute distress.     Appearance: He is obese.   HENT:      Head: Normocephalic.   Eyes:      General: No scleral icterus.  Neck:      Thyroid: No thyromegaly.      Vascular: No carotid bruit. "   Cardiovascular:      Rate and Rhythm: Normal rate and regular rhythm.      Heart sounds: No murmur heard.  Pulmonary:      Effort: Pulmonary effort is normal. No respiratory distress.      Breath sounds: Normal breath sounds.   Abdominal:      General: Bowel sounds are normal.      Palpations: Abdomen is soft.      Tenderness: There is no abdominal tenderness.   Musculoskeletal:      Cervical back: Normal range of motion.      Right lower leg: No edema.      Left lower leg: No edema.   Lymphadenopathy:      Cervical: No cervical adenopathy.   Skin:     General: Skin is warm and dry.   Neurological:      General: No focal deficit present.      Mental Status: He is alert and oriented to person, place, and time.      Gait: Gait abnormal (antalgic w/chronic pain).   Psychiatric:         Mood and Affect: Mood normal.         Behavior: Behavior normal.         Thought Content: Thought content normal.         Judgment: Judgment normal.           Administrative Statements   I have spent a total time of 20 minutes in caring for this patient on the day of the visit/encounter including Diagnostic results, Risks and benefits of tx options, Instructions for management, Patient and family education, Risk factor reductions, Impressions, Counseling / Coordination of care, Documenting in the medical record, Reviewing/placing orders in the medical record (including tests, medications, and/or procedures), and Obtaining or reviewing history

## 2025-02-24 NOTE — PROGRESS NOTES
Assessment:  1. Lumbar spondylosis    2. Lumbar post-laminectomy syndrome    3. Lumbar degenerative disc disease    4. Lumbar radiculopathy    5. Polyarthralgia    6. Idiopathic peripheral neuropathy    7. Chronic right shoulder pain    8. Chronic pain syndrome    9. Uncomplicated opioid dependence (HCC)    10. Long-term current use of opiate analgesic        Plan:  While the patient was in the office today, I did have a thorough conversation regarding their chronic pain syndrome, medication management, and treatment plan options.    I have placed an order for a right shoulder x-ray on today's visit for further evaluation of pain in this region.  Orthopedic evaluation versus injection.  Patient states that he would like to consider.    He remains clinically stable and partially controlled with the Norco 10/325 every 8 hours as needed for pain.  On today's visit I have electronically sent 3 months of the opioid prescription to his pharmacy with the appropriate fill dates.    [place] Prescription Drug Monitoring Program report was reviewed and was appropriate     There are risks associated with opioid medications, including dependence, addiction and tolerance. The patient understands and agrees to use these medications only as prescribed. Potential side effects of the medications include, but are not limited to, constipation, drowsiness, addiction, impaired judgment and risk of fatal overdose if not taken as prescribed. The patient was warned against driving while taking sedation medications.  Sharing medications is a felony. At this point in time, the patient is showing no signs of addiction, abuse, diversion or suicidal ideation.    Consider right SI joint injection.    The patient will follow-up in 12 weeks for medication prescription refill and reevaluation. The patient was advised to contact the office should their symptoms worsen in the interim. The patient was agreeable and verbalized an understanding.         History of Present Illness:    The patient is a 69 y.o. male last seen on 12/3/2024 who presents for a follow up office visit for chronic low back pain secondary to a postlaminectomy pain syndrome, lumbar spondylosis as well as multisite polyarthralgia secondary to osteoarthritis.  The patient currently reports low back pain, joint pain and a significant increase in right shoulder pain over the past few weeks.  He rates his current pain an 8 out of 10 and describes these areas as constant and burning, dull, aching, sharp, shooting.  Patient states that his pain does interfere with his daily living activities at times, in particular sleeping.  The patient is having significant right shoulder pain with range of motion.  He had complained about this in the past, an x-ray was ordered 1 year ago however was never completed.  Otherwise, he remains clinically stable and partially controlled on the Norco.  He is reporting constipation which is managed with Linzess which was prescribed by his PCP.    Pain Contract Signed: 2/24/2025  Last Urine Drug Screen: 9/10/2024    I have personally reviewed and/or updated the patient's past medical history, past surgical history, family history, social history, current medications, allergies, and vital signs today.       Review of Systems:    Review of Systems   Respiratory:  Positive for shortness of breath.    Cardiovascular:  Negative for chest pain.   Gastrointestinal:  Positive for constipation. Negative for diarrhea, nausea and vomiting.   Musculoskeletal:  Positive for gait problem. Negative for arthralgias, joint swelling (Joint stiffness) and myalgias.   Skin:  Negative for rash.   Neurological:  Positive for dizziness and weakness. Negative for seizures.   All other systems reviewed and are negative.        Past Medical History:   Diagnosis Date   • Acute low back pain     10 AUG 2016 RESOLVED   • Bunion    • Bursitis of hip    • Carpal tunnel syndrome    • Chronic  bilateral low back pain with bilateral sciatica    • Chronic lumbar radiculopathy    • Chronic narcotic dependence (HCC)    • Chronic pain syndrome    • Constipation due to opioid therapy    • DDD (degenerative disc disease), lumbar    • Deep vein thrombosis of left upper extremity (HCC)     [id number]  RESOLVED   • Depression    • Diverticula of colon     [id number] RESOLVED   • DVT (deep venous thrombosis) ([facility])     LUE   • Edema    • History of staph infection    • Hyperglycemia    • Hyperlipidemia    • Insomnia    • Lateral epicondylitis    • Left inguinal hernia    • Methicillin resistant Staphylococcus aureus septicemia (HCC)    • Morbid obesity due to excess calories ([facility])    • Obesity    • Peripheral neuropathy    • Post laminectomy syndrome    • RLS (restless legs syndrome)    • Septicemia ([facility])     MRSA   • Umbilical hernia        Past Surgical History:   Procedure Laterality Date   • BACK SURGERY      laminectomy, hardware   • CARPAL TUNNEL RELEASE Left    • CERVICAL SPINE SURGERY     • COLONOSCOPY     • ELBOW SURGERY     • ELBOW SURGERY     • KNEE ARTHROSCOPY Right     knee   • KNEE SURGERY Right     ARTHOSCOPY KNEE   • NECK SURGERY      1997   • VT COLONOSCOPY FLX DX W/COLLJ SPEC WHEN PFRMD N/A 1/24/2017    Procedure: COLONOSCOPY;  Surgeon: Pierre Hlocomb MD;  Location: [facility];  Service: General   • VT NDSC WRST SURG W/RLS TRANSVRS CARPL LIGM Right 1/2/2020    Procedure: RELEASE CARPAL TUNNEL ENDOSCOPIC, right;  Surgeon: Brayan Quinteros MD;  Location: [facility];  Service: Orthopedics   • VT RPR UMBILICAL HRNA 5 YRS/> REDUCIBLE N/A 10/5/2022    Procedure: REPAIR HERNIA UMBILICAL WITH MESH.;  Surgeon: Pierre Holcomb MD;  [facility];  Service: General   • VT TENDON SHEATH INCISION Right 9/9/2021    Procedure: Right long finger trigger finger release;  Surgeon: Brayan Quinteros MD;  [facility];  Service: Orthopedics   • VT TENDON SHEATH INCISION Left 9/3/2024    Procedure:  Left thumb, long, small and ring trigger finger release;  Surgeon: Brayan Quinteros MD;  [facility];  Service: Orthopedics   • AR TENDON SHEATH INCISION Left 9/3/2024    Procedure: Left thumb, long, small and ring trigger finger release;  Surgeon: Brayan Quinteros MD;  [facility];  Service: Orthopedics   • AR TENDON SHEATH INCISION Left 9/3/2024    Procedure: Left thumb, long, small and ring trigger finger release;  Surgeon: Brayan Quinteros MD;  [facility];  Service: Orthopedics   • AR TENDON SHEATH INCISION Left 9/3/2024    Procedure: Left thumb, long, small and ring trigger finger release;  Surgeon: Brayan Quinteros MD;  [facility];  Service: Orthopedics   • AR TENDON SHEATH INCISION Right 10/8/2024    Procedure: Right trigger thumb release;  Surgeon: Brayan Quinteros MD;  [facility];  Service: Orthopedics   • SHOULDER SURGERY     • SPINAL CORD STIMULATOR IMPLANT     • SYNOVECTOMY Right 9/9/2021    Procedure: Tenosynovectomy right hand long finger flexor digitorum superficialis and profundus tendon;  Surgeon: Brayan Quinteros MD;  [facility];  Service: Orthopedics       Family History   Problem Relation Age of Onset   • Heart disease Mother    • Cancer Mother         breast, stomach   • Aneurysm Mother    • Heart disease Father    • Cancer Father         skin, liver, colon DECESED AGE 62       Social History     Occupational History   • Not on file   Tobacco Use   • Smoking status: Never   • Smokeless tobacco: Never   Vaping Use   • Vaping status: Never Used   Substance and Sexual Activity   • Alcohol use: Yes     Comment: occasional- less than 1 drink per week   • Drug use: No     Comment: opiod use for chronic pain   • Sexual activity: Yes         Current Outpatient Medications:   •  acetaminophen (TYLENOL) 650 mg CR tablet, Take 1 tablet (650 mg total) by mouth every 8 (eight) hours as needed for mild pain, Disp: 30 tablet, Rfl: 0  •   "bisoprolol-hydrochlorothiazide (ZIAC) 2.5-6.25 MG per tablet, Take 1 tablet by mouth daily, Disp: 90 tablet, Rfl: 0  •  HYDROcodone-acetaminophen (NORCO)  mg per tablet, Take 1 tablet by mouth every 8 (eight) hours as needed for moderate pain For ongoing therapy DO NOT FILL BEFORE: 02/27/25 Max Daily Amount: 3 tablets, Disp: 90 tablet, Rfl: 0  •  HYDROcodone-acetaminophen (NORCO)  mg per tablet, Take 1 tablet by mouth every 8 (eight) hours as needed for moderate pain For ongoing therapy DO NOT FILL BEFORE: 03/28/25 Max Daily Amount: 3 tablets, Disp: 90 tablet, Rfl: 0  •  HYDROcodone-acetaminophen (NORCO)  mg per tablet, Take 1 tablet by mouth every 8 (eight) hours as needed for moderate pain For ongoing therapy DO NOT FILL BEFORE: 04/26/25 Max Daily Amount: 3 tablets, Disp: 90 tablet, Rfl: 0  •  mirtazapine (REMERON) 15 mg tablet, Take 1 tablet (15 mg total) by mouth daily at bedtime, Disp: 30 tablet, Rfl: 0  •  naproxen sodium (ALEVE) 220 MG tablet, Take 1 tablet (220 mg total) by mouth 2 (two) times a day with meals, Disp: 20 tablet, Rfl: 0  •  Naproxen Sodium 220 MG CAPS, Take 1 capsule (220 mg total) by mouth 2 (two) times a day, Disp: 60 capsule, Rfl: 0  •  rosuvastatin (CRESTOR) 10 MG tablet, Take 1 tablet (10 mg total) by mouth daily at bedtime, Disp: 90 tablet, Rfl: 1  •  acetaminophen (TYLENOL) 650 mg CR tablet, Take 1 tablet (650 mg total) by mouth every 8 (eight) hours as needed for mild pain (Patient not taking: Reported on 2/24/2025), Disp: 30 tablet, Rfl: 0  •  Misc. Devices (Roller Walker) MISC, Please provide rollator (Patient not taking: Reported on 10/16/2024), Disp: 1 each, Rfl: 0  •  naloxone (NARCAN) 4 mg/0.1 mL nasal spray, Administer 1 spray into a nostril. If no response after 2-3 minutes, give another dose in the other nostril using a new spray., Disp: 1 each, Rfl: 1    Allergies   Allergen Reactions   • Fentanyl Other (See Comments)     \"Makes me Suicidal\"  Happened when " "dosage increased.   • Penicillins Other (See Comments)     As a child unknown   • Pravastatin Other (See Comments)     Reaction Date: 09Nov2011;   Muscle weakness   • Sulfa Antibiotics Other (See Comments)     As a child unknown   • Vytorin [Ezetimibe-Simvastatin] Myalgia     Reaction Date: 09Nov2011;        Physical Exam:    Pulse 75   Ht 5' 10\" (1.778 m)   Wt 124 kg (273 lb)   SpO2 95%   BMI 39.17 kg/m²     Constitutional:normal, well developed, well nourished, alert, in no distress and non-toxic and no overt pain behavior. and obese  Eyes:anicteric  HEENT:grossly intact  Neck:supple, symmetric, trachea midline and no masses   Pulmonary:even and unlabored  Cardiovascular:No edema or pitting edema present  Skin:Normal without rashes or lesions and well hydrated  Psychiatric:Mood and affect appropriate  Neurologic:Cranial Nerves II-XII grossly intact  Musculoskeletal: Right shoulder tenderness laterally, limited range of motion.      Imaging  XR shoulder 2+ vw right    (Results Pending)         Orders Placed This Encounter   Procedures   • XR shoulder 2+ vw right       "

## 2025-02-25 NOTE — ASSESSMENT & PLAN NOTE
Managing w/lifestyle   Update labs as ordered     Orders:    Comprehensive metabolic panel; Future    Hemoglobin A1C; Future

## 2025-02-25 NOTE — ASSESSMENT & PLAN NOTE
Prior Authorization Clinical Questions for Weight Management Pharmacotherapy    2. Does the patient have a diagnosis of obesity, confirmed by a BMI greater than or equal to 30 kg/m^2?: Yes  3. Does the patient have a BMI of greater than or equal to 27 kg/m^2 with at least one weight-related comorbidity/risk factor/complication (e.g. diabetes, dyslipidemia, coronary artery disease)?: Yes  4. Weight-related co-morbidities/risk factors: prediabetes, dyslipidemia, hypertension, obstructive sleep apnea ([place])  9. Does the patient have a history of type 2 diabetes?: No  11. Will the member use requested medication in combination with another GLP agonist or weight loss drug?: No  12. Is the medication a controlled substance?: No  For renewals: Has the patient had a positive outcome with current weight management medication (i.e., change in body weight of at least 4-5% after 12-16 weeks on maximally tolerated dose)?: Yes     Baseline weight (in pounds): 290 lbs  Current weight (in pounds): 270 lbs  Weight loss percentage: -6.9%       Intolerant to max dose wegovy with intolerable gaseousness  Zepbound rx'd as he requests trial of alternative med    Orders:    Zepbound 2.5 MG/0.5ML auto-injector; Inject 0.5 mL (2.5 mg total) under the skin once a week    Zepbound 5 MG/0.5ML auto-injector; Inject 0.5 mL (5 mg total) under the skin once a week    Zepbound 7.5 MG/0.5ML auto-injector; Inject 0.5 mL (7.5 mg total) under the skin once a week

## 2025-02-26 ENCOUNTER — TELEPHONE (OUTPATIENT)
Age: 70
End: 2025-02-26

## 2025-02-26 NOTE — TELEPHONE ENCOUNTER
PA for Zepbound 2.5mg SUBMITTED to [employer]    via    []CMM-KEY:   [x]Surescripts-Case ID #: [id number]   []Availity-Auth ID #  []Faxed to plan   []Other website   []Phone call Case ID #     []PA sent as URGENT    All office notes, labs and other pertaining documents and studies sent. Clinical questions answered. Awaiting determination from insurance company.     Turnaround time for your insurance to make a decision on your Prior Authorization can take 7-21 business days.

## 2025-02-27 NOTE — TELEPHONE ENCOUNTER
PA for Zepbound 2.5mg  APPROVED     Date(s) approved February 26, 2025 to February 26, 2026     Case #: [id number]     Patient advised by          []MyChart Message  [x]Phone call   []LMOM  [x]L/M to call office as no active Communication consent on file  []Unable to leave detailed message as VM not approved on Communication consent       Pharmacy advised by    [x]Fax  []Phone call    Approval letter scanned into [employer] Yes

## 2025-03-05 ENCOUNTER — RESULTS FOLLOW-UP (OUTPATIENT)
Age: 70
End: 2025-03-05

## 2025-03-05 ENCOUNTER — HOSPITAL ENCOUNTER (OUTPATIENT)
Dept: RADIOLOGY | Facility: HOSPITAL | Age: 70
Discharge: HOME | End: 2025-03-05
Payer: COMMERCIAL

## 2025-03-05 ENCOUNTER — APPOINTMENT (OUTPATIENT)
Dept: LAB | Facility: HOSPITAL | Age: 70
End: 2025-03-05
Payer: COMMERCIAL

## 2025-03-05 DIAGNOSIS — G89.29 CHRONIC RIGHT SHOULDER PAIN: ICD-10-CM

## 2025-03-05 DIAGNOSIS — R73.03 PREDIABETES: ICD-10-CM

## 2025-03-05 DIAGNOSIS — E78.2 MIXED HYPERLIPIDEMIA: ICD-10-CM

## 2025-03-05 DIAGNOSIS — I10 ESSENTIAL HYPERTENSION: ICD-10-CM

## 2025-03-05 DIAGNOSIS — Z12.5 SCREENING FOR PROSTATE CANCER: ICD-10-CM

## 2025-03-05 DIAGNOSIS — M25.511 CHRONIC RIGHT SHOULDER PAIN: ICD-10-CM

## 2025-03-05 LAB
ALBUMIN SERPL BCG-MCNC: 4.2 G/DL (ref 3.5–5)
ALP SERPL-CCNC: 48 U/L (ref 34–104)
ALT SERPL W P-5'-P-CCNC: 17 U/L (ref 7–52)
ANION GAP SERPL CALCULATED.3IONS-SCNC: 6 MMOL/L (ref 4–13)
AST SERPL W P-5'-P-CCNC: 18 U/L (ref 13–39)
BASOPHILS # BLD AUTO: 0.05 THOUSANDS/ÂΜL (ref 0–0.1)
BASOPHILS NFR BLD AUTO: 1 % (ref 0–1)
BILIRUB SERPL-MCNC: 0.6 MG/DL (ref 0.2–1)
BUN SERPL-MCNC: 16 MG/DL (ref 5–25)
CALCIUM SERPL-MCNC: 9.2 MG/DL (ref 8.4–10.2)
CHLORIDE SERPL-SCNC: 104 MMOL/L (ref 96–108)
CHOLEST SERPL-MCNC: 127 MG/DL (ref ?–200)
CO2 SERPL-SCNC: 30 MMOL/L (ref 21–32)
CREAT SERPL-MCNC: 1 MG/DL (ref 0.6–1.3)
EOSINOPHIL # BLD AUTO: 0.16 THOUSAND/ÂΜL (ref 0–0.61)
EOSINOPHIL NFR BLD AUTO: 2 % (ref 0–6)
ERYTHROCYTE [DISTWIDTH] IN BLOOD BY AUTOMATED COUNT: 14.4 % (ref 11.6–15.1)
EST. AVERAGE GLUCOSE BLD GHB EST-MCNC: 117 MG/DL
GFR SERPL CREATININE-BSD FRML MDRD: 77 ML/MIN/1.73SQ M
GLUCOSE P FAST SERPL-MCNC: 118 MG/DL (ref 65–99)
HBA1C MFR BLD: 5.7 %
HCT VFR BLD AUTO: 49.1 % (ref 36.5–49.3)
HDLC SERPL-MCNC: 39 MG/DL
HGB BLD-MCNC: 15.2 G/DL (ref 12–17)
IMM GRANULOCYTES # BLD AUTO: 0.02 THOUSAND/UL (ref 0–0.2)
IMM GRANULOCYTES NFR BLD AUTO: 0 % (ref 0–2)
LDLC SERPL CALC-MCNC: 68 MG/DL (ref 0–100)
LYMPHOCYTES # BLD AUTO: 2.24 THOUSANDS/ÂΜL (ref 0.6–4.47)
LYMPHOCYTES NFR BLD AUTO: 34 % (ref 14–44)
MCH RBC QN AUTO: 27.9 PG (ref 26.8–34.3)
MCHC RBC AUTO-ENTMCNC: 31 G/DL (ref 31.4–37.4)
MCV RBC AUTO: 90 FL (ref 82–98)
MONOCYTES # BLD AUTO: 0.66 THOUSAND/ÂΜL (ref 0.17–1.22)
MONOCYTES NFR BLD AUTO: 10 % (ref 4–12)
NEUTROPHILS # BLD AUTO: 3.47 THOUSANDS/ÂΜL (ref 1.85–7.62)
NEUTS SEG NFR BLD AUTO: 53 % (ref 43–75)
NRBC BLD AUTO-RTO: 0 /100 WBCS
PLATELET # BLD AUTO: 319 THOUSANDS/UL (ref 149–390)
PMV BLD AUTO: 8.7 FL (ref 8.9–12.7)
POTASSIUM SERPL-SCNC: 4.4 MMOL/L (ref 3.5–5.3)
PROT SERPL-MCNC: 7.7 G/DL (ref 6.4–8.4)
PSA SERPL-MCNC: 0.79 NG/ML (ref 0–4)
RBC # BLD AUTO: 5.44 MILLION/UL (ref 3.88–5.62)
SODIUM SERPL-SCNC: 140 MMOL/L (ref 135–147)
TRIGL SERPL-MCNC: 99 MG/DL (ref ?–150)
TSH SERPL DL<=0.05 MIU/L-ACNC: 1.23 UIU/ML (ref 0.45–4.5)
WBC # BLD AUTO: 6.6 THOUSAND/UL (ref 4.31–10.16)

## 2025-03-05 PROCEDURE — 85025 COMPLETE CBC W/AUTO DIFF WBC: CPT

## 2025-03-05 PROCEDURE — 73030 X-RAY EXAM OF SHOULDER: CPT

## 2025-03-05 PROCEDURE — 80053 COMPREHEN METABOLIC PANEL: CPT

## 2025-03-05 PROCEDURE — 84443 ASSAY THYROID STIM HORMONE: CPT

## 2025-03-05 PROCEDURE — G0103 PSA SCREENING: HCPCS

## 2025-03-05 PROCEDURE — 36415 COLL VENOUS BLD VENIPUNCTURE: CPT

## 2025-03-05 PROCEDURE — 80061 LIPID PANEL: CPT

## 2025-03-05 PROCEDURE — 83036 HEMOGLOBIN GLYCOSYLATED A1C: CPT

## 2025-03-05 NOTE — TELEPHONE ENCOUNTER
----- Message from Helena Todd PA-C sent at 3/5/2025 12:35 PM EST -----  Please let patient know his right shoulder xray shows mild arthritis.

## 2025-03-07 NOTE — TELEPHONE ENCOUNTER
S/w pt's wife, Bruna, per medical communication consent on file. Advised of above. Bruna questioned any further instructions re: how to proceed. Advised Bruna, the writer will fu with MG on monday and this office will cb to advise. Bruna verbalized understanding and appreciation.

## 2025-03-10 DIAGNOSIS — G89.29 CHRONIC RIGHT SHOULDER PAIN: Primary | ICD-10-CM

## 2025-03-10 DIAGNOSIS — M25.511 CHRONIC RIGHT SHOULDER PAIN: Primary | ICD-10-CM

## 2025-03-11 ENCOUNTER — RESULTS FOLLOW-UP (OUTPATIENT)
Dept: FAMILY MEDICINE CLINIC | Facility: HOSPITAL | Age: 70
End: 2025-03-11

## 2025-03-11 DIAGNOSIS — E78.2 MIXED HYPERLIPIDEMIA: ICD-10-CM

## 2025-03-11 DIAGNOSIS — R73.03 PREDIABETES: Primary | ICD-10-CM

## 2025-03-12 DIAGNOSIS — I10 ESSENTIAL HYPERTENSION: ICD-10-CM

## 2025-03-13 RX ORDER — BISOPROLOL FUMARATE AND HYDROCHLOROTHIAZIDE 2.5; 6.25 MG/1; MG/1
1 TABLET ORAL DAILY
Qty: 90 TABLET | Refills: 1 | Status: SHIPPED | OUTPATIENT
Start: 2025-03-13

## 2025-03-26 DIAGNOSIS — M47.816 LUMBAR SPONDYLOSIS: ICD-10-CM

## 2025-03-26 DIAGNOSIS — M25.50 POLYARTHRALGIA: ICD-10-CM

## 2025-03-26 DIAGNOSIS — G60.9 IDIOPATHIC PERIPHERAL NEUROPATHY: ICD-10-CM

## 2025-03-26 DIAGNOSIS — E66.812 CLASS 2 SEVERE OBESITY DUE TO EXCESS CALORIES WITH SERIOUS COMORBIDITY AND BODY MASS INDEX (BMI) OF 38.0 TO 38.9 IN ADULT (HCC): ICD-10-CM

## 2025-03-26 DIAGNOSIS — G89.4 CHRONIC PAIN SYNDROME: ICD-10-CM

## 2025-03-26 DIAGNOSIS — M54.16 LUMBAR RADICULOPATHY: ICD-10-CM

## 2025-03-26 DIAGNOSIS — M96.1 LUMBAR POST-LAMINECTOMY SYNDROME: ICD-10-CM

## 2025-03-26 DIAGNOSIS — E66.01 CLASS 2 SEVERE OBESITY DUE TO EXCESS CALORIES WITH SERIOUS COMORBIDITY AND BODY MASS INDEX (BMI) OF 38.0 TO 38.9 IN ADULT (HCC): ICD-10-CM

## 2025-03-26 DIAGNOSIS — M51.369 LUMBAR DEGENERATIVE DISC DISEASE: ICD-10-CM

## 2025-03-26 RX ORDER — HYDROCODONE BITARTRATE AND ACETAMINOPHEN 10; 325 MG/1; MG/1
1 TABLET ORAL EVERY 8 HOURS PRN
Qty: 90 TABLET | Refills: 0 | Status: CANCELLED | OUTPATIENT
Start: 2025-03-26

## 2025-03-27 NOTE — TELEPHONE ENCOUNTER
Please call and clarify - does he need this 5mg refilled? I issued 7.5mg rx at his last appt so wondering if he's planning to increase dose....

## 2025-03-28 DIAGNOSIS — E66.01 CLASS 2 SEVERE OBESITY DUE TO EXCESS CALORIES WITH SERIOUS COMORBIDITY AND BODY MASS INDEX (BMI) OF 38.0 TO 38.9 IN ADULT (HCC): ICD-10-CM

## 2025-03-28 DIAGNOSIS — E66.812 CLASS 2 SEVERE OBESITY DUE TO EXCESS CALORIES WITH SERIOUS COMORBIDITY AND BODY MASS INDEX (BMI) OF 38.0 TO 38.9 IN ADULT (HCC): ICD-10-CM

## 2025-03-28 RX ORDER — TIRZEPATIDE 7.5 MG/.5ML
7.5 INJECTION, SOLUTION SUBCUTANEOUS WEEKLY
Qty: 2 ML | Refills: 0 | Status: SHIPPED | OUTPATIENT
Start: 2025-03-28

## 2025-03-28 RX ORDER — TIRZEPATIDE 5 MG/.5ML
5 INJECTION, SOLUTION SUBCUTANEOUS WEEKLY
Qty: 2 ML | Refills: 0 | Status: SHIPPED | OUTPATIENT
Start: 2025-03-28 | End: 2025-03-28

## 2025-03-28 NOTE — TELEPHONE ENCOUNTER
Caller: Bruna, patient's wife    Doctor/Office: Dr Lyn    Call regarding :  returning call from nurse from yesterday     Call was transferred to: triage nurse

## 2025-03-31 NOTE — PROGRESS NOTES
Orthopaedic Surgery - Office Note  Yayo Smith Jr. (69 y.o. male)   : 1955   MRN: [id number]  Encounter Date: 2025    No chief complaint on file.        Assessment & Plan  Acute pain of right shoulder    Orders:    Ambulatory Referral to Physical Therapy; Future    Right rotator cuff tendinitis  The diagnosis as well as treatment options were reviewed with the patient in the office today.  X-ray images and findings were reviewed.  Due to the lack of trauma history and well-maintained strength on physical exam there is a low index of suspicion at this time for full-thickness rotator cuff tear.  I would recommend initial conservative treatment of icing the shoulder 20 minutes on 1 hour off 3 times a day.  He may use an oral anti-inflammatory such as naproxen with food twice daily stopping and calling if any stomach upset occurs.  He may use Tylenol as needed for breakthrough pain.  He will be referred to physical therapy for evaluation and treatment.  The risks and benefits of a subacromial cortisone injection were reviewed.  Shared decision making was utilized and elected to undergo the procedure.  He tolerated it well and there were no complications.  I would recommend a repeat evaluation in 6 weeks at which time if he has not improved we could consider a CT arthrogram to rule out full-thickness rotator cuff tear (patient reports he has a spinal cord stimulator that is not MRI compatible)  Orders:    Ambulatory Referral to Physical Therapy; Future    Impingement syndrome of right shoulder    Orders:    Ambulatory Referral to Physical Therapy; Future       Return for Recheck in 6 weeks with myself.        History of Present Illness  This is a previous patient here for right shoulder pain that is been ongoing for 6 months.  He denies any trauma.  He reports he has had progressive increase in pain and decreased range of motion lifting overhead on the right side.  He has not had problems like this in  "the past.  He is right-hand dominant.  He has had no treatment.  He reports that overhead lifting and reaching behind his back increases the pain.  No paresthesias or neck symptoms are reported.    Patient reports he has a spinal cord stimulator that is not compatible with MRIs.        Review of Systems  Pertinent items are noted in HPI.  All other systems were reviewed and are negative.    Physical Exam  Ht 5' 10\" (1.778 m)   Wt 122 kg (270 lb)   BMI 38.74 kg/m²   Cons: Appears well.  No apparent distress.  Psych: Alert. Oriented x3.  Mood and affect normal.    Right shoulder has no skin breakdown lesions or signs of infection.  Active forward flexion is to 90 degrees.  Passively to 165 degrees with end motion pain.  Internal rotation is to the buttocks.  External Tatian is to 50 degrees.  He has pain with drop arm testing but no significant weakness.  He has a positive impingement sign.  Internal rotation and external rotation strength testing is at 5- out of 5 with pain.  He is neurovascular grossly intact in the right upper extremity.  His elbow exam is unremarkable he has no AC joint tenderness.    Post cortisone injection.  Patient's active forward flexion improved to 160 degrees with mild discomfort.      Studies Reviewed    RIGHT SHOULDER     INDICATION:   Pain in right shoulder. Other chronic pain.      COMPARISON:  None.     VIEWS:  XR SHOULDER 2+ VW RIGHT     FINDINGS:     There is no acute fracture or dislocation.     No significant degenerative changes of the glenohumeral joint. Acromioclavicular joint shows marginal osteophytes of the distal clavicle and acromial process although well aligned.     No lytic or blastic osseous lesion.     Soft tissues are unremarkable.     IMPRESSION:        No acute osseous abnormality of the glenohumeral joint.  Mild acromioclavicular arthrosis.  No fracture or dislocation     Electronically signed: 03/05/2025 10:25 AM Chito Tabares MD    X-ray images as well as " "reports were reviewed by myself in the office today and I agree with radiologist interpretation.      Large joint arthrocentesis: R subacromial bursa  Universal Protocol:  Consent: Verbal consent obtained.  Risks and benefits: risks, benefits and alternatives were discussed  Consent given by: patient  Time out: Immediately prior to procedure a \"time out\" was called to verify the correct patient, procedure, equipment, support staff and site/side marked as required.  Patient understanding: patient states understanding of the procedure being performed  Patient consent: the patient's understanding of the procedure matches consent given  Relevant documents: relevant documents present and verified  Test results: test results available and properly labeled  Site marked: the operative site was marked  Radiology Images displayed and confirmed. If images not available, report reviewed: imaging studies available  Patient identity confirmed: verbally with patient  Supporting Documentation  Indications: pain   Procedure Details  Location: shoulder - R subacromial bursa  Preparation: Patient was prepped and draped in the usual sterile fashion  Needle size: 22 G  Approach: posterior  Medications administered: 2 mL lidocaine 1 %; 80 mg triamcinolone acetonide 40 mg/mL    Patient tolerance: patient tolerated the procedure well with no immediate complications  Dressing:  Sterile dressing applied            Medical, Surgical, Family, and Social History  The patient's medical history, family history, and social history, were reviewed and updated as appropriate.    Past Medical History:   Diagnosis Date    Abdominal enlargement 04/30/2024    Acute low back pain     10 AUG 2016 RESOLVED    BMI 39.0-39.9,adult 01/16/2017    Bunion     Bursitis of hip     Carpal tunnel syndrome     Chest pain, exertional 06/19/2019    Chronic bilateral low back pain with bilateral sciatica     Chronic lumbar radiculopathy     Chronic narcotic dependence " (HCC)     Chronic pain syndrome     Constipation due to opioid therapy     DDD (degenerative disc disease), lumbar     Deep vein thrombosis of left upper extremity (HCC)     [id number]  RESOLVED    Depression     Diverticula of colon     [id number] RESOLVED    DVT (deep venous thrombosis) (HCC)     LUE    Edema     History of staph infection     Hyperglycemia     Hyperglycemia 11/28/2012    Hyperlipidemia     Hypertension     Insomnia     Lateral epicondylitis     Left hand pain 09/12/2023    Left inguinal hernia     Lower leg edema 04/30/2024    Methicillin resistant Staphylococcus aureus septicemia (HCC)     Morbid obesity due to excess calories (HCC)     Obesity     Peripheral neuropathy     Post laminectomy syndrome     RLS (restless legs syndrome)     Septicemia (HCC)     MRSA    Umbilical hernia        Past Surgical History:   Procedure Laterality Date    BACK SURGERY      laminectomy, hardware    CARPAL TUNNEL RELEASE Left     CERVICAL SPINE SURGERY      COLONOSCOPY      ELBOW SURGERY      ELBOW SURGERY      KNEE ARTHROSCOPY Right     knee    KNEE SURGERY Right     ARTHOSCOPY KNEE    NECK SURGERY      1997    LA COLONOSCOPY FLX DX W/COLLJ SPEC WHEN PFRMD N/A 1/24/2017    Procedure: COLONOSCOPY;  Surgeon: Pierre Holcomb MD;  Location: [facility];  Service: General    LA NDSC WRST SURG W/RLS TRANSVRS CARPL LIGM Right 1/2/2020    Procedure: RELEASE CARPAL TUNNEL ENDOSCOPIC, right;  Surgeon: Brayan Quinteros MD;  Location: [facility];  Service: Orthopedics    LA RPR UMBILICAL HRNA 5 YRS/> REDUCIBLE N/A 10/5/2022    Procedure: REPAIR HERNIA UMBILICAL WITH MESH.;  Surgeon: Pierre Holcomb MD;  Location: [facility];  Service: General    LA TENDON SHEATH INCISION Right 9/9/2021    Procedure: Right long finger trigger finger release;  Surgeon: Brayan Quinteros MD;  Location: [facility];  Service: Orthopedics    LA TENDON SHEATH INCISION Left 9/3/2024    Procedure: Left thumb, long, small and ring trigger finger  "release;  Surgeon: Brayan Quinteros MD;  [facility];  Service: Orthopedics    VA TENDON SHEATH INCISION Left 9/3/2024    Procedure: Left thumb, long, small and ring trigger finger release;  Surgeon: Bryaan Quinteros MD;  [facility];  Service: Orthopedics    VA TENDON SHEATH INCISION Left 9/3/2024    Procedure: Left thumb, long, small and ring trigger finger release;  Surgeon: Brayan Quinteros MD;  [facility];  Service: Orthopedics    VA TENDON SHEATH INCISION Left 9/3/2024    Procedure: Left thumb, long, small and ring trigger finger release;  Surgeon: Brayan Quinteros MD;  [facility];  Service: Orthopedics    VA TENDON SHEATH INCISION Right 10/8/2024    Procedure: Right trigger thumb release;  Surgeon: Brayan Quinteros MD;  [facility];  Service: Orthopedics    SHOULDER SURGERY      SPINAL CORD STIMULATOR IMPLANT      SYNOVECTOMY Right 9/9/2021    Procedure: Tenosynovectomy right hand long finger flexor digitorum superficialis and profundus tendon;  Surgeon: Brayan Quinteros MD;  Location: [facility];  Service: Orthopedics       Family History   Problem Relation Age of Onset    Heart disease Mother     Cancer Mother         breast, stomach    Aneurysm Mother     Heart disease Father     Cancer Father         skin, liver, colon DECESED AGE 62    Colon cancer Daughter         Early onset stage 3 rectal cancer       Social History     Occupational History    Not on file   Tobacco Use    Smoking status: Never    Smokeless tobacco: Never   Vaping Use    Vaping status: Never Used   Substance and Sexual Activity    Alcohol use: Yes     Comment: occasional- less than 1 drink per week    Drug use: No     Comment: opiod use for chronic pain    Sexual activity: Yes     Partners: Female     Birth control/protection: Post-menopausal, Female Sterilization       Allergies   Allergen Reactions    Fentanyl Other (See Comments)     \"Makes me Suicidal\"  Happened when dosage " increased.    Penicillins Other (See Comments)     As a child unknown    Pravastatin Other (See Comments)     Reaction Date: 09Nov2011;   Muscle weakness    Sulfa Antibiotics Other (See Comments)     As a child unknown    Vytorin [Ezetimibe-Simvastatin] Myalgia     Reaction Date: 09Nov2011;          Current Outpatient Medications:     acetaminophen (TYLENOL) 650 mg CR tablet, Take 1 tablet (650 mg total) by mouth every 8 (eight) hours as needed for mild pain, Disp: 30 tablet, Rfl: 0    bisoprolol-hydrochlorothiazide (ZIAC) 2.5-6.25 MG per tablet, Take 1 tablet by mouth daily, Disp: 90 tablet, Rfl: 1    HYDROcodone-acetaminophen (NORCO)  mg per tablet, Take 1 tablet by mouth every 8 (eight) hours as needed for moderate pain For ongoing therapy DO NOT FILL BEFORE: 02/27/25 Max Daily Amount: 3 tablets, Disp: 90 tablet, Rfl: 0    HYDROcodone-acetaminophen (NORCO)  mg per tablet, Take 1 tablet by mouth every 8 (eight) hours as needed for moderate pain For ongoing therapy DO NOT FILL BEFORE: 03/28/25 Max Daily Amount: 3 tablets, Disp: 90 tablet, Rfl: 0    HYDROcodone-acetaminophen (NORCO)  mg per tablet, Take 1 tablet by mouth every 8 (eight) hours as needed for moderate pain For ongoing therapy DO NOT FILL BEFORE: 04/26/25 Max Daily Amount: 3 tablets, Disp: 90 tablet, Rfl: 0    linaCLOtide 145 MCG CAPS, Take 1 tablet daily as needed for constipation, Disp: 30 capsule, Rfl: 5    mirtazapine (REMERON) 15 mg tablet, Take 1 tablet (15 mg total) by mouth daily at bedtime, Disp: 30 tablet, Rfl: 0    naproxen sodium (ALEVE) 220 MG tablet, Take 1 tablet (220 mg total) by mouth 2 (two) times a day with meals, Disp: 20 tablet, Rfl: 0    rosuvastatin (CRESTOR) 10 MG tablet, Take 1 tablet (10 mg total) by mouth daily at bedtime, Disp: 90 tablet, Rfl: 1    Zepbound 7.5 MG/0.5ML auto-injector, Inject 0.5 mL (7.5 mg total) under the skin once a week, Disp: 2 mL, Rfl: 0    acetaminophen (TYLENOL) 650 mg CR tablet, Take  1 tablet (650 mg total) by mouth every 8 (eight) hours as needed for mild pain (Patient not taking: Reported on 4/1/2025), Disp: 30 tablet, Rfl: 0    naloxone (NARCAN) 4 mg/0.1 mL nasal spray, Administer 1 spray into a nostril. If no response after 2-3 minutes, give another dose in the other nostril using a new spray., Disp: 1 each, Rfl: 1    Naproxen Sodium 220 MG CAPS, Take 1 capsule (220 mg total) by mouth 2 (two) times a day (Patient not taking: Reported on 2/24/2025), Disp: 60 capsule, Rfl: 0      Tanner Cotto PA-C

## 2025-03-31 NOTE — PATIENT INSTRUCTIONS
Patient Education     Rotator Cuff Tendinitis Strengthening Exercises   About this topic   The rotator cuff is a group of muscles and tendons in your shoulder. It helps your shoulder move and be steady. These muscles help to rotate and lift the arm. Tendons are strong bands that connect muscles to bones. Tendonitis happens when the tendon becomes swollen and inflamed. Exercises can help make this problem better. If you have this problem, it may be helpful to see a physical therapist. A therapist can tell you what exercises are best for you.  General   Before starting with a program, ask your doctor if you are healthy enough to do these exercises. Your doctor may have you work with a  or physical therapist to make a safe exercise program to meet your needs.  Strengthening Exercises   Strengthening exercises keep your muscles firm and strong. Start by repeating each exercise 2 to 3 times. Work up to doing each exercise 10 times. Try to do the exercises 2 to 3 times each day. Hold each exercise for 3 to 5 seconds. Do all exercises slowly.  Shoulder blade exercises ? Lie on your stomach near the edge of a mat, bed, or supported on an exercise ball. Start with your arms hanging down in a relaxed position.  Y position: Raise your arms up and to the sides so your elbows are near your ears and your arms are straight out diagonally like the letter Y. Lower the arms back to the starting position.  T position: Raise your arms straight out to the sides, even with your shoulders. Lower the arms back to the starting position.  I position: Raise your arms straight back until they are in line with your body like the letter I. Lower your arms slowly back to the starting position.  Wall push-ups ? Stand about 18 inches (45 cm) away from a wall. Place your hands on the wall in front of both shoulders. Keeping your back straight, lean forward by bending your elbows until your face almost touches the wall. Now, straighten the  "elbows and push your body away from the wall. As this gets easier, try doing a push-up leaning on a counter to put more body weight through your arms.  Shoulder exercises ? Tie a knot in an exercise band. Secure the band in a door by shutting it in around waist level. Wrap the band around one hand for a good . Stand away from the door enough to have slight tension in the band. As the exercises get easier, you may need to change to a heavier band. Moving closer to, or further away from, the point where the band is tied down will decrease or increase the tension in the band.  Internal rotations ? Face sideways with the arm holding the band closest to the door. Bend the elbow to 90 degrees or in an \"L\" position. Keeping your elbow by your side and bent, pull the band across your body. Bring it back to the starting position. Repeat with the other arm.  External rotations ? Face sideways with the arm holding the band farthest from the door. Bend the elbow to 90 degrees or in an \"L\" position. Keeping your elbow by your side and bent, pull the band away from your body. Bring it back to the starting position. Repeat with the other arm.  Abductions ? Face sideways with the arm holding the band farthest from the door. Be sure that your thumb is facing upwards. Keep your elbow straight and pull the band out to the side and away from your body. Go up to shoulder level. Bring it back to the starting position. Repeat with the other arm.  Flexions ? Face away from the door. Keeping your elbow straight, pull the band straight out in front of you. Bring it back to the starting position. Repeat with the other arm.         What will the results be?   Less pain and stiffness  Better range of motion  Better function  Less swelling  Increased strength  Easier to do daily activities  Helpful tips   Avoid activities that repeatedly use your shoulder in the same way which may cause your shoulder pain to worsen.  Stay active and work out " to keep your muscles strong and flexible.  Eat a healthy diet to keep your muscles healthy.  Be sure you do not hold your breath when exercising. This can raise your blood pressure. If you tend to hold your breath, try counting out loud when exercising. If any exercise bothers you, stop right away.  Try walking and swinging your arms at an easy pace for a few minutes to warm up your muscles. Do this again after exercising.  After exercising, it is a good idea to use ice. Place an ice pack or a bag of frozen peas wrapped in a towel over the painful part. Never put ice right on the skin. Do not leave the ice on more than 10 to 15 minutes at a time. Ice after activity may help decrease pain and swelling. Never ice before stretching.  Doing exercises before a meal may be a good way to get into a routine.  Exercise may be slightly uncomfortable, but you should not have sharp pains. If you do get sharp pains, stop what you are doing. If the sharp pains continue, call your doctor.  Last Reviewed Date   2021-05-21  Consumer Information Use and Disclaimer   This generalized information is a limited summary of diagnosis, treatment, and/or medication information. It is not meant to be comprehensive and should be used as a tool to help the user understand and/or assess potential diagnostic and treatment options. It does NOT include all information about conditions, treatments, medications, side effects, or risks that may apply to a specific patient. It is not intended to be medical advice or a substitute for the medical advice, diagnosis, or treatment of a health care provider based on the health care provider's examination and assessment of a patient’s specific and unique circumstances. Patients must speak with a health care provider for complete information about their health, medical questions, and treatment options, including any risks or benefits regarding use of medications. This information does not endorse any  treatments or medications as safe, effective, or approved for treating a specific patient. [employer] and its affiliates disclaim any warranty or liability relating to this information or the use thereof. The use of this information is governed by the Terms of Use, available at https://www.[employer].com/en/know/clinical-effectiveness-terms   Copyright   Copyright © 2024 [employer] and its affiliates and/or licensors. All rights reserved.

## 2025-04-01 ENCOUNTER — OFFICE VISIT (OUTPATIENT)
Dept: OBGYN CLINIC | Facility: CLINIC | Age: 70
End: 2025-04-01
Payer: COMMERCIAL

## 2025-04-01 VITALS — WEIGHT: 270 LBS | HEIGHT: 70 IN | BODY MASS INDEX: 38.65 KG/M2

## 2025-04-01 DIAGNOSIS — M25.511 ACUTE PAIN OF RIGHT SHOULDER: Primary | ICD-10-CM

## 2025-04-01 DIAGNOSIS — M75.41 IMPINGEMENT SYNDROME OF RIGHT SHOULDER: ICD-10-CM

## 2025-04-01 DIAGNOSIS — M75.81 RIGHT ROTATOR CUFF TENDINITIS: ICD-10-CM

## 2025-04-01 PROCEDURE — 99213 OFFICE O/P EST LOW 20 MIN: CPT | Performed by: PHYSICIAN ASSISTANT

## 2025-04-01 PROCEDURE — 20610 DRAIN/INJ JOINT/BURSA W/O US: CPT | Performed by: PHYSICIAN ASSISTANT

## 2025-04-01 RX ORDER — LIDOCAINE HYDROCHLORIDE 10 MG/ML
2 INJECTION, SOLUTION INFILTRATION; PERINEURAL
Status: COMPLETED | OUTPATIENT
Start: 2025-04-01 | End: 2025-04-01

## 2025-04-01 RX ORDER — TRIAMCINOLONE ACETONIDE 40 MG/ML
80 INJECTION, SUSPENSION INTRA-ARTICULAR; INTRAMUSCULAR
Status: COMPLETED | OUTPATIENT
Start: 2025-04-01 | End: 2025-04-01

## 2025-04-01 RX ADMIN — TRIAMCINOLONE ACETONIDE 80 MG: 40 INJECTION, SUSPENSION INTRA-ARTICULAR; INTRAMUSCULAR at 08:00

## 2025-04-01 RX ADMIN — LIDOCAINE HYDROCHLORIDE 2 ML: 10 INJECTION, SOLUTION INFILTRATION; PERINEURAL at 08:00

## 2025-04-02 ENCOUNTER — EVALUATION (OUTPATIENT)
Dept: PHYSICAL THERAPY | Facility: CLINIC | Age: 70
End: 2025-04-02
Payer: COMMERCIAL

## 2025-04-02 DIAGNOSIS — M25.511 ACUTE PAIN OF RIGHT SHOULDER: ICD-10-CM

## 2025-04-02 DIAGNOSIS — M25.511 CHRONIC RIGHT SHOULDER PAIN: Primary | ICD-10-CM

## 2025-04-02 DIAGNOSIS — M75.81 RIGHT ROTATOR CUFF TENDINITIS: ICD-10-CM

## 2025-04-02 DIAGNOSIS — G89.29 CHRONIC RIGHT SHOULDER PAIN: Primary | ICD-10-CM

## 2025-04-02 DIAGNOSIS — M75.41 IMPINGEMENT SYNDROME OF RIGHT SHOULDER: ICD-10-CM

## 2025-04-02 PROCEDURE — 97161 PT EVAL LOW COMPLEX 20 MIN: CPT | Performed by: PHYSICAL THERAPIST

## 2025-04-02 PROCEDURE — 97110 THERAPEUTIC EXERCISES: CPT | Performed by: PHYSICAL THERAPIST

## 2025-04-02 NOTE — PROGRESS NOTES
PT Evaluation     Today's date: 2025  Patient name: Yayo Smith Jr.  : 1955  MRN: [id number]  Referring provider: Tanner Cotto PA*  Dx:   Encounter Diagnosis     ICD-10-CM    1. Chronic right shoulder pain  M25.511     G89.29       2. Acute pain of right shoulder  M25.511 Ambulatory Referral to Physical Therapy      3. Right rotator cuff tendinitis  M75.81 Ambulatory Referral to Physical Therapy      4. Impingement syndrome of right shoulder  M75.41 Ambulatory Referral to Physical Therapy          Start Time: 1115  Stop Time: 1200  Total time in clinic (min): 45 minutes    Assessment  Impairments: abnormal muscle firing, abnormal or restricted ROM, activity intolerance, impaired balance, impaired physical strength, lacks appropriate home exercise program, pain with function and poor body mechanics    Assessment details: Yayo Smith Jr. is a pleasant 69 y.o. male who presents with chronic R shoulder pain.  he has R shoulder ROM restriction and weakness as well as positive painful arc sign resulting in pain with ADLs.  No further referral appears necessary at this time based upon examination results.  I expect he will improve in 6-8 wks with physical therapy and home program.      Barriers to therapy: n/a  Understanding of Dx/Px/POC: good     Prognosis: fair    Goals  ST.  Independent with HEP in 2 weeks  2. Decrease pain by 50% in 3 wks  3. Negative painful arc in 4 wks    LT. Achieve FOTO score expected in 6 weeks   2.  Able to reach a shelf overhead with 5 lbs in 6 weeks        Plan  Patient would benefit from: skilled physical therapy  Planned modality interventions: cryotherapy, electrical stimulation/Russian stimulation and thermotherapy: hydrocollator packs    Planned therapy interventions: abdominal trunk stabilization, manual therapy, neuromuscular re-education, therapeutic activities, therapeutic exercise, body mechanics training and home exercise  "program    Frequency: 2x week  Duration in weeks: 6  Plan of Care beginning date: 4/2/2025  Plan of Care expiration date: 5/16/2025  Treatment plan discussed with: patient  Plan details: RE in 6 weeks        Subjective Evaluation    History of Present Illness  Mechanism of injury: Pt reports to PT with chronic R shoulder pain. Gradual onset 6 months ago, no known EDUARDO. Has not tried anything besides active recovery and it is slowly worsening. He does endorse similar pain starting in the L shoulder but he is unsure if this is due to compensation.    R shoulder XR shows mild arthritis. Cervical CT from 2023 shows \"Multilevel degenerative changes of cervical spine with varying degrees of canal stenosis (suspected moderate C6-C7) and foraminal narrowing (severe right C3-C4 and bilateral C6-C7; moderate-to-severe left C3-C4), as detailed above.\"    R shoulder pain is located anterior-lateral shoulder, deep. Described as deep muscle, sharp and aching. Rated 0/10 at rest, 10/10 when lifting his arm OH. He does have a h/o BUE N/T, but no new or progressing symptoms. No new weakness of the shoulder or .    He has a h/o many orthopedic/neurologic surgeries including C6-7 surgery (unable to specify), lumbar, shoulder, knee, elbow, carpal tunnel, trigger finger. He also has a spinal stimulator.    He lives with his wife. He is independent with ADLs and driving. He is primarily sedentary due to chronic pain and generalized weakness.  Patient Goals  Patient goals for therapy: decreased pain, increased motion, return to work, return to sport/leisure activities and increased strength          Objective     Postural Observations  Seated posture: poor  Correction of posture: makes symptoms worse (worsens low back symptoms)    Additional Postural Observation Details  Increased thoracic kyphosis, fwd head    Cervical/Thoracic Screen   Cervical range of motion within normal limits with the following exceptions: Mod limited all " planes  Thoracic range of motion within normal limits with the following exceptions: Mod-max thoracic ext limitation    Neurological Testing     Sensation     Shoulder   Left Shoulder   Intact: light touch    Right Shoulder   Intact: Light touch    Active Range of Motion   Left Shoulder   Flexion: 115 degrees with pain  Extension: 50 degrees   Abduction: 110 degrees with pain  External rotation 0°: 55 degrees   External rotation BTH: C7 WFL  Internal rotation BTB: T12 WFL    Right Shoulder   Flexion: 125 degrees with pain  Extension: 55 degrees   Abduction: 115 degrees with pain  External rotation 0°: 65 degrees   External rotation BTH: C7 with pain  Internal rotation BTB: T12     Strength/Myotome Testing     Left Shoulder     Planes of Motion   Flexion: 4+   Abduction: 4+   External rotation at 0°: 5   Internal rotation at 0°: 5     Isolated Muscles   Biceps: 5   Triceps: 5     Right Shoulder     Planes of Motion   Flexion: 4   Abduction: 4   External rotation at 0°: 5   Internal rotation at 0°: 5     Isolated Muscles   Biceps: 5   Triceps: 5     Tests     Right Shoulder   Positive painful arc.              Precautions: n/a  Comorbidities: h/o multiple orthopedic and neurological surgeries, spinal stimulator        HEP:  Access Code: [email or web address]  URL: [email or web address]  Date: 04/02/2025  Prepared by: Autumn Padilla    Exercises  - Seated Thoracic Lumbar Extension  - 3 x daily - 10 reps  - shoulder blade squeeze / posture correction  - 3 x daily - 10 reps  - Seated Passive Cervical Retraction  - 3 x daily - 10 reps        POC Expires Reeval for Medicare to be completed Unit LImit Auth Expiration Date PT/OT/STVisit Limit   5/16 By visit n/a N/a 12/31/25 BOMN    Completed on visit                  TREATMENT DIARY    Auth status 4/2            BOMN               Visit # 1 2 3 4 5 6 7 8 9 10   Visits Remaining                          Manuals             Cervical STM             Cervical mob              R sh' STM             R sh' PROM                                       Neuro Re-Ed             RC iso                                                    Ther Ex             UBE- cardio             Scap rtrxn x10            Tspine ext over chair x10            Tspine rot             Cervical rtrxn x10            Cspine rtrxn+ext             Sh' ext             row             ER             IR             Band chest press                                                    Ther Activity                                       Gait Training                                       Modalities

## 2025-04-07 DIAGNOSIS — E78.2 MIXED HYPERLIPIDEMIA: ICD-10-CM

## 2025-04-07 RX ORDER — ROSUVASTATIN CALCIUM 10 MG/1
10 TABLET, COATED ORAL
Qty: 90 TABLET | Refills: 1 | Status: SHIPPED | OUTPATIENT
Start: 2025-04-07

## 2025-04-09 ENCOUNTER — OFFICE VISIT (OUTPATIENT)
Dept: PHYSICAL THERAPY | Facility: CLINIC | Age: 70
End: 2025-04-09
Payer: COMMERCIAL

## 2025-04-09 DIAGNOSIS — M25.511 ACUTE PAIN OF RIGHT SHOULDER: ICD-10-CM

## 2025-04-09 DIAGNOSIS — M25.511 CHRONIC RIGHT SHOULDER PAIN: Primary | ICD-10-CM

## 2025-04-09 DIAGNOSIS — M75.81 RIGHT ROTATOR CUFF TENDINITIS: ICD-10-CM

## 2025-04-09 DIAGNOSIS — M75.41 IMPINGEMENT SYNDROME OF RIGHT SHOULDER: ICD-10-CM

## 2025-04-09 DIAGNOSIS — G89.29 CHRONIC RIGHT SHOULDER PAIN: Primary | ICD-10-CM

## 2025-04-09 PROCEDURE — 97110 THERAPEUTIC EXERCISES: CPT | Performed by: PHYSICAL THERAPIST

## 2025-04-09 NOTE — PROGRESS NOTES
Daily Note     Today's date: 2025  Patient name: Yayo Smith Jr.  : 1955  MRN: [id number]  Referring provider: Tanner Cotto PA*  Dx:   Encounter Diagnosis     ICD-10-CM    1. Chronic right shoulder pain  M25.511     G89.29       2. Acute pain of right shoulder  M25.511       3. Right rotator cuff tendinitis  M75.81       4. Impingement syndrome of right shoulder  M75.41           Start Time: 1019          Subjective: Shoulder is doing better as the injection has taken effect.      Objective: See treatment diary below      Assessment: Corrections provided for HEP exercises, especially cervical retraction. He was able to maintain corrections without faulting. No issues with rotator cuff isometrics or banded exercise. He noted resistance could be greater for the bands. Will wait to assess response to session prior to updating HEP. Ended with MHP for comfort and recovery. He will benefit from continued therapy to maximize function.      Plan: update HEP based on response to added TE     Precautions: n/a  Comorbidities: h/o multiple orthopedic and neurological surgeries, spinal stimulator        HEP:  Access Code: [email or web address]  URL: [email or web address]  Date: 2025  Prepared by: Autumn Padilla    Exercises  - Seated Thoracic Lumbar Extension  - 3 x daily - 10 reps  - shoulder blade squeeze / posture correction  - 3 x daily - 10 reps  - Seated Passive Cervical Retraction  - 3 x daily - 10 reps        POC Expires Reeval for Medicare to be completed Unit LImit Auth Expiration Date PT/OT/STVisit Limit    By visit n/a N/a 25 BOMN    Completed on visit                  TREATMENT DIARY    Auth status            BOMN               Visit # 1 2 3 4 5 6 7 8 9 10   Visits Remaining                          Manuals             Cervical STM             Cervical mob             R sh' STM             R sh' PROM                                       Neuro Re-Ed             RC  "iso  Er/ir, flex 5\"x10 ea                                                  Ther Ex             UBE- cardio  3'/3'           Scap rtrxn x10 2x10           Tspine ext over chair x10 2x10           Tspine rot             Cervical rtrxn x10 2x10           Cspine rtrxn+ext             Sh' ext  Gtb x20           row  Gtb x20           ER             IR             Band chest press  Gtb x20                                                  Ther Activity                                       Gait Training                                       Modalities                                                   "

## 2025-04-11 ENCOUNTER — OFFICE VISIT (OUTPATIENT)
Dept: PHYSICAL THERAPY | Facility: CLINIC | Age: 70
End: 2025-04-11
Payer: COMMERCIAL

## 2025-04-11 DIAGNOSIS — M75.41 IMPINGEMENT SYNDROME OF RIGHT SHOULDER: ICD-10-CM

## 2025-04-11 DIAGNOSIS — M25.511 ACUTE PAIN OF RIGHT SHOULDER: ICD-10-CM

## 2025-04-11 DIAGNOSIS — M75.81 RIGHT ROTATOR CUFF TENDINITIS: ICD-10-CM

## 2025-04-11 DIAGNOSIS — M25.511 CHRONIC RIGHT SHOULDER PAIN: Primary | ICD-10-CM

## 2025-04-11 DIAGNOSIS — G89.29 CHRONIC RIGHT SHOULDER PAIN: Primary | ICD-10-CM

## 2025-04-11 PROCEDURE — 97110 THERAPEUTIC EXERCISES: CPT | Performed by: PHYSICAL THERAPIST

## 2025-04-11 NOTE — PROGRESS NOTES
"Daily Note     Today's date: 2025  Patient name: Yayo Smith Jr.  : 1955  MRN: [id number]  Referring provider: Tanner Cotto PA*  Dx:   Encounter Diagnosis     ICD-10-CM    1. Chronic right shoulder pain  M25.511     G89.29       2. Acute pain of right shoulder  M25.511       3. Right rotator cuff tendinitis  M75.81       4. Impingement syndrome of right shoulder  M75.41                      Subjective: Shoulder is doing fine, only has a twinge of pain in the anterior shoulder with reaching OH a certain way. His whole body is in pain from the poor weather.      Objective: See treatment diary below      Assessment: Able to tolerate increased resistance of band exercises today without issue. Breaks taken throughout session for increased low back pain. HEP not updated today, will re-evaluate need next session. Declined ice/heat end of session. He will benefit from continued therapy to maximize function.      Plan: update HEP based on response to added TE     Precautions: n/a  Comorbidities: h/o multiple orthopedic and neurological surgeries, spinal stimulator        HEP:  Access Code: [email or web address]  URL: [email or web address]  Date: 2025  Prepared by: Autumn Padilla    Exercises  - Seated Thoracic Lumbar Extension  - 3 x daily - 10 reps  - shoulder blade squeeze / posture correction  - 3 x daily - 10 reps  - Seated Passive Cervical Retraction  - 3 x daily - 10 reps        POC Expires Reeval for Medicare to be completed Unit LImit Auth Expiration Date PT/OT/STVisit Limit    By visit n/a N/a 25 BOMN    Completed on visit                  TREATMENT DIARY    Auth status           BOMN               Visit # 1 2 3 4 5 6 7 8 9 10   Visits Remaining                          Manuals             Cervical STM             Cervical mob             R sh' STM             R sh' PROM                                       Neuro Re-Ed             RC iso  Er/ir, flex 5\"x10 ea " "Er/ir, flex 5\"x15 ea                                                 Ther Ex             UBE- cardio  3'/3' 3'/3'          Scap rtrxn x10 2x10           Tspine ext over chair x10 2x10 x20          Tspine rot             Cervical rtrxn x10 2x10           Cspine rtrxn+ext             Sh' ext  Gtb x20 99# 2x20          row  Gtb x20 99# 2x20          ER   Btb 2x20          IR             Band chest press  Gtb x20                                                  Ther Activity                                       Gait Training                                       Modalities                                                   "

## 2025-04-16 ENCOUNTER — APPOINTMENT (OUTPATIENT)
Dept: PHYSICAL THERAPY | Facility: CLINIC | Age: 70
End: 2025-04-16
Payer: COMMERCIAL

## 2025-04-18 ENCOUNTER — APPOINTMENT (OUTPATIENT)
Dept: PHYSICAL THERAPY | Facility: CLINIC | Age: 70
End: 2025-04-18
Payer: COMMERCIAL

## 2025-04-23 ENCOUNTER — OFFICE VISIT (OUTPATIENT)
Dept: PHYSICAL THERAPY | Facility: CLINIC | Age: 70
End: 2025-04-23
Payer: COMMERCIAL

## 2025-04-23 DIAGNOSIS — M75.41 IMPINGEMENT SYNDROME OF RIGHT SHOULDER: ICD-10-CM

## 2025-04-23 DIAGNOSIS — G89.29 CHRONIC RIGHT SHOULDER PAIN: Primary | ICD-10-CM

## 2025-04-23 DIAGNOSIS — M25.511 CHRONIC RIGHT SHOULDER PAIN: Primary | ICD-10-CM

## 2025-04-23 DIAGNOSIS — M75.81 RIGHT ROTATOR CUFF TENDINITIS: ICD-10-CM

## 2025-04-23 DIAGNOSIS — M25.511 ACUTE PAIN OF RIGHT SHOULDER: ICD-10-CM

## 2025-04-23 PROCEDURE — 97110 THERAPEUTIC EXERCISES: CPT | Performed by: PHYSICAL THERAPIST

## 2025-04-23 PROCEDURE — 97140 MANUAL THERAPY 1/> REGIONS: CPT | Performed by: PHYSICAL THERAPIST

## 2025-04-23 NOTE — PROGRESS NOTES
Daily Note - discharge     Today's date: 2025  Patient name: Yayo Smith Jr.  : 1955  MRN: [id number]  Referring provider: Tanner Cotto PA*  Dx:   Encounter Diagnosis     ICD-10-CM    1. Chronic right shoulder pain  M25.511     G89.29       2. Acute pain of right shoulder  M25.511       3. Right rotator cuff tendinitis  M75.81       4. Impingement syndrome of right shoulder  M75.41           Start Time: 1015  Stop Time: 1100  Total time in clinic (min): 45 minutes    Subjective: Shoulder continues to feel good. He is able to perform any tasks that he likes without issue. He's ready to be done with PT today.      Objective: See treatment diary below      Assessment: Yayo Smith Jr. has attended physical therapy for 4 visits. In this time, they have made significant improvements in symptoms and function, as noted by subjective report, improved balance, ROM, strength, flexibility and activity tolerance. They have made positive goal progress with FOTO score improvement. Recommend d/c to HEP at this time.        Plan: D/c to HEP     Precautions: n/a  Comorbidities: h/o multiple orthopedic and neurological surgeries, spinal stimulator        HEP:  Access Code: [email or web address]  URL: [email or web address]  Date: 2025  Prepared by: Autumn Padilla    Exercises  - Seated Thoracic Lumbar Extension  - 3 x daily - 10 reps  - shoulder blade squeeze / posture correction  - 3 x daily - 10 reps  - Seated Passive Cervical Retraction  - 3 x daily - 10 reps        POC Expires Reeval for Medicare to be completed Unit LImit Auth Expiration Date PT/OT/STVisit Limit    By visit n/a N/a 25 BOMN    Completed on visit                  TREATMENT DIARY    Auth status          BOMN               Visit # 1 2 3 4 5 6 7 8 9 10   Visits Remaining                          Manuals             RE             FOMARICRUZ    MINO         Lumbar sTM    MINO         Cervical mob             R  "sh' STM             R sh' PROM                                       Neuro Re-Ed             RC iso  Er/ir, flex 5\"x10 ea Er/ir, flex 5\"x15 ea                                                 Ther Ex             UBE- cardio  3'/3' 3'/3' 3'/3'         Scap rtrxn x10 2x10           Tspine ext over chair x10 2x10 x20 x20         Tspine rot             Cervical rtrxn x10 2x10           Cspine rtrxn+ext             Sh' ext  Gtb x20 99# 2x20 66# 3x10         row  Gtb x20 99# 2x20 88# 3x10         ER   Btb 2x20 Btb 2x20 ea         IR             Band chest press  Gtb x20                                                  Ther Activity                                       Gait Training                                       Modalities                                                   "

## 2025-04-25 ENCOUNTER — APPOINTMENT (OUTPATIENT)
Dept: PHYSICAL THERAPY | Facility: CLINIC | Age: 70
End: 2025-04-25
Payer: COMMERCIAL

## 2025-04-28 DIAGNOSIS — E66.01 CLASS 2 SEVERE OBESITY DUE TO EXCESS CALORIES WITH SERIOUS COMORBIDITY AND BODY MASS INDEX (BMI) OF 38.0 TO 38.9 IN ADULT (HCC): ICD-10-CM

## 2025-04-28 DIAGNOSIS — E66.812 CLASS 2 SEVERE OBESITY DUE TO EXCESS CALORIES WITH SERIOUS COMORBIDITY AND BODY MASS INDEX (BMI) OF 38.0 TO 38.9 IN ADULT (HCC): ICD-10-CM

## 2025-04-29 RX ORDER — TIRZEPATIDE 7.5 MG/.5ML
7.5 INJECTION, SOLUTION SUBCUTANEOUS WEEKLY
Qty: 2 ML | Refills: 5 | Status: SHIPPED | OUTPATIENT
Start: 2025-04-29

## 2025-04-30 ENCOUNTER — APPOINTMENT (OUTPATIENT)
Dept: PHYSICAL THERAPY | Facility: CLINIC | Age: 70
End: 2025-04-30
Payer: COMMERCIAL

## 2025-05-19 DIAGNOSIS — M25.50 POLYARTHRALGIA: ICD-10-CM

## 2025-05-19 DIAGNOSIS — M96.1 LUMBAR POST-LAMINECTOMY SYNDROME: ICD-10-CM

## 2025-05-19 DIAGNOSIS — M54.16 LUMBAR RADICULOPATHY: ICD-10-CM

## 2025-05-19 DIAGNOSIS — M51.369 LUMBAR DEGENERATIVE DISC DISEASE: ICD-10-CM

## 2025-05-19 DIAGNOSIS — G60.9 IDIOPATHIC PERIPHERAL NEUROPATHY: ICD-10-CM

## 2025-05-19 DIAGNOSIS — G89.4 CHRONIC PAIN SYNDROME: ICD-10-CM

## 2025-05-19 DIAGNOSIS — M47.816 LUMBAR SPONDYLOSIS: ICD-10-CM

## 2025-05-19 RX ORDER — HYDROCODONE BITARTRATE AND ACETAMINOPHEN 10; 325 MG/1; MG/1
1 TABLET ORAL EVERY 8 HOURS PRN
Qty: 90 TABLET | Refills: 0 | Status: SHIPPED | OUTPATIENT
Start: 2025-05-19 | End: 2025-05-23

## 2025-05-19 NOTE — TELEPHONE ENCOUNTER
Pt needs to have the script put in by tomorrow in order for the script to be mailed out to home in time because of the upcoming holiday patient an not wait for appt on Friday   Please send script in

## 2025-05-19 NOTE — TELEPHONE ENCOUNTER
Pt was scheduled for 12 week med refill appt today, rescheduled to Friday. Pt is asking if this next months Boyce can be sent to pharmacy today so he can assure he gets it in time. Pt said [facility] mails it to him and with the holiday he is nervous if sent Friday he will not get it in time. Pt last filled on 4/28/25.

## 2025-05-23 ENCOUNTER — OFFICE VISIT (OUTPATIENT)
Dept: PAIN MEDICINE | Facility: CLINIC | Age: 70
End: 2025-05-23

## 2025-05-23 VITALS
OXYGEN SATURATION: 97 % | BODY MASS INDEX: 38.22 KG/M2 | WEIGHT: 267 LBS | HEIGHT: 70 IN | TEMPERATURE: 98 F | HEART RATE: 83 BPM

## 2025-05-23 DIAGNOSIS — M47.816 LUMBAR SPONDYLOSIS: ICD-10-CM

## 2025-05-23 DIAGNOSIS — M96.1 LUMBAR POSTLAMINECTOMY SYNDROME: ICD-10-CM

## 2025-05-23 DIAGNOSIS — G89.4 CHRONIC PAIN SYNDROME: Primary | ICD-10-CM

## 2025-05-23 DIAGNOSIS — Z79.891 LONG-TERM CURRENT USE OF OPIATE ANALGESIC: ICD-10-CM

## 2025-05-23 DIAGNOSIS — F11.20 UNCOMPLICATED OPIOID DEPENDENCE (HCC): ICD-10-CM

## 2025-05-23 DIAGNOSIS — M54.16 LUMBAR RADICULOPATHY: ICD-10-CM

## 2025-05-23 RX ORDER — HYDROCODONE BITARTRATE AND ACETAMINOPHEN 10; 325 MG/1; MG/1
1 TABLET ORAL EVERY 8 HOURS PRN
Qty: 90 TABLET | Refills: 0 | Status: SHIPPED | OUTPATIENT
Start: 2025-05-23

## 2025-05-23 NOTE — ASSESSMENT & PLAN NOTE
Orders:  •  HYDROcodone-acetaminophen (NORCO)  mg per tablet; Take 1 tablet by mouth every 8 (eight) hours as needed for moderate pain For ongoing therapy DO NOT FILL BEFORE: 06/17/25 Max Daily Amount: 3 tablets  •  HYDROcodone-acetaminophen (NORCO)  mg per tablet; Take 1 tablet by mouth every 8 (eight) hours as needed for moderate pain For ongoing therapy DO NOT FILL BEFORE: 07/15/25 Max Daily Amount: 3 tablets  •  naloxone (NARCAN) 4 mg/0.1 mL nasal spray; Administer 1 spray into a nostril. If no response after 2-3 minutes, give another dose in the other nostril using a new spray.  •  [facility] All Prescribed Meds and Special Instructions  •  Amphetamines, Methamphetamines  •  Butalbital  •  Phenobarbital  •  Secobarbital  •  Alprazolam  •  Clonazepam  •  Diazepam  •  Lorazepam  •  Gabapentin  •  Pregabalin  •  Cocaine  •  Heroin  •  Buprenorphine  •  Levorphanol  •  Meperidine  •  Naltrexone  •  Fentanyl  •  Methadone  •  Oxycodone  •  Tapentadol  •  THC  •  Tramadol  •  Codeine, Hydrocodone, Hydropmorphone, Morphine  •  Bath Salts  •  Ethyl Glucuronide/Ethyl Sulfate  •  Kratom  •  Spice  •  Methylphenidate  •  Phentermine  •  Validity Oxidant  •  Validity Creatinine  •  Validity pH  •  Validity Specific  •  Xylazine Definitive Test

## 2025-05-23 NOTE — PROGRESS NOTES
Name: Yayo Smith Jr.      : 1955      MRN: [id number]  Encounter Provider: Helena Todd PA-C  Encounter Date: 2025   Encounter department: [facility] [place]  :  Assessment & Plan  Lumbar radiculopathy  Lumbar postlaminectomy syndrome  Lumbar spondylosis  While the patient was in the office today, I did have a thorough conversation regarding their chronic pain syndrome, medication management, and treatment plan options.    Discussed and recommended a left L4 and L5 transforaminal epidural steroid injection.  Patient is not interested due to a bad experience in the past.    Consider CT scan of the lumbar spine.  Unable to have MRI due to noncompatible spinal cord stimulator.    Patient remains clinically stable and moderately controlled on the current medication regimen of Norco 10/325 every 8 hours as needed for pain.  Patient is taking the medication as prescribed and without side effects.  I feel it is reasonable to continue and have electronically sent the next 2 months of medication to his pharmacy with the appropriate fill dates.    Add tizanidine 4 mg nightly as needed.  I advised the patient that they should not drive or operate machinery while on this medication until they see how it affects them, as it could cause lethargy and mental cloudiness. I advised the patient to call our office if they experience any side effects or issues with the medication changes. The patient verbalized an understanding.    Follow-up is planned in 12 weeks or sooner as warranted. The patient was advised to contact the office should their symptoms worsen in the interim.     Orders:  •  tiZANidine (ZANAFLEX) 4 mg tablet; Take 1 tablet (4 mg total) by mouth daily at bedtime  •  HYDROcodone-acetaminophen (NORCO)  mg per tablet; Take 1 tablet by mouth every 8 (eight) hours as needed for moderate pain For ongoing therapy DO NOT FILL BEFORE: 25 Max Daily Amount: 3 tablets  •   HYDROcodone-acetaminophen (NORCO)  mg per tablet; Take 1 tablet by mouth every 8 (eight) hours as needed for moderate pain For ongoing therapy DO NOT FILL BEFORE: 07/15/25 Max Daily Amount: 3 tablets  •  naloxone (NARCAN) 4 mg/0.1 mL nasal spray; Administer 1 spray into a nostril. If no response after 2-3 minutes, give another dose in the other nostril using a new spray.    Chronic pain syndrome    Orders:  •  HYDROcodone-acetaminophen (NORCO)  mg per tablet; Take 1 tablet by mouth every 8 (eight) hours as needed for moderate pain For ongoing therapy DO NOT FILL BEFORE: 06/17/25 Max Daily Amount: 3 tablets  •  HYDROcodone-acetaminophen (NORCO)  mg per tablet; Take 1 tablet by mouth every 8 (eight) hours as needed for moderate pain For ongoing therapy DO NOT FILL BEFORE: 07/15/25 Max Daily Amount: 3 tablets  •  naloxone (NARCAN) 4 mg/0.1 mL nasal spray; Administer 1 spray into a nostril. If no response after 2-3 minutes, give another dose in the other nostril using a new spray.  •  [facility] All Prescribed Meds and Special Instructions  •  Amphetamines, Methamphetamines  •  Butalbital  •  Phenobarbital  •  Secobarbital  •  Alprazolam  •  Clonazepam  •  Diazepam  •  Lorazepam  •  Gabapentin  •  Pregabalin  •  Cocaine  •  Heroin  •  Buprenorphine  •  Levorphanol  •  Meperidine  •  Naltrexone  •  Fentanyl  •  Methadone  •  Oxycodone  •  Tapentadol  •  THC  •  Tramadol  •  Codeine, Hydrocodone, Hydropmorphone, Morphine  •  Bath Salts  •  Ethyl Glucuronide/Ethyl Sulfate  •  Kratom  •  Spice  •  Methylphenidate  •  Phentermine  •  Validity Oxidant  •  Validity Creatinine  •  Validity pH  •  Validity Specific  •  Xylazine Definitive Test    Long-term current use of opiate analgesic    Orders:  •  naloxone (NARCAN) 4 mg/0.1 mL nasal spray; Administer 1 spray into a nostril. If no response after 2-3 minutes, give another dose in the other nostril using a new spray.  •  [facility] All Prescribed Meds and  Special Instructions  •  Amphetamines, Methamphetamines  •  Butalbital  •  Phenobarbital  •  Secobarbital  •  Alprazolam  •  Clonazepam  •  Diazepam  •  Lorazepam  •  Gabapentin  •  Pregabalin  •  Cocaine  •  Heroin  •  Buprenorphine  •  Levorphanol  •  Meperidine  •  Naltrexone  •  Fentanyl  •  Methadone  •  Oxycodone  •  Tapentadol  •  THC  •  Tramadol  •  Codeine, Hydrocodone, Hydropmorphone, Morphine  •  Bath Salts  •  Ethyl Glucuronide/Ethyl Sulfate  •  Kratom  •  Spice  •  Methylphenidate  •  Phentermine  •  Validity Oxidant  •  Validity Creatinine  •  Validity pH  •  Validity Specific  •  Xylazine Definitive Test    Uncomplicated opioid dependence (HCC)    Orders:  •  naloxone (NARCAN) 4 mg/0.1 mL nasal spray; Administer 1 spray into a nostril. If no response after 2-3 minutes, give another dose in the other nostril using a new spray.      There are risks associated with opioid medications, including dependence, addiction and tolerance. The patient understands and agrees to use these medications only as prescribed. Potential side effects of the medications include, but are not limited to, constipation, drowsiness, addiction, impaired judgment and risk of fatal overdose if not taken as prescribed. The patient was warned against driving while taking sedation medications.  Sharing medications is a felony. At this point in time, the patient is showing no signs of addiction, abuse, diversion or suicidal ideation.  A urine drug screen was collected at today's office visit as part of our medication management protocol. The point of care testing results were appropriate for what was being prescribed. The specimen will be sent for confirmatory testing. The drug screen is medically necessary because the patient is either dependent on opioid medication or is being considered for opioid medication therapy and the results could impact ongoing or future treatment. The drug screen is to evaluate for the presences or absence  of prescribed, non-prescribed, and/or illicit drugs/substances.  [place] Prescription Drug Monitoring Program report was reviewed and was appropriate      My impressions and treatment recommendations were discussed in detail with the patient who verbalized understanding and had no further questions.  Discharge instructions were provided. I personally saw and examined the patient and I agree with the above discussed plan of care.    History of Present Illness     Yayo Smith Jr. is a 70 y.o. male who presents for a follow up office visit in regards to Hand Pain, Back Pain, and Leg Pain. The patient’s current symptoms include increased low back pain with radiation into the anterior and lateral aspect of the left lower extremity to slightly below the knee.  His current pain is an 8 out of 10 and described as a constant burning, sharp, dull, aching, pressure-like and shooting pain.  He has intermittent numbness and paresthesias in the leg as well.  Patient also has multisite joint pain secondary to osteoarthritis.  He is currently taking Norco 10/325 every 8 hours as needed with partial relief.  He reports significant increase in pain with prolonged standing, walking but also it affects his ability to sleep significantly.  Patient had a bad experience with an epidural steroid injection done about 30 years ago and is very hesitant to proceed with injection therapy.    Opioid contract date: 2/24/2025    Last UDS Date: 5/23/2025    Review of Systems   Respiratory:  Positive for shortness of breath.    Cardiovascular:  Negative for chest pain.   Gastrointestinal:  Positive for constipation. Negative for diarrhea, nausea and vomiting.   Musculoskeletal:  Positive for arthralgias and gait problem. Negative for joint swelling and myalgias.   Skin:  Negative for rash.   Neurological:  Positive for weakness. Negative for dizziness and seizures.   Psychiatric/Behavioral:  Negative for dysphoric mood.    All other  "systems reviewed and are negative.      Medical History Reviewed by provider this encounter:  Meds     .    Objective   Pulse 83   Temp 98 °F (36.7 °C)   Ht 5' 10\" (1.778 m)   Wt 121 kg (267 lb)   SpO2 97%   BMI 38.31 kg/m²      Pain Score:   8  Physical Exam  Constitutional:normal, well developed, well nourished, alert, in no distress and non-toxic and no overt pain behavior. and obese  Eyes:anicteric  HEENT:grossly intact  Neck:supple, symmetric, trachea midline and no masses   Pulmonary:even and unlabored  Cardiovascular:No edema or pitting edema present  Skin:Normal without rashes or lesions and well hydrated  Psychiatric:Mood and affect appropriate  Neurologic:Cranial Nerves II-XII grossly intact  Musculoskeletal: Gait is slow but stable, wearing lumbar back brace    Radiology Results Review: I have reviewed radiology reports from 12/5/2024 including: xray(s).      "

## 2025-05-25 LAB
4OH-XYLAZINE UR QL CFM: NEGATIVE NG/ML
6MAM UR QL CFM: NEGATIVE NG/ML
7AMINOCLONAZEPAM UR QL CFM: NEGATIVE NG/ML
A-OH ALPRAZ UR QL CFM: NEGATIVE NG/ML
ACCEPTABLE CREAT UR QL: NORMAL MG/DL
ACCEPTIBLE SP GR UR QL: NORMAL
AMPHET UR QL CFM: NEGATIVE NG/ML
BUPRENORPHINE UR QL CFM: NEGATIVE NG/ML
BUTALBITAL UR QL CFM: NEGATIVE NG/ML
BZE UR QL CFM: NEGATIVE NG/ML
CODEINE UR QL CFM: NEGATIVE NG/ML
EDDP UR QL CFM: NEGATIVE NG/ML
ETHYL GLUCURONIDE UR QL CFM: NEGATIVE NG/ML
ETHYL SULFATE UR QL SCN: NEGATIVE NG/ML
EUTYLONE UR QL: NEGATIVE NG/ML
FENTANYL UR QL CFM: NEGATIVE NG/ML
GLIADIN IGG SER IA-ACNC: NEGATIVE NG/ML
HYDROCODONE UR QL CFM: NORMAL NG/ML
HYDROMORPHONE UR QL CFM: NORMAL NG/ML
LORAZEPAM UR QL CFM: NEGATIVE NG/ML
ME-PHENIDATE UR QL CFM: NEGATIVE NG/ML
MEPERIDINE UR QL CFM: NEGATIVE NG/ML
METHADONE UR QL CFM: NEGATIVE NG/ML
METHAMPHET UR QL CFM: NEGATIVE NG/ML
MORPHINE UR QL CFM: NEGATIVE NG/ML
NALTREXONE UR QL CFM: NEGATIVE NG/ML
NITRITE UR QL: NORMAL UG/ML
NORBUPRENORPHINE UR QL CFM: NEGATIVE NG/ML
NORDIAZEPAM UR QL CFM: NEGATIVE NG/ML
NORFENTANYL UR QL CFM: NEGATIVE NG/ML
NORHYDROCODONE UR QL CFM: NORMAL NG/ML
NORMEPERIDINE UR QL CFM: NEGATIVE NG/ML
NOROXYCODONE UR QL CFM: NEGATIVE NG/ML
OXAZEPAM UR QL CFM: NEGATIVE NG/ML
OXYCODONE UR QL CFM: NEGATIVE NG/ML
OXYMORPHONE UR QL CFM: NEGATIVE NG/ML
PARA-FLUOROFENTANYL QUANTIFICATION: NORMAL NG/ML
PHENOBARB UR QL CFM: NEGATIVE NG/ML
RESULT ALL_PRESCRIBED MEDS AND SPECIAL INSTRUCTIONS: NORMAL
SECOBARBITAL UR QL CFM: NEGATIVE NG/ML
SL AMB 5F-ADB-M7 METABOLITE QUANTIFICATION: NEGATIVE NG/ML
SL AMB 7-OH-MITRAGYNINE (KRATOM ALKALOID) QUANTIFICATION: NEGATIVE NG/ML
SL AMB AB-FUBINACA-M3 METABOLITE QUANTIFICATION: NEGATIVE NG/ML
SL AMB ACETYL FENTANYL QUANTIFICATION: NORMAL NG/ML
SL AMB ACETYL NORFENTANYL QUANTIFICATION: NORMAL NG/ML
SL AMB ACRYL FENTANYL QUANTIFICATION: NORMAL NG/ML
SL AMB CARFENTANIL QUANTIFICATION: NORMAL NG/ML
SL AMB CTHC (MARIJUANA METABOLITE) QUANTIFICATION: NEGATIVE NG/ML
SL AMB DEXTRORPHAN (DEXTROMETHORPHAN METABOLITE) QUANT: NEGATIVE NG/ML
SL AMB GABAPENTIN QUANTIFICATION: NEGATIVE NG/ML
SL AMB JWH018 METABOLITE QUANTIFICATION: NEGATIVE NG/ML
SL AMB JWH073 METABOLITE QUANTIFICATION: NEGATIVE NG/ML
SL AMB MDMB-FUBINACA-M1 METABOLITE QUANTIFICATION: NEGATIVE NG/ML
SL AMB METHYLONE QUANTIFICATION: NEGATIVE NG/ML
SL AMB N-DESMETHYL-TRAMADOL QUANTIFICATION: NEGATIVE NG/ML
SL AMB PHENTERMINE QUANTIFICATION: NEGATIVE NG/ML
SL AMB PREGABALIN QUANTIFICATION: NEGATIVE NG/ML
SL AMB RCS4 METABOLITE QUANTIFICATION: NEGATIVE NG/ML
SL AMB RITALINIC ACID QUANTIFICATION: NEGATIVE NG/ML
SMOOTH MUSCLE AB TITR SER IF: NEGATIVE NG/ML
SPECIMEN DRAWN SERPL: NEGATIVE NG/ML
SPECIMEN PH ACCEPTABLE UR: NORMAL
TAPENTADOL UR QL CFM: NEGATIVE NG/ML
TEMAZEPAM UR QL CFM: NEGATIVE NG/ML
TRAMADOL UR QL CFM: NEGATIVE NG/ML
URATE/CREAT 24H UR: NEGATIVE NG/ML
XYLAZINE UR QL CFM: NEGATIVE NG/ML

## 2025-05-28 ENCOUNTER — TELEPHONE (OUTPATIENT)
Dept: FAMILY MEDICINE CLINIC | Facility: HOSPITAL | Age: 70
End: 2025-05-28

## 2025-05-28 NOTE — TELEPHONE ENCOUNTER
There is an active PA on file for all doses with [facility] until 02/26/25. There is quantity limits on the 2.5 mg, 7.5 mg and 12.5 mg dose. Patient will need to increase dose. If patient cannot increase dose, please provide the PA team with medical justification to present to insurance for a quantity limit exception request.

## 2025-06-10 DIAGNOSIS — I10 ESSENTIAL HYPERTENSION: ICD-10-CM

## 2025-06-11 RX ORDER — BISOPROLOL FUMARATE AND HYDROCHLOROTHIAZIDE 2.5; 6.25 MG/1; MG/1
1 TABLET ORAL DAILY
Qty: 90 TABLET | Refills: 1 | Status: SHIPPED | OUTPATIENT
Start: 2025-06-11

## 2025-06-16 DIAGNOSIS — M47.816 LUMBAR SPONDYLOSIS: ICD-10-CM

## 2025-06-16 DIAGNOSIS — M96.1 LUMBAR POSTLAMINECTOMY SYNDROME: ICD-10-CM

## 2025-06-16 DIAGNOSIS — G89.4 CHRONIC PAIN SYNDROME: ICD-10-CM

## 2025-06-16 DIAGNOSIS — M54.16 LUMBAR RADICULOPATHY: ICD-10-CM

## 2025-06-16 RX ORDER — HYDROCODONE BITARTRATE AND ACETAMINOPHEN 10; 325 MG/1; MG/1
1 TABLET ORAL EVERY 8 HOURS PRN
Qty: 90 TABLET | Refills: 0 | Status: CANCELLED | OUTPATIENT
Start: 2025-06-16

## 2025-06-19 NOTE — TELEPHONE ENCOUNTER
Sw pt's wife aimee per medical communication consent on file and confirmed that both rx's have been addressed. Advised aimee to cb if questions or issues arise. Aimee verbalized understanding and appreciation.

## 2025-08-12 ENCOUNTER — OFFICE VISIT (OUTPATIENT)
Dept: PAIN MEDICINE | Facility: CLINIC | Age: 70
End: 2025-08-12
Payer: COMMERCIAL

## 2025-08-19 ENCOUNTER — APPOINTMENT (OUTPATIENT)
Dept: LAB | Facility: HOSPITAL | Age: 70
End: 2025-08-19
Payer: COMMERCIAL

## 2025-08-19 DIAGNOSIS — E78.2 MIXED HYPERLIPIDEMIA: ICD-10-CM

## 2025-08-19 DIAGNOSIS — R73.03 PREDIABETES: ICD-10-CM

## 2025-08-19 LAB
ALBUMIN SERPL BCG-MCNC: 4 G/DL (ref 3.5–5)
ALP SERPL-CCNC: 55 U/L (ref 34–104)
ALT SERPL W P-5'-P-CCNC: 14 U/L (ref 7–52)
ANION GAP SERPL CALCULATED.3IONS-SCNC: 10 MMOL/L (ref 4–13)
AST SERPL W P-5'-P-CCNC: 15 U/L (ref 13–39)
BILIRUB SERPL-MCNC: 0.44 MG/DL (ref 0.2–1)
BUN SERPL-MCNC: 13 MG/DL (ref 5–25)
CALCIUM SERPL-MCNC: 8.9 MG/DL (ref 8.4–10.2)
CHLORIDE SERPL-SCNC: 102 MMOL/L (ref 96–108)
CHOLEST SERPL-MCNC: 129 MG/DL (ref ?–200)
CO2 SERPL-SCNC: 29 MMOL/L (ref 21–32)
CREAT SERPL-MCNC: 0.88 MG/DL (ref 0.6–1.3)
EST. AVERAGE GLUCOSE BLD GHB EST-MCNC: 117 MG/DL
GFR SERPL CREATININE-BSD FRML MDRD: 88 ML/MIN/1.73SQ M
GLUCOSE P FAST SERPL-MCNC: 113 MG/DL (ref 65–99)
HBA1C MFR BLD: 5.7 %
HDLC SERPL-MCNC: 38 MG/DL
LDLC SERPL CALC-MCNC: 68 MG/DL (ref 0–100)
POTASSIUM SERPL-SCNC: 4 MMOL/L (ref 3.5–5.3)
PROT SERPL-MCNC: 7.4 G/DL (ref 6.4–8.4)
SODIUM SERPL-SCNC: 141 MMOL/L (ref 135–147)
TRIGL SERPL-MCNC: 113 MG/DL (ref ?–150)

## 2025-08-19 PROCEDURE — 80061 LIPID PANEL: CPT

## 2025-08-19 PROCEDURE — 36415 COLL VENOUS BLD VENIPUNCTURE: CPT

## 2025-08-19 PROCEDURE — 80053 COMPREHEN METABOLIC PANEL: CPT

## 2025-08-19 PROCEDURE — 83036 HEMOGLOBIN GLYCOSYLATED A1C: CPT

## 2025-08-25 PROBLEM — I20.9 ANGINA PECTORIS, UNSPECIFIED (HCC): Status: RESOLVED | Noted: 2022-07-11 | Resolved: 2025-08-25

## (undated) DEVICE — GLOVE INDICATOR PI UNDERGLOVE SZ 8 BLUE

## (undated) DEVICE — SPONGE PVP SCRUB WING STERILE

## (undated) DEVICE — RETROGRADE KNIFE BOX OF 6: Brand: ECTRA

## (undated) DEVICE — GLOVE INDICATOR PI UNDERGLOVE SZ 6.5 BLUE

## (undated) DEVICE — GLOVE SRG BIOGEL 7

## (undated) DEVICE — NEEDLE 25G X 1 1/2

## (undated) DEVICE — CUFF TOURNIQUET 18 X 4 IN QUICK CONNECT DISP 1 BLADDER

## (undated) DEVICE — INTENDED FOR TISSUE SEPARATION, AND OTHER PROCEDURES THAT REQUIRE A SHARP SURGICAL BLADE TO PUNCTURE OR CUT.: Brand: BARD-PARKER SAFETY BLADES SIZE 15, STERILE

## (undated) DEVICE — STERILE BETHLEHEM PLASTIC HAND: Brand: CARDINAL HEALTH

## (undated) DEVICE — SUT PROLENE 4-0 PC-3 18 IN 8634G

## (undated) DEVICE — TUBING SUCTION 5MM X 12 FT

## (undated) DEVICE — SUT PROLENE 4-0 PS-2 18 IN 8682G

## (undated) DEVICE — WET SKIN PREP TRAY: Brand: MEDLINE INDUSTRIES, INC.

## (undated) DEVICE — VIAL DECANTER

## (undated) DEVICE — SUT PROLENE 2-0 CT-2 30 IN 8411H

## (undated) DEVICE — PREP PAD BNS: Brand: CONVERTORS

## (undated) DEVICE — BETHLEHEM UNIVERSAL MINOR GEN: Brand: CARDINAL HEALTH

## (undated) DEVICE — STERI DRAPE 1000 NON-STERILE ROLL

## (undated) DEVICE — SUT VICRYL 3-0 SH 27 IN J416H

## (undated) DEVICE — CHLORAPREP HI-LITE 26ML ORANGE

## (undated) DEVICE — GLOVE SRG BIOGEL 6.5

## (undated) DEVICE — INTENDED FOR TISSUE SEPARATION, AND OTHER PROCEDURES THAT REQUIRE A SHARP SURGICAL BLADE TO PUNCTURE OR CUT.: Brand: BARD-PARKER ® CARBON RIB-BACK BLADES

## (undated) DEVICE — SYRINGE 30ML LL

## (undated) DEVICE — GLOVE SRG BIOGEL 8

## (undated) DEVICE — GLOVE SRG BIOGEL 7.5

## (undated) DEVICE — GLOVE INDICATOR PI UNDERGLOVE SZ 7 BLUE

## (undated) DEVICE — NEEDLE HYPO 22G X 1-1/2 IN

## (undated) DEVICE — GLOVE SRG BIOGEL ECLIPSE 8

## (undated) DEVICE — STERILE COTTON TIPPED APPLICATORS: Brand: PURITAN

## (undated) DEVICE — ELECTRODE BLADE MOD E-Z CLEAN  2.75IN 7CM -0012AM

## (undated) DEVICE — SUT MONOCRYL 4-0 PS-2 27 IN Y426H

## (undated) DEVICE — 1200CC GUARDIAN II: Brand: GUARDIAN

## (undated) DEVICE — GLOVE SRG BIOGEL 8.5

## (undated) DEVICE — MEDI-VAC YANKAUER SUCTION HANDLE W/BULBOUS AND CONTROL VENT: Brand: CARDINAL HEALTH

## (undated) DEVICE — PENCIL ELECTROSURG E-Z CLEAN -0035H

## (undated) DEVICE — CONMED ACCESSORY ELECTRODE, FLAT BLADE WITH EXTENDED INSULATION: Brand: CONMED

## (undated) DEVICE — SYRINGE 50ML LL

## (undated) DEVICE — ADHESIVE SKIN HIGH VISCOSITY EXOFIN 1ML